# Patient Record
Sex: MALE | Race: BLACK OR AFRICAN AMERICAN | Employment: UNEMPLOYED | URBAN - METROPOLITAN AREA
[De-identification: names, ages, dates, MRNs, and addresses within clinical notes are randomized per-mention and may not be internally consistent; named-entity substitution may affect disease eponyms.]

---

## 2020-02-21 ENCOUNTER — OFFICE VISIT (OUTPATIENT)
Dept: SURGERY | Age: 65
End: 2020-02-21

## 2020-02-21 VITALS
DIASTOLIC BLOOD PRESSURE: 81 MMHG | SYSTOLIC BLOOD PRESSURE: 166 MMHG | HEIGHT: 71 IN | WEIGHT: 178 LBS | RESPIRATION RATE: 16 BRPM | TEMPERATURE: 98.2 F | BODY MASS INDEX: 24.92 KG/M2 | HEART RATE: 66 BPM | OXYGEN SATURATION: 99 %

## 2020-02-21 DIAGNOSIS — K40.90 RIGHT INGUINAL HERNIA: Primary | ICD-10-CM

## 2020-02-21 DIAGNOSIS — F19.10 DRUG ABUSE, IV (HCC): ICD-10-CM

## 2020-02-21 DIAGNOSIS — F10.10 ALCOHOL ABUSE: ICD-10-CM

## 2020-02-21 DIAGNOSIS — K70.30 ALCOHOLIC CIRRHOSIS OF LIVER WITHOUT ASCITES (HCC): ICD-10-CM

## 2020-02-21 NOTE — H&P
General Surgery Consult      Burgess Monk  Admit date: (Not on file)    MRN: [id number]     : 1955     Age: 75 y.o. Attending Physician: Hoa Keller MD [facility]      History of Present Illness:     Burgess Monk is a 75 y.o. male who has multiple medical problems including history of IV drug use and alcohol abuse as well. The patient was diagnosed with a symptomatic large right inguinal hernia for which she was scheduled to have the surgery at [place]. The patient also has evidence of cirrhosis on a CT scan but with no ascites and also there was a liver mass about 2 cm for which they decided to do an MRI and follow-up with medical oncology. The MRI was not done yet but the patient was scheduled for the surgery and it was canceled last minute because his insurance was not taken at the [facility].  So he was referred to me for the surgical evaluation. In addition to the right groin pain the patient has stated that he has sciatica on the right lower extremity with significant right hip and back pain and with the pain radiating all the way to his right lower extremity. The patient stated that his right groin pain is intermittent and that the bulge is most of the time out but it can be reduced when he laid back.      Patient Active Problem List    Diagnosis Date Noted    Leukopenia 2012    Iron deficiency anemia, unspecified 2012    Epidural abscess, L2-L5 2012    Drug abuse, IV ([address] 2012     Past Medical History:   Diagnosis Date    Abscess of right shoulder     Abscess of shoulder     Epidural abscess     Heart murmur     Hematuria     Heroin abuse ([address]     Infected wound     Infectious disease     Iron deficiency anemia     IV drug user     Tobacco abuse       Past Surgical History:   Procedure Laterality Date    HX COLONOSCOPY  2016    HX CYST INCISION AND DRAINAGE      HX CYST INCISION AND DRAINAGE      right shoulder    HX ENDOSCOPY 07/18/2016    HX FREE SKIN GRAFT      HX OTHER SURGICAL      right forearm infections and graft      Social History     Tobacco Use    Smoking status: Current Every Day Smoker     Packs/day: 0.50     Years: 38.00     Pack years: 19.00     Types: Cigarettes    Smokeless tobacco: Never Used   Substance Use Topics    Alcohol use: Yes      Social History     Tobacco Use   Smoking Status Current Every Day Smoker    Packs/day: 0.50    Years: 38.00    Pack years: 19.00    Types: Cigarettes   Smokeless Tobacco Never Used     No family history on file. Current Outpatient Medications   Medication Sig    amLODIPine (NORVASC) 10 mg tablet Take  by mouth daily.  ergocalciferol (VITAMIN D2) 50,000 unit capsule Take 50,000 Units by mouth.  cyclobenzaprine (FLEXERIL) 10 mg tablet Take  by mouth three (3) times daily as needed for Muscle Spasm(s).  fluconazole (DIFLUCAN) 100 mg tablet Take 200 mg by mouth daily. FDA advises cautious prescribing of oral fluconazole in pregnancy.  gabapentin (NEURONTIN) 300 mg capsule Take 300 mg by mouth three (3) times daily.  lidocaine (LIDODERM) 5 % by TransDERmal route every twenty-four (24) hours. Apply patch to the affected area for 12 hours a day and remove for 12 hours a day.  thiamine HCL (B-1) 100 mg tablet Take  by mouth daily.  HYDROcodone-acetaminophen (NORCO) 5-325 mg per tablet Take 1 Tab by mouth every four (4) hours as needed for Pain. No current facility-administered medications for this visit.        No Known Allergies     Review of Systems:  Constitutional: negative  Eyes: negative  Ears, Nose, Mouth, Throat, and Face: negative  Respiratory: negative  Cardiovascular: negative  Gastrointestinal: positive for abdominal pain and Right groin pain and bulge  Genitourinary:negative  Integument/Breast: negative  Hematologic/Lymphatic: negative  Musculoskeletal:positive for back pain and Right hip pain and sciatica however the pain radiating to his right lower extremity including the thigh. Neurological: negative  Behavioral/Psychiatric: negative  Endocrine: negative  Allergic/Immunologic: negative    Objective:     Visit Vitals  /81   Pulse 66   Temp 98.2 °F (36.8 °C) (Oral)   Resp 16   Ht 5' 11\" (1.803 m)   Wt 80.7 kg (178 lb)   SpO2 99%   BMI 24.83 kg/m²       Physical Exam:      General:  in no apparent distress, alert and oriented times 3   Eyes:  conjunctivae and sclerae normal, pupils equal, round, reactive to light   Throat & Neck: no erythema or exudates noted and neck supple and symmetrical; no palpable masses   Lungs:   clear to auscultation bilaterally   Heart:  Regular rate and rhythm   Abdomen:   rounded, soft, nontender, nondistended, no masses or organomegaly. There is no evidence of ascites or abdominal wall hernias except for a large right inguinal hernia that is tender to palpation but reducible with gentle continuous pressure.     Extremities: extremities normal, atraumatic, no cyanosis or edema   Skin: Normal.       Imaging and Lab Review:     CBC:   Lab Results   Component Value Date/Time    WBC 2.7 (L) 09/05/2012 01:30 AM    RBC 4.19 (L) 09/05/2012 01:30 AM    HGB 10.6 (L) 09/05/2012 01:30 AM    HCT 33.2 (L) 09/05/2012 01:30 AM    PLATELET 142 (L) 11/95/9308 01:30 AM     BMP:   Lab Results   Component Value Date/Time    Glucose 95 08/27/2012 04:00 AM    Sodium 136 08/27/2012 04:00 AM    Potassium 4.2 08/27/2012 04:00 AM    Chloride 102 08/27/2012 04:00 AM    CO2 30 08/27/2012 04:00 AM    BUN 10 08/27/2012 04:00 AM    Creatinine 1.0 09/04/2012 07:00 AM    Calcium 10.0 08/27/2012 04:00 AM     CMP:  Lab Results   Component Value Date/Time    Glucose 95 08/27/2012 04:00 AM    Sodium 136 08/27/2012 04:00 AM    Potassium 4.2 08/27/2012 04:00 AM    Chloride 102 08/27/2012 04:00 AM    CO2 30 08/27/2012 04:00 AM    BUN 10 08/27/2012 04:00 AM    Creatinine 1.0 09/04/2012 07:00 AM    Calcium 10.0 08/27/2012 04:00 AM    Anion gap 4 (L) 08/27/2012 04:00 AM    BUN/Creatinine ratio 10 (L) 08/27/2012 04:00 AM    Alk. phosphatase 86 09/11/2010 02:05 AM    Protein, total 6.9 09/11/2010 02:05 AM    Albumin 2.2 (L) 09/11/2010 02:05 AM    Globulin 4.7 (H) 09/11/2010 02:05 AM    A-G Ratio 0.5 (L) 09/11/2010 02:05 AM       No results found for this or any previous visit (from the past 24 hour(s)). images and reports reviewed    Assessment:   Vera Bloom is a 75 y.o. male is presenting with a picture of a symptomatic right inguinal hernia. I agree with the surgeon at [facility] that the patient needs surgery because it is symptomatic and it is tender to palpation however the patient also has a lot of medical condition I explained to him the risks of the surgery. I also explained to him that his right lower extremity pain has nothing to do with the hernia and this will never go away after the surgery. I also explained to him the importance of stop drinking alcohol and the use of IV drugs. I Discussed the possibility of incarceration, strangulation, enlargement in size over time, and the risk of emergency surgery in the face of strangulation. I also discussed the use of prosthetic materials (mesh), including the risk of infection. Also discussed the risk of surgery including recurrence and the possible need for reoperation and removal of mesh if used, possibility of postoperative small bowel injury, obstruction or ileus, and the risks of general anesthetic. I explained to the the patient about the robotic hernia repair procedure. I also explained to the patient that it is a must that he follow-up with PCP and to follow-up on the MRI that was ordered for his liver because of the mass that was discovered. I explained to him that this could be malignant and he stated that he knows about it. Plan:     Schedule for robotic right inguinal hernia repair with placement of mesh.   Stop drinking alcohol and use of IV drugs  Follow-up with his PCP and medical oncology for his liver mass  Follow-up with the GI for his liver cirrhosis    Please call me if you have any questions (cell phone: [phone])     Signed By: Sylvester Chand MD     February 21, 2020

## 2020-03-02 NOTE — PERIOP NOTES
PAT - SURGICAL PRE-ADMISSION INSTRUCTIONS    NAME:  Gilberto Crowley                                                          TODAY'S DATE:  3/2/2020    SURGERY DATE:  3/6/2020                                  SURGERY ARRIVAL TIME:   TBA    1. Do NOT eat or drink anything, including candy or gum, after MIDNIGHT on 03/05 , unless you have specific instructions from your Surgeon or Anesthesia Provider to do so. 2. No smoking on the day of surgery. 3. No alcohol 24 hours prior to the day of surgery. 4. No recreational drugs for one week prior to the day of surgery. 5. Leave all valuables, including money/purse, at home. 6. Remove all jewelry, nail polish, makeup (including mascara); no lotions, powders, deodorant, or perfume/cologne/after shave. 7. Glasses/Contact lenses and Dentures may be worn to the hospital.  They will be removed prior to surgery. 8. Call your doctor if symptoms of a cold or illness develop within 24 ours prior to surgery. 9. AN ADULT MUST DRIVE YOU HOME AFTER OUTPATIENT SURGERY. 10. If you are having an OUTPATIENT procedure, please make arrangements for a responsible adult to be with you for 24 hours after your surgery. 11. If you are admitted to the hospital, you will be assigned to a bed after surgery is complete. Normally a family member will not be able to see you until you are in your assigned bed. 15. Family is encouraged to accompany you to the hospital.  We ask visitors in the treatment area to be limited to ONE person at a time to ensure patient privacy. EXCEPTIONS WILL BE MADE AS NEEDED. 15. Children under 12 are discouraged from entering the treatment area and need to be supervised by an adult when in the waiting room. Special Instructions:    NONE. Patient Prep:    shower with anti-bacterial soap    These surgical instructions were reviewed with Herman Pereira during the PAT phone call. Directions: On the morning of surgery, please go to the [address] [facility]. Enter the building from the [facility] entrance, 1st floor (next to the Emergency Room entrance). Take the elevator to the 2nd floor. Sign in at the Registration Desk.     If you have any questions and/or concerns, please do not hesitate to call:  (Prior to the day of surgery)  [facility] unit:  [phone]  (Day of surgery)  [facility] unit:  [phone]

## 2020-03-04 ENCOUNTER — ANESTHESIA EVENT (OUTPATIENT)
Dept: SURGERY | Age: 65
End: 2020-03-04
Payer: MEDICARE

## 2020-03-06 ENCOUNTER — ANESTHESIA (OUTPATIENT)
Dept: SURGERY | Age: 65
End: 2020-03-06
Payer: MEDICARE

## 2020-03-06 ENCOUNTER — HOSPITAL ENCOUNTER (OUTPATIENT)
Age: 65
Setting detail: OUTPATIENT SURGERY
Discharge: HOME | End: 2020-03-06
Attending: SURGERY | Admitting: SURGERY
Payer: MEDICARE

## 2020-03-06 VITALS
OXYGEN SATURATION: 98 % | SYSTOLIC BLOOD PRESSURE: 150 MMHG | RESPIRATION RATE: 12 BRPM | WEIGHT: 177.8 LBS | TEMPERATURE: 97.6 F | HEART RATE: 59 BPM | BODY MASS INDEX: 24.89 KG/M2 | HEIGHT: 71 IN | DIASTOLIC BLOOD PRESSURE: 72 MMHG

## 2020-03-06 DIAGNOSIS — Z98.890 S/P HERNIA REPAIR: Primary | ICD-10-CM

## 2020-03-06 DIAGNOSIS — Z87.19 S/P HERNIA REPAIR: Primary | ICD-10-CM

## 2020-03-06 LAB
AMPHET UR QL SCN: NEGATIVE
BARBITURATES UR QL SCN: NEGATIVE
BENZODIAZ UR QL: NEGATIVE
BUN BLD-MCNC: 11 MG/DL (ref 7–18)
CANNABINOIDS UR QL SCN: NEGATIVE
CHLORIDE BLD-SCNC: 112 MMOL/L (ref 100–108)
COCAINE UR QL SCN: NEGATIVE
GLUCOSE BLD STRIP.AUTO-MCNC: 91 MG/DL (ref 74–106)
HCT VFR BLD CALC: 46 % (ref 36–49)
HDSCOM,HDSCOM: ABNORMAL
HGB BLD-MCNC: 15.6 G/DL (ref 12–16)
METHADONE UR QL: NEGATIVE
OPIATES UR QL: POSITIVE
PCP UR QL: NEGATIVE
POTASSIUM BLD-SCNC: 4.3 MMOL/L (ref 3.5–5.5)
SODIUM BLD-SCNC: 138 MMOL/L (ref 136–145)

## 2020-03-06 PROCEDURE — 74011250636 HC RX REV CODE- 250/636: Performed by: SURGERY

## 2020-03-06 PROCEDURE — 76210000021 HC REC RM PH II 0.5 TO 1 HR: Performed by: SURGERY

## 2020-03-06 PROCEDURE — 74011250637 HC RX REV CODE- 250/637: Performed by: NURSE ANESTHETIST, CERTIFIED REGISTERED

## 2020-03-06 PROCEDURE — 80307 DRUG TEST PRSMV CHEM ANLYZR: CPT

## 2020-03-06 PROCEDURE — 76060000033 HC ANESTHESIA 1 TO 1.5 HR: Performed by: SURGERY

## 2020-03-06 PROCEDURE — 77030020703 HC SEAL CANN DISP INTU -B: Performed by: SURGERY

## 2020-03-06 PROCEDURE — 77030002933 HC SUT MCRYL J&J -A: Performed by: SURGERY

## 2020-03-06 PROCEDURE — 74011250636 HC RX REV CODE- 250/636: Performed by: NURSE ANESTHETIST, CERTIFIED REGISTERED

## 2020-03-06 PROCEDURE — 93005 ELECTROCARDIOGRAM TRACING: CPT

## 2020-03-06 PROCEDURE — C1781 MESH (IMPLANTABLE): HCPCS | Performed by: SURGERY

## 2020-03-06 PROCEDURE — 82947 ASSAY GLUCOSE BLOOD QUANT: CPT

## 2020-03-06 PROCEDURE — 77030035277 HC OBTRTR BLDELSS DISP INTU -B: Performed by: SURGERY

## 2020-03-06 PROCEDURE — 74011000250 HC RX REV CODE- 250: Performed by: NURSE ANESTHETIST, CERTIFIED REGISTERED

## 2020-03-06 PROCEDURE — 77030032490 HC SLV COMPR SCD KNE COVD -B: Performed by: SURGERY

## 2020-03-06 PROCEDURE — 74011000250 HC RX REV CODE- 250: Performed by: SURGERY

## 2020-03-06 PROCEDURE — 77030031139 HC SUT VCRL2 J&J -A: Performed by: SURGERY

## 2020-03-06 PROCEDURE — 74011250637 HC RX REV CODE- 250/637: Performed by: ANESTHESIOLOGY

## 2020-03-06 PROCEDURE — 76010000934 HC OR TIME 1 TO 1.5HR INTENSV - TIER 2: Performed by: SURGERY

## 2020-03-06 PROCEDURE — 77030009848 HC PASSR SUT SET COOP -C: Performed by: SURGERY

## 2020-03-06 PROCEDURE — 77030018842 HC SOL IRR SOD CL 9% BAXT -A: Performed by: SURGERY

## 2020-03-06 PROCEDURE — 76210000016 HC OR PH I REC 1 TO 1.5 HR: Performed by: SURGERY

## 2020-03-06 PROCEDURE — 77030022704 HC SUT VLOC COVD -B: Performed by: SURGERY

## 2020-03-06 PROCEDURE — 77030010507 HC ADH SKN DERMBND J&J -B: Performed by: SURGERY

## 2020-03-06 DEVICE — LAPAROSCOPIC SELF-FIXATING MESH POLYESTER WITH POLYLACTIC ACID GRIPS AND COLLAGEN FILM
Type: IMPLANTABLE DEVICE | Site: INGUINAL | Status: FUNCTIONAL
Brand: PROGRIP

## 2020-03-06 RX ORDER — FENTANYL CITRATE 50 UG/ML
INJECTION, SOLUTION INTRAMUSCULAR; INTRAVENOUS AS NEEDED
Status: DISCONTINUED | OUTPATIENT
Start: 2020-03-06 | End: 2020-03-06 | Stop reason: HOSPADM

## 2020-03-06 RX ORDER — PROPOFOL 10 MG/ML
INJECTION, EMULSION INTRAVENOUS AS NEEDED
Status: DISCONTINUED | OUTPATIENT
Start: 2020-03-06 | End: 2020-03-06 | Stop reason: HOSPADM

## 2020-03-06 RX ORDER — MAGNESIUM SULFATE 100 %
16 CRYSTALS MISCELLANEOUS AS NEEDED
Status: DISCONTINUED | OUTPATIENT
Start: 2020-03-06 | End: 2020-03-06 | Stop reason: HOSPADM

## 2020-03-06 RX ORDER — VECURONIUM BROMIDE FOR INJECTION 1 MG/ML
INJECTION, POWDER, LYOPHILIZED, FOR SOLUTION INTRAVENOUS AS NEEDED
Status: DISCONTINUED | OUTPATIENT
Start: 2020-03-06 | End: 2020-03-06 | Stop reason: HOSPADM

## 2020-03-06 RX ORDER — FAMOTIDINE 20 MG/1
20 TABLET, FILM COATED ORAL ONCE
Status: COMPLETED | OUTPATIENT
Start: 2020-03-06 | End: 2020-03-06

## 2020-03-06 RX ORDER — HYDROMORPHONE HYDROCHLORIDE 2 MG/ML
0.5 INJECTION, SOLUTION INTRAMUSCULAR; INTRAVENOUS; SUBCUTANEOUS AS NEEDED
Status: DISCONTINUED | OUTPATIENT
Start: 2020-03-06 | End: 2020-03-06 | Stop reason: HOSPADM

## 2020-03-06 RX ORDER — OXYCODONE AND ACETAMINOPHEN 5; 325 MG/1; MG/1
2 TABLET ORAL
Status: COMPLETED | OUTPATIENT
Start: 2020-03-06 | End: 2020-03-06

## 2020-03-06 RX ORDER — KETOROLAC TROMETHAMINE 10 MG/1
10 TABLET, FILM COATED ORAL
Status: COMPLETED | OUTPATIENT
Start: 2020-03-06 | End: 2020-03-06

## 2020-03-06 RX ORDER — SODIUM CHLORIDE, SODIUM LACTATE, POTASSIUM CHLORIDE, CALCIUM CHLORIDE 600; 310; 30; 20 MG/100ML; MG/100ML; MG/100ML; MG/100ML
50 INJECTION, SOLUTION INTRAVENOUS CONTINUOUS
Status: DISCONTINUED | OUTPATIENT
Start: 2020-03-06 | End: 2020-03-06 | Stop reason: HOSPADM

## 2020-03-06 RX ORDER — GLYCOPYRROLATE 0.2 MG/ML
INJECTION INTRAMUSCULAR; INTRAVENOUS AS NEEDED
Status: DISCONTINUED | OUTPATIENT
Start: 2020-03-06 | End: 2020-03-06 | Stop reason: HOSPADM

## 2020-03-06 RX ORDER — INSULIN LISPRO 100 [IU]/ML
INJECTION, SOLUTION INTRAVENOUS; SUBCUTANEOUS ONCE
Status: DISCONTINUED | OUTPATIENT
Start: 2020-03-06 | End: 2020-03-06 | Stop reason: HOSPADM

## 2020-03-06 RX ORDER — NEOSTIGMINE METHYLSULFATE 1 MG/ML
INJECTION, SOLUTION INTRAVENOUS AS NEEDED
Status: DISCONTINUED | OUTPATIENT
Start: 2020-03-06 | End: 2020-03-06 | Stop reason: HOSPADM

## 2020-03-06 RX ORDER — HYDROMORPHONE HYDROCHLORIDE 1 MG/ML
INJECTION, SOLUTION INTRAMUSCULAR; INTRAVENOUS; SUBCUTANEOUS
Status: DISCONTINUED
Start: 2020-03-06 | End: 2020-03-06 | Stop reason: HOSPADM

## 2020-03-06 RX ORDER — ONDANSETRON 2 MG/ML
INJECTION INTRAMUSCULAR; INTRAVENOUS AS NEEDED
Status: DISCONTINUED | OUTPATIENT
Start: 2020-03-06 | End: 2020-03-06 | Stop reason: HOSPADM

## 2020-03-06 RX ORDER — CEFAZOLIN SODIUM 2 G/50ML
2 SOLUTION INTRAVENOUS
Status: COMPLETED | OUTPATIENT
Start: 2020-03-06 | End: 2020-03-06

## 2020-03-06 RX ORDER — OXYCODONE AND ACETAMINOPHEN 5; 325 MG/1; MG/1
1 TABLET ORAL
Qty: 12 TAB | Refills: 0 | Status: SHIPPED | OUTPATIENT
Start: 2020-03-06 | End: 2020-03-09

## 2020-03-06 RX ORDER — LIDOCAINE HYDROCHLORIDE 20 MG/ML
INJECTION, SOLUTION EPIDURAL; INFILTRATION; INTRACAUDAL; PERINEURAL AS NEEDED
Status: DISCONTINUED | OUTPATIENT
Start: 2020-03-06 | End: 2020-03-06 | Stop reason: HOSPADM

## 2020-03-06 RX ORDER — SODIUM CHLORIDE 0.9 % (FLUSH) 0.9 %
5-40 SYRINGE (ML) INJECTION AS NEEDED
Status: DISCONTINUED | OUTPATIENT
Start: 2020-03-06 | End: 2020-03-06 | Stop reason: HOSPADM

## 2020-03-06 RX ORDER — SUCCINYLCHOLINE CHLORIDE 100 MG/5ML
SYRINGE (ML) INTRAVENOUS AS NEEDED
Status: DISCONTINUED | OUTPATIENT
Start: 2020-03-06 | End: 2020-03-06 | Stop reason: HOSPADM

## 2020-03-06 RX ORDER — DEXAMETHASONE SODIUM PHOSPHATE 4 MG/ML
INJECTION, SOLUTION INTRA-ARTICULAR; INTRALESIONAL; INTRAMUSCULAR; INTRAVENOUS; SOFT TISSUE AS NEEDED
Status: DISCONTINUED | OUTPATIENT
Start: 2020-03-06 | End: 2020-03-06 | Stop reason: HOSPADM

## 2020-03-06 RX ORDER — SODIUM CHLORIDE 0.9 % (FLUSH) 0.9 %
5-40 SYRINGE (ML) INJECTION EVERY 8 HOURS
Status: DISCONTINUED | OUTPATIENT
Start: 2020-03-06 | End: 2020-03-06 | Stop reason: HOSPADM

## 2020-03-06 RX ORDER — METOPROLOL TARTRATE 5 MG/5ML
INJECTION INTRAVENOUS AS NEEDED
Status: DISCONTINUED | OUTPATIENT
Start: 2020-03-06 | End: 2020-03-06 | Stop reason: HOSPADM

## 2020-03-06 RX ORDER — MIDAZOLAM HYDROCHLORIDE 1 MG/ML
INJECTION, SOLUTION INTRAMUSCULAR; INTRAVENOUS AS NEEDED
Status: DISCONTINUED | OUTPATIENT
Start: 2020-03-06 | End: 2020-03-06 | Stop reason: HOSPADM

## 2020-03-06 RX ORDER — ONDANSETRON 2 MG/ML
4 INJECTION INTRAMUSCULAR; INTRAVENOUS ONCE
Status: DISCONTINUED | OUTPATIENT
Start: 2020-03-06 | End: 2020-03-06 | Stop reason: HOSPADM

## 2020-03-06 RX ADMIN — GLYCOPYRROLATE 0.2 MG: 0.2 INJECTION INTRAMUSCULAR; INTRAVENOUS at 09:47

## 2020-03-06 RX ADMIN — OXYCODONE HYDROCHLORIDE AND ACETAMINOPHEN 2 TABLET: 5; 325 TABLET ORAL at 11:46

## 2020-03-06 RX ADMIN — CEFAZOLIN SODIUM 2 G: 2 SOLUTION INTRAVENOUS at 08:53

## 2020-03-06 RX ADMIN — VECURONIUM BROMIDE FOR INJECTION 2 MG: 1 INJECTION, POWDER, LYOPHILIZED, FOR SOLUTION INTRAVENOUS at 09:05

## 2020-03-06 RX ADMIN — Medication 100 MG: at 08:57

## 2020-03-06 RX ADMIN — ONDANSETRON 4 MG: 2 SOLUTION INTRAMUSCULAR; INTRAVENOUS at 09:50

## 2020-03-06 RX ADMIN — FAMOTIDINE 20 MG: 20 TABLET ORAL at 07:58

## 2020-03-06 RX ADMIN — DEXAMETHASONE SODIUM PHOSPHATE 4 MG: 4 INJECTION, SOLUTION INTRA-ARTICULAR; INTRALESIONAL; INTRAMUSCULAR; INTRAVENOUS; SOFT TISSUE at 09:44

## 2020-03-06 RX ADMIN — VECURONIUM BROMIDE FOR INJECTION 1 MG: 1 INJECTION, POWDER, LYOPHILIZED, FOR SOLUTION INTRAVENOUS at 08:57

## 2020-03-06 RX ADMIN — LIDOCAINE HYDROCHLORIDE 60 MG: 20 INJECTION, SOLUTION INTRAVENOUS at 08:57

## 2020-03-06 RX ADMIN — KETOROLAC TROMETHAMINE 10 MG: 10 TABLET, FILM COATED ORAL at 12:02

## 2020-03-06 RX ADMIN — DEXMEDETOMIDINE: 100 INJECTION, SOLUTION, CONCENTRATE INTRAVENOUS at 09:08

## 2020-03-06 RX ADMIN — DEXMEDETOMIDINE: 100 INJECTION, SOLUTION, CONCENTRATE INTRAVENOUS at 08:07

## 2020-03-06 RX ADMIN — MIDAZOLAM 2 MG: 1 INJECTION INTRAMUSCULAR; INTRAVENOUS at 08:52

## 2020-03-06 RX ADMIN — SODIUM CHLORIDE, SODIUM LACTATE, POTASSIUM CHLORIDE, AND CALCIUM CHLORIDE 50 ML/HR: 600; 310; 30; 20 INJECTION, SOLUTION INTRAVENOUS at 08:00

## 2020-03-06 RX ADMIN — Medication 2 MG: at 09:47

## 2020-03-06 RX ADMIN — HYDROMORPHONE HYDROCHLORIDE 0.5 MG: 2 INJECTION, SOLUTION INTRAMUSCULAR; INTRAVENOUS; SUBCUTANEOUS at 10:30

## 2020-03-06 RX ADMIN — HYDROMORPHONE HYDROCHLORIDE 0.5 MG: 2 INJECTION, SOLUTION INTRAMUSCULAR; INTRAVENOUS; SUBCUTANEOUS at 10:50

## 2020-03-06 RX ADMIN — PROPOFOL 200 MG: 10 INJECTION, EMULSION INTRAVENOUS at 08:57

## 2020-03-06 RX ADMIN — FENTANYL CITRATE 100 MCG: 50 INJECTION, SOLUTION INTRAMUSCULAR; INTRAVENOUS at 08:57

## 2020-03-06 RX ADMIN — HYDROMORPHONE HYDROCHLORIDE 0.5 MG: 2 INJECTION, SOLUTION INTRAMUSCULAR; INTRAVENOUS; SUBCUTANEOUS at 10:20

## 2020-03-06 RX ADMIN — METOPROLOL TARTRATE 1 MG: 5 INJECTION INTRAVENOUS at 09:15

## 2020-03-06 NOTE — ANESTHESIA PREPROCEDURE EVALUATION
Relevant Problems   No relevant active problems       Anesthetic History   No history of anesthetic complications            Review of Systems / Medical History  Patient summary reviewed, nursing notes reviewed and pertinent labs reviewed    Pulmonary          Smoker         Neuro/Psych             Comments: + Hx/o Polysubstance Abuse/IVDA, denies recent heroin or cocaine. .. Cardiovascular    Hypertension                Comments:   + PULMONARY HTN    1/27/2019 2D ECHO:  Result Impression  :   NORMAL LEFT VENTRICULAR CAVITY SIZE WITH MARKED CONCENTRIC LEFT VENTRICULAR HYPERTROPHY. NORMAL LEFT VENTRICULAR EJECTION FRACTION ESTIMATED AT 65% WITH NO WALL MOTION   ABNORMALITIES. MILD DIASTOLIC DYSFUNCTION. NORMAL RIGHT VENTRICULAR SIZE WITH NORMAL SYSTOLIC FUNCTION. NO HEMODYNAMICALLY SIGNIFICANT VALVULAR PATHOLOGY. ESTIMATED PULMONARY ARTERY PRESSURE IS 40 MMHG. COMPARED TO PRIOR ECHO FROM 7/16 THE EJECTION FRACTION HAS DECREASED BY 10% AND THE   PULMONARY ARTERY PRESSURE HAS INCREASED BY 17 MMHG.        Clinical Indications: Chest pain   ICD Codes: Technical Quality: Adequate     MEASUREMENTS:   2D ECHO    LV Diastolic Diameter Base LX     4.1 cm                8.9-2.1   LV Systolic Diameter Base LX      2.2 cm                2.5-4.1   IVS Diastolic Thickness           1.8 cm                0.6-1.1 cm   LVPW Diastolic Thickness          1.3 cm                6.0-5.8 cm   LA Systolic Diameter LX           3.1 cm                2.1-3.7 cm   Aortic Root Diameter              2.9 cm                6.0-9.8 cm   LA Systolic Pressure              22.2 mmHg   LA Area 4C View                   13.5 cm²   LA Area 2C View                   14.3 cm²   LA Length 4C                      5.3 cm   LA Length 2C                      5 cm   LA Volume                         27.2 cm³     DOPPLER    Mitral E Point Velocity           95.2 cm/s   Mitral  A Point Velocity          103 cm/s   Mitral E to A Ratio               0.92                  1.0 to 1.5   Mitral E to LV E' Septal Ratio    12.9   Mitral E to LV E' Lateral Ratio   16.2   MV Deceleration Time              285 ms                160-240 ms   MV Area PHT                       2.6 cm²   Isovolumic Relaxation Time        111 ms                70-90 ms   Pulm Vein Atrial Reversal Veloci  31.6 cm/s             <35 cm/s   Pulm Vein Atrial Duration         124 ms   E Prime Velocity                  5.9 cm/s   RA Pressure (Entered Value)       3 mmHg   TR Peak Velocity                  3 m/s   TR Peak Gradient                  31 mmHg   RV Systolic Pressure              40 mmHg       FINDINGS:     Left Ventricle: Normal left ventricular cavity size with marked concentric hypertrophy. Left Ventricle Normal left ventricular ejection fraction. Mild diastolic dysfunction. Function:   LVEF: 65 %       Left Atrium: Normal left atrial size. Right Ventricle: Normal right ventricular size with normal systolic function. Tricuspid Annular Plane Systolic   Excursion (TAPSE) is 2.6 cm. Tricuspid Annular Plane Systolic Velocity (TAPSV) is 16 cm/s. Right Atrium: Normal right atrial size. Normal inferior vena cava (IVC)/hepatic vein. IVC collapses >50% with   inspiration consistent with normal venous pressure. Mitral Valve: Thickening/fibrosis of the mitral valve leaflets with no evidence of stenosis. Mild mitral   regurgitation. Aortic Valve: Structurally normal aortic valve with no evidence of stenosis or regurgitation. Tricuspid Valve: Normally structured tricuspid valve with no evidence of stenosis. Mild tricuspid regurgitation. Estimated pulmonary artery pressure is 40 mmHg. Pulmonic Valve: Structurally normal pulmonic valve. No evidence of valvular pulmonic stenosis. Mild pulmonic   regurgitation. Aorta: Normal aortic root diameter. Pericardium: No pericardial effusion. Masses / Shunts: No masses, shunts or thrombi seen.        Anay Lynn MD, [facility] GI/Hepatic/Renal       Hepatitis: type C    Liver disease    Comments: Recent CT scan (1/21/20) shows CIRRHOSIS and Splenomegaly. .. Hx/o Hyponatremia Endo/Other        Arthritis and anemia     Other Findings   Comments: + Hx/o Polysubstance Abuse/IVDA, denies recent heroin or cocaine. .. UDS only positive for opiates today, thus will proceed. .. Physical Exam    Airway  Mallampati: II  TM Distance: 4 - 6 cm  Neck ROM: normal range of motion   Mouth opening: Normal     Cardiovascular    Rhythm: regular  Rate: normal         Dental    Dentition: Poor dentition  Comments: Patient has only two remaining teeth, both cracked/chipped, incisors on lower arcade. ..    Pulmonary  Breath sounds clear to auscultation               Abdominal  GI exam deferred       Other Findings            Anesthetic Plan    ASA: 3  Anesthesia type: general          Induction: Intravenous  Anesthetic plan and risks discussed with: Patient

## 2020-03-06 NOTE — OP NOTES
[facility]  OPERATIVE REPORT    Name:  Otilio Kline  MR#:   [id number]  :  1955  ACCOUNT #:  [de-identified]  DATE OF SERVICE:  2020    PREOPERATIVE DIAGNOSIS:  Right inguinal hernia. POSTOPERATIVE DIAGNOSIS:  Right indirect inguinal hernia. PROCEDURE PERFORMED:  Robotic repair of right inguinal hernia with placement of mesh. SURGEON:  Patricia Rueda MD    ASSISTANT:  Carolyne Gomes. ANESTHESIA:  General.    COMPLICATIONS:  None. SPECIMENS REMOVED:  None. IMPLANTS:  ProGrip Covidien mesh 4 x 6 inches. ESTIMATED BLOOD LOSS:  Minimal.    DETAILS OF PROCEDURE:  The patient was brought to operating room. Anesthesia was induced. Scrubbing and draping of the abdomen was done in usual manner. A time-out was performed. A skin incision in the supraumbilical area was performed. Veress needle was inserted. Saline drop test was performed. Abdomen was insufflated. An 8.5 port was inserted. Abdomen was explored. There was what seems to be a macronodular liver cirrhosis. There was no ascites in the abdomen. There was also a very large right indirect inguinal hernia with part of the small bowel inside of it. Two other 8 mm ports were placed on the right and left side of the abdominal wall under direct visualization after TAP block was performed bilaterally. The patient was placed in Trendelenburg position. The robot was docked. It was easy to reduce the bowel from the hernia sac itself. Then, the peritoneum was opened in the right groin. The preperitoneal space was dissected. The inferior epigastric vessels were identified and protected. The hernia sac was completely reduced. At this point, the Ruel ligament was identified and the vas and cord structure were protected as well. A 4 x 6 inch ProGrip Covidien mesh was placed in the preperitoneal space.   This was fixed with interrupted 2-0 Vicryl sutures and then the peritoneum was closed with a 2-0 V-Loc suture in a running fashion on top of the mesh to cover it. Hemostasis was secured. Instruments were removed. Robot was undocked and the skin incisions were closed with 4-0 Monocryl and glue.       Scar Schultz MD      [id number]  D:  03/06/2020 10:03  T:  03/06/2020 18:29  JOB #:  [id number]

## 2020-03-06 NOTE — PROGRESS NOTES
Date of Surgery Update:  Tammie Ortega was seen and examined. History and physical has been reviewed. The patient has been examined. There have been no significant clinical changes since the completion of the originally dated History and Physical. Will proceed with robotic right inguinal hernia repair with mesh if urology screen is negative.     Signed By: Kelsi Cameron MD     March 6, 2020 8:24 AM

## 2020-03-06 NOTE — DISCHARGE INSTRUCTIONS
Discharge Instructions Following Surgery    Patient: Carisa Rossi MRN: [id number]  SSN: [id number]    YOB: 1955  Age: 75 y.o. Sex: male      Activity  · As tolerated, walking encourage, stairs are okay. · Avoid strenuous activities - no lifting anything heavier than 15 pounds till seen in the clinic. · You may shower at home after 24 hours. Diet  · Regular diet after nausea from the anesthetic has passed. Pain  · Take pain medication as directed by your doctor. · Call your doctor if pain is NOT relieved by medication. Wound and Dressing Care  · There is glue on the wounds. No need for any dressing care. · Apply ice packs to the area of the surgery for the first 1 to 2 days  · Apply warm compresses after 2 days for pain relieve if needed    After Anesthesia  · For the first 24 hours: DO NOT Drive, Drink alcoholic beverages, or Make important decisions. · Be aware of dizziness following anesthesia and while taking pain medication. Call your doctor if  · Excessive bleeding that does not stop after holding mild pressure over the area. · Temperature of 101 degrees F or above. · Redness,excessive swelling or bruising, and/or green or yellow, smelly discharge from incision. · If nausea and vomiting continues. Appointment date/time Follow-Up Phone Calls    · Call the office at [phone] to make your follow-up appointment in 2 weeks after the surgery (if not already set up) . Dr. Cris Lutz cell phone number is [phone]. Please call me if you have any concerns or questions.      DISCHARGE SUMMARY from Nurse    PATIENT INSTRUCTIONS:    After general anesthesia or intravenous sedation, for 24 hours or while taking prescription Narcotics:  · Limit your activities  · Do not drive and operate hazardous machinery  · Do not make important personal or business decisions  · Do  not drink alcoholic beverages  · If you have not urinated within 8 hours after discharge, please contact your surgeon on call. Report the following to your surgeon:  · Excessive pain, swelling, redness or odor of or around the surgical area  · Temperature over 100.5  · Nausea and vomiting lasting longer than 4 hours or if unable to take medications  · Any signs of decreased circulation or nerve impairment to extremity: change in color, persistent  numbness, tingling, coldness or increase pain  · Any questions    These are general instructions for a healthy lifestyle:    No smoking/ No tobacco products/ Avoid exposure to second hand smoke  Surgeon General's Warning:  Quitting smoking now greatly reduces serious risk to your health. Obesity, smoking, and sedentary lifestyle greatly increases your risk for illness    A healthy diet, regular physical exercise & weight monitoring are important for maintaining a healthy lifestyle    You may be retaining fluid if you have a history of heart failure or if you experience any of the following symptoms:  Weight gain of 3 pounds or more overnight or 5 pounds in a week, increased swelling in our hands or feet or shortness of breath while lying flat in bed. Please call your doctor as soon as you notice any of these symptoms; do not wait until your next office visit. The discharge information has been reviewed with the patient. The patient verbalized understanding. Discharge medications reviewed with the patient and appropriate educational materials and side effects teaching were provided. _______________________Patient armband removed and given to patient to take home.   Patient was informed of the privacy risks if armband lost or stolen  ____________________________________________________________________________________________________________

## 2020-03-06 NOTE — ANESTHESIA POSTPROCEDURE EVALUATION
Procedure(s):  Davinci Right inguinal hernia repair with mesh  ROBOTIC ASSISTED. general    Anesthesia Post Evaluation      Multimodal analgesia: multimodal analgesia used between 6 hours prior to anesthesia start to PACU discharge  Patient location during evaluation: PACU  Patient participation: complete - patient participated  Level of consciousness: sleepy but conscious  Pain score: 4  Pain management: adequate  Airway patency: patent  Anesthetic complications: no  Cardiovascular status: acceptable  Respiratory status: acceptable  Hydration status: acceptable  Post anesthesia nausea and vomiting:  none      Vitals Value Taken Time   /68 3/6/2020 10:25 AM   Temp 36.8 °C (98.2 °F) 3/6/2020 10:08 AM   Pulse 60 3/6/2020 10:36 AM   Resp 12 3/6/2020 10:36 AM   SpO2 96 % 3/6/2020 10:36 AM   Vitals shown include unvalidated device data.

## 2020-03-06 NOTE — PERIOP NOTES
0701 Pt states he is unable to urinate at this moment. Pt's friend is confirmed to be in the [address] [facility] opened . Pt urinated at 0830. Istat obtained. Dr. Echo Blackwood at bedside. Pt had not urinated. Pt walked to the  and urine specimen obtained. Clothing in closet.   Anesth given the [facility]

## 2020-03-06 NOTE — PERIOP NOTES
Phase 2 Recovery Summary    Report received from renita martell    Vitals:    03/06/20 1056 03/06/20 1110 03/06/20 1121 03/06/20 1131   BP: 144/72 146/69  150/72   Pulse: (!) 59 (!) 59 (!) 59 (!) 59   Resp: 12 12 12 12   Temp: 97.6 °F (36.4 °C)      SpO2: 96% 97% 97% 98%   Weight:       Height:           oriented to time, place, person and situation          Lines and Drains  Peripheral Intravenous Line:      Wound  Wound Abdomen Anterior TROCAR SITES X 3 (Active)   Dressing Status Clean, dry, and intact 3/6/2020 11:00 AM   Dressing Type 4 x 4;Topical skin adhesive/glue 3/6/2020 11:00 AM   Incision Site Well Approximated Yes 3/6/2020 11:00 AM   Drainage Amount None 3/6/2020 11:00 AM   Number of days: 0       Wound Abdomen (Active)   Number of days: 0        Patient assisted to chair with minimal assist. Pateint tolerating liquids well. Patient's family at bedside. Discharge discussed with patient and family. No questions or concerns at this time. Patient had time to ask any questions. Discharge medications reviewed with patient and appropriate educational materials and side effects teaching were provided.     Patient discharged to home with Nava Gamino RN

## 2020-03-06 NOTE — BRIEF OP NOTE
BRIEF OPERATIVE NOTE    Date of Procedure: 3/6/2020   Preoperative Diagnosis: right inguinal hernia  k40.90  Postoperative Diagnosis: right inguinal hernia  k40.90    Procedure(s):  Johanna Button Right inguinal hernia repair with mesh  ROBOTIC ASSISTED  Surgeon(s) and Role:     * Katlin Lara MD - Primary  Surgical Staff:  Circ-1: Royal Misha RN  Scrub Tech-1: Renetta Sierra  Surg Asst-1: Emogene Eastern C  Event Time In Time Out   Incision Start 0412    Incision Close 0951      Anesthesia: General   Estimated Blood Loss: Minimal.  Specimens: * No specimens in log *   Findings: Right large indirect inguinal hernia   Complications: None. Implants:   Implant Name Type Inv.  Item Serial No.  Lot No. LRB No. Used Action   MESH ABSORB SURG PROGRIP 10X15 -- PROGRIP - [id number]  MESH ABSORB SURG PROGRIP 10X15 -- PROGRIP  [employer]OVID[employer]  SURGICAL [id number] 1 Implanted

## 2020-03-07 LAB
ATRIAL RATE: 64 BPM
CALCULATED P AXIS, ECG09: 49 DEGREES
CALCULATED R AXIS, ECG10: -37 DEGREES
CALCULATED T AXIS, ECG11: 75 DEGREES
DIAGNOSIS, 93000: NORMAL
P-R INTERVAL, ECG05: 154 MS
Q-T INTERVAL, ECG07: 412 MS
QRS DURATION, ECG06: 84 MS
QTC CALCULATION (BEZET), ECG08: 425 MS
VENTRICULAR RATE, ECG03: 64 BPM

## 2022-04-08 ENCOUNTER — APPOINTMENT (OUTPATIENT)
Dept: CT IMAGING | Age: 67
End: 2022-04-08
Attending: STUDENT IN AN ORGANIZED HEALTH CARE EDUCATION/TRAINING PROGRAM
Payer: MEDICARE

## 2022-04-08 ENCOUNTER — HOSPITAL ENCOUNTER (EMERGENCY)
Age: 67
Discharge: HOME | End: 2022-04-08
Attending: STUDENT IN AN ORGANIZED HEALTH CARE EDUCATION/TRAINING PROGRAM
Payer: MEDICARE

## 2022-04-08 DIAGNOSIS — K74.60 CIRRHOSIS OF LIVER WITHOUT ASCITES, UNSPECIFIED HEPATIC CIRRHOSIS TYPE (HCC): ICD-10-CM

## 2022-04-08 DIAGNOSIS — N30.01 ACUTE CYSTITIS WITH HEMATURIA: Primary | ICD-10-CM

## 2022-04-08 DIAGNOSIS — N28.1 RENAL CYST: ICD-10-CM

## 2022-04-08 DIAGNOSIS — D64.9 ANEMIA, UNSPECIFIED TYPE: ICD-10-CM

## 2022-04-08 LAB
ALBUMIN SERPL-MCNC: 2.4 G/DL (ref 3.4–5)
ALBUMIN/GLOB SERPL: 0.5 {RATIO} (ref 0.8–1.7)
ALP SERPL-CCNC: 121 U/L (ref 45–117)
ALT SERPL-CCNC: 60 U/L (ref 16–61)
ANION GAP SERPL CALC-SCNC: 4 MMOL/L (ref 3–18)
APPEARANCE UR: ABNORMAL
APTT PPP: 38.2 SEC (ref 23–36.4)
AST SERPL-CCNC: 79 U/L (ref 10–38)
BACTERIA URNS QL MICRO: ABNORMAL /HPF
BASOPHILS # BLD: 0 K/UL (ref 0–0.1)
BASOPHILS NFR BLD: 1 % (ref 0–2)
BILIRUB DIRECT SERPL-MCNC: 0.3 MG/DL (ref 0–0.2)
BILIRUB SERPL-MCNC: 0.7 MG/DL (ref 0.2–1)
BILIRUB UR QL: ABNORMAL
BUN SERPL-MCNC: 9 MG/DL (ref 7–18)
BUN/CREAT SERPL: 9 (ref 12–20)
CALCIUM SERPL-MCNC: 9.3 MG/DL (ref 8.5–10.1)
CHLORIDE SERPL-SCNC: 106 MMOL/L (ref 100–111)
CO2 SERPL-SCNC: 27 MMOL/L (ref 21–32)
COLOR UR: ABNORMAL
CREAT SERPL-MCNC: 1.03 MG/DL (ref 0.6–1.3)
DIFFERENTIAL METHOD BLD: ABNORMAL
EOSINOPHIL # BLD: 0.3 K/UL (ref 0–0.4)
EOSINOPHIL NFR BLD: 6 % (ref 0–5)
EPITH CASTS URNS QL MICRO: ABNORMAL /LPF (ref 0–5)
ERYTHROCYTE [DISTWIDTH] IN BLOOD BY AUTOMATED COUNT: 16.6 % (ref 11.6–14.5)
GLOBULIN SER CALC-MCNC: 5.2 G/DL (ref 2–4)
GLUCOSE SERPL-MCNC: 143 MG/DL (ref 74–99)
GLUCOSE UR STRIP.AUTO-MCNC: 100 MG/DL
HCT VFR BLD AUTO: 31.2 % (ref 36–48)
HGB BLD-MCNC: 10 G/DL (ref 13–16)
HGB UR QL STRIP: ABNORMAL
IMM GRANULOCYTES # BLD AUTO: 0 K/UL (ref 0–0.04)
IMM GRANULOCYTES NFR BLD AUTO: 0 % (ref 0–0.5)
INR PPP: 1.5 (ref 0.8–1.2)
KETONES UR QL STRIP.AUTO: 40 MG/DL
LEUKOCYTE ESTERASE UR QL STRIP.AUTO: ABNORMAL
LYMPHOCYTES # BLD: 2.3 K/UL (ref 0.9–3.6)
LYMPHOCYTES NFR BLD: 43 % (ref 21–52)
MCH RBC QN AUTO: 28.2 PG (ref 24–34)
MCHC RBC AUTO-ENTMCNC: 32.1 G/DL (ref 31–37)
MCV RBC AUTO: 88.1 FL (ref 78–100)
MONOCYTES # BLD: 0.5 K/UL (ref 0.05–1.2)
MONOCYTES NFR BLD: 9 % (ref 3–10)
NEUTS SEG # BLD: 2.2 K/UL (ref 1.8–8)
NEUTS SEG NFR BLD: 42 % (ref 40–73)
NITRITE UR QL STRIP.AUTO: POSITIVE
NRBC # BLD: 0 K/UL (ref 0–0.01)
NRBC BLD-RTO: 0 PER 100 WBC
OTHER,OTHU: ABNORMAL
PH UR STRIP: 8.5 [PH] (ref 5–8)
PLATELET # BLD AUTO: 100 K/UL (ref 135–420)
PMV BLD AUTO: 11.7 FL (ref 9.2–11.8)
POTASSIUM SERPL-SCNC: 4.2 MMOL/L (ref 3.5–5.5)
PROT SERPL-MCNC: 7.6 G/DL (ref 6.4–8.2)
PROT UR STRIP-MCNC: >300 MG/DL
PROTHROMBIN TIME: 18.1 SEC (ref 11.5–15.2)
RBC # BLD AUTO: 3.54 M/UL (ref 4.35–5.65)
RBC #/AREA URNS HPF: ABNORMAL /HPF (ref 0–5)
SODIUM SERPL-SCNC: 137 MMOL/L (ref 136–145)
SP GR UR REFRACTOMETRY: 1.01 (ref 1–1.03)
UROBILINOGEN UR QL STRIP.AUTO: >8 EU/DL (ref 0.2–1)
WBC # BLD AUTO: 5.3 K/UL (ref 4.6–13.2)
WBC URNS QL MICRO: ABNORMAL /HPF (ref 0–4)

## 2022-04-08 PROCEDURE — 74177 CT ABD & PELVIS W/CONTRAST: CPT

## 2022-04-08 PROCEDURE — 74011250636 HC RX REV CODE- 250/636: Performed by: STUDENT IN AN ORGANIZED HEALTH CARE EDUCATION/TRAINING PROGRAM

## 2022-04-08 PROCEDURE — 74011000636 HC RX REV CODE- 636: Performed by: STUDENT IN AN ORGANIZED HEALTH CARE EDUCATION/TRAINING PROGRAM

## 2022-04-08 PROCEDURE — 85610 PROTHROMBIN TIME: CPT

## 2022-04-08 PROCEDURE — 85025 COMPLETE CBC W/AUTO DIFF WBC: CPT

## 2022-04-08 PROCEDURE — 99285 EMERGENCY DEPT VISIT HI MDM: CPT

## 2022-04-08 PROCEDURE — 80076 HEPATIC FUNCTION PANEL: CPT

## 2022-04-08 PROCEDURE — 80048 BASIC METABOLIC PNL TOTAL CA: CPT

## 2022-04-08 PROCEDURE — 85730 THROMBOPLASTIN TIME PARTIAL: CPT

## 2022-04-08 PROCEDURE — 81001 URINALYSIS AUTO W/SCOPE: CPT

## 2022-04-08 PROCEDURE — 74011000250 HC RX REV CODE- 250: Performed by: STUDENT IN AN ORGANIZED HEALTH CARE EDUCATION/TRAINING PROGRAM

## 2022-04-08 PROCEDURE — 96374 THER/PROPH/DIAG INJ IV PUSH: CPT

## 2022-04-08 RX ORDER — METHADONE HYDROCHLORIDE 10 MG/1
45 TABLET ORAL DAILY
COMMUNITY

## 2022-04-08 RX ORDER — CEPHALEXIN 500 MG/1
500 CAPSULE ORAL 4 TIMES DAILY
Qty: 28 CAPSULE | Refills: 0 | Status: SHIPPED | OUTPATIENT
Start: 2022-04-08 | End: 2022-04-15

## 2022-04-08 RX ADMIN — IOPAMIDOL 100 ML: 612 INJECTION, SOLUTION INTRAVENOUS at 15:02

## 2022-04-08 RX ADMIN — WATER 1 G: 1 INJECTION INTRAMUSCULAR; INTRAVENOUS; SUBCUTANEOUS at 16:14

## 2022-04-08 NOTE — ED PROVIDER NOTES
EMERGENCY DEPARTMENT HISTORY AND PHYSICAL EXAM      Date: 4/8/2022  Patient Name: Leyda Daniels    History of Presenting Illness     Chief Complaint   Patient presents with    Melena       History Provided By: Patient     HPI: Leyda Daniels, 74 y.o. male with extensive smoking history, hypertension, IV drug abuse presents to the ED with cc of hematuria. Patient says that for the past 1 to 2 months he has noticed intermittent small amounts of bright red blood in his urine. Says that over the past week, especially the past 2-3 days, he has noticed large amount of dark red clots in his urine. Denies any hematuria, urethral discharge, difficulty starting or stopping urinary stream, flank pain, fever, chills, history of kidney stones, trauma to the bladder, or concern for STDs. He denies any diarrhea, constipation, abdominal pain, bloody stools. PCP: Larisa, MD Jesus Alberto    No current facility-administered medications on file prior to encounter. Current Outpatient Medications on File Prior to Encounter   Medication Sig Dispense Refill    methadone (DOLOPHINE) 10 mg tablet Take 45 mg by mouth daily. Past History     Past Medical History:  Past Medical History:   Diagnosis Date    Abscess of right shoulder     Abscess of shoulder     Arthritis     Epidural abscess     Heart murmur     Hematuria     Heroin abuse ([address]     Hypertension     Infected wound     Infectious disease     Iron deficiency anemia     IV drug user     Liver disease     Tobacco abuse        Past Surgical History:  Past Surgical History:   Procedure Laterality Date    HX COLONOSCOPY  07/18/2016    HX CYST REMOVAL      rt.forearm and rt.foot    HX ENDOSCOPY  07/18/2016    HX FREE SKIN GRAFT      HX OTHER SURGICAL      right forearm infections and graft       Family History:  No family history on file.     Social History:  Social History     Tobacco Use    Smoking status: Current Every Day Smoker     Packs/day: 0.50 Years: 38.00     Pack years: 19.00     Types: Cigarettes    Smokeless tobacco: Never Used   Substance Use Topics    Alcohol use: Yes     Alcohol/week: 4.0 standard drinks     Types: 4 Cans of beer per week    Drug use: Yes     Types: Heroin     Comment: last dose 2/15       Allergies:  No Known Allergies      Review of Systems   Review of Systems   Constitutional: Negative for chills and fever. HENT: Negative for mouth sores and sore throat. Eyes: Negative for visual disturbance. Negative recent vision problems   Respiratory: Negative for shortness of breath. Cardiovascular: Negative for chest pain. Gastrointestinal: Negative for abdominal pain, blood in stool, diarrhea, nausea and vomiting. Genitourinary: Positive for hematuria. Negative for difficulty urinating, dysuria, flank pain, frequency, penile discharge, penile pain, penile swelling, scrotal swelling, testicular pain and urgency. Musculoskeletal: Negative for joint swelling and myalgias. Skin: Negative for rash. Neurological: Negative for weakness, light-headedness and headaches. Negative altered level of consciousness   All other systems reviewed and are negative. Physical Exam   Physical Exam  Vitals and nursing note reviewed. Constitutional:       General: He is not in acute distress. Appearance: Normal appearance. HENT:      Head: Normocephalic and atraumatic. Mouth/Throat:      Mouth: Mucous membranes are moist.   Eyes:      General: No scleral icterus. Conjunctiva/sclera: Conjunctivae normal.   Cardiovascular:      Rate and Rhythm: Normal rate and regular rhythm. Heart sounds: No murmur heard. No friction rub. No gallop. Pulmonary:      Effort: Pulmonary effort is normal.      Breath sounds: Normal breath sounds. Abdominal:      General: Abdomen is flat. There is no distension. Palpations: Abdomen is soft. Tenderness: There is no abdominal tenderness.    Genitourinary: Penis: Normal.       Testes: Normal.      Prostate: Normal.   Musculoskeletal:         General: No deformity. Normal range of motion. Cervical back: Normal range of motion and neck supple. Skin:     General: Skin is warm and dry. Findings: No rash. Neurological:      General: No focal deficit present. Mental Status: He is alert and oriented to person, place, and time. Mental status is at baseline. Psychiatric:         Mood and Affect: Mood normal.         Behavior: Behavior normal.         Diagnostic Study Results     Labs -     Recent Results (from the past 12 hour(s))   METABOLIC PANEL, BASIC    Collection Time: 04/08/22 11:30 AM   Result Value Ref Range    Sodium 137 136 - 145 mmol/L    Potassium 4.2 3.5 - 5.5 mmol/L    Chloride 106 100 - 111 mmol/L    CO2 27 21 - 32 mmol/L    Anion gap 4 3.0 - 18 mmol/L    Glucose 143 (H) 74 - 99 mg/dL    BUN 9 7.0 - 18 MG/DL    Creatinine 1.03 0.6 - 1.3 MG/DL    BUN/Creatinine ratio 9 (L) 12 - 20      GFR est AA >60 >60 ml/min/1.73m2    GFR est non-AA >60 >60 ml/min/1.73m2    Calcium 9.3 8.5 - 10.1 MG/DL   CBC WITH AUTOMATED DIFF    Collection Time: 04/08/22 11:30 AM   Result Value Ref Range    WBC 5.3 4.6 - 13.2 K/uL    RBC 3.54 (L) 4.35 - 5.65 M/uL    HGB 10.0 (L) 13.0 - 16.0 g/dL    HCT 31.2 (L) 36.0 - 48.0 %    MCV 88.1 78.0 - 100.0 FL    MCH 28.2 24.0 - 34.0 PG    MCHC 32.1 31.0 - 37.0 g/dL    RDW 16.6 (H) 11.6 - 14.5 %    PLATELET 426 (L) 992 - 420 K/uL    MPV 11.7 9.2 - 11.8 FL    NRBC 0.0 0  WBC    ABSOLUTE NRBC 0.00 0.00 - 0.01 K/uL    NEUTROPHILS 42 40 - 73 %    LYMPHOCYTES 43 21 - 52 %    MONOCYTES 9 3 - 10 %    EOSINOPHILS 6 (H) 0 - 5 %    BASOPHILS 1 0 - 2 %    IMMATURE GRANULOCYTES 0 0.0 - 0.5 %    ABS. NEUTROPHILS 2.2 1.8 - 8.0 K/UL    ABS. LYMPHOCYTES 2.3 0.9 - 3.6 K/UL    ABS. MONOCYTES 0.5 0.05 - 1.2 K/UL    ABS. EOSINOPHILS 0.3 0.0 - 0.4 K/UL    ABS. BASOPHILS 0.0 0.0 - 0.1 K/UL    ABS. IMM.  GRANS. 0.0 0.00 - 0.04 K/UL    DF AUTOMATED     HEPATIC FUNCTION PANEL    Collection Time: 04/08/22 11:30 AM   Result Value Ref Range    Protein, total 7.6 6.4 - 8.2 g/dL    Albumin 2.4 (L) 3.4 - 5.0 g/dL    Globulin 5.2 (H) 2.0 - 4.0 g/dL    A-G Ratio 0.5 (L) 0.8 - 1.7      Bilirubin, total 0.7 0.2 - 1.0 MG/DL    Bilirubin, direct 0.3 (H) 0.0 - 0.2 MG/DL    Alk. phosphatase 121 (H) 45 - 117 U/L    AST (SGOT) 79 (H) 10 - 38 U/L    ALT (SGPT) 60 16 - 61 U/L   PROTHROMBIN TIME + INR    Collection Time: 04/08/22  2:12 PM   Result Value Ref Range    Prothrombin time 18.1 (H) 11.5 - 15.2 sec    INR 1.5 (H) 0.8 - 1.2     PTT    Collection Time: 04/08/22  2:12 PM   Result Value Ref Range    aPTT 38.2 (H) 23.0 - 36.4 SEC       Radiologic Studies -   CT ABD PELV W CONT    (Results Pending)     CT Results  (Last 48 hours)    None        CXR Results  (Last 48 hours)    None          Medical Decision Making   I am the first provider for this patient. I reviewed the vital signs, available nursing notes, past medical history, past surgical history, family history and social history. Vital Signs-Reviewed the patient's vital signs. Patient Vitals for the past 12 hrs:   Temp Pulse Resp SpO2   04/08/22 1133 (P) 97 °F (36.1 °C) (P) 77 (P) 20 (P) 99 %     Records Reviewed: Nursing Notes    Provider Notes (Medical Decision Making):   DDX: Bladder cancer, renal cancer, UTI    38-year-old male with extensive smoking history presenting with 1 to 2 months of hematuria, worse within the past 2 to 3 days. Denies any fevers, chills, dysuria, flank pain, concern for STDs. Vitals within normal limits in ED. genital examination was unremarkable, there was no blood at the urethral meatus. Prostate was not enlarged. Given the patient's smoking history, concerned that there is an underlying urological malignancy. Will obtain CBC and coags to determine if patient is anemic/has an underlying bleeding disorder.   CMP to evaluate for hepatic function as well as kidney function. We will also obtain CT abdomen pelvis with IV contrast to assess for any masses/cysts. Patient will need to most likely follow-up with urology for further work-up for malignancy. Patient hemoglobin is 10, seems to be consistent with his baseline comparing previous levels. BMP largely unremarkable, creatinine is 1.03. INR elevated at 1.5. AST 79, ALT and 60. Albumin is 2.4. Patient's urine sample had gross amounts of blood visualization. Patient may have reduced hepatic synthetic function given his low albumin, elevated INR, elevated direct bili. Patient was informed of lab findings, imaging findings, as well as recommendation to follow-up with urology. Patient understands and is agreeable to plan. ED Course:   Initial assessment performed. The patients presenting problems have been discussed, and they are in agreement with the care plan formulated and outlined with them. I have encouraged them to ask questions as they arise throughout their visit.    4:04 PM  Patient's UA positive for leukocytes and nitrites however unable to have further patient CT scan shows probably distended bladder with marked bladder wall thickening, benign left renal cortical cysts, evidence of cirrhosis, and cholelithiasis without evidence of cholecystitis. Microscopic evaluation and therefore will provide patient with IV ceftriaxone and Keflex to go home with for further treatment for possible UTI. Will discharge patient home with return precautions and follow-up recommendations with urology. Patient verbalized understanding and is without any further questions. Disposition:  Home    Follow-up Information     Follow up With Specialties Details Why Contact Info    Catina Folres MD Urology   [address] [facility] [address]  [phone]      [facility] [address]  [phone]        Diagnosis     Clinical Impression:   1.  Hematuria, unspecified type        Attestations:    Екатерина Almeida MD    Please note that this dictation was completed with [employer], the computer voice recognition software. Quite often unanticipated grammatical, syntax, homophones, and other interpretive errors are inadvertently transcribed by the computer software. Please disregard these errors. Please excuse any errors that have escaped final proofreading. Thank you.      ==================================================================================================================================================    I personally saw and examined the patient. I have reviewed and agree with the residents findings, including all diagnostic interpretations, and plans as written. I have edited the note as needed. I was present during the key portions of separately billed procedures.   Naila Lane, DO

## 2022-04-08 NOTE — DISCHARGE INSTRUCTIONS
You have been evaluated today for hematuria. Based on our examination here in the ER, there is no acute emergency that needs to be acted on at this moment. However we highly encourage you to follow-up with the referred urologist in order to be worked up for the blood in your urine. We have listed multiple providers, please reach out to see who you are able to get an appointment with the soonest.  Still, please be sure to follow-up with your primary care doctor as soon as possible for reevaluation. Please return to the ER if you experience any worsening bleeding from the penis, lightheadedness, weakness, chest pain, shortness above, or for any other concerning symptoms.

## 2022-04-08 NOTE — PROGRESS NOTES
CT Delay     [id number] - pt arrived to CT with no IV access - IV attempted by CT tech but was unsuccessful - ordering provider Dr Javier Eisenberg in ED notified and CT was instructed to send pt back to ED for IV placement - ED to call CT once IV access has been established - jillian     7587 - labs in process - iv access not documented - jillian

## 2022-04-22 ENCOUNTER — HOSPITAL ENCOUNTER (INPATIENT)
Age: 67
LOS: 6 days | Discharge: NURSING, REHAB OR LONG-TERM CARE | DRG: 493 | End: 2022-04-28
Attending: EMERGENCY MEDICINE | Admitting: HOSPITALIST
Payer: MEDICARE

## 2022-04-22 ENCOUNTER — APPOINTMENT (OUTPATIENT)
Dept: GENERAL RADIOLOGY | Age: 67
DRG: 493 | End: 2022-04-22
Attending: PHYSICIAN ASSISTANT
Payer: MEDICARE

## 2022-04-22 ENCOUNTER — APPOINTMENT (OUTPATIENT)
Dept: CT IMAGING | Age: 67
DRG: 493 | End: 2022-04-22
Attending: PHYSICIAN ASSISTANT
Payer: MEDICARE

## 2022-04-22 DIAGNOSIS — S82.201A CLOSED FRACTURE OF RIGHT TIBIA AND FIBULA, INITIAL ENCOUNTER: Primary | ICD-10-CM

## 2022-04-22 DIAGNOSIS — D64.9 ANEMIA, UNSPECIFIED TYPE: ICD-10-CM

## 2022-04-22 DIAGNOSIS — F11.90 METHADONE USE: ICD-10-CM

## 2022-04-22 DIAGNOSIS — S82.401A CLOSED FRACTURE OF RIGHT TIBIA AND FIBULA, INITIAL ENCOUNTER: Primary | ICD-10-CM

## 2022-04-22 DIAGNOSIS — D69.6 THROMBOCYTOPENIA (HCC): ICD-10-CM

## 2022-04-22 DIAGNOSIS — S92.314A CLOSED NONDISPLACED FRACTURE OF FIRST METATARSAL BONE OF RIGHT FOOT, INITIAL ENCOUNTER: ICD-10-CM

## 2022-04-22 PROBLEM — S82.209A TIBIA/FIBULA FRACTURE: Status: ACTIVE | Noted: 2022-04-22

## 2022-04-22 PROBLEM — S92.309A METATARSAL FRACTURE: Status: ACTIVE | Noted: 2022-04-22

## 2022-04-22 PROBLEM — F10.10 ALCOHOL ABUSE: Status: ACTIVE | Noted: 2022-04-22

## 2022-04-22 PROBLEM — S82.409A TIBIA/FIBULA FRACTURE: Status: ACTIVE | Noted: 2022-04-22

## 2022-04-22 PROBLEM — I10 HTN (HYPERTENSION): Status: ACTIVE | Noted: 2022-04-22

## 2022-04-22 LAB
AMPHET UR QL SCN: NEGATIVE
ANION GAP SERPL CALC-SCNC: 7 MMOL/L (ref 3–18)
ATRIAL RATE: 69 BPM
BARBITURATES UR QL SCN: NEGATIVE
BASOPHILS # BLD: 0 K/UL (ref 0–0.1)
BASOPHILS NFR BLD: 1 % (ref 0–2)
BENZODIAZ UR QL: NEGATIVE
BUN SERPL-MCNC: 7 MG/DL (ref 7–18)
BUN/CREAT SERPL: 7 (ref 12–20)
CALCIUM SERPL-MCNC: 8.7 MG/DL (ref 8.5–10.1)
CALCULATED P AXIS, ECG09: 55 DEGREES
CALCULATED R AXIS, ECG10: -18 DEGREES
CALCULATED T AXIS, ECG11: 110 DEGREES
CANNABINOIDS UR QL SCN: NEGATIVE
CHLORIDE SERPL-SCNC: 107 MMOL/L (ref 100–111)
CO2 SERPL-SCNC: 27 MMOL/L (ref 21–32)
COCAINE UR QL SCN: POSITIVE
COVID-19 RAPID TEST, COVR: NOT DETECTED
CREAT SERPL-MCNC: 1.07 MG/DL (ref 0.6–1.3)
DIAGNOSIS, 93000: NORMAL
DIFFERENTIAL METHOD BLD: ABNORMAL
EOSINOPHIL # BLD: 0.4 K/UL (ref 0–0.4)
EOSINOPHIL NFR BLD: 13 % (ref 0–5)
ERYTHROCYTE [DISTWIDTH] IN BLOOD BY AUTOMATED COUNT: 17.4 % (ref 11.6–14.5)
GLUCOSE SERPL-MCNC: 98 MG/DL (ref 74–99)
HCT VFR BLD AUTO: 26.6 % (ref 36–48)
HDSCOM,HDSCOM: ABNORMAL
HGB BLD-MCNC: 8.2 G/DL (ref 13–16)
IMM GRANULOCYTES # BLD AUTO: 0 K/UL (ref 0–0.04)
IMM GRANULOCYTES NFR BLD AUTO: 0 % (ref 0–0.5)
INR PPP: 1.3 (ref 0.8–1.2)
LYMPHOCYTES # BLD: 1 K/UL (ref 0.9–3.6)
LYMPHOCYTES NFR BLD: 35 % (ref 21–52)
MCH RBC QN AUTO: 27.1 PG (ref 24–34)
MCHC RBC AUTO-ENTMCNC: 30.8 G/DL (ref 31–37)
MCV RBC AUTO: 87.8 FL (ref 78–100)
METHADONE UR QL: POSITIVE
MONOCYTES # BLD: 0.3 K/UL (ref 0.05–1.2)
MONOCYTES NFR BLD: 11 % (ref 3–10)
NEUTS SEG # BLD: 1.1 K/UL (ref 1.8–8)
NEUTS SEG NFR BLD: 40 % (ref 40–73)
NRBC # BLD: 0 K/UL (ref 0–0.01)
NRBC BLD-RTO: 0 PER 100 WBC
OPIATES UR QL: POSITIVE
P-R INTERVAL, ECG05: 176 MS
PCP UR QL: NEGATIVE
PLATELET # BLD AUTO: 65 K/UL (ref 135–420)
PLATELET COMMENTS,PCOM: ABNORMAL
PMV BLD AUTO: 11 FL (ref 9.2–11.8)
POTASSIUM SERPL-SCNC: 3.8 MMOL/L (ref 3.5–5.5)
PROTHROMBIN TIME: 16.2 SEC (ref 11.5–15.2)
Q-T INTERVAL, ECG07: 424 MS
QRS DURATION, ECG06: 90 MS
QTC CALCULATION (BEZET), ECG08: 454 MS
RBC # BLD AUTO: 3.03 M/UL (ref 4.35–5.65)
RBC MORPH BLD: ABNORMAL
SODIUM SERPL-SCNC: 141 MMOL/L (ref 136–145)
SOURCE, COVRS: NORMAL
VENTRICULAR RATE, ECG03: 69 BPM
WBC # BLD AUTO: 2.8 K/UL (ref 4.6–13.2)

## 2022-04-22 PROCEDURE — 74011250636 HC RX REV CODE- 250/636: Performed by: PHYSICIAN ASSISTANT

## 2022-04-22 PROCEDURE — 99285 EMERGENCY DEPT VISIT HI MDM: CPT

## 2022-04-22 PROCEDURE — 96375 TX/PRO/DX INJ NEW DRUG ADDON: CPT

## 2022-04-22 PROCEDURE — 65270000046 HC RM TELEMETRY

## 2022-04-22 PROCEDURE — 71250 CT THORAX DX C-: CPT

## 2022-04-22 PROCEDURE — 74011250637 HC RX REV CODE- 250/637: Performed by: HOSPITALIST

## 2022-04-22 PROCEDURE — 80048 BASIC METABOLIC PNL TOTAL CA: CPT

## 2022-04-22 PROCEDURE — 85025 COMPLETE CBC W/AUTO DIFF WBC: CPT

## 2022-04-22 PROCEDURE — 73551 X-RAY EXAM OF FEMUR 1: CPT

## 2022-04-22 PROCEDURE — 93005 ELECTROCARDIOGRAM TRACING: CPT

## 2022-04-22 PROCEDURE — 96374 THER/PROPH/DIAG INJ IV PUSH: CPT

## 2022-04-22 PROCEDURE — 86923 COMPATIBILITY TEST ELECTRIC: CPT

## 2022-04-22 PROCEDURE — 73700 CT LOWER EXTREMITY W/O DYE: CPT

## 2022-04-22 PROCEDURE — 2709999900 HC NON-CHARGEABLE SUPPLY

## 2022-04-22 PROCEDURE — 73590 X-RAY EXAM OF LOWER LEG: CPT

## 2022-04-22 PROCEDURE — 87635 SARS-COV-2 COVID-19 AMP PRB: CPT

## 2022-04-22 PROCEDURE — 80307 DRUG TEST PRSMV CHEM ANLYZR: CPT

## 2022-04-22 PROCEDURE — 36415 COLL VENOUS BLD VENIPUNCTURE: CPT

## 2022-04-22 PROCEDURE — APPSS60 APP SPLIT SHARED TIME 46-60 MINUTES: Performed by: PHYSICIAN ASSISTANT

## 2022-04-22 PROCEDURE — 74011250637 HC RX REV CODE- 250/637: Performed by: PHYSICIAN ASSISTANT

## 2022-04-22 PROCEDURE — 99222 1ST HOSP IP/OBS MODERATE 55: CPT | Performed by: HOSPITALIST

## 2022-04-22 PROCEDURE — 85610 PROTHROMBIN TIME: CPT

## 2022-04-22 PROCEDURE — 71045 X-RAY EXAM CHEST 1 VIEW: CPT

## 2022-04-22 PROCEDURE — 96376 TX/PRO/DX INJ SAME DRUG ADON: CPT

## 2022-04-22 PROCEDURE — 86900 BLOOD TYPING SEROLOGIC ABO: CPT

## 2022-04-22 PROCEDURE — 75810000053 HC SPLINT APPLICATION

## 2022-04-22 RX ORDER — LORAZEPAM 1 MG/1
2 TABLET ORAL
Status: DISCONTINUED | OUTPATIENT
Start: 2022-04-22 | End: 2022-04-28 | Stop reason: HOSPADM

## 2022-04-22 RX ORDER — HYDROMORPHONE HYDROCHLORIDE 1 MG/ML
1 INJECTION, SOLUTION INTRAMUSCULAR; INTRAVENOUS; SUBCUTANEOUS
Status: DISCONTINUED | OUTPATIENT
Start: 2022-04-22 | End: 2022-04-23 | Stop reason: SDUPTHER

## 2022-04-22 RX ORDER — CLONIDINE HYDROCHLORIDE 0.1 MG/1
0.1 TABLET ORAL 2 TIMES DAILY
Status: DISCONTINUED | OUTPATIENT
Start: 2022-04-22 | End: 2022-04-24

## 2022-04-22 RX ORDER — ACETAMINOPHEN 325 MG/1
650 TABLET ORAL
Status: DISCONTINUED | OUTPATIENT
Start: 2022-04-22 | End: 2022-04-28 | Stop reason: HOSPADM

## 2022-04-22 RX ORDER — LORAZEPAM 2 MG/ML
2 INJECTION, SOLUTION INTRAMUSCULAR; INTRAVENOUS
Status: DISCONTINUED | OUTPATIENT
Start: 2022-04-22 | End: 2022-04-28 | Stop reason: HOSPADM

## 2022-04-22 RX ORDER — ONDANSETRON 4 MG/1
4 TABLET, ORALLY DISINTEGRATING ORAL
Status: DISCONTINUED | OUTPATIENT
Start: 2022-04-22 | End: 2022-04-23 | Stop reason: SDUPTHER

## 2022-04-22 RX ORDER — LORAZEPAM 1 MG/1
1 TABLET ORAL
Status: DISCONTINUED | OUTPATIENT
Start: 2022-04-22 | End: 2022-04-28 | Stop reason: HOSPADM

## 2022-04-22 RX ORDER — ONDANSETRON 2 MG/ML
4 INJECTION INTRAMUSCULAR; INTRAVENOUS
Status: DISCONTINUED | OUTPATIENT
Start: 2022-04-22 | End: 2022-04-23 | Stop reason: SDUPTHER

## 2022-04-22 RX ORDER — SODIUM CHLORIDE 0.9 % (FLUSH) 0.9 %
5-40 SYRINGE (ML) INJECTION AS NEEDED
Status: DISCONTINUED | OUTPATIENT
Start: 2022-04-22 | End: 2022-04-28 | Stop reason: HOSPADM

## 2022-04-22 RX ORDER — LANOLIN ALCOHOL/MO/W.PET/CERES
100 CREAM (GRAM) TOPICAL DAILY
Status: DISCONTINUED | OUTPATIENT
Start: 2022-04-22 | End: 2022-04-28 | Stop reason: HOSPADM

## 2022-04-22 RX ORDER — ACETAMINOPHEN 650 MG/1
650 SUPPOSITORY RECTAL
Status: DISCONTINUED | OUTPATIENT
Start: 2022-04-22 | End: 2022-04-28 | Stop reason: HOSPADM

## 2022-04-22 RX ORDER — THERA TABS 400 MCG
1 TAB ORAL DAILY
Status: DISCONTINUED | OUTPATIENT
Start: 2022-04-22 | End: 2022-04-28 | Stop reason: HOSPADM

## 2022-04-22 RX ORDER — SODIUM CHLORIDE 0.9 % (FLUSH) 0.9 %
5-40 SYRINGE (ML) INJECTION EVERY 8 HOURS
Status: DISCONTINUED | OUTPATIENT
Start: 2022-04-22 | End: 2022-04-27 | Stop reason: SDUPTHER

## 2022-04-22 RX ORDER — FOLIC ACID 1 MG/1
1 TABLET ORAL DAILY
Status: DISCONTINUED | OUTPATIENT
Start: 2022-04-22 | End: 2022-04-28 | Stop reason: HOSPADM

## 2022-04-22 RX ORDER — FACIAL-BODY WIPES
10 EACH TOPICAL DAILY PRN
Status: DISCONTINUED | OUTPATIENT
Start: 2022-04-22 | End: 2022-04-28 | Stop reason: HOSPADM

## 2022-04-22 RX ORDER — MORPHINE SULFATE 4 MG/ML
4 INJECTION INTRAVENOUS
Status: COMPLETED | OUTPATIENT
Start: 2022-04-22 | End: 2022-04-22

## 2022-04-22 RX ORDER — POLYETHYLENE GLYCOL 3350 17 G/17G
17 POWDER, FOR SOLUTION ORAL DAILY PRN
Status: DISCONTINUED | OUTPATIENT
Start: 2022-04-22 | End: 2022-04-28 | Stop reason: HOSPADM

## 2022-04-22 RX ORDER — LORAZEPAM 2 MG/ML
3 INJECTION, SOLUTION INTRAMUSCULAR; INTRAVENOUS
Status: DISCONTINUED | OUTPATIENT
Start: 2022-04-22 | End: 2022-04-28 | Stop reason: HOSPADM

## 2022-04-22 RX ORDER — ONDANSETRON 2 MG/ML
4 INJECTION INTRAMUSCULAR; INTRAVENOUS
Status: COMPLETED | OUTPATIENT
Start: 2022-04-22 | End: 2022-04-22

## 2022-04-22 RX ORDER — OXYCODONE HYDROCHLORIDE 5 MG/1
5 TABLET ORAL
Status: DISCONTINUED | OUTPATIENT
Start: 2022-04-22 | End: 2022-04-23

## 2022-04-22 RX ORDER — LORAZEPAM 2 MG/ML
1 INJECTION, SOLUTION INTRAMUSCULAR; INTRAVENOUS
Status: DISCONTINUED | OUTPATIENT
Start: 2022-04-22 | End: 2022-04-28 | Stop reason: HOSPADM

## 2022-04-22 RX ORDER — SODIUM CHLORIDE 0.9 % (FLUSH) 0.9 %
5-40 SYRINGE (ML) INJECTION AS NEEDED
Status: DISCONTINUED | OUTPATIENT
Start: 2022-04-22 | End: 2022-04-27 | Stop reason: SDUPTHER

## 2022-04-22 RX ORDER — SODIUM CHLORIDE 0.9 % (FLUSH) 0.9 %
5-40 SYRINGE (ML) INJECTION EVERY 8 HOURS
Status: DISCONTINUED | OUTPATIENT
Start: 2022-04-22 | End: 2022-04-28 | Stop reason: HOSPADM

## 2022-04-22 RX ORDER — AMLODIPINE BESYLATE 5 MG/1
5 TABLET ORAL DAILY
Status: DISCONTINUED | OUTPATIENT
Start: 2022-04-22 | End: 2022-04-22

## 2022-04-22 RX ADMIN — MORPHINE SULFATE 4 MG: 4 INJECTION, SOLUTION INTRAMUSCULAR; INTRAVENOUS at 15:20

## 2022-04-22 RX ADMIN — FOLIC ACID 1 MG: 1 TABLET ORAL at 22:22

## 2022-04-22 RX ADMIN — OXYCODONE HYDROCHLORIDE 5 MG: 5 TABLET ORAL at 20:47

## 2022-04-22 RX ADMIN — MORPHINE SULFATE 4 MG: 4 INJECTION, SOLUTION INTRAMUSCULAR; INTRAVENOUS at 14:24

## 2022-04-22 RX ADMIN — Medication 100 MG: at 22:22

## 2022-04-22 RX ADMIN — ONDANSETRON 4 MG: 2 INJECTION INTRAMUSCULAR; INTRAVENOUS at 14:24

## 2022-04-22 RX ADMIN — SODIUM CHLORIDE 1000 ML: 900 INJECTION, SOLUTION INTRAVENOUS at 14:25

## 2022-04-22 RX ADMIN — CLONIDINE HYDROCHLORIDE 0.1 MG: 0.1 TABLET ORAL at 22:22

## 2022-04-22 RX ADMIN — MORPHINE SULFATE 4 MG: 4 INJECTION, SOLUTION INTRAMUSCULAR; INTRAVENOUS at 17:56

## 2022-04-22 RX ADMIN — THERA TABS 1 TABLET: TAB at 22:22

## 2022-04-22 NOTE — ED NOTES
TRANSFER - OUT REPORT:    Verbal report given to [facility]  on Ramos Hurley  being transferred to [facility]S Room 408 for routine progression of care       Report consisted of patients Situation, Background, Assessment and   Recommendations(SBAR). Information from the following report(s) SBAR and ED Summary was reviewed with the receiving nurse. Lines:   Peripheral IV 04/22/22 Left; Inner Arm (Active)   Site Assessment Clean, dry, & intact 04/22/22 1422   Phlebitis Assessment 0 04/22/22 1422   Infiltration Assessment 0 04/22/22 1422   Dressing Type Transparent 04/22/22 1422   Hub Color/Line Status Pink;Patent; Flushed 04/22/22 1422        Opportunity for questions and clarification was provided.

## 2022-04-22 NOTE — PROGRESS NOTES
Ortho    For IM nail tibia tomorrow am    UDS pos    Please hold blood thinners  NPO after midnight  Elevate lower extremity and Ice    Thanks to IM for helping with medical management

## 2022-04-22 NOTE — ED NOTES
Short splint to right lower leg completed. Patient tolerated procedure well. Patient has strong pedal pulses in right lower leg and strong pedal pulses.

## 2022-04-22 NOTE — ED PROVIDER NOTES
EMERGENCY DEPARTMENT HISTORY AND PHYSICAL EXAM    2:35 PM      Date: 4/22/2022  Patient Name: Arturo Mix    History of Presenting Illness     Chief Complaint   Patient presents with    Leg Injury     right       History Provided By: Patient, EMS     Additional History (Context): Arturo Mix is a 74 y.o. male with hx of heroin abuse, anemia, tobacco abuse, and other noted PMH on Methadone who presents with complaint of right lower extremity pain and swelling after injury that occurred just prior to arrival.  Patient notes he was stepping off the bus this morning when he tripped and twisted the leg, landed forward. Patient denies head injury, loss of consciousness. Notes right lower extremity pain since incident. Did not take any medication for the symptoms prior to arrival.  Patient notes last p.o. intake was this morning around 8 AM.  Denies blood thinner use. PCP: Jesus Alberto Peers MD    Current Outpatient Medications   Medication Sig Dispense Refill    methadone (DOLOPHINE) 10 mg tablet Take 45 mg by mouth daily. Past History     Past Medical History:  Past Medical History:   Diagnosis Date    Abscess of right shoulder     Abscess of shoulder     Arthritis     Epidural abscess     Heart murmur     Hematuria     Heroin abuse ([facility] [address]     Hypertension     Infected wound     Infectious disease     Iron deficiency anemia     IV drug user     Liver disease     Tobacco abuse        Past Surgical History:  Past Surgical History:   Procedure Laterality Date    HX COLONOSCOPY  07/18/2016    HX CYST REMOVAL      rt.forearm and rt.foot    HX ENDOSCOPY  07/18/2016    HX FREE SKIN GRAFT      HX OTHER SURGICAL      right forearm infections and graft       Family History:  History reviewed. No pertinent family history.     Social History:  Social History     Tobacco Use    Smoking status: Current Every Day Smoker     Packs/day: 0.50     Years: 38.00     Pack years: 19.00     Types: Cigarettes    Smokeless tobacco: Never Used   Vaping Use    Vaping Use: Never used   Substance Use Topics    Alcohol use: Yes     Alcohol/week: 4.0 standard drinks     Types: 4 Cans of beer per week    Drug use: Yes     Types: Heroin     Comment: last dose 2/15       Allergies:  No Known Allergies      Review of Systems       Review of Systems   Constitutional: Negative for chills and fever. Respiratory: Negative for shortness of breath. Cardiovascular: Negative for chest pain. Gastrointestinal: Negative for abdominal pain, nausea and vomiting. Musculoskeletal: Positive for arthralgias, joint swelling and myalgias. Skin: Negative for rash. Neurological: Negative for weakness. All other systems reviewed and are negative. Physical Exam     Visit Vitals  BP (!) 181/84 (BP 1 Location: Right upper arm, BP Patient Position: Supine)   Pulse 76   Temp 98.3 °F (36.8 °C)   Resp 10   Ht 6' (1.829 m)   Wt 81.6 kg (180 lb)   SpO2 100%   BMI 24.41 kg/m²         Physical Exam  Vitals and nursing note reviewed. Constitutional:       General: He is not in acute distress. Appearance: Normal appearance. He is well-developed. He is not ill-appearing, toxic-appearing or diaphoretic. HENT:      Head: Normocephalic and atraumatic. Cardiovascular:      Rate and Rhythm: Normal rate and regular rhythm. Heart sounds: Normal heart sounds. No murmur heard. No friction rub. No gallop. Pulmonary:      Effort: Pulmonary effort is normal. No respiratory distress. Breath sounds: Normal breath sounds. No wheezing or rales. Musculoskeletal:         General: Normal range of motion. Cervical back: Normal range of motion and neck supple. Right hip: Normal.      Right upper leg: Normal.      Right knee: Normal.      Right lower leg: Bony tenderness (diffuse at lower leg, moderate edema, no open lesions or skin breakdown) present. Edema present.       Comments: Sensation intact throughout digits, dorsalis pedis 2+    Skin:     General: Skin is warm. Findings: No rash. Neurological:      Mental Status: He is alert. Diagnostic Study Results     Labs -  Recent Results (from the past 12 hour(s))   CBC WITH AUTOMATED DIFF    Collection Time: 04/22/22  2:00 PM   Result Value Ref Range    WBC 2.8 (L) 4.6 - 13.2 K/uL    RBC 3.03 (L) 4.35 - 5.65 M/uL    HGB 8.2 (L) 13.0 - 16.0 g/dL    HCT 26.6 (L) 36.0 - 48.0 %    MCV 87.8 78.0 - 100.0 FL    MCH 27.1 24.0 - 34.0 PG    MCHC 30.8 (L) 31.0 - 37.0 g/dL    RDW 17.4 (H) 11.6 - 14.5 %    PLATELET 65 (L) 078 - 420 K/uL    MPV 11.0 9.2 - 11.8 FL    NRBC 0.0 0  WBC    ABSOLUTE NRBC 0.00 0.00 - 0.01 K/uL    NEUTROPHILS 40 40 - 73 %    LYMPHOCYTES 35 21 - 52 %    MONOCYTES 11 (H) 3 - 10 %    EOSINOPHILS 13 (H) 0 - 5 %    BASOPHILS 1 0 - 2 %    IMMATURE GRANULOCYTES 0 0.0 - 0.5 %    ABS. NEUTROPHILS 1.1 (L) 1.8 - 8.0 K/UL    ABS. LYMPHOCYTES 1.0 0.9 - 3.6 K/UL    ABS. MONOCYTES 0.3 0.05 - 1.2 K/UL    ABS. EOSINOPHILS 0.4 0.0 - 0.4 K/UL    ABS. BASOPHILS 0.0 0.0 - 0.1 K/UL    ABS. IMM.  GRANS. 0.0 0.00 - 0.04 K/UL    DF AUTOMATED      PLATELET COMMENTS DECREASED PLATELETS      RBC COMMENTS NORMOCYTIC, NORMOCHROMIC     METABOLIC PANEL, BASIC    Collection Time: 04/22/22  2:00 PM   Result Value Ref Range    Sodium 141 136 - 145 mmol/L    Potassium 3.8 3.5 - 5.5 mmol/L    Chloride 107 100 - 111 mmol/L    CO2 27 21 - 32 mmol/L    Anion gap 7 3.0 - 18 mmol/L    Glucose 98 74 - 99 mg/dL    BUN 7 7.0 - 18 MG/DL    Creatinine 1.07 0.6 - 1.3 MG/DL    BUN/Creatinine ratio 7 (L) 12 - 20      GFR est AA >60 >60 ml/min/1.73m2    GFR est non-AA >60 >60 ml/min/1.73m2    Calcium 8.7 8.5 - 10.1 MG/DL   TYPE & SCREEN    Collection Time: 04/22/22  2:00 PM   Result Value Ref Range    Crossmatch Expiration 04/25/2022,2359     ABO/Rh(D) Heena Risk POSITIVE     Antibody screen NEG    COVID-19 RAPID TEST    Collection Time: 04/22/22  2:30 PM   Result Value Ref Range    Specimen source Nasopharyngeal      COVID-19 rapid test Not detected NOTD     EKG, 12 LEAD, INITIAL    Collection Time: 04/22/22  2:39 PM   Result Value Ref Range    Ventricular Rate 69 BPM    Atrial Rate 69 BPM    P-R Interval 176 ms    QRS Duration 90 ms    Q-T Interval 424 ms    QTC Calculation (Bezet) 454 ms    Calculated P Axis 55 degrees    Calculated R Axis -18 degrees    Calculated T Axis 110 degrees    Diagnosis       Normal sinus rhythm  Possible Left atrial enlargement  Left ventricular hypertrophy with repolarization abnormality  Abnormal ECG  When compared with ECG of 06-MAR-2020 07:26,  T wave inversion now evident in Lateral leads  Confirmed by Pepito Nunn MD, Nito Boss ([id number]) on 4/22/2022 4:41:16 PM     DRUG SCREEN, URINE    Collection Time: 04/22/22  2:45 PM   Result Value Ref Range    BENZODIAZEPINES Negative NEG      BARBITURATES Negative NEG      THC (TH-CANNABINOL) Negative NEG      OPIATES Positive (A) NEG      PCP(PHENCYCLIDINE) Negative NEG      COCAINE Positive (A) NEG      AMPHETAMINES Negative NEG      METHADONE Positive (A) NEG      HDSCOM (NOTE)        Radiologic Studies -   CT LOW EXT RT WO CONT   Final Result   1. Fractures tibia and fibula shaft, as above. 2.  Mildly comminuted fracture at the base of the first metatarsal without   displacement. There is intra-articular extension to the tarsometatarsal joint. 3.  Nondisplaced fracture at the base of the fourth metatarsal extends near the   articular surface. 4.  Small joint effusion needs, possibly hemarthrosis. 5.  Edema mid distal lower extremity to the foot. 6.  Atherosclerosis. XR FEMUR RT 1 V   Final Result      No acute findings. XR CHEST PORT   Final Result   Question patchy perihilar opacities. CT could be obtained if further evaluation   is required. XR TIB/FIB RT   Final Result      Tibial and fibular fractures as discussed.       CT CHEST WO CONT    (Results Pending)         Medical Decision Making   I am the first provider for this patient. I reviewed the vital signs, available nursing notes, past medical history, past surgical history, family history and social history. Vital Signs-Reviewed the patient's vital signs. Records Reviewed: Nursing Notes, Old Medical Records and Previous electrocardiograms (Time of Review: 2:35 PM)    ED Course: Progress Notes, Reevaluation, and Consults:  2:59 PM: Discussed care with Dr. Wade Fraser, who reviewed images. Recommends posterior and U splint, CT scan of RLE, message him with image results, pt will likely be d/c'd with non-weight-bearing, pain meds, follow-up in office for surgery. 3:52 PM: Dr. Wade Fraser called back, requesting admission to medicine, no blood thinners, NPO after midnight, OR tomorrow    4:12 PM: Discussed care with Dr. Suzan Jackson, hospitalist, accepts admission to telemetry. Will order CT chest for further deliniation for CXR findings. Updated patient with plan, in agreement with plan. 5:36 PM: Pt back from CT, pending CT chest and CT LLE results. 7:16 PM: Sent CT LE radiology read to Dr. Wade Fraser via [employer]. Pending CT chest results. Transport into the ED to take patient to [address] [place] [address]  Provider Notes (Medical Decision Making): 75-year-old male who presents to the ED due to right lower extremity pain and swelling after injury that occurred just prior to arrival.  No head injury or loss of consciousness. Extremity neurovascularly intact. X-ray demonstrates tibia and fibula fracture. Discussed with orthopedic on-call, patient will be admitted for further evaluation and OR tomorrow. Diagnosis     Clinical Impression:   1. Closed fracture of right tibia and fibula, initial encounter    2. Methadone use    3. Anemia, unspecified type    4.  Thrombocytopenia ([facility] [address]        Disposition: admission     Follow-up Information    None          Patient's Medications   Start Taking    No medications on file   Continue Taking    METHADONE (DOLOPHINE) 10 MG TABLET    Take 45 mg by mouth daily. These Medications have changed    No medications on file   Stop Taking    No medications on file       Dictation disclaimer:  Please note that this dictation was completed with [employer], the computer voice recognition software. Quite often unanticipated grammatical, syntax, homophones, and other interpretive errors are inadvertently transcribed by the computer software. Please disregard these errors. Please excuse any errors that have escaped final proofreading.

## 2022-04-23 ENCOUNTER — ANESTHESIA EVENT (OUTPATIENT)
Dept: SURGERY | Age: 67
DRG: 493 | End: 2022-04-23
Payer: MEDICARE

## 2022-04-23 ENCOUNTER — APPOINTMENT (OUTPATIENT)
Dept: GENERAL RADIOLOGY | Age: 67
DRG: 493 | End: 2022-04-23
Attending: ORTHOPAEDIC SURGERY
Payer: MEDICARE

## 2022-04-23 ENCOUNTER — ANESTHESIA (OUTPATIENT)
Dept: SURGERY | Age: 67
DRG: 493 | End: 2022-04-23
Payer: MEDICARE

## 2022-04-23 LAB
ALBUMIN SERPL-MCNC: 2.2 G/DL (ref 3.4–5)
ALBUMIN/GLOB SERPL: 0.5 {RATIO} (ref 0.8–1.7)
ALP SERPL-CCNC: 98 U/L (ref 45–117)
ALT SERPL-CCNC: 37 U/L (ref 16–61)
AMPHET UR QL SCN: NEGATIVE
ANION GAP SERPL CALC-SCNC: 4 MMOL/L (ref 3–18)
AST SERPL-CCNC: 51 U/L (ref 10–38)
BARBITURATES UR QL SCN: NEGATIVE
BENZODIAZ UR QL: NEGATIVE
BILIRUB DIRECT SERPL-MCNC: 0.3 MG/DL (ref 0–0.2)
BILIRUB SERPL-MCNC: 0.9 MG/DL (ref 0.2–1)
BUN SERPL-MCNC: 8 MG/DL (ref 7–18)
BUN/CREAT SERPL: 8 (ref 12–20)
CALCIUM SERPL-MCNC: 9 MG/DL (ref 8.5–10.1)
CANNABINOIDS UR QL SCN: NEGATIVE
CHLORIDE SERPL-SCNC: 106 MMOL/L (ref 100–111)
CO2 SERPL-SCNC: 28 MMOL/L (ref 21–32)
COCAINE UR QL SCN: POSITIVE
CREAT SERPL-MCNC: 1.01 MG/DL (ref 0.6–1.3)
ERYTHROCYTE [DISTWIDTH] IN BLOOD BY AUTOMATED COUNT: 17.5 % (ref 11.6–14.5)
FERRITIN SERPL-MCNC: 18 NG/ML (ref 8–388)
FOLATE SERPL-MCNC: >20 NG/ML (ref 3.1–17.5)
GLOBULIN SER CALC-MCNC: 4.8 G/DL (ref 2–4)
GLUCOSE BLD STRIP.AUTO-MCNC: 122 MG/DL (ref 70–110)
GLUCOSE SERPL-MCNC: 99 MG/DL (ref 74–99)
HCT VFR BLD AUTO: 24.6 % (ref 36–48)
HDSCOM,HDSCOM: ABNORMAL
HGB BLD-MCNC: 7.6 G/DL (ref 13–16)
INR PPP: 1.4 (ref 0.8–1.2)
IRON SATN MFR SERPL: 6 % (ref 20–50)
IRON SERPL-MCNC: 25 UG/DL (ref 50–175)
MCH RBC QN AUTO: 26.8 PG (ref 24–34)
MCHC RBC AUTO-ENTMCNC: 30.9 G/DL (ref 31–37)
MCV RBC AUTO: 86.6 FL (ref 78–100)
METHADONE UR QL: POSITIVE
NRBC # BLD: 0 K/UL (ref 0–0.01)
NRBC BLD-RTO: 0 PER 100 WBC
OPIATES UR QL: POSITIVE
PCP UR QL: NEGATIVE
PLATELET # BLD AUTO: 53 K/UL (ref 135–420)
PMV BLD AUTO: 11.6 FL (ref 9.2–11.8)
POTASSIUM SERPL-SCNC: 4.1 MMOL/L (ref 3.5–5.5)
PROT SERPL-MCNC: 7 G/DL (ref 6.4–8.2)
PROTHROMBIN TIME: 17.2 SEC (ref 11.5–15.2)
RBC # BLD AUTO: 2.84 M/UL (ref 4.35–5.65)
SODIUM SERPL-SCNC: 138 MMOL/L (ref 136–145)
TIBC SERPL-MCNC: 420 UG/DL (ref 250–450)
VIT B12 SERPL-MCNC: 1445 PG/ML (ref 211–911)
WBC # BLD AUTO: 2.9 K/UL (ref 4.6–13.2)

## 2022-04-23 PROCEDURE — 74011250637 HC RX REV CODE- 250/637: Performed by: STUDENT IN AN ORGANIZED HEALTH CARE EDUCATION/TRAINING PROGRAM

## 2022-04-23 PROCEDURE — 74011250636 HC RX REV CODE- 250/636: Performed by: ORTHOPAEDIC SURGERY

## 2022-04-23 PROCEDURE — 77030026438 HC STYL ET INTUB CARD -A: Performed by: ANESTHESIOLOGY

## 2022-04-23 PROCEDURE — 82962 GLUCOSE BLOOD TEST: CPT

## 2022-04-23 PROCEDURE — C1713 ANCHOR/SCREW BN/BN,TIS/BN: HCPCS | Performed by: ORTHOPAEDIC SURGERY

## 2022-04-23 PROCEDURE — 77030013079 HC BLNKT BAIR HGGR 3M -A: Performed by: ANESTHESIOLOGY

## 2022-04-23 PROCEDURE — 80076 HEPATIC FUNCTION PANEL: CPT

## 2022-04-23 PROCEDURE — 01480 ANES OPEN PX LOWER L/A/F NOS: CPT | Performed by: NURSE ANESTHETIST, CERTIFIED REGISTERED

## 2022-04-23 PROCEDURE — 74011250636 HC RX REV CODE- 250/636: Performed by: NURSE ANESTHETIST, CERTIFIED REGISTERED

## 2022-04-23 PROCEDURE — 36415 COLL VENOUS BLD VENIPUNCTURE: CPT

## 2022-04-23 PROCEDURE — 77030038692 HC WND DEB SYS IRMX -B: Performed by: ORTHOPAEDIC SURGERY

## 2022-04-23 PROCEDURE — 77030012890: Performed by: ORTHOPAEDIC SURGERY

## 2022-04-23 PROCEDURE — 99221 1ST HOSP IP/OBS SF/LOW 40: CPT | Performed by: ORTHOPAEDIC SURGERY

## 2022-04-23 PROCEDURE — 99232 SBSQ HOSP IP/OBS MODERATE 35: CPT | Performed by: INTERNAL MEDICINE

## 2022-04-23 PROCEDURE — 85027 COMPLETE CBC AUTOMATED: CPT

## 2022-04-23 PROCEDURE — 73590 X-RAY EXAM OF LOWER LEG: CPT

## 2022-04-23 PROCEDURE — 74011000250 HC RX REV CODE- 250: Performed by: PHYSICIAN ASSISTANT

## 2022-04-23 PROCEDURE — 01480 ANES OPEN PX LOWER L/A/F NOS: CPT | Performed by: ANESTHESIOLOGY

## 2022-04-23 PROCEDURE — C1769 GUIDE WIRE: HCPCS | Performed by: ORTHOPAEDIC SURGERY

## 2022-04-23 PROCEDURE — 28470 CLTX METATARSAL FX WO MNP EA: CPT | Performed by: ORTHOPAEDIC SURGERY

## 2022-04-23 PROCEDURE — 77030016474 HC BIT DRL QC3 SYNT -C: Performed by: ORTHOPAEDIC SURGERY

## 2022-04-23 PROCEDURE — 65270000046 HC RM TELEMETRY

## 2022-04-23 PROCEDURE — 74011000250 HC RX REV CODE- 250: Performed by: ORTHOPAEDIC SURGERY

## 2022-04-23 PROCEDURE — 2709999900 HC NON-CHARGEABLE SUPPLY: Performed by: ORTHOPAEDIC SURGERY

## 2022-04-23 PROCEDURE — 74011250637 HC RX REV CODE- 250/637: Performed by: ORTHOPAEDIC SURGERY

## 2022-04-23 PROCEDURE — 77030008462 HC STPLR SKN PROX J&J -A: Performed by: ORTHOPAEDIC SURGERY

## 2022-04-23 PROCEDURE — 77030018836 HC SOL IRR NACL ICUM -A: Performed by: ORTHOPAEDIC SURGERY

## 2022-04-23 PROCEDURE — 74011250637 HC RX REV CODE- 250/637: Performed by: PHYSICIAN ASSISTANT

## 2022-04-23 PROCEDURE — 83540 ASSAY OF IRON: CPT

## 2022-04-23 PROCEDURE — 85610 PROTHROMBIN TIME: CPT

## 2022-04-23 PROCEDURE — 27759 TREATMENT OF TIBIA FRACTURE: CPT | Performed by: ORTHOPAEDIC SURGERY

## 2022-04-23 PROCEDURE — 76010000132 HC OR TIME 2.5 TO 3 HR: Performed by: ORTHOPAEDIC SURGERY

## 2022-04-23 PROCEDURE — 82607 VITAMIN B-12: CPT

## 2022-04-23 PROCEDURE — 99140 ANES COMP EMERGENCY COND: CPT | Performed by: NURSE ANESTHETIST, CERTIFIED REGISTERED

## 2022-04-23 PROCEDURE — 80048 BASIC METABOLIC PNL TOTAL CA: CPT

## 2022-04-23 PROCEDURE — APPSS45 APP SPLIT SHARED TIME 31-45 MINUTES: Performed by: PHYSICIAN ASSISTANT

## 2022-04-23 PROCEDURE — 74011000250 HC RX REV CODE- 250: Performed by: NURSE ANESTHETIST, CERTIFIED REGISTERED

## 2022-04-23 PROCEDURE — 74011250637 HC RX REV CODE- 250/637: Performed by: ANESTHESIOLOGY

## 2022-04-23 PROCEDURE — 74011250636 HC RX REV CODE- 250/636

## 2022-04-23 PROCEDURE — 80307 DRUG TEST PRSMV CHEM ANLYZR: CPT

## 2022-04-23 PROCEDURE — 99140 ANES COMP EMERGENCY COND: CPT | Performed by: ANESTHESIOLOGY

## 2022-04-23 PROCEDURE — 2709999900 HC NON-CHARGEABLE SUPPLY

## 2022-04-23 PROCEDURE — 77030017026 HC ENDCAP TIB NL1 SYNT -C: Performed by: ORTHOPAEDIC SURGERY

## 2022-04-23 PROCEDURE — 82728 ASSAY OF FERRITIN: CPT

## 2022-04-23 PROCEDURE — 76210000017 HC OR PH I REC 1.5 TO 2 HR: Performed by: ORTHOPAEDIC SURGERY

## 2022-04-23 PROCEDURE — 74011250636 HC RX REV CODE- 250/636: Performed by: INTERNAL MEDICINE

## 2022-04-23 PROCEDURE — 76060000036 HC ANESTHESIA 2.5 TO 3 HR: Performed by: ORTHOPAEDIC SURGERY

## 2022-04-23 PROCEDURE — 77030014405 HC GD ROD RMR SYNT -C: Performed by: ORTHOPAEDIC SURGERY

## 2022-04-23 PROCEDURE — 77030002933 HC SUT MCRYL J&J -A: Performed by: ORTHOPAEDIC SURGERY

## 2022-04-23 PROCEDURE — 77030040922 HC BLNKT HYPOTHRM STRY -A: Performed by: ORTHOPAEDIC SURGERY

## 2022-04-23 PROCEDURE — 0QSG36Z REPOSITION RIGHT TIBIA WITH INTRAMEDULLARY INTERNAL FIXATION DEVICE, PERCUTANEOUS APPROACH: ICD-10-PCS | Performed by: ORTHOPAEDIC SURGERY

## 2022-04-23 DEVICE — SCREW BNE L44MM DIA5MM NONSTERILE TIB LT GRN TI ST CANN LOK: Type: IMPLANTABLE DEVICE | Site: TIBIA | Status: FUNCTIONAL

## 2022-04-23 DEVICE — IMPLANTABLE DEVICE: Type: IMPLANTABLE DEVICE | Site: TIBIA | Status: FUNCTIONAL

## 2022-04-23 DEVICE — SCREW BNE L36MM DIA5MM NONSTERILE TIB LT GRN TI ST CANN LOK: Type: IMPLANTABLE DEVICE | Site: TIBIA | Status: FUNCTIONAL

## 2022-04-23 DEVICE — SCREW BNE L38MM DIA5MM NONSTERILE TIB LT GRN TI ST CANN LOK: Type: IMPLANTABLE DEVICE | Site: TIBIA | Status: FUNCTIONAL

## 2022-04-23 DEVICE — SCREW BNE L40MM DIA5MM TIB LT GRN TI ST CANN LOK FULL THRD: Type: IMPLANTABLE DEVICE | Site: TIBIA | Status: FUNCTIONAL

## 2022-04-23 RX ORDER — SODIUM CHLORIDE 0.9 % (FLUSH) 0.9 %
5-40 SYRINGE (ML) INJECTION AS NEEDED
Status: DISCONTINUED | OUTPATIENT
Start: 2022-04-23 | End: 2022-04-23 | Stop reason: HOSPADM

## 2022-04-23 RX ORDER — OXYCODONE AND ACETAMINOPHEN 5; 325 MG/1; MG/1
1 TABLET ORAL ONCE
Status: DISCONTINUED | OUTPATIENT
Start: 2022-04-23 | End: 2022-04-23 | Stop reason: HOSPADM

## 2022-04-23 RX ORDER — GLYCOPYRROLATE 0.2 MG/ML
INJECTION INTRAMUSCULAR; INTRAVENOUS AS NEEDED
Status: DISCONTINUED | OUTPATIENT
Start: 2022-04-23 | End: 2022-04-23 | Stop reason: HOSPADM

## 2022-04-23 RX ORDER — SODIUM CHLORIDE 0.9 % (FLUSH) 0.9 %
5-40 SYRINGE (ML) INJECTION AS NEEDED
Status: DISCONTINUED | OUTPATIENT
Start: 2022-04-23 | End: 2022-04-25

## 2022-04-23 RX ORDER — FLUMAZENIL 0.1 MG/ML
0.2 INJECTION INTRAVENOUS AS NEEDED
Status: DISCONTINUED | OUTPATIENT
Start: 2022-04-23 | End: 2022-04-28 | Stop reason: HOSPADM

## 2022-04-23 RX ORDER — FAMOTIDINE 20 MG/1
20 TABLET, FILM COATED ORAL ONCE
Status: COMPLETED | OUTPATIENT
Start: 2022-04-23 | End: 2022-04-23

## 2022-04-23 RX ORDER — LANOLIN ALCOHOL/MO/W.PET/CERES
1 CREAM (GRAM) TOPICAL 2 TIMES DAILY WITH MEALS
Status: DISCONTINUED | OUTPATIENT
Start: 2022-04-23 | End: 2022-04-28 | Stop reason: HOSPADM

## 2022-04-23 RX ORDER — INSULIN LISPRO 100 [IU]/ML
INJECTION, SOLUTION INTRAVENOUS; SUBCUTANEOUS ONCE
Status: DISCONTINUED | OUTPATIENT
Start: 2022-04-23 | End: 2022-04-23 | Stop reason: HOSPADM

## 2022-04-23 RX ORDER — DEXAMETHASONE SODIUM PHOSPHATE 4 MG/ML
INJECTION, SOLUTION INTRA-ARTICULAR; INTRALESIONAL; INTRAMUSCULAR; INTRAVENOUS; SOFT TISSUE AS NEEDED
Status: DISCONTINUED | OUTPATIENT
Start: 2022-04-23 | End: 2022-04-23 | Stop reason: HOSPADM

## 2022-04-23 RX ORDER — NEOSTIGMINE METHYLSULFATE 1 MG/ML
INJECTION, SOLUTION INTRAVENOUS AS NEEDED
Status: DISCONTINUED | OUTPATIENT
Start: 2022-04-23 | End: 2022-04-23 | Stop reason: HOSPADM

## 2022-04-23 RX ORDER — MORPHINE SULFATE 10 MG/ML
INJECTION, SOLUTION INTRAMUSCULAR; INTRAVENOUS AS NEEDED
Status: DISCONTINUED | OUTPATIENT
Start: 2022-04-23 | End: 2022-04-23 | Stop reason: HOSPADM

## 2022-04-23 RX ORDER — ONDANSETRON 2 MG/ML
4 INJECTION INTRAMUSCULAR; INTRAVENOUS
Status: DISCONTINUED | OUTPATIENT
Start: 2022-04-23 | End: 2022-04-28 | Stop reason: HOSPADM

## 2022-04-23 RX ORDER — SUCCINYLCHOLINE CHLORIDE 20 MG/ML
INJECTION INTRAMUSCULAR; INTRAVENOUS AS NEEDED
Status: DISCONTINUED | OUTPATIENT
Start: 2022-04-23 | End: 2022-04-23 | Stop reason: HOSPADM

## 2022-04-23 RX ORDER — HYDROMORPHONE HYDROCHLORIDE 1 MG/ML
0.5 INJECTION, SOLUTION INTRAMUSCULAR; INTRAVENOUS; SUBCUTANEOUS
Status: DISCONTINUED | OUTPATIENT
Start: 2022-04-23 | End: 2022-04-23 | Stop reason: HOSPADM

## 2022-04-23 RX ORDER — ACETAMINOPHEN 500 MG
1000 TABLET ORAL EVERY 6 HOURS
Status: DISCONTINUED | OUTPATIENT
Start: 2022-04-23 | End: 2022-04-28 | Stop reason: HOSPADM

## 2022-04-23 RX ORDER — LIDOCAINE HYDROCHLORIDE 20 MG/ML
INJECTION, SOLUTION EPIDURAL; INFILTRATION; INTRACAUDAL; PERINEURAL AS NEEDED
Status: DISCONTINUED | OUTPATIENT
Start: 2022-04-23 | End: 2022-04-23 | Stop reason: HOSPADM

## 2022-04-23 RX ORDER — ASPIRIN 81 MG/1
81 TABLET ORAL 2 TIMES DAILY
Status: DISCONTINUED | OUTPATIENT
Start: 2022-04-24 | End: 2022-04-28 | Stop reason: HOSPADM

## 2022-04-23 RX ORDER — ROCURONIUM BROMIDE 10 MG/ML
INJECTION, SOLUTION INTRAVENOUS AS NEEDED
Status: DISCONTINUED | OUTPATIENT
Start: 2022-04-23 | End: 2022-04-23 | Stop reason: HOSPADM

## 2022-04-23 RX ORDER — SODIUM CHLORIDE, SODIUM LACTATE, POTASSIUM CHLORIDE, CALCIUM CHLORIDE 600; 310; 30; 20 MG/100ML; MG/100ML; MG/100ML; MG/100ML
25 INJECTION, SOLUTION INTRAVENOUS CONTINUOUS
Status: DISCONTINUED | OUTPATIENT
Start: 2022-04-23 | End: 2022-04-28 | Stop reason: HOSPADM

## 2022-04-23 RX ORDER — FENTANYL CITRATE 50 UG/ML
50 INJECTION, SOLUTION INTRAMUSCULAR; INTRAVENOUS AS NEEDED
Status: DISCONTINUED | OUTPATIENT
Start: 2022-04-23 | End: 2022-04-23 | Stop reason: HOSPADM

## 2022-04-23 RX ORDER — ONDANSETRON 2 MG/ML
4 INJECTION INTRAMUSCULAR; INTRAVENOUS ONCE
Status: COMPLETED | OUTPATIENT
Start: 2022-04-23 | End: 2022-04-23

## 2022-04-23 RX ORDER — METHADONE HYDROCHLORIDE 10 MG/ML
45 CONCENTRATE ORAL DAILY
Status: DISCONTINUED | OUTPATIENT
Start: 2022-04-24 | End: 2022-04-28 | Stop reason: HOSPADM

## 2022-04-23 RX ORDER — OXYCODONE HYDROCHLORIDE 10 MG/1
10 TABLET ORAL
Status: DISCONTINUED | OUTPATIENT
Start: 2022-04-23 | End: 2022-04-23 | Stop reason: SDUPTHER

## 2022-04-23 RX ORDER — HYDROMORPHONE HYDROCHLORIDE 1 MG/ML
INJECTION, SOLUTION INTRAMUSCULAR; INTRAVENOUS; SUBCUTANEOUS
Status: COMPLETED
Start: 2022-04-23 | End: 2022-04-23

## 2022-04-23 RX ORDER — LIDOCAINE HYDROCHLORIDE 10 MG/ML
0.1 INJECTION, SOLUTION EPIDURAL; INFILTRATION; INTRACAUDAL; PERINEURAL AS NEEDED
Status: DISCONTINUED | OUTPATIENT
Start: 2022-04-23 | End: 2022-04-23 | Stop reason: HOSPADM

## 2022-04-23 RX ORDER — NALOXONE HYDROCHLORIDE 0.4 MG/ML
0.4 INJECTION, SOLUTION INTRAMUSCULAR; INTRAVENOUS; SUBCUTANEOUS AS NEEDED
Status: DISCONTINUED | OUTPATIENT
Start: 2022-04-23 | End: 2022-04-28 | Stop reason: HOSPADM

## 2022-04-23 RX ORDER — OXYCODONE HYDROCHLORIDE 10 MG/1
10-15 TABLET ORAL
Status: DISCONTINUED | OUTPATIENT
Start: 2022-04-23 | End: 2022-04-28 | Stop reason: HOSPADM

## 2022-04-23 RX ORDER — OXYCODONE HYDROCHLORIDE 10 MG/1
20 TABLET ORAL
Status: DISCONTINUED | OUTPATIENT
Start: 2022-04-23 | End: 2022-04-28 | Stop reason: HOSPADM

## 2022-04-23 RX ORDER — FENTANYL CITRATE 50 UG/ML
INJECTION, SOLUTION INTRAMUSCULAR; INTRAVENOUS AS NEEDED
Status: DISCONTINUED | OUTPATIENT
Start: 2022-04-23 | End: 2022-04-23 | Stop reason: HOSPADM

## 2022-04-23 RX ORDER — NALOXONE HYDROCHLORIDE 0.4 MG/ML
0.04 INJECTION, SOLUTION INTRAMUSCULAR; INTRAVENOUS; SUBCUTANEOUS AS NEEDED
Status: DISCONTINUED | OUTPATIENT
Start: 2022-04-23 | End: 2022-04-23 | Stop reason: HOSPADM

## 2022-04-23 RX ORDER — PROPOFOL 10 MG/ML
INJECTION, EMULSION INTRAVENOUS AS NEEDED
Status: DISCONTINUED | OUTPATIENT
Start: 2022-04-23 | End: 2022-04-23 | Stop reason: HOSPADM

## 2022-04-23 RX ORDER — SODIUM CHLORIDE 9 MG/ML
100 INJECTION, SOLUTION INTRAVENOUS CONTINUOUS
Status: DISPENSED | OUTPATIENT
Start: 2022-04-23 | End: 2022-04-24

## 2022-04-23 RX ORDER — HYDROMORPHONE HYDROCHLORIDE 1 MG/ML
1 INJECTION, SOLUTION INTRAMUSCULAR; INTRAVENOUS; SUBCUTANEOUS
Status: DISCONTINUED | OUTPATIENT
Start: 2022-04-23 | End: 2022-04-28 | Stop reason: HOSPADM

## 2022-04-23 RX ORDER — SODIUM CHLORIDE, SODIUM LACTATE, POTASSIUM CHLORIDE, CALCIUM CHLORIDE 600; 310; 30; 20 MG/100ML; MG/100ML; MG/100ML; MG/100ML
100 INJECTION, SOLUTION INTRAVENOUS CONTINUOUS
Status: DISCONTINUED | OUTPATIENT
Start: 2022-04-23 | End: 2022-04-23 | Stop reason: HOSPADM

## 2022-04-23 RX ORDER — AMOXICILLIN 250 MG
1 CAPSULE ORAL 2 TIMES DAILY
Status: DISCONTINUED | OUTPATIENT
Start: 2022-04-23 | End: 2022-04-28 | Stop reason: HOSPADM

## 2022-04-23 RX ORDER — ONDANSETRON 2 MG/ML
INJECTION INTRAMUSCULAR; INTRAVENOUS
Status: COMPLETED
Start: 2022-04-23 | End: 2022-04-23

## 2022-04-23 RX ORDER — LANOLIN ALCOHOL/MO/W.PET/CERES
1 CREAM (GRAM) TOPICAL
Status: DISCONTINUED | OUTPATIENT
Start: 2022-04-24 | End: 2022-04-23 | Stop reason: SDUPTHER

## 2022-04-23 RX ORDER — KETOROLAC TROMETHAMINE 15 MG/ML
15 INJECTION, SOLUTION INTRAMUSCULAR; INTRAVENOUS EVERY 6 HOURS
Status: DISPENSED | OUTPATIENT
Start: 2022-04-23 | End: 2022-04-24

## 2022-04-23 RX ORDER — SODIUM CHLORIDE, SODIUM LACTATE, POTASSIUM CHLORIDE, CALCIUM CHLORIDE 600; 310; 30; 20 MG/100ML; MG/100ML; MG/100ML; MG/100ML
125 INJECTION, SOLUTION INTRAVENOUS CONTINUOUS
Status: DISCONTINUED | OUTPATIENT
Start: 2022-04-23 | End: 2022-04-23 | Stop reason: HOSPADM

## 2022-04-23 RX ORDER — HYDRALAZINE HYDROCHLORIDE 20 MG/ML
5 INJECTION INTRAMUSCULAR; INTRAVENOUS ONCE
Status: COMPLETED | OUTPATIENT
Start: 2022-04-23 | End: 2022-04-23

## 2022-04-23 RX ORDER — SODIUM CHLORIDE 0.9 % (FLUSH) 0.9 %
5-40 SYRINGE (ML) INJECTION EVERY 8 HOURS
Status: DISCONTINUED | OUTPATIENT
Start: 2022-04-23 | End: 2022-04-23 | Stop reason: HOSPADM

## 2022-04-23 RX ORDER — ONDANSETRON 2 MG/ML
INJECTION INTRAMUSCULAR; INTRAVENOUS AS NEEDED
Status: DISCONTINUED | OUTPATIENT
Start: 2022-04-23 | End: 2022-04-23 | Stop reason: HOSPADM

## 2022-04-23 RX ORDER — MAGNESIUM SULFATE 100 %
4 CRYSTALS MISCELLANEOUS AS NEEDED
Status: DISCONTINUED | OUTPATIENT
Start: 2022-04-23 | End: 2022-04-23 | Stop reason: HOSPADM

## 2022-04-23 RX ORDER — MIDAZOLAM HYDROCHLORIDE 1 MG/ML
INJECTION, SOLUTION INTRAMUSCULAR; INTRAVENOUS AS NEEDED
Status: DISCONTINUED | OUTPATIENT
Start: 2022-04-23 | End: 2022-04-23 | Stop reason: HOSPADM

## 2022-04-23 RX ORDER — HYDRALAZINE HYDROCHLORIDE 20 MG/ML
INJECTION INTRAMUSCULAR; INTRAVENOUS
Status: COMPLETED
Start: 2022-04-23 | End: 2022-04-23

## 2022-04-23 RX ORDER — SODIUM CHLORIDE 0.9 % (FLUSH) 0.9 %
5-40 SYRINGE (ML) INJECTION EVERY 8 HOURS
Status: DISCONTINUED | OUTPATIENT
Start: 2022-04-23 | End: 2022-04-24

## 2022-04-23 RX ADMIN — SENNOSIDES AND DOCUSATE SODIUM 1 TABLET: 50; 8.6 TABLET ORAL at 18:14

## 2022-04-23 RX ADMIN — LIDOCAINE HYDROCHLORIDE 100 MG: 20 INJECTION, SOLUTION EPIDURAL; INFILTRATION; INTRACAUDAL; PERINEURAL at 12:26

## 2022-04-23 RX ADMIN — ONDANSETRON 4 MG: 2 INJECTION INTRAMUSCULAR; INTRAVENOUS at 12:42

## 2022-04-23 RX ADMIN — ONDANSETRON 4 MG: 2 INJECTION INTRAMUSCULAR; INTRAVENOUS at 16:40

## 2022-04-23 RX ADMIN — HYDROMORPHONE HYDROCHLORIDE 0.5 MG: 1 INJECTION, SOLUTION INTRAMUSCULAR; INTRAVENOUS; SUBCUTANEOUS at 16:28

## 2022-04-23 RX ADMIN — CEFAZOLIN SODIUM 2 G: 1 INJECTION, POWDER, FOR SOLUTION INTRAMUSCULAR; INTRAVENOUS at 12:43

## 2022-04-23 RX ADMIN — PROPOFOL 150 MG: 10 INJECTION, EMULSION INTRAVENOUS at 12:26

## 2022-04-23 RX ADMIN — HYDRALAZINE HYDROCHLORIDE 5 MG: 20 INJECTION, SOLUTION INTRAMUSCULAR; INTRAVENOUS at 16:41

## 2022-04-23 RX ADMIN — DEXAMETHASONE SODIUM PHOSPHATE 4 MG: 4 INJECTION, SOLUTION INTRAMUSCULAR; INTRAVENOUS at 12:42

## 2022-04-23 RX ADMIN — HYDRALAZINE HYDROCHLORIDE 5 MG: 20 INJECTION INTRAMUSCULAR; INTRAVENOUS at 16:41

## 2022-04-23 RX ADMIN — FENTANYL CITRATE 50 MCG: 50 INJECTION, SOLUTION INTRAMUSCULAR; INTRAVENOUS at 12:17

## 2022-04-23 RX ADMIN — WATER 2 G: 1 INJECTION INTRAMUSCULAR; INTRAVENOUS; SUBCUTANEOUS at 21:36

## 2022-04-23 RX ADMIN — OXYCODONE HYDROCHLORIDE 10 MG: 10 TABLET ORAL at 08:12

## 2022-04-23 RX ADMIN — FAMOTIDINE 20 MG: 20 TABLET ORAL at 12:00

## 2022-04-23 RX ADMIN — MORPHINE SULFATE 2 MG: 10 INJECTION, SOLUTION INTRAMUSCULAR; INTRAVENOUS at 14:41

## 2022-04-23 RX ADMIN — HYDROMORPHONE HYDROCHLORIDE 0.5 MG: 1 INJECTION, SOLUTION INTRAMUSCULAR; INTRAVENOUS; SUBCUTANEOUS at 15:39

## 2022-04-23 RX ADMIN — SODIUM CHLORIDE, SODIUM LACTATE, POTASSIUM CHLORIDE, AND CALCIUM CHLORIDE 25 ML/HR: 600; 310; 30; 20 INJECTION, SOLUTION INTRAVENOUS at 11:08

## 2022-04-23 RX ADMIN — OXYCODONE HYDROCHLORIDE 10 MG: 10 TABLET ORAL at 04:11

## 2022-04-23 RX ADMIN — MORPHINE SULFATE 3 MG: 10 INJECTION, SOLUTION INTRAMUSCULAR; INTRAVENOUS at 14:21

## 2022-04-23 RX ADMIN — FENTANYL CITRATE 50 MCG: 50 INJECTION, SOLUTION INTRAMUSCULAR; INTRAVENOUS at 12:26

## 2022-04-23 RX ADMIN — SODIUM CHLORIDE, PRESERVATIVE FREE 10 ML: 5 INJECTION INTRAVENOUS at 17:54

## 2022-04-23 RX ADMIN — MIDAZOLAM HYDROCHLORIDE 2 MG: 2 INJECTION, SOLUTION INTRAMUSCULAR; INTRAVENOUS at 12:17

## 2022-04-23 RX ADMIN — OXYCODONE HYDROCHLORIDE 5 MG: 5 TABLET ORAL at 00:45

## 2022-04-23 RX ADMIN — HYDROMORPHONE HYDROCHLORIDE 0.5 MG: 1 INJECTION, SOLUTION INTRAMUSCULAR; INTRAVENOUS; SUBCUTANEOUS at 15:49

## 2022-04-23 RX ADMIN — SODIUM CHLORIDE, PRESERVATIVE FREE 10 ML: 5 INJECTION INTRAVENOUS at 21:36

## 2022-04-23 RX ADMIN — SUCCINYLCHOLINE CHLORIDE 100 MG: 20 INJECTION, SOLUTION INTRAMUSCULAR; INTRAVENOUS at 12:26

## 2022-04-23 RX ADMIN — GLYCOPYRROLATE 0.4 MG: 0.2 INJECTION INTRAMUSCULAR; INTRAVENOUS at 14:35

## 2022-04-23 RX ADMIN — CLONIDINE HYDROCHLORIDE 0.1 MG: 0.1 TABLET ORAL at 17:37

## 2022-04-23 RX ADMIN — Medication 3 MG: at 14:35

## 2022-04-23 RX ADMIN — ACETAMINOPHEN 1000 MG: 500 TABLET ORAL at 18:14

## 2022-04-23 RX ADMIN — KETOROLAC TROMETHAMINE 15 MG: 15 INJECTION, SOLUTION INTRAMUSCULAR; INTRAVENOUS at 18:14

## 2022-04-23 RX ADMIN — FENTANYL CITRATE 50 MCG: 50 INJECTION, SOLUTION INTRAMUSCULAR; INTRAVENOUS at 16:00

## 2022-04-23 RX ADMIN — FENTANYL CITRATE 50 MCG: 50 INJECTION, SOLUTION INTRAMUSCULAR; INTRAVENOUS at 16:10

## 2022-04-23 RX ADMIN — ROCURONIUM BROMIDE 25 MG: 50 INJECTION INTRAVENOUS at 12:50

## 2022-04-23 RX ADMIN — OXYCODONE HYDROCHLORIDE 10 MG: 10 TABLET ORAL at 17:37

## 2022-04-23 RX ADMIN — Medication 325 MG: at 18:00

## 2022-04-23 RX ADMIN — CLONIDINE HYDROCHLORIDE 0.1 MG: 0.1 TABLET ORAL at 08:12

## 2022-04-23 RX ADMIN — MORPHINE SULFATE 5 MG: 10 INJECTION, SOLUTION INTRAMUSCULAR; INTRAVENOUS at 12:50

## 2022-04-23 NOTE — ANESTHESIA POSTPROCEDURE EVALUATION
Procedure(s):  RIGHT TIBIA INSERTION INTRA MEDULLARY NAIL/SYNTHES/C-ARM. general    Anesthesia Post Evaluation      Multimodal analgesia: multimodal analgesia used between 6 hours prior to anesthesia start to PACU discharge  Patient location during evaluation: bedside  Patient participation: complete - patient participated  Level of consciousness: awake  Pain management: adequate  Airway patency: patent  Anesthetic complications: no  Cardiovascular status: stable  Respiratory status: acceptable  Hydration status: acceptable  Post anesthesia nausea and vomiting:  controlled      INITIAL Post-op Vital signs:   Vitals Value Taken Time   /74 04/23/22 1651   Temp 36.3 °C (97.4 °F) 04/23/22 1521   Pulse 76 04/23/22 1656   Resp 16 04/23/22 1656   SpO2 100 % 04/23/22 1657   Vitals shown include unvalidated device data.

## 2022-04-23 NOTE — INTERVAL H&P NOTE
Update History & Physical    The Patient's History and Physical of April 23, 2022 was reviewed with the patient and I examined the patient. There was no change. The surgical site was confirmed by the patient and me. Plan:  The risk, benefits, expected outcome, and alternative to the recommended procedure have been discussed with the patient. Patient understands and wants to proceed with the procedure.     Electronically signed by Fredis Gloria MD on 4/23/2022 at 11:19 AM

## 2022-04-23 NOTE — CONSULTS
[address] [facility], [place]    Name:  Jw Trujillo  MR#:   [id number]  :  1955  ACCOUNT #:  [de-identified]  DATE OF SERVICE:  2022    REASON FOR CONSULTATION:  Right tib-fib fracture with metatarsal fracture as well. HISTORY OF PRESENT ILLNESS:  The patient apparently was getting off the bus and fell and sustained a fracture. He was seen over at [facility] ED and then transferred here. He denies any shortness of breath or chest pain. Denies any neck or back problems. Otherwise, he has been feeling well. Isolated injury to his right lower extremity. He does drink between four and six beers every day. He uses cocaine. History of opiates as well. History of hypertension. No cardiac history. FAMILY HISTORY:  Positive for arthritis and hypertension. Again, he denies any back pain or other areas of issue. On examination, he is alert. He moves his head and neck adequately. There is no respiratory compromise. The trachea is midline. The abdominal and cardiovascular exams are deferred. His other issues are he does have anemia, thrombocytopenia, and a history of hypertension. He is on withdrawal protocol. I do very much appreciate the Internal Medicine assisting with his medical management. IMAGING DATA:  Review of his x-rays confirmed a somewhat comminuted distal tibial fracture. We did a CT scan to confirm if it is rodable. He also has a couple of base of the metatarsal fractures as well, which are undisplaced. There is a mildly comminuted fracture at the base of the first metatarsal without displacement with some intra-articular extension and also a nondisplaced at the base of the fourth as well. I will have our foot and ankle specialist assist with the management of his metatarsal fractures as well. PHYSICAL EXAMINATION:  Today, he is pleasant enough, moving his upper extremities adequately. Left lower extremity is noncontributory.   He can wiggle his toes.  He is currently in a splint and he is comfortable. The compartments seem to be soft with manual palpation and passive dorsiflexion and plantar flexion. We did discuss risks and benefits associated with this:  Infection, DVT, malunion, nonunion, tight compartments, problems with protected weightbearing as well, possibility of needing transfusion after surgery. All questions were answered and the patient elected to proceed. I again thank Internal Medicine very much for their assistance with regards to this patient's medical management.       Taniya Burrows MD [id number]  D:  04/23/2022 11:25  T:  04/23/2022 14:13  JOB #:  [id number]

## 2022-04-23 NOTE — PROGRESS NOTES
[facility] Hospitalist Group  Progress Note    Patient: Poncho Short Age: 74 y.o. : 1955 MR#: [id number] SSN: [id number]  Date: 2022         Assessment/Plan:     Hospital Problems  Date Reviewed: 3/6/2020          Codes Class Noted POA    Anemia ICD-10-CM: D64.9  ICD-9-CM: 285.9  2022 Unknown        Thrombocytopenia ([facility] [address] ICD-10-CM: D69.6  ICD-9-CM: 287.5  2022 Unknown        * (Principal) Tibia/fibula fracture ICD-10-CM: S82.209A, S82.409A  ICD-9-CM: 823.82  2022 Unknown        Metatarsal fracture ICD-10-CM: Z25.173B  ICD-9-CM: 825.25  2022 Unknown        Alcohol abuse ICD-10-CM: F10.10  ICD-9-CM: 305.00  2022 Unknown        HTN (hypertension) ICD-10-CM: I10  ICD-9-CM: 401.9  2022 Unknown        Polysubstance abuse ([facility] ICD-10-CM: F19.10  ICD-9-CM: 305.90  2012 Unknown            Tib/fib fx  - appreciate ortho assistance  - to OR today  - PT/OT per ortho  - PRN pain control  - incentive spirometry     Metatarsal fx: mildly comminuted fx of 1st metatarsal with intra-articular extension to tarsometatarsal joint and nondisplaced fx at the base of the 4th metatarsal extending near the articular surface.   - PRN pain control  - further recs per ortho     Thrombocytopenia: ?cirrhosis with low platelets, elevated INR, hypoalbuminemia  - monitor CBC  - Patient is agreeable to blood/blood product transfusion if needed  - check RUQ US    Anemia, acute on chronic: acute blood loss, iron deficiency  - monitor H&H and transfuse as needed  - start iron replacement     HTN  - BID clonidine     Polysubstance abuse, alcohol abuse  - CIWA protocol, folate, thiamine  - resume methadone      DVT Prophylaxis:  []Lovenox  []Hep SQ  [x]SCDs  []Coumadin   []On Heparin gtt []PO anticoagulant    Anticipated discharge: 2 days    Subjective:     Pt s/e @ bedside. No major events overnight. Pt complains of continued pain to RLE, no numbness, tingling.  Denies sob, cp, abd pain, nvd.     Objective:   VS:   Visit Vitals  BP (!) 147/72 (BP 1 Location: Right arm, BP Patient Position: At rest)   Pulse 60   Temp 98.7 °F (37.1 °C)   Resp 18   Ht 6' (1.829 m)   Wt 81.6 kg (180 lb)   SpO2 100%   BMI 24.41 kg/m²      Tmax/24hrs: Temp (24hrs), Av.9 °F (37.2 °C), Min:98.3 °F (36.8 °C), Max:99.6 °F (37.6 °C)      Intake/Output Summary (Last 24 hours) at 2022 1314  Last data filed at 2022 1243  Gross per 24 hour   Intake 20 ml   Output 400 ml   Net -380 ml       General Appearance: NAD, conversant  HENT: normocephalic/atraumatic, moist mucus membranes  Lungs: CTA with normal respiratory effort  CV: RRR, no m/r/g  Abdomen: soft, non-tender, normal bowel sounds  Extremities: no cyanosis, no peripheral edema, RLE splinted and wrapped in ACE bandage  Neuro: No focal deficits, motor/sensory intact  Skin: Normal color, intact  Psych: appropriate affect    Current Facility-Administered Medications   Medication Dose Route Frequency    oxyCODONE IR (ROXICODONE) tablet 10 mg  10 mg Oral Q4H PRN    lactated Ringers infusion  25 mL/hr IntraVENous CONTINUOUS    lidocaine (PF) (XYLOCAINE) 10 mg/mL (1 %) injection 0.1 mL  0.1 mL SubCUTAneous PRN    lactated Ringers infusion  100 mL/hr IntraVENous CONTINUOUS    sodium chloride (NS) flush 5-40 mL  5-40 mL IntraVENous Q8H    sodium chloride (NS) flush 5-40 mL  5-40 mL IntraVENous PRN    insulin lispro (HUMALOG) injection   SubCUTAneous ONCE    glucose chewable tablet 16 g  4 Tablet Oral PRN    glucagon (GLUCAGEN) injection 1 mg  1 mg IntraMUSCular PRN    [START ON 2022] ferrous sulfate tablet 325 mg  1 Tablet Oral DAILY WITH BREAKFAST    [START ON 2022] methadone (DOLOPHINE) 10 mg/mL concentrated solution 45 mg  45 mg Oral DAILY    sodium chloride (NS) flush 5-40 mL  5-40 mL IntraVENous Q8H    sodium chloride (NS) flush 5-40 mL  5-40 mL IntraVENous PRN    acetaminophen (TYLENOL) tablet 650 mg  650 mg Oral Q6H PRN    Or    acetaminophen (TYLENOL) suppository 650 mg  650 mg Rectal Q6H PRN    polyethylene glycol (MIRALAX) packet 17 g  17 g Oral DAILY PRN    bisacodyL (DULCOLAX) suppository 10 mg  10 mg Rectal DAILY PRN    ondansetron (ZOFRAN ODT) tablet 4 mg  4 mg Oral Q8H PRN    Or    ondansetron (ZOFRAN) injection 4 mg  4 mg IntraVENous Q6H PRN    HYDROmorphone (DILAUDID) injection 1 mg  1 mg IntraVENous Q4H PRN    sodium chloride (NS) flush 5-40 mL  5-40 mL IntraVENous Q8H    sodium chloride (NS) flush 5-40 mL  5-40 mL IntraVENous PRN    LORazepam (ATIVAN) tablet 1 mg  1 mg Oral Q1H PRN    Or    LORazepam (ATIVAN) 2 mg/mL injection 1 mg  1 mg IntraVENous Q1H PRN    LORazepam (ATIVAN) tablet 2 mg  2 mg Oral Q1H PRN    Or    LORazepam (ATIVAN) 2 mg/mL injection 2 mg  2 mg IntraVENous Q1H PRN    LORazepam (ATIVAN) 2 mg/mL injection 3 mg  3 mg IntraVENous Q15MIN PRN    cloNIDine HCL (CATAPRES) tablet 0.1 mg  0.1 mg Oral BID    thiamine HCL (B-1) tablet 100 mg  100 mg Oral DAILY    folic acid (FOLVITE) tablet 1 mg  1 mg Oral DAILY    therapeutic multivitamin (THERAGRAN) tablet 1 Tablet  1 Tablet Oral DAILY     Facility-Administered Medications Ordered in Other Encounters   Medication Dose Route Frequency    midazolam (VERSED) injection   IntraVENous PRN    fentaNYL citrate (PF) injection   IntraVENous PRN    lidocaine (PF) (XYLOCAINE) 20 mg/mL (2 %) injection   IntraVENous PRN    propofoL (DIPRIVAN) 10 mg/mL injection   IntraVENous PRN    dexamethasone (DECADRON) 4 mg/mL injection   IntraVENous PRN    ondansetron (ZOFRAN) injection   IntraVENous PRN    succinylcholine (ANECTINE) injection   IntraVENous PRN    rocuronium injection   IntraVENous PRN    morphine 10 mg/ml injection   IntraVENous PRN        Labs:    Recent Results (from the past 24 hour(s))   CBC WITH AUTOMATED DIFF    Collection Time: 04/22/22  2:00 PM   Result Value Ref Range    WBC 2.8 (L) 4.6 - 13.2 K/uL    RBC 3.03 (L) 4.35 - 5.65 M/uL    HGB 8.2 (L) 13.0 - 16.0 g/dL    HCT 26.6 (L) 36.0 - 48.0 %    MCV 87.8 78.0 - 100.0 FL    MCH 27.1 24.0 - 34.0 PG    MCHC 30.8 (L) 31.0 - 37.0 g/dL    RDW 17.4 (H) 11.6 - 14.5 %    PLATELET 65 (L) 923 - 420 K/uL    MPV 11.0 9.2 - 11.8 FL    NRBC 0.0 0  WBC    ABSOLUTE NRBC 0.00 0.00 - 0.01 K/uL    NEUTROPHILS 40 40 - 73 %    LYMPHOCYTES 35 21 - 52 %    MONOCYTES 11 (H) 3 - 10 %    EOSINOPHILS 13 (H) 0 - 5 %    BASOPHILS 1 0 - 2 %    IMMATURE GRANULOCYTES 0 0.0 - 0.5 %    ABS. NEUTROPHILS 1.1 (L) 1.8 - 8.0 K/UL    ABS. LYMPHOCYTES 1.0 0.9 - 3.6 K/UL    ABS. MONOCYTES 0.3 0.05 - 1.2 K/UL    ABS. EOSINOPHILS 0.4 0.0 - 0.4 K/UL    ABS. BASOPHILS 0.0 0.0 - 0.1 K/UL    ABS. IMM.  GRANS. 0.0 0.00 - 0.04 K/UL    DF AUTOMATED      PLATELET COMMENTS DECREASED PLATELETS      RBC COMMENTS NORMOCYTIC, NORMOCHROMIC     METABOLIC PANEL, BASIC    Collection Time: 04/22/22  2:00 PM   Result Value Ref Range    Sodium 141 136 - 145 mmol/L    Potassium 3.8 3.5 - 5.5 mmol/L    Chloride 107 100 - 111 mmol/L    CO2 27 21 - 32 mmol/L    Anion gap 7 3.0 - 18 mmol/L    Glucose 98 74 - 99 mg/dL    BUN 7 7.0 - 18 MG/DL    Creatinine 1.07 0.6 - 1.3 MG/DL    BUN/Creatinine ratio 7 (L) 12 - 20      GFR est AA >60 >60 ml/min/1.73m2    GFR est non-AA >60 >60 ml/min/1.73m2    Calcium 8.7 8.5 - 10.1 MG/DL   TYPE & SCREEN    Collection Time: 04/22/22  2:00 PM   Result Value Ref Range    Crossmatch Expiration 04/25/2022,2359     ABO/Rh(D) Viviane Krause POSITIVE     Antibody screen NEG    COVID-19 RAPID TEST    Collection Time: 04/22/22  2:30 PM   Result Value Ref Range    Specimen source Nasopharyngeal      COVID-19 rapid test Not detected NOTD     EKG, 12 LEAD, INITIAL    Collection Time: 04/22/22  2:39 PM   Result Value Ref Range    Ventricular Rate 69 BPM    Atrial Rate 69 BPM    P-R Interval 176 ms    QRS Duration 90 ms    Q-T Interval 424 ms    QTC Calculation (Bezet) 454 ms    Calculated P Axis 55 degrees    Calculated R Axis -18 degrees    Calculated T Axis 110 degrees    Diagnosis       Normal sinus rhythm  Possible Left atrial enlargement  Left ventricular hypertrophy with repolarization abnormality  Abnormal ECG  When compared with ECG of 06-MAR-2020 07:26,  T wave inversion now evident in Lateral leads  Confirmed by Landry Barros MD, [facility] ([id number]) on 4/22/2022 4:41:16 PM     DRUG SCREEN, URINE    Collection Time: 04/22/22  2:45 PM   Result Value Ref Range    BENZODIAZEPINES Negative NEG      BARBITURATES Negative NEG      THC (TH-CANNABINOL) Negative NEG      OPIATES Positive (A) NEG      PCP(PHENCYCLIDINE) Negative NEG      COCAINE Positive (A) NEG      AMPHETAMINES Negative NEG      METHADONE Positive (A) NEG      HDSCOM (NOTE)    PROTHROMBIN TIME + INR    Collection Time: 04/22/22  9:31 PM   Result Value Ref Range    Prothrombin time 16.2 (H) 11.5 - 15.2 sec    INR 1.3 (H) 0.8 - 1.2     CBC W/O DIFF    Collection Time: 04/23/22  1:26 AM   Result Value Ref Range    WBC 2.9 (L) 4.6 - 13.2 K/uL    RBC 2.84 (L) 4.35 - 5.65 M/uL    HGB 7.6 (L) 13.0 - 16.0 g/dL    HCT 24.6 (L) 36.0 - 48.0 %    MCV 86.6 78.0 - 100.0 FL    MCH 26.8 24.0 - 34.0 PG    MCHC 30.9 (L) 31.0 - 37.0 g/dL    RDW 17.5 (H) 11.6 - 14.5 %    PLATELET 53 (L) 745 - 420 K/uL    MPV 11.6 9.2 - 11.8 FL    NRBC 0.0 0  WBC    ABSOLUTE NRBC 0.00 0.00 - 0.01 K/uL   PROTHROMBIN TIME + INR    Collection Time: 04/23/22  1:26 AM   Result Value Ref Range    Prothrombin time 17.2 (H) 11.5 - 15.2 sec    INR 1.4 (H) 0.8 - 1.2     VITAMIN B12 & FOLATE    Collection Time: 04/23/22  1:26 AM   Result Value Ref Range    Vitamin B12 1,445 (H) 211 - 911 pg/mL    Folate >20.0 (H) 3.10 - 17.50 ng/mL   DRUG SCREEN, URINE    Collection Time: 04/23/22  2:05 AM   Result Value Ref Range    BENZODIAZEPINES Negative NEG      BARBITURATES Negative NEG      THC (TH-CANNABINOL) Negative NEG      OPIATES Positive (A) NEG      PCP(PHENCYCLIDINE) Negative NEG      COCAINE Positive (A) NEG      AMPHETAMINES Negative NEG      METHADONE Positive (A) NEG      HDSCOM (NOTE)    METABOLIC PANEL, BASIC    Collection Time: 04/23/22  8:25 AM   Result Value Ref Range    Sodium 138 136 - 145 mmol/L    Potassium 4.1 3.5 - 5.5 mmol/L    Chloride 106 100 - 111 mmol/L    CO2 28 21 - 32 mmol/L    Anion gap 4 3.0 - 18 mmol/L    Glucose 99 74 - 99 mg/dL    BUN 8 7.0 - 18 MG/DL    Creatinine 1.01 0.6 - 1.3 MG/DL    BUN/Creatinine ratio 8 (L) 12 - 20      GFR est AA >60 >60 ml/min/1.73m2    GFR est non-AA >60 >60 ml/min/1.73m2    Calcium 9.0 8.5 - 10.1 MG/DL   FERRITIN    Collection Time: 04/23/22  8:25 AM   Result Value Ref Range    Ferritin 18 8 - 388 NG/ML   IRON PROFILE    Collection Time: 04/23/22  8:25 AM   Result Value Ref Range    Iron 25 (L) 50 - 175 ug/dL    TIBC 420 250 - 450 ug/dL    Iron % saturation 6 (L) 20 - 50 %   HEPATIC FUNCTION PANEL    Collection Time: 04/23/22  8:25 AM   Result Value Ref Range    Protein, total 7.0 6.4 - 8.2 g/dL    Albumin 2.2 (L) 3.4 - 5.0 g/dL    Globulin 4.8 (H) 2.0 - 4.0 g/dL    A-G Ratio 0.5 (L) 0.8 - 1.7      Bilirubin, total 0.9 0.2 - 1.0 MG/DL    Bilirubin, direct 0.3 (H) 0.0 - 0.2 MG/DL    Alk. phosphatase 98 45 - 117 U/L    AST (SGOT) 51 (H) 10 - 38 U/L    ALT (SGPT) 37 16 - 61 U/L   GLUCOSE, POC    Collection Time: 04/23/22  8:54 AM   Result Value Ref Range    Glucose (POC) 122 (H) 70 - 110 mg/dL       Imaging:  XR TIB/FIB RT    Result Date: 4/22/2022  History: Pain status post fall. COMPARISON: None. TECHNIQUE: 3 views right tibia and fibula. FINDINGS: Comminuted fracture of the proximal fibula at the neck noted with minimal displacement. Probable nondisplaced oblique fracture through the distal fibula at the syndesmosis partially evaluated. Comminuted fracture of the distal tibial shaft centered approximately 9 cm from the tibial plafond. Main distal fracture fragment is distracted inferiorly approximately 7 mm and laterally approximately 5 mm. Tibial and fibular fractures as discussed.     CT CHEST WO CONT    Result Date: 4/22/2022  EXAMINATION: CT chest without contrast INDICATION: Abnormal chest x-ray, pulmonary opacities COMPARISON: CT 10/27/2007 TECHNIQUE: CT chest without contrast with multiplanar reformations. All CT scans at this facility are performed using dose optimization technique as appropriate to a performed exam, to include automated exposure control, adjustment of the mA and/or kV according to patient size (including appropriate matching first site specific examinations), or use of iterative reconstruction technique. FINDINGS: Evaluation of soft tissues and vessels limited without vascular enhancement. Cardiovascular: Minimal burden of atherosclerotic calcifications. Pulmonary artery trunk 3.5 cm caliber. Thoracic aorta normal in caliber. Heart size normal. Mediastinum: Imaged thyroid unremarkable. Prominent precarinal, right hilar, subcarinal nodes suspected, not well delineated. Esophagus nondistended. Pleura: No pleural effusion. No pneumothorax. Lungs/airways: No suspicious focal bronchial lesions. No confluent consolidation. A few scattered foci of atelectasis/scar. Upper abdomen: No acute findings. Miscellaneous: Superficial soft tissues unremarkable. Bones: No acute osseous findings. No clearly acute findings. Prominent pulmonary artery trunk could reflect pulmonary artery hypertension. Prominent mediastinal lymph nodes as above, not well delineated, nonspecific. CT LOW EXT RT WO CONT    Result Date: 4/22/2022  EXAM: CT LOW EXT RT WO CONT INDICATION: Right lower extremity pain and swelling following injury, fractures on same day radiograph undergoing better evaluation. COMPARISON: Same day tibia/fibular radiographs TECHNIQUE: Helical CT of the right tibia/fibula with coronal and sagittal reformats. Images reviewed in soft tissue and bone windows.   CT dose reduction was achieved through the use of a standardized protocol tailored for this examination and automatic exposure control for dose modulation. CONTRAST: None. FINDINGS: The joint space is maintained. Fractures of the tibia and fibula. Tibial fracture is comminuted in the distal shaft. There is a longitudinal component laterally and an oblique component at the distal aspect. There are 3 dominant fracture fragments. The distal lateral aspect of the fracture is 4.6 cm from the mortise. The medial aspect of the distal fracture is 8.5 cm from the mortise. There is mild dorsal displacement of the distal fracture fragment by 9 mm. There is some impaction of the fracture fragments with approximately 1.1 cm of bony overlap at the anterior fracture margins and 8 mm of overlap at the medial fracture margins. No intra-articular fracture. Mortise is intact and the talar dome is smooth. Fractures at the fibula are at the proximal and distal shaft. The proximal fracture is mildly comminuted and obliquely oriented. There is mild impaction. The distal fracture is mildly obliquely oriented. No intra-articular extension. This fracture is also comminuted best seen on axial views. No fracture at the medial, lateral or posterior malleolus. There is a mildly comminuted fracture at the base of the first metatarsal without displacement or angulation (series 2, image 200. Fracture extends to the tarsometatarsal joint space. There is a fracture at the base of the fourth metatarsal without displacement (202) which extends near the articular surface. Incompletely visualized small joint effusion knee, possibly hemarthrosis. Edema at the mid distal lower extremity to the foot. Atherosclerosis. 1.  Fractures tibia and fibula shaft, as above. 2.  Mildly comminuted fracture at the base of the first metatarsal without displacement. There is intra-articular extension to the tarsometatarsal joint. 3.  Nondisplaced fracture at the base of the fourth metatarsal extends near the articular surface. 4.  Small joint effusion needs, possibly hemarthrosis.  5.  Edema mid distal lower extremity to the foot. 6.  Atherosclerosis. XR CHEST PORT    Result Date: 4/22/2022  EXAM: XR CHEST PORT INDICATION: chest pain, sob and/or arrhythmia COMPARISON: July 24, 2012 FINDINGS: A portable AP radiograph of the chest was obtained at 1351 hours. The patient is on a cardiac monitor. Question patchy perihilar opacities. . Stable mildly prominent cardiac silhouette. Right PICC line has been removed. .  No acute bone findings. .     Question patchy perihilar opacities. CT could be obtained if further evaluation is required. XR FEMUR RT 1 V    Result Date: 4/22/2022  History: Right leg pain status post fall. COMPARISON: None. TECHNIQUE: 3 views right femur. FINDINGS: Mild-to-moderate atherosclerosis. No visualized fracture. Right hip joint preserved. Potential cam-type morphology to the femoral head neck junction. No acute findings.         Signed By: SAMIA Sauceda DR.[facility]  Hospitalist Division  Office:  [phone]  Pager: [phone]         April 23, 2022 1:14 PM

## 2022-04-23 NOTE — ANESTHESIA PREPROCEDURE EVALUATION
Relevant Problems   CARDIOVASCULAR   (+) HTN (hypertension)      HEMATOLOGY   (+) Anemia   (+) Iron deficiency anemia, unspecified       Anesthetic History   No history of anesthetic complications            Review of Systems / Medical History  Patient summary reviewed and pertinent labs reviewed    Pulmonary          Smoker         Neuro/Psych             Comments: + Hx/o Polysubstance Abuse/IVDA, denies recent heroin or cocaine. .. Cardiovascular    Hypertension: poorly controlled                Comments:   + PULMONARY HTN    1/27/2019 2D ECHO:  Result Impression  :   NORMAL LEFT VENTRICULAR CAVITY SIZE WITH MARKED CONCENTRIC LEFT VENTRICULAR HYPERTROPHY. NORMAL LEFT VENTRICULAR EJECTION FRACTION ESTIMATED AT 65% WITH NO WALL MOTION   ABNORMALITIES. MILD DIASTOLIC DYSFUNCTION. NORMAL RIGHT VENTRICULAR SIZE WITH NORMAL SYSTOLIC FUNCTION. NO HEMODYNAMICALLY SIGNIFICANT VALVULAR PATHOLOGY. ESTIMATED PULMONARY ARTERY PRESSURE IS 40 MMHG. COMPARED TO PRIOR ECHO FROM 7/16 THE EJECTION FRACTION HAS DECREASED BY 10% AND THE   PULMONARY ARTERY PRESSURE HAS INCREASED BY 17 MMHG.        Clinical Indications: Chest pain   ICD Codes: Technical Quality: Adequate     MEASUREMENTS:   2D ECHO    LV Diastolic Diameter Base LX     4.1 cm                9.4-7.0   LV Systolic Diameter Base LX      2.2 cm                5.3-3.5   IVS Diastolic Thickness           1.8 cm                0.6-1.1 cm   LVPW Diastolic Thickness          1.3 cm                0.5-3.3 cm   LA Systolic Diameter LX           3.1 cm                2.1-3.7 cm   Aortic Root Diameter              2.9 cm                4.4-4.7 cm   LA Systolic Pressure              22.2 mmHg   LA Area 4C View                   13.5 cm²   LA Area 2C View                   14.3 cm²   LA Length 4C                      5.3 cm   LA Length 2C                      5 cm   LA Volume                         27.2 cm³     DOPPLER    Mitral E Point Velocity           95.2 cm/s Mitral  A Point Velocity          103 cm/s   Mitral E to A Ratio               0.92                  1.0 to 1.5   Mitral E to LV E' Septal Ratio    12.9   Mitral E to LV E' Lateral Ratio   16.2   MV Deceleration Time              285 ms                160-240 ms   MV Area PHT                       2.6 cm²   Isovolumic Relaxation Time        111 ms                70-90 ms   Pulm Vein Atrial Reversal Veloci  31.6 cm/s             <35 cm/s   Pulm Vein Atrial Duration         124 ms   E Prime Velocity                  5.9 cm/s   RA Pressure (Entered Value)       3 mmHg   TR Peak Velocity                  3 m/s   TR Peak Gradient                  19 mmHg   RV Systolic Pressure              40 mmHg       FINDINGS:     Left Ventricle: Normal left ventricular cavity size with marked concentric hypertrophy. Left Ventricle Normal left ventricular ejection fraction. Mild diastolic dysfunction. Function:   LVEF: 65 %       Left Atrium: Normal left atrial size. Right Ventricle: Normal right ventricular size with normal systolic function. Tricuspid Annular Plane Systolic   Excursion (TAPSE) is 2.6 cm. Tricuspid Annular Plane Systolic Velocity (TAPSV) is 16 cm/s. Right Atrium: Normal right atrial size. Normal inferior vena cava (IVC)/hepatic vein. IVC collapses >50% with   inspiration consistent with normal venous pressure. Mitral Valve: Thickening/fibrosis of the mitral valve leaflets with no evidence of stenosis. Mild mitral   regurgitation. Aortic Valve: Structurally normal aortic valve with no evidence of stenosis or regurgitation. Tricuspid Valve: Normally structured tricuspid valve with no evidence of stenosis. Mild tricuspid regurgitation. Estimated pulmonary artery pressure is 40 mmHg. Pulmonic Valve: Structurally normal pulmonic valve. No evidence of valvular pulmonic stenosis. Mild pulmonic   regurgitation. Aorta: Normal aortic root diameter. Pericardium: No pericardial effusion.    Masses / Shunts: No masses, shunts or thrombi seen. Lizet Evans MD, [facility]       GI/Hepatic/Renal       Hepatitis: type C    Liver disease    Comments: Recent CT scan (1/21/20) shows CIRRHOSIS and Splenomegaly. .. Hx/o Hyponatremia Endo/Other        Arthritis and anemia     Other Findings   Comments: + Hx/o Polysubstance Abuse/IVDA, . Pos for cocaine, heroin, methadone. Physical Exam    Airway  Mallampati: II  TM Distance: 4 - 6 cm  Neck ROM: normal range of motion   Mouth opening: Normal     Cardiovascular    Rhythm: regular  Rate: normal         Dental    Dentition: Poor dentition  Comments: Patient has only two remaining teeth, both cracked/chipped, incisors on lower arcade. ..    Pulmonary  Breath sounds clear to auscultation               Abdominal  GI exam deferred       Other Findings            Anesthetic Plan    ASA: 3, emergent  Anesthesia type: general          Induction: Intravenous  Anesthetic plan and risks discussed with: Patient

## 2022-04-23 NOTE — PERIOP NOTES
TRANSFER - IN REPORT:    Verbal report received from [facility] rn(name) on Marisela Cooley  being received from room 408 for ordered procedure      Report consisted of patients Situation, Background, Assessment and   Recommendations(SBAR). Information from the following report(s) SBAR, Kardex and MAR was reviewed with the receiving nurse. Opportunity for questions and clarification was provided. Assessment completed upon patients arrival to unit and care assumed.

## 2022-04-23 NOTE — ROUTINE PROCESS
Assumed care of patient -- arrived medical transport-- splint and ace wrap to right lower leg-- 20g left lower infiltrated --removed -- attempted iv site by 3 RN's not one found-- gave patient P.O. medication for pain--     Patient resting -- easily aroused but complains of pain when he is awake--     0600-- patient wiped with pre-op wipes, gown changed and top side linens -- he would not roll for under sheet to be changes-- undergarment were cut off patient with his permission because he didn't want to move the leg--      Security came to collect the wallet and keys         Bedside and Verbal shift change report given to [address] (oncoming nurse) by Saravanan Elias RN (offgoing nurse). Report given with SBAR, Kardex, Intake/Output, MAR, Accordion and Recent Results.

## 2022-04-23 NOTE — PERIOP NOTES
TRANSFER - OUT REPORT:    Telephonel report given to Mireya(name) on Tommie Estrada  being transferred to [facility](unit) for routine post - op       Report consisted of patients Situation, Background, Assessment and   Recommendations(SBAR). Information from the following report(s) SBAR, OR Summary and MAR was reviewed with the receiving nurse. Lines:   Peripheral IV 04/23/22 Left;Posterior Hand (Active)        Opportunity for questions and clarification was provided.       Patient transported with:   O2 @ 3 liters  Tech

## 2022-04-23 NOTE — BRIEF OP NOTE
Brief Postoperative Note    Patient: Abran Valadez  YOB: 1955  MRN: [id number]    Date of Procedure: 4/23/2022     Pre-Op Diagnosis: RIGHT TIBULA AND FIBULA FRACTURE    Post-Op Diagnosis: Same as preoperative diagnosis. Procedure(s):  RIGHT TIBIA INSERTION INTRA MEDULLARY NAIL/SYNTHES/C-ARM    Surgeon(s):  Eliseo Torres MD    Surgical Assistant: Surg Asst-1: Sade Galo    Anesthesia: General     Estimated Blood Loss (mL): less than 50     Complications: None    Specimens: * No specimens in log *     Implants:   Implant Name Type Inv.  Item Serial No.  Lot No. LRB No. Used Action   NAIL IM [id number] [id number] UNIV TIB LT GRN TI MARILEE PADMINI RND - [id number]  NAIL IM L375MM [id number] UNIV TIB LT GRN TI MARILEE PADMINI RND  DEPUY [employer] [id number] Right 1 Implanted   SCREW BNE L36MM DIA5MM NONSTERILE TIB LT GRN TI ST MARILEE PADMINI - [id number]  SCREW BNE L36MM DIA5MM NONSTERILE TIB LT GRN TI ST MARILEE PADMINI  DEPUY [employer] AA Right 1 Implanted   SCREW BNE L38MM DIA5MM NONSTERILE TIB LT GRN TI ST MARILEE PADMINI - [id number]  SCREW BNE L38MM DIA5MM NONSTERILE TIB LT GRN TI ST MARILEE PADMINI  DEPUY [employer] AA Right 1 Implanted   SCREW BNE L40MM DIA5MM TIB LT GRN TI ST MARILEE PADMINI FULL THRD - [id number]  SCREW BNE L40MM DIA5MM TIB LT GRN TI ST MARILEE PADMINI FULL THRD  DEPUY [employer] AA Right 1 Implanted   SCREW BNE L44MM DIA5MM NONSTERILE TIB LT GRN TI ST MARILEE PADMINI - [id number]  SCREW BNE L44MM DIA5MM NONSTERILE TIB LT GRN TI ST MARILEE PADMINI  DEPUY [employer] AA Right 2 Implanted   ENDCAP ORTH EXTN 0MM TIB GRY TI MARILEE T40 STARDRV RECESS EXT - [id number]  ENDCAP ORTH EXTN 0MM TIB GRY TI MARILEE T40 STARDRV RECESS EXT  DEPUY [employer] AA Right 1 Implanted       Drains: * No LDAs found *    Findings: same    Electronically Signed by López Cruz MD on 4/23/2022 at 2:41 PM

## 2022-04-23 NOTE — PERIOP NOTES
Per Dr. Mehdi Pandey ok for pt to go to OR with 24g PIV, patient  Per CLEVELAND Vázquez RN, pt. Was stuck mutiple times for PIV access. Per Dr. Mehdi Pandey ok to maintain LR from floor nurse.

## 2022-04-23 NOTE — H&P
History and Physical          Subjective     HPI: Poncho Short is a 74 y.o. male with a PMHx of polysubstance abuse, tobacco abuse, HTN, on methadone who presented to the ED with c/i RLE pain s/p fall. Patient states he was walking downhill when he tripped in a hole. He denies any other injuries, no headache, cp, sob, abd pain, nvd, numbness, tingling. Pain is currently 10/10. He reports daily alcohol use, but gives inconsistent answers as to exactly how much - somewhere between 2-12 beers per day. He has smoked since he was 14, and he admits to doing cocaine and heroin. In the ED, labs significant for thrombocytopenia, anemia. UDS +cocaine, methadone, opiates. CT RLE shows tib/fib fx of shaft, mildly comminuted fx of 1st metatarsal with intra-articular extension to tarsometatarsal joint and nondisplaced fx at the base of the 4th metatarsal extending near the articular surface. Ortho surgery was consulted in ED. Patient will be admitted for further evaluation and treatment.      PMHx:  Past Medical History:   Diagnosis Date    Abscess of right shoulder     Abscess of shoulder     Arthritis     Epidural abscess     Heart murmur     Hematuria     Heroin abuse ([address]     Hypertension     Infected wound     Infectious disease     Iron deficiency anemia     IV drug user     Liver disease     Tobacco abuse        PSurgHx:  Past Surgical History:   Procedure Laterality Date    HX COLONOSCOPY  07/18/2016    HX CYST REMOVAL      rt.forearm and rt.foot    HX ENDOSCOPY  07/18/2016    HX FREE SKIN GRAFT      HX OTHER SURGICAL      right forearm infections and graft       SocialHx:  Social History     Socioeconomic History    Marital status:      Spouse name: Not on file    Number of children: Not on file    Years of education: Not on file    Highest education level: Not on file   Occupational History    Not on file   Tobacco Use    Smoking status: Current Every Day Smoker     Packs/day: 0.50     Years: 38.00     Pack years: 19.00     Types: Cigarettes    Smokeless tobacco: Never Used   Vaping Use    Vaping Use: Never used   Substance and Sexual Activity    Alcohol use: Yes     Alcohol/week: 4.0 standard drinks     Types: 4 Cans of beer per week    Drug use: Yes     Types: Heroin     Comment: last dose 2/15    Sexual activity: Yes   Other Topics Concern    Not on file   Social History Narrative    ** Merged History Encounter **          Social Determinants of Health     Financial Resource Strain:     Difficulty of Paying Living Expenses: Not on file   Food Insecurity:     Worried About Running Out of Food in the Last Year: Not on file    Adelia of Food in the Last Year: Not on file   Transportation Needs:     Lack of Transportation (Medical): Not on file    Lack of Transportation (Non-Medical): Not on file   Physical Activity:     Days of Exercise per Week: Not on file    Minutes of Exercise per Session: Not on file   Stress:     Feeling of Stress : Not on file   Social Connections:     Frequency of Communication with Friends and Family: Not on file    Frequency of Social Gatherings with Friends and Family: Not on file    Attends Baptism Services: Not on file    Active Member of [address] [place] [address] [employer] or Organizations: Not on file    Attends Club or Organization Meetings: Not on file    Marital Status: Not on file   Intimate Partner Violence:     Fear of Current or Ex-Partner: Not on file    Emotionally Abused: Not on file    Physically Abused: Not on file    Sexually Abused: Not on file   Housing Stability:     Unable to Pay for Housing in the Last Year: Not on file    Number of [place] in the Last Year: Not on file    Unstable Housing in the Last Year: Not on file       FamilyHx:  History reviewed. No pertinent family history. Home Medications:  Prior to Admission Medications   Prescriptions Last Dose Informant Patient Reported? Taking?    methadone (DOLOPHINE) 10 mg tablet Yes No   Sig: Take 45 mg by mouth daily. Facility-Administered Medications: None       Allergies:  No Known Allergies     Review of Systems:  CONST: no weight loss, no falls, no fever or chills  HEENT: No change in vision, no sore throat or sinus congestion. NECK: No pain or stiffness. PULM: No shortness of breath, no cough or wheeze. CV: no pnd or orthopnea, no CP, no palpitations, no edema  GI: No abdominal pain, no nausea, no vomiting or diarrhea  : No urinary frequency, no urgency, no hesitancy or dysuria. MSK: +joint pain, no back pain, +recent trauma. INTEG: No rash, no itching, no lesions. NEURO No headache, no dizziness, no seizures, no numbness, no tingling or weakness.          Objective     Physical Exam:  Visit Vitals  BP (!) 181/84 (BP 1 Location: Right upper arm, BP Patient Position: Supine)   Pulse 76   Temp 98.3 °F (36.8 °C)   Resp 10   Ht 6' (1.829 m)   Wt 81.6 kg (180 lb)   SpO2 100%   BMI 24.41 kg/m²       General: NAD, appears stated age, alert, appears uncomfortable  Skin: warm, dry, no rashes  Eyes: sclera is non-icteric  HENT: normocephalic/atraumatic, moist mucus membranes  Respiratory: CTA with no signs of respiratory distress  Cardiovascular: RRR, no m/r/g  GI: soft, non-tender, normal bowel sounds  Extremities: no cyanosis, no peripheral edema, RLE is splinted and wrapped in ACE bandage  Neuro: moves all extremities, no focal deficits, normal speech  Psych: appropriate mood and affect    Laboratory Studies:  Recent Results (from the past 24 hour(s))   CBC WITH AUTOMATED DIFF    Collection Time: 04/22/22  2:00 PM   Result Value Ref Range    WBC 2.8 (L) 4.6 - 13.2 K/uL    RBC 3.03 (L) 4.35 - 5.65 M/uL    HGB 8.2 (L) 13.0 - 16.0 g/dL    HCT 26.6 (L) 36.0 - 48.0 %    MCV 87.8 78.0 - 100.0 FL    MCH 27.1 24.0 - 34.0 PG    MCHC 30.8 (L) 31.0 - 37.0 g/dL    RDW 17.4 (H) 11.6 - 14.5 %    PLATELET 65 (L) 598 - 420 K/uL    MPV 11.0 9.2 - 11.8 FL    NRBC 0.0 0  WBC ABSOLUTE NRBC 0.00 0.00 - 0.01 K/uL    NEUTROPHILS 40 40 - 73 %    LYMPHOCYTES 35 21 - 52 %    MONOCYTES 11 (H) 3 - 10 %    EOSINOPHILS 13 (H) 0 - 5 %    BASOPHILS 1 0 - 2 %    IMMATURE GRANULOCYTES 0 0.0 - 0.5 %    ABS. NEUTROPHILS 1.1 (L) 1.8 - 8.0 K/UL    ABS. LYMPHOCYTES 1.0 0.9 - 3.6 K/UL    ABS. MONOCYTES 0.3 0.05 - 1.2 K/UL    ABS. EOSINOPHILS 0.4 0.0 - 0.4 K/UL    ABS. BASOPHILS 0.0 0.0 - 0.1 K/UL    ABS. IMM.  GRANS. 0.0 0.00 - 0.04 K/UL    DF AUTOMATED      PLATELET COMMENTS DECREASED PLATELETS      RBC COMMENTS NORMOCYTIC, NORMOCHROMIC     METABOLIC PANEL, BASIC    Collection Time: 04/22/22  2:00 PM   Result Value Ref Range    Sodium 141 136 - 145 mmol/L    Potassium 3.8 3.5 - 5.5 mmol/L    Chloride 107 100 - 111 mmol/L    CO2 27 21 - 32 mmol/L    Anion gap 7 3.0 - 18 mmol/L    Glucose 98 74 - 99 mg/dL    BUN 7 7.0 - 18 MG/DL    Creatinine 1.07 0.6 - 1.3 MG/DL    BUN/Creatinine ratio 7 (L) 12 - 20      GFR est AA >60 >60 ml/min/1.73m2    GFR est non-AA >60 >60 ml/min/1.73m2    Calcium 8.7 8.5 - 10.1 MG/DL   TYPE & SCREEN    Collection Time: 04/22/22  2:00 PM   Result Value Ref Range    Crossmatch Expiration 04/25/2022,2359     ABO/Rh(D) Roberto Beverly POSITIVE     Antibody screen NEG    COVID-19 RAPID TEST    Collection Time: 04/22/22  2:30 PM   Result Value Ref Range    Specimen source Nasopharyngeal      COVID-19 rapid test Not detected NOTD     EKG, 12 LEAD, INITIAL    Collection Time: 04/22/22  2:39 PM   Result Value Ref Range    Ventricular Rate 69 BPM    Atrial Rate 69 BPM    P-R Interval 176 ms    QRS Duration 90 ms    Q-T Interval 424 ms    QTC Calculation (Bezet) 454 ms    Calculated P Axis 55 degrees    Calculated R Axis -18 degrees    Calculated T Axis 110 degrees    Diagnosis       Normal sinus rhythm  Possible Left atrial enlargement  Left ventricular hypertrophy with repolarization abnormality  Abnormal ECG  When compared with ECG of 06-MAR-2020 07:26,  T wave inversion now evident in Lateral leads  Confirmed by Ernestine Goff MD, Mohan Soriano ([id number]) on 4/22/2022 4:41:16 PM     DRUG SCREEN, URINE    Collection Time: 04/22/22  2:45 PM   Result Value Ref Range    BENZODIAZEPINES Negative NEG      BARBITURATES Negative NEG      THC (TH-CANNABINOL) Negative NEG      OPIATES Positive (A) NEG      PCP(PHENCYCLIDINE) Negative NEG      COCAINE Positive (A) NEG      AMPHETAMINES Negative NEG      METHADONE Positive (A) NEG      HDSCOM (NOTE)        Imaging Reviewed:  XR TIB/FIB RT    Result Date: 4/22/2022  History: Pain status post fall. COMPARISON: None. TECHNIQUE: 3 views right tibia and fibula. FINDINGS: Comminuted fracture of the proximal fibula at the neck noted with minimal displacement. Probable nondisplaced oblique fracture through the distal fibula at the syndesmosis partially evaluated. Comminuted fracture of the distal tibial shaft centered approximately 9 cm from the tibial plafond. Main distal fracture fragment is distracted inferiorly approximately 7 mm and laterally approximately 5 mm. Tibial and fibular fractures as discussed. CT LOW EXT RT WO CONT    Result Date: 4/22/2022  EXAM: CT LOW EXT RT WO CONT INDICATION: Right lower extremity pain and swelling following injury, fractures on same day radiograph undergoing better evaluation. COMPARISON: Same day tibia/fibular radiographs TECHNIQUE: Helical CT of the right tibia/fibula with coronal and sagittal reformats. Images reviewed in soft tissue and bone windows. CT dose reduction was achieved through the use of a standardized protocol tailored for this examination and automatic exposure control for dose modulation. CONTRAST: None. FINDINGS: The joint space is maintained. Fractures of the tibia and fibula. Tibial fracture is comminuted in the distal shaft. There is a longitudinal component laterally and an oblique component at the distal aspect. There are 3 dominant fracture fragments.  The distal lateral aspect of the fracture is 4.6 cm from the mortise. The medial aspect of the distal fracture is 8.5 cm from the mortise. There is mild dorsal displacement of the distal fracture fragment by 9 mm. There is some impaction of the fracture fragments with approximately 1.1 cm of bony overlap at the anterior fracture margins and 8 mm of overlap at the medial fracture margins. No intra-articular fracture. Mortise is intact and the talar dome is smooth. Fractures at the fibula are at the proximal and distal shaft. The proximal fracture is mildly comminuted and obliquely oriented. There is mild impaction. The distal fracture is mildly obliquely oriented. No intra-articular extension. This fracture is also comminuted best seen on axial views. No fracture at the medial, lateral or posterior malleolus. There is a mildly comminuted fracture at the base of the first metatarsal without displacement or angulation (series 2, image 200. Fracture extends to the tarsometatarsal joint space. There is a fracture at the base of the fourth metatarsal without displacement (202) which extends near the articular surface. Incompletely visualized small joint effusion knee, possibly hemarthrosis. Edema at the mid distal lower extremity to the foot. Atherosclerosis. 1.  Fractures tibia and fibula shaft, as above. 2.  Mildly comminuted fracture at the base of the first metatarsal without displacement. There is intra-articular extension to the tarsometatarsal joint. 3.  Nondisplaced fracture at the base of the fourth metatarsal extends near the articular surface. 4.  Small joint effusion needs, possibly hemarthrosis. 5.  Edema mid distal lower extremity to the foot. 6.  Atherosclerosis. XR CHEST PORT    Result Date: 4/22/2022  EXAM: XR CHEST PORT INDICATION: chest pain, sob and/or arrhythmia COMPARISON: July 24, 2012 FINDINGS: A portable AP radiograph of the chest was obtained at 1351 hours. The patient is on a cardiac monitor. Question patchy perihilar opacities. . Stable mildly prominent cardiac silhouette. Right PICC line has been removed. .  No acute bone findings. .     Question patchy perihilar opacities. CT could be obtained if further evaluation is required. XR FEMUR RT 1 V    Result Date: 4/22/2022  History: Right leg pain status post fall. COMPARISON: None. TECHNIQUE: 3 views right femur. FINDINGS: Mild-to-moderate atherosclerosis. No visualized fracture. Right hip joint preserved. Potential cam-type morphology to the femoral head neck junction. No acute findings. Assessment/Plan     Hospital Problems  Date Reviewed: 3/6/2020          Codes Class Noted POA    Anemia ICD-10-CM: D64.9  ICD-9-CM: 285.9  4/22/2022 Unknown        Thrombocytopenia ([facility] [address] ICD-10-CM: D69.6  ICD-9-CM: 287.5  4/22/2022 Unknown        Tibia/fibula fracture ICD-10-CM: S82.209A, S82.409A  ICD-9-CM: 823.82  4/22/2022 Unknown        Metatarsal fracture ICD-10-CM: S92.309A  ICD-9-CM: 825.25  4/22/2022 Unknown            Tib/fib fx  - appreciate ortho assistance  - to OR tomorrow, NPO p MN  - check INR, monitor plts closely. Patient is agreeable to blood/blood product transfusion if needed  - PT/OT per ortho  - PRN pain control  - incentive spirometry    Metatarsal fx: mildly comminuted fx of 1st metatarsal with intra-articular extension to tarsometatarsal joint and nondisplaced fx at the base of the 4th metatarsal extending near the articular surface.   - PRN pain control  - further recs per ortho    Thrombocytopenia: ?cirrhosis with low platelets, recently elevated INR, hypoalbuminemia  - check LFTs, INR  - monitor CBC    Anemia  - check iron profile, ferritin, b12, folate  - monitor CBC    Polysubstance abuse, alcohol abuse  - [facility] protocol      Anticipated Discharge: >2 days    DVT Prophylaxis:  []Lovenox  []Hep SQ  [x]SCDs  []Coumadin   []On Heparin gtt []PO anticoagulant    I have personally reviewed all pertinent labs, films and EKGs that have officially resulted.  I reviewed available electronic documentation outlining the initial presentation as well as the emergency room physician's encounter.     Ren Copeland PA-C DR. [facility]  Hospitalist Division  Office:  [phone]  Pager: [phone]

## 2022-04-23 NOTE — PROGRESS NOTES
I have discussed with the patient the rationale for blood component transfusion; its benefits in treating or preventing fatigue, organ damage, or death; and its risk which includes mild transfusion reactions, rare risk of blood borne infection, or more serious but rare reactions. I have discussed the alternatives to transfusion, including the risk and consequences of not receiving transfusion. The patient had an opportunity to ask questions and had agreed to proceed with transfusion of blood components.       Navya Johnson PA-C [facility]. [facility]  Hospitalist Division  Office:  [phone]  Pager: [phone]

## 2022-04-24 ENCOUNTER — APPOINTMENT (OUTPATIENT)
Dept: ULTRASOUND IMAGING | Age: 67
DRG: 493 | End: 2022-04-24
Attending: PHYSICIAN ASSISTANT
Payer: MEDICARE

## 2022-04-24 PROBLEM — R16.0 LIVER MASS: Status: ACTIVE | Noted: 2022-04-24

## 2022-04-24 PROBLEM — R31.9 HEMATURIA: Status: ACTIVE | Noted: 2022-04-24

## 2022-04-24 PROBLEM — N32.89 BLADDER MASS: Status: ACTIVE | Noted: 2022-04-24

## 2022-04-24 LAB
ANION GAP SERPL CALC-SCNC: 3 MMOL/L (ref 3–18)
BUN SERPL-MCNC: 11 MG/DL (ref 7–18)
BUN/CREAT SERPL: 10 (ref 12–20)
CALCIUM SERPL-MCNC: 8.3 MG/DL (ref 8.5–10.1)
CHLORIDE SERPL-SCNC: 106 MMOL/L (ref 100–111)
CO2 SERPL-SCNC: 27 MMOL/L (ref 21–32)
CREAT SERPL-MCNC: 1.06 MG/DL (ref 0.6–1.3)
ERYTHROCYTE [DISTWIDTH] IN BLOOD BY AUTOMATED COUNT: 17.6 % (ref 11.6–14.5)
GLUCOSE SERPL-MCNC: 144 MG/DL (ref 74–99)
HCT VFR BLD AUTO: 24.3 % (ref 36–48)
HGB BLD-MCNC: 7.5 G/DL (ref 13–16)
MCH RBC QN AUTO: 26.3 PG (ref 24–34)
MCHC RBC AUTO-ENTMCNC: 30.9 G/DL (ref 31–37)
MCV RBC AUTO: 85.3 FL (ref 78–100)
NRBC # BLD: 0 K/UL (ref 0–0.01)
NRBC BLD-RTO: 0 PER 100 WBC
PLATELET # BLD AUTO: 79 K/UL (ref 135–420)
PMV BLD AUTO: 11 FL (ref 9.2–11.8)
POTASSIUM SERPL-SCNC: 4.2 MMOL/L (ref 3.5–5.5)
RBC # BLD AUTO: 2.85 M/UL (ref 4.35–5.65)
SODIUM SERPL-SCNC: 136 MMOL/L (ref 136–145)
WBC # BLD AUTO: 4.4 K/UL (ref 4.6–13.2)

## 2022-04-24 PROCEDURE — 74011250637 HC RX REV CODE- 250/637: Performed by: ORTHOPAEDIC SURGERY

## 2022-04-24 PROCEDURE — 74011250637 HC RX REV CODE- 250/637: Performed by: HOSPITALIST

## 2022-04-24 PROCEDURE — 86708 HEPATITIS A ANTIBODY: CPT

## 2022-04-24 PROCEDURE — 86704 HEP B CORE ANTIBODY TOTAL: CPT

## 2022-04-24 PROCEDURE — 85027 COMPLETE CBC AUTOMATED: CPT

## 2022-04-24 PROCEDURE — 74011250637 HC RX REV CODE- 250/637: Performed by: PHYSICIAN ASSISTANT

## 2022-04-24 PROCEDURE — 97530 THERAPEUTIC ACTIVITIES: CPT

## 2022-04-24 PROCEDURE — 76705 ECHO EXAM OF ABDOMEN: CPT

## 2022-04-24 PROCEDURE — 97165 OT EVAL LOW COMPLEX 30 MIN: CPT

## 2022-04-24 PROCEDURE — 86015 ACTIN ANTIBODY EACH: CPT

## 2022-04-24 PROCEDURE — 97161 PT EVAL LOW COMPLEX 20 MIN: CPT

## 2022-04-24 PROCEDURE — 74011000250 HC RX REV CODE- 250: Performed by: ORTHOPAEDIC SURGERY

## 2022-04-24 PROCEDURE — 65270000046 HC RM TELEMETRY

## 2022-04-24 PROCEDURE — 36415 COLL VENOUS BLD VENIPUNCTURE: CPT

## 2022-04-24 PROCEDURE — 74011250636 HC RX REV CODE- 250/636: Performed by: ORTHOPAEDIC SURGERY

## 2022-04-24 PROCEDURE — 2709999900 HC NON-CHARGEABLE SUPPLY

## 2022-04-24 PROCEDURE — APPSS45 APP SPLIT SHARED TIME 31-45 MINUTES: Performed by: PHYSICIAN ASSISTANT

## 2022-04-24 PROCEDURE — 77010033678 HC OXYGEN DAILY

## 2022-04-24 PROCEDURE — 80048 BASIC METABOLIC PNL TOTAL CA: CPT

## 2022-04-24 PROCEDURE — 82103 ALPHA-1-ANTITRYPSIN TOTAL: CPT

## 2022-04-24 RX ORDER — AMLODIPINE BESYLATE 10 MG/1
10 TABLET ORAL DAILY
Status: DISCONTINUED | OUTPATIENT
Start: 2022-04-25 | End: 2022-04-28 | Stop reason: HOSPADM

## 2022-04-24 RX ADMIN — Medication 100 MG: at 11:21

## 2022-04-24 RX ADMIN — ACETAMINOPHEN 1000 MG: 500 TABLET ORAL at 15:25

## 2022-04-24 RX ADMIN — OXYCODONE HYDROCHLORIDE 10 MG: 10 TABLET ORAL at 09:34

## 2022-04-24 RX ADMIN — SENNOSIDES AND DOCUSATE SODIUM 1 TABLET: 50; 8.6 TABLET ORAL at 11:21

## 2022-04-24 RX ADMIN — Medication 325 MG: at 17:00

## 2022-04-24 RX ADMIN — OXYCODONE HYDROCHLORIDE 10 MG: 10 TABLET ORAL at 22:05

## 2022-04-24 RX ADMIN — ASPIRIN 81 MG: 81 TABLET, COATED ORAL at 11:21

## 2022-04-24 RX ADMIN — OXYCODONE HYDROCHLORIDE 10 MG: 10 TABLET ORAL at 15:25

## 2022-04-24 RX ADMIN — ASPIRIN 81 MG: 81 TABLET, COATED ORAL at 17:43

## 2022-04-24 RX ADMIN — CLONIDINE HYDROCHLORIDE 0.1 MG: 0.1 TABLET ORAL at 11:21

## 2022-04-24 RX ADMIN — FOLIC ACID 1 MG: 1 TABLET ORAL at 11:21

## 2022-04-24 RX ADMIN — Medication 325 MG: at 08:00

## 2022-04-24 RX ADMIN — SODIUM CHLORIDE 100 ML/HR: 9 INJECTION, SOLUTION INTRAVENOUS at 05:15

## 2022-04-24 RX ADMIN — OXYCODONE HYDROCHLORIDE 10 MG: 10 TABLET ORAL at 06:39

## 2022-04-24 RX ADMIN — METHADONE HYDROCHLORIDE 45 MG: 10 CONCENTRATE ORAL at 09:34

## 2022-04-24 RX ADMIN — THERA TABS 1 TABLET: TAB at 11:21

## 2022-04-24 RX ADMIN — SENNOSIDES AND DOCUSATE SODIUM 1 TABLET: 50; 8.6 TABLET ORAL at 17:43

## 2022-04-24 RX ADMIN — WATER 2 G: 1 INJECTION INTRAMUSCULAR; INTRAVENOUS; SUBCUTANEOUS at 05:03

## 2022-04-24 NOTE — OP NOTES
[address] [place] [address]   OPERATIVE REPORT    Name:  George Desai  MR#:   [id number]  :  1955  ACCOUNT #:  [de-identified]  DATE OF SERVICE:  2022    PREOPERATIVE DIAGNOSES:  Displaced somewhat comminuted distal third right tibia fracture with associated fibular fracture distally and also proximal fibular fracture. POSTOPERATIVE DIAGNOSES:  Displaced somewhat comminuted distal third right tibia fracture with associated fibular fracture distally and also proximal fibular fracture. PROCEDURE PERFORMED:  Closed IM nailing of the right tibia using the [employer] IM nail system with a double lock proximally and triple lock distally. SURGEON:  Lalit Saucedo MD    FIRST ASSISTANT:  Angel Gibbons    SECOND ASSISTANT:   Clara Wesley    ANESTHESIOLOGIST:  Nii Naylor MD    ANESTHESIA:  General.    COMPLICATIONS:  No complications. Compartments soft at the end of the case. SPECIMENS REMOVED:  No specimen. IMPLANTS:  As above mentioned. ESTIMATED BLOOD LOSS:  Less than 100. PROCEDURE:  After the anesthetic was successfully induced, it was confirmed he received his antibiotics. A standard prep and drape and a time-out performed. Initially we made a midline incision and approached the patellar ligament medially and retracted it laterally. I extended the incision a little bit proximally and distally. As he had a previous Osgood-Schlatter disease, he gave us good safe exposure. We placed a guidewire appropriately with C-arm control on AP and lateral images and then opened up the canal and then put a little bend on the guide tip on the wire and had a nice reduction and placed the guidewire across the fracture site. AP and lateral confirmed. We then measured for the nail.   We then reamed up the nail and placed the nail and then locked it distally while protecting neurovascular structures and then locked it proximally keeping it only on the fracture site and we were very nicely reduced on AP and lateral images. Compartments very soft, washed out, and routine closure. No complications and the patient tolerated the procedure well. A well padded posterior splint was applied and the patient brought to the recovery room with vitals stable and compartments soft.       Jose Sandra MD [id number]  D:  04/23/2022 15:00  T:  04/23/2022 23:01  JOB #:  [id number]

## 2022-04-24 NOTE — PROGRESS NOTES
[facility] Hospitalist Group  Progress Note    Patient: Alycia Alford Age: 74 y.o. : 1955 MR#: [id number] SSN: [id number]  Date: 2022         Assessment/Plan:     Hospital Problems  Date Reviewed: 3/6/2020          Codes Class Noted POA    Liver mass ICD-10-CM: R16.0  ICD-9-CM: 573.8  2022 Unknown        Bladder mass ICD-10-CM: N32.89  ICD-9-CM: 596.89  2022 Yes        Hematuria ICD-10-CM: R31.9  ICD-9-CM: 599.70  2022 Yes        Anemia ICD-10-CM: D64.9  ICD-9-CM: 285.9  2022 Unknown        Thrombocytopenia ([facility] ICD-10-CM: D69.6  ICD-9-CM: 287.5  2022 Unknown        * (Principal) Tibia/fibula fracture ICD-10-CM: S82.209A, S82.409A  ICD-9-CM: 823.82  2022 Unknown        Metatarsal fracture ICD-10-CM: W59.913O  ICD-9-CM: 825.25  2022 Unknown        Alcohol abuse ICD-10-CM: F10.10  ICD-9-CM: 305.00  2022 Unknown        HTN (hypertension) ICD-10-CM: I10  ICD-9-CM: 401.9  2022 Unknown        Polysubstance abuse ([facility] ICD-10-CM: F19.10  ICD-9-CM: 305.90  2012 Unknown            Tib/fib fx  - appreciate ortho assistance  - to OR  for closed IM nailing of the right tibia  - PT/OT per ortho  - PRN pain control  - incentive spirometry     Metatarsal fx: mildly comminuted fx of 1st metatarsal with intra-articular extension to tarsometatarsal joint and nondisplaced fx at the base of the 4th metatarsal extending near the articular surface.   - PRN pain control  - further recs per ortho     Thrombocytopenia: likely 2/2 cirrhosis  - platelets have improved today, continue to monitor    Anemia, acute on chronic: acute blood loss, iron deficiency  - monitor H&H and transfuse as needed  - continue iron replacement   - hgb stable today, continue to monitor    Cirrhosis with hepatic masses: seen on CT from ER visit on  with recs for MRI. Per chart review - patient has had evidence of cirrhosis since 2018.  A mass in the right hepatic lobe was seen on a CT in . An MRI was ordered by his surgeon at the time, but the patient states he didn't get it due to transportation issues. - will order MRI   - will consult GI    Hematuria with possible bladder mass: recently seen in ER for hematuria , discharged with abx. Past history of bladder tumor. Patient states he followed up with urology, and it was determined to not be cancer, but I do not see evidence that a cystoscopy was performed, only that it was planned. - will consult urology    HTN  - d/c clonidine, start norvasc     Polysubstance abuse, alcohol abuse  - CIWA protocol, folate, thiamine  - resume methadone      DVT Prophylaxis:  []Lovenox  []Hep SQ  [x]SCDs  []Coumadin   []On Heparin gtt []PO anticoagulant    Anticipated discharge: 2 days    Subjective:     Pt s/e @ bedside. No major events overnight. Pt has no complaints today. Denies sob, cp, abd pain, nvd.     Objective:   VS:   Visit Vitals  BP (!) 158/68   Pulse 70   Temp 97.7 °F (36.5 °C)   Resp 17   Ht 6' (1.829 m)   Wt 81.6 kg (180 lb)   SpO2 98%   BMI 24.41 kg/m²      Tmax/24hrs: Temp (24hrs), Av.8 °F (36.6 °C), Min:97.4 °F (36.3 °C), Max:98.4 °F (36.9 °C)      Intake/Output Summary (Last 24 hours) at 2022 1259  Last data filed at 2022 0405  Gross per 24 hour   Intake 1577 ml   Output 1400 ml   Net 177 ml       General Appearance: NAD, conversant  HENT: normocephalic/atraumatic, moist mucus membranes  Lungs: CTA with normal respiratory effort  CV: RRR, no m/r/g  Abdomen: soft, non-tender, +bowel sounds  Extremities: no cyanosis, no peripheral edema, RLE in post op dressing  Neuro: No focal deficits, motor/sensory intact  Skin: Normal color, intact  Psych: appropriate affect    Current Facility-Administered Medications   Medication Dose Route Frequency    lactated Ringers infusion  25 mL/hr IntraVENous CONTINUOUS    methadone (DOLOPHINE) 10 mg/mL concentrated solution 45 mg  45 mg Oral DAILY    0.9% sodium chloride infusion  100 mL/hr IntraVENous CONTINUOUS    sodium chloride (NS) flush 5-40 mL  5-40 mL IntraVENous Q8H    sodium chloride (NS) flush 5-40 mL  5-40 mL IntraVENous PRN    acetaminophen (TYLENOL) tablet 1,000 mg  1,000 mg Oral Q6H    oxyCODONE IR (ROXICODONE) tablet 10-15 mg  10-15 mg Oral Q3H PRN    oxyCODONE IR (ROXICODONE) tablet 20 mg  20 mg Oral Q3H PRN    naloxone (NARCAN) injection 0.4 mg  0.4 mg IntraVENous PRN    flumazeniL (ROMAZICON) 0.1 mg/mL injection 0.2 mg  0.2 mg IntraVENous PRN    ceFAZolin (ANCEF) 2 g in sterile water (preservative free) 20 mL IV syringe  2 g IntraVENous Q8H    ferrous sulfate tablet 325 mg  1 Tablet Oral BID WITH MEALS    ondansetron (ZOFRAN) injection 4 mg  4 mg IntraVENous Q4H PRN    senna-docusate (PERICOLACE) 8.6-50 mg per tablet 1 Tablet  1 Tablet Oral BID    ketorolac (TORADOL) injection 15 mg  15 mg IntraVENous Q6H    HYDROmorphone (DILAUDID) injection 1 mg  1 mg IntraVENous Q4H PRN    aspirin delayed-release tablet 81 mg  81 mg Oral BID    sodium chloride (NS) flush 5-40 mL  5-40 mL IntraVENous Q8H    sodium chloride (NS) flush 5-40 mL  5-40 mL IntraVENous PRN    acetaminophen (TYLENOL) tablet 650 mg  650 mg Oral Q6H PRN    Or    acetaminophen (TYLENOL) suppository 650 mg  650 mg Rectal Q6H PRN    polyethylene glycol (MIRALAX) packet 17 g  17 g Oral DAILY PRN    bisacodyL (DULCOLAX) suppository 10 mg  10 mg Rectal DAILY PRN    sodium chloride (NS) flush 5-40 mL  5-40 mL IntraVENous Q8H    sodium chloride (NS) flush 5-40 mL  5-40 mL IntraVENous PRN    LORazepam (ATIVAN) tablet 1 mg  1 mg Oral Q1H PRN    Or    LORazepam (ATIVAN) 2 mg/mL injection 1 mg  1 mg IntraVENous Q1H PRN    LORazepam (ATIVAN) tablet 2 mg  2 mg Oral Q1H PRN    Or    LORazepam (ATIVAN) 2 mg/mL injection 2 mg  2 mg IntraVENous Q1H PRN    LORazepam (ATIVAN) 2 mg/mL injection 3 mg  3 mg IntraVENous Q15MIN PRN    cloNIDine HCL (CATAPRES) tablet 0.1 mg  0.1 mg Oral BID    thiamine HCL (B-1) tablet 100 mg  100 mg Oral DAILY    folic acid (FOLVITE) tablet 1 mg  1 mg Oral DAILY    therapeutic multivitamin (THERAGRAN) tablet 1 Tablet  1 Tablet Oral DAILY        Labs:    Recent Results (from the past 24 hour(s))   METABOLIC PANEL, BASIC    Collection Time: 04/24/22 12:43 AM   Result Value Ref Range    Sodium 136 136 - 145 mmol/L    Potassium 4.2 3.5 - 5.5 mmol/L    Chloride 106 100 - 111 mmol/L    CO2 27 21 - 32 mmol/L    Anion gap 3 3.0 - 18 mmol/L    Glucose 144 (H) 74 - 99 mg/dL    BUN 11 7.0 - 18 MG/DL    Creatinine 1.06 0.6 - 1.3 MG/DL    BUN/Creatinine ratio 10 (L) 12 - 20      GFR est AA >60 >60 ml/min/1.73m2    GFR est non-AA >60 >60 ml/min/1.73m2    Calcium 8.3 (L) 8.5 - 10.1 MG/DL   CBC W/O DIFF    Collection Time: 04/24/22 12:43 AM   Result Value Ref Range    WBC 4.4 (L) 4.6 - 13.2 K/uL    RBC 2.85 (L) 4.35 - 5.65 M/uL    HGB 7.5 (L) 13.0 - 16.0 g/dL    HCT 24.3 (L) 36.0 - 48.0 %    MCV 85.3 78.0 - 100.0 FL    MCH 26.3 24.0 - 34.0 PG    MCHC 30.9 (L) 31.0 - 37.0 g/dL    RDW 17.6 (H) 11.6 - 14.5 %    PLATELET 79 (L) 319 - 420 K/uL    MPV 11.0 9.2 - 11.8 FL    NRBC 0.0 0  WBC    ABSOLUTE NRBC 0.00 0.00 - 0.01 K/uL       Imaging:  XR TIB/FIB RT    Result Date: 4/23/2022  EXAM: XR TIB/FIB RT CLINICAL INDICATION/HISTORY : orif rt tib fib. COMPARISON: April 22, 2022 TECHNIQUE: 5 fluoroscopic spot VIEWS FINDINGS: Intramedullary floyd and screw fixation of the comminuted distal fibula fracture. 1.  Postoperative findings as above    US ABD LTD    Result Date: 4/24/2022  Ultrasound right upper quadrant: HISTORY: Elevated liver function tests. Reference: CT April 8, 2022 Pancreatic duct is visualized at the pancreatic head. No discrete mass. Liver cirrhosis with increased echogenicity and lobulated contour. Irregular hypoechoic masses in the left and right hepatic lobes, corresponding to mass lesions in segment 3 and segment 6 as seen on CT.  Gallbladder is distended, containing stones and biliary sludge. Thickened gallbladder wall at 3.6 mm. No sonographic Garica signs. Common bile duct is top normal at 6 mm. Portal vein is slightly distended 1.3 cm. Normal portal venous flow by Color and spectral Doppler study. Right kidney measures 9.5 cm in length. Normal echogenicity. No hydronephrosis, renal stones or renal mass. 1. Cholelithiasis and gallbladder wall thickening with no sonographic Garcia signs. Suspect chronic cholecystitis. No biliary dilatation. 2. Liver cirrhosis with known masses from CT. Further evaluation with dedicated liver MRI as previously recommended.         Signed By: Myrick Bloch, PA-C DR. [facility]  Hospitalist Division  Office:  [phone]  Pager: [phone]         April 24, 2022 1:14 PM

## 2022-04-24 NOTE — PROGRESS NOTES
Problem: Self Care Deficits Care Plan (Adult)  Goal: *Acute Goals and Plan of Care (Insert Text)  Note: Occupational Therapy Goals  Initiated 4/24/2022 within 7 day(s). 1.  Patient will perform LB bathing with modified independence (once wrapping is discontinued)  2. Patient will perform LB dressing with modified independence  3. Patient will maneuver on and off BSC with modified independence (PWB status)     Prior Level of Function: he reports that he lives alone and was independent with his ADL's and IADL's    OCCUPATIONAL THERAPY EVALUATION    Patient: Dot Gonzalez (83 y.o. male)  Date: 4/24/2022  Primary Diagnosis: Tibia/fibula fracture [S82.209A, S82.409A]  Anemia [D64.9]  Thrombocytopenia ([address] [D69.6]  Procedure(s) (LRB):  RIGHT TIBIA INSERTION INTRA MEDULLARY NAIL/SYNTHES/C-ARM (Right) 1 Day Post-Op   Precautions:  Fall,PWB  PLOF: he reports that he lives alone and was independent with his ADL's and IADL's    ASSESSMENT :  Based on the objective data described below, the patient presents with decreased functional mobility and decreased ability to bend and reach his lower body. This limits his independence and safety with his self care routine. Patient will benefit from skilled intervention to address the above impairments.   Patient's rehabilitation potential is considered to be Good  Factors which may influence rehabilitation potential include:   [x]             None noted  []             Mental ability/status  []             Medical condition  []             Home/family situation and support systems  []             Safety awareness  []             Pain tolerance/management  []             Other:      PLAN :  Recommendations and Planned Interventions:   [x]               Self Care Training                  [x]      Therapeutic Activities  [x]               Functional Mobility Training   []      Cognitive Retraining  []               Therapeutic Exercises           []      Endurance Activities  [] Balance Training                    []      Neuromuscular Re-Education  []               Visual/Perceptual Training     [x]      Home Safety Training  []               Patient Education                   []      Family Training/Education  []               Other (comment):    Frequency/Duration: Patient will be followed by occupational therapy 1-2 times per day/4-7 days per week to address goals. Discharge Recommendations: Home Health  Further Equipment Recommendations for Discharge: N/A     SUBJECTIVE:   Patient stated I can just sponge bathe if I need to when I go home.     OBJECTIVE DATA SUMMARY:     Past Medical History:   Diagnosis Date    Abscess of right shoulder     Abscess of shoulder     Arthritis     Epidural abscess     Heart murmur     Hematuria     Heroin abuse ([address]     Hypertension     Infected wound     Infectious disease     Iron deficiency anemia     IV drug user     Liver disease     Tobacco abuse      Past Surgical History:   Procedure Laterality Date    HX COLONOSCOPY  07/18/2016    HX CYST REMOVAL      rt.forearm and rt.foot    HX ENDOSCOPY  07/18/2016    HX FREE SKIN GRAFT      HX OTHER SURGICAL      right forearm infections and graft     Barriers to Learning/Limitations: none  Home Situation:   Home Situation  Home Environment: Private residence  # Steps to Enter: 1  One/Two Story Residence: One story  Living Alone: Yes  Support Systems: Other Family Member(s)  Patient Expects to be Discharged to[de-identified] Home with home health  Current DME Used/Available at Home: None  [x]  Right hand dominant   []  Left hand dominant    Cognitive/Behavioral Status:  Neurologic State: Alert  Orientation Level: Oriented X4    Skin: no skin integrity issues noted during OT evaluation  Edema: no UE edema noted    Vision/Perceptual:     Tracking is WFL        Coordination: BUE  WFL    Balance:  Sitting: Intact; With support  Standing: Impaired; With support  Standing - Static: Fair  Standing - Dynamic : Fair    Strength: BUE  4+/5  Tone & Sensation: BUE  Tone: Normal  Sensation: Intact       Range of Motion: BUE  AROM WFL    Functional Mobility and Transfers for ADLs:  Bed Mobility:     Supine to Sit: Minimum assistance; Additional time     Scooting: Stand-by assistance  Transfers:  Sit to Stand: Contact guard assistance  Stand to Sit: Contact guard assistance      ADL Assessment:    Self feeding: independent  Grooming: modified independent (simulated at bed level)  UB bathing/dressing: independent  LB bathing/dressing: dependent    Pain:  Pain level pre-treatment: 0/10   Pain level post-treatment: 0/10     Activity Tolerance:   No SOB; has generalized c/o fatigue  Please refer to the flowsheet for vital signs taken during this treatment. After treatment:   [] Patient left in no apparent distress sitting up in chair  [x] Patient left in no apparent distress in bed  [x] Call bell left within reach  [] Nursing notified  [] Caregiver present  [] Bed alarm activated    COMMUNICATION/EDUCATION:   [] Role of Occupational Therapy in the acute care setting  [x] Home safety education was provided and the patient/caregiver indicated understanding. [] Patient/family have participated as able in goal setting and plan of care. [x] Patient/family agree to work toward stated goals and plan of care. [] Patient understands intent and goals of therapy, but is neutral about his/her participation. [] Patient is unable to participate in goal setting and plan of care. Thank you for this referral.  Kiara Brunson OTR/L  Time Calculation: 8 mins    Eval Complexity: History: LOW Complexity : Brief history review ; Examination: LOW Complexity : 1-3 performance deficits relating to physical, cognitive , or psychosocial skils that result in activity limitations and / or participation restrictions ;    Decision Making:LOW Complexity : No comorbidities that affect functional and no verbal or physical assistance needed to complete eval tasks

## 2022-04-24 NOTE — ROUTINE PROCESS
Bedside shift change report given to [facility], RN (oncoming nurse) by Teresa Condon RN (offgoing nurse). Report included the following information SBAR, Kardex, MAR and Cardiac Rhythm NSR.

## 2022-04-24 NOTE — PROGRESS NOTES
Ortho    Comfortable    No sob or cp    Moving toes well  Compartments soft    p- mainly in bed today, continue elevation  Ambulate tomorrow

## 2022-04-24 NOTE — PROGRESS NOTES
Problem: Mobility Impaired (Adult and Pediatric)  Goal: *Acute Goals and Plan of Care (Insert Text)  Description: Physical Therapy Goals  Initiated 4/24/2022 and to be accomplished within 7 day(s)  1. Patient will move from supine to sit and sit to supine  in bed with modified independence. 2.  Patient will transfer from bed to chair and chair to bed with modified independence using the least restrictive device. 3.  Patient will perform sit to stand with modified independence. 4.  Patient will ambulate with modified independence for 50 feet with the least restrictive device. 5.  Patient will ascend/descend 1 stairs with B handrail(s) with modified independence. PLOF: Pt lives alone in a [facility] [place] with 1 ALEJANDRO. Indep PTA. Outcome: Progressing Towards Goal     PHYSICAL THERAPY EVALUATION    Patient: Debbi Escobar (73 y.o. male)  Date: 4/24/2022  Primary Diagnosis: Tibia/fibula fracture [S82.209A, S82.409A]  Anemia [D64.9]  Thrombocytopenia ([facility]) [D69.6]  Procedure(s) (LRB):  RIGHT TIBIA INSERTION INTRA MEDULLARY NAIL/SYNTHES/C-ARM (Right) 1 Day Post-Op   Precautions:  Fall,PWB    ASSESSMENT :  Based on the objective data described below, the patient presents with generalized weakness and pain. RN cleared for mobility. Pt found semi-reclined in bed, in NAD, willing to work with PT. Increased pain to R LE. Pt edu on PWB precautions. Sup-sit with Bakari for R LE guidance. Once sitting, grossly 3+/5 RLE, WFL L LE strength. Recliner brought next to bed. Pt stood with Bakari and RW and amb 3 short steps to sit in recliner. Good compliance with PWB precautions. Pt left with B LE's elevated, call bell nearby, all needs met, eud on not getting up without assist.     Patient will benefit from skilled intervention to address the above impairments.   Patient's rehabilitation potential is considered to be Good  Factors which may influence rehabilitation potential include:   []         None noted  []         Mental ability/status  []         Medical condition  [x]         Home/family situation and support systems  []         Safety awareness  [x]         Pain tolerance/management  []         Other:      PLAN :  Recommendations and Planned Interventions:   [x]           Bed Mobility Training             [x]    Neuromuscular Re-Education  [x]           Transfer Training                   []    Orthotic/Prosthetic Training  [x]           Gait Training                          []    Modalities  [x]           Therapeutic Exercises           []    Edema Management/Control  [x]           Therapeutic Activities            [x]    Family Training/Education  [x]           Patient Education  []           Other (comment):    Frequency/Duration: Patient will be followed by physical therapy 1-2 times per day/4-7 days per week to address goals. Discharge Recommendations: Rehab vs Home Health with 24/7 assist  Further Equipment Recommendations for Discharge: rolling walker     SUBJECTIVE:   Patient stated i'm not walking with this pain.     OBJECTIVE DATA SUMMARY:     Past Medical History:   Diagnosis Date    Abscess of right shoulder     Abscess of shoulder     Arthritis     Epidural abscess     Heart murmur     Hematuria     Heroin abuse ([facility]     Hypertension     Infected wound     Infectious disease     Iron deficiency anemia     IV drug user     Liver disease     Tobacco abuse      Past Surgical History:   Procedure Laterality Date    HX COLONOSCOPY  07/18/2016    HX CYST REMOVAL      rt.forearm and rt.foot    HX ENDOSCOPY  07/18/2016    HX FREE SKIN GRAFT      HX OTHER SURGICAL      right forearm infections and graft     Barriers to Learning/Limitations: None  Compensate with: N/A  Home Situation:  Home Situation  Home Environment: Private residence  # Steps to Enter: 1  One/Two Story Residence: One story  Living Alone: Yes  Support Systems: Other Family Member(s)  Patient Expects to be Discharged to[de-identified] Home with home health  Current DME Used/Available at Home: None  Critical Behavior:  Neurologic State: Alert  Orientation Level: Oriented X4  Cognition: Follows commands  Safety/Judgement: Fall prevention  Psychosocial  Patient Behaviors: Calm; Cooperative    Strength:    Strength: Generally decreased, functional       Tone & Sensation:   Tone: Normal              Sensation: Intact    Range Of Motion:  AROM: Generally decreased, functional           Functional Mobility:  Bed Mobility:     Supine to Sit: Minimum assistance; Additional time     Scooting: Stand-by assistance  Transfers:  Sit to Stand: Contact guard assistance  Stand to Sit: Contact guard assistance             Balance:   Sitting: Intact; With support  Standing: Impaired; With support  Standing - Static: Fair  Standing - Dynamic : Fair    Ambulation/Gait Training:  Distance (ft): 3 Feet (ft)  Assistive Device: Walker, rolling  Ambulation - Level of Assistance: Minimal assistance        Gait Abnormalities: Antalgic;Decreased step clearance        Base of Support: Shift to left     Speed/Jovanna: Slow  Step Length: Right shortened;Left shortened       Pain:  Pain level pre-treatment: 8/10   Pain level post-treatment: 8/10   Pain Intervention(s) : Medication (see MAR); Rest, Ice, Repositioning  Response to intervention: Nurse notified, See doc flow    Activity Tolerance:   Pt tolerated mobility wlel  Please refer to the flowsheet for vital signs taken during this treatment. After treatment:   [x]         Patient left in no apparent distress sitting up in chair  []         Patient left in no apparent distress in bed  [x]         Call bell left within reach  [x]         Nursing notified  []         Caregiver present  []         Bed alarm activated  []         SCDs applied    COMMUNICATION/EDUCATION:   [x]         Role of Physical Therapy in the acute care setting. [x]         Fall prevention education was provided and the patient/caregiver indicated understanding.   [x] Patient/family have participated as able in goal setting and plan of care. []         Patient/family agree to work toward stated goals and plan of care. []         Patient understands intent and goals of therapy, but is neutral about his/her participation. []         Patient is unable to participate in goal setting/plan of care: ongoing with therapy staff.  []         Other:     Thank you for this referral.  Nereida Byrd   Time Calculation: 18 mins      Eval Complexity: History: LOW Complexity : Zero comorbidities / personal factors that will impact the outcome / POCExam:MEDIUM Complexity : 3 Standardized tests and measures addressing body structure, function, activity limitation and / or participation in recreation  Presentation: LOW Complexity : Stable, uncomplicated  Clinical Decision Making:Low Complexity    Overall Complexity:LOW

## 2022-04-24 NOTE — CONSULTS
[email or web address]  [phone]    GASTROENTEROLOGY CONSULT      Impression:   1. Liver mass-concerning for hepatoma. Segment 6-2.3 x 2.9 x 2.9 cm; segment 6-1.5 x 1.5 x 1.5. Questionable mass in segment 3.  2.  Liver cirrhosis-nodular liver noted. Suspect this is related to hepatitis C/alcohol use. Appears to have well compensated cirrhosis-no ascites noted on CT. Meld score = 11.  3.  Hepatitis C-untreated. 4.  Polysubstance abuse-alcohol, cocaine. Remote history of IV drug use. 5.  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 Splenomegaly noted on imaging. 6.  Noncompliance. Plan:     1. MRI liver. 2.  Alpha-fetoprotein. 3. Will order Hep C labs to confirm viremia, genotype, Will order additional labs to screen for concomitant liver diseases  4. Counseled about drug use. 5.  May need EGD to screen for varices electively. 6. If MRI shows masses within [place] criteria, consider urgent evaluation for liver transplantation at [facility]-his ongoing drug/alcohol use, noncompliance will be factors that against him. Having said that, would not be unreasonable to at least consider evaluation if appropriate. 7. Low threshold for treatment for opioid/alcohol withdrawal    Parts of this note were generated using speech recognition software, and may contain transcription/phonetic errors-please contact me if any additional clarification is needed. Corrections may be made at a later date. Chief Complaint: Liver mass-concerning for hematoma      HPI:  Debbi Escobar is a 74 y.o. male who I am being asked to see in consultation for an opinion regarding the above symptom. Patient was admitted for fracture of his tibia and fibula. He was noted to have liver masses along with liver cirrhosis on a imaging study done on 4/8/2022. Patient states that he was told he had cirrhosis many years ago but has not had follow-up for this.   Since he was relatively asymptomatic, patient felt that there was no need for follow-up. He has a longstanding history of drug use. Continues to snort cocaine. Denies any recent IV drug use. Continues to drink 2 beers per day. He was told he has hepatitis C many years ago-treatment naïve; review of office chart shows that he was seen as an in patient at [facility] by one of our colleagues in 2016-consult note references active drug use/hepatitis C even at that time. EGD and colonoscopy was done at that time for iron deficiency anemia-EGD was done on 7/18/2016-this showed mild gastritis. Gastric biopsies were negative for Helicobacter pylori. Colonoscopy was done on 7/18/2016-showed diverticulosis. Currently denies any abdominal pain. Denies any jaundice/confusion/hematemesis. No liver/GI related symptoms at this time. PMH:   Past Medical History:   Diagnosis Date    Abscess of right shoulder     Abscess of shoulder     Arthritis     Epidural abscess     Heart murmur     Hematuria     Heroin abuse (HCC)     Hypertension     Infected wound     Infectious disease     Iron deficiency anemia     IV drug user     Liver disease     Tobacco abuse        PSH:   Past Surgical History:   Procedure Laterality Date    HX COLONOSCOPY  07/18/2016    HX CYST REMOVAL      rt.forearm and rt.foot    HX ENDOSCOPY  07/18/2016    HX FREE SKIN GRAFT      HX OTHER SURGICAL      right forearm infections and graft       Social HX:   Social History     Socioeconomic History    Marital status:      Spouse name: Not on file    Number of children: Not on file    Years of education: Not on file    Highest education level: Not on file   Occupational History    Not on file   Tobacco Use    Smoking status: Current Every Day Smoker     Packs/day: 0.50     Years: 38.00     Pack years: 19.00     Types: Cigarettes    Smokeless tobacco: Never Used   Vaping Use    Vaping Use: Never used   Substance and Sexual Activity    Alcohol use:  Yes Alcohol/week: 4.0 standard drinks     Types: 4 Cans of beer per week    Drug use: Yes     Types: Heroin     Comment: last dose 2/15    Sexual activity: Yes   Other Topics Concern    Not on file   Social History Narrative    ** Merged History Encounter **          Social Determinants of Health     Financial Resource Strain:     Difficulty of Paying Living Expenses: Not on file   Food Insecurity:     Worried About Running Out of Food in the Last Year: Not on file    Adelia of Food in the Last Year: Not on file   Transportation Needs:     Lack of Transportation (Medical): Not on file    Lack of Transportation (Non-Medical): Not on file   Physical Activity:     Days of Exercise per Week: Not on file    Minutes of Exercise per Session: Not on file   Stress:     Feeling of Stress : Not on file   Social Connections:     Frequency of Communication with Friends and Family: Not on file    Frequency of Social Gatherings with Friends and Family: Not on file    Attends Jainism Services: Not on file    Active Member of [address] [place] [address] or Organizations: Not on file    Attends Club or Organization Meetings: Not on file    Marital Status: Not on file   Intimate Partner Violence:     Fear of Current or Ex-Partner: Not on file    Emotionally Abused: Not on file    Physically Abused: Not on file    Sexually Abused: Not on file   Housing Stability:     Unable to Pay for Housing in the Last Year: Not on file    Number of [place] in the Last Year: Not on file    Unstable Housing in the Last Year: Not on file       FHX:   History reviewed. No pertinent family history.     Allergy:   No Known Allergies    Patient Active Problem List   Diagnosis Code    Epidural abscess, L2-L5 G06.1    Polysubstance abuse ([facility]) F19.10    Leukopenia D72.819    Iron deficiency anemia, unspecified D50.9    Anemia D64.9    Thrombocytopenia ([facility]) D69.6    Tibia/fibula fracture S82.209A, S82.409A    Metatarsal fracture S92.309A    Alcohol abuse F10.10    HTN (hypertension) I10    Liver mass R16.0    Bladder mass N32.89    Hematuria R31.9       Home Medications:     Medications Prior to Admission   Medication Sig    methadone (DOLOPHINE) 10 mg tablet Take 45 mg by mouth daily. Review of Systems:     Constitutional: No fevers, chills, weight loss, fatigue. Skin: No rashes, pruritis, jaundice, ulcerations, erythema. HENT: No headaches, nosebleeds, sinus pressure, rhinorrhea, sore throat. Eyes: No visual changes, blurred vision, eye pain, photophobia, jaundice. Cardiovascular: No chest pain, heart palpitations. Respiratory: No cough, SOB, wheezing, chest discomfort, orthopnea. Gastrointestinal: Neg unless noted otherwise in H&P   Genitourinary: No dysuria, bleeding, discharge, pyuria. Musculoskeletal: RLE pain   Endo: No sweats. Heme: No bruising, easy bleeding. Allergies: As noted. Neurological: Cranial nerves intact. Alert and oriented. Gait not assessed. Psychiatric:  No anxiety, depression, hallucinations. Visit Vitals  BP (!) 158/68   Pulse 70   Temp 97.7 °F (36.5 °C)   Resp 17   Ht 6' (1.829 m)   Wt 81.6 kg (180 lb)   SpO2 98%   BMI 24.41 kg/m²       Physical Assessment:     constitutional: appearance: well developed, well nourished, normal habitus, no deformities, in no acute distress. skin: inspection: no rashes, ulcers, icterus or other lesions; no clubbing or telangiectasias. palpation: no induration or subcutaneos nodules. eyes: inspection: normal conjunctivae and lids; no jaundice pupils: normal  ENMT: mouth: normal oral mucosa,lips and gums; oropharynx: normal tongue, hard and soft palate; posterior pharynx without erithema, exudate or lesions. neck: thyroid: normal size, consistency and position; no masses or tenderness. respiratory: effort: normal chest excursion; no intercostal retraction or accessory muscle use.    cardiovascular: abdominal aorta: normal size and position; no bruits. palpation: PMI of normal size and position; normal rhythm; no thrill or murmurs. abdominal: abdomen: normal consistency; no tenderness or masses. hernias: no hernias appreciated. liver: normal size and consistency. spleen: not palpable. rectal: hemoccult/guaiac: not performed. musculoskeletal: RLE in bandage. neurologic: cranial nerves: II-XII normal.   psychiatric: judgement/insight: within normal limits. memory: within normal limits for recent and remote events. mood and affect: no evidence of depression, anxiety or agitation. orientation: oriented to time, space and person. Basic Metabolic Profile   Recent Labs     04/24/22  0043      K 4.2      CO2 27   BUN 11   *   CA 8.3*         CBC w/Diff    Recent Labs     04/24/22  0043   WBC 4.4*   RBC 2.85*   HGB 7.5*   HCT 24.3*   MCV 85.3   MCH 26.3   MCHC 30.9*   RDW 17.6*   PLT 79*    Recent Labs     04/22/22  1400   GRANS 40   LYMPH 35   EOS 13*        Hepatic Function   Recent Labs     04/23/22  0825   ALB 2.2*   TP 7.0   TBILI 0.9   AP 98        Coags   Recent Labs     04/23/22  0126 04/22/22  2131   PTP 17.2* 16.2*   INR 1.4* 1.3*           Alize May MD.   [facility] [address]  Cell: [phone]  [email or web address]

## 2022-04-24 NOTE — PROGRESS NOTES
PT order received and chart reviewed. Patient currently has an active bedrest order. Please discontinue bedrest order for full participation in skilled PT evaluation/treatment. Will follow up as patient schedule allows.      Thank you for the referral.    Elena Nielson, PT, DPT \

## 2022-04-24 NOTE — ROUTINE PROCESS
Patient resting quietly easily aroused no complaints of pain -- ginger ale and jello given --   Patient voiding with no issue urine no longer bloody looking -- ara in color   Patient's linens changed   Pain medications given p.o. patient Iv infiltrated during 2nd run of ancef -- no able to find a new one           Bedside and Verbal shift change report given to [employer] (oncoming nurse) by Karen Duffy RN (offgoing nurse). Report given with SBAR, Kardex, Intake/Output, MAR, Accordion and Recent Results.

## 2022-04-24 NOTE — ROUTINE PROCESS
Bedside shift change report given to Fabiola Naylor RN (oncoming nurse) by ST LYNNETTE RN (offgoing nurse).  Report included the following information SBAR, Kardex, MAR and Cardiac Rhythm SB, NSR.

## 2022-04-25 ENCOUNTER — HOSPITAL ENCOUNTER (INPATIENT)
Dept: INTERVENTIONAL RADIOLOGY/VASCULAR | Age: 67
Discharge: HOME | DRG: 493 | End: 2022-04-25
Attending: INTERNAL MEDICINE
Payer: MEDICARE

## 2022-04-25 ENCOUNTER — APPOINTMENT (OUTPATIENT)
Dept: MRI IMAGING | Age: 67
DRG: 493 | End: 2022-04-25
Attending: PHYSICIAN ASSISTANT
Payer: MEDICARE

## 2022-04-25 LAB
ANION GAP SERPL CALC-SCNC: 3 MMOL/L (ref 3–18)
BUN SERPL-MCNC: 13 MG/DL (ref 7–18)
BUN/CREAT SERPL: 12 (ref 12–20)
CALCIUM SERPL-MCNC: 8.3 MG/DL (ref 8.5–10.1)
CHLORIDE SERPL-SCNC: 105 MMOL/L (ref 100–111)
CO2 SERPL-SCNC: 28 MMOL/L (ref 21–32)
CREAT SERPL-MCNC: 1.05 MG/DL (ref 0.6–1.3)
ERYTHROCYTE [DISTWIDTH] IN BLOOD BY AUTOMATED COUNT: 17.6 % (ref 11.6–14.5)
GLUCOSE SERPL-MCNC: 130 MG/DL (ref 74–99)
HBV SURFACE AG SER QL: <0.1 INDEX
HBV SURFACE AG SER QL: NEGATIVE
HCT VFR BLD AUTO: 20.7 % (ref 36–48)
HGB BLD-MCNC: 6.5 G/DL (ref 13–16)
HISTORY CHECKED?,CKHIST: NORMAL
MCH RBC QN AUTO: 27 PG (ref 24–34)
MCHC RBC AUTO-ENTMCNC: 31.4 G/DL (ref 31–37)
MCV RBC AUTO: 85.9 FL (ref 78–100)
NRBC # BLD: 0 K/UL (ref 0–0.01)
NRBC BLD-RTO: 0 PER 100 WBC
PLATELET # BLD AUTO: 64 K/UL (ref 135–420)
PMV BLD AUTO: 11 FL (ref 9.2–11.8)
POTASSIUM SERPL-SCNC: 4.2 MMOL/L (ref 3.5–5.5)
RBC # BLD AUTO: 2.41 M/UL (ref 4.35–5.65)
SODIUM SERPL-SCNC: 136 MMOL/L (ref 136–145)
WBC # BLD AUTO: 4.2 K/UL (ref 4.6–13.2)

## 2022-04-25 PROCEDURE — 82107 ALPHA-FETOPROTEIN L3: CPT

## 2022-04-25 PROCEDURE — 65270000046 HC RM TELEMETRY

## 2022-04-25 PROCEDURE — 87522 HEPATITIS C REVRS TRNSCRPJ: CPT

## 2022-04-25 PROCEDURE — P9016 RBC LEUKOCYTES REDUCED: HCPCS

## 2022-04-25 PROCEDURE — 74011250637 HC RX REV CODE- 250/637: Performed by: PHYSICIAN ASSISTANT

## 2022-04-25 PROCEDURE — A9577 INJ MULTIHANCE: HCPCS | Performed by: INTERNAL MEDICINE

## 2022-04-25 PROCEDURE — 30233N1 TRANSFUSION OF NONAUTOLOGOUS RED BLOOD CELLS INTO PERIPHERAL VEIN, PERCUTANEOUS APPROACH: ICD-10-PCS | Performed by: INTERNAL MEDICINE

## 2022-04-25 PROCEDURE — 2709999900 HC NON-CHARGEABLE SUPPLY

## 2022-04-25 PROCEDURE — 80048 BASIC METABOLIC PNL TOTAL CA: CPT

## 2022-04-25 PROCEDURE — 86038 ANTINUCLEAR ANTIBODIES: CPT

## 2022-04-25 PROCEDURE — 36430 TRANSFUSION BLD/BLD COMPNT: CPT

## 2022-04-25 PROCEDURE — 87902 NFCT AGT GNTYP ALYS HEP C: CPT

## 2022-04-25 PROCEDURE — 74011250637 HC RX REV CODE- 250/637: Performed by: ORTHOPAEDIC SURGERY

## 2022-04-25 PROCEDURE — 85027 COMPLETE CBC AUTOMATED: CPT

## 2022-04-25 PROCEDURE — 76937 US GUIDE VASCULAR ACCESS: CPT

## 2022-04-25 PROCEDURE — 74011250636 HC RX REV CODE- 250/636: Performed by: INTERNAL MEDICINE

## 2022-04-25 PROCEDURE — 74011000250 HC RX REV CODE- 250: Performed by: PHYSICIAN ASSISTANT

## 2022-04-25 PROCEDURE — 36415 COLL VENOUS BLD VENIPUNCTURE: CPT

## 2022-04-25 PROCEDURE — 87340 HEPATITIS B SURFACE AG IA: CPT

## 2022-04-25 PROCEDURE — 74183 MRI ABD W/O CNTR FLWD CNTR: CPT

## 2022-04-25 PROCEDURE — 99232 SBSQ HOSP IP/OBS MODERATE 35: CPT | Performed by: INTERNAL MEDICINE

## 2022-04-25 RX ORDER — SODIUM CHLORIDE 9 MG/ML
250 INJECTION, SOLUTION INTRAVENOUS AS NEEDED
Status: DISCONTINUED | OUTPATIENT
Start: 2022-04-25 | End: 2022-04-26

## 2022-04-25 RX ADMIN — OXYCODONE HYDROCHLORIDE 15 MG: 10 TABLET ORAL at 23:50

## 2022-04-25 RX ADMIN — ACETAMINOPHEN 1000 MG: 500 TABLET ORAL at 19:29

## 2022-04-25 RX ADMIN — SODIUM CHLORIDE, PRESERVATIVE FREE 10 ML: 5 INJECTION INTRAVENOUS at 15:13

## 2022-04-25 RX ADMIN — AMLODIPINE BESYLATE 10 MG: 10 TABLET ORAL at 10:54

## 2022-04-25 RX ADMIN — Medication 325 MG: at 08:00

## 2022-04-25 RX ADMIN — ASPIRIN 81 MG: 81 TABLET, COATED ORAL at 19:29

## 2022-04-25 RX ADMIN — OXYCODONE HYDROCHLORIDE 15 MG: 10 TABLET ORAL at 05:19

## 2022-04-25 RX ADMIN — SENNOSIDES AND DOCUSATE SODIUM 1 TABLET: 50; 8.6 TABLET ORAL at 19:29

## 2022-04-25 RX ADMIN — ACETAMINOPHEN 1000 MG: 500 TABLET ORAL at 05:19

## 2022-04-25 RX ADMIN — SODIUM CHLORIDE, PRESERVATIVE FREE 10 ML: 5 INJECTION INTRAVENOUS at 16:57

## 2022-04-25 RX ADMIN — OXYCODONE HYDROCHLORIDE 15 MG: 10 TABLET ORAL at 14:10

## 2022-04-25 RX ADMIN — GADOBENATE DIMEGLUMINE 15 ML: 529 INJECTION, SOLUTION INTRAVENOUS at 16:25

## 2022-04-25 RX ADMIN — GADOBENATE DIMEGLUMINE 15 ML: 529 INJECTION, SOLUTION INTRAVENOUS at 09:40

## 2022-04-25 RX ADMIN — METHADONE HYDROCHLORIDE 45 MG: 10 CONCENTRATE ORAL at 10:53

## 2022-04-25 RX ADMIN — OXYCODONE HYDROCHLORIDE 15 MG: 10 TABLET ORAL at 19:29

## 2022-04-25 RX ADMIN — ACETAMINOPHEN 1000 MG: 500 TABLET ORAL at 00:26

## 2022-04-25 RX ADMIN — ASPIRIN 81 MG: 81 TABLET, COATED ORAL at 10:53

## 2022-04-25 RX ADMIN — Medication 325 MG: at 17:00

## 2022-04-25 RX ADMIN — SODIUM CHLORIDE, PRESERVATIVE FREE 10 ML: 5 INJECTION INTRAVENOUS at 23:52

## 2022-04-25 RX ADMIN — ACETAMINOPHEN 1000 MG: 500 TABLET ORAL at 23:50

## 2022-04-25 NOTE — PROGRESS NOTES
conducted an initial consultation and Spiritual Assessment for Sheron Ram, who is a 74 y. o.,male. Patient's Primary Language is: [place]. According to the patient's EMR Tenriism Affiliation is: No Gnosticist. The reason the Patient came to the hospital is:   Patient Active Problem List    Diagnosis Date Noted    Liver mass 04/24/2022    Bladder mass 04/24/2022    Hematuria 04/24/2022    Anemia 04/22/2022    Thrombocytopenia ([facility] [address] 04/22/2022    Tibia/fibula fracture 04/22/2022    Metatarsal fracture 04/22/2022    Alcohol abuse 04/22/2022    HTN (hypertension) 04/22/2022    Leukopenia 08/05/2012    Iron deficiency anemia, unspecified 08/05/2012    Epidural abscess, L2-L5 08/04/2012    Polysubstance abuse ([facility] [address] 08/04/2012        The  provided the following Interventions:  Initiated a relationship of care and support through empathic listening as he shared about his hospital experience thus far. Explored for spiritual needs while hospitalized. Provided information about Spiritual Care Services. Chart reviewed. The following outcomes where achieved:  Patient processed feelings about current hospitalization. Patient expressed gratitude for 's visit. Assessment:  Patient does not have any Sikh/cultural needs that will affect patient's preferences in health care. There are no spiritual or Sikh issues which require intervention at this time. Plan:  Chaplains will continue to follow and will provide pastoral care on an as needed/requested basis.  recommends bedside caregivers page  on duty if patient shows signs of acute spiritual or emotional distress.     [address] [place]   [phone]

## 2022-04-25 NOTE — PROGRESS NOTES
OT attempted 2x. Pt BABAR for testing and now politely refusing stating that he will be going for more testing shortly. Will continue to follow.

## 2022-04-25 NOTE — ROUTINE PROCESS
Bedside and Verbal shift change report given to [address] (oncoming nurse) by Keyshawn Tyler RN (offgoing nurse). Report given with SBAR, Kardex, Intake/Output, MAR, Accordion and Recent Results.      Labs resulted talked to Rui Flores about H&H

## 2022-04-25 NOTE — PROGRESS NOTES
Ortho    Pt off floor for testing    Visit Vitals  BP (!) 158/70 (BP 1 Location: Right upper arm, BP Patient Position: At rest)   Pulse 65   Temp 98.2 °F (36.8 °C)   Resp 16   Ht 6' (1.829 m)   Wt 180 lb (81.6 kg)   SpO2 98%   BMI 24.41 kg/m²     A: sp IM floyd right tibial fx    P:  PT- pwb right LE, aspirin

## 2022-04-25 NOTE — PROGRESS NOTES
[email or web address]  [phone]    Gastroenterology follow up-Progress note    Impression:  1. Liver mass-concerning for hepatoma. Segment 6-2.3 x 2.9 x 2.9 cm; segment 6-1.5 x 1.5 x 1.5. Questionable mass in segment 3. MRI pending- went down this AM but had no IV access  2. Liver cirrhosis-nodular liver noted. Suspect this is related to hepatitis C/alcohol use. Appears to have well compensated cirrhosis-no ascites noted on CT. Meld score = 11.  3.  Hepatitis C-untreated. 4.  Polysubstance abuse-alcohol, cocaine. Remote history of IV drug use. 5.  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 Splenomegaly noted on imaging. 6.  Noncompliance.       Plan:     1. MRI liver pending. 2.  Alpha-fetoprotein pending  3. Will order Hep C labs to confirm viremia, genotype, Will order additional labs to screen for concomitant liver diseases- all pending  4. Counseled about drug use. 5.  May need EGD to screen for varices electively. 6. If MRI shows masses within [place] criteria, consider urgent [facility]-his ongoing drug/alcohol use, noncompliance will be factors that against him. Having said that, would not be unreasonable to at least consider evaluation if appropriate. 7. Low threshold for treatment for opioid/alcohol withdrawal    Chief Complaint: liver lesion      Subjective:  Doing well; no abdominal pain, frustrated about lack of IV so delay in MRI. ROS: Denies any fevers, chills, rash.      Eyes: conjunctiva normal, EOM normal   Neck: ROM normal, supple and trachea normal   Cardiovascular: heart normal,  normal rate and regular rhythm   Pulmonary/Chest Wall: Respiratory  effort normal   Abdominal: appearance normal,  soft, non-acute, non-tender     Patient Active Problem List   Diagnosis Code    Epidural abscess, L2-L5 G06.1    Polysubstance abuse (HCC) F19.10    Leukopenia D72.819    Iron deficiency anemia, unspecified D50.9    Anemia D64.9    Thrombocytopenia (HCC) D69.6    Tibia/fibula fracture S82.209A, S82.409A    Metatarsal fracture S92.309A    Alcohol abuse F10.10    HTN (hypertension) I10    Liver mass R16.0    Bladder mass N32.89    Hematuria R31.9         Visit Vitals  BP (!) 158/70 (BP 1 Location: Right upper arm, BP Patient Position: At rest)   Pulse 65   Temp 98.2 °F (36.8 °C)   Resp 16   Ht 6' (1.829 m)   Wt 81.6 kg (180 lb)   SpO2 98%   BMI 24.41 kg/m²         No intake or output data in the 24 hours ending 04/25/22 1241    CBC w/Diff    Lab Results   Component Value Date/Time    WBC 4.2 (L) 04/25/2022 05:04 AM    RBC 2.41 (L) 04/25/2022 05:04 AM    HGB 6.5 (L) 04/25/2022 05:04 AM    HCT 20.7 (L) 04/25/2022 05:04 AM    MCV 85.9 04/25/2022 05:04 AM    MCH 27.0 04/25/2022 05:04 AM    MCHC 31.4 04/25/2022 05:04 AM    RDW 17.6 (H) 04/25/2022 05:04 AM    PLT 64 (L) 04/25/2022 05:04 AM    Lab Results   Component Value Date/Time    GRANS 40 04/22/2022 02:00 PM    LYMPH 35 04/22/2022 02:00 PM    EOS 13 (H) 04/22/2022 02:00 PM    BANDS 1 08/22/2012 03:00 AM    BASOS 1 04/22/2022 02:00 PM      Basic Metabolic Profile   Recent Labs     04/25/22  0504      K 4.2      CO2 28   BUN 13   CA 8.3*        Hepatic Function    Lab Results   Component Value Date/Time    ALB 2.2 (L) 04/23/2022 08:25 AM    TP 7.0 04/23/2022 08:25 AM    AP 98 04/23/2022 08:25 AM    No results found for: TBIL       Coags   Recent Labs     04/23/22  0126 04/22/22  2131   PTP 17.2* 16.2*   INR 1.4* 1.3*               Asenath Anne, NP    [facility]. [email or web address]  Phone: [phone]  Pager: [phone]

## 2022-04-25 NOTE — PROGRESS NOTES
PT treatment attempted at 1355. Pt currently off floor. Will follow up. 2nd attempt at , pt reporting needing blood and liver biopsy today. Pt requesting to rest. Will continue to follow.        Thank you for this referral.   Brandy Bertrand PT DPT

## 2022-04-25 NOTE — PROGRESS NOTES
CM called patient's sister Nakul Paul [phone], received voicemail, CM left a message with phone number for a return call. CM called patient's wife Juan Avila [phone], said Not in Service.                Ekaterina Scott, RN  Case Management [phone]

## 2022-04-25 NOTE — PROGRESS NOTES
Problem: Falls - Risk of  Goal: *Absence of Falls  Description: Document Gregg Ralphs Fall Risk and appropriate interventions in the flowsheet. Outcome: Progressing Towards Goal  Note: Fall Risk Interventions:  Mobility Interventions: OT consult for ADLs,PT Consult for mobility concerns         Medication Interventions: Bed/chair exit alarm,Patient to call before getting OOB    Elimination Interventions: Call light in reach,Urinal in reach    History of Falls Interventions: Room close to nurse's station         Problem: Patient Education: Go to Patient Education Activity  Goal: Patient/Family Education  Outcome: Progressing Towards Goal     Problem: Pressure Injury - Risk of  Goal: *Prevention of pressure injury  Description: Document Jonathan Scale and appropriate interventions in the flowsheet. Outcome: Progressing Towards Goal  Note: Pressure Injury Interventions:             Activity Interventions: PT/OT evaluation    Mobility Interventions: PT/OT evaluation    Nutrition Interventions: Document food/fluid/supplement intake                     Problem: Patient Education: Go to Patient Education Activity  Goal: Patient/Family Education  Outcome: Progressing Towards Goal     Problem: Patient Education: Go to Patient Education Activity  Goal: Patient/Family Education  Outcome: Progressing Towards Goal     Problem: Lower Extremity Fracture:Day of Admission  Goal: Diagnostic Test/Procedures  Outcome: Resolved/Met  Goal: Nutrition/Diet  Outcome: Resolved/Met  Goal: Medications  Outcome: Resolved/Met  Goal: Respiratory  Outcome: Resolved/Met  Goal: Treatments/Interventions/Procedures  Outcome: Resolved/Met  Goal: *Optimal pain control at patient's stated goal  Outcome: Resolved/Met  Goal: *Hemodynamically stable  Outcome: Resolved/Met  Goal: *Adequate oxygenation  Outcome: Resolved/Met     Problem: Lower Extremity Fracture:Day of Admission  Goal: Diagnostic Test/Procedures  Outcome: Resolved/Met  Goal: Nutrition/Diet  Outcome: Resolved/Met  Goal: Medications  Outcome: Resolved/Met  Goal: Respiratory  Outcome: Resolved/Met  Goal: Treatments/Interventions/Procedures  Outcome: Resolved/Met  Goal: *Optimal pain control at patient's stated goal  Outcome: Resolved/Met  Goal: *Hemodynamically stable  Outcome: Resolved/Met  Goal: *Adequate oxygenation  Outcome: Resolved/Met     Problem: Lower Extremity Fracture:Day of Surgery (Intiate SCIP Measures for Post-op Care)  Goal: Activity/Safety  Outcome: Progressing Towards Goal  Goal: Consults, if ordered  Outcome: Progressing Towards Goal  Goal: Diagnostic Test/Procedures  Outcome: Progressing Towards Goal  Goal: Nutrition/Diet  Outcome: Progressing Towards Goal  Goal: Medications  Outcome: Progressing Towards Goal  Goal: Respiratory  Outcome: Progressing Towards Goal  Goal: Treatments/Interventions/Procedures  Outcome: Progressing Towards Goal  Goal: Psychosocial  Outcome: Progressing Towards Goal  Goal: *Optimal pain control at patient's stated goal  Outcome: Progressing Towards Goal  Goal: *Hemodynamically stable  Outcome: Progressing Towards Goal  Goal: *Adequate oxygenation  Outcome: Progressing Towards Goal     Problem: Injury - Risk of, Adverse Drug Event  Goal: *Absence of adverse drug events  Outcome: Progressing Towards Goal  Goal: *Absence of medication errors  Outcome: Progressing Towards Goal  Goal: *Knowledge of prescribed medications  Outcome: Progressing Towards Goal     Problem: Patient Education: Go to Patient Education Activity  Goal: Patient/Family Education  Outcome: Progressing Towards Goal

## 2022-04-25 NOTE — PROGRESS NOTES
[facility]  [facility]    Patient: Rosa Patient Age: 74 y.o. : 1955 MR#: [id number] SSN: [id number]  Date/Time: 2022     C/C: fall and fracture Rt LL       Subjective:   HPI : Right T?F Fracture s/p open reduction , Liver mass , Bladder mass           Review of Systems:  positive responses in bold type   Constitutional: Negative for fever, chills, diaphoresis and unexpected weight change. HENT: Negative for ear pain, congestion, sore throat, rhinorrhea, drooling, trouble swallowing, neck pain and tinnitus. Eyes: Negative for photophobia, pain, redness and visual disturbance. Respiratory: negative for shortness of breath, cough, choking, chest tightness, wheezing or stridor. Cardiovascular: Negative for chest pain, palpitations and leg swelling. Gastrointestinal: Negative for nausea, vomiting, abdominal pain, diarrhea, constipation, blood in stool, abdominal distention and anal bleeding. Genitourinary: Negative for dysuria, urgency, frequency, hematuria, flank pain and difficulty urinating. Musculoskeletal: Negative for back pain and arthralgias. Skin: Negative for color change, rash and wound. Neurological: Negative for dizziness, seizures, syncope, speech difficulty, light-headedness or headaches. Hematological: Does not bruise/bleed easily. Psychiatric/Behavioral: Negative for suicidal ideas, hallucinations, behavioral problems, self-injury or agitation     Assessment/Plan:     1. Right Tib/fib fx- s/p surgery - follow with ortho - PT /OT     2 Liver mass - s/b GI - Follow MRI     3 Anemia - Post hemorrhagia - BT one unit / monitor CBC     4 Thrombocytopenia - ?  From Cirrhosis of Liver     5 Midline for IV access    Objective:       General:  Alert, cooperative, no acute distress  HEENT: No facial asymmetry, JENNIFER Salvador, External ears - WNL    Cardiovascular: S1S2 - regular , No Murmur   Pulmonary: Equal expansion , No Use of accessory muscles , No Rales No Rhonchi    GI:  +BS in all four quadrants, soft, non-tender  Extremities:  No edema; 2+ dorsalis pedis pulses bilaterally  Neuro: Alert and oriented X 2.        DVT Prophylaxis:  []Lovenox  []Hep SQ  [x]SCDs  []Coumadin   []On Heparin gtt    [] Eliquis [] Xarelto     Vitals:         VS:   Visit Vitals  BP (!) 158/70 (BP 1 Location: Right upper arm, BP Patient Position: At rest)   Pulse 65   Temp 98.2 °F (36.8 °C)   Resp 16   Ht 6' (1.829 m)   Wt 81.6 kg (180 lb)   SpO2 98%   BMI 24.41 kg/m²      Tmax/24hrs: Temp (24hrs), Av.1 °F (36.7 °C), Min:98 °F (36.7 °C), Max:98.2 °F (36.8 °C)  IOBRIEFNo intake or output data in the 24 hours ending 22 [id number]      Medications:   Current Facility-Administered Medications   Medication Dose Route Frequency    0.9% sodium chloride infusion 250 mL  250 mL IntraVENous PRN    amLODIPine (NORVASC) tablet 10 mg  10 mg Oral DAILY    lactated Ringers infusion  25 mL/hr IntraVENous CONTINUOUS    methadone (DOLOPHINE) 10 mg/mL concentrated solution 45 mg  45 mg Oral DAILY    sodium chloride (NS) flush 5-40 mL  5-40 mL IntraVENous PRN    acetaminophen (TYLENOL) tablet 1,000 mg  1,000 mg Oral Q6H    oxyCODONE IR (ROXICODONE) tablet 10-15 mg  10-15 mg Oral Q3H PRN    oxyCODONE IR (ROXICODONE) tablet 20 mg  20 mg Oral Q3H PRN    naloxone (NARCAN) injection 0.4 mg  0.4 mg IntraVENous PRN    flumazeniL (ROMAZICON) 0.1 mg/mL injection 0.2 mg  0.2 mg IntraVENous PRN    ferrous sulfate tablet 325 mg  1 Tablet Oral BID WITH MEALS    ondansetron (ZOFRAN) injection 4 mg  4 mg IntraVENous Q4H PRN    senna-docusate (PERICOLACE) 8.6-50 mg per tablet 1 Tablet  1 Tablet Oral BID    HYDROmorphone (DILAUDID) injection 1 mg  1 mg IntraVENous Q4H PRN    aspirin delayed-release tablet 81 mg  81 mg Oral BID    sodium chloride (NS) flush 5-40 mL  5-40 mL IntraVENous Q8H    sodium chloride (NS) flush 5-40 mL  5-40 mL IntraVENous PRN    acetaminophen (TYLENOL) tablet 650 mg  650 mg Oral Q6H PRN    Or    acetaminophen (TYLENOL) suppository 650 mg  650 mg Rectal Q6H PRN    polyethylene glycol (MIRALAX) packet 17 g  17 g Oral DAILY PRN    bisacodyL (DULCOLAX) suppository 10 mg  10 mg Rectal DAILY PRN    sodium chloride (NS) flush 5-40 mL  5-40 mL IntraVENous Q8H    sodium chloride (NS) flush 5-40 mL  5-40 mL IntraVENous PRN    LORazepam (ATIVAN) tablet 1 mg  1 mg Oral Q1H PRN    Or    LORazepam (ATIVAN) 2 mg/mL injection 1 mg  1 mg IntraVENous Q1H PRN    LORazepam (ATIVAN) tablet 2 mg  2 mg Oral Q1H PRN    Or    LORazepam (ATIVAN) 2 mg/mL injection 2 mg  2 mg IntraVENous Q1H PRN    LORazepam (ATIVAN) 2 mg/mL injection 3 mg  3 mg IntraVENous Q15MIN PRN    thiamine HCL (B-1) tablet 100 mg  100 mg Oral DAILY    folic acid (FOLVITE) tablet 1 mg  1 mg Oral DAILY    therapeutic multivitamin (THERAGRAN) tablet 1 Tablet  1 Tablet Oral DAILY       Labs:    Recent Labs     04/25/22  0504 04/24/22  0043 04/23/22  0126   WBC 4.2* 4.4* 2.9*   HGB 6.5* 7.5* 7.6*   HCT 20.7* 24.3* 24.6*   PLT 64* 79* 53*     Recent Labs     04/25/22  0504 04/24/22  0043 04/23/22  0825 04/23/22  0126 04/22/22  2131    136 138  --   --    K 4.2 4.2 4.1  --   --     106 106  --   --    CO2 28 27 28  --   --    * 144* 99  --   --    BUN 13 11 8  --   --    CREA 1.05 1.06 1.01  --   --    CA 8.3* 8.3* 9.0  --   --    ALB  --   --  2.2*  --   --    ALT  --   --  37  --   --    INR  --   --   --  1.4* 1.3*         Time spent on direct patient care >30 mints     Complexity : High complex - due to multiple medical issues outlined above. CODE Status : Full CODE     Case discussed with:  [x]Patient  [] Family  []Nursing  []Case Management         Disclaimer: Sections of this note are dictated utilizing voice recognition software, which may have resulted in some phonetic based errors in grammar and contents.  Even though attempts were made to correct all the mistakes, some may have been missed, and remained in the body of the document. If questions arise, please contact our department.     Signed By: Cassia Rodriguez MD     April 25, 2022

## 2022-04-25 NOTE — PROGRESS NOTES
4/25/2022 09:59 am Patient arrived in department for the STAT MRI abdomen w and w/o contrast without IV access. Unable to get IV access in MRI department and notified the RN that the test will be delayed until he has and IV.

## 2022-04-25 NOTE — CONSULTS
1. Closed fracture of right tibia and fibula, initial encounter    2. Methadone use    3. Anemia, unspecified type    4. Thrombocytopenia (HCC)    5. Closed nondisplaced fracture of first metatarsal bone of right foot, initial encounter        ASSESSMENT:   Bladder Tumor- 2 cm nodular projection at right lateral aspect   WBC 4.2   Hgb 6.5   Creat: 10.5   VSS  Thrombocytopenia- PLT 64      Admitted for Tib/fib fracture, metatarsal fx    Cirrhosis with hepatic masses  HTN  Polysubstance abuse, ETOH abuse        PLAN:    Appreciate overall management per medicine. CT reviewed, bladder tumor present on imaging. No hematuria noted at this time. Dark yellow in urinal. Recommend cystoscopy outpatient to evaluate bladder. Recommend obtain PVR. No further  intervention at this time. Will sign off. Follow up arranged? YES  Note sent to nancy [facility]. Abril Ramila, [address]    [phone]    I have seen and examined Mr. Estela Chamorro. He reports no dysuria and no hematuria. I reviewed concern for bladder mass. I stressed need for outpatient follow up for office cystoscopy. I agree with MIKAYLA Dunn's assessment and plan. Everett Deluna M.D., [facility]   Urological Oncologist   [facility] [address], [address] [place] [address]   [phone]       Chief Complaint   Patient presents with    Leg Injury     right       HISTORY OF PRESENT ILLNESS:  Corey Garcia is a 74 y.o. male who is seen in consultation as referred by Nelson Sabillon  for bladder tumor. Patient seen in 2018 by Dr. Radha Pineda for possible bladder mass with gross hematuria. Patient was referred for cysto but never followed up. Patient with PMHX significant for  Polysubstance abuse, tobacco abuse, HTN, presented to ED for RLE pain after falling. Patient tripped on pot hole while walking down hill. Patient admitted for for tibial fracture and metatarsal fracture. Patient says intermittent gross hematuria for 1 year.  Complains of some frequency. Denies dysuria, fever, or chills. Patient is a 1/2 PPD of tobacco for past 50 years. No  previous history of cancer. AUA Symptom Score 9/24/2018   Over the past month how often have you had the sensation that your bladder was not completely empty after you finished urinating? 0   Over the past month, how often have had to urinate again less than 2 hours after you last finished urinating? 0   Over the past month, how often have you found you stopped and started again several times when you urinated? 0   Over the past month, how often have you found it difficult to postpone urination? 5   Over the past month, how often have you had a weak urinary stream? 0   Over the past month, how often have you had to push or strain to begin urinating? 0   Over the past month, how many times did you most typically get up to urinate from the time you went to bed at night until the time you got up in the morning? 3   AUA Score 8   If you were to spend the rest of your life with your urinary condition the way it is now, how would you feel about that?  Mostly satisfied         Past Medical History:   Diagnosis Date    Abscess of right shoulder     Abscess of shoulder     Arthritis     Epidural abscess     Heart murmur     Hematuria     Heroin abuse (HCC)     Hypertension     Infected wound     Infectious disease     Iron deficiency anemia     IV drug user     Liver disease     Tobacco abuse        Past Surgical History:   Procedure Laterality Date    HX COLONOSCOPY  07/18/2016    HX CYST REMOVAL      rt.forearm and rt.foot    HX ENDOSCOPY  07/18/2016    HX FREE SKIN GRAFT      HX OTHER SURGICAL      right forearm infections and graft       Social History     Tobacco Use    Smoking status: Current Every Day Smoker     Packs/day: 0.50     Years: 38.00     Pack years: 19.00     Types: Cigarettes    Smokeless tobacco: Never Used   Vaping Use    Vaping Use: Never used   Substance Use Topics    Alcohol use: Yes     Alcohol/week: 4.0 standard drinks     Types: 4 Cans of beer per week    Drug use: Yes     Types: Heroin     Comment: last dose 2/15       No Known Allergies    History reviewed. No pertinent family history.     Current Facility-Administered Medications   Medication Dose Route Frequency Provider Last Rate Last Admin    0.9% sodium chloride infusion 250 mL  250 mL IntraVENous PRN Vesna Guadarrama MD        amLODIPine (NORVASC) tablet 10 mg  10 mg Oral DAILY Reprogle, Georgianne Ormond, PA   10 mg at 04/25/22 1054    lactated Ringers infusion  25 mL/hr IntraVENous CONTINUOUS Vesna Guadarrama MD 25 mL/hr at 04/23/22 1219 Restarted at 04/23/22 1432    methadone (DOLOPHINE) 10 mg/mL concentrated solution 45 mg  45 mg Oral DAILY Emanuelgle, Georgianne Ormond, PA   45 mg at 04/25/22 1053    acetaminophen (TYLENOL) tablet 1,000 mg  1,000 mg Oral Q6H Jamel Akbar MD   1,000 mg at 04/25/22 0519    oxyCODONE IR (ROXICODONE) tablet 10-15 mg  10-15 mg Oral Q3H PRN Jamel Akbar MD   15 mg at 04/25/22 1410    oxyCODONE IR (ROXICODONE) tablet 20 mg  20 mg Oral Q3H PRN Jamel Akbar MD        naloxone [facility]) injection 0.4 mg  0.4 mg IntraVENous PRN Jamel Akbar MD        flumazeniL (ROMAZICON) 0.1 mg/mL injection 0.2 mg  0.2 mg IntraVENous PRN Jamel Akbar MD        ferrous sulfate tablet 325 mg  1 Tablet Oral BID WITH MEALS Jamel Akbar MD   325 mg at 04/25/22 0800    ondansetron (ZOFRAN) injection 4 mg  4 mg IntraVENous Q4H PRN Jamel Akbar MD        senna-docusate (PERICOLACE) 8.6-50 mg per tablet 1 Tablet  1 Tablet Oral BID Jamel Akbar MD   1 Tablet at 04/24/22 1743    HYDROmorphone (DILAUDID) injection 1 mg  1 mg IntraVENous Q4H PRN Jamel Akbar MD        aspirin delayed-release tablet 81 mg  81 mg Oral BID Jamel Akbar MD   81 mg at 04/25/22 1053    sodium chloride (NS) flush 5-40 mL  5-40 mL IntraVENous Q8H Vice, MIKAYLA Byrne   10 mL at 04/23/22 1754    sodium chloride (NS) flush 5-40 mL  5-40 mL IntraVENous PRN Reprogle, Severiano Alley, PA        acetaminophen (TYLENOL) tablet 650 mg  650 mg Oral Q6H PRN Reprogle, Severiano Alley, PA        Or    acetaminophen (TYLENOL) suppository 650 mg  650 mg Rectal Q6H PRN Reprogle, Severiano Alley, PA        polyethylene glycol (MIRALAX) packet 17 g  17 g Oral DAILY PRN Reprogle, Severiano Alley, PA        bisacodyL (DULCOLAX) suppository 10 mg  10 mg Rectal DAILY PRN Reprogle, Severiano Alley, PA        sodium chloride (NS) flush 5-40 mL  5-40 mL IntraVENous Q8H Reprogle, MIKAYLA Byrne   10 mL at 04/25/22 1513    sodium chloride (NS) flush 5-40 mL  5-40 mL IntraVENous PRN Reprogle, Severiano Alley, PA        LORazepam (ATIVAN) tablet 1 mg  1 mg Oral Q1H PRN Reprogle, Severiano Alley, PA        Or    LORazepam (ATIVAN) 2 mg/mL injection 1 mg  1 mg IntraVENous Q1H PRN Reprogle, Severiano Alley, PA        LORazepam (ATIVAN) tablet 2 mg  2 mg Oral Q1H PRN Reprogle, Severiano Alley, PA        Or    LORazepam (ATIVAN) 2 mg/mL injection 2 mg  2 mg IntraVENous Q1H PRN Reprogle, Severiano Alley, PA        LORazepam (ATIVAN) 2 mg/mL injection 3 mg  3 mg IntraVENous Q15MIN PRN Reprogle, Severiano Alley, PA        thiamine HCL (B-1) tablet 100 mg  100 mg Oral DAILY Cindy Epps MD   100 mg at 94/65/28 1912    folic acid (FOLVITE) tablet 1 mg  1 mg Oral DAILY Cindy Epps MD   1 mg at 04/24/22 1121    therapeutic multivitamin (THERAGRAN) tablet 1 Tablet  1 Tablet Oral DAILY Dajuan Wolf MD   1 Tablet at 04/24/22 1121       Review of Systems  ROS is:        Negative for: Ophthalmologic issues, ENT issues, Cardiovascular issues, respiratory issues, GI issues, neurologic issues, hematoogic issues, skin lesions, musculoskeletal issues, psychiatric issues  Exceptions: yes    Positive for:    Intermittent hematuria, frequency              PHYSICAL EXAMINATION:   Visit Vitals  BP (!) 158/70 (BP 1 Location: Right upper arm, BP Patient Position: At rest) Pulse 65   Temp 98.2 °F (36.8 °C)   Resp 16   Ht 6' (1.829 m)   Wt 81.6 kg (180 lb)   SpO2 98%   BMI 24.41 kg/m²     Constitutional: Well developed, well nourished male. No acute distress. HEENT: Normocephalic, Atraumatic, EOM's intact   CV:  no edema  Respiratory: No respiratory distress or difficulties breathing   Abdomen:  soft and non tender    Male:  No CVA tenderness. Urinal with dark yellow urine. Skin: No evidence of jaundice. Normal color  Neuro/Psych:  Alert and oriented. Affect appropriate. REVIEW OF LABS AND IMAGING:      CT 4/8/22:     IMPRESSION  1. Poorly distended bladder with marked bladder wall thickening, especially  more lobulated with 2 cm nodular projection at right lateral aspect. Poor  distention and lack of intraluminal contrast limits the evaluation. Recommend  urology consult with cystoscopy. 2.  Benign left renal cortical cysts. No renal calculi, hydronephrosis or  enhancing mass seen. 3.  Evidence of cirrhosis. Several subtle enhancing masses concerned in the  segment 3 and segment 6 as outlined above, highly concerning for hepatomas. Recommend dedicated liver MR for better evaluation. 4.  Borderline splenomegaly and mild perigastric varices. No ascites. This is  suggestive of early portal vein hypertension. The portal vein is mildly dilated. 5.  Gallbladder hydrops with multiple small gallstones.   6.  Moderate stool burden in the colon.     Thank you for this referral.    Labs: Results:   Chemistry    Recent Labs     04/25/22  0504 04/24/22  0043 04/23/22  0825   * 144* 99    136 138   K 4.2 4.2 4.1    106 106   CO2 28 27 28   BUN 13 11 8   CREA 1.05 1.06 1.01   CA 8.3* 8.3* 9.0   AGAP 3 3 4   BUCR 12 10* 8*   AP  --   --  98   TP  --   --  7.0   ALB  --   --  2.2*   GLOB  --   --  4.8*   AGRAT  --   --  0.5*      CBC w/Diff Recent Labs     04/25/22  0504 04/24/22  0043 04/23/22  0126   WBC 4.2* 4.4* 2.9*   RBC 2.41* 2.85* 2.84*   HGB 6.5* 7.5* 7.6*   HCT 20.7* 24.3* 24.6*   PLT 64* 79* 53*      Cultures No results for input(s): CULT in the last 72 hours. All Micro Results     Procedure Component Value Units Date/Time    COVID-19 RAPID TEST [[id number]] Collected: 04/22/22 1430    Order Status: Completed Specimen: Nasopharyngeal Updated: 04/22/22 1642     Specimen source Nasopharyngeal        COVID-19 rapid test Not detected        Comment: Rapid Abbott ID Now       Rapid NAAT:  The specimen is NEGATIVE for SARS-CoV-2, the novel coronavirus associated with COVID-19. Negative results should be treated as presumptive and, if inconsistent with clinical signs and symptoms or necessary for patient management, should be tested with an alternative molecular assay. Negative results do not preclude SARS-CoV-2 infection and should not be used as the sole basis for patient management decisions. This test has been authorized by the FDA under an Emergency Use Authorization (EUA) for use by authorized laboratories.    Fact sheet for Healthcare Providers: ConventionUpdate.co.nz  Fact sheet for Patients: ConventionUpdate.co.nz       Methodology: Isothermal Nucleic Acid Amplification                 Urinalysis Color   Date Value Ref Range Status   04/08/2022 RED   Final     Appearance   Date Value Ref Range Status   04/08/2022 BLOODY   Final     Specific gravity   Date Value Ref Range Status   04/08/2022 1.010 1.003 - 1.030   Final     pH (UA)   Date Value Ref Range Status   04/08/2022 8.5 (H) 5.0 - 8.0   Final     Protein   Date Value Ref Range Status   04/08/2022 >300 (A) NEG mg/dL Final     Ketone   Date Value Ref Range Status   04/08/2022 40 (A) NEG mg/dL Final     Bilirubin   Date Value Ref Range Status   04/08/2022 LARGE (A) NEG   Final     Blood   Date Value Ref Range Status   04/08/2022 LARGE (A) NEG   Final     Urobilinogen   Date Value Ref Range Status   04/08/2022 >8.0 (H) 0.2 - 1.0 EU/dL Final     Nitrites Date Value Ref Range Status   04/08/2022 Positive (A) NEG   Final     Leukocyte Esterase   Date Value Ref Range Status   04/08/2022 LARGE (A) NEG   Final     Potassium   Date Value Ref Range Status   04/25/2022 4.2 3.5 - 5.5 mmol/L Final     Creatinine   Date Value Ref Range Status   04/25/2022 1.05 0.6 - 1.3 MG/DL Final     BUN   Date Value Ref Range Status   04/25/2022 13 7.0 - 18 MG/DL Final      PSA No results for input(s): PSA in the last 72 hours.    Coagulation Lab Results   Component Value Date/Time    Prothrombin time 17.2 (H) 04/23/2022 01:26 AM    Prothrombin time 16.2 (H) 04/22/2022 09:31 PM    INR 1.4 (H) 04/23/2022 01:26 AM    INR 1.3 (H) 04/22/2022 09:31 PM    aPTT 38.2 (H) 04/08/2022 02:12 PM    aPTT 39.6 (H) 07/23/2012 08:00 AM

## 2022-04-25 NOTE — PROGRESS NOTES
CM went to see patient, to complete an initial assessment, patient is off the unit.            Tiana Reynoso, RN  Case Management [phone]

## 2022-04-26 LAB
A1AT SERPL-MCNC: 156 MG/DL (ref 101–187)
ABO + RH BLD: NORMAL
ANA TITR SER IF: NEGATIVE {TITER}
ANION GAP SERPL CALC-SCNC: 4 MMOL/L (ref 3–18)
BLD PROD TYP BPU: NORMAL
BLOOD GROUP ANTIBODIES SERPL: NORMAL
BPU ID: NORMAL
BUN SERPL-MCNC: 12 MG/DL (ref 7–18)
BUN/CREAT SERPL: 14 (ref 12–20)
CALCIUM SERPL-MCNC: 8.4 MG/DL (ref 8.5–10.1)
CALLED TO:,BCALL1: NORMAL
CHLORIDE SERPL-SCNC: 107 MMOL/L (ref 100–111)
CO2 SERPL-SCNC: 28 MMOL/L (ref 21–32)
CREAT SERPL-MCNC: 0.88 MG/DL (ref 0.6–1.3)
CROSSMATCH RESULT,%XM: NORMAL
ERYTHROCYTE [DISTWIDTH] IN BLOOD BY AUTOMATED COUNT: 16.7 % (ref 11.6–14.5)
GLUCOSE SERPL-MCNC: 102 MG/DL (ref 74–99)
HAV AB SER QL IA: POSITIVE
HBV CORE AB SERPL QL IA: POSITIVE
HCT VFR BLD AUTO: 24.8 % (ref 36–48)
HGB BLD-MCNC: 7.9 G/DL (ref 13–16)
MCH RBC QN AUTO: 27.8 PG (ref 24–34)
MCHC RBC AUTO-ENTMCNC: 31.9 G/DL (ref 31–37)
MCV RBC AUTO: 87.3 FL (ref 78–100)
NRBC # BLD: 0.04 K/UL (ref 0–0.01)
NRBC BLD-RTO: 1.1 PER 100 WBC
PLATELET # BLD AUTO: 63 K/UL (ref 135–420)
PLEASE NOTE, 734348: NORMAL
PMV BLD AUTO: 11.6 FL (ref 9.2–11.8)
POTASSIUM SERPL-SCNC: 4.1 MMOL/L (ref 3.5–5.5)
RBC # BLD AUTO: 2.84 M/UL (ref 4.35–5.65)
SODIUM SERPL-SCNC: 139 MMOL/L (ref 136–145)
SPECIMEN EXP DATE BLD: NORMAL
STATUS OF UNIT,%ST: NORMAL
UNIT DIVISION, %UDIV: 0
WBC # BLD AUTO: 3.5 K/UL (ref 4.6–13.2)

## 2022-04-26 PROCEDURE — 74011250637 HC RX REV CODE- 250/637: Performed by: ORTHOPAEDIC SURGERY

## 2022-04-26 PROCEDURE — 65270000046 HC RM TELEMETRY

## 2022-04-26 PROCEDURE — 99232 SBSQ HOSP IP/OBS MODERATE 35: CPT | Performed by: INTERNAL MEDICINE

## 2022-04-26 PROCEDURE — 74011250637 HC RX REV CODE- 250/637: Performed by: PHYSICIAN ASSISTANT

## 2022-04-26 PROCEDURE — 36415 COLL VENOUS BLD VENIPUNCTURE: CPT

## 2022-04-26 PROCEDURE — 2709999900 HC NON-CHARGEABLE SUPPLY

## 2022-04-26 PROCEDURE — 97116 GAIT TRAINING THERAPY: CPT

## 2022-04-26 PROCEDURE — 74011000250 HC RX REV CODE- 250: Performed by: PHYSICIAN ASSISTANT

## 2022-04-26 PROCEDURE — 80048 BASIC METABOLIC PNL TOTAL CA: CPT

## 2022-04-26 PROCEDURE — 74011250637 HC RX REV CODE- 250/637: Performed by: HOSPITALIST

## 2022-04-26 PROCEDURE — 97110 THERAPEUTIC EXERCISES: CPT

## 2022-04-26 PROCEDURE — 97530 THERAPEUTIC ACTIVITIES: CPT

## 2022-04-26 PROCEDURE — 97535 SELF CARE MNGMENT TRAINING: CPT

## 2022-04-26 PROCEDURE — 85027 COMPLETE CBC AUTOMATED: CPT

## 2022-04-26 RX ADMIN — ASPIRIN 81 MG: 81 TABLET, COATED ORAL at 09:09

## 2022-04-26 RX ADMIN — ACETAMINOPHEN 1000 MG: 500 TABLET ORAL at 23:25

## 2022-04-26 RX ADMIN — ACETAMINOPHEN 1000 MG: 500 TABLET ORAL at 13:51

## 2022-04-26 RX ADMIN — FOLIC ACID 1 MG: 1 TABLET ORAL at 09:09

## 2022-04-26 RX ADMIN — SENNOSIDES AND DOCUSATE SODIUM 1 TABLET: 50; 8.6 TABLET ORAL at 09:09

## 2022-04-26 RX ADMIN — Medication 100 MG: at 09:09

## 2022-04-26 RX ADMIN — SODIUM CHLORIDE, PRESERVATIVE FREE 10 ML: 5 INJECTION INTRAVENOUS at 23:31

## 2022-04-26 RX ADMIN — OXYCODONE HYDROCHLORIDE 20 MG: 10 TABLET ORAL at 18:34

## 2022-04-26 RX ADMIN — OXYCODONE HYDROCHLORIDE 20 MG: 10 TABLET ORAL at 09:10

## 2022-04-26 RX ADMIN — AMLODIPINE BESYLATE 10 MG: 10 TABLET ORAL at 09:09

## 2022-04-26 RX ADMIN — THERA TABS 1 TABLET: TAB at 09:09

## 2022-04-26 RX ADMIN — METHADONE HYDROCHLORIDE 45 MG: 10 CONCENTRATE ORAL at 09:32

## 2022-04-26 RX ADMIN — POLYETHYLENE GLYCOL 3350 17 G: 17 POWDER, FOR SOLUTION ORAL at 23:25

## 2022-04-26 RX ADMIN — OXYCODONE HYDROCHLORIDE 15 MG: 10 TABLET ORAL at 05:05

## 2022-04-26 RX ADMIN — OXYCODONE HYDROCHLORIDE 20 MG: 10 TABLET ORAL at 13:51

## 2022-04-26 RX ADMIN — Medication 325 MG: at 18:32

## 2022-04-26 RX ADMIN — ACETAMINOPHEN 1000 MG: 500 TABLET ORAL at 18:27

## 2022-04-26 RX ADMIN — ACETAMINOPHEN 1000 MG: 500 TABLET ORAL at 05:04

## 2022-04-26 RX ADMIN — Medication 325 MG: at 09:09

## 2022-04-26 RX ADMIN — OXYCODONE HYDROCHLORIDE 15 MG: 10 TABLET ORAL at 23:25

## 2022-04-26 RX ADMIN — SENNOSIDES AND DOCUSATE SODIUM 1 TABLET: 50; 8.6 TABLET ORAL at 18:26

## 2022-04-26 RX ADMIN — ASPIRIN 81 MG: 81 TABLET, COATED ORAL at 18:26

## 2022-04-26 NOTE — ROUTINE PROCESS
Bedside and Verbal shift change report given to [address] [place] [address] (oncoming nurse) by Rand Jaimes RN (offgoing nurse). Report given with SBAR, Kardex, Intake/Output, MAR, Accordion and Recent Results.

## 2022-04-26 NOTE — PROGRESS NOTES
[email or web address] Number  [phone]    Gastroenterology follow up-Progress note    Impression:  1. Liver mass-concerning for hepatoma. Segment 6-2.3 x 2.9 x 2.9 cm; segment 6-1.5 x 1.5 x 1.5. Questionable mass in segment 3. MRI pending-  report thus far:   Preliminary: Segment 3 right paramedian anterior aspect faintly T2 hyperintense 1.3 x 1.3 cm arterial phase enhancing mass with washout and restricted diffusion. Suspicious for hepatic neoplasm i.e. HCC (LiRADS 4-5). 2.  Liver cirrhosis-nodular liver noted. Suspect this is related to hepatitis C/alcohol use. Appears to have well compensated cirrhosis-no ascites noted on CT. Meld score = 11.  3.  Hepatitis C-untreated. Hep B Core Ab positive; Hep B Surface Ag neg. 4.  Polysubstance abuse-alcohol, cocaine. Remote history of IV drug use. 5.  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 Splenomegaly noted on imaging. 6.  Noncompliance. 7. Bladder lesion- f/u with urology as outpatient     Plan:     1. MRI liver pending final result but prelim reports show likely malignancy . 2. Alpha-fetoprotein pending  3. Will order Hep C labs to confirm viremia, genotype, Will order additional labs to screen for concomitant liver diseases- Hep C labs still pending as are other serologies and his AFP  4. Counseled about drug use. 5.  May need EGD to screen for varices electively. 6. If MRI shows masses within [place] criteria, consider urgent evaluation for liver transplantation at [facility]-his ongoing drug/alcohol use, noncompliance will be factors that against him. Having said that, would not be unreasonable to at least consider evaluation if appropriate. 7. Low threshold for treatment for opioid/alcohol withdrawal    Chief Complaint: liver lesion      Subjective:  Doing well; no abdominal pain, states feels better today than yesterday. ROS: Denies any fevers, chills, rash.      Eyes: conjunctiva normal, EOM normal   Neck: ROM normal, supple and trachea normal   Cardiovascular: heart normal,  normal rate and regular rhythm   Pulmonary/Chest Wall: Respiratory  effort normal   Abdominal: appearance normal,  soft, non-acute, non-tender     Patient Active Problem List   Diagnosis Code    Epidural abscess, L2-L5 G06.1    Polysubstance abuse (HCC) F19.10    Leukopenia D72.819    Iron deficiency anemia, unspecified D50.9    Anemia D64.9    Thrombocytopenia (HCC) D69.6    Tibia/fibula fracture S82.209A, S82.409A    Metatarsal fracture S92.309A    Alcohol abuse F10.10    HTN (hypertension) I10    Liver mass R16.0    Bladder mass N32.89    Hematuria R31.9         Visit Vitals  BP (!) 172/74   Pulse 74   Temp 97.2 °F (36.2 °C)   Resp 17   Ht 6' (1.829 m)   Wt 81.6 kg (180 lb)   SpO2 100%   BMI 24.41 kg/m²           Intake/Output Summary (Last 24 hours) at 4/26/2022 1038  Last data filed at 4/26/2022 0421  Gross per 24 hour   Intake 330 ml   Output 500 ml   Net -170 ml       CBC w/Diff    Lab Results   Component Value Date/Time    WBC 3.5 (L) 04/26/2022 05:29 AM    RBC 2.84 (L) 04/26/2022 05:29 AM    HGB 7.9 (L) 04/26/2022 05:29 AM    HCT 24.8 (L) 04/26/2022 05:29 AM    MCV 87.3 04/26/2022 05:29 AM    MCH 27.8 04/26/2022 05:29 AM    MCHC 31.9 04/26/2022 05:29 AM    RDW 16.7 (H) 04/26/2022 05:29 AM    PLT 63 (L) 04/26/2022 05:29 AM    Lab Results   Component Value Date/Time    GRANS 40 04/22/2022 02:00 PM    LYMPH 35 04/22/2022 02:00 PM    EOS 13 (H) 04/22/2022 02:00 PM    BANDS 1 08/22/2012 03:00 AM    BASOS 1 04/22/2022 02:00 PM      Basic Metabolic Profile   Recent Labs     04/26/22  0529      K 4.1      CO2 28   BUN 12   CA 8.4*        Hepatic Function    Lab Results   Component Value Date/Time    ALB 2.2 (L) 04/23/2022 08:25 AM    TP 7.0 04/23/2022 08:25 AM    AP 98 04/23/2022 08:25 AM    No results found for: TBIL       Coags   No results for input(s): PTP, INR, APTT, INREXT, INREXT in the last 72 hours.             [facility] Justin Boateng, EDER    [facility]. [email or web address]  Phone: [phone]  Pager: [phone]

## 2022-04-26 NOTE — PROGRESS NOTES
[facility]  Progress Note    Patient: Maximilian Toth Age: 74 y.o. : 1955 MR#: [id number] SSN: [id number]  Date/Time: 2022     C/C: fall and fracture Rt LL       Subjective:   HPI : Right T?F Fracture s/p open reduction , Liver mass , Bladder mass           Review of Systems: Patient offers no new complaints he is lying in bed alert awake oriented, pain control adequate    positive responses in bold type   Constitutional: Negative for fever, chills, diaphoresis and unexpected weight change. HENT: Negative for ear pain, congestion, sore throat, rhinorrhea, drooling, trouble swallowing, neck pain and tinnitus. Eyes: Negative for photophobia, pain, redness and visual disturbance. Respiratory: negative for shortness of breath, cough, choking, chest tightness, wheezing or stridor. Cardiovascular: Negative for chest pain, palpitations and leg swelling. Gastrointestinal: Negative for nausea, vomiting, abdominal pain, diarrhea, constipation, blood in stool, abdominal distention and anal bleeding. Genitourinary: Negative for dysuria, urgency, frequency, hematuria, flank pain and difficulty urinating. Musculoskeletal: Negative for back pain and arthralgias. Skin: Negative for color change, rash and wound. Neurological: Negative for dizziness, seizures, syncope, speech difficulty, light-headedness or headaches. Hematological: Does not bruise/bleed easily. Psychiatric/Behavioral: Negative for suicidal ideas, hallucinations, behavioral problems, self-injury or agitation     Assessment/Plan:     1. Right Tib/fib fx- s/p surgery - follow with ortho - PT /OT     2 Liver mass - s/b GI - Follow MRI   MRI preliminary report \" Preliminary: Segment 3 right paramedian anterior aspect faintly T2 hyperintense 1.3 x 1.3 cm arterial phase enhancing mass with washout and restricted diffusion.  Suspicious for hepatic neoplasm i.e. HCC (LiRADS 4-5). \"    3 Anemia - Post hemorrhagia - BT one unit / monitor CBC     4 Thrombocytopenia - ? From Cirrhosis of Liver     5 Midline for IV access        Objective:       General:  Alert, cooperative, no acute distress  HEENT: No facial asymmetry, JENNIFER Salvador, External ears - WNL    Cardiovascular: S1S2 - regular , No Murmur   Pulmonary: Equal expansion , No Use of accessory muscles , No Rales No Rhonchi    GI:  +BS in all four quadrants, soft, non-tender  Extremities:  No edema; 2+ dorsalis pedis pulses bilaterally  Neuro: Alert and oriented X 2.        DVT Prophylaxis:  []Lovenox  []Hep SQ  [x]SCDs  []Coumadin   []On Heparin gtt    [] Eliquis [] Xarelto     Vitals:         VS:   Visit Vitals  /65   Pulse 61   Temp 97.9 °F (36.6 °C)   Resp 17   Ht 6' (1.829 m)   Wt 81.6 kg (180 lb)   SpO2 100%   BMI 24.41 kg/m²      Tmax/24hrs: Temp (24hrs), Av.2 °F (36.8 °C), Min:97.2 °F (36.2 °C), Max:98.8 °F (37.1 °C)  IOBRIEF    Intake/Output Summary (Last 24 hours) at 2022 1356  Last data filed at 2022 0421  Gross per 24 hour   Intake 330 ml   Output 500 ml   Net -170 ml         Medications:   Current Facility-Administered Medications   Medication Dose Route Frequency    0.9% sodium chloride infusion 250 mL  250 mL IntraVENous PRN    amLODIPine (NORVASC) tablet 10 mg  10 mg Oral DAILY    lactated Ringers infusion  25 mL/hr IntraVENous CONTINUOUS    methadone (DOLOPHINE) 10 mg/mL concentrated solution 45 mg  45 mg Oral DAILY    acetaminophen (TYLENOL) tablet 1,000 mg  1,000 mg Oral Q6H    oxyCODONE IR (ROXICODONE) tablet 10-15 mg  10-15 mg Oral Q3H PRN    oxyCODONE IR (ROXICODONE) tablet 20 mg  20 mg Oral Q3H PRN    naloxone (NARCAN) injection 0.4 mg  0.4 mg IntraVENous PRN    flumazeniL (ROMAZICON) 0.1 mg/mL injection 0.2 mg  0.2 mg IntraVENous PRN    ferrous sulfate tablet 325 mg  1 Tablet Oral BID WITH MEALS    ondansetron (ZOFRAN) injection 4 mg  4 mg IntraVENous Q4H PRN    senna-docusate (PERICOLACE) 8.6-50 mg per tablet 1 Tablet  1 Tablet Oral BID    HYDROmorphone (DILAUDID) injection 1 mg  1 mg IntraVENous Q4H PRN    aspirin delayed-release tablet 81 mg  81 mg Oral BID    sodium chloride (NS) flush 5-40 mL  5-40 mL IntraVENous Q8H    sodium chloride (NS) flush 5-40 mL  5-40 mL IntraVENous PRN    acetaminophen (TYLENOL) tablet 650 mg  650 mg Oral Q6H PRN    Or    acetaminophen (TYLENOL) suppository 650 mg  650 mg Rectal Q6H PRN    polyethylene glycol (MIRALAX) packet 17 g  17 g Oral DAILY PRN    bisacodyL (DULCOLAX) suppository 10 mg  10 mg Rectal DAILY PRN    sodium chloride (NS) flush 5-40 mL  5-40 mL IntraVENous Q8H    sodium chloride (NS) flush 5-40 mL  5-40 mL IntraVENous PRN    LORazepam (ATIVAN) tablet 1 mg  1 mg Oral Q1H PRN    Or    LORazepam (ATIVAN) 2 mg/mL injection 1 mg  1 mg IntraVENous Q1H PRN    LORazepam (ATIVAN) tablet 2 mg  2 mg Oral Q1H PRN    Or    LORazepam (ATIVAN) 2 mg/mL injection 2 mg  2 mg IntraVENous Q1H PRN    LORazepam (ATIVAN) 2 mg/mL injection 3 mg  3 mg IntraVENous Q15MIN PRN    thiamine HCL (B-1) tablet 100 mg  100 mg Oral DAILY    folic acid (FOLVITE) tablet 1 mg  1 mg Oral DAILY    therapeutic multivitamin (THERAGRAN) tablet 1 Tablet  1 Tablet Oral DAILY       Labs:    Recent Labs     04/26/22  0529 04/25/22  0504 04/24/22  0043   WBC 3.5* 4.2* 4.4*   HGB 7.9* 6.5* 7.5*   HCT 24.8* 20.7* 24.3*   PLT 63* 64* 79*     Recent Labs     04/26/22  0529 04/25/22  0504 04/24/22  0043    136 136   K 4.1 4.2 4.2    105 106   CO2 28 28 27   * 130* 144*   BUN 12 13 11   CREA 0.88 1.05 1.06   CA 8.4* 8.3* 8.3*         Time spent on direct patient care >30 mints     Complexity : High complex - due to multiple medical issues outlined above.      CODE Status : full code     Case discussed with:  [x]Patient  [] Family  []Nursing  []Case Management         Disclaimer: Sections of this note are dictated utilizing voice recognition software, which may have resulted in some phonetic based errors in grammar and contents. Even though attempts were made to correct all the mistakes, some may have been missed, and remained in the body of the document. If questions arise, please contact our department.     Signed By: Ellie Zapata MD     April 26, 2022

## 2022-04-26 NOTE — PROGRESS NOTES
Reason for Admission:  Tibia/fibula fracture [S82.209A, S82.409A]  Anemia [D64.9]  Thrombocytopenia ([address] [D69.6]                 RUR Score:    13%            Plan for utilizing home health:    Not at this time. Discharge plan is Acute Rehab Unit vs SNF. Likelihood of Readmission:   LOW                         Transition of Care Plan:              Initial assessment completed with patient. Cognitive status of patient: oriented to time, place, person and situation. Face sheet information confirmed:  yes. The patient designates his sister Susan Zhang [phone] to participate in his discharge plan and to receive any needed information. This patient lives alone, in a single family home, with 2-3 steps to enter . Patient was able to navigate steps as needed. Prior to hospitalization, patient was considered to be independent with ADLs/IADLS : yes . Patient has a current ACP document on file: no.      Healthcare Decision Maker:   Primary Decision Maker: Rosa M Zavala - [phone]    Click here to [address] including selection of the Healthcare Decision Maker Relationship (ie \"Primary\")      Medical Transport will need to be available to transport patient to SNF or ARU upon discharge. The patient already has none reported,  medical equipment available in the home. Patient is not currently active with home health. Patient has not stayed in a skilled nursing facility or rehab. This patient is on dialysis :no.    List of available SNF agencies were provided and reviewed with the patient prior to discharge. [place] of choice verbal consent given: yes, for [facility]. Currently, the discharge plan is Acute Rehab Unit vs SNF. The patient states that he can obtain his medications from the pharmacy, and take his medications as directed.     Patient's current insurance is [employer]. Care Management Interventions  PCP Verified by CM:  Yes (CM updated PCP in patient's chart.)  Mode of Transport at Discharge: BLS  Transition of Care Consult (CM Consult): Acute Rehab,SNF  Partner SNF: Yes  Discharge Durable Medical Equipment: No  Physical Therapy Consult: Yes  Occupational Therapy Consult: Yes  Speech Therapy Consult: No  Support Systems: Other (Comment) (Patient lives alone.)  Confirm Follow Up Transport: Family  The Plan for Transition of Care is Related to the Following Treatment Goals : Rehab Unit Subacute  The Patient and/or Patient Representative was Provided with a Choice of Provider and Agrees with the Discharge Plan?: Yes  Freedom of Choice List was Provided with Basic Dialogue that Supports the Patient's Individualized Plan of Care/Goals, Treatment Preferences and Shares the Quality Data Associated with the Providers?: Yes  Discharge Location  Patient Expects to be Discharged to[de-identified] Rehab Unit Subacute (vs SNF)        Shira Garza RN  Case Management [phone]

## 2022-04-26 NOTE — PROGRESS NOTES
Problem: Mobility Impaired (Adult and Pediatric)  Goal: *Acute Goals and Plan of Care (Insert Text)  Description: Physical Therapy Goals  Initiated 4/24/2022 and to be accomplished within 7 day(s)  1. Patient will move from supine to sit and sit to supine  in bed with modified independence. 2.  Patient will transfer from bed to chair and chair to bed with modified independence using the least restrictive device. 3.  Patient will perform sit to stand with modified independence. 4.  Patient will ambulate with modified independence for 50 feet with the least restrictive device. 5.  Patient will ascend/descend 1 stairs with B handrail(s) with modified independence. PLOF: Pt lives alone in a [facility] [place] with 1 ALEJANDRO. Indep PTA. Outcome: Progressing Towards Goal   PHYSICAL THERAPY TREATMENT    Patient: Arturo Mix (53 y.o. male)  Date: 4/26/2022  Diagnosis: Tibia/fibula fracture [S82.209A, S82.409A]  Anemia [D64.9]  Thrombocytopenia (HCC) [D69.6] Tibia/fibula fracture  Procedure(s) (LRB):  RIGHT TIBIA INSERTION INTRA MEDULLARY NAIL/SYNTHES/C-ARM (Right) 3 Days Post-Op  Precautions: Fall,PWB  PLOF: see above    ASSESSMENT:  Pt cleared by nursing to participate in PT. Pt sitting up in recliner chair upon entry of PT. Pt reported he got up into the chair by himself without nursing staff present in room. Pt educated again on need for supervision still at this time for safety and need to call for assistance. Obtained alarm box and attached it to pad positioned in pt's chair and notified nursing. Pt participated in gait training today and able to ambulate 30 ft into hallway and turn around and go 30 ft back to chair. However, gait was very slow, painful, and took a lot of energy. Pt voiced agreement that it was more difficult walking and maintaining PWB R LE than he thought and that he was \"wore out\" after this short walk.   Pt also participated in B LE therex seated in recliner chair and needed assistance with R LE due to pain and weakness. Pt lives alone and would benefit from short term rehab for more strengthening and mobility training to maximize function and safe transition back home. Pt educated on PT's recommendation and verbalized that he would think about rehab as a possible option. Progression toward goals:   [x]      Improving appropriately and progressing toward goals  []      Improving slowly and progressing toward goals  []      Not making progress toward goals and plan of care will be adjusted     PLAN:  Patient continues to benefit from skilled intervention to address the above impairments. Continue treatment per established plan of care. Discharge Recommendations:  Rehab  Further Equipment Recommendations for Discharge:  rolling walker and wheelchair      SUBJECTIVE:   Patient stated I've been sitting up for about an hour. I got in the chair by myself. No, I didn't have nursing in here with me.   Pt educated again on need for assistance with mobility and pt verbalized understanding. Pt voiced agreement with PT that it \"wore me out\" taking short walk and that rehab \"may be a good idea for a couple weeks. \"    OBJECTIVE DATA SUMMARY:   Critical Behavior:  Neurologic State: Alert  Orientation Level: Oriented X4  Cognition: Follows commands  Safety/Judgement: Fall prevention  Functional Mobility Training:  Bed Mobility:   NT- pt up in recliner chair upon entry of PT and returned to chair at end of session        Transfers:  Sit to Stand: Contact guard assistance (vc's for hand placement for safety)  Stand to Sit: Contact guard assistance (vc's for hand placement for safety & to control descent)        Balance:  Standing: Impaired; With support (RW)  Standing - Static: Fair  Standing - Dynamic : Fair     Ambulation/Gait Training:  Distance (ft): 60 Feet (ft) (30 ft, turn, 30 ft back to room)  Assistive Device: Walker, rolling  Ambulation - Level of Assistance: Minimal assistance  Gait Description (WDL): Exceptions to WDL  Gait Abnormalities: Antalgic;Decreased step clearance; Step to gait (PWB R LE)  Right Side Weight Bearing: Partial (%)  Base of Support: Shift to left  Stance: Right decreased  Speed/Jovanna: Slow  Step Length: Right shortened;Left shortened      Slow pace, painful, and fatigued after gait this distance    Therapeutic Exercises:   AROM L LE; AAROM R LE due to pain and weakness  Performed seated in recliner chair with both feet down and feet elevated in long-sit position      EXERCISE   Sets   Reps   Active Active Assist   Passive Self ROM   Comments   Ankle Pumps    [] [] [] []    Quad Sets/Glut Sets    [] [] [] []    Hamstring Sets   [] [] [] []    Short Arc Quads   [] [] [] []    Heel Slides   [] [] [] []    Straight Leg Raises 1 10 [x]L [x]R [] [] Long-sit position   Hip Add/Abd  1 10 [x]L [x]R [] [] Long-sit position   Long Arc Quads 1 10 [x]L [x]R [] []    Seated Marching 1 10 [x]L [x]R [] []    Standing Marching   [] [] [] []       [] [] [] []        Pain:  Pain level pre-treatment: 7/10, R LE  Pain level post-treatment: 8.5/10  R LE  Pain Intervention(s): Medication (see MAR); Rest, Repositioning   Response to intervention: Nurse notified, See doc flow    Activity Tolerance:   Fair +; pt without c/o dizziness or SOB or nausea. Pt limited by pain levels in R LE; needed slow pace and frequent rest breaks  Please refer to the flowsheet for vital signs taken during this treatment. After treatment:   [x] Patient left in no apparent distress sitting up in chair  [] Patient left in no apparent distress in bed  [x] Call bell left within reach  [x] Nursing notified  [] Caregiver present  [x] Chair alarm activated  [] SCDs applied      COMMUNICATION/EDUCATION:   [x]         Role of Physical Therapy in the acute care setting. [x]         Fall prevention education was provided and the patient/caregiver indicated understanding.   [x]         Patient/family have participated as able in working toward goals and plan of care. []         Patient/family agree to work toward stated goals and plan of care. []         Patient understands intent and goals of therapy, but is neutral about his/her participation.   []         Patient is unable to participate in stated goals/plan of care: ongoing with therapy staff.  []         Other:        Alivia Boyd, PT   Time Calculation: 33 mins

## 2022-04-26 NOTE — PROGRESS NOTES
CM spoke with patient, he is agreeable to Rehab, would like ARU, but is agreeable to SNF if not accepted to ARU. Patient is agreeable to [facility]. Patient said he could have someone stay with him after discharge if accepted to ARU. Patient will need Auth for ARU and SNF. Patient has been Covid Vaccinated with a Booster shot. CM called and spoke with Eusebia at ARU, and asked if she could review patient for possible admission. CM uploaded patient with clinicals to [place] and [address] [place] [address], and [place] [facility] [place]              Devorah Garza, RN  Case Management [phone]

## 2022-04-26 NOTE — PROGRESS NOTES
Ortho    Pt seen and evaluated  Doing well  Pain well controlled  No cp, sob, abd pain    Visit Vitals  /65   Pulse 61   Temp 97.9 °F (36.6 °C)   Resp 17   Ht 6' (1.829 m)   Wt 180 lb (81.6 kg)   SpO2 100%   BMI 24.41 kg/m²     Right LE  Splint/ACE cdi  Comparments soft  nv intact  Neg calf tenderness    Labs  Lab Results   Component Value Date/Time    WBC 3.5 (L) 04/26/2022 05:29 AM    Hemoglobin, POC 15.6 03/06/2020 08:39 AM    HGB 7.9 (L) 04/26/2022 05:29 AM    Hematocrit, POC 46 03/06/2020 08:39 AM    HCT 24.8 (L) 04/26/2022 05:29 AM    PLATELET 63 (L) 36/54/7818 05:29 AM    MCV 87.3 04/26/2022 05:29 AM     A: sp right tibial IM nailing    P:  PT- pwb to right LE. Aspirin x 2 weeks. Continue with current medical management. F/u in office in 2 weeks.

## 2022-04-26 NOTE — PROGRESS NOTES
Problem: Self Care Deficits Care Plan (Adult)  Goal: *Acute Goals and Plan of Care (Insert Text)  Outcome: Progressing Towards Goal   Note: Occupational Therapy Goals  Initiated 4/24/2022 within 7 day(s). 1.  Patient will perform LB bathing with modified independence (once wrapping is discontinued)  2. Patient will perform LB dressing with modified independence  3. Patient will maneuver on and off BSC with modified independence (PWB status)      Prior Level of Function: he reports that he lives alone and was independent with his ADL's and IADL's    OCCUPATIONAL THERAPY TREATMENT    Patient: Mariposa Srinivasan (42 y.o. male)  Date: 4/26/2022  Diagnosis: Tibia/fibula fracture [S82.209A, S82.409A]  Anemia [D64.9]  Thrombocytopenia ([address] [D69.6] Tibia/fibula fracture  Procedure(s) (LRB):  RIGHT TIBIA INSERTION INTRA MEDULLARY NAIL/SYNTHES/C-ARM (Right) 3 Days Post-Op  Precautions: Fall,PWB    Chart, occupational therapy assessment, plan of care, and goals were reviewed. ASSESSMENT:  Pt OOB seated in chair upon entry. Pt limited by c/o RLE pain. NSG aware. Pt seen for adaptive equipment training for increase independence w/LB dressing ADLs. Pt requires increase time and vc's for technique. Pt requires assist w/functional transfer to standing for clothing mgt w/LB dressing. Provided elevation for pain relief. Progression toward goals:  []          Improving appropriately and progressing toward goals  [x]          Improving slowly and progressing toward goals  []          Not making progress toward goals and plan of care will be adjusted     PLAN:  Patient continues to benefit from skilled intervention to address the above impairments. Continue treatment per established plan of care. Discharge Recommendations:  Rehab  Further Equipment Recommendations for Discharge:  TBD by next level of care     SUBJECTIVE:   Patient stated I'm going to make my decision in a couple of days.  reference rehab vs home    OBJECTIVE DATA SUMMARY:   Cognitive/Behavioral Status:  Neurologic State: Alert  Orientation Level: Oriented X4  Cognition: Follows commands  Safety/Judgement: Fall prevention    Functional Mobility and Transfers for ADLs:   Transfers:  Sit to Stand: Contact guard assistance (w/RW)  Bed to Chair: Contact guard assistance (w/RW)    Balance:  Sitting: Intact  Standing: Impaired; With support  Standing - Static: Fair  Standing - Dynamic : Fair    ADL Intervention:  Lower Body Dressing Assistance  Underpants: Minimum assistance (simulated w/pillowcase)  Leg Crossed Method Used: No  Position Performed: Seated in chair  Cues: Don;Doff  Adaptive Equipment Used: Reacher    Cognitive Retraining  Safety/Judgement: Fall prevention    UE Therapeutic Exercises/Activity:   Arm chair push-ups 10x    Pt performs floor level item retrieval w/AE for carryover w/ADLs. Pain:  Pain level pre-treatment: 7/10   Pain level post-treatment: 7/10    Activity Tolerance:    Good    Please refer to the flowsheet for vital signs taken during this treatment. After treatment:   [x]  Patient left in no apparent distress sitting up in chair  []  Patient left in no apparent distress in bed  [x]  Call bell left within reach  []  Nursing notified  []  Caregiver present  [x]  chair alarm activated    COMMUNICATION/EDUCATION:   [] Role of Occupational Therapy in the acute care setting  [] Home safety education was provided and the patient/caregiver indicated understanding. [] Patient/family have participated as able in working towards goals and plan of care. [x] Patient/family agree to work toward stated goals and plan of care. [] Patient understands intent and goals of therapy, but is neutral about his/her participation. [] Patient is unable to participate in goal setting and plan of care.       Thank you for this referral.  MANUEL Hawkins  Time Calculation: 23 mins

## 2022-04-26 NOTE — PROGRESS NOTES
ARU/IPR REFERRAL CONTACT NOTE  [address] [facility]    RE: Consuelo Gutierrez Referral received to review this patient's case for admission to [address] [facility]. Current status reviewed. Pt positive for displaced somewhat comminuted distal third right tibia fracture with associated fibular fracture distally and also proximal fibular fracture s/p closed IM nailing of the right tibia using the [employer] IM nail system with a double lock proximally and triple lock distally. PT eval completed; OT eval pending. Will follow up for medical/therapy status, review case with team and advise. Please be aware that if patient meets criteria for [facility] for [facility] admission will be contingent on insurance authorization. Thank you for this referral.  Should you have any questions please do not hesitate to call.      Sincerely,  [facility]  [phone]

## 2022-04-27 LAB
AFP L3 MFR SERPL: 20.9 % (ref 0–9.9)
AFP SERPL-MCNC: 14.7 NG/ML (ref 0–8.4)
ANION GAP SERPL CALC-SCNC: 3 MMOL/L (ref 3–18)
BUN SERPL-MCNC: 13 MG/DL (ref 7–18)
BUN/CREAT SERPL: 14 (ref 12–20)
CALCIUM SERPL-MCNC: 8.3 MG/DL (ref 8.5–10.1)
CHLORIDE SERPL-SCNC: 105 MMOL/L (ref 100–111)
CO2 SERPL-SCNC: 29 MMOL/L (ref 21–32)
CREAT SERPL-MCNC: 0.95 MG/DL (ref 0.6–1.3)
ERYTHROCYTE [DISTWIDTH] IN BLOOD BY AUTOMATED COUNT: 18 % (ref 11.6–14.5)
GLUCOSE SERPL-MCNC: 114 MG/DL (ref 74–99)
HCT VFR BLD AUTO: 24.9 % (ref 36–48)
HCV GENTYP SERPL NAA+PROBE: NORMAL
HCV GENTYP SERPL NAA+PROBE: NORMAL
HCV RNA SERPL NAA+PROBE-ACNC: NORMAL IU/ML
HCV RNA SERPL NAA+PROBE-LOG IU: 6.31 LOG10 IU/ML
HGB BLD-MCNC: 7.6 G/DL (ref 13–16)
MCH RBC QN AUTO: 27.5 PG (ref 24–34)
MCHC RBC AUTO-ENTMCNC: 30.5 G/DL (ref 31–37)
MCV RBC AUTO: 90.2 FL (ref 78–100)
NRBC # BLD: 0.04 K/UL (ref 0–0.01)
NRBC BLD-RTO: 1.1 PER 100 WBC
PLATELET # BLD AUTO: 70 K/UL (ref 135–420)
PLEASE NOTE, 550474: NORMAL
PMV BLD AUTO: 12.1 FL (ref 9.2–11.8)
POTASSIUM SERPL-SCNC: 3.7 MMOL/L (ref 3.5–5.5)
RBC # BLD AUTO: 2.76 M/UL (ref 4.35–5.65)
SODIUM SERPL-SCNC: 137 MMOL/L (ref 136–145)
TEST INFORMATION, 550045: NORMAL
WBC # BLD AUTO: 3.7 K/UL (ref 4.6–13.2)

## 2022-04-27 PROCEDURE — 80048 BASIC METABOLIC PNL TOTAL CA: CPT

## 2022-04-27 PROCEDURE — 74011250637 HC RX REV CODE- 250/637: Performed by: HOSPITALIST

## 2022-04-27 PROCEDURE — 74011250637 HC RX REV CODE- 250/637: Performed by: ORTHOPAEDIC SURGERY

## 2022-04-27 PROCEDURE — 74011250637 HC RX REV CODE- 250/637: Performed by: PHYSICIAN ASSISTANT

## 2022-04-27 PROCEDURE — 36415 COLL VENOUS BLD VENIPUNCTURE: CPT

## 2022-04-27 PROCEDURE — 65270000046 HC RM TELEMETRY

## 2022-04-27 PROCEDURE — 85027 COMPLETE CBC AUTOMATED: CPT

## 2022-04-27 PROCEDURE — 97535 SELF CARE MNGMENT TRAINING: CPT

## 2022-04-27 PROCEDURE — 97116 GAIT TRAINING THERAPY: CPT

## 2022-04-27 PROCEDURE — 2709999900 HC NON-CHARGEABLE SUPPLY

## 2022-04-27 PROCEDURE — 99232 SBSQ HOSP IP/OBS MODERATE 35: CPT | Performed by: INTERNAL MEDICINE

## 2022-04-27 PROCEDURE — 94760 N-INVAS EAR/PLS OXIMETRY 1: CPT

## 2022-04-27 RX ADMIN — METHADONE HYDROCHLORIDE 45 MG: 10 CONCENTRATE ORAL at 09:46

## 2022-04-27 RX ADMIN — ASPIRIN 81 MG: 81 TABLET, COATED ORAL at 08:49

## 2022-04-27 RX ADMIN — Medication 325 MG: at 08:49

## 2022-04-27 RX ADMIN — SENNOSIDES AND DOCUSATE SODIUM 1 TABLET: 50; 8.6 TABLET ORAL at 08:48

## 2022-04-27 RX ADMIN — OXYCODONE HYDROCHLORIDE 20 MG: 10 TABLET ORAL at 08:48

## 2022-04-27 RX ADMIN — FOLIC ACID 1 MG: 1 TABLET ORAL at 08:49

## 2022-04-27 RX ADMIN — OXYCODONE HYDROCHLORIDE 20 MG: 10 TABLET ORAL at 20:51

## 2022-04-27 RX ADMIN — OXYCODONE HYDROCHLORIDE 20 MG: 10 TABLET ORAL at 17:27

## 2022-04-27 RX ADMIN — Medication 100 MG: at 08:49

## 2022-04-27 RX ADMIN — SENNOSIDES AND DOCUSATE SODIUM 1 TABLET: 50; 8.6 TABLET ORAL at 17:27

## 2022-04-27 RX ADMIN — THERA TABS 1 TABLET: TAB at 08:49

## 2022-04-27 RX ADMIN — ACETAMINOPHEN 1000 MG: 500 TABLET ORAL at 13:23

## 2022-04-27 RX ADMIN — Medication 325 MG: at 17:27

## 2022-04-27 RX ADMIN — POLYETHYLENE GLYCOL 3350 17 G: 17 POWDER, FOR SOLUTION ORAL at 08:47

## 2022-04-27 RX ADMIN — OXYCODONE HYDROCHLORIDE 15 MG: 10 TABLET ORAL at 05:36

## 2022-04-27 RX ADMIN — AMLODIPINE BESYLATE 10 MG: 10 TABLET ORAL at 08:49

## 2022-04-27 RX ADMIN — ACETAMINOPHEN 1000 MG: 500 TABLET ORAL at 05:36

## 2022-04-27 NOTE — PROGRESS NOTES
ARU/IPR REFERRAL CONTACT NOTE  [id number] [place] [address] for Physical Rehabilitation    RE:  Edna Lana Thank you for the opportunity to review this patient's case for admission to [id number] [place] [address] for Physical Rehabilitation. Based on our pre-admission screening:     Johana ] Our Team/Medical Director is following this case. Comments:  Have sent request to Dr Hunt Free to review for medical necessity. Will meet with patient at bedside to discuss IPR and support available at [facility]. Please be advised admission to ARU will be based on meeting admission requirements, authorization from [facility]        Thank you for this referral.   Please do not hesitate to call if you need further information or have additional questions.      Regards,    Jimbo Chiang PTA   Admissions [facility] for [facility]  [phone]

## 2022-04-27 NOTE — PROGRESS NOTES
All SNF declined patient in [place] for drug screen positive on admission for Cocaine and Opiates, and patient on Methadone. ARU reviewing, if they decline patient, patient will need to go home with home health at time of discharge.              Marisela Kingston RN  Case Management [phone]

## 2022-04-27 NOTE — PROGRESS NOTES
[facility] Hospitalist Group  Progress Note    Patient: Dot Gonzalez Age: 74 y.o. : 1955 MR#: [id number] SSN: [id number]  Date/Time: 2022     C/C: fall and fracture Rt LL       Subjective:   HPI : Right T?F Fracture s/p open reduction , Liver mass , Bladder mass           Review of Systems: Patient is sitting in bed. Appears comfortable. Pain control adequate  I tried to discuss case with him but he is not interested at this point he is not even interested in going to out of this area for management of his liver issue  positive responses in bold type   Constitutional: Negative for fever, chills, diaphoresis and unexpected weight change. HENT: Negative for ear pain, congestion, sore throat, rhinorrhea, drooling, trouble swallowing, neck pain and tinnitus. Eyes: Negative for photophobia, pain, redness and visual disturbance. Respiratory: negative for shortness of breath, cough, choking, chest tightness, wheezing or stridor. Cardiovascular: Negative for chest pain, palpitations and leg swelling. Gastrointestinal: Negative for nausea, vomiting, abdominal pain, diarrhea, constipation, blood in stool, abdominal distention and anal bleeding. Genitourinary: Negative for dysuria, urgency, frequency, hematuria, flank pain and difficulty urinating. Musculoskeletal: Negative for back pain and arthralgias. Skin: Negative for color change, rash and wound. Neurological: Negative for dizziness, seizures, syncope, speech difficulty, light-headedness or headaches. Hematological: Does not bruise/bleed easily. Psychiatric/Behavioral: Negative for suicidal ideas, hallucinations, behavioral problems, self-injury or agitation     Assessment/Plan:     1. Right Tib/fib fx- s/p surgery - follow with ortho - PT /OT     2 Liver mass -MRI report is back-likely hematoma-discussed with GI, they are not going to follow patient as patient needs oncology management.   Consult placed to Dr. Brenda Mix for further management. Message left to his office awaiting phone call back. Approach was made through perfection    3 Anemia - Post hemorrhagia - BT one unit / monitor CBC     4 Thrombocytopenia - ? From Cirrhosis of Liver     5 Midline for IV access    Objective:       General:  Alert, cooperative, no acute distress  HEENT: No facial asymmetry, JENNIFER Salvador, External ears - WNL    Cardiovascular: S1S2 - regular , No Murmur   Pulmonary: Equal expansion , No Use of accessory muscles , No Rales No Rhonchi    GI:  +BS in all four quadrants, soft, non-tender  Extremities:  No edema; 2+ dorsalis pedis pulses bilaterally  Neuro: Alert and oriented X 2.        DVT Prophylaxis:  []Lovenox  []Hep SQ  [x]SCDs  []Coumadin   []On Heparin gtt    [] Eliquis [] Xarelto       Vitals:         VS:   Visit Vitals  BP (!) 116/59 (BP 1 Location: Left upper arm, BP Patient Position: Sitting)   Pulse 66   Temp 98.2 °F (36.8 °C)   Resp 17   Ht 6' (1.829 m)   Wt 81.6 kg (180 lb)   SpO2 98%   BMI 24.41 kg/m²      Tmax/24hrs: Temp (24hrs), Av.4 °F (36.9 °C), Min:97.6 °F (36.4 °C), Max:99.5 °F (37.5 °C)  IOBRIEF    Intake/Output Summary (Last 24 hours) at 2022 1147  Last data filed at 2022 0848  Gross per 24 hour   Intake 240 ml   Output 1550 ml   Net -1310 ml         Medications:   Current Facility-Administered Medications   Medication Dose Route Frequency    amLODIPine (NORVASC) tablet 10 mg  10 mg Oral DAILY    lactated Ringers infusion  25 mL/hr IntraVENous CONTINUOUS    methadone (DOLOPHINE) 10 mg/mL concentrated solution 45 mg  45 mg Oral DAILY    acetaminophen (TYLENOL) tablet 1,000 mg  1,000 mg Oral Q6H    oxyCODONE IR (ROXICODONE) tablet 10-15 mg  10-15 mg Oral Q3H PRN    oxyCODONE IR (ROXICODONE) tablet 20 mg  20 mg Oral Q3H PRN    naloxone (NARCAN) injection 0.4 mg  0.4 mg IntraVENous PRN    flumazeniL (ROMAZICON) 0.1 mg/mL injection 0.2 mg  0.2 mg IntraVENous PRN    ferrous sulfate tablet 325 mg  1 Tablet Oral BID WITH MEALS    ondansetron (ZOFRAN) injection 4 mg  4 mg IntraVENous Q4H PRN    senna-docusate (PERICOLACE) 8.6-50 mg per tablet 1 Tablet  1 Tablet Oral BID    HYDROmorphone (DILAUDID) injection 1 mg  1 mg IntraVENous Q4H PRN    aspirin delayed-release tablet 81 mg  81 mg Oral BID    sodium chloride (NS) flush 5-40 mL  5-40 mL IntraVENous Q8H    sodium chloride (NS) flush 5-40 mL  5-40 mL IntraVENous PRN    acetaminophen (TYLENOL) tablet 650 mg  650 mg Oral Q6H PRN    Or    acetaminophen (TYLENOL) suppository 650 mg  650 mg Rectal Q6H PRN    polyethylene glycol (MIRALAX) packet 17 g  17 g Oral DAILY PRN    bisacodyL (DULCOLAX) suppository 10 mg  10 mg Rectal DAILY PRN    sodium chloride (NS) flush 5-40 mL  5-40 mL IntraVENous Q8H    sodium chloride (NS) flush 5-40 mL  5-40 mL IntraVENous PRN    LORazepam (ATIVAN) tablet 1 mg  1 mg Oral Q1H PRN    Or    LORazepam (ATIVAN) 2 mg/mL injection 1 mg  1 mg IntraVENous Q1H PRN    LORazepam (ATIVAN) tablet 2 mg  2 mg Oral Q1H PRN    Or    LORazepam (ATIVAN) 2 mg/mL injection 2 mg  2 mg IntraVENous Q1H PRN    LORazepam (ATIVAN) 2 mg/mL injection 3 mg  3 mg IntraVENous Q15MIN PRN    thiamine HCL (B-1) tablet 100 mg  100 mg Oral DAILY    folic acid (FOLVITE) tablet 1 mg  1 mg Oral DAILY    therapeutic multivitamin (THERAGRAN) tablet 1 Tablet  1 Tablet Oral DAILY       Labs:    Recent Labs     04/27/22  0042 04/26/22  0529 04/25/22  0504   WBC 3.7* 3.5* 4.2*   HGB 7.6* 7.9* 6.5*   HCT 24.9* 24.8* 20.7*   PLT 70* 63* 64*     Recent Labs     04/27/22  0042 04/26/22  0529 04/25/22  0504    139 136   K 3.7 4.1 4.2    107 105   CO2 29 28 28   * 102* 130*   BUN 13 12 13   CREA 0.95 0.88 1.05   CA 8.3* 8.4* 8.3*         Time spent on direct patient care >30 mints     Complexity : High complex - due to multiple medical issues outlined above.      CODE Status : Full CODE     Case discussed with:  [x]Patient  [] Family  []Nursing []Case Management      Disclaimer: Sections of this note are dictated utilizing voice recognition software, which may have resulted in some phonetic based errors in grammar and contents. Even though attempts were made to correct all the mistakes, some may have been missed, and remained in the body of the document. If questions arise, please contact our department.     Signed By: Rancho Davison MD     April 27, 2022

## 2022-04-27 NOTE — PROGRESS NOTES
[email or web address]  [phone]    Gastroenterology Progress Note    Impression:  1.  Liver mass-concerning for hepatoma.  Segment 6-2.3 x 2.9 x 2.9 cm; segment 6-1.5 x 1.5 x 1.5.  Questionable mass in segment 3. MRI performed 4/25 pending 4/27-  report thus far:   Preliminary: Segment 3 right paramedian anterior aspect faintly T2 hyperintense 1.3 x 1.3 cm arterial phase enhancing mass with washout and restricted diffusion.  Suspicious for hepatic neoplasm i.e. HCC (LiRADS 4-5). 2. Chris Fanbrittany cirrhosis-nodular liver noted.  Suspect this is related to hepatitis C/alcohol use.  Appears to have well compensated cirrhosis-no ascites noted on CT.  Meld score = 11.  3.  Chronic Hepatitis C - untreated, PCR elevated. Hep B Core Ab positive; Hep B Surface Ag neg. 4.  Polysubstance abuse-alcohol, cocaine.  Remote history of IV drug use. 5.  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  Splenomegaly noted on imaging. 6.  Noncompliance. 7. Bladder lesion- f/u with urology as outpatient     Plan:  1.  MRI liver pending final result but prelim reports show likely malignancy. AFP pending. If MRI shows masses within Titus criteria, consider urgent evaluation for liver transplantation at [facility]-his ongoing drug/alcohol use, noncompliance will be factors that against him. Boone Alfonso said that, would not be unreasonable to at least consider evaluation if appropriate. 2. Oncology input is appreciated. 3.  Hep C labs to confirm genotype are pending. Will schedule OP f/u for HCV treatment as well as EGD for varices screening. 4. GI to sign off. Thank you for this consultation and the opportunity to participate in the care of this patient. Please do not hesitate to call with any questions or concerns, or should event occur that may necessitate additional GI evaluation. Chief Complaint: liver lesion, HCV      Subjective:  Continues to feel well, eating well today.       ROS: Denies any fevers, chills, nausea, vomiting.        General: well developed, well nourished, no acute distress  Eyes: conjunctiva normal, EOM normal  Cardiovascular: heart normal, intact distal pulses, normal rate and regular rhythm  Pulmonary: breath sounds normal and effort normal  Abdominal: non-distended, active bowel sounds, soft, non-tender, non-acute  Patient Active Problem List   Diagnosis Code    Epidural abscess, L2-L5 G06.1    Polysubstance abuse (HCC) F19.10    Leukopenia D72.819    Iron deficiency anemia, unspecified D50.9    Anemia D64.9    Thrombocytopenia (HCC) D69.6    Tibia/fibula fracture S82.209A, S82.409A    Metatarsal fracture S92.309A    Alcohol abuse F10.10    HTN (hypertension) I10    Liver mass R16.0    Bladder mass N32.89    Hematuria R31.9         Visit Vitals  /60 (BP 1 Location: Right upper arm, BP Patient Position: At rest)   Pulse 71   Temp 99.5 °F (37.5 °C)   Resp 18   Ht 6' (1.829 m)   Wt 81.6 kg (180 lb)   SpO2 98%   BMI 24.41 kg/m²           Intake/Output Summary (Last 24 hours) at 4/27/2022 0841  Last data filed at 4/27/2022 0801  Gross per 24 hour   Intake 120 ml   Output 1550 ml   Net -1430 ml       CBC w/Diff    Lab Results   Component Value Date/Time    WBC 3.7 (L) 04/27/2022 12:42 AM    RBC 2.76 (L) 04/27/2022 12:42 AM    HGB 7.6 (L) 04/27/2022 12:42 AM    HCT 24.9 (L) 04/27/2022 12:42 AM    MCV 90.2 04/27/2022 12:42 AM    MCH 27.5 04/27/2022 12:42 AM    MCHC 30.5 (L) 04/27/2022 12:42 AM    RDW 18.0 (H) 04/27/2022 12:42 AM    PLT 70 (L) 04/27/2022 12:42 AM    Lab Results   Component Value Date/Time    GRANS 40 04/22/2022 02:00 PM    LYMPH 35 04/22/2022 02:00 PM    EOS 13 (H) 04/22/2022 02:00 PM    BANDS 1 08/22/2012 03:00 AM    BASOS 1 04/22/2022 02:00 PM      Basic Metabolic Profile   Recent Labs     04/27/22  0042      K 3.7      CO2 29   BUN 13   CA 8.3*        Hepatic Function    Lab Results   Component Value Date/Time    ALB 2.2 (L) 04/23/2022 08:25 AM    TP 7.0 04/23/2022 08:25 AM    AP 98 04/23/2022 08:25 AM    No results found for: TBIL       Coags   No results for input(s): PTP, INR, APTT, INREXT in the last 72 hours. Rolando Cee PA-C.   04/27/22, 8:50 AM   [facility]  [email or web address]  Phone: [phone]  Pager: [phone]

## 2022-04-27 NOTE — ROUTINE PROCESS
Bedside and Verbal shift change report given to [address] [place] [address] (oncoming nurse) by Mouna Pinto RN (offgoing nurse). Report given with SBAR, Kardex, Intake/Output, MAR, Accordion and Recent Results.

## 2022-04-27 NOTE — ROUTINE PROCESS
Bedside and Verbal shift change report given to Ramin Muhammad RN (oncoming nurse) by TEZ Travis RN (offgoing nurse). Report included the following information SBAR, Kardex, MAR and Recent Results.

## 2022-04-27 NOTE — PROGRESS NOTES
Problem: Self Care Deficits Care Plan (Adult)  Goal: *Acute Goals and Plan of Care (Insert Text)  Outcome: Progressing Towards Goal    Note: Occupational Therapy Goals  Initiated 4/24/2022 within 7 day(s). 1.  Patient will perform LB bathing with modified independence (once wrapping is discontinued)  2. Patient will perform LB dressing with modified independence  3. Patient will maneuver on and off BSC with modified independence (PWB status)      Prior Level of Function: he reports that he lives alone and was independent with his ADL's and [address] TREATMENT    Patient: Maximilian Toth (27 y.o. male)  Date: 4/27/2022  Diagnosis: Tibia/fibula fracture [S82.209A, S82.409A]  Anemia [D64.9]  Thrombocytopenia (HCC) [D69.6] Tibia/fibula fracture  Procedure(s) (LRB):  RIGHT TIBIA INSERTION INTRA MEDULLARY NAIL/SYNTHES/C-ARM (Right) 4 Days Post-Op  Precautions: Fall,PWB    Chart, occupational therapy assessment, plan of care, and goals were reviewed. ASSESSMENT:  Pt seen for toileting ADL. Pt requires min vc's for pacing and safety 2/2 impulsivity. Pt tolerates standing sinkside for hand hygiene to improve dynamic standing balance. Pt uses bending forward technique w/LB dressing task and CGA w/clothing mgt hiking pants over hips. Reinforced importacne of calling for assistance. Pt will benefit from short ARU stay to address IALDS and increase overall strength/activity tolerance for return to PLOF. Progression toward goals:  [x]          Improving appropriately and progressing toward goals  []          Improving slowly and progressing toward goals  []          Not making progress toward goals and plan of care will be adjusted     PLAN:  Patient continues to benefit from skilled intervention to address the above impairments. Continue treatment per established plan of care.   Discharge Recommendations:  Inpatient Rehab  Further Equipment Recommendations for Discharge:  shower chair and rolling walker     SUBJECTIVE:   Patient stated I just want to stay sitting on the edge of the bed.     OBJECTIVE DATA SUMMARY:   Cognitive/Behavioral Status:  Neurologic State: Alert  Orientation Level: Oriented X4  Cognition: Follows commands  Safety/Judgement: Fall prevention    Functional Mobility and Transfers for ADLs:   Transfers:  Sit to Stand: Contact guard assistance (w/RW)  Bed to Chair: Contact guard assistance (w/RW)   Toilet Transfer : Contact guard assistance (w/grab bars)   Bathroom Mobility: Contact guard assistance (w/RW)  Balance:  Sitting: Intact; With support  Standing: Impaired; With support  Standing - Static: Good  Standing - Dynamic : Fair;Occasional    ADL Intervention:  Grooming  Position Performed: Standing  Washing Hands: Contact guard assistance    Lower Body Dressing Assistance  Pants With Elastic Waist: Minimum assistance  Leg Crossed Method Used: No  Position Performed: Seated edge of bed;Bending forward method  Cues: [address] [facility], [address]: Moderate assistance  Clothing Management: Moderate assistance    Cognitive Retraining  Safety/Judgement: Fall prevention    Pain:  Pain level pre-treatment: 0/10   Pain level post-treatment: 0/10    Activity Tolerance:    Good    Please refer to the flowsheet for vital signs taken during this treatment. After treatment:   []  Patient left in no apparent distress sitting up in chair  [x]  Patient left in no apparent distress sitting EOB  [x]  Call bell left within reach  []  Nursing notified  []  Caregiver present  []  Bed alarm activated    COMMUNICATION/EDUCATION:   [] Role of Occupational Therapy in the acute care setting  [] Home safety education was provided and the patient/caregiver indicated understanding. [] Patient/family have participated as able in working towards goals and plan of care. [x] Patient/family agree to work toward stated goals and plan of care.   [] Patient understands intent and goals of therapy, but is neutral about his/her participation. [] Patient is unable to participate in goal setting and plan of care.       Thank you for this referral.  MANUEL Delgado  Time Calculation: 27 mins

## 2022-04-27 NOTE — PROGRESS NOTES
Problem: Falls - Risk of  Goal: *Absence of Falls  Description: Document Morene Hole Fall Risk and appropriate interventions in the flowsheet. Outcome: Progressing Towards Goal  Note: Fall Risk Interventions:  Mobility Interventions: OT consult for ADLs,PT Consult for mobility concerns,Utilize walker, cane, or other assistive device         Medication Interventions: Patient to call before getting OOB    Elimination Interventions: Call light in reach,Urinal in reach    History of Falls Interventions: Door open when patient unattended         Problem: Patient Education: Go to Patient Education Activity  Goal: Patient/Family Education  Outcome: Progressing Towards Goal     Problem: Pressure Injury - Risk of  Goal: *Prevention of pressure injury  Description: Document Jonathan Scale and appropriate interventions in the flowsheet. Outcome: Progressing Towards Goal  Note: Pressure Injury Interventions:             Activity Interventions: Increase time out of bed,PT/OT evaluation    Mobility Interventions: HOB 30 degrees or less    Nutrition Interventions: Document food/fluid/supplement intake                     Problem: Patient Education: Go to Patient Education Activity  Goal: Patient/Family Education  Outcome: Progressing Towards Goal     Problem: Patient Education: Go to Patient Education Activity  Goal: Patient/Family Education  Outcome: Progressing Towards Goal     Problem: Lower Extremity Fracture:Day of Surgery (Intiate SCIP Measures for Post-op Care)  Goal: Activity/Safety  Outcome: Progressing Towards Goal  Goal: Diagnostic Test/Procedures  Outcome: Progressing Towards Goal  Goal: Nutrition/Diet  Outcome: Progressing Towards Goal  Goal: Medications  Outcome: Progressing Towards Goal  Goal: Respiratory  Outcome: Progressing Towards Goal  Goal: Treatments/Interventions/Procedures  Outcome: Progressing Towards Goal  Goal: Psychosocial  Outcome: Progressing Towards Goal  Goal: *Optimal pain control at patient's stated goal  Outcome: Progressing Towards Goal  Goal: *Hemodynamically stable  Outcome: Progressing Towards Goal  Goal: *Adequate oxygenation  Outcome: Progressing Towards Goal

## 2022-04-27 NOTE — PROGRESS NOTES
ARU/IPR REFERRAL CONTACT NOTE  [id number] [place] [address] for Physical Rehabilitation    RE:  Anjelica Sol Thank you for the opportunity to review this patient's case for admission to [id number] [place] [address] for Physical Rehabilitation. Based on our pre-admission screening:     [email or web address] ] Our Team/Medical Director is following this case. Comments: Met with patient at bedside. He is agreeable to IPR pending insurance authorization and VU that he would return home at DC from [employer]. Identified his cousin Bessy Aiken at [phone] for assist at [facility]; Mr Vy Rae on cell and agreeing to assist patient at MD. Please be advised admission to ARU will be based on meeting admission requirements, authorization from [facility]. Initiated authorization with patient's [facility]  Medicare Advantage plan. Lennox.Seidel ] This patient meets criteria for admission to [facility] for Physical      Rehabilitation. Thank you for this referral.   Please do not hesitate to call if you need further information or have additional questions.      Regards,    Mehdi Comes PTA   Admissions [facility] for Physical Rehabilitation  [phone]

## 2022-04-27 NOTE — PROGRESS NOTES
Problem: Mobility Impaired (Adult and Pediatric)  Goal: *Acute Goals and Plan of Care (Insert Text)  Description: Physical Therapy Goals  Initiated 4/24/2022 and to be accomplished within 7 day(s)  1. Patient will move from supine to sit and sit to supine  in bed with modified independence. 2.  Patient will transfer from bed to chair and chair to bed with modified independence using the least restrictive device. 3.  Patient will perform sit to stand with modified independence. 4.  Patient will ambulate with modified independence for 50 feet with the least restrictive device. 5.  Patient will ascend/descend 1 stairs with B handrail(s) with modified independence. PLOF: Pt lives alone in a [facility] [place] with 1 ALEJANDRO. Indep PTA. Outcome: Progressing Towards Goal     PHYSICAL THERAPY TREATMENT    Patient: Rick Vallecillo (87 y.o. male)  Date: 4/27/2022  Diagnosis: Tibia/fibula fracture [S82.209A, S82.409A]  Anemia [D64.9]  Thrombocytopenia (HCC) [D69.6] Tibia/fibula fracture  Procedure(s) (LRB):  RIGHT TIBIA INSERTION INTRA MEDULLARY NAIL/SYNTHES/C-ARM (Right) 4 Days Post-Op  Precautions: Fall,PWB    ASSESSMENT:  RN cleared for mobility. Pt found sitting up in recliner, in NAD, willing to work with PT. He reports increased pain to leg, but is very motivated to get up and amb. STS with CGA and RW, slight unsteadiness upon rising but pt able to recover well. Good compliance with PWB precautions. He amb 50 ft with a very slow gait speed out door and back. Several rest breaks needed. Mahendra at times for safety. He returned back sitting up in recliner. B LE's elevated, call bell nearby, RLE propped on pillow. Chair alarm on for safety.   Progression toward goals:   []      Improving appropriately and progressing toward goals  [x]      Improving slowly and progressing toward goals  []      Not making progress toward goals and plan of care will be adjusted     PLAN:  Patient continues to benefit from skilled intervention to address the above impairments. Continue treatment per established plan of care. Discharge Recommendations:  Inpatient Rehab  Further Equipment Recommendations for Discharge:  N/A     SUBJECTIVE:   Patient stated I hope I can get down there for rehab.     OBJECTIVE DATA SUMMARY:   Critical Behavior:  Neurologic State: Alert  Orientation Level: Oriented X4  Cognition: Follows commands  Safety/Judgement: Fall prevention  Functional Mobility Training:    Transfers:  Sit to Stand: Contact guard assistance  Stand to Sit: Contact guard assistance             Balance:  Sitting: Intact; With support  Standing: Impaired; With support  Standing - Static: Good  Standing - Dynamic : Fair;Occasional   Ambulation/Gait Training:  Distance (ft): 50 Feet (ft)  Assistive Device: Walker, rolling  Ambulation - Level of Assistance: Minimal assistance; Additional time;Assist x2        Gait Abnormalities: Decreased step clearance;Trunk sway increased        Base of Support: Center of gravity altered;Shift to left  Stance: Left increased  Speed/Jovanna: Slow  Step Length: Left shortened;Right shortened    Pain:  Pain level pre-treatment: 9/10  Pain level post-treatment: 9/10   Pain Intervention(s): Medication (see MAR); Rest, Ice, Repositioning   Response to intervention: Nurse notified, See doc flow    Activity Tolerance:   Pt tolerated mobility well  Please refer to the flowsheet for vital signs taken during this treatment. After treatment:   [x] Patient left in no apparent distress sitting up in chair  [] Patient left in no apparent distress in bed  [x] Call bell left within reach  [x] Nursing notified  [] Caregiver present  [] Bed alarm activated  [] SCDs applied      COMMUNICATION/EDUCATION:   [x]         Role of Physical Therapy in the acute care setting. [x]         Fall prevention education was provided and the patient/caregiver indicated understanding.   [x]         Patient/family have participated as able in working toward goals and plan of care. []         Patient/family agree to work toward stated goals and plan of care. []         Patient understands intent and goals of therapy, but is neutral about his/her participation.   []         Patient is unable to participate in stated goals/plan of care: ongoing with therapy staff.  []         Other:        Librado Alarcon   Time Calculation: 23 mins

## 2022-04-28 ENCOUNTER — HOSPITAL ENCOUNTER (INPATIENT)
Age: 67
LOS: 14 days | Discharge: HOME HEALTH | DRG: 812 | End: 2022-05-12
Attending: EMERGENCY MEDICINE | Admitting: EMERGENCY MEDICINE
Payer: MEDICARE

## 2022-04-28 VITALS
HEIGHT: 72 IN | BODY MASS INDEX: 24.38 KG/M2 | SYSTOLIC BLOOD PRESSURE: 125 MMHG | DIASTOLIC BLOOD PRESSURE: 58 MMHG | TEMPERATURE: 98.1 F | RESPIRATION RATE: 17 BRPM | OXYGEN SATURATION: 99 % | WEIGHT: 180 LBS | HEART RATE: 63 BPM

## 2022-04-28 DIAGNOSIS — S82.251D CLOSED DISPLACED COMMINUTED FRACTURE OF SHAFT OF RIGHT TIBIA WITH ROUTINE HEALING: Primary | ICD-10-CM

## 2022-04-28 DIAGNOSIS — F11.20 METHADONE DEPENDENCE (HCC): Chronic | ICD-10-CM

## 2022-04-28 DIAGNOSIS — S82.91XD: ICD-10-CM

## 2022-04-28 DIAGNOSIS — I10 HYPERTENSION, UNSPECIFIED TYPE: ICD-10-CM

## 2022-04-28 DIAGNOSIS — I10 PRIMARY HYPERTENSION: ICD-10-CM

## 2022-04-28 DIAGNOSIS — D50.9 IRON DEFICIENCY ANEMIA, UNSPECIFIED IRON DEFICIENCY ANEMIA TYPE: ICD-10-CM

## 2022-04-28 DIAGNOSIS — F19.10 POLYSUBSTANCE ABUSE (HCC): ICD-10-CM

## 2022-04-28 DIAGNOSIS — D62 ACUTE BLOOD LOSS AS CAUSE OF POSTOPERATIVE ANEMIA: ICD-10-CM

## 2022-04-28 DIAGNOSIS — S82.831D OTHER CLOSED FRACTURE OF DISTAL END OF RIGHT FIBULA WITH ROUTINE HEALING, SUBSEQUENT ENCOUNTER: ICD-10-CM

## 2022-04-28 DIAGNOSIS — F10.10 ALCOHOL ABUSE: ICD-10-CM

## 2022-04-28 LAB — SMA IGG SER-ACNC: 57 UNITS (ref 0–19)

## 2022-04-28 PROCEDURE — 74011250637 HC RX REV CODE- 250/637: Performed by: PHYSICIAN ASSISTANT

## 2022-04-28 PROCEDURE — 65310000000 HC RM PRIVATE REHAB

## 2022-04-28 PROCEDURE — 74011000250 HC RX REV CODE- 250: Performed by: PHYSICIAN ASSISTANT

## 2022-04-28 PROCEDURE — 74011250637 HC RX REV CODE- 250/637: Performed by: EMERGENCY MEDICINE

## 2022-04-28 PROCEDURE — 74011250637 HC RX REV CODE- 250/637: Performed by: ORTHOPAEDIC SURGERY

## 2022-04-28 PROCEDURE — 97116 GAIT TRAINING THERAPY: CPT

## 2022-04-28 PROCEDURE — 99232 SBSQ HOSP IP/OBS MODERATE 35: CPT | Performed by: INTERNAL MEDICINE

## 2022-04-28 PROCEDURE — 97535 SELF CARE MNGMENT TRAINING: CPT

## 2022-04-28 PROCEDURE — 99239 HOSP IP/OBS DSCHRG MGMT >30: CPT | Performed by: INTERNAL MEDICINE

## 2022-04-28 PROCEDURE — 99222 1ST HOSP IP/OBS MODERATE 55: CPT | Performed by: NURSE PRACTITIONER

## 2022-04-28 PROCEDURE — 74011250637 HC RX REV CODE- 250/637: Performed by: HOSPITALIST

## 2022-04-28 PROCEDURE — 2709999900 HC NON-CHARGEABLE SUPPLY

## 2022-04-28 RX ORDER — LANOLIN ALCOHOL/MO/W.PET/CERES
325 CREAM (GRAM) TOPICAL 2 TIMES DAILY WITH MEALS
Qty: 30 TABLET | Refills: 0 | Status: SHIPPED
Start: 2022-04-28

## 2022-04-28 RX ORDER — ASPIRIN 81 MG/1
81 TABLET ORAL 2 TIMES DAILY
Qty: 30 TABLET | Refills: 0 | Status: SHIPPED
Start: 2022-04-28

## 2022-04-28 RX ORDER — OXYCODONE HYDROCHLORIDE 5 MG/1
10 TABLET ORAL
Status: DISCONTINUED | OUTPATIENT
Start: 2022-04-28 | End: 2022-05-02

## 2022-04-28 RX ORDER — DOCUSATE SODIUM 100 MG/1
100 CAPSULE, LIQUID FILLED ORAL 2 TIMES DAILY
Status: DISCONTINUED | OUTPATIENT
Start: 2022-04-28 | End: 2022-05-12 | Stop reason: HOSPADM

## 2022-04-28 RX ORDER — POLYETHYLENE GLYCOL 3350 17 G/17G
17 POWDER, FOR SOLUTION ORAL DAILY
Status: DISCONTINUED | OUTPATIENT
Start: 2022-04-29 | End: 2022-05-03

## 2022-04-28 RX ORDER — THERA TABS 400 MCG
1 TAB ORAL DAILY
Qty: 30 TABLET | Refills: 0 | Status: SHIPPED
Start: 2022-04-29

## 2022-04-28 RX ORDER — GUAIFENESIN 100 MG/5ML
81 LIQUID (ML) ORAL 2 TIMES DAILY
Status: DISCONTINUED | OUTPATIENT
Start: 2022-04-28 | End: 2022-05-12 | Stop reason: HOSPADM

## 2022-04-28 RX ORDER — FACIAL-BODY WIPES
10 EACH TOPICAL
Status: DISCONTINUED | OUTPATIENT
Start: 2022-04-28 | End: 2022-05-02

## 2022-04-28 RX ORDER — POLYETHYLENE GLYCOL 3350 17 G/17G
17 POWDER, FOR SOLUTION ORAL DAILY
Qty: 30 PACKET | Refills: 0 | Status: SHIPPED
Start: 2022-04-28

## 2022-04-28 RX ORDER — THERA TABS 400 MCG
1 TAB ORAL DAILY
Status: DISCONTINUED | OUTPATIENT
Start: 2022-04-29 | End: 2022-05-12 | Stop reason: HOSPADM

## 2022-04-28 RX ORDER — LANOLIN ALCOHOL/MO/W.PET/CERES
100 CREAM (GRAM) TOPICAL DAILY
Qty: 15 TABLET | Refills: 0 | Status: SHIPPED
Start: 2022-04-29

## 2022-04-28 RX ORDER — AMLODIPINE BESYLATE 10 MG/1
10 TABLET ORAL DAILY
Status: DISCONTINUED | OUTPATIENT
Start: 2022-04-29 | End: 2022-05-12 | Stop reason: HOSPADM

## 2022-04-28 RX ORDER — AMLODIPINE BESYLATE 10 MG/1
10 TABLET ORAL DAILY
Qty: 15 TABLET | Refills: 0 | Status: SHIPPED
Start: 2022-04-29

## 2022-04-28 RX ORDER — LANOLIN ALCOHOL/MO/W.PET/CERES
100 CREAM (GRAM) TOPICAL DAILY
Status: DISCONTINUED | OUTPATIENT
Start: 2022-04-29 | End: 2022-05-12 | Stop reason: HOSPADM

## 2022-04-28 RX ORDER — OXYCODONE HYDROCHLORIDE 10 MG/1
10-15 TABLET ORAL
Qty: 3 TABLET | Refills: 0 | Status: SHIPPED
Start: 2022-04-28 | End: 2022-05-12

## 2022-04-28 RX ORDER — FOLIC ACID 1 MG/1
1 TABLET ORAL DAILY
Qty: 15 TABLET | Refills: 0 | Status: SHIPPED
Start: 2022-04-29

## 2022-04-28 RX ORDER — FOLIC ACID 1 MG/1
1 TABLET ORAL DAILY
Status: DISCONTINUED | OUTPATIENT
Start: 2022-04-29 | End: 2022-05-12 | Stop reason: HOSPADM

## 2022-04-28 RX ORDER — LANOLIN ALCOHOL/MO/W.PET/CERES
325 CREAM (GRAM) TOPICAL 2 TIMES DAILY WITH MEALS
Status: DISCONTINUED | OUTPATIENT
Start: 2022-04-29 | End: 2022-05-12 | Stop reason: HOSPADM

## 2022-04-28 RX ADMIN — DOCUSATE SODIUM 100 MG: 100 CAPSULE, LIQUID FILLED ORAL at 20:44

## 2022-04-28 RX ADMIN — OXYCODONE HYDROCHLORIDE 20 MG: 10 TABLET ORAL at 16:43

## 2022-04-28 RX ADMIN — OXYCODONE HYDROCHLORIDE 20 MG: 10 TABLET ORAL at 03:26

## 2022-04-28 RX ADMIN — METHADONE HYDROCHLORIDE 45 MG: 10 CONCENTRATE ORAL at 08:59

## 2022-04-28 RX ADMIN — OXYCODONE HYDROCHLORIDE 20 MG: 10 TABLET ORAL at 00:13

## 2022-04-28 RX ADMIN — Medication 325 MG: at 08:44

## 2022-04-28 RX ADMIN — SENNOSIDES AND DOCUSATE SODIUM 1 TABLET: 50; 8.6 TABLET ORAL at 08:44

## 2022-04-28 RX ADMIN — Medication 100 MG: at 08:44

## 2022-04-28 RX ADMIN — SODIUM CHLORIDE, PRESERVATIVE FREE 10 ML: 5 INJECTION INTRAVENOUS at 06:47

## 2022-04-28 RX ADMIN — ASPIRIN 81 MG CHEWABLE TABLET 81 MG: 81 TABLET CHEWABLE at 20:44

## 2022-04-28 RX ADMIN — THERA TABS 1 TABLET: TAB at 08:44

## 2022-04-28 RX ADMIN — SODIUM CHLORIDE, PRESERVATIVE FREE 10 ML: 5 INJECTION INTRAVENOUS at 16:44

## 2022-04-28 RX ADMIN — ACETAMINOPHEN 1000 MG: 500 TABLET ORAL at 06:23

## 2022-04-28 RX ADMIN — FOLIC ACID 1 MG: 1 TABLET ORAL at 08:43

## 2022-04-28 RX ADMIN — SODIUM CHLORIDE, PRESERVATIVE FREE 10 ML: 5 INJECTION INTRAVENOUS at 06:48

## 2022-04-28 RX ADMIN — AMLODIPINE BESYLATE 10 MG: 10 TABLET ORAL at 08:43

## 2022-04-28 RX ADMIN — Medication 325 MG: at 16:44

## 2022-04-28 RX ADMIN — OXYCODONE HYDROCHLORIDE 10 MG: 10 TABLET ORAL at 13:42

## 2022-04-28 RX ADMIN — ACETAMINOPHEN 1000 MG: 500 TABLET ORAL at 00:13

## 2022-04-28 RX ADMIN — OXYCODONE HYDROCHLORIDE 10 MG: 5 TABLET ORAL at 20:44

## 2022-04-28 RX ADMIN — ACETAMINOPHEN 1000 MG: 500 TABLET ORAL at 13:38

## 2022-04-28 RX ADMIN — ASPIRIN 81 MG: 81 TABLET, COATED ORAL at 08:43

## 2022-04-28 RX ADMIN — OXYCODONE HYDROCHLORIDE 10 MG: 10 TABLET ORAL at 06:23

## 2022-04-28 NOTE — PROGRESS NOTES
Received call from  at [facility]. Case is with medical director for review. Anticipate decision by noon today per Pedro Griffith at [place].      Nixon Brock PTA   Admissions [facility] for Physical Rehabilitation  [phone]

## 2022-04-28 NOTE — DISCHARGE SUMMARY
Discharge Summary     Patient ID:  Libia Wall  [id number]  53 y.o.  1955  Body mass index is 24.41 kg/m². PCP on record: Racheal Zhong MD    Admit date: 4/22/2022  Discharge date and time: 4/28/2022      Reason for admission: Fall and fracture    Discharge Diagnoses:    1 right tibia-fibula fracture s/p surgery  2 liver mass-MRI suggestive of hepatoma  3 fpfeqz-unweivmhhfpvvqt-zrdril after 1 unit of transfusion  4 hypertension  5 UDS positive for cocaine methadone and opiates  6 urinary mass-seen by urology-outpatient dvctor-tc-ni hematuria  7 Thrombocytopenic   8  Cirrhosis and portal hypertension with trace ascites. - on MRI                                              Diet - Regular with no salt added        Consults: GI, Hematology/Oncology and Orthopedic Surgery          Hospital Course by problems:    -25-year-old male, past history of hypertension, illicit drug use, admitted with history of fall fracture of right tibia-fibula, seen by Ortho s/p surgical repair now Ortho recommending  \"PT- pwb to right LE. Aspirin x 2 weeks. Continue with current medical management. F/u in office in 2 weeks\"    -Patient's right lower leg in cast    -Patient also had a past history of liver mass for which she was supposed to go to see a specialist and he did not go this admission further work-up including MRI suggestive of hematoma. Initial plan was to get biopsy. But after discussion it was decided that no need for biopsy patient will continue follow-up with IR and hematology oncology.    -Strong recommendation to stop use of illicit drugs-patient agreed and verbalized understanding      Patient seen and examined by me on discharge day.   Pertinent Findings:  Stable    Significant Diagnostic Studies:  Results  MRI ABD W WO CONT (Accession [id number]) (Order [id number])    Allergies       No Known Allergies     Exam Information    Status Exam Begun  Exam Ended    Final [99] 4/25/2022 09:15 4/25/2022  4:44 PM [id number]  4:44 PM     Result Information    Status: Edited Result - FINAL (Exam End: 4/25/2022 16:44) Provider Status: Open       MRI ABD W WO CONT: Patient Communication     Released  Not seen     Addendum       Addendum: Hepatic fibrosis   Addended by Slim Greenberg MD on 4/27/2022 10:24 AM     Study Result    Narrative & Impression   MRI abdomen with and without contrast     Indication: Admitted with lower extremity fracture with cirrhosis and liver  masses on imaging, hepatitis C undergoing follow-up     Technique: MRI performed consisting of following pulse sequences: Coronal and  axial SSFSE, axial in and out of phase FSPGR, FSFSE T2 and axial gradient echo  T1 with fat suppression and gadolinium enhanced FAME dynamic gradient  acquisition T1.      Comparison: 4/24/2022; 4/22/2022; 4/8/22     Findings: Images are significantly degraded by motion. Arterial images are  suboptimally timed also limiting assessment.     Liver is diffusely nodular. There is signal dropout on out of phase sequences.     Observations 1:1.4 x 1.3 cm lesion in segment 4 between 4A and 4B (series 1301,  image 78) is relatively T2 isointense, T1 isointense with enhancement and  minimal washout. No clear capsule.     Observations 2: The enhancing lesion in segment 6 on recent CT is mildly  hyperenhancing, better seen as a washout lesion measuring 2.2 x 1.9 cm (series  1304, image 22). There is a capsule. There is generalized restricted diffusion  in this region by not clearly corresponding to the lesion. There is ill-defined  nodular enhancement at the periphery of segment 6 with potential additional  regions of enhancement (series 1301, image 37). There is heterogeneity in this  region on subsequent postcontrast imaging with suggestion of washout measuring  up to 1.2 and 0.6 cm (series 1304, image 43, 44).  There are fine reticulations  throughout the liver seen as delayed enhancement.     Spleen is 12.6 cm longitudinal, upper limits of normal. No focal mass. Gallbladder and biliary ducts are normal. Pancreas demonstrates normal T1 and T2  signal intensity and the duct is normal in caliber. There is homogenous  enhancement. Moderate colonic stool burden. No lymphadenopathy. Trace ascites.     Bilateral adrenal thickening. There are a few left peripelvic cysts. No  hydronephrosis.     Aorta is normal in caliber. Hepatic veins, portal vein and SMV are patent. There  is no recanalized paraumbilical vein. No paraesophageal varices. No significant  collaterals. No splenorenal shunt.     Lung bases are unremarkable.     Soft tissues grossly normal. Bones grossly normal for patient's age.     IMPRESSION  1. Observations 1 in hepatic segment 4 is LIRADS 5.  2.  Observation 2 in segment 6 is also LIRADS 5 given size and washout. There  may be peripheral additional suspicious nodules, potentially infiltrative. 3.  Cirrhosis and portal hypertension with trace ascites.     Preliminary report provided by Dr. Rodriguez on 4/25/2022 at 2024 hours. Pertinent Lab Data:  Recent Labs     04/27/22  0042 04/26/22  0529   WBC 3.7* 3.5*   HGB 7.6* 7.9*   HCT 24.9* 24.8*   PLT 70* 63*     Recent Labs     04/27/22  0042 04/26/22  0529    139   K 3.7 4.1    107   CO2 29 28   * 102*   BUN 13 12   CREA 0.95 0.88   CA 8.3* 8.4*       DISCHARGE MEDICATIONS:   @  Current Discharge Medication List      START taking these medications    Details   amLODIPine (NORVASC) 10 mg tablet Take 1 Tablet by mouth daily. Qty: 15 Tablet, Refills: 0  Start date: 4/29/2022      aspirin delayed-release 81 mg tablet Take 1 Tablet by mouth two (2) times a day. Qty: 30 Tablet, Refills: 0  Start date: 4/28/2022      ferrous sulfate 325 mg (65 mg iron) tablet Take 1 Tablet by mouth two (2) times daily (with meals). Qty: 30 Tablet, Refills: 0  Start date: 4/65/6835      folic acid (FOLVITE) 1 mg tablet Take 1 Tablet by mouth daily.   Qty: 15 Tablet, Refills: 0  Start date: 2022      oxyCODONE IR (ROXICODONE) 10 mg tab immediate release tablet Take 1-1.5 Tablets by mouth every three (3) hours as needed for Pain for up to 3 days. Max Daily Amount: 120 mg.  Qty: 3 Tablet, Refills: 0  Start date: 2022, End date: 2022    Associated Diagnoses: Closed fracture of right tibia and fibula, initial encounter      polyethylene glycol (MIRALAX) 17 gram packet Take 1 Packet by mouth daily. Qty: 30 Packet, Refills: 0  Start date: 2022      therapeutic multivitamin [facility]) tablet Take 1 Tablet by mouth daily. Qty: 30 Tablet, Refills: 0  Start date: 2022      thiamine HCL (B-1) 100 mg tablet Take 1 Tablet by mouth daily. Qty: 15 Tablet, Refills: 0  Start date: 2022         CONTINUE these medications which have NOT CHANGED    Details   methadone (DOLOPHINE) 10 mg tablet Take 45 mg by mouth daily. My Recommended Diet, Activity, Wound Care, and follow-up labs are listed in the patient's Discharge Insturctions which I have personally completed and reviewed. Disposition:     [x] Home with family     [] [facility] PT/RN   [] SNF/NH   [] Inpatient Rehab/[facility]  Condition at Discharge:  Stable    Follow up with:   PCP : Unruly Kunz MD      Please follow-up tests/labs that are still pendin. None  2.    >30 minutes spent coordinating this discharge (review instructions/follow-up, prescriptions, preparing report for sign off)    Disclaimer: Sections of this note are dictated utilizing voice recognition software, which may have resulted in some phonetic based errors in grammar and contents. Even though attempts were made to correct all the mistakes, some may have been missed, and remained in the body of the document. If questions arise, please contact our department.     Signed:  Gina Johnson MD

## 2022-04-28 NOTE — CONSULTS
Hematology/Oncology Brief Note     Patient has been consulted for liver Mass         ---- MRI result   IMPRESSION  1. Observations 1 in hepatic segment 4 is LIRADS 5.  2.  Observation 2 in segment 6 is also LIRADS 5 given size and washout. There  may be peripheral additional suspicious nodules, potentially infiltrative. 3.  Cirrhosis and portal hypertension with trace ascites.     Labs report AFP was elevated at 14.7      Dr. Brenda Mix has spoken to Dr. Silvestre Half about biopsy   Please consult IR for liver biopsy    Final consult note will follow this afternoon       Ani Larry, DNP

## 2022-04-28 NOTE — PROGRESS NOTES
Problem: Self Care Deficits Care Plan (Adult)  Goal: *Acute Goals and Plan of Care (Insert Text)  Description: Note: Occupational Therapy Goals  Initiated 4/24/2022 within 7 day(s). 1.  Patient will perform LB bathing with modified independence (once wrapping is discontinued)  2. Patient will perform LB dressing with modified independence  3. Patient will maneuver on and off BSC with modified independence (PWB status)      Prior Level of Function: he reports that he lives alone and was independent with his ADL's and IADL's  4/28/2022 1552 by Perry POOL  Outcome: Progressing Towards Goal  OCCUPATIONAL THERAPY TREATMENT    Patient: Maximilian Toth (81 y.o. male)  Date: 4/28/2022  Diagnosis: Tibia/fibula fracture [S82.209A, S82.409A]  Anemia [D64.9]  Thrombocytopenia (HCC) [D69.6] Tibia/fibula fracture  Procedure(s) (LRB):  RIGHT TIBIA INSERTION INTRA MEDULLARY NAIL/SYNTHES/C-ARM (Right) 5 Days Post-Op  Precautions: Fall,PWB (RLE)  PLOF: he reports that he lives alone and was independent with his ADL's and IADL's    Chart, occupational therapy assessment, plan of care, and goals were reviewed. ASSESSMENT:  Nursing/RN cleared for pt to participate in OT tx session. Pt agreeable to wash hands in stance with RW. STS w/ CGA, functional mobility using RW with good adherence with RLE PBW & CGA from recliner chair to sink w/ fair balance. Pt sitting up in recliner chair w/ BLEs elevated. Call bell within reach & pt verbalized understanding and provided return demonstration to utilize for assist e.g. functional transfers in order to prevent falls. Progression toward goals:  [x]          Improving appropriately and progressing toward goals  []          Improving slowly and progressing toward goals  []          Not making progress toward goals and plan of care will be adjusted     PLAN:  Patient continues to benefit from skilled intervention to address the above impairments.   Continue treatment per established plan of care. Discharge Recommendations:  Rehab  Further Equipment Recommendations for Discharge:  transfer bench and rolling walker     SUBJECTIVE:   Patient stated Namrata Johnson don't have anyone to stay with me.     OBJECTIVE DATA SUMMARY:   Cognitive/Behavioral Status:  Neurologic State: Alert  Orientation Level: Oriented X4  Cognition: Follows commands  Safety/Judgement: Fall prevention    Functional Mobility and Transfers for ADLs:   Bed Mobility:   Transfers:  Sit to Stand: Contact guard assistance  Stand to Sit: Contact guard assistance    Balance:  Sitting: Intact  Standing: Impaired; With support  Standing - Static: Good  Standing - Dynamic : Fair    ADL Intervention:  Grooming  Position Performed: Standing (w/ RW)  Washing Hands: Contact guard assistance    Pain:  Pain level pre-treatment: 5/10   Pain level post-treatment: 7/10  Pain Intervention(s): Medication (see MAR); Rest, Repositioning   Response to intervention: Nurse notified, See doc flow    Activity Tolerance:    good  Please refer to the flowsheet for vital signs taken during this treatment. After treatment:   [x]  Patient left in no apparent distress sitting up in chair  []  Patient left in no apparent distress in bed  [x]  Call bell left within reach  [x]  Nursing notified  []  Caregiver present  [x]  chair alarm activated    COMMUNICATION/EDUCATION:   [x] Role of Occupational Therapy in the acute care setting  [x] Home safety education was provided and the patient/caregiver indicated understanding. [x] Patient/family have participated as able in working towards goals and plan of care. [x] Patient/family agree to work toward stated goals and plan of care. [] Patient understands intent and goals of therapy, but is neutral about his/her participation. [] Patient is unable to participate in goal setting and plan of care.       Thank you for this referral.  Rosemary Fall  Time Calculation: 32 mins

## 2022-04-28 NOTE — CONSULTS
Hematology / Oncology Consult Note      Reason for Consultation:  Rick Vallecillo is a 74 y.o. male who I've been asked to consult for liver Mass. Subjective:     HPI:      Mr Ann Marlow is a 74year old male with a past history of hypertension, illicit drug use, currently on methadone per patient. Admitted for a fall fracture of the right tibia,. He is S/P closed IM nailing of the right tibia on 2022. He is being consulted for liver mass. He was noted to have liver mass with liver cirrhosis on imaging done on 2022. Patient report that he was inform about he cirrhosis and hepatitis C  many year ago but he did not follow up. He report drinking about 2-3 beers per day. 1/2 pack cigarette smoker a day. Patient seen and examine today. Sitting up in chair. Denies any abdominal pain,         Patient report sister  in her 42's with some brain cancer   No personal history of cancer   ECOG 1  Review of Systems:   Constitutional: Negative for fever, chills, diaphoresis,   HENT: Negative for nosebleeds, congestion, . Eyes: Negative for photophobia, pain, discharge and itching. Respiratory: Negative for apnea, cough,   Cardiovascular: Negative for chest pain, palpitations and leg swelling. Gastrointestinal: Negative for abdominal pain. Negative for nausea, diarrhea, constipation, blood in stool and rectal pain. Genitourinary: Negative for dysuria, urgency, hematuria, flank pain and difficulty urinating. Musculoskeletal: Negative for back pain, joint swelling, arthralgias and gait problem. Skin: Negative for color change, pallor, rash and wound. Neurological:  Negative for dizziness, facial asymmetry, speech difficulty,   Hematological: Negative for adenopathy. Does not bruise/bleed easily. Psychiatric/Behavioral: Negative for hallucinations, confusion, disturbed wake/sleep cycle and agitation.        Physical Assessment:     Visit Vitals  /72 (BP 1 Location: Left upper arm, BP Patient Position: Sitting)   Pulse 61   Temp 97.9 °F (36.6 °C)   Resp 16   Ht 6' (1.829 m)   Wt 81.6 kg (180 lb)   SpO2 99%   BMI 24.41 kg/m²       Temp (24hrs), Av °F (36.7 °C), Min:97.9 °F (36.6 °C), Max:98.3 °F (36.8 °C)      Physical Exam:    General: NAD  HEENT:  Anicteric sclerae; pink palpebral conjunctivae; mucosa moist  Resp:  Symmetrical chest expansion, no accessory muscle use; good airway entry; no rales/ wheezing/ rhonchi noted  CV:  regular rate   GI:  Abdomen soft, non-tender; (+) active bowel sounds  Extremities:  +2 pulses on all extremities; no edema/ cyanosis/ clubbing noted  Skin:  Warm; no rashes/ lesions noted  Neurologic:  Non-focal          AssessmentPlan:   Liver Mass      IMPRESSION  Preliminary MRI report on 2022  1. Observations 1 in hepatic segment 4 is LIRADS 5.  2.  Observation 2 in segment 6 is also LIRADS 5 given size and washout. There  may be peripheral additional suspicious nodules, potentially infiltrative. 3.  Cirrhosis and portal hypertension with trace ascites. MRI reported LIRADS 5 therefore they is no place for Biopsy at this time         Waiting final MRI report   ---- AFP elevated at 14.7  ----- Please Consult IR for liver directed chemoembolization          Dr. Mookie De La Rosa spoke again with Dr. Tereso Ferguson and he does agrees with IR consult     Bladder Mass        CT on 2022 reported   IMPRESSION  1. Poorly distended bladder with marked bladder wall thickening, especially  more lobulated with 2 cm nodular projection at right lateral aspect. Poor  distention and lack of intraluminal contrast limits the evaluation. Recommend  urology consult with cystoscopy. 2.  Benign left renal cortical cysts. No renal calculi, hydronephrosis or  enhancing mass seen. 3.  Evidence of cirrhosis. Several subtle enhancing masses concerned in the  segment 3 and segment 6 as outlined above, highly concerning for hepatomas. Recommend dedicated liver MR for better evaluation.   4. Borderline splenomegaly and mild perigastric varices. No ascites. This is  suggestive of early portal vein hypertension. The portal vein is mildly dilated. 5.  Gallbladder hydrops with multiple small gallstones. 6.  Moderate stool burden in the colon. Patient will need Urology to follow up for the bladder Mass      Thrombocytopenia/Anemia    Thrombocytopenia: could be multifactorial given cirrhosis, alcohol abuse, chronic hepatitis C anemia   --- Platelet Transfusion can be considered in PLT's <20 K, active bleeding and the PLT count is <50k or prior to invasive procedure to keep Platelet count above 50K     Anemia evaluation labs         Recent iron was low at  25, iron sat of 6%, ferritin 18, vitamin B12 was 1445  ---  Venofer 300 mg IV every 24 hours for 4 doses  ---- Please give iron as ordered         We will order: LDH, haptoglobin, peripheral smear today   --- continue medical management  --- follow up outpatient  Hematology/Oncology on Wednesday 5/4/2022 at 2:15 to see Dr. Mookie De La Rosa at the [facility] location     Past Medical History:   Diagnosis Date    Abscess of right shoulder     Abscess of shoulder     Arthritis     Epidural abscess     Heart murmur     Hematuria     Heroin abuse ([address]     Hypertension     Infected wound     Infectious disease     Iron deficiency anemia     IV drug user     Liver disease     Tobacco abuse      Past Surgical History:   Procedure Laterality Date    HX COLONOSCOPY  07/18/2016    HX CYST REMOVAL      rt.forearm and rt.foot    HX ENDOSCOPY  07/18/2016    HX FREE SKIN GRAFT      HX OTHER SURGICAL      right forearm infections and graft       History reviewed. No pertinent family history.   No Known Allergies    Home Medications:   Home Meds reviewed with patient    Hospital Medications:   Current hospital medications reviewed    Labs:  Recent Labs     04/27/22  0042 04/26/22  0529   WBC 3.7* 3.5*   RBC 2.76* 2.84*   HCT 24.9* 24.8*   MCV 90.2 87.3 MCH 27.5 27.8   MCHC 30.5* 31.9   RDW 18.0* 16.7*       Recent Labs     04/27/22  0042 04/26/22  0529   CO2 29 28   BUN 13 12       No results for input(s): ALBUMIN in the last 72 hours. No lab exists for component: [facility]    Thank you for requesting us to consult on your patient. We appreciate the opportunity to participate in your patient's care. Please call if you have questions.       Dee Lowe, DNP

## 2022-04-28 NOTE — PROGRESS NOTES
Problem: Mobility Impaired (Adult and Pediatric)  Goal: *Acute Goals and Plan of Care (Insert Text)  Description: Physical Therapy Goals  Initiated 4/24/2022 and to be accomplished within 7 day(s)  1. Patient will move from supine to sit and sit to supine  in bed with modified independence. 2.  Patient will transfer from bed to chair and chair to bed with modified independence using the least restrictive device. 3.  Patient will perform sit to stand with modified independence. 4.  Patient will ambulate with modified independence for 50 feet with the least restrictive device. 5.  Patient will ascend/descend 1 stairs with B handrail(s) with modified independence. PLOF: Pt lives alone in a [facility] [place] with 1 ALEJANDRO. Indep PTA. Outcome: Progressing Towards Goal     PHYSICAL THERAPY TREATMENT    Patient: Rick Vallecillo (27 y.o. male)  Date: 4/28/2022  Diagnosis: Tibia/fibula fracture [S82.209A, S82.409A]  Anemia [D64.9]  Thrombocytopenia (HCC) [D69.6] Tibia/fibula fracture  Procedure(s) (LRB):  RIGHT TIBIA INSERTION INTRA MEDULLARY NAIL/SYNTHES/C-ARM (Right) 5 Days Post-Op  Precautions: Fall,PWB (RLE)  PLOF: (I)    ASSESSMENT:  Pt is progressing well, as evidenced by ability to ambulate increased distance this session with less assistance. Pt continues to demonstrate gait dysfunction and decreased activity tolerance; however, required only CGA during ambulation, at times progressing to SBA with use of RW. Pt sitting up in chair at end of session, provided with cool washcloth. Progression toward goals:   [x]      Improving appropriately and progressing toward goals  []      Improving slowly and progressing toward goals  []      Not making progress toward goals and plan of care will be adjusted     PLAN:  Patient continues to benefit from skilled intervention to address the above impairments. Continue treatment per established plan of care.   Discharge Recommendations:  Rehab  Further Equipment Recommendations for Discharge:  rolling walker     SUBJECTIVE:   Patient agreeable to therapy. OBJECTIVE DATA SUMMARY:   Critical Behavior:  Neurologic State: Alert  Orientation Level: Oriented X4  Cognition: Follows commands  Safety/Judgement: Fall prevention  Functional Mobility Training:  Bed Mobility:  Supine to Sit: Minimum assistance (for RLE)  Transfers:  Sit to Stand: Contact guard assistance  Stand to Sit: Contact guard assistance  Balance:  Sitting: Intact  Standing: Impaired; With support  Standing - Static: Good  Standing - Dynamic : Fair  Ambulation/Gait Training:  Distance (ft): 60 Feet (ft)  Assistive Device: Walker, rolling  Ambulation - Level of Assistance: Contact guard assistance  Gait Description (WDL): Exceptions to WDL  Speed/Jovanna: Slow;Pace decreased (<100 feet/min)  Step Length: Right shortened;Left shortened  Pain:  Pain level pre-treatment: 5/10  Pain level post-treatment: 5/10   Pain Intervention(s): Medication (see MAR); Rest, Ice, Repositioning   Response to intervention: Nurse notified, See doc flow    Activity Tolerance:   Fair, limited by pain  Please refer to the flowsheet for vital signs taken during this treatment. After treatment:   [x] Patient left in no apparent distress sitting up in chair  [] Patient left in no apparent distress in bed  [x] Call bell left within reach  [x] Nursing notified  [] Caregiver present  [] Bed alarm activated  [] SCDs applied      COMMUNICATION/EDUCATION:   [x]         Role of Physical Therapy in the acute care setting. [x]         Fall prevention education was provided and the patient/caregiver indicated understanding. [x]         Patient/family have participated as able in working toward goals and plan of care. []         Patient/family agree to work toward stated goals and plan of care. []         Patient understands intent and goals of therapy, but is neutral about his/her participation.   []         Patient is unable to participate in stated goals/plan of care: ongoing with therapy staff.  []         Other:        Rubén Nixon, PT   Time Calculation: 14 mins

## 2022-04-28 NOTE — PROGRESS NOTES
Ortho    Pt seen and evaluated  Doing well  Pain well controlled  No cp, sob, abd pain    Visit Vitals  BP (!) 141/73 (BP 1 Location: Left upper arm, BP Patient Position: At rest;Semi fowlers)   Pulse 61   Temp 97.9 °F (36.6 °C)   Resp 16   Ht 6' (1.829 m)   Wt 180 lb (81.6 kg)   SpO2 99%   BMI 24.41 kg/m²     Right LE  Dressing/splint cdi  Compartments soft  nv intact    A: sp right tibial IM nail    P:  Continue with current medical management. PT- pwb right LE with RW. Work on knee ROM. Ice. Pain control. Aspirin x 2 weeks. F/u in office in 2 weeks.

## 2022-04-28 NOTE — PROGRESS NOTES
[facility] Hospitalist Group  Progress Note    Patient: Marycruz Jackson Age: 74 y.o. : 1955 MR#: [id number] SSN: [id number]  Date/Time: 2022     C/C: fall and fracture Rt LL       Subjective:   HPI : Right T?F Fracture s/p open reduction , Liver mass , Bladder mass           Review of Systems: Patient is sitting in bed. Appears comfortable. Pain control adequate  I tried to discuss case with him but he is not interested at this point he is not even interested in going to out of this area for management of his liver issue  positive responses in bold type   Constitutional: Negative for fever, chills, diaphoresis and unexpected weight change. HENT: Negative for ear pain, congestion, sore throat, rhinorrhea, drooling, trouble swallowing, neck pain and tinnitus. Eyes: Negative for photophobia, pain, redness and visual disturbance. Respiratory: negative for shortness of breath, cough, choking, chest tightness, wheezing or stridor. Cardiovascular: Negative for chest pain, palpitations and leg swelling. Gastrointestinal: Negative for nausea, vomiting, abdominal pain, diarrhea, constipation, blood in stool, abdominal distention and anal bleeding. Genitourinary: Negative for dysuria, urgency, frequency, hematuria, flank pain and difficulty urinating. Musculoskeletal: Negative for back pain and arthralgias. Skin: Negative for color change, rash and wound. Neurological: Negative for dizziness, seizures, syncope, speech difficulty, light-headedness or headaches. Hematological: Does not bruise/bleed easily. Psychiatric/Behavioral: Negative for suicidal ideas, hallucinations, behavioral problems, self-injury or agitation     Assessment/Plan:     1. Right Tib/fib fx- s/p surgery - follow with ortho - PT /OT     2 Liver mass - S/B Hem onco- they want liver biopsy , consult to IR placed   3 Anemia - Post hemorrhagia - BT one unit / monitor CBC     4 Thrombocytopenia - ? From Cirrhosis of Liver , low stable ? May need transfusion for Biopsy procedure     5 Midline for IV access    Objective:       General:  Alert, cooperative, no acute distress  HEENT: No facial asymmetry, JENNIFER Salvador, External ears - WNL    Cardiovascular: S1S2 - regular , No Murmur   Pulmonary: Equal expansion , No Use of accessory muscles , No Rales No Rhonchi    GI:  +BS in all four quadrants, soft, non-tender  Extremities:  No edema; 2+ dorsalis pedis pulses bilaterally  Neuro: Alert and oriented X 2.        DVT Prophylaxis:  []Lovenox  []Hep SQ  [x]SCDs  []Coumadin   []On Heparin gtt    [] Eliquis [] Xarelto       Vitals:         VS:   Visit Vitals  /72 (BP 1 Location: Left upper arm, BP Patient Position: Sitting)   Pulse 61   Temp 97.9 °F (36.6 °C)   Resp 16   Ht 6' (1.829 m)   Wt 81.6 kg (180 lb)   SpO2 99%   BMI 24.41 kg/m²      Tmax/24hrs: Temp (24hrs), Av °F (36.7 °C), Min:97.9 °F (36.6 °C), Max:98.3 °F (36.8 °C)  IOBRIEF    Intake/Output Summary (Last 24 hours) at 2022 1345  Last data filed at 2022 1005  Gross per 24 hour   Intake 1200 ml   Output 3800 ml   Net -2600 ml         Medications:   Current Facility-Administered Medications   Medication Dose Route Frequency    amLODIPine (NORVASC) tablet 10 mg  10 mg Oral DAILY    lactated Ringers infusion  25 mL/hr IntraVENous CONTINUOUS    methadone (DOLOPHINE) 10 mg/mL concentrated solution 45 mg  45 mg Oral DAILY    acetaminophen (TYLENOL) tablet 1,000 mg  1,000 mg Oral Q6H    oxyCODONE IR (ROXICODONE) tablet 10-15 mg  10-15 mg Oral Q3H PRN    oxyCODONE IR (ROXICODONE) tablet 20 mg  20 mg Oral Q3H PRN    naloxone (NARCAN) injection 0.4 mg  0.4 mg IntraVENous PRN    flumazeniL (ROMAZICON) 0.1 mg/mL injection 0.2 mg  0.2 mg IntraVENous PRN    ferrous sulfate tablet 325 mg  1 Tablet Oral BID WITH MEALS    ondansetron (ZOFRAN) injection 4 mg  4 mg IntraVENous Q4H PRN    senna-docusate (PERICOLACE) 8.6-50 mg per tablet 1 Tablet  1 Tablet Oral BID    HYDROmorphone (DILAUDID) injection 1 mg  1 mg IntraVENous Q4H PRN    aspirin delayed-release tablet 81 mg  81 mg Oral BID    acetaminophen (TYLENOL) tablet 650 mg  650 mg Oral Q6H PRN    Or    acetaminophen (TYLENOL) suppository 650 mg  650 mg Rectal Q6H PRN    polyethylene glycol (MIRALAX) packet 17 g  17 g Oral DAILY PRN    bisacodyL (DULCOLAX) suppository 10 mg  10 mg Rectal DAILY PRN    sodium chloride (NS) flush 5-40 mL  5-40 mL IntraVENous Q8H    sodium chloride (NS) flush 5-40 mL  5-40 mL IntraVENous PRN    LORazepam (ATIVAN) tablet 1 mg  1 mg Oral Q1H PRN    Or    LORazepam (ATIVAN) 2 mg/mL injection 1 mg  1 mg IntraVENous Q1H PRN    LORazepam (ATIVAN) tablet 2 mg  2 mg Oral Q1H PRN    Or    LORazepam (ATIVAN) 2 mg/mL injection 2 mg  2 mg IntraVENous Q1H PRN    LORazepam (ATIVAN) 2 mg/mL injection 3 mg  3 mg IntraVENous Q15MIN PRN    thiamine HCL (B-1) tablet 100 mg  100 mg Oral DAILY    folic acid (FOLVITE) tablet 1 mg  1 mg Oral DAILY    therapeutic multivitamin (THERAGRAN) tablet 1 Tablet  1 Tablet Oral DAILY       Labs:    Recent Labs     04/27/22  0042 04/26/22  0529   WBC 3.7* 3.5*   HGB 7.6* 7.9*   HCT 24.9* 24.8*   PLT 70* 63*     Recent Labs     04/27/22  0042 04/26/22  0529    139   K 3.7 4.1    107   CO2 29 28   * 102*   BUN 13 12   CREA 0.95 0.88   CA 8.3* 8.4*         Time spent on direct patient care >30 mints     Complexity : High complex - due to multiple medical issues outlined above. CODE Status : stable     Case discussed with:  [x]Patient  [] Family  []Nursing  []Case Management         Disclaimer: Sections of this note are dictated utilizing voice recognition software, which may have resulted in some phonetic based errors in grammar and contents. Even though attempts were made to correct all the mistakes, some may have been missed, and remained in the body of the document.  If questions arise, please contact our department.     Signed By: Kiko Neville MD     April 28, 2022

## 2022-04-28 NOTE — CONSULTS
Interventional Radiology Consult Note  Patient: Deja Wong               Sex: male          DOA: 4/22/2022       YOB: 1955      Age:  74 y.o.        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 Case and images reviewed by Dr. Vitaly Calvin and Dr. Kyleigh March. Plan     1. The patient will be referred to our outpatient clinic for consideration of liver directed therapy  2. No biopsy is needed at this time    Thank you,  Carroll Moreno [facility]  [id number]    HPI:     Deja Wong is a 74 y.o. male who has been seen in evaluation of [place] [address]. at the request of Dr. Rebekah Caballero. Deja Wong presented to [facility] 4/22/22 s/p fall from standing. He was found to have a right tib-fib fracture requiring surgical repair. Imaging (MRI 4/25/22) shows LR 5 multifocal hepatoma, hepatic cirrhosis and portal hypertension. PMHx is significant for untreated hepatitis C, hematuria, HTN non compliant with medication, 2-6 beers per day, and polysubstance abuse. A bladder mass was noted on CT 4/08/22 and the patient is going to follow with Urology outpatient. The patient does not take any blood thinning medication at home. He reports that his last alcoholic drink was approximately 1 week ago. The patient denies abdominal pain, N/V, weakness, fatigue, unintentional weight loss, abdominal distension. We discussed in detail hepatocellular carcinoma, and our treatment options such as radioembolization. We discussed that he will need to remain abstinent from alcohol and drug use during treatment, due to risk of liver injury and potential liver failure.  We discussed that the only definitive cure for Brad [address] 73. is a liver transplant, and that he will need to be referred to a specialist for this while we delay the progression of disease with liver directed therapy if this is determined to be the best option for the patient after his follow up visit with Dr. Esther Gurrola. The patient voices his understanding, and wants more time to think before getting further imaging studies. He agrees to follow with our clinic in the outpatient setting. Past Medical History:   Diagnosis Date    Abscess of right shoulder     Abscess of shoulder     Arthritis     Epidural abscess     Heart murmur     Hematuria     Heroin abuse (HCC)     Hypertension     Infected wound     Infectious disease     Iron deficiency anemia     IV drug user     Liver disease     Tobacco abuse      Past Surgical History:   Procedure Laterality Date    HX COLONOSCOPY  07/18/2016    HX CYST REMOVAL      rt.forearm and rt.foot    HX ENDOSCOPY  07/18/2016    HX FREE SKIN GRAFT      HX OTHER SURGICAL      right forearm infections and graft     History reviewed. No pertinent family history. Social History     Socioeconomic History    Marital status:    Tobacco Use    Smoking status: Current Every Day Smoker     Packs/day: 0.50     Years: 38.00     Pack years: 19.00     Types: Cigarettes    Smokeless tobacco: Never Used   Vaping Use    Vaping Use: Never used   Substance and Sexual Activity    Alcohol use: Yes     Alcohol/week: 4.0 standard drinks     Types: 4 Cans of beer per week    Drug use: Yes     Types: Heroin     Comment: last dose 2/15    Sexual activity: Yes   Social History Narrative    ** Merged History Encounter **          Prior to Admission medications    Medication Sig Start Date End Date Taking? Authorizing Provider   methadone (DOLOPHINE) 10 mg tablet Take 45 mg by mouth daily.    Yes Other, MD Jesus Alberto     No Known Allergies  Review of Systems  As above    Physical Exam:      Visit Vitals  /72 (BP 1 Location: Left upper arm, BP Patient Position: Sitting)   Pulse 61   Temp 97.9 °F (36.6 °C)   Resp 16   Ht 6' (1.829 m)   Wt 81.6 kg (180 lb)   SpO2 99%   BMI 24.41 kg/m²     Physical Exam:  Constitutional: Awake and alert and oriented x 4, NAD  Respiratory: Normal work of breathing, equal chest rise and fall  Cardiovascular: RRR  Gastrointestinal: Soft NT ND  Extremities: Skin warm and dry, moves all four, right leg elevated    Labs Reviewed:  Plt 70  Hgb 7.6  INR 1.4  T Bili 0.9  AST/ALT 51/37  AFP 14.7 with 20.9 L3%    Blood Thinners:  None

## 2022-04-28 NOTE — PROGRESS NOTES
Discharge planning    Discharge order noted for today. Patient has been accepted to ARU. Confirmed with [facility] bed is available today. Patient and  agreeable to the transition plan today. [employer] authorization has been obtained. ** Transport to facility has been arranged with staff. Patient's discharge summary has been forwarded to ARU. Bedside RN, Nikolas, has been updated to the transition plan. Discharge information has been updated on the AVS.  Please call report to ext [phone]/ .     K.  CAROLINA Fernandez, RN  Pager # [phone]  Care Manager

## 2022-04-28 NOTE — PROGRESS NOTES
Problem: Falls - Risk of  Goal: *Absence of Falls  Description: Document Taz Sumner Fall Risk and appropriate interventions in the flowsheet. Outcome: Progressing Towards Goal  Note: Fall Risk Interventions:  Mobility Interventions: Patient to call before getting OOB,Bed/chair exit alarm         Medication Interventions: Evaluate medications/consider consulting pharmacy,Patient to call before getting OOB    Elimination Interventions: Patient to call for help with toileting needs,Call light in reach    History of Falls Interventions: Bed/chair exit alarm,Door open when patient unattended         Problem: Pressure Injury - Risk of  Goal: *Prevention of pressure injury  Description: Document Jonathan Scale and appropriate interventions in the flowsheet. Outcome: Progressing Towards Goal  Note: Pressure Injury Interventions:             Activity Interventions: Pressure redistribution bed/mattress(bed type)    Mobility Interventions: Pressure redistribution bed/mattress (bed type)    Nutrition Interventions: Document food/fluid/supplement intake                     Problem: Injury - Risk of, Adverse Drug Event  Goal: *Absence of adverse drug events  Outcome: Progressing Towards Goal

## 2022-04-28 NOTE — ROUTINE PROCESS
Bedside and Verbal shift change report given to [facility] (oncoming nurse) by Jose Eduardo Mullins (offgoing nurse). Report included the following information SBAR and Kardex.

## 2022-04-28 NOTE — PROGRESS NOTES
Problem: Falls - Risk of  Goal: *Absence of Falls  Description: Document Palak Roberts Fall Risk and appropriate interventions in the flowsheet. Outcome: Progressing Towards Goal  Note: Fall Risk Interventions:  Mobility Interventions: PT Consult for mobility concerns         Medication Interventions: Bed/chair exit alarm    Elimination Interventions: Bed/chair exit alarm    History of Falls Interventions: Bed/chair exit alarm         Problem: Patient Education: Go to Patient Education Activity  Goal: Patient/Family Education  Outcome: Progressing Towards Goal     Problem: Pressure Injury - Risk of  Goal: *Prevention of pressure injury  Description: Document Jonathan Scale and appropriate interventions in the flowsheet. Outcome: Progressing Towards Goal  Note: Pressure Injury Interventions:             Activity Interventions: PT/OT evaluation    Mobility Interventions: HOB 30 degrees or less,PT/OT evaluation    Nutrition Interventions: Document food/fluid/supplement intake                     Problem: Patient Education: Go to Patient Education Activity  Goal: Patient/Family Education  Outcome: Progressing Towards Goal     Problem: Injury - Risk of, Adverse Drug Event  Goal: *Absence of adverse drug events  Outcome: Progressing Towards Goal  Goal: *Absence of medication errors  Outcome: Progressing Towards Goal  Goal: *Knowledge of prescribed medications  Outcome: Progressing Towards Goal

## 2022-04-28 NOTE — ROUTINE PROCESS
3812- Bedside, Verbal and Written shift change report received by Nahomy Lord (oncoming nurse) by Carey(offgoing nurse). Report included the following information SBAR, Kardex, Intake/Output, MAR and Recent Results. 0845-Assessment completed, callbell within reach, no distress noted. AM  medications administered, pt tolerated with ease, will continue to monitor. 1130- Shift reassessment, pt condition unchanged, will continue to monitor. 1600-  Shift reassessment, pt condition unchanged, will continue to monitor. 1620-TRANSFER - OUT REPORT:    Verbal report given to Colton(name) on Sheron Ram  being transferred to ARU(unit) for routine progression of care       Report consisted of patients Situation, Background, Assessment and   Recommendations(SBAR). Information from the following report(s) SBAR, MAR and Recent Results was reviewed with the receiving nurse. Lines:       Opportunity for questions and clarification was provided. Patient transported with:   Patient-specific medications from Pharmacy    1574-transferred to ARU via wheel chair.

## 2022-04-29 ENCOUNTER — APPOINTMENT (OUTPATIENT)
Dept: GENERAL RADIOLOGY | Age: 67
DRG: 812 | End: 2022-04-29
Attending: FAMILY MEDICINE
Payer: MEDICARE

## 2022-04-29 LAB
ANION GAP SERPL CALC-SCNC: 2 MMOL/L (ref 3–18)
APPEARANCE UR: CLEAR
BASOPHILS # BLD: 0 K/UL (ref 0–0.1)
BASOPHILS NFR BLD: 0 % (ref 0–2)
BILIRUB UR QL: NEGATIVE
BUN SERPL-MCNC: 10 MG/DL (ref 7–18)
BUN/CREAT SERPL: 11 (ref 12–20)
CALCIUM SERPL-MCNC: 8 MG/DL (ref 8.5–10.1)
CHLORIDE SERPL-SCNC: 106 MMOL/L (ref 100–111)
CO2 SERPL-SCNC: 28 MMOL/L (ref 21–32)
COLOR UR: YELLOW
CREAT SERPL-MCNC: 0.88 MG/DL (ref 0.6–1.3)
DIFFERENTIAL METHOD BLD: ABNORMAL
EOSINOPHIL # BLD: 0.2 K/UL (ref 0–0.4)
EOSINOPHIL NFR BLD: 5 % (ref 0–5)
ERYTHROCYTE [DISTWIDTH] IN BLOOD BY AUTOMATED COUNT: 22.5 % (ref 11.6–14.5)
GLUCOSE SERPL-MCNC: 118 MG/DL (ref 74–99)
GLUCOSE UR STRIP.AUTO-MCNC: NEGATIVE MG/DL
HCT VFR BLD AUTO: 28.6 % (ref 36–48)
HGB BLD-MCNC: 8.9 G/DL (ref 13–16)
HGB UR QL STRIP: ABNORMAL
IMM GRANULOCYTES # BLD AUTO: 0 K/UL (ref 0–0.04)
IMM GRANULOCYTES NFR BLD AUTO: 0 % (ref 0–0.5)
KETONES UR QL STRIP.AUTO: NEGATIVE MG/DL
LEUKOCYTE ESTERASE UR QL STRIP.AUTO: NEGATIVE
LYMPHOCYTES # BLD: 0.9 K/UL (ref 0.9–3.6)
LYMPHOCYTES NFR BLD: 30 % (ref 21–52)
MAGNESIUM SERPL-MCNC: 1.9 MG/DL (ref 1.6–2.6)
MCH RBC QN AUTO: 29 PG (ref 24–34)
MCHC RBC AUTO-ENTMCNC: 31.1 G/DL (ref 31–37)
MCV RBC AUTO: 93.2 FL (ref 78–100)
MONOCYTES # BLD: 0.4 K/UL (ref 0.05–1.2)
MONOCYTES NFR BLD: 12 % (ref 3–10)
NEUTS SEG # BLD: 1.6 K/UL (ref 1.8–8)
NEUTS SEG NFR BLD: 52 % (ref 40–73)
NITRITE UR QL STRIP.AUTO: NEGATIVE
NRBC # BLD: 0 K/UL (ref 0–0.01)
NRBC BLD-RTO: 0 PER 100 WBC
PH UR STRIP: 6 [PH] (ref 5–8)
PLATELET # BLD AUTO: 63 K/UL (ref 135–420)
PMV BLD AUTO: 11.3 FL (ref 9.2–11.8)
POTASSIUM SERPL-SCNC: 4.5 MMOL/L (ref 3.5–5.5)
PROT UR STRIP-MCNC: NEGATIVE MG/DL
RBC # BLD AUTO: 3.07 M/UL (ref 4.35–5.65)
RBC #/AREA URNS HPF: NORMAL /HPF (ref 0–5)
SODIUM SERPL-SCNC: 136 MMOL/L (ref 136–145)
SP GR UR REFRACTOMETRY: 1.01 (ref 1–1.03)
UROBILINOGEN UR QL STRIP.AUTO: 1 EU/DL (ref 0.2–1)
WBC # BLD AUTO: 3.1 K/UL (ref 4.6–13.2)
WBC URNS QL MICRO: NORMAL /HPF (ref 0–4)

## 2022-04-29 PROCEDURE — 87086 URINE CULTURE/COLONY COUNT: CPT

## 2022-04-29 PROCEDURE — 2709999900 HC NON-CHARGEABLE SUPPLY

## 2022-04-29 PROCEDURE — 97530 THERAPEUTIC ACTIVITIES: CPT

## 2022-04-29 PROCEDURE — 36415 COLL VENOUS BLD VENIPUNCTURE: CPT

## 2022-04-29 PROCEDURE — 81001 URINALYSIS AUTO W/SCOPE: CPT

## 2022-04-29 PROCEDURE — 83735 ASSAY OF MAGNESIUM: CPT

## 2022-04-29 PROCEDURE — 85025 COMPLETE CBC W/AUTO DIFF WBC: CPT

## 2022-04-29 PROCEDURE — 65310000000 HC RM PRIVATE REHAB

## 2022-04-29 PROCEDURE — 87040 BLOOD CULTURE FOR BACTERIA: CPT

## 2022-04-29 PROCEDURE — 87186 SC STD MICRODIL/AGAR DIL: CPT

## 2022-04-29 PROCEDURE — 71045 X-RAY EXAM CHEST 1 VIEW: CPT

## 2022-04-29 PROCEDURE — 74011250637 HC RX REV CODE- 250/637: Performed by: EMERGENCY MEDICINE

## 2022-04-29 PROCEDURE — 97166 OT EVAL MOD COMPLEX 45 MIN: CPT

## 2022-04-29 PROCEDURE — 97162 PT EVAL MOD COMPLEX 30 MIN: CPT

## 2022-04-29 PROCEDURE — 97542 WHEELCHAIR MNGMENT TRAINING: CPT

## 2022-04-29 PROCEDURE — 97535 SELF CARE MNGMENT TRAINING: CPT

## 2022-04-29 PROCEDURE — 80048 BASIC METABOLIC PNL TOTAL CA: CPT

## 2022-04-29 PROCEDURE — 74011250637 HC RX REV CODE- 250/637: Performed by: FAMILY MEDICINE

## 2022-04-29 PROCEDURE — 99223 1ST HOSP IP/OBS HIGH 75: CPT | Performed by: EMERGENCY MEDICINE

## 2022-04-29 PROCEDURE — 87077 CULTURE AEROBIC IDENTIFY: CPT

## 2022-04-29 RX ORDER — ACETAMINOPHEN 325 MG/1
650 TABLET ORAL
Status: DISCONTINUED | OUTPATIENT
Start: 2022-04-29 | End: 2022-05-02

## 2022-04-29 RX ORDER — PANTOPRAZOLE SODIUM 40 MG/1
40 TABLET, DELAYED RELEASE ORAL
Status: DISCONTINUED | OUTPATIENT
Start: 2022-04-29 | End: 2022-05-12 | Stop reason: HOSPADM

## 2022-04-29 RX ADMIN — FERROUS SULFATE TAB 325 MG (65 MG ELEMENTAL FE) 325 MG: 325 (65 FE) TAB at 16:22

## 2022-04-29 RX ADMIN — OXYCODONE HYDROCHLORIDE 10 MG: 5 TABLET ORAL at 00:23

## 2022-04-29 RX ADMIN — OXYCODONE HYDROCHLORIDE 10 MG: 5 TABLET ORAL at 12:20

## 2022-04-29 RX ADMIN — FOLIC ACID 1 MG: 1 TABLET ORAL at 08:38

## 2022-04-29 RX ADMIN — DOCUSATE SODIUM 100 MG: 100 CAPSULE, LIQUID FILLED ORAL at 17:08

## 2022-04-29 RX ADMIN — OXYCODONE HYDROCHLORIDE 10 MG: 5 TABLET ORAL at 08:01

## 2022-04-29 RX ADMIN — ACETAMINOPHEN 650 MG: 325 TABLET ORAL at 21:26

## 2022-04-29 RX ADMIN — AMLODIPINE BESYLATE 10 MG: 10 TABLET ORAL at 08:39

## 2022-04-29 RX ADMIN — FERROUS SULFATE TAB 325 MG (65 MG ELEMENTAL FE) 325 MG: 325 (65 FE) TAB at 08:02

## 2022-04-29 RX ADMIN — METHADONE HYDROCHLORIDE 45 MG: 10 TABLET ORAL at 08:39

## 2022-04-29 RX ADMIN — OXYCODONE HYDROCHLORIDE 10 MG: 5 TABLET ORAL at 20:09

## 2022-04-29 RX ADMIN — THERA TABS 1 TABLET: TAB at 08:39

## 2022-04-29 RX ADMIN — DOCUSATE SODIUM 100 MG: 100 CAPSULE, LIQUID FILLED ORAL at 08:39

## 2022-04-29 RX ADMIN — ASPIRIN 81 MG CHEWABLE TABLET 81 MG: 81 TABLET CHEWABLE at 17:08

## 2022-04-29 RX ADMIN — POLYETHYLENE GLYCOL 3350 17 G: 17 POWDER, FOR SOLUTION ORAL at 08:38

## 2022-04-29 RX ADMIN — OXYCODONE HYDROCHLORIDE 10 MG: 5 TABLET ORAL at 04:05

## 2022-04-29 RX ADMIN — Medication 100 MG: at 08:39

## 2022-04-29 RX ADMIN — ASPIRIN 81 MG CHEWABLE TABLET 81 MG: 81 TABLET CHEWABLE at 08:39

## 2022-04-29 RX ADMIN — OXYCODONE HYDROCHLORIDE 10 MG: 5 TABLET ORAL at 16:22

## 2022-04-29 NOTE — H&P
[facility] PHYSICAL REHABILITATION  [address] [place] [address], [address]     INPATIENT REHABILITATION  HISTORY AND PHYSICAL  (Post Admission Physician Evaluation)    Name: Delta Crow CSN: [id number]   Age: 74 y.o. MRN: [id number]   Sex: male Admit Date: 4/28/2022         Primary Rehabilitation Impairment Category (MICHEL): Miscellaneous    Impairment Group Label: Debility    Etiologic Diagnosis: Anemia      Subjective:     Patient seen and examined. History of the Present Illness: This is a 73-year-old male who was recently admitted to [facility] after he had a fall. Patient was noted to have a right tib-fib fracture. Orthopedics was consulted. Patient underwent intramedullary nailing. Patient was noted to have chronic thrombocytopenia. Patient has a history of alcohol abuse and substance abuse. Patient had a liver mass and MRI was very suspicious for hepatocellular cancer. There was also concern for bladder mass and patient was seen by urology and outpatient follow-up was recommended. Patient was also seen by gastroenterology as well as oncology as well as interventional radiology. Patient was evaluated by PT and OT and subsequently was transferred to the inpatient rehab. For full details regarding patient's hospital course at [facility] please refer to chart. I saw the patient for the first time in the inpatient rehab facility earlier today. Patient is sitting in bed in no apparent distress. Patient is awake and alert. No history of any chest pain or shortness of breath or palpitations. No history of any nausea vomiting or abdominal pain. No history of any diarrhea or rectal bleeding. No history of any headaches numbness or focal weakness. No history of any rash.         Past Medical History:  Past Medical History:   Diagnosis Date    Abscess of right shoulder     Abscess of shoulder     Arthritis     Epidural abscess     Heart murmur     Hematuria     Heroin abuse ([facility] [address]     Hypertension     Infected wound     Infectious disease     Iron deficiency anemia     IV drug user     Liver disease     Tobacco abuse        Past Surgical History:  Past Surgical History:   Procedure Laterality Date    HX COLONOSCOPY  07/18/2016    HX CYST REMOVAL      rt.forearm and rt.foot    HX ENDOSCOPY  07/18/2016    HX FREE SKIN GRAFT      HX ORTHOPAEDIC      HX ORTHOPAEDIC Right 04/23/2022    Closed Fracture of Right Tibia     HX OTHER SURGICAL      right forearm infections and graft       Allergies:  No Known Allergies      Social History: Patient denies any tobacco use. Patient states he drinks alcohol but does not specify how much      Family History:  High blood pressure runs in the family        Transfer Medications (from the Discharge Summary     Prior to Admission Medications   Prescriptions Last Dose Informant Patient Reported? Taking? amLODIPine (NORVASC) 10 mg tablet 4/28/2022 at 0844  No Yes   Sig: Take 1 Tablet by mouth daily. aspirin delayed-release 81 mg tablet 4/28/2022 at 0844  No Yes   Sig: Take 1 Tablet by mouth two (2) times a day. ferrous sulfate 325 mg (65 mg iron) tablet 4/28/2022 at 1640  No Yes   Sig: Take 1 Tablet by mouth two (2) times daily (with meals). folic acid (FOLVITE) 1 mg tablet 4/28/2022 at 0844  No Yes   Sig: Take 1 Tablet by mouth daily. methadone (DOLOPHINE) 10 mg tablet 4/28/2022  Yes Yes   Sig: Take 45 mg by mouth daily. oxyCODONE IR (ROXICODONE) 10 mg tab immediate release tablet 4/28/2022 at Unknown time  No Yes   Sig: Take 1-1.5 Tablets by mouth every three (3) hours as needed for Pain for up to 3 days. Max Daily Amount: 120 mg.   polyethylene glycol (MIRALAX) 17 gram packet 4/27/2022 at Unknown time  No Yes   Sig: Take 1 Packet by mouth daily. therapeutic multivitamin (THERAGRAN) tablet 4/28/2022 at 0844  No Yes   Sig: Take 1 Tablet by mouth daily.    thiamine HCL (B-1) 100 mg tablet 4/28/2022 at 4922  No Yes   Sig: Take 1 Tablet by mouth daily. Facility-Administered Medications: None       Review Of Systems:   Review of Systems  GENERAL: Patient alert, awake and oriented times 3, able to communicate full sentences and not in distress. HEENT: No change in vision, no earache, tinnitus, sore throat or sinus congestion. NECK: No pain or stiffness. PULMONARY: No shortness of breath, cough or wheeze. Cardiovascular: no pnd or orthopnea, no CP  GASTROINTESTINAL: No abdominal pain, nausea, vomiting or diarrhea, melena or bright red blood per rectum. GENITOURINARY: No urinary frequency, urgency, hesitancy or dysuria. MUSCULOSKELETAL: No back pain, no leg pain  DERMATOLOGIC: No rash, no itching, no lesions. ENDOCRINE: No polyuria, polydipsia, no heat or cold intolerance. No recent change in weight. HEMATOLOGICAL: No anemia or easy bruising or bleeding. NEUROLOGIC: No headache, seizures, numbness, tingling or weakness. Objective:     Vital Signs:  Patient Vitals for the past 24 hrs:   BP Temp Pulse Resp SpO2 Height   04/29/22 1641 -- -- -- -- -- 6' (1.829 m)   04/29/22 1541 124/84 98.4 °F (36.9 °C) 75 15 99 % --   04/29/22 0745 (!) 148/67 98.5 °F (36.9 °C) 81 18 98 % --   04/29/22 0404 (!) 152/71 99.1 °F (37.3 °C) 72 18 99 % --   04/29/22 0022 (!) 146/63 98.7 °F (37.1 °C) 73 18 98 % --   04/28/22 2042 139/63 97.3 °F (36.3 °C) 74 18 99 % --        Body mass index is 24.41 kg/m². Physical Examination:  General:    Lying in bed in no acute distress. HEENT:  Pupils equal.  Sclera anicteric. Conjunctiva pink. Mucous membranes                           Moist, no ear or nasal discharge  Neck:  Supple. Trachea midline. No accessory muscle use. No thyromegaly. No jugular venous distention, no carotid bruit  CV:                  Regular rate and rhythm. S1S2+  Lungs:   Clear to auscultation bilaterally. No Wheezing or Rhonchi. No rales.   Abdomen:   Soft, non-tender. Not distended. Bowel sounds normal.   Extremities: No cyanosis. No edema. Right foot in cast  Neurologic: Alert and oriented X 3. Follows commands, responds appropriately. No focal neurological deficit was noted  Skin:                Warm and dry. No rashes.    Right arm has midline    Current Medications:  Current Facility-Administered Medications   Medication Dose Route Frequency    amLODIPine (NORVASC) tablet 10 mg  10 mg Oral DAILY    aspirin chewable tablet 81 mg  81 mg Oral BID    ferrous sulfate tablet 325 mg  325 mg Oral BID WITH MEALS    folic acid (FOLVITE) tablet 1 mg  1 mg Oral DAILY    methadone (DOLOPHINE) tablet 45 mg  45 mg Oral DAILY    oxyCODONE IR (ROXICODONE) tablet 10 mg  10 mg Oral Q4H PRN    polyethylene glycol (MIRALAX) packet 17 g  17 g Oral DAILY    therapeutic multivitamin (THERAGRAN) tablet 1 Tablet  1 Tablet Oral DAILY    thiamine HCL (B-1) tablet 100 mg  100 mg Oral DAILY    docusate sodium (COLACE) capsule 100 mg  100 mg Oral BID    bisacodyL (DULCOLAX) suppository 10 mg  10 mg Rectal Q48H PRN       Functional Assessment:     Occupational Therapy   Prior Level of Function  Pre-Admission Screen  Post-Admission Evaluation   Eating   Independent Eating   Set up Eating  Functional Level: 5   Grooming   Independent Grooming   Contact Guard Assist Grooming      Upper Body Dressing   Independent Upper Body Dressing   Set up Upper Body Dressing      Lower Body Dressing   Independent Lower Body Dressing   Minimal Assist Lower Body Dressing      Bladder Management   Independent Bladder Management   Modified Independent Toileting      Bowel Management   Independent Bowel Management   Set up      Physical Therapy   Prior Level of Function  Pre-Admission Screen  Post-Admission Evaluation   Ambulation   Independent Ambulation   Maximal Assist Gait  Amount of Assistance: 4 (Minimal assistance)  Distance (ft): 6 Feet (ft)  Assistive Device: Walker, rolling   Bed Mobility Independent Bed Mobility   Minimal Assist Bed/Mat Mobility  Rolling Right : 4 (Minimal assistance)  Rolling Left : 4 (Minimal assistance)  Supine to Sit : 5 (Supervision)  Sit to Supine : 4 (Minimal assistance)   Supine to Sit   Independent Supine to Sit   Minimal Assist Bed/Mat Mobility  Rolling Right : 4 (Minimal assistance)  Rolling Left : 4 (Minimal assistance)  Supine to Sit : 5 (Supervision)  Sit to Supine : 4 (Minimal assistance)   Sit to Stand   Independent Sit to Stand   Contact Guard Assist Bed/Mat Mobility  Rolling Right : 4 (Minimal assistance)  Rolling Left : 4 (Minimal assistance)  Supine to Sit : 5 (Supervision)  Sit to Supine : 4 (Minimal assistance)   Bed/Chair Transfers   Independent Bed/Chair Transfers   Minimal Assist Transfers  Transfer Type:  Other  Other: stand step with RW  Transfer Assistance : 4 (Minimal assistance)  Sit to Stand Assistance: Minimal assistance  Car Transfers: Not tested (pt declined 2/2 pain)  Car Type: N/A   Toilet Transfers   Independent Toilet Transfers   Contact Guard Assist Toilet Transfers        Speech and Language Pathology  Post-Admission Evaluation                                     Legend:   7 - Independent   6 - Modified Independent   5 - [address] / Supervision / Set-up   4 - Minimum Assistance / [address]   3 - Moderate Assistance   2 - Maximum Assistance   1 - Total Assistance / Dependent       Labs on Admission:  Recent Results (from the past 24 hour(s))   CBC WITH AUTOMATED DIFF    Collection Time: 04/29/22  5:34 AM   Result Value Ref Range    WBC 3.1 (L) 4.6 - 13.2 K/uL    RBC 3.07 (L) 4.35 - 5.65 M/uL    HGB 8.9 (L) 13.0 - 16.0 g/dL    HCT 28.6 (L) 36.0 - 48.0 %    MCV 93.2 78.0 - 100.0 FL    MCH 29.0 24.0 - 34.0 PG    MCHC 31.1 31.0 - 37.0 g/dL    RDW 22.5 (H) 11.6 - 14.5 %    PLATELET 63 (L) 930 - 420 K/uL    MPV 11.3 9.2 - 11.8 FL    NRBC 0.0 0  WBC    ABSOLUTE NRBC 0.00 0.00 - 0.01 K/uL    NEUTROPHILS 52 40 - 73 % LYMPHOCYTES 30 21 - 52 %    MONOCYTES 12 (H) 3 - 10 %    EOSINOPHILS 5 0 - 5 %    BASOPHILS 0 0 - 2 %    IMMATURE GRANULOCYTES 0 0.0 - 0.5 %    ABS. NEUTROPHILS 1.6 (L) 1.8 - 8.0 K/UL    ABS. LYMPHOCYTES 0.9 0.9 - 3.6 K/UL    ABS. MONOCYTES 0.4 0.05 - 1.2 K/UL    ABS. EOSINOPHILS 0.2 0.0 - 0.4 K/UL    ABS. BASOPHILS 0.0 0.0 - 0.1 K/UL    ABS. IMM. GRANS. 0.0 0.00 - 0.04 K/UL    DF AUTOMATED     METABOLIC PANEL, BASIC    Collection Time: 04/29/22  5:34 AM   Result Value Ref Range    Sodium 136 136 - 145 mmol/L    Potassium 4.5 3.5 - 5.5 mmol/L    Chloride 106 100 - 111 mmol/L    CO2 28 21 - 32 mmol/L    Anion gap 2 (L) 3.0 - 18 mmol/L    Glucose 118 (H) 74 - 99 mg/dL    BUN 10 7.0 - 18 MG/DL    Creatinine 0.88 0.6 - 1.3 MG/DL    BUN/Creatinine ratio 11 (L) 12 - 20      GFR est AA >60 >60 ml/min/1.73m2    GFR est non-AA >60 >60 ml/min/1.73m2    Calcium 8.0 (L) 8.5 - 10.1 MG/DL   MAGNESIUM    Collection Time: 04/29/22  5:34 AM   Result Value Ref Range    Magnesium 1.9 1.6 - 2.6 mg/dL             Assessment:     Primary rehabilitation diagnosis  Impaired mobility and ADLs  Right tib-fib fracture status post intramedullary nailing by orthopedics    Other medical issues  Liver mass likely hepatocellular cancer  Anemia  Hypertension  Substance abuse  On chronic methadone  Thrombocytopenia  Cirrhosis of liver  Bladder mass    Willingness to participate in the program: Good      Rehabilitation Potential: Good      Plan:     1. Medical Issues being followed closely:    [x]  Fall and safety precautions     [x]  Wound Care     [x]  Bowel and Bladder Function     [x]  Fluid Electrolyte and Nutrition Balance     [x]  Pain Control      2.  Issues that 24 hour rehabilitation nursing is following:    [x]  Fall and safety precautions     [x]  Wound Care     [x]  Bowel and Bladder Function     [x]  Fluid Electrolyte and Nutrition Balance     [x]  Pain Control      [x]  Assistance with and education on in-room safety with transfers to and from the bed, wheelchair, toilet and shower. 3. Acute rehabilitation plan of care:    [x]  Patient to be evaluated and treated by:           [x]  Physical Therapy           [x]  Occupational Therapy           []  Speech Therapy     []  Hold Rehab until further notice     5. Medications:    [x]  MAR Reviewed     [x]  Continue Present Medications     6. Chemical DVT Prophylaxis:      []  Enoxaparin     []  Unfractionated Heparin     []  Warfarin     []  NOAC     [x]  Aspirin     []  None     7. Mechanical DVT Prophylaxis:      []  RADHA Stockings     [x]  Sequential Compression Device     []  None     8. GI Prophylaxis:      [x]  PPI     []  H2 Blocker     [x]  None / Not indicated     9. Code status:    [x]  Full code     []  Partial code     []  Do not intubate     []  Do not resuscitate       12. Rehabilitation program and expectations from patient, as well as medical issues discussed with the patient. 1. The patient is being admitted to a comprehensive acute inpatient rehabilitation program consisting of at least 180 minutes a day, 5 out of 7 days a week of  combined physical, occupational therapy   2. The patient's prognosis for significant practical improvement within a reasonable period of time appears to be good. 3. The estimated length of stay is 12 days. 4. The patient/family has a good understanding of our discharge process. The patient has potential to make improvement and is in need of at least two of the following multidisciplinary therapies including but not limited to physical, occupational, speech, nutritional services, wound care, prosthetics and orthotics. The patient is expected to be able to return to home with home health therapy and family support. 5. Given the patient's multiple co-morbidities and risk for further medical complications, rehabilitation services could not be safely provided at a lower level of care such as a skilled nursing facility.     6. Physical therapy for therapeutic exercise, progressive mobility, gait training, transfer training, bed mobility training, patient and family education, and wheelchair mobility training. Physical therapy goals to address - extremity function, range of motion, balance, safety awareness, independence in transfers, activity tolerance, independence in bed mobility, and independence in ambulation. 7. Occupational therapy for self-care home management, transfer training, therapeutic exercise, activity, wheelchair mobility training. Occupational therapy goals to address - extremity function, cognition, balance, activity tolerance, independence in functional transfers, range of motion, safety awareness, independence in ADL  and independence in home management skills. 9. Specialized 24 hour rehabilitation nursing care for bowel and bladder retraining, disease management, pain management, pressure ulcer prevention and management per policy, education on pressure relief techniques, embolism prevention, nutrition management, hydration management, transfer training and   medication distribution. 10. Nutrition and Dietary services will be obtained for assessment of adequate calorie needs, hydration and calorie counts as appropriate. 11. Therapeutic recreation for leisure skills. 15. Rehab psychology for coping skills. 15. Social work services for patient and family counseling and safe discharge planning. MEDICAL PLAN:  We will continue prior to admission medications include amlodipine. Patient's chronic methadone will be continued. I counseled patient regarding cessation of illicit drug use. We will continue patient on aspirin for DVT prophylaxis. Labs will be monitored intermittently. Patient will need close outpatient follow-up after discharge from the rehab with oncology as well as gastroenterology as well as interventional radiology as well as urology. Discussed with patient.   I counseled patient regarding lifelong abstinence from alcohol. Patient verbalized understanding. I also counseled patient regarding cessation of illicit drug use. Patient verbalized understanding. I will start patient on Protonix. I have asked nursing staff to remove midline        PRECAUTIONS:  1. Safety/fall precautions. 2. Deep venous thrombosis precautions. 3. Aspiration precautions. POTENTIAL BARRIERS TO DISCHARGE:  1. Risk for falls. 3. Current home layout. 4. Family limited in ability to provide constant care. 5. Limited community resources. RELEVANT CHANGES SINCE PREADMISSION SCREENING: I have compared the patients medical and functional status at the time of the pre-admission screening and on this post-admission evaluation. The preadmission screen and findings from therapy evaluations both support my post admission physician evaluation, deeming this patient to be an appropriate candidate for the IRF. The patient requires multidisciplinary treatment, physician oversight and intensive therapy not provided at a lower level of care. By signing this document, I acknowledge that I have personally performed a full physical examination on this patient within 24 hours of admission to this inpatient rehabilitation facility and have determined the patient to be able to tolerate the above course of treatment at an intensive level for a reasonable period of time.            Signed:    Leatha Raphael MD      April 29, 2022

## 2022-04-29 NOTE — ROUTINE PROCESS
SHIFT CHANGE NOTE FOR [facility]    Bedside and Verbal shift change report given to Sudha Rebollar RN (oncoming nurse) by Radha De La Cruz RN (offgoing nurse). Report included the following information SBAR, Kardex, MAR and Recent Results.     Situation:   Code Status: Full Code   Hospital Day: 0   Problem List:   Hospital Problems  Date Reviewed: 3/6/2020          Codes Class Noted POA    Tibia/fibula fracture ICD-10-CM: S82.209A, S82.409A  ICD-9-CM: 823.82  4/22/2022 Unknown              Background:   Past Medical History:   Past Medical History:   Diagnosis Date    Abscess of right shoulder     Abscess of shoulder     Arthritis     Epidural abscess     Heart murmur     Hematuria     Heroin abuse ([facility] [address]     Hypertension     Infected wound     Infectious disease     Iron deficiency anemia     IV drug user     Liver disease     Tobacco abuse         Assessment:   Changes in Assessment throughout shift:       Patient has a central line: no Reasons if yes: na  Insertion date:na Last dressing date:na   Patient has Morris Cath: no Reasons if yes: na   Insertion date:na  Shift morris care completed: NO     Last Vitals:     Vitals:    04/28/22 1757   BP: 126/67   Pulse: (!) 59   Resp: 19   Temp: 99.2 °F (37.3 °C)   SpO2: 97%   Height: 6' (1.829 m)        PAIN    Pain Assessment    Pain Intensity 1: 0 (04/28/22 1945) Pain Intensity 1: 2 (12/29/14 1105)      Pain Location 1: Abdomen      Pain Intervention(s) 1: Medication (see MAR)  Patient Stated Pain Goal: 0 Patient Stated Pain Goal: 0  o Intervention effective: yes  o Other actions taken for pain:       Skin Assessment  Skin color    Condition/Temperature    Integrity Skin Integrity: Incision (comment) (s/p ORIF)  Turgor    Weekly Pressure Ulcer Documentation  Pressure  Injury Documentation: No Pressure Injury Noted-Pressure Ulcer Prevention Initiated  Wound Prevention & Protection Methods  Orientation of wound Orientation of Wound Prevention: Posterior  Location of Prevention    Dressing Present    Dressing Status    Wound Offloading       INTAKE/OUPUT  Date 04/27/22 1900 - 04/28/22 0659(Not Admitted) 04/28/22 0700 - 04/29/22 0659   Shift 3530-0798 24 Hour Total 7834-2036 2163-1727 24 Hour Total   INTAKE   Shift Total        OUTPUT   Urine          Urine Occurrence(s)    0 x 0 x   Stool          Stool Occurrence(s)    0 x 0 x   Shift Total        NET        Weight (kg)            Recommendations:  1. Patient needs and requests: na    2. Pending tests/procedures: na     3. Functional Level/Equipment:   / Bed (comment)    Fall Precautions:   Fall risk precautions were reinforced with the patient; he was instructed to call for help prior to getting up. The following fall risk precautions were continued: bed/ chair alarms, door signage, yellow bracelet and socks as well as update of the Weblio Katherine tool in the patient's room. Ezio Score: 4    HEALS Safety Check    A safety check occurred in the patient's room between off going nurse and oncoming nurse listed above. The safety check included the below items  Area Items   H  High Alert Medications - Verify all high alert medication drips (heparin, PCA, etc.)   E  Equipment - Suction is set up for ALL patients (with devin)  - Red plugs utilized for all equipment (IV pumps, etc.)  - WOWs wiped down at end of shift.  - Room stocked with oxygen, suction, and other unit-specific supplies   A  Alarms - Bed alarm is set for fall risk patients  - Ensure chair alarm is in place and activated if patient is up in a chair   L  Lines - Check IV for any infiltration  - Benitez bag is empty if patient has a Benitez   - Tubing and IV bags are labeled   S  Safety   - Room is clean, patient is clean, and equipment is clean. - Hallways are clear from equipment besides carts.    - Fall bracelet on for fall risk patients  - Ensure room is clear and free of clutter  - Suction is set up for ALL patients (with devin)  - Hallways are clear from equipment besides carts.    - Isolation precautions followed, supplies available outside room, sign posted     Juve Garzon RN

## 2022-04-29 NOTE — ROUTINE PROCESS
1730 Pt. Admitted to Acute rehab alert and oriented x 4 cast to right lower ext intact  Good pulses. Pt. Denied any discomfort Dr. Low Williamson was notified of admission and stated he will have admission order. 1800 Admission assessment done no pressure ulcers or skin breakdown noted skin intact   1900 On coming nurse given report.

## 2022-04-29 NOTE — PROGRESS NOTES
Patient was not able to receive the Venofer 300 mg IVP x4 doses that was ordered for him to start tonight prior to coming to ARU this evening. Nurse called Dr. Carlos Eduardo Perdomo and informed him of the above to see if he wanted to start the medication tonight. MD states to not give the medication tonight and to follow up with Dr. Corrinne Basket tomorrow morning about starting the medication.

## 2022-04-29 NOTE — PROGRESS NOTES
Problem: Mobility Impaired (Adult and Pediatric)  Goal: *Acute Goals and Plan of Care (Insert Text)  Description: Physical Therapy Short Term Goals  Initiated 4/29/2022 and to be accomplished within 5-7 day(s) (5/06/2022)  1. Patient will move from supine to sit and sit to supine , scoot up and down, and roll side to side in bed with supervision/set-up. 2.  Patient will transfer from bed to chair and chair to bed with supervision/set-up using the least restrictive device. 3.  Patient will perform sit to stand with supervision/set-up. 4.  Patient will ambulate with supervision/set-up for 50 feet with the least restrictive device. 5.  Patient will ascend/descend 2 stairs with B handrail(s) with minimal assistance/contact guard assist.    Physical Therapy Long Term Goals  Initiated 4/29/2022 and to be accomplished within 10-14 day(s) (5/13/2022)  1. Patient will move from supine to sit and sit to supine , scoot up and down, and roll side to side in bed with modified independence. 2.  Patient will transfer from bed to chair and chair to bed with modified independence using the least restrictive device. 3.  Patient will perform sit to stand with modified independence. 4.  Patient will ambulate with modified independence for 150 feet with the least restrictive device. 5.  Patient will ascend/descend 2 stairs with B handrail(s) with modified independence. Outcome: Progressing Towards Goal    PHYSICAL THERAPY EVALUATION    Patient: Abran Valadez (58 y.o. male)  Date: 4/29/2022  Diagnosis: Tibia/fibula fracture [S82.209A, S82.409A] <principal problem not specified>  Precautions: Fall,Other (comment) (PWB Right LE)  Chart, physical therapy assessment, plan of care and goals were reviewed. Time in:0700  Time out:0830    Patient seen for: Balance activities; Family training;Patient education;Transfer training; Wheelchair mobility;Gait training    Pain:  Pt pain was reported as 6/10 at rest, 10/10 in dependent position right LE pre-treatment. Pt pain was reported as 6-10/10 post-treatment. Intervention: Pt's nursing staff notified and aware. Pt received pain medication during treatment session. Patient identified with name and : yes    SUBJECTIVE:     Patient stated I can't do much with my leg hurting like this.   Pt reports c/o right LE pain but is agreeable to participation in skilled PT evaluation and intervention with minimal encouragement. Pt is pleasant and cooperative throughout treatment session. Patient's Goal for Physical Therapy: \"to get well and get better. \"    OBJECTIVE DATA SUMMARY:     Past Medical History:   Diagnosis Date    Abscess of right shoulder     Abscess of shoulder     Arthritis     Epidural abscess     Heart murmur     Hematuria     Heroin abuse ([facility] [address]     Hypertension     Infected wound     Infectious disease     Iron deficiency anemia     IV drug user     Liver disease     Tobacco abuse      Past Surgical History:   Procedure Laterality Date    HX COLONOSCOPY  2016    HX CYST REMOVAL      rt.forearm and rt.foot    HX ENDOSCOPY  2016    HX FREE SKIN GRAFT      HX OTHER SURGICAL      right forearm infections and graft       Problem List:    Decreased strength B LE  [x]     Decreased strength trunk/core  [x]     Decreased AROM   [x]     Decreased PROM  [x]    Decreased endurance  [x]     Decreased balance sitting  []     Decreased balance standing  [x]     Pain   [x]     Slow ambulation velocity  [x]    Decreased coordination  []    Decreased safety awareness  [x]      Functional Limitations:   Decreased independence with bed mobility  [x]     Decreased independence with functional transfers  [x]     Decreased independence with ambulation  [x]     Decreased independence with stair negotiation  [x]       Previous Functional Level: Per pt he was employed full time and mobilizing independently before he had his fall and recent hospitalization. Home Environment: Home Environment: Private residence  # Steps to Enter: 2  Rails to Enter: Yes  One/Two Story Residence: Two story  Living Alone: No  Support Systems: Other Family Member(s)  Patient Expects to be Discharged to[de-identified] Home  Current DME Used/Available at Home: None    Barriers to Learning/Limitations: yes;  physical  Compensate with: visual, verbal, tactile, kinesthetic cues/model         Outcome Measures: mobility assessment     MMT Initial Assessment: Not formally assessed on evaluation. Right LE limited AROM at hip and knee against gravity with functional mobility.    Right Lower Extremity Left Lower Extremity   Hip Flexion       Knee Extension       Knee Flexion       Ankle Dorsiflexion       0/5 No palpable muscle contraction  1/5 Palpable muscle contraction, no joint movement  2-/5 Less than full range of motion in gravity eliminated position  2/5 Able to complete full range of motion in gravity eliminated position  2+/5 Able to initiate movement against gravity  3-/5 More than half but not full range of motion against gravity  3/5 Able to complete full range of motion against gravity  3+/5 Completes full range of motion against gravity with minimal resistance  4-/5 Completes full range of motion against gravity with minimal-moderate resistance  4/5 Completes full range of motion against gravity with moderate resistance  4+/5 Completes full range of motion against gravity with moderate-maximum resistance  5/5 Completes full range of motion against gravity with maximum resistance        GROSS ASSESSMENT Initial Assessment 4/29/2022   AROM Generally decreased, functional   Strength Generally decreased, functional   Coordination Within functional limits   Tone Normal   Sensation Intact   PROM Generally decreased, functional       POSTURE Initial Assessment 4/29/2022   Posture (WDL)  Exceptions to WDL   Posture Assessment Forward Flexed, Rounded Shoulder Posture       BALANCE Initial Assessment 4/29/2022    Sitting - Static: Good (unsupported)  Sitting - Dynamic: Good (unsupported)  Standing - Static: Poor  Standing - Dynamic : Impaired       BED/CHAIR/WHEELCHAIR TRANSFERS Initial Assessment 4/29/2022   Rolling Right 4 (Minimal assistance) for safety and right LE management   Rolling Left 4 (Minimal assistance)  for safety and right LE management   Supine to Sit 5 (Supervision) for safety with increased time and pt utilizing B UEs to assist right LE. Sit to Stand Minimal assistance for safety with verbal reminders for safe hand placement with transition sit <> stand to/from RW. Sit to Supine 4 (Minimal assistance) for right LE management against gravity   Transfer Assist Score 4 (Minimal assistance)   Transfer Type Other   Comments Pt requires CGA for safety and balance in static standing with UE support on RW but demonstrates LOB to left with stand step transfer bed to w/c to his left with c/o right LE pain requiring minimal steadying assistance. Pt consistently maintains right LE PWB through forefoot with right knee flexed. Car Transfer Not tested (pt declined 2/2 pain)   Car Type N/A       WHEELCHAIR MOBILITY/MANAGEMENT Initial Assessment 4/29/2022   Able to Propel 270 feet   Assist Level 4   Curbs/ramps assistance required 0 (Not tested)   Wheelchair set up assistance required 3 (Moderate assistance) to manage B leg rests   Wheelchair management Manages left brake,Manages right brake   Comments Pt requires minimal assistance for straight path negotiation and increased time to propel w/c.        GAIT Initial Assessment 4/29/2022   Gait Description (WDL) Exceptions to WDL   Gait Abnormalities Antalgic (Forward Flexed Posture)       WALKING INDEPENDENCE Initial Assessment 4/29/2022   Assistive device Walker, rolling   Ambulation assistance - level surface 4 (Minimal assistance)   Distance 6 Feet (ft)   Comments  Pt requires minimal assistance for balance and safety ambulating with RW with verbal cues for decreased forward flexed posture. Ambulation assistance - unlevel surface  NT       STEPS/STAIRS Initial Assessment 4/29/2022   Steps/Stairs ambulated 0   Rail Use None   Assistance Level 0 (Not tested) (2/2 pt ambulating only short distances 2/2 pain)   Comments  NT   Curbs/Ramps  NT         ASSESSMENT :  Based on the objective data described above, the patient presents with right LE pain s/p tibia/fibula fracture with PWB following surgical intervention and decreased functional strength and balance limiting safety and independence with mobility. Patient will benefit from skilled intervention to address the above impairments. Patient's rehabilitation potential is considered to be Good  Factors which may influence rehabilitation potential include:   []         None noted  []         Mental ability/status  [x]         Medical condition  []         Home/family situation and support systems  [x]         Safety awareness  [x]         Pain tolerance/management  []         Other:      PLAN :  Please refer to POC for details re: LTGs. Order received from MD for physical therapy services and chart reviewed. Pt to be seen 5 times per week for 3 hours of total therapy per day for 1-2 weeks. Thank you for the referral.    Pt would benefit from skilled physical therapy in order to improve independent functional mobility within the home. Interventions may include range of motion (AROM, PROM B LE/trunk), motor function (B LE/trunk strengthening/coordination), activity tolerance (vitals, oxygen saturation levels), bed mobility training, balance activities, gait training (progressive ambulation program), wheelchair management and functional transfer training. Patient would also benefit from concurrent and group therapy sessions to promote increased participation in skilled therapy interventions and to provide opportunities for increased social interaction.      Discharge Recommendations: Home Physical Therapy  Further Equipment Recommendations for Discharge: rolling walker and N/A       Activity Tolerance:   Fair  Please refer to the flowsheet for vital signs taken during this treatment. After treatment:   [] Patient left in no apparent distress in bed  [x] Patient left in no apparent distress sitting up in chair  [] Patient left in no apparent distress in w/c mobilizing under own power  [] Patient left in no apparent distress dining area  [] Patient left in no apparent distress mobilizing under own power  [x] Call bell left within reach  [] Nursing notified  [] Caregiver present  [] Bed alarm activated   [x] Chair alarm activated. COMMUNICATION/EDUCATION:   [x]         Fall prevention education was provided and the patient/caregiver indicated understanding. [x]         Patient/family have participated as able in goal setting and plan of care. [x]         Patient/family agree to work toward stated goals and plan of care. []         Patient understands intent and goals of therapy, but is neutral about his/her participation. []         Patient is unable to participate in goal setting and plan of care.     Thank you for this referral.  Ellen Caba, PT, DPT  4/29/2022

## 2022-04-29 NOTE — ROUTINE PROCESS
SHIFT CHANGE NOTE FOR [facility]    Bedside and Verbal shift change report given to SHAWN Markham (oncoming nurse) by Juan Starks RN (offgoing nurse). Report included the following information SBAR, Kardex, MAR and Recent Results.     Situation:   Code Status: Full Code   Hospital Day: 1   Problem List:   Hospital Problems  Date Reviewed: 3/6/2020          Codes Class Noted POA    Tibia/fibula fracture ICD-10-CM: S82.209A, S82.409A  ICD-9-CM: 823.82  4/22/2022 Unknown    Overview Signed 4/29/2022  3:06 PM by Van PERALTA NP     > On 4/23/2022:    > Surgery: Closed fracture of right tibia insertion intra medullary nail. ( Dr. Reddy Lin)                   Background:   Past Medical History:   Past Medical History:   Diagnosis Date    Abscess of right shoulder     Abscess of shoulder     Arthritis     Epidural abscess     Heart murmur     Hematuria     Heroin abuse ([address]     Hypertension     Infected wound     Infectious disease     Iron deficiency anemia     IV drug user     Liver disease     Tobacco abuse         Assessment:   Changes in Assessment throughout shift: No change to previous assessment     Patient has a central line: no Reasons if yes: na  Insertion date:na Last dressing date:na   Patient has Morris Cath: no Reasons if yes: na   Insertion date:na  Shift morris care completed: NO     Last Vitals:     Vitals:    04/29/22 0404 04/29/22 0745 04/29/22 1541 04/29/22 1641   BP: (!) 152/71 (!) 148/67 124/84    Pulse: 72 81 75    Resp: 18 18 15    Temp: 99.1 °F (37.3 °C) 98.5 °F (36.9 °C) 98.4 °F (36.9 °C)    SpO2: 99% 98% 99%    Height:    6' (1.829 m)        PAIN    Pain Assessment    Pain Intensity 1: 5 (04/29/22 1706) Pain Intensity 1: 2 (12/29/14 1105)    Pain Location 1: Leg Pain Location 1: Abdomen    Pain Intervention(s) 1: Medication (see MAR) Pain Intervention(s) 1: Medication (see MAR)  Patient Stated Pain Goal: 0 Patient Stated Pain Goal: 0  o Intervention effective: yes  o Other actions taken for pain: Medication (see MAR)     Skin Assessment  Skin color    Condition/Temperature    Integrity Skin Integrity: Wound (add Wound LDA)  Turgor    Weekly Pressure Ulcer Documentation  Pressure  Injury Documentation: No Pressure Injury Noted-Pressure Ulcer Prevention Initiated  Wound Prevention & Protection Methods  Orientation of wound Orientation of Wound Prevention: Posterior  Location of Prevention Location of Wound Prevention: Sacrum/Coccyx  Dressing Present Dressing Present : No  Dressing Status    Wound Offloading Wound Offloading (Prevention Methods): Repositioning,Bed, pressure reduction mattress     INTAKE/OUPUT  Date 04/28/22 1900 - 04/29/22 0659 04/29/22 0700 - 04/30/22 0659   Shift 9185-1213 24 Hour Total 0456-2989 6721-7682 24 Hour Total   INTAKE   P.O.   240  240     P. O.   240  240   Shift Total   240  240   OUTPUT   Urine 2050 2050        Urine Voided 2050 2050        Urine Occurrence(s) 4 x 4 x 3 x  3 x   Stool          Stool Occurrence(s) 0 x 0 x 1 x  1 x   Shift Total 2050 2050      NET -2050 -2050 240  240   Weight (kg)            Recommendations:  1. Patient needs and requests: na    2. Pending tests/procedures: na     3. Functional Level/Equipment: Partial (one person) /      Fall Precautions:   Fall risk precautions were reinforced with the patient; he was instructed to call for help prior to getting up. The following fall risk precautions were continued: bed/ chair alarms, door signage, yellow bracelet and socks as well as update of the Gregg [employer]s tool in the patient's room. Ezio Score: 4    HEALS Safety Check    A safety check occurred in the patient's room between off going nurse and oncoming nurse listed above.     The safety check included the below items  Area Items   H  High Alert Medications - Verify all high alert medication drips (heparin, PCA, etc.)   E  Equipment - Suction is set up for ALL patients (with eviker)  - Red plugs utilized for all equipment (IV pumps, etc.)  - WOWs wiped down at end of shift.  - Room stocked with oxygen, suction, and other unit-specific supplies   A  Alarms - Bed alarm is set for fall risk patients  - Ensure chair alarm is in place and activated if patient is up in a chair   L  Lines - Check IV for any infiltration  - Benitez bag is empty if patient has a Benitez   - Tubing and IV bags are labeled   S  Safety   - Room is clean, patient is clean, and equipment is clean. - Hallways are clear from equipment besides carts. - Fall bracelet on for fall risk patients  - Ensure room is clear and free of clutter  - Suction is set up for ALL patients (with yanker)  - Hallways are clear from equipment besides carts.    - Isolation precautions followed, supplies available outside room, sign posted     María Rainey RN

## 2022-04-29 NOTE — WOUND CARE
Physical Exam   Patient: Cherelle Merritt  Room #: 176  Date:  04/28/22   AURELIO: [id number]    Situation: Wound Care Consult    Background:    PMH:    Abscess of right shoulder      Abscess of shoulder      Arthritis      Epidural abscess      Heart murmur      Hematuria      Heroin abuse (HCC)      Hypertension      Infected wound      Infectious disease      Iron deficiency anemia      IV drug user      Liver disease      Tobacco abuse           PSurgHx:        Past Surgical History:   Procedure Laterality Date    HX COLONOSCOPY   07/18/2016    HX CYST REMOVAL         rt.forearm and rt.foot    HX ENDOSCOPY   07/18/2016    HX FREE SKIN GRAFT        HX OTHER SURGICAL         right forearm infections and graft      Current Dx:    Anemia ICD-10-CM: D64.9  ICD-9-CM: 285. 9   4/22/2022 Unknown           Thrombocytopenia (HCC) ICD-10-CM: D69.6  ICD-9-CM: 287. 5   4/22/2022 Unknown           Tibia/fibula fracture ICD-10-CM: S82.209A, S82.409A  ICD-9-CM: 823.82   4/22/2022 Unknown           Metatarsal fracture ICD-10-CM: S92.309A  ICD-9-CM: 825.25   4/22/2022 Unknown      Jonathan Score: 19/23   BMI: 24.41   Preventive measures in place: Other bed/chair alarms    Assessment:   Patient found sitting. Patient is Oriented x person, place, time and situation. Wound(s) Description:         Wound #1    Location: Other right knee  Stage/Etiology: Surgical    Characteristics: appears to be healing well with good reapproximation. Staples in place. Tiana-wound skin is clear/intact  Drainage: Sanguinous and Scant   Measurements: 15x0.1cm. Medial incision 3x0.1cm. Photo:         Wound #2  Location: Other right ankle  Stage/Etiology: Surgical    Characteristics: appears to be healing well with good reapproximation, staples in place. Tiana-wound skin is clear/intact  Drainage: Serosanguinous and Scant   Measurements: 2x0.1cm x 3 incisions.    Photo:         Recommendations:    Wound care orders as follows: Every other day clean incisions to right knee and ankle with wound spray then apply Xeroform and dry dressing. Wrap with kerlex then reapply posterior splint with ace wraps. Supplies Used: Xeroform gauze, kerlex, ace wrap         Care discussed with primary nurse, Emelia Bazan RN. Care turned over to nursing staff at this time. Soniya VARGASN, RN, [address] [place] [address],[address], [place] [address] Dept  [phone]

## 2022-04-29 NOTE — PROGRESS NOTES
Problem: Self Care Deficits Care Plan (Adult)  Goal: *Acute Goals and Plan of Care (Insert Text)  Description: Occupational Therapy Goals   Long Term Goals  Initiated 4/29/2022 and to be accomplished within 2 week(s), 5/13/2022  1. Pt will perform self-feeding with Mod I.  2. Pt will perform grooming with Mod I.  3. Pt will perform UB bathing with Mod I.  4. Pt will perform LB bathing with Mod I using AE and/ or compensatory strategies as needed. 5. Pt will perform tub/shower transfer with supv using least restrictive assistive device while maintaining weight bearing restrictions. 6. Pt will perform UB dressing with Mod I.  7. Pt will perform LB dressing with supv using AE and/ or compensatory strategies as needed. 8. Pt will perform toileting task with Mod I.  9. Pt will perform toilet transfer with supv using least restrictive assistive device while maintaining weight bearing restrictions. Short Term Goals   Initiated 4/29/2022 and to be accomplished within 7 day(s), by 5/6/2022  1. Pt will perform self-feeding with set-up. 2. Pt will perform grooming with SBA. 3. Pt will perform UB bathing with set-up. 4. Pt will perform LB bathing with SBA using AE and/ or compensatory strategies as needed. 5. Pt will perform tub/shower transfer with Min A using least restrictive assistive device while maintaining weight bearing restrictions. 6. Pt will perform UB dressing with set-up. 7. Pt will perform LB dressing with CGA/ SBA using AE and/ or compensatory strategies as needed. 8. Pt will perform toileting task with Min A/ CGA. 9. Pt will perform toilet transfer with SBA using least restrictive assistive device while maintaining weight bearing restrictions. Outcome: Progressing Towards Goal  Goal: Interventions  Outcome: Progressing Towards Goal   OCCUPATIONAL THERAPY EVALUATION    Patient: Giuseppe Brown 74 y.o.   Date: 4/29/2022  Primary Diagnosis: Tibia/fibula fracture [X83.450X, S82.409A]    Patient identified with name and : yes    Past Medical History:   Past Medical History:   Diagnosis Date    Abscess of right shoulder     Abscess of shoulder     Arthritis     Epidural abscess     Heart murmur     Hematuria     Heroin abuse ([facility] [address]     Hypertension     Infected wound     Infectious disease     Iron deficiency anemia     IV drug user     Liver disease     Tobacco abuse      Precautions: Fall precautions; Safety precautions; PWB R LE    First Tx session:   Time In: 0900  Time Out: 1000    Second Tx session:  Time In: 1330  Time Out: 1359    Pain:  Pt reports 5/10 pain or discomfort prior to treatment; R LE (aching; throbbing)  Pt reports 5/10 pain or discomfort post treatment; R LE (aching; throbbing)  Interventions: repositioning; intermittent rest breaks. Pt denies need for pain medication at this time. SUBJECTIVE:   Patient stated my leg feels better propped up.     OBJECTIVE DATA SUMMARY:   ADL scoring; RUE/ LUE MMT    [x]  Right hand dominant   []  Left hand dominant    Therapy Diagnosis:   Difficulty with ADLs  [x]     Difficulty with functional transfers  [x]     Difficulty with ambulation  [x]     Difficulty with IADLs  [x]       Problem List:    Decreased strength B UE  [x]     Decreased strength trunk/core  [x]     Decreased AROM   [x]     Decreased endurance  [x]     Decreased balance sitting  []     Decreased balance standing  [x]     Pain   [x]     Decreased PROM  []       Functional Limitations:   Decreased independence with ADL  [x]     Decreased independence with functional transfers  [x]     Decreased independence with ambulation  [x]     Decreased independence with IADL  [x]       Previous Functional Level: Pre-Morbid Level of Function: Per patient he was employed full time and (I) with ADL's/ IADL's. He does not drive. Pt reports being (I) with mobility without AD prior to recent fall and current hospitalization.      Home Environment: Home Situation  Home Environment: Private residence  # Steps to Enter: 2  Rails to Enter: Yes  Hand Rails : Bilateral  Wheelchair Ramp: No  One/Two Story Residence: One story  Living Alone: No  Support Systems: Other Family Member(s);children; friends  Patient Expects to be Discharged to[de-identified] Home  Current DME Used/Available at Home: None  Tub or Shower Type: Tub/Shower combination    Barriers to Learning/Limitations: yes;  physical  Compensate with: visual, verbal, tactile, kinesthetic cues/model    Outcome Measures:      MMT Initial Assessment   Right Upper Extremity  Left Upper Extremity    UE AROM RUE AROM WFL LUE AROM WFL   Shoulder flexion 4-/5 4-/5   Shoulder extension 4-/5 4-/5   Shoulder ABDuction     Shoulder ADDUction     Elbow Flexion 4-/5 4-/5   Elbow Extension 4-/5 4-/5   Wrist Extension/Flexion 4-/5 4-/5    4-/5 4-/5   0/5 No palpable muscle contraction  1/5 Palpable muscle contraction, no joint movement  2-/5 Less than full range of motion in gravity eliminated position  2/5 Able to complete full range of motion in gravity eliminated position  2+/5 Able to initiate movement against gravity  3-/5 More than half but not full range of motion against gravity  3/5 Able to complete full range of motion against gravity  3+/5 Completes full range of motion against gravity with minimal resistance  4-/5 Completes full range of motion against gravity with minimal resistance  4/5 Completes full range of motion against gravity with moderate resistance  5/5 Completes full range of motion against gravity with maximum resistance    Sensation: intact to light touch  Coordination: RUE/ LUE FMC appears intact    FIM SCORES Initial Assessment   Bladder - level of assist     Bladder - accident frequency score     Bowel - level of assist     Bowel - accident frequency score     Please see [facility] Interdisciplinary Eval: Coordination/Balance Section for details regarding FIM score description.     COGNITION/PERCEPTION Initial Assessment   Reading Status     Writing Status     Arousal/Alertness     Orientation Level Oriented X4   Visual Fields     Praxis     Body Scheme       COMPREHENSION MODE Initial Assessment   Primary Mode of Comprehension Auditory   Hearing Aide None   Corrective Lenses     Score 5     EXPRESSION Initial Assessment   Primary Mode of Expression Verbal   Score 5   Comments       SOCIAL INTERACTION/PRAGMATICS Initial Assessment   Score 5   Comments       PROBLEM SOLVING Initial Assessment   Score 5   Comments       MEMORY Initial Assessment   Score 5   Comments       EATING Initial Assessment   Functional Level 5   Comments  (Self-feeding (set-up) of meal tray; pt opens lids/ packets)     GROOMING Initial Assessment   Functional Level 4 (CGA in stance; set-up/ supv wheelchair at sink)    Oral Hygiene FIM: 4 (CGA in stance)   Tasks completed by patient Washed face,Washed hands,Brushed teeth (Oral hygiene)   Comments CGA in stance; set-up/ supv wheelchair at sink     BATHING Initial Assessment   Functional Level 4 (UB bathing: set-up; LB bathing: Min A)   Body parts patient bathed Abdomen,Arm, left,Arm, right,Chest,Lower leg and foot, left,Tiana area,Thigh, left,Thigh, right   Comments UB bathing: set-up/ supv with basin on tray table; LB bathing: simulated bathing bed level with assistance to wash distal left lower extremity and sacrum. TUB/SHOWER TRANSFER INDEPENDENCE Initial Assessment   Score 0 (Not assessed at this time 2/2 pain R LE and safety/ balance)   Comments Not assessed at this time 2/2 pain R LE and safety/ balance     UPPER BODY DRESSING/UNDRESSING Initial Assessment   Functional Level 5 (set-up/ supv )   Items applied/Steps completed Pullover (4 steps)   Comments UB dressing performed set-up/ supv seated edge of bed for doffing/ donning scrub top.         LOWER BODY DRESSING/UNDRESSING Initial Assessment   Functional Level 4 (Min A overall)    Sock and/or Shoe Management FIM: 5   Items applied/Steps completed Sock, left (1 step) (Elastic waist shorts (3 steps))   Comments Patient requiring assistance to thread shorts over RLE (s/p surgical intervention tibia/ fibula fx); pt able to thread shorts over left lower extremity. Pt able to pull shorts over hips to waist (bed level). Pt doffed/ donned left slipper sock with SBA seated in wheelchair. TOILETING Initial Assessment   Functional Level 3 (Mod A)   Comments  (CM performed Mod A; hygiene set-up/ supv)     TOILET TRANSFER INDEPENDENCE Initial Assessment   Transfer score 4 (Stand step xfer (Min A) using RW; PWB R LE; gait belt)   Comments  (Elevated seat over commode; Stand step xfer (Min A) using RW)     INSTRUMENTAL ADL Initial Assessment (PLOF)   Meal preparation Independent (PLOF)   Homemaking Independent (PLOF)   Medicine Management Independent (PLOF)   Financial Management Independent (PLOF)        ASSESSMENT :  Based on the objective data described above, the patient presents with decreased (I) with ADL's, decreased strength/ endurance, decreased activity tolerance, impaired functional transfers/ mobility, impaired balance/ coordination, pain (R LE), and weight bearing restrictions impacting patient's functional independence with ADL's and mobility. Patient presented seated wheelchair level at bedside. Alert and oriented x4. Therapist introduced self and role of occupational therapy on inpatient rehab unit. Patient agreeable to participate in occupational therapy evaluation and ADL treatment session at this time. UB ADL's performed supv/ set-up level; LB ADL's performed with Min A. Stand step xfer performed (Min A) using RW and gait belt; pt demonstrating good adherence to PWB status R LE. Patient limited at times 2/2 pain R LE; pain decreasing with R LE positioned on elevated leg rest. Patient currently requiring Mod A during toileting self-care for clothing management due to standing balance, pain, and weight bearing restrictions.  Pt would benefit from continued skilled occupational therapy interventions to address decreased (I) with ADL's, decreased strength/ endurance, decreased activity tolerance, impaired functional transfers/ mobility, impaired balance/ coordination, pain (R LE), and weight bearing restrictions impacting patient's functional independence with ADL's and mobility. Second tx session: focused on ADL training during toileting self-care and functional mobility/ transfer training using least restrictive AD (RW) while maintaining PWB R LE. Sit to supine (Min A) for managing R LE. Therapist introduced AE for improved functional ability with LB dressing tasks e.g. reacher. Pt would benefit from skilled occupational therapy in order to improve independent functional mobility/ADLs,/IADLs within the home. Interventions may include range of motion (AROM, PROM B UE), motor function (B UE/ strengthening/coordination), activity tolerance (vitals, oxygen saturation levels), balance training, ADL/IADL training and functional transfer training. Please see IRC; Interdisciplinary Eval, Care Plan, and Patient Education for further information regarding occupational therapy examination and plan of care. PLAN :  Recommendations and Planned Interventions:  [x]               Self Care Training                   [x]        Therapeutic Activities  [x]               Functional Mobility Training    []        Cognitive Retraining  [x]               Therapeutic Exercises            [x]        Endurance Activities  [x]               Balance Training                     [x]        Neuromuscular Re-Education  []               Visual/Perceptual Training      [x]   Home Safety Training  [x]               Patient Education                    [x]        Family Training/Education  []               Other (comment):    Frequency/Duration: Patient will be followed by occupational therapy 1-2 times per day/4-7 days per week to address goals.   Discharge Recommendations: Home Health  Further Equipment Recommendations for Discharge: bedside commode, gait belt and transfer bench; TBD pending progress     Please refer to the flow sheet for vital signs taken during this treatment. After treatment:   [x] Patient left in no apparent distress sitting up in wheelchair with needs met at conclusion of first tx session  [x] Patient left in no apparent distress in bed with needs met at conclusion of second tx session  [x] Call bell left within reach  [x] Nursing notified  [] Caregiver present  [x] Wheelchair alarm activated (at conclusion of first tx session; Bed alarm activated (at conclusion of second tx session)    COMMUNICATION/EDUCATION:   [] Home safety education was provided and the patient/caregiver indicated understanding. [x] Patient/family have participated as able in goal setting and plan of care. [x] Patient/family agree to work toward stated goals and plan of care. [] Patient understands intent and goals of therapy, but is neutral about his/her participation. [] Patient is unable to participate in goal setting and plan of care. Order received from MD for occupational therapy services and chart reviewed. Pt to be seen 5 times per week for 3 hours of total therapy per day for 2 weeks.   Thank you for the referral.    Thank you for this referral.  Citlaly Munson, OT

## 2022-04-29 NOTE — CONSULTS
Comprehensive Nutrition Assessment    Type and Reason for Visit: Initial,Consult    Nutrition Recommendations/Plan:   1. No nutrition intervention indicated at this time. Will re-screen as appropriate. 2. Check weight as able - nursing to obtain     Malnutrition Assessment:  Malnutrition Status:  No malnutrition (04/29/22 1648)      Nutrition History and Allergies: Past medical hx:  Heroin abuse, HTN, infectious disease, liver disease, IV drug user, iron deficiency anemia. Good appetite/ meal intake PTA. Denied experiencing any recent changes in weight PTA; UBW is 180 lb. Noted weight gain over the last ~10 years per chart hx. No known food allergies     Nutrition Assessment:    Pt reported good appetite/ meal intake PTA and since admission. Tolerating diet. Reported stable weight PTA. Last wt taken was on 4/22/2022, stated wt of 180 lb. Attempted with RN to obtain current wt today via bed scale; not functioning properly at this time; RN to obtain at later time as able. Nutrition Related Findings:    BM 4/27. No edema. Wound Type: Surgical incision    Current Nutrition Intake & Therapies:  Average Meal Intake: %  Average Supplement Intake: None ordered  ADULT DIET Regular; Low Fat/Low Chol/High Fiber/2 gm Na    Anthropometric Measures:  Height: 6' (182.9 cm)  Ideal Body Weight (IBW): 178 lbs (81 kg)  Admission Body Weight:  (not taken)  Current Body Wt:  81.6 kg (179 lb 14.3 oz), 101.1 % IBW.  Stated  Current BMI (kg/m2): 24.4  Usual Body Weight: 81.6 kg (180 lb)  % Weight Change (Calculated): -0.1  Weight Adjustment: No adjustment  BMI Category: Normal weight (BMI 22.0-24.9) age over 72    Estimated Daily Nutrient Needs:  Energy Requirements Based On: Formula  Weight Used for Energy Requirements: Current  Energy (kcal/day): 3324-6505  Weight Used for Protein Requirements: Current  Protein (g/day): 65-82  Method Used for Fluid Requirements: 1 ml/kcal  Fluid (ml/day): 8590-5073    Nutrition Diagnosis:   · No nutrition diagnosis at this time          Nutrition Interventions:   Food and/or Nutrient Delivery: Continue current diet,Mineral supplement,Vitamin supplement  Nutrition Education/Counseling: No recommendations at this time,Education not indicated  Coordination of Nutrition Care: Continue to monitor while inpatient  Plan of Care discussed with: pt and RN    Goals:     Goals: Meet at least 75% of estimated needs,PO intake 75% or greater,by next RD assessment       Nutrition Monitoring and Evaluation:   Behavioral-Environmental Outcomes: None identified  Food/Nutrient Intake Outcomes: Food and nutrient intake,Vitamin/mineral intake  Physical Signs/Symptoms Outcomes: Biochemical data,Meal time behavior    Discharge Planning:    No discharge needs at this time    Keenan Guidry, [address]: [phone]

## 2022-04-29 NOTE — ROUTINE PROCESS
SHIFT CHANGE NOTE FOR [facility]    Bedside and Verbal shift change report given to Cristina Sanders RN (oncoming nurse) by Victorino Faye RN (offgoing nurse). Report included the following information SBAR, Kardex, MAR and Recent Results.     Situation:   Code Status: Full Code   Hospital Day: 1   Problem List:   Hospital Problems  Date Reviewed: 3/6/2020          Codes Class Noted POA    Tibia/fibula fracture ICD-10-CM: S82.209A, S82.409A  ICD-9-CM: 823.82  4/22/2022 Unknown              Background:   Past Medical History:   Past Medical History:   Diagnosis Date    Abscess of right shoulder     Abscess of shoulder     Arthritis     Epidural abscess     Heart murmur     Hematuria     Heroin abuse ([facility] [address]     Hypertension     Infected wound     Infectious disease     Iron deficiency anemia     IV drug user     Liver disease     Tobacco abuse         Assessment:   Changes in Assessment throughout shift: No change to previous assessment     Patient has a central line: no Reasons if yes: na  Insertion date:na Last dressing date:na   Patient has Morris Cath: no Reasons if yes: na   Insertion date:na  Shift morris care completed: NO     Last Vitals:     Vitals:    04/28/22 1757 04/28/22 2042 04/29/22 0022 04/29/22 0404   BP: 126/67 139/63 (!) 146/63 (!) 152/71   Pulse: (!) 59 74 73 72   Resp: 19 18 18 18   Temp: 99.2 °F (37.3 °C) 97.3 °F (36.3 °C) 98.7 °F (37.1 °C) 99.1 °F (37.3 °C)   SpO2: 97% 99% 98% 99%   Height: 6' (1.829 m)           PAIN    Pain Assessment    Pain Intensity 1: 0 (04/29/22 0505) Pain Intensity 1: 2 (12/29/14 1105)    Pain Location 1: Leg Pain Location 1: Abdomen    Pain Intervention(s) 1: Medication (see MAR) Pain Intervention(s) 1: Medication (see MAR)  Patient Stated Pain Goal: 0 Patient Stated Pain Goal: 0  o Intervention effective: yes  o Other actions taken for pain: Medication (see MAR)     Skin Assessment  Skin color    Condition/Temperature    Integrity Skin Integrity: Incision (comment) (ORIF to RLE)  [facility]    Weekly Pressure Ulcer Documentation  Pressure  Injury Documentation: No Pressure Injury Noted-Pressure Ulcer Prevention Initiated  Wound Prevention & Protection Methods  Orientation of wound Orientation of Wound Prevention: Posterior  Location of Prevention Location of Wound Prevention: Sacrum/Coccyx  Dressing Present    Dressing Status    Wound Offloading Wound Offloading (Prevention Methods): Bed, pressure redistribution/air,Bed, pressure reduction mattress,Pillows,Repositioning,Wheelchair     INTAKE/OUPUT  Date 04/28/22 0700 - 04/29/22 0659 04/29/22 0700 - 04/30/22 0659   Shift 6862-0013 3690-3105 24 Hour Total 3085-5206 6557-3392 24 Hour Total   INTAKE   Shift Total         OUTPUT   Urine  2050 2050        Urine Voided  2050 2050        Urine Occurrence(s)  4 x 4 x      Stool           Stool Occurrence(s)  0 x 0 x      Shift Total  2050 2050      NET  -2050 -2050      Weight (kg)             Recommendations:  1. Patient needs and requests: na    2. Pending tests/procedures: na     3. Functional Level/Equipment: Partial (one person) / Bed (comment); Stabilization belt; Wheelchair    Fall Precautions:   Fall risk precautions were reinforced with the patient; he was instructed to call for help prior to getting up. The following fall risk precautions were continued: bed/ chair alarms, door signage, yellow bracelet and socks as well as update of the David Gault tool in the patient's room. Ezio Score: 4    HEALS Safety Check    A safety check occurred in the patient's room between off going nurse and oncoming nurse listed above.     The safety check included the below items  Area Items   H  High Alert Medications - Verify all high alert medication drips (heparin, PCA, etc.)   E  Equipment - Suction is set up for ALL patients (with yanker)  - Red plugs utilized for all equipment (IV pumps, etc.)  - WOWs wiped down at end of shift.  - Room stocked with oxygen, suction, and other unit-specific supplies   A  Alarms - Bed alarm is set for fall risk patients  - Ensure chair alarm is in place and activated if patient is up in a chair   L  Lines - Check IV for any infiltration  - Benitez bag is empty if patient has a Benitez   - Tubing and IV bags are labeled   S  Safety   - Room is clean, patient is clean, and equipment is clean. - Hallways are clear from equipment besides carts. - Fall bracelet on for fall risk patients  - Ensure room is clear and free of clutter  - Suction is set up for ALL patients (with yanker)  - Hallways are clear from equipment besides carts.    - Isolation precautions followed, supplies available outside room, sign posted     Anitra Velez RN

## 2022-04-30 PROCEDURE — 74011250637 HC RX REV CODE- 250/637: Performed by: EMERGENCY MEDICINE

## 2022-04-30 PROCEDURE — 97530 THERAPEUTIC ACTIVITIES: CPT

## 2022-04-30 PROCEDURE — 65310000000 HC RM PRIVATE REHAB

## 2022-04-30 PROCEDURE — 97542 WHEELCHAIR MNGMENT TRAINING: CPT

## 2022-04-30 PROCEDURE — 97110 THERAPEUTIC EXERCISES: CPT

## 2022-04-30 RX ADMIN — POLYETHYLENE GLYCOL 3350 17 G: 17 POWDER, FOR SOLUTION ORAL at 08:07

## 2022-04-30 RX ADMIN — OXYCODONE HYDROCHLORIDE 10 MG: 5 TABLET ORAL at 08:03

## 2022-04-30 RX ADMIN — FERROUS SULFATE TAB 325 MG (65 MG ELEMENTAL FE) 325 MG: 325 (65 FE) TAB at 08:08

## 2022-04-30 RX ADMIN — THERA TABS 1 TABLET: TAB at 08:08

## 2022-04-30 RX ADMIN — DOCUSATE SODIUM 100 MG: 100 CAPSULE, LIQUID FILLED ORAL at 08:08

## 2022-04-30 RX ADMIN — OXYCODONE HYDROCHLORIDE 10 MG: 5 TABLET ORAL at 03:56

## 2022-04-30 RX ADMIN — Medication 100 MG: at 08:08

## 2022-04-30 RX ADMIN — AMLODIPINE BESYLATE 10 MG: 10 TABLET ORAL at 08:08

## 2022-04-30 RX ADMIN — OXYCODONE HYDROCHLORIDE 10 MG: 5 TABLET ORAL at 16:12

## 2022-04-30 RX ADMIN — METHADONE HYDROCHLORIDE 45 MG: 10 TABLET ORAL at 09:12

## 2022-04-30 RX ADMIN — PANTOPRAZOLE 40 MG: 40 TABLET, DELAYED RELEASE ORAL at 06:42

## 2022-04-30 RX ADMIN — OXYCODONE HYDROCHLORIDE 10 MG: 5 TABLET ORAL at 20:18

## 2022-04-30 RX ADMIN — FERROUS SULFATE TAB 325 MG (65 MG ELEMENTAL FE) 325 MG: 325 (65 FE) TAB at 16:17

## 2022-04-30 RX ADMIN — ASPIRIN 81 MG CHEWABLE TABLET 81 MG: 81 TABLET CHEWABLE at 08:08

## 2022-04-30 RX ADMIN — DOCUSATE SODIUM 100 MG: 100 CAPSULE, LIQUID FILLED ORAL at 17:05

## 2022-04-30 RX ADMIN — ASPIRIN 81 MG CHEWABLE TABLET 81 MG: 81 TABLET CHEWABLE at 17:15

## 2022-04-30 RX ADMIN — FOLIC ACID 1 MG: 1 TABLET ORAL at 08:08

## 2022-04-30 NOTE — PROGRESS NOTES
Problem: Falls - Risk of  Goal: *Absence of Falls  Description: Document Taz Sumner Fall Risk and appropriate interventions in the flowsheet.   Outcome: Progressing Towards Goal  Note: Fall Risk Interventions:  Mobility Interventions: Bed/chair exit alarm         Medication Interventions: Evaluate medications/consider consulting pharmacy,Bed/chair exit alarm,Patient to call before getting OOB    Elimination Interventions: Call light in reach,Bed/chair exit alarm,Patient to call for help with toileting needs    History of Falls Interventions: Bed/chair exit alarm,Evaluate medications/consider consulting pharmacy         Problem: Patient Education: Go to Patient Education Activity  Goal: Patient/Family Education  Outcome: Progressing Towards Goal     Problem: Pain  Goal: *Control of Pain  Outcome: Progressing Towards Goal  Goal: *PALLIATIVE CARE:  Alleviation of Pain  Outcome: Progressing Towards Goal     Problem: Patient Education: Go to Patient Education Activity  Goal: Patient/Family Education  Outcome: Progressing Towards Goal     Problem: Patient Education: Go to Patient Education Activity  Goal: Patient/Family Education  Outcome: Progressing Towards Goal     Problem: Patient Education: Go to Patient Education Activity  Goal: Patient/Family Education  Outcome: Progressing Towards Goal

## 2022-04-30 NOTE — PROGRESS NOTES
Progress Note    Patient: Arturo Mix MRN: [id number]  CSN: [id number]    YOB: 1955  Age: 74 y.o. Sex: male    DOA: 4/28/2022 LOS:  LOS: 2 days                    Subjective:     Primary Rehabilitation Impairment Category (MICHEL): Miscellaneous     Impairment Group Label: Debility     Etiologic Diagnosis: Anemia     Pt seen and examined review chart dioscussed with patient and nursing staff     Review of systems  General: had low grade fever last night no chills. Cardiovascular: No chest pain or pressure. No palpitations. Pulmonary: No shortness of breath, cough or wheeze. Gastrointestinal: No abdominal pain, nausea, vomiting or diarrhea. Genitourinary: No urinary frequency, urgency, hesitancy or dysuria. Musculoskeletal: Rt leg pain s/p surgery dressing clean  Neurologic: No headache, generalized weakness    Objective:     Physical Exam:  Visit Vitals  /66 (BP 1 Location: Left upper arm, BP Patient Position: At rest)   Pulse 63   Temp 97.5 °F (36.4 °C)   Resp 17   Ht 6' (1.829 m)   SpO2 98%   BMI 24.41 kg/m²        General:         Alert, cooperative, no acute distress    HEENT: NC, Atraumatic. PERRLA, anicteric sclerae. Lungs: CTA Bilaterally. No Wheezing/Rhonchi/Rales. Heart:  Regular  rhythm,  No murmur, No Rubs, No Gallops  Abdomen: Soft, Non distended, Non tender.  +Bowel sounds, no HSM  Extremities:  dressing and ace bandage Rt leg clean  Psych:   Not anxious or agitated. Neurologic:  CN 2-12 grossly intact, Alert and oriented X 3.   No acute neurological                                 Deficits,     Intake and Output:  Current Shift:  04/30 0701 - 04/30 1900  In: 720 [P.O.:720]  Out: -   Last three shifts:  04/28 1901 - 04/30 0700  In: 240 [P.O.:240]  Out: 5150 [Urine:5150]    Labs: Results:       Chemistry Recent Labs     04/29/22  0534   *      K 4.5      CO2 28   BUN 10   CREA 0.88   CA 8.0*   AGAP 2*   BUCR 11*      CBC w/Diff Recent Labs 04/29/22  0534   WBC 3.1*   RBC 3.07*   HGB 8.9*   HCT 28.6*   PLT 63*   GRANS 52   LYMPH 30   EOS 5      Cardiac Enzymes No results for input(s): CPK, CKND1, RADHA in the last 72 hours. No lab exists for component: CKRMB, TROIP   Coagulation No results for input(s): PTP, INR, APTT, INREXT, INREXT in the last 72 hours. Lipid Panel No results found for: CHOL, CHOLPOCT, CHOLX, CHLST, CHOLV, 731226, HDL, HDLP, LDL, LDLC, DLDLP, 513799, VLDLC, VLDL, TGLX, TRIGL, TRIGP, TGLPOCT, CHHD, CHHDX   BNP No results for input(s): BNPP in the last 72 hours. Liver Enzymes No results for input(s): TP, ALB, TBIL, AP in the last 72 hours.     No lab exists for component: SGOT, GPT, DBIL   Thyroid Studies No results found for: T4, T3U, TSH, TSHEXT, TSHEXT       Procedures/imaging: see electronic medical records for all procedures/Xrays and details which were not copied into this note but were reviewed prior to creation of Plan    Medications:   Current Facility-Administered Medications   Medication Dose Route Frequency    pantoprazole (PROTONIX) tablet 40 mg  40 mg Oral ACB    acetaminophen (TYLENOL) tablet 650 mg  650 mg Oral Q6H PRN    amLODIPine (NORVASC) tablet 10 mg  10 mg Oral DAILY    aspirin chewable tablet 81 mg  81 mg Oral BID    ferrous sulfate tablet 325 mg  325 mg Oral BID WITH MEALS    folic acid (FOLVITE) tablet 1 mg  1 mg Oral DAILY    methadone (DOLOPHINE) tablet 45 mg  45 mg Oral DAILY    oxyCODONE IR (ROXICODONE) tablet 10 mg  10 mg Oral Q4H PRN    polyethylene glycol (MIRALAX) packet 17 g  17 g Oral DAILY    therapeutic multivitamin (THERAGRAN) tablet 1 Tablet  1 Tablet Oral DAILY    thiamine HCL (B-1) tablet 100 mg  100 mg Oral DAILY    docusate sodium (COLACE) capsule 100 mg  100 mg Oral BID    bisacodyL (DULCOLAX) suppository 10 mg  10 mg Rectal Q48H PRN       Assessment/Plan     Active Problems:    Tibia/fibula fracture (4/22/2022)      Overview: > On 4/23/2022:        > Surgery: Closed fracture of right tibia insertion intra medullary nail.       ( Dr. Bermudez Common)      Plan    Right tibia/fibula fracture/low-grade fever patient patient rehab  Status post surgery  Use incentive spirometry  PT and OT evaluation and treatment  Out of bed to chair with assistance  Fall precautions  Chest x-ray  Urine analysis/urine culture if urine analysis positive  Blood culture x2  Tylenol 650 every 6 hours as needed  Monitor for new symptoms      Iron deficiency anemia/status post 1 unit transfusion  Ferrous sulfate 325 mg twice daily  Colace 100 mg twice daily  Protonix 40 mg once daily  MiraLAX 17 g once daily    Hypertension/polysubstance abuse  Norvasc 10 mg once daily  Aspirin 81 mg once daily  Thiamine 100 mg once daily  Folic acid 1 mg once daily  Methadone 45 mg daily      Portal hypertension/cirrhosis of the liver thrombocytopenia/questionable liver mass  Patient seen by hematology oncology  During his hospital stay  Need follow-up with oncology      Nicolas Johnson MD  4/30/2022 3:31 PM

## 2022-04-30 NOTE — PROGRESS NOTES
Problem: Mobility Impaired (Adult and Pediatric)  Goal: *Acute Goals and Plan of Care (Insert Text)  Description: Physical Therapy Short Term Goals  Initiated 4/29/2022 and to be accomplished within 5-7 day(s) (5/06/2022)  1. Patient will move from supine to sit and sit to supine , scoot up and down, and roll side to side in bed with supervision/set-up. 2.  Patient will transfer from bed to chair and chair to bed with supervision/set-up using the least restrictive device. 3.  Patient will perform sit to stand with supervision/set-up. 4.  Patient will ambulate with supervision/set-up for 50 feet with the least restrictive device. 5.  Patient will ascend/descend 2 stairs with B handrail(s) with minimal assistance/contact guard assist.    Physical Therapy Long Term Goals  Initiated 4/29/2022 and to be accomplished within 10-14 day(s) (5/13/2022)  1. Patient will move from supine to sit and sit to supine , scoot up and down, and roll side to side in bed with modified independence. 2.  Patient will transfer from bed to chair and chair to bed with modified independence using the least restrictive device. 3.  Patient will perform sit to stand with modified independence. 4.  Patient will ambulate with modified independence for 150 feet with the least restrictive device. 5.  Patient will ascend/descend 2 stairs with B handrail(s) with modified independence. Outcome: Progressing Towards Goal  PHYSICAL THERAPY TREATMENT    Patient: Delta Crow (89 y.o. male)  Date: 4/30/2022  Diagnosis: Tibia/fibula fracture [S82.209A, S82.409A] <principal problem not specified>  Precautions: Fall,Other (comment) (PWB Right LE)  Chart, physical therapy assessment, plan of care and goals were reviewed. Time In:0900  Time Out:1005    Patient seen for:  gait, transfers, LE strengthening, There Act    Pain:  Pt pain was reported as 6 pre-treatment. Pt pain was reported as 5 post-treatment.   Intervention: rest and elevating right LE as needed    Patient identified with name and :yes    SUBJECTIVE:      \"I'm ready to work. \"    OBJECTIVE DATA SUMMARY:    Objective:     GROSS ASSESSMENT Daily Assessment            BED/MAT MOBILITY Daily Assessment     Supine to Sit : 5 (Supervision) (with increased time and use of rail)      TRANSFERS Daily Assessment     Other: stand step with RW  Transfer Assistance : 4 (Minimal assistance)  Sit to Stand Assistance: Minimal assistance (progressed to light CGA )  Verbal and tactile cues for anterior weight shift during sit to stand transfer, with repetition patient progressed from min A to light CGA        GAIT Daily Assessment    Gait Description (WDL)      Gait Abnormalities Antalgic    Assistive device Walker, rolling;Gait belt    Ambulation assistance - level surface 4 (Minimal assistance)    Distance 8 Feet (ft)    Ambulation assistance- uneven surface  NT    Comments Cues to stay close to walker and increase use of UEs to allow for increased step length and foot clearance on the left        STEPS/STAIRS Daily Assessment     Steps/Stairs ambulated      Assistance Required 0 (Not tested) (2/2 pt ambulating only short distances 2/2 pain)    Rail Use      Comments  NT    Curbs/Ramps          BALANCE Daily Assessment     Sitting - Static: Good (unsupported)  Sitting - Dynamic: Good (unsupported)  Standing - Static: Fair (with RW for UE support)  Pt relying heavily on UEs to maintain balance in standing        WHEELCHAIR MOBILITY Daily Assessment     Able to Propel (ft): 250 feet  Functional Level: 5  Curbs/Ramps Assist Required (FIM Score): 0 (Not tested)  Wheelchair Management: Manages right brake;Manages left brake  Pt was able to maintain straight course and maneuver around obstacles with only supervision today        THERAPEUTIC EXERCISES Daily Assessment       Seated B LE exercises 2x10 with 2 pound weight on left LE: LAQ, marching, knee flexion and glute sets          ASSESSMENT:  Pt is progressing with transfers however is limited by pain with right LE in dependent position limiting his ambulation distance and activity tolerance to seated and standing activities. Pt remains motivated and is participating well despite pain. Progression toward goals:  []      Improving appropriately and progressing toward goals  [x]      Improving slowly and progressing toward goals  []      Not making progress toward goals and plan of care will be adjusted      PLAN:  Patient continues to benefit from skilled intervention to address the above impairments. Continue treatment per established plan of care. Discharge Recommendations:  Home Physical Therapy  Further Equipment Recommendations for Discharge:  TBD as patient progresses      Estimated Discharge Date: TBD    Activity Tolerance:   Fair-  Please refer to the flowsheet for vital signs taken during this treatment.     After treatment:   [] Patient left in no apparent distress in bed  [x] Patient left in no apparent distress sitting up in chair  [] Patient left in no apparent distress in w/c mobilizing under own power  [] Patient left in no apparent distress dining area  [] Patient left in no apparent distress mobilizing under own power  [x] Call bell left within reach  [x] Nursing notified  [] Caregiver present  [] Bed alarm activated   [x] Chair alarm activated      Dianne Andrea, PT  4/30/2022

## 2022-04-30 NOTE — PROGRESS NOTES
conducted an initial consultation and Spiritual Assessment for Melchor Marie, who is a 74 y. o.,male. Patient's Primary Language is: [place]. According to the patient's EMR Yazidi Affiliation is: No Jew. The reason the Patient came to the hospital is:   Patient Active Problem List    Diagnosis Date Noted    Liver mass 04/24/2022    Bladder mass 04/24/2022    Hematuria 04/24/2022    Anemia 04/22/2022    Thrombocytopenia ([facility] [address] 04/22/2022    Tibia/fibula fracture 04/22/2022    Metatarsal fracture 04/22/2022    Alcohol abuse 04/22/2022    HTN (hypertension) 04/22/2022    Leukopenia 08/05/2012    Iron deficiency anemia, unspecified 08/05/2012    Epidural abscess, L2-L5 08/04/2012    Polysubstance abuse ([facility] [address] 08/04/2012        The  provided the following Interventions:  Continued a relationship of care and support as I had also visited him on a regular unit pre-surgery. Explored issues of estefanía, belief, spirituality and Sikh/ritual needs. Listened empathically as he requested for I keep him prayer during these \"trying times. \"  Offered assurance of prayers on patient's behalf. Chart reviewed. The following outcomes where achieved:  Patient shared limited information about both their medical narrative and spiritual journey/beliefs. Patient expressed gratitude for 's visit. Assessment:  Patient does not have any Sikh/cultural needs that will affect patient's preferences in health care. There are no additional spiritual or Sikh issues which require intervention at this time. Plan:  Chaplains will continue to follow and will provide pastoral care on an as needed/requested basis.  recommends bedside caregivers page  on duty if patient shows signs of acute spiritual or emotional distress.     [address] [place]   [phone]

## 2022-04-30 NOTE — ROUTINE PROCESS
SHIFT CHANGE NOTE FOR [facility]    Bedside and Verbal shift change report given to [facility], RN (oncoming nurse) by Daquan Arriaza RN (offgoing nurse). Report included the following information SBAR, Kardex, MAR and Recent Results.     Situation:   Code Status: Full Code   Hospital Day: 2   Problem List:   Hospital Problems  Date Reviewed: 3/6/2020          Codes Class Noted POA    Tibia/fibula fracture ICD-10-CM: S82.209A, S82.409A  ICD-9-CM: 823.82  4/22/2022 Unknown    Overview Signed 4/29/2022  3:06 PM by Xena Gipson I NP     > On 4/23/2022:    > Surgery: Closed fracture of right tibia insertion intra medullary nail. ( Dr. Benitez Gave)                   Background:   Past Medical History:   Past Medical History:   Diagnosis Date    Abscess of right shoulder     Abscess of shoulder     Arthritis     Epidural abscess     Heart murmur     Hematuria     Heroin abuse ([address]     Hypertension     Infected wound     Infectious disease     Iron deficiency anemia     IV drug user     Liver disease     Tobacco abuse         Assessment:   Changes in Assessment throughout shift: No change to previous assessment     Patient has a central line: no Reasons if yes: na  Insertion date:na Last dressing date:na   Patient has Morris Cath: no Reasons if yes: na   Insertion date:na  Shift morris care completed: NO     Last Vitals:     Vitals:    04/29/22 0404 04/29/22 0745 04/29/22 1541 04/29/22 1641   BP: (!) 152/71 (!) 148/67 124/84    Pulse: 72 81 75    Resp: 18 18 15    Temp: 99.1 °F (37.3 °C) 98.5 °F (36.9 °C) 98.4 °F (36.9 °C)    SpO2: 99% 98% 99%    Height:    6' (1.829 m)        PAIN    Pain Assessment    Pain Intensity 1: 8 (04/30/22 0356) Pain Intensity 1: 2 (12/29/14 1105)    Pain Location 1: Leg Pain Location 1: Abdomen    Pain Intervention(s) 1: Elevation,Medication (see MAR) Pain Intervention(s) 1: Medication (see MAR)  Patient Stated Pain Goal: 0 Patient Stated Pain Goal: 0  o Intervention effective: yes  o Other actions taken for pain: Elevation;Medication (see MAR)     Skin Assessment  Skin color    Condition/Temperature    Integrity Skin Integrity: Wound (add Wound LDA)  Turgor    Weekly Pressure Ulcer Documentation  Pressure  Injury Documentation: No Pressure Injury Noted-Pressure Ulcer Prevention Initiated  Wound Prevention & Protection Methods  Orientation of wound Orientation of Wound Prevention: Posterior  Location of Prevention Location of Wound Prevention: Sacrum/Coccyx  Dressing Present Dressing Present : No  Dressing Status    Wound Offloading Wound Offloading (Prevention Methods): Bed, pressure reduction mattress,Elevate heels,Pillows,Repositioning,Wheelchair,Walker     INTAKE/OUPUT  Date 04/29/22 0700 - 04/30/22 0659 04/30/22 0700 - 05/01/22 0659   Shift 1364-6221 8401-6242 24 Hour Total 3320-5938 8877-4752 24 Hour Total   INTAKE   P.O. 240  240        P. O. 240  240      Shift Total 240  240      OUTPUT   Urine  3100 3100        Urine Voided  3100 3100        Urine Occurrence(s) 3 x 6 x 9 x      Emesis/NG output           Emesis Occurrence(s)  0 x 0 x      Stool           Stool Occurrence(s) 1 x 0 x 1 x      Shift Total  3100 3100       -3100 -2860      Weight (kg)             Recommendations:  1. Patient needs and requests: na    2. Pending tests/procedures: na     3. Functional Level/Equipment: Partial (one person) / Bed (comment); [place]; Wheelchair; Anti-embolic stockings    Fall Precautions:   Fall risk precautions were reinforced with the patient; he was instructed to call for help prior to getting up. The following fall risk precautions were continued: bed/ chair alarms, door signage, yellow bracelet and socks as well as update of the Gaylan Mandes tool in the patient's room. Ezio Score: 4    HEALS Safety Check    A safety check occurred in the patient's room between off going nurse and oncoming nurse listed above.     The safety check included the below items  Area Items H  High Alert Medications - Verify all high alert medication drips (heparin, PCA, etc.)   E  Equipment - Suction is set up for ALL patients (with devin)  - Red plugs utilized for all equipment (IV pumps, etc.)  - WOWs wiped down at end of shift.  - Room stocked with oxygen, suction, and other unit-specific supplies   A  Alarms - Bed alarm is set for fall risk patients  - Ensure chair alarm is in place and activated if patient is up in a chair   L  Lines - Check IV for any infiltration  - Benitez bag is empty if patient has a Benitez   - Tubing and IV bags are labeled   S  Safety   - Room is clean, patient is clean, and equipment is clean. - Hallways are clear from equipment besides carts. - Fall bracelet on for fall risk patients  - Ensure room is clear and free of clutter  - Suction is set up for ALL patients (with devin)  - Hallways are clear from equipment besides carts.    - Isolation precautions followed, supplies available outside room, sign posted     Margarito Tilley RN

## 2022-04-30 NOTE — PROGRESS NOTES
Patient's rivo=471.1 . Dr Yuri Solomon notified with new orders and were carried out. CXR done,UA/C& S sent. BCx2 done . Tylenol 650 mg po given as ordered. LETI midline removed as order.

## 2022-05-01 PROCEDURE — 65310000000 HC RM PRIVATE REHAB

## 2022-05-01 PROCEDURE — 97530 THERAPEUTIC ACTIVITIES: CPT

## 2022-05-01 PROCEDURE — 2709999900 HC NON-CHARGEABLE SUPPLY

## 2022-05-01 PROCEDURE — 74011250637 HC RX REV CODE- 250/637: Performed by: EMERGENCY MEDICINE

## 2022-05-01 PROCEDURE — 97110 THERAPEUTIC EXERCISES: CPT

## 2022-05-01 PROCEDURE — 74011250637 HC RX REV CODE- 250/637: Performed by: FAMILY MEDICINE

## 2022-05-01 RX ADMIN — Medication 100 MG: at 08:02

## 2022-05-01 RX ADMIN — OXYCODONE HYDROCHLORIDE 10 MG: 5 TABLET ORAL at 15:02

## 2022-05-01 RX ADMIN — FERROUS SULFATE TAB 325 MG (65 MG ELEMENTAL FE) 325 MG: 325 (65 FE) TAB at 08:01

## 2022-05-01 RX ADMIN — METHADONE HYDROCHLORIDE 45 MG: 10 TABLET ORAL at 08:02

## 2022-05-01 RX ADMIN — OXYCODONE HYDROCHLORIDE 10 MG: 5 TABLET ORAL at 23:06

## 2022-05-01 RX ADMIN — FERROUS SULFATE TAB 325 MG (65 MG ELEMENTAL FE) 325 MG: 325 (65 FE) TAB at 17:16

## 2022-05-01 RX ADMIN — OXYCODONE HYDROCHLORIDE 10 MG: 5 TABLET ORAL at 09:45

## 2022-05-01 RX ADMIN — POLYETHYLENE GLYCOL 3350 17 G: 17 POWDER, FOR SOLUTION ORAL at 08:01

## 2022-05-01 RX ADMIN — DOCUSATE SODIUM 100 MG: 100 CAPSULE, LIQUID FILLED ORAL at 08:02

## 2022-05-01 RX ADMIN — ACETAMINOPHEN 650 MG: 325 TABLET ORAL at 17:55

## 2022-05-01 RX ADMIN — AMLODIPINE BESYLATE 10 MG: 10 TABLET ORAL at 08:02

## 2022-05-01 RX ADMIN — PANTOPRAZOLE 40 MG: 40 TABLET, DELAYED RELEASE ORAL at 07:03

## 2022-05-01 RX ADMIN — THERA TABS 1 TABLET: TAB at 08:02

## 2022-05-01 RX ADMIN — OXYCODONE HYDROCHLORIDE 10 MG: 5 TABLET ORAL at 19:01

## 2022-05-01 RX ADMIN — ASPIRIN 81 MG CHEWABLE TABLET 81 MG: 81 TABLET CHEWABLE at 08:01

## 2022-05-01 RX ADMIN — OXYCODONE HYDROCHLORIDE 10 MG: 5 TABLET ORAL at 05:40

## 2022-05-01 RX ADMIN — DOCUSATE SODIUM 100 MG: 100 CAPSULE, LIQUID FILLED ORAL at 17:16

## 2022-05-01 RX ADMIN — OXYCODONE HYDROCHLORIDE 10 MG: 5 TABLET ORAL at 00:18

## 2022-05-01 RX ADMIN — FOLIC ACID 1 MG: 1 TABLET ORAL at 08:01

## 2022-05-01 RX ADMIN — ASPIRIN 81 MG CHEWABLE TABLET 81 MG: 81 TABLET CHEWABLE at 17:16

## 2022-05-01 NOTE — ROUTINE PROCESS
SHIFT CHANGE NOTE FOR [facility]    Bedside and Verbal shift change report given to [facility], RN (oncoming nurse) by Tone Villalobos RN (offgoing nurse). Report included the following information SBAR, Kardex, MAR and Recent Results.     Situation:   Code Status: Full Code   Hospital Day: 3   Problem List:   Hospital Problems  Date Reviewed: 3/6/2020          Codes Class Noted POA    Tibia/fibula fracture ICD-10-CM: S82.209A, S82.409A  ICD-9-CM: 823.82  4/22/2022 Unknown    Overview Signed 4/29/2022  3:06 PM by Ralph PERALTA NP     > On 4/23/2022:    > Surgery: Closed fracture of right tibia insertion intra medullary nail. ( Dr. David Weathers)                   Background:   Past Medical History:   Past Medical History:   Diagnosis Date    Abscess of right shoulder     Abscess of shoulder     Arthritis     Epidural abscess     Heart murmur     Hematuria     Heroin abuse ([address]     Hypertension     Infected wound     Infectious disease     Iron deficiency anemia     IV drug user     Liver disease     Tobacco abuse         Assessment:   Changes in Assessment throughout shift:       Patient has a central line: no Reasons if yes: na  Insertion date:na Last dressing date:na   Patient has Morris Cath: no Reasons if yes: na   Insertion date:na  Shift morris care completed: NO     Last Vitals:     Vitals:    04/29/22 1641 04/30/22 0743 04/30/22 1553 04/30/22 2018   BP:  135/66 (!) 142/70 139/70   Pulse:  63 68 71   Resp:  17 19 17   Temp:  97.5 °F (36.4 °C) 98 °F (36.7 °C) 97.1 °F (36.2 °C)   SpO2:  98% 99% 99%   Height: 6' (1.829 m)           PAIN    Pain Assessment    Pain Intensity 1: 4 (05/01/22 0640) Pain Intensity 1: 2 (12/29/14 1105)    Pain Location 1: Ankle Pain Location 1: Abdomen    Pain Intervention(s) 1: Medication (see MAR) Pain Intervention(s) 1: Medication (see MAR)  Patient Stated Pain Goal: 0 Patient Stated Pain Goal: 0  o Intervention effective: yes  o Other actions taken for pain: Medication (see MAR)     Skin Assessment  Skin color    Condition/Temperature    Integrity Skin Integrity: Incision (comment)  Turgor    Weekly Pressure Ulcer Documentation  Pressure  Injury Documentation: No Pressure Injury Noted-Pressure Ulcer Prevention Initiated  Wound Prevention & Protection Methods  Orientation of wound Orientation of Wound Prevention: Posterior  Location of Prevention Location of Wound Prevention: Buttocks,Sacrum/Coccyx  Dressing Present Dressing Present : No  Dressing Status    Wound Offloading Wound Offloading (Prevention Methods): Bed, pressure redistribution/air     INTAKE/OUPUT  Date 04/30/22 0700 - 05/01/22 0659 05/01/22 0700 - 05/02/22 0659   Shift 1789-0430 8300-4252 24 Hour Total 2129-5084 9441-2668 24 Hour Total   INTAKE   P.O. 960  960        P. O. 960  960      Shift Total 960  960      OUTPUT   Urine 600 2750 3350        Urine Voided 600 2750 3350        Urine Occurrence(s) 3 x 6 x 9 x      Stool           Stool Occurrence(s) 0 x 0 x 0 x      Shift Total 600 2750 3350       -2750 -2390      Weight (kg)             Recommendations:  1. Patient needs and requests: pain management,dressing change,emptying urinal    2. Pending tests/procedures: na     3. Functional Level/Equipment: Partial (one person) /      Fall Precautions:   Fall risk precautions were reinforced with the patient; he was instructed to call for help prior to getting up. The following fall risk precautions were continued: bed/ chair alarms, door signage, yellow bracelet and socks as well as update of the Lavaughn Harp tool in the patient's room. Ezio Score:      HEALS Safety Check    A safety check occurred in the patient's room between off going nurse and oncoming nurse listed above.     The safety check included the below items  Area Items   H  High Alert Medications - Verify all high alert medication drips (heparin, PCA, etc.)   E  Equipment - Suction is set up for ALL patients (with devin)  - Red plugs utilized for all equipment (IV pumps, etc.)  - WOWs wiped down at end of shift.  - Room stocked with oxygen, suction, and other unit-specific supplies   A  Alarms - Bed alarm is set for fall risk patients  - Ensure chair alarm is in place and activated if patient is up in a chair   L  Lines - Check IV for any infiltration  - Benitez bag is empty if patient has a Benitez   - Tubing and IV bags are labeled   S  Safety   - Room is clean, patient is clean, and equipment is clean. - Hallways are clear from equipment besides carts. - Fall bracelet on for fall risk patients  - Ensure room is clear and free of clutter  - Suction is set up for ALL patients (with yanker)  - Hallways are clear from equipment besides carts.    - Isolation precautions followed, supplies available outside room, sign posted     Scooter Nunez RN

## 2022-05-01 NOTE — ROUTINE PROCESS
SHIFT CHANGE NOTE FOR [facility]    Bedside and Verbal shift change report given to SHAWN Mistry (oncoming nurse) by Jaime Erikcson RN (offgoing nurse). Report included the following information SBAR, Kardex, MAR and Recent Results.     Situation:   Code Status: Full Code   Hospital Day: 3   Problem List:   Hospital Problems  Date Reviewed: 3/6/2020          Codes Class Noted POA    Tibia/fibula fracture ICD-10-CM: S82.209A, S82.409A  ICD-9-CM: 823.82  4/22/2022 Unknown    Overview Signed 4/29/2022  3:06 PM by César PERALTA NP     > On 4/23/2022:    > Surgery: Closed fracture of right tibia insertion intra medullary nail. ( Dr. Power Briscoe)                   Background:   Past Medical History:   Past Medical History:   Diagnosis Date    Abscess of right shoulder     Abscess of shoulder     Arthritis     Epidural abscess     Heart murmur     Hematuria     Heroin abuse ([address]     Hypertension     Infected wound     Infectious disease     Iron deficiency anemia     IV drug user     Liver disease     Tobacco abuse         Assessment:   Changes in Assessment throughout shift: No change to previous assessment     Patient has a central line: no Reasons if yes: na  Insertion date:na Last dressing date:na   Patient has Morris Cath: no Reasons if yes: na   Insertion date:na  Shift morris care completed: NO     Last Vitals:     Vitals:    04/30/22 1553 04/30/22 2018 05/01/22 0750 05/01/22 1643   BP: (!) 142/70 139/70 (!) 158/77 127/68   Pulse: 68 71 65 71   Resp: 19 17 18 18   Temp: 98 °F (36.7 °C) 97.1 °F (36.2 °C) 97.3 °F (36.3 °C) 97.4 °F (36.3 °C)   SpO2: 99% 99% 99% 97%   Height:            PAIN    Pain Assessment    Pain Intensity 1: 8 (05/01/22 1903) Pain Intensity 1: 2 (12/29/14 1105)    Pain Location 1: Knee,Ankle Pain Location 1: Abdomen    Pain Intervention(s) 1: Medication (see MAR) Pain Intervention(s) 1: Medication (see MAR)  Patient Stated Pain Goal: 0 Patient Stated Pain Goal: 0  o Intervention effective: yes  o Other actions taken for pain: Medication (see MAR)     Skin Assessment  Skin color    Condition/Temperature    Integrity Skin Integrity: Incision (comment)  Turgor    Weekly Pressure Ulcer Documentation  Pressure  Injury Documentation: No Pressure Injury Noted-Pressure Ulcer Prevention Initiated  Wound Prevention & Protection Methods  Orientation of wound Orientation of Wound Prevention: Posterior  Location of Prevention Location of Wound Prevention: Sacrum/Coccyx  Dressing Present Dressing Present : No  Dressing Status    Wound Offloading Wound Offloading (Prevention Methods): Bed, pressure reduction mattress     INTAKE/OUPUT  Date 04/30/22 1900 - 05/01/22 0659 05/01/22 0700 - 05/02/22 0659   Shift 4007-1132 24 Hour Total 1976-4474 6964-1896 24 Hour Total   INTAKE   P.O.  960 720  720     P. O.  960 720  720   Shift Total  960 720  720   OUTPUT   Urine 2750 3350        Urine Voided 2750 3350        Urine Occurrence(s) 6 x 9 x 4 x  4 x   Stool          Stool Occurrence(s) 0 x 0 x 0 x  0 x   Shift Total 2750 3350      NET -2750 -2390 720  720   Weight (kg)            Recommendations:  1. Patient needs and requests: pain management,dressing change,emptying urinal    2. Pending tests/procedures: na     3. Functional Level/Equipment: Partial (one person) / Wheelchair    Fall Precautions:   Fall risk precautions were reinforced with the patient; he was instructed to call for help prior to getting up. The following fall risk precautions were continued: bed/ chair alarms, door signage, yellow bracelet and socks as well as update of the Jessica Jalloh tool in the patient's room. Ezio Score: 4    HEALS Safety Check    A safety check occurred in the patient's room between off going nurse and oncoming nurse listed above.     The safety check included the below items  Area Items   H  High Alert Medications - Verify all high alert medication drips (heparin, PCA, etc.)   E  Equipment - Suction is set up for ALL patients (with devin)  - Red plugs utilized for all equipment (IV pumps, etc.)  - WOWs wiped down at end of shift.  - Room stocked with oxygen, suction, and other unit-specific supplies   A  Alarms - Bed alarm is set for fall risk patients  - Ensure chair alarm is in place and activated if patient is up in a chair   L  Lines - Check IV for any infiltration  - Benitez bag is empty if patient has a Benitez   - Tubing and IV bags are labeled   S  Safety   - Room is clean, patient is clean, and equipment is clean. - Hallways are clear from equipment besides carts. - Fall bracelet on for fall risk patients  - Ensure room is clear and free of clutter  - Suction is set up for ALL patients (with devin)  - Hallways are clear from equipment besides carts.    - Isolation precautions followed, supplies available outside room, sign posted     Jessica Luevano RN

## 2022-05-01 NOTE — PROGRESS NOTES
Problem: Self Care Deficits Care Plan (Adult)  Goal: *Acute Goals and Plan of Care (Insert Text)  Description: Occupational Therapy Goals   Long Term Goals  Initiated 2022 and to be accomplished within 2 week(s), 2022  1. Pt will perform self-feeding with Mod I.  2. Pt will perform grooming with Mod I.  3. Pt will perform UB bathing with Mod I.  4. Pt will perform LB bathing with Mod I using AE and/ or compensatory strategies as needed. 5. Pt will perform tub/shower transfer with supv using least restrictive assistive device while maintaining weight bearing restrictions. 6. Pt will perform UB dressing with Mod I.  7. Pt will perform LB dressing with supv using AE and/ or compensatory strategies as needed. 8. Pt will perform toileting task with Mod I.  9. Pt will perform toilet transfer with supv using least restrictive assistive device while maintaining weight bearing restrictions. Short Term Goals   Initiated 2022 and to be accomplished within 7 day(s), by 2022  1. Pt will perform self-feeding with set-up. 2. Pt will perform grooming with SBA. 3. Pt will perform UB bathing with set-up. 4. Pt will perform LB bathing with SBA using AE and/ or compensatory strategies as needed. 5. Pt will perform tub/shower transfer with Min A using least restrictive assistive device while maintaining weight bearing restrictions. 6. Pt will perform UB dressing with set-up. 7. Pt will perform LB dressing with CGA/ SBA using AE and/ or compensatory strategies as needed. 8. Pt will perform toileting task with Min A/ CGA. 9. Pt will perform toilet transfer with SBA using least restrictive assistive device while maintaining weight bearing restrictions. Outcome: Progressing Towards Goal  Goal: Interventions  Outcome: Progressing Towards Goal   Occupational Therapy TREATMENT    Patient: Rick Vallecillo   74 y.o.     Patient identified with name and : yes    Date: 2022    First Tx Session  Time In: 1101  Time Out[de-identified] 1200        Diagnosis: Tibia/fibula fracture [S82.209A, S82.409A]   Precautions: Fall,Other (comment) (PWB Right LE)  Chart, occupational therapy assessment, plan of care, and goals were reviewed. Pain:  Pt reports 0/10 pain or discomfort prior to treatment. Pt reports -/10 pain or discomfort post treatment. Intervention Provided:       SUBJECTIVE:   Patient stated Napolean Pucker we go outside? Thalia Brand    OBJECTIVE DATA SUMMARY:     THERAPEUTIC EXERCISE Daily Assessment    Pt performed BUE strengthening with green theraband in all planes (shoulder flexion/extension, bicep curls, tricep extension, chest press) 8x15 reps, with RB's in between reps. MOBILITY/TRANSFERS Daily Assessment     Pt performed bed mobility rolling to L with supervision and transferring supine to sit with supervision. Pt transferred EOB to w/c with CGA for increased safety using FWW for stability. Pt performed functional mobility from pt room to therapy gym with supervision and on uneven terrain outdoors ascending and descending small hills from w/c self propelling with BUE and BLE with supervision. ASSESSMENT:  Pt performed w/c transfer with CGA and VC's for safety with RLE PWB status. Pt requires RB's with BUE strengthening secondary to decreased activity tolerance. Continued OT necessary to increase independence and safety with self cares and increasing BUE strength and activity tolerance. Progression toward goals:  [x]          Improving appropriately and progressing toward goals  []          Improving slowly and progressing toward goals  []          Not making progress toward goals and plan of care will be adjusted     PLAN:  Patient continues to benefit from skilled intervention to address the above impairments. Continue treatment per established plan of care.   Discharge Recommendations:  Home Health  Further Equipment Recommendations for Discharge:  bedside commode and transfer bench Activity Tolerance:  fair      Estimated LOS:TBD    Please refer to the flowsheet for vital signs taken during this treatment. After treatment:   [x]  Patient left in no apparent distress sitting up in chair   []  Patient left in no apparent distress in bed  [x]  Call bell left within reach  []  Nursing notified  []  Caregiver present  []  Bed alarm activated    COMMUNICATION/EDUCATION:   [] Home safety education was provided and the patient/caregiver indicated understanding. [] Patient/family have participated as able in goal setting and plan of care. [x] Patient/family agree to work toward stated goals and plan of care. [] Patient understands intent and goals of therapy, but is neutral about his/her participation. [] Patient is unable to participate in goal setting and plan of care.       Med George, OT

## 2022-05-01 NOTE — PROGRESS NOTES
Problem: Falls - Risk of  Goal: *Absence of Falls  Description: Document Te Smith Fall Risk and appropriate interventions in the flowsheet. Outcome: Progressing Towards Goal  Note: Fall Risk Interventions:  Mobility Interventions: Bed/chair exit alarm,Patient to call before getting OOB    Mentation Interventions: Bed/chair exit alarm,Evaluate medications/consider consulting pharmacy    Medication Interventions: Bed/chair exit alarm,Evaluate medications/consider consulting pharmacy,Patient to call before getting OOB    Elimination Interventions: Call light in reach,Bed/chair exit alarm,Patient to call for help with toileting needs    History of Falls Interventions: Bed/chair exit alarm,Evaluate medications/consider consulting pharmacy         Problem: Patient Education: Go to Patient Education Activity  Goal: Patient/Family Education  Outcome: Progressing Towards Goal     Problem: Pain  Goal: *Control of Pain  Outcome: Progressing Towards Goal  Goal: *PALLIATIVE CARE:  Alleviation of Pain  Outcome: Progressing Towards Goal     Problem: Patient Education: Go to Patient Education Activity  Goal: Patient/Family Education  Outcome: Progressing Towards Goal     Problem: Patient Education: Go to Patient Education Activity  Goal: Patient/Family Education  Outcome: Progressing Towards Goal     Problem: Patient Education: Go to Patient Education Activity  Goal: Patient/Family Education  Outcome: Progressing Towards Goal     Problem: Pressure Injury - Risk of  Goal: *Prevention of pressure injury  Description: Document Jonathan Scale and appropriate interventions in the flowsheet.   Outcome: Progressing Towards Goal  Note: Pressure Injury Interventions:  Sensory Interventions: Assess changes in LOC,Chair cushion         Activity Interventions: Chair cushion,Increase time out of bed,Pressure redistribution bed/mattress(bed type)    Mobility Interventions: Chair cushion,HOB 30 degrees or less,Pressure redistribution bed/mattress (bed type)    Nutrition Interventions: Document food/fluid/supplement intake                     Problem: Patient Education: Go to Patient Education Activity  Goal: Patient/Family Education  Outcome: Progressing Towards Goal

## 2022-05-01 NOTE — PROGRESS NOTES
Progress Note    Patient: Abran Valadez MRN: [id number]  CSN: [id number]    YOB: 1955  Age: 74 y.o. Sex: male    DOA: 4/28/2022 LOS:  LOS: 3 days                    Subjective:     Primary Rehabilitation Impairment Category (MICHEL): Miscellaneous     Impairment Group Label: Debility     Etiologic Diagnosis: Anemia     Review of systems  General: had low grade fever last night no chills. Cardiovascular: No chest pain or pressure. Pulmonary: No shortness of breath, cough or wheeze. Gastrointestinal: No abdominal pain, nausea, vomiting or diarrhea. Genitourinary: No urinary frequency, urgency, hesitancy or dysuria. Musculoskeletal: Rt leg pain s/p surgery dressing clean pain improved   Neurologic: No headache, generalized weakness    Objective:     Physical Exam:  Visit Vitals  BP (!) 158/77 (BP 1 Location: Left upper arm, BP Patient Position: At rest)   Pulse 65   Temp 97.3 °F (36.3 °C)   Resp 18   Ht 6' (1.829 m)   SpO2 99%   BMI 24.41 kg/m²        General:         Alert, cooperative, no acute distress    HEENT: NC,  anicteric sclerae. Lungs: CTA Bilaterally. No Wheezing/Rhonchi/Rales. Heart:  Regular  rhythm,  No murmur, No Rubs, No Gallops  Abdomen: Soft, Non distended, Non tender. Extremities:  dressing and ace bandage Rt leg clean  Psych:   Not anxious or agitated. Neurologic:  CN 2-12 grossly intact, Alert and oriented X 3.   No acute neurological                                 Deficits,     Intake and Output:  Current Shift:  05/01 0701 - 05/01 1900  In: 240 [P.O.:240]  Out: -   Last three shifts:  04/29 1901 - 05/01 0700  In: 960 [P.O.:960]  Out: 6450 [Urine:6450]    Labs: Results:       Chemistry Recent Labs     04/29/22  0534   *      K 4.5      CO2 28   BUN 10   CREA 0.88   CA 8.0*   AGAP 2*   BUCR 11*      CBC w/Diff Recent Labs     04/29/22  0534   WBC 3.1*   RBC 3.07*   HGB 8.9*   HCT 28.6*   PLT 63*   GRANS 52   LYMPH 30   EOS 5      Cardiac Enzymes No results for input(s): CPK, CKND1, RADHA in the last 72 hours. No lab exists for component: CKRMB, TROIP   Coagulation No results for input(s): PTP, INR, APTT, INREXT, INREXT in the last 72 hours. Lipid Panel No results found for: CHOL, CHOLPOCT, CHOLX, CHLST, CHOLV, 614256, HDL, HDLP, LDL, LDLC, DLDLP, 309508, VLDLC, VLDL, TGLX, TRIGL, TRIGP, TGLPOCT, CHHD, CHHDX   BNP No results for input(s): BNPP in the last 72 hours. Liver Enzymes No results for input(s): TP, ALB, TBIL, AP in the last 72 hours.     No lab exists for component: SGOT, GPT, DBIL   Thyroid Studies No results found for: T4, T3U, TSH, TSHEXT, TSHEXT       Procedures/imaging: see electronic medical records for all procedures/Xrays and details which were not copied into this note but were reviewed prior to creation of Plan    Medications:   Current Facility-Administered Medications   Medication Dose Route Frequency    pantoprazole (PROTONIX) tablet 40 mg  40 mg Oral ACB    acetaminophen (TYLENOL) tablet 650 mg  650 mg Oral Q6H PRN    amLODIPine (NORVASC) tablet 10 mg  10 mg Oral DAILY    aspirin chewable tablet 81 mg  81 mg Oral BID    ferrous sulfate tablet 325 mg  325 mg Oral BID WITH MEALS    folic acid (FOLVITE) tablet 1 mg  1 mg Oral DAILY    methadone (DOLOPHINE) tablet 45 mg  45 mg Oral DAILY    oxyCODONE IR (ROXICODONE) tablet 10 mg  10 mg Oral Q4H PRN    polyethylene glycol (MIRALAX) packet 17 g  17 g Oral DAILY    therapeutic multivitamin (THERAGRAN) tablet 1 Tablet  1 Tablet Oral DAILY    thiamine HCL (B-1) tablet 100 mg  100 mg Oral DAILY    docusate sodium (COLACE) capsule 100 mg  100 mg Oral BID    bisacodyL (DULCOLAX) suppository 10 mg  10 mg Rectal Q48H PRN       Assessment/Plan     Active Problems:    Tibia/fibula fracture (4/22/2022)      Overview: > On 4/23/2022:        > Surgery: Closed fracture of right tibia insertion intra medullary nail.       ( Dr. Magalie Porter)      Plan    Right tibia/fibula fracture/low-grade fever patient patient rehab  Status post surgery  Use incentive spirometry  PT and OT evaluation and treatment  Out of bed to chair with assistance  Fall precautions    Fever on admission   Chest x-ray negative  Urine analysis negative /urine culture positive 20,000 rods  Pt has no sx f/u final result possible contamination or colonization   Blood culture x2 negative  Tylenol 650 every 6 hours as needed  Monitor for new symptoms      Iron deficiency anemia/status post 1 unit transfusion  Ferrous sulfate 325 mg twice daily  Colace 100 mg twice daily  Protonix 40 mg once daily  MiraLAX 17 g once daily    Hypertension/polysubstance abuse  Norvasc 10 mg once daily  Aspirin 81 mg once daily  Thiamine 100 mg once daily  Folic acid 1 mg once daily  Methadone 45 mg daily      Portal hypertension/cirrhosis of the liver thrombocytopenia/questionable liver mass  Patient seen by hematology oncology  During his hospital stay  Need follow-up with oncology      Derrick Krueger MD  5/1/2022 2:46  PM

## 2022-05-02 PROBLEM — Z74.09 IMPAIRED MOBILITY AND ADLS: Status: ACTIVE | Noted: 2022-04-22

## 2022-05-02 PROBLEM — Z78.9 IMPAIRED MOBILITY AND ADLS: Status: ACTIVE | Noted: 2022-04-22

## 2022-05-02 PROBLEM — S82.831D CLOSED FRACTURE OF DISTAL END OF RIGHT FIBULA WITH ROUTINE HEALING: Status: ACTIVE | Noted: 2022-04-22

## 2022-05-02 PROBLEM — S82.251D CLOSED DISPLACED COMMINUTED FRACTURE OF SHAFT OF RIGHT TIBIA WITH ROUTINE HEALING: Status: ACTIVE | Noted: 2022-05-02

## 2022-05-02 PROBLEM — S82.254D: Status: ACTIVE | Noted: 2022-05-02

## 2022-05-02 PROBLEM — S82.301D CLOSED FRACTURE OF DISTAL END OF RIGHT TIBIA WITH ROUTINE HEALING: Status: ACTIVE | Noted: 2022-04-22

## 2022-05-02 PROBLEM — D62 ACUTE BLOOD LOSS AS CAUSE OF POSTOPERATIVE ANEMIA: Status: ACTIVE | Noted: 2022-04-22

## 2022-05-02 PROBLEM — S82.251D CLOSED DISPLACED COMMINUTED FRACTURE OF SHAFT OF RIGHT TIBIA WITH ROUTINE HEALING: Status: ACTIVE | Noted: 2022-04-22

## 2022-05-02 PROBLEM — S82.91XD: Status: ACTIVE | Noted: 2022-04-22

## 2022-05-02 LAB
BACTERIA SPEC CULT: ABNORMAL
CC UR VC: ABNORMAL
SERVICE CMNT-IMP: ABNORMAL

## 2022-05-02 PROCEDURE — 65310000000 HC RM PRIVATE REHAB

## 2022-05-02 PROCEDURE — 99232 SBSQ HOSP IP/OBS MODERATE 35: CPT | Performed by: INTERNAL MEDICINE

## 2022-05-02 PROCEDURE — 74011250637 HC RX REV CODE- 250/637: Performed by: EMERGENCY MEDICINE

## 2022-05-02 PROCEDURE — 97535 SELF CARE MNGMENT TRAINING: CPT

## 2022-05-02 PROCEDURE — 97150 GROUP THERAPEUTIC PROCEDURES: CPT

## 2022-05-02 PROCEDURE — 97542 WHEELCHAIR MNGMENT TRAINING: CPT

## 2022-05-02 PROCEDURE — 97116 GAIT TRAINING THERAPY: CPT

## 2022-05-02 PROCEDURE — 97530 THERAPEUTIC ACTIVITIES: CPT

## 2022-05-02 PROCEDURE — 97110 THERAPEUTIC EXERCISES: CPT

## 2022-05-02 PROCEDURE — 74011250637 HC RX REV CODE- 250/637: Performed by: INTERNAL MEDICINE

## 2022-05-02 PROCEDURE — 2709999900 HC NON-CHARGEABLE SUPPLY

## 2022-05-02 RX ORDER — ACETAMINOPHEN 325 MG/1
650 TABLET ORAL
Status: DISCONTINUED | OUTPATIENT
Start: 2022-05-02 | End: 2022-05-12 | Stop reason: HOSPADM

## 2022-05-02 RX ORDER — OXYCODONE HYDROCHLORIDE 5 MG/1
10 TABLET ORAL
Status: DISCONTINUED | OUTPATIENT
Start: 2022-05-02 | End: 2022-05-12 | Stop reason: HOSPADM

## 2022-05-02 RX ORDER — BISACODYL 5 MG
10 TABLET, DELAYED RELEASE (ENTERIC COATED) ORAL DAILY PRN
Status: DISCONTINUED | OUTPATIENT
Start: 2022-05-02 | End: 2022-05-12 | Stop reason: HOSPADM

## 2022-05-02 RX ORDER — ACETAMINOPHEN 325 MG/1
650 TABLET ORAL 3 TIMES DAILY
Status: DISCONTINUED | OUTPATIENT
Start: 2022-05-03 | End: 2022-05-12 | Stop reason: HOSPADM

## 2022-05-02 RX ADMIN — DOCUSATE SODIUM 100 MG: 100 CAPSULE, LIQUID FILLED ORAL at 17:05

## 2022-05-02 RX ADMIN — OXYCODONE HYDROCHLORIDE 10 MG: 5 TABLET ORAL at 18:22

## 2022-05-02 RX ADMIN — ASPIRIN 81 MG CHEWABLE TABLET 81 MG: 81 TABLET CHEWABLE at 17:05

## 2022-05-02 RX ADMIN — Medication 100 MG: at 08:35

## 2022-05-02 RX ADMIN — FERROUS SULFATE TAB 325 MG (65 MG ELEMENTAL FE) 325 MG: 325 (65 FE) TAB at 17:05

## 2022-05-02 RX ADMIN — OXYCODONE HYDROCHLORIDE 10 MG: 5 TABLET ORAL at 14:03

## 2022-05-02 RX ADMIN — OXYCODONE HYDROCHLORIDE 10 MG: 5 TABLET ORAL at 05:00

## 2022-05-02 RX ADMIN — AMLODIPINE BESYLATE 10 MG: 10 TABLET ORAL at 08:36

## 2022-05-02 RX ADMIN — METHADONE HYDROCHLORIDE 45 MG: 10 TABLET ORAL at 08:35

## 2022-05-02 RX ADMIN — FERROUS SULFATE TAB 325 MG (65 MG ELEMENTAL FE) 325 MG: 325 (65 FE) TAB at 08:36

## 2022-05-02 RX ADMIN — PANTOPRAZOLE 40 MG: 40 TABLET, DELAYED RELEASE ORAL at 06:33

## 2022-05-02 RX ADMIN — ASPIRIN 81 MG CHEWABLE TABLET 81 MG: 81 TABLET CHEWABLE at 08:36

## 2022-05-02 RX ADMIN — THERA TABS 1 TABLET: TAB at 08:36

## 2022-05-02 RX ADMIN — OXYCODONE HYDROCHLORIDE 10 MG: 5 TABLET ORAL at 09:24

## 2022-05-02 RX ADMIN — FOLIC ACID 1 MG: 1 TABLET ORAL at 08:35

## 2022-05-02 RX ADMIN — DOCUSATE SODIUM 100 MG: 100 CAPSULE, LIQUID FILLED ORAL at 08:35

## 2022-05-02 RX ADMIN — OXYCODONE HYDROCHLORIDE 10 MG: 5 TABLET ORAL at 23:14

## 2022-05-02 NOTE — PROGRESS NOTES
Problem: Self Care Deficits Care Plan (Adult)  Goal: *Acute Goals and Plan of Care (Insert Text)  Description: Occupational Therapy Goals   Long Term Goals  Initiated 4/29/2022 and to be accomplished within 2 week(s), 5/13/2022  1. Pt will perform self-feeding with Mod I.  2. Pt will perform grooming with Mod I.  3. Pt will perform UB bathing with Mod I.  4. Pt will perform LB bathing with Mod I using AE and/ or compensatory strategies as needed. 5. Pt will perform tub/shower transfer with supv using least restrictive assistive device while maintaining weight bearing restrictions. 6. Pt will perform UB dressing with Mod I.  7. Pt will perform LB dressing with supv using AE and/ or compensatory strategies as needed. 8. Pt will perform toileting task with Mod I.  9. Pt will perform toilet transfer with supv using least restrictive assistive device while maintaining weight bearing restrictions. Short Term Goals   Initiated 4/29/2022 and to be accomplished within 7 day(s), by 5/6/2022  1. Pt will perform self-feeding with set-up. 2. Pt will perform grooming with SBA. 3. Pt will perform UB bathing with set-up. 4. Pt will perform LB bathing with SBA using AE and/ or compensatory strategies as needed. 5. Pt will perform tub/shower transfer with Min A using least restrictive assistive device while maintaining weight bearing restrictions. 6. Pt will perform UB dressing with set-up. 7. Pt will perform LB dressing with CGA/ SBA using AE and/ or compensatory strategies as needed. 8. Pt will perform toileting task with Min A/ CGA. 9. Pt will perform toilet transfer with SBA using least restrictive assistive device while maintaining weight bearing restrictions.        5/2/2022 1648 by Jamee Gray OT  Outcome: Progressing Towards Goal  Goal: Interventions  5/2/2022 1648 by Jamee Gray OT  Outcome: Progressing Towards Goal  Occupational Therapy TREATMENT    Patient: Janine Hodgkins 74 y.o.    Patient identified with name and : yes    Date: 2022    First Tx Session  Time In: 1330  Time Out: 1432    Diagnosis: Tibia/fibula fracture [S82.209A, S82.409A]   Precautions: Fall,Other (comment) (PWB Right LE)  Chart, occupational therapy assessment, plan of care, and goals were reviewed. Pain:  Pt reports 6/10 pain or discomfort prior to treatment; right ankle (aching). Pt reports 4/10 pain or discomfort post treatment; right ankle (aching). Intervention Provided: repositioning RLE on elevated leg rest; intermittent rest breaks. Care coordinated with Matilda Marrero LPN for pain medication. SUBJECTIVE:   Patient stated it's a constant dull ache\" and \"it really increases in pain when I stand up.  Patient's description of his RLE discomfort/ pain. OBJECTIVE DATA SUMMARY:     THERAPEUTIC ACTIVITY Daily Assessment    Patient performed functional wheelchair mobility using his BUE's and left lower extremity to propel w/c from pt's room to therapy gym at (SBA) level; ~258 ft. Patient requiring Min A for managing RLE leg rest.    RUE/ LUE [address] there act performed for reaching out-front and overhead to insert large colored pegs onto vertical foam pegboard surface (40 pegs); followed by removal. Therapist increased task challenge with use of 2 lb wrist weights. Verbal cues for alternating right/ left upper extremities and for following verbal instructions regarding peg color selection and placement onto pegboard. Performed for functional reach, range of motion, and strengthening for increased (I) with ADL's and functional transfers using LRAD (RW) while adhering to weight bearing restrictions. THERAPEUTIC EXERCISE Daily Assessment    RUE/ LUE there ex performed using tabletop shoulder ladder for range of motion and strengthening for increased (I) with ADL's and functional mobility using LRAD (RW) while maintaining R LE PWB status.  Therapist increased task challenge with use of 4 lb weight on wooden bar and 2 lb wrist weights. Pt performed 4 trials (with 4 lb weight on bar); 1 trial= up/ down six slots on shoulder ladder. Therapist modified task to include use of 2.5 lb weight on wooden bar versus 4 lb weight secondary to level of difficulty; pt performed 5 trials up/down shoulder ladder (2 lb weight on bar). Intermittent rest breaks due to arm fatigue. RUE/ LUE there ex performed using red flexbar (10 reps x2) for shoulder internal/ external rotation, forearm pronation/ supination, and wrist flexion/ extension. Performed for range of motion and strengthening for increased (I) with ADL's. Therapist providing verbal cues with demonstration for technique. GROOMING Daily Assessment    Functional Level: 5 (set-up/ supv)  Tasks Completed by Patient: Washed hands  Comments: Hand hygiene performed w/c level at sink following toileting self-care. TOILETING Daily Assessment    Functional Level: 4 (CGA)  Comments: Toileting CM performed CGA; hygiene supv; elevated seat over commode     MOBILITY/TRANSFERS Daily Assessment    Toilet Transfer Score: 4 (CGA/ Min A)  Comments: Functional xfer w/c <-> elevated seat over commode; use of grab bar; gait belt in use         ASSESSMENT:  Today's skilled occupational therapy session focused on ADL training (toileting; toilet transfer), RUE/ LUE [address], functional wheelchair mobility for accessing/ navigating within facility environment, and RUE/ LUE there ex (ROM; strengthening) for increased functional (I) with self-cares. Patient will benefit from continued skilled occupational therapy interventions to address decreased (I) with ADL's, decreased strength/ endurance, decreased activity tolerance, impaired functional transfers/ mobility, impaired balance/ coordination, pain (R LE), and weight bearing restrictions impacting patient's functional independence with ADL's and mobility. Patient is limited at times secondary to pain R LE.      Progression toward goals:  [] Improving appropriately and progressing toward goals  [x]          Improving slowly and progressing toward goals  []          Not making progress toward goals and plan of care will be adjusted     PLAN:  Patient continues to benefit from skilled intervention to address the above impairments. Continue treatment per established plan of care. Discharge Recommendations:  Home Health  Further Equipment Recommendations for Discharge:  bedside commode, gait belt, transfer bench; TBD pending progress. Activity Tolerance:  Fair+; intermittent rest breaks as needed due to fatigue and pain R LE. Estimated LOS: TBD    Please refer to the flowsheet for vital signs taken during this treatment. After treatment:   [x]  Patient left in no apparent distress sitting up in wheelchair with needs met  []  Patient left in no apparent distress in bed  [x]  Call bell left within reach  [x]  Nursing notified  []  Caregiver present  [x]  Wheelchair alarm activated    COMMUNICATION/EDUCATION:   [] Home safety education was provided and the patient/caregiver indicated understanding. [x] Patient/family have participated as able in goal setting and plan of care. [x] Patient/family agree to work toward stated goals and plan of care. [] Patient understands intent and goals of therapy, but is neutral about his/her participation. [] Patient is unable to participate in goal setting and plan of care.     [id number], OT

## 2022-05-02 NOTE — INTERDISCIPLINARY ROUNDS
[facility] PHYSICAL REHABILITATION  [address] [place] [address], [address]    INPATIENT REHABILITATION  PRE-TEAM CONFERENCE SUMMARY     Date of Conference: 5/3/2022    Patient Information:        Name: Mariposa Srinivasan Age / Sex: 74 y.o. / male   CSN: [id number] MRN: [id number]   Admit Date: 4/28/2022 Length of Stay: 4 days     Primary Rehabilitation Diagnosis  1. Impaired Mobility and ADLs  2. Closed displaced comminuted fracture of distal third of shaft of right tibia with routine healing  3. Closed fracture of distal end of right fibula with routine healing  4. S/P Closed IM nailing of the right tibia using the Synthes IM nail system with a double lock proximally and triple lock distally (4/22/2022 - Dr. Enrique Enriquez)    Comorbidities  Patient Active Problem List   Diagnosis Code    Epidural abscess, L2-L5 G06.1    Polysubstance abuse ([address] F19.10    Leukopenia D72.819    Iron deficiency anemia, unspecified D50.9    Anemia D64.9    Thrombocytopenia ([address] D69.6    Orthopedic aftercare for healing traumatic lower leg fracture, right, closed S82. 91XD    Metatarsal fracture S92.309A    Alcohol abuse F10.10    HTN (hypertension) I10    Liver mass R16.0    Bladder mass N32.89    Hematuria R31.9    Closed displaced comminuted fracture of shaft of right tibia with routine healing S82.251D    Closed fracture of distal end of right fibula with routine healing S82.831D    Impaired mobility and ADLs Z74.09, Z78.9    Acute blood loss as cause of postoperative anemia D62    Methadone dependence (HCC) F11.20          Therapy:     FIM SCORES Initial Assessment Weekly Progress Assessment 5/2/2022   Eating Functional Level: 5  Comments:  (Self-feeding (set-up) of meal tray; pt opens lids/ packets)  Functional Level: 5 set-up/ Mod I for self-feeding   Swallowing     Grooming 4 (CGA in stance; set-up/ supv wheelchair at sink)  4 (set-up seated edge of bed; CGA for grooming tasks performed in stance at sink)   Bathing 4 (UB bathing: set-up; LB bathing: Min A)  5 (UB bathing: set-up seated EOB; LB bathing: CGA/ SBA) use of LH sponge for washing distal left lower extremity. Upper Body Dressing Functional Level: 5 (set-up/ supv )  Items Applied/Steps Completed: Pullover (4 steps)  Comments: UB dressing performed set-up/ supv seated edge of bed for doffing/ donning scrub top. Functional level: 5 (set-up) seated edge of bed   Lower Body Dressing Functional Level: 4 (Min A overall)  Items Applied/Steps Completed: Sock, left (1 step) (Elastic waist shorts (3 steps))  Comments: Patient requiring assistance to thread shorts over RLE (s/p surgical intervention tibia/ fibula fx); pt able to thread shorts over left lower extremity. Pt able to pull shorts over hips to waist (bed level). Pt doffed/ donned left slipper sock with SBA seated in wheelchair. Functional Level: 4 (CGA/ Min A); minimal assist for doffing shorts over R LE splint while seated edge of bed. Toileting Functional Level: 3 (Mod A)  Comments:  (CM performed Mod A; hygiene set-up/ supv)  Functional Level: 4 (CGA/ SBA); elevated seat over commode   Bladder 0 0   Bowel  0 0   Toilet Transfer [place] Toilet Transfer Score: 4 (Stand step xfer (Min A) using RW; PWB R LE; gait belt)  Comments:  (Elevated seat over commode; Stand step xfer (Min A) using RW)  Toilet transfer score: 4 (CGA/ Min A using RW; R LE PWB) gait belt in use for safety; elevated seat over commode   Tub/Shower Transfer [place] Tub or Shower Type: Tub/Shower combination  Tub/Shower Transfer Score: 0 (Not assessed at this time 2/2 pain R LE and safety/ balance)  Comments: Not assessed at this time 2/2 pain R LE and safety/ balance  Tub/ Shower transfer score: 4 (CGA/ Min A using RW; R LE PWB) gait belt for safety. Functional transfer training using RW.      Comprehension Primary Mode of Comprehension: Auditory  Score: 5  Score: 5     Expression Primary Mode of Expression: Verbal  Score: 5  Score: 5   Social Interaction Score: 5  Score: 5   Problem Solving Score: 5  Score: 5   Memory Score: 5  Score: 5     FIM SCORES Initial Assessment Weekly Progress Assessment 5/2/2022   Bed/Chair/Wheelchair Transfers Transfer Type: Other  Other: stand step with RW  Transfer Assistance : 4 (Minimal assistance)  Sit to Stand Assistance: Minimal assistance  Car Transfers: Not tested (pt declined 2/2 pain)  Car Type: N/A Transfer Type:  Other  Other: stand step w/RW  Transfer Assistance : 4 (Minimal assistance) (CGA/min A, x 2 reps to L and to R)  Sit to Stand Assistance: Contact guard assistance (frequent vc's for hand placement )   Bed Mobility Rolling Right 4 (Minimal assistance)   Rolling Left 4 (Minimal assistance)   Supine to Sit 5 (Supervision)   Sit to Stand Minimal assistance   Sit to Supine 4 (Minimal assistance)    Rolling Right    4 (CGA/minimal assistance)   Rolling Left    4 (CGA/minimal assistance)   Supine to Sit   5 (Supervision) (additional time, flat mat table w/out side rails)   Sit to Stand   Contact guard assistance (frequent vc's for hand placement )   Sit to Supine    (CGA for R LE on mat table w/out siderail)      Locomotion (W/C) Able to Propel (ft): 270 feet  Functional Level: 4  Curbs/Ramps Assist Required (FIM Score): 0 (Not tested)  Wheelchair Setup Assist Required : 3 (Moderate assistance)  Wheelchair Management: Manages left brake,Manages right brake Function 5  Setup Assistance  4 (Minimal assistance)   W/c management: manages left brake, manages right brake   Locomotion (W/C distance)   250 feet (x2 reps between room and PT gym)   Locomotion (Walk) 4 (Minimal assistance) 4 (Minimal assistance)  Walker, rolling;Gait belt   Locomotion (Walk dist.) 6 Feet (ft) 24 Feet (ft) (x2 trials; seated rest break between)   Steps/Stairs Steps/Stairs Ambulated (#): 0  Level of Assist : 0 (Not tested) (2/2 pt ambulating only short distances 2/2 pain)  Rail Use: None  Steps/Stairs Ambulated (#): 0  Level of Assist: 0 (NTdue to pain levels in R LE)         Nursing:     Neuro:   AAA&O x4            Respiratory:   [x] WNL   [] O2 LPM:   Other:  Peripheral Vascular:   [] TEDS present   [x] Edema present ____ Grade   Cardiac:   [x] WNL   [] Other  Genitourinary:   [x] continent   [] incontinent   [] morris  Abdominal ____5/2/2022___ LBM  GI: ____Regular___ Diet __thn____ Liquids _____ tube feeds  Musculoskeletal: ____ ROM Transfers ___w/c__ Assistive Device Used  ___mod_ Level of Assistance  Skin Integumentary:   [] Intact   [x] Not Intact   __________Preventative Measures  Details_____Right tibia/fibia fracture_________________________________________________________  Pain: [x] Controlled   [] Not Controlled   Pain Meds:   [] Scheduled   [x] PRN        Interdisciplinary Team Goals:     1. Discipline  Physical Therapy    Goal  pt will demonstrated bed <> chair transfers with RW and SBA/CGA, stand step with RW and PWB R LE. Barrier  increased pain, decreased strength B LE's, impaired standing balance, decreased activity tolerance    Intervention  therex, therapeutic activities, balance/NMR, pt education    Goal written by:   Nancy Cespedes PT     2. Discipline  Occupational Therapy    Goal  Patient will perform LB dressing SBA using AE and/ or compensatory strategies as needed. Barrier Decreased (I) with ADL's, decreased strength/ endurance, impaired functional transfers/ mobility, impaired standing balance/ coordination, decreased activity tolerance, pain R LE    Intervention  ADL training, there ex, there act, functional transfer training, patient education    Goal written by:  James Stauffer OTR/L      3. Discipline  Speech Therapy    Goal      Barrier      Intervention      Goal written by:       4.  Discipline  Nursing    Goal  Patient will request pain medication at onset of pain before intensity increases    Barrier  Understanding pain medication,fears of side effects,cognition Intervention  Acute pain management,Regular pain assessments, health history    Goal written by:  Santa Ruiz LPN     5. Discipline  Clinical Psychology    Goal      Barrier      Intervention      Goal written by:           Disposition / Discharge Planning: Follow-up services:  [x] Physical Therapy             [x] Occupational Therapy       [] Speech Therapy           [] Skilled Nursing      [] Medical Social Worker   [] Aide        [] Outpatient      [] vs   [x] Home Health  [] vs       [] to progress to outpatient       [] with 24-hour supervision       [] with 24-hour assistance   [] [place]   [facility] recommendations:  rolling walker pending progress   Estimated discharge date:     Discharge Location:  [] Home  [] versus    [] [place]    [] [address]   [] Other:           Electronic Signatures:      Signature Date Signed   Physical Therapist    Nicole Damian, PT 5/2/2022   Occupational Therapist    Filomena Kline, OTR/L  5/3/2022   Speech Therapist         Recreational Therapist   Filiberto Heath, CTRS 5/2/2022   Nursing    Santa Ruiz LPN  8/2/2849   Clinical Psychologist         Physician    Claudette Brewer MD   5/2/2022               Opportunity to share with family/caregiver[] YES [] NO    Relationship to patient____________________________________________________      The above information has been reviewed with the patient in a language that they can understand. Opportunity for comments and questions has been provided and a signed attestation has been scanned into the \"media tab\" of the EMR.       Patient Signature: ______________________________________________________    Date Signed: __________________________________________________________

## 2022-05-02 NOTE — PROGRESS NOTES
Problem: Self Care Deficits Care Plan (Adult)  Goal: *Acute Goals and Plan of Care (Insert Text)  Description: Occupational Therapy Goals   Long Term Goals  Initiated 4/29/2022 and to be accomplished within 2 week(s), 5/13/2022  1. Pt will perform self-feeding with Mod I.  2. Pt will perform grooming with Mod I.  3. Pt will perform UB bathing with Mod I.  4. Pt will perform LB bathing with Mod I using AE and/ or compensatory strategies as needed. 5. Pt will perform tub/shower transfer with supv using least restrictive assistive device while maintaining weight bearing restrictions. 6. Pt will perform UB dressing with Mod I.  7. Pt will perform LB dressing with supv using AE and/ or compensatory strategies as needed. 8. Pt will perform toileting task with Mod I.  9. Pt will perform toilet transfer with supv using least restrictive assistive device while maintaining weight bearing restrictions. Short Term Goals   Initiated 4/29/2022 and to be accomplished within 7 day(s), by 5/6/2022  1. Pt will perform self-feeding with set-up. 2. Pt will perform grooming with SBA. 3. Pt will perform UB bathing with set-up. 4. Pt will perform LB bathing with SBA using AE and/ or compensatory strategies as needed. 5. Pt will perform tub/shower transfer with Min A using least restrictive assistive device while maintaining weight bearing restrictions. 6. Pt will perform UB dressing with set-up. 7. Pt will perform LB dressing with CGA/ SBA using AE and/ or compensatory strategies as needed. 8. Pt will perform toileting task with Min A/ CGA. 9. Pt will perform toilet transfer with SBA using least restrictive assistive device while maintaining weight bearing restrictions.        Outcome: Progressing Towards Goal  Goal: Interventions  Outcome: Progressing Towards Goal     OCCUPATIONAL THERAPY GROUP THERAPY TREATMENT     Patient: Mariposa Srinivasan (40 y.o. male)  Date: 5/2/2022  Diagnosis: Tibia/fibula fracture [S82.209A, S82.409A] Closed displaced comminuted fracture of shaft of right tibia with routine healing  Precautions: Fall,Other (comment) (PWB Right LE)        Time In: 1136  Time Out: 1206     Patient seen for: Group therapy session     Pain:  Pt pain was reported as 5/10 pre-treatment; right ankle (aching). Pt pain was reported as 5/10 post-treatment; right ankle (aching). Intervention: repositioning RLE using elevated leg rest; intermittent rest breaks as needed. Care coordinated with staff nurse; Bharti Rhodes LPN to follow-up. Patient identified with name and : yes     SUBJECTIVE:        Patient agreeable to participate in occupational therapy group there ex session. OBJECTIVE DATA SUMMARY:     Objective:       Therex   Sets Reps Comments   Shoulder shrugs 2 10    Shoulder forward/ backward rolls        2       10    Bicep curls        2       15 2 lb weighted bar   Chest Press        2       15 2 lb weighted bar   Stirring motion holding bar vertically (clockwise; counter clockwise)        2       15 2 lb weighted bar   Wrist flexion/extension        1        10 2 lb weighted bar   Rowing motion        2        15 2 lb weighted bar       Therac   Sets   Reps   Comments   Beach ball toss/catch there act performed along with question/ answer activity to facilitate active engagement among group members  Multiple reps during toss/ catch activity Beach ball toss/ catch activity performed for range of motion, eye hand coordination, and activity tolerance for increased (I) with ADL's; question/ answer section performed to facilitate active engagement and participation among group members   Forward/multi-directional AROM performed during warm-up and cool down       2      10 RUE/ LUE AROM performed during warm-up and cool down period           ASSESSMENT:  Progression toward goals:  [x]          Patient participated fully in group therapy session and able to tolerate all activities  []          Patient able to participate in group therapy session with only minor modifications and/or extended rest breaks needed. []          Patient not able to tolerate group therapy session. PLAN:  Patient continues to benefit from skilled intervention to address functional impairments. Patient is appropriate to continue group therapy sessions to promote increased participation in skilled therapy interventions and to provide opportunities for increased social interaction.            After treatment:   [x]  Patient left in no apparent distress sitting up in wheelchair   []  Patient left in no apparent distress in bed  [x]  Call bell left within reach  [x]  Nursing notified  []  Caregiver present  [x]  Wheelchair alarm activated      Shanna Rapp OT  5/2/2022

## 2022-05-02 NOTE — REHAB NOTE
[facility] PHYSICAL REHABILITATION  [address] [place] [address]    Name: Shruti Carney CSN: [id number]   Age: 74 y.o. MRN: [id number]   Sex: male Admit Date: 4/28/2022     Primary Rehabilitation Diagnosis  1. Impaired Mobility and ADLs  2. Closed displaced comminuted fracture of distal third of shaft of right tibia with routine healing  3. Closed fracture of distal end of right fibula with routine healing  4. S/P Closed IM nailing of the right tibia using the Synthes IM nail system with a double lock proximally and triple lock distally (4/22/2022 - Dr. Andrei Estevez)    Comorbidities  Patient Active Problem List   Diagnosis Code    Epidural abscess, L2-L5 G06.1    Polysubstance abuse ([address] F19.10    Leukopenia D72.819    Iron deficiency anemia, unspecified D50.9    Anemia D64.9    Thrombocytopenia ([address] D69.6    Orthopedic aftercare for healing traumatic lower leg fracture, right, closed S82. 91XD    Metatarsal fracture S92.309A    Alcohol abuse F10.10    HTN (hypertension) I10    Liver mass R16.0    Bladder mass N32.89    Hematuria R31.9    Closed displaced comminuted fracture of shaft of right tibia with routine healing S82.251D    Closed fracture of distal end of right fibula with routine healing S82.831D    Impaired mobility and ADLs Z74.09, Z78.9    Acute blood loss as cause of postoperative anemia D62    Methadone dependence (HCC) F11.20       ANTICIPATED INTERVENTIONS THAT SUPPORT THE MEDICAL NECESSITY OF THIS ADMISSION:    I. Physical Therapy              A. Intensity: 1.5 hour per day              B. Frequency: 5 times per week              C. Duration: 1-2 weeks              D. Short Term Goals:    1. Patient will move from supine to sit and sit to supine , scoot up and down, and roll side to side in bed with supervision/set-up.       2.  Patient will transfer from bed to chair and chair to bed with supervision/set-up using the least restrictive device. 3.  Patient will perform sit to stand with supervision/set-up. 4.  Patient will ambulate with supervision/set-up for 50 feet with the least restrictive device. 5.  Patient will ascend/descend 2 stairs with B handrail(s) with minimal assistance/contact guard assist.              E. Long Term Goals:    1. Patient will move from supine to sit and sit to supine , scoot up and down, and roll side to side in bed with modified independence. 2.  Patient will transfer from bed to chair and chair to bed with modified independence using the least restrictive device. 3.  Patient will perform sit to stand with modified independence. 4.  Patient will ambulate with modified independence for 150 feet with the least restrictive device. 5.  Patient will ascend/descend 2 stairs with B handrail(s) with modified independence. F. Interventions: Interventions may include range of motion (AROM, PROM B LE/trunk), motor function (B LE/trunk strengthening/coordination), activity tolerance (vitals, oxygen saturation levels), bed mobility training, balance activities, gait training (progressive ambulation program), wheelchair management and functional transfer training. Patient would also benefit from concurrent and group therapy sessions to promote increased participation in skilled therapy interventions and to provide opportunities for increased social interaction. II. Occupational Therapy              A. Intensity: 1.5 hour per day              B. Frequency: 5 times per week              C. Duration: 2 weeks              D. Short Term Goals:    1. Pt will perform self-feeding with set-up. 2. Pt will perform grooming with SBA. 3. Pt will perform UB bathing with set-up. 4. Pt will perform LB bathing with SBA using AE and/ or compensatory strategies as needed.     5. Pt will perform tub/shower transfer with Min A using least restrictive assistive device while maintaining weight bearing restrictions. 6. Pt will perform UB dressing with set-up. 7. Pt will perform LB dressing with CGA/ SBA using AE and/ or compensatory strategies as needed. 8. Pt will perform toileting task with Min A/ CGA. 9. Pt will perform toilet transfer with SBA using least restrictive assistive device while maintaining weight bearing restrictions. E. Long Term Goals:    1. Pt will perform self-feeding with Mod I.    2. Pt will perform grooming with Mod I.    3. Pt will perform UB bathing with Mod I.    4. Pt will perform LB bathing with Mod I using AE and/ or compensatory strategies as needed. 5. Pt will perform tub/shower transfer with supv using least restrictive assistive device while maintaining weight bearing restrictions. 6. Pt will perform UB dressing with Mod I.    7. Pt will perform LB dressing with supv using AE and/ or compensatory strategies as needed. 8. Pt will perform toileting task with Mod I.    9. Pt will perform toilet transfer with supv using least restrictive assistive device while maintaining weight bearing restrictions. F. Interventions: Interventions may include range of motion (AROM, PROM B UE), motor function (B UE/ strengthening/coordination), activity tolerance (vitals, oxygen saturation levels), balance training, ADL/IADL training and functional transfer training. PHYSICIAN'S ASSESSMENT OF FINDINGS:    Are the established goals sufficient for achieving the optimal level of function? [x]  Yes      []  No    What changes would you recommend to the goals as written? None      Are the interventions noted sufficient for achieving the optimal level of function? [x]  Yes      []  No    What changes would you recommend to the interventions noted?  If therapy staff is unable to provide 3 hr of total therapy per day in 5 days due to medical issues or decreased patient tolerance, may modify treatment schedule to 15 hr/week.       Estimated length of stay: 1-2 weeks      Medical rehabilitation prognosis:    []  Excellent     [x]  Good     []  Fair     []  Guarded       Discharge Destination:     [x]  Home     []  [address]     []  [facility]     []  [facility] [place]     []  [place]     []  Other:       Signed:    Micheal Hunter MD    April 30, 2022

## 2022-05-02 NOTE — PROGRESS NOTES
Problem: Falls - Risk of  Goal: *Absence of Falls  Description: Document Cindia Neigh Fall Risk and appropriate interventions in the flowsheet. Outcome: Progressing Towards Goal  Note: Fall Risk Interventions:  Mobility Interventions: Assess mobility with egress test,Bed/chair exit alarm,PT Consult for mobility concerns,Strengthening exercises (ROM-active/passive),Utilize walker, cane, or other assistive device,Utilize gait belt for transfers/ambulation    Mentation Interventions: Bed/chair exit alarm,Adequate sleep, hydration, pain control,Increase mobility,Gait belt with transfers/ambulation,Room close to nurse's station,Toileting rounds    Medication Interventions: Bed/chair exit alarm,Teach patient to arise slowly,Utilize gait belt for transfers/ambulation    Elimination Interventions: Bed/chair exit alarm,Call light in reach,Patient to call for help with toileting needs,Urinal in reach    History of Falls Interventions: Consult care management for discharge planning,Bed/chair exit alarm,Room close to nurse's station,Utilize gait belt for transfer/ambulation         Problem: Pain  Goal: *Control of Pain  Outcome: Progressing Towards Goal     Problem: Pressure Injury - Risk of  Goal: *Prevention of pressure injury  Description: Document Jonathan Scale and appropriate interventions in the flowsheet. Outcome: Progressing Towards Goal  Note: Pressure Injury Interventions:  Sensory Interventions: Assess need for specialty bed,Assess changes in LOC,Chair cushion,Minimize linen layers,Pad between skin to skin,Pressure redistribution bed/mattress (bed type),Turn and reposition approx.  every two hours (pillows and wedges if needed)    Moisture Interventions: Absorbent underpads,Apply protective barrier, creams and emollients,Limit adult briefs,Maintain skin hydration (lotion/cream),Minimize layers,Moisture barrier    Activity Interventions: Assess need for specialty bed,Chair cushion,Increase time out of bed,Pressure redistribution bed/mattress(bed type),PT/OT evaluation    Mobility Interventions: Assess need for specialty bed,Chair cushion,Float heels,HOB 30 degrees or less,Pressure redistribution bed/mattress (bed type),PT/OT evaluation    Nutrition Interventions: Discuss nutritional consult with provider,Document food/fluid/supplement intake

## 2022-05-02 NOTE — ROUTINE PROCESS
SHIFT CHANGE NOTE FOR [facility]    Bedside and Verbal shift change report given to SCOTTY Aguayo (oncoming nurse) by Nohemi Hart RN (offgoing nurse). Report included the following information SBAR, Kardex, MAR and Recent Results.     Situation:   Code Status: Full Code   Hospital Day: 4   Problem List:   Hospital Problems  Date Reviewed: 3/6/2020          Codes Class Noted POA    Tibia/fibula fracture ICD-10-CM: S82.209A, S82.409A  ICD-9-CM: 823.82  4/22/2022 Unknown    Overview Signed 4/29/2022  3:06 PM by Rebbeca Nageotte I, NP     > On 4/23/2022:    > Surgery: Closed fracture of right tibia insertion intra medullary nail. ( Dr. Enrique Enriquez)                   Background:   Past Medical History:   Past Medical History:   Diagnosis Date    Abscess of right shoulder     Abscess of shoulder     Arthritis     Epidural abscess     Heart murmur     Hematuria     Heroin abuse ([address]     Hypertension     Infected wound     Infectious disease     Iron deficiency anemia     IV drug user     Liver disease     Tobacco abuse         Assessment:   Changes in Assessment throughout shift: No change to previous assessment     Patient has a central line: no Reasons if yes: na  Insertion date:na Last dressing date:na   Patient has Morris Cath: no Reasons if yes: na   Insertion date:na  Shift morris care completed: NO     Last Vitals:     Vitals:    04/30/22 2018 05/01/22 0750 05/01/22 1643 05/01/22 2045   BP: 139/70 (!) 158/77 127/68 130/71   Pulse: 71 65 71 74   Resp: 17 18 18 18   Temp: 97.1 °F (36.2 °C) 97.3 °F (36.3 °C) 97.4 °F (36.3 °C) 98.9 °F (37.2 °C)   SpO2: 99% 99% 97% 99%   Height:            PAIN    Pain Assessment    Pain Intensity 1: 4 (05/02/22 0600) Pain Intensity 1: 2 (12/29/14 1105)    Pain Location 1: Ankle Pain Location 1: Abdomen    Pain Intervention(s) 1: Medication (see MAR) Pain Intervention(s) 1: Medication (see MAR)  Patient Stated Pain Goal: 0 Patient Stated Pain Goal: 0  o Intervention effective: yes  o Other actions taken for pain: Medication (see MAR)     Skin Assessment  Skin color    Condition/Temperature    Integrity Skin Integrity: Incision (comment)  Turgor    Weekly Pressure Ulcer Documentation  Pressure  Injury Documentation: No Pressure Injury Noted-Pressure Ulcer Prevention Initiated  Wound Prevention & Protection Methods  Orientation of wound Orientation of Wound Prevention: Posterior  Location of Prevention Location of Wound Prevention: Buttocks,Sacrum/Coccyx  Dressing Present Dressing Present : No  Dressing Status    Wound Offloading Wound Offloading (Prevention Methods): Bed, pressure redistribution/air     INTAKE/OUPUT  Date 05/01/22 0700 - 05/02/22 0659 05/02/22 0700 - 05/03/22 0659   Shift 5165-8838 1247-4188 24 Hour Total 3078-5812 6470-9008 24 Hour Total   INTAKE   P.O. 720  720        P. O. 720  720      Shift Total 720  720      OUTPUT   Urine  3675 3675        Urine Voided  3675 3675        Urine Occurrence(s) 4 x 8 x 12 x      Stool           Stool Occurrence(s) 0 x 0 x 0 x      Shift Total  3675 3675       -2620 -2955      Weight (kg)             Recommendations:  1. Patient needs and requests: pain management,emptying urinal    2. Pending tests/procedures: labs    3. Functional Level/Equipment: Partial (one person) /      Fall Precautions:   Fall risk precautions were reinforced with the patient; he was instructed to call for help prior to getting up. The following fall risk precautions were continued: bed/ chair alarms, door signage, yellow bracelet and socks as well as update of the [employer] tool in the patient's room. Ezio Score:      HEALS Safety Check    A safety check occurred in the patient's room between off going nurse and oncoming nurse listed above.     The safety check included the below items  Area Items   H  High Alert Medications - Verify all high alert medication drips (heparin, PCA, etc.)   E  Equipment - Suction is set up for ALL patients (with devin)  - Red plugs utilized for all equipment (IV pumps, etc.)  - WOWs wiped down at end of shift.  - Room stocked with oxygen, suction, and other unit-specific supplies   A  Alarms - Bed alarm is set for fall risk patients  - Ensure chair alarm is in place and activated if patient is up in a chair   L  Lines - Check IV for any infiltration  - Benitez bag is empty if patient has a Benitez   - Tubing and IV bags are labeled   S  Safety   - Room is clean, patient is clean, and equipment is clean. - Hallways are clear from equipment besides carts. - Fall bracelet on for fall risk patients  - Ensure room is clear and free of clutter  - Suction is set up for ALL patients (with devin)  - Hallways are clear from equipment besides carts.    - Isolation precautions followed, supplies available outside room, sign posted     Marv Barkley RN

## 2022-05-02 NOTE — PROGRESS NOTES
[facility] PHYSICAL REHABILITATION  [address] [place] [address], [address]     INPATIENT REHABILITATION  DAILY PROGRESS NOTE     Date: 5/2/2022    Name: Alycia Alford Age / Sex: 74 y.o. / male   CSN: [id number] MRN: [id number]   [facility] Date: 4/28/2022 Length of Stay: 4 days     Primary Rehabilitation Diagnosis: Impaired Mobility and ADLs secondary to:  1. Closed displaced comminuted fracture of distal third of shaft of right tibia with routine healing  2. Closed fracture of distal end of right fibula with routine healing  3. S/P Closed IM nailing of the right tibia using the Synthes IM nail system with a double lock proximally and triple lock distally (4/22/2022 - Dr. Venu Dhaliwal)      Subjective:     Patient seen and examined. Blood pressure controlled. Patient's Complaint:   No significant medical complaints    Pain Control: ongoing significant pain in which is stable and controlled by current meds      Objective:     Vital Signs:  Patient Vitals for the past 24 hrs:   BP Temp Pulse Resp SpO2   05/02/22 0736 135/70 98.8 °F (37.1 °C) 62 16 97 %   05/01/22 2045 130/71 98.9 °F (37.2 °C) 74 18 99 %   05/01/22 1643 127/68 97.4 °F (36.3 °C) 71 18 97 %        Physical Examination:  GENERAL SURVEY: Patient is awake, alert, oriented x 3, sitting comfortably on the wheelchair, not in acute respiratory distress.   HEENT: pale palpebral conjunctivae, anicteric sclerae, no nasoaural discharge, moist oral mucosa  NECK: supple, no jugular venous distention, no palpable lymph nodes  CHEST/LUNGS: symmetrical chest expansion, good air entry, clear breath sounds  HEART: adynamic precordium, good S1 S2, no S3, regular rhythm, no murmurs  ABDOMEN: flat, bowel sounds appreciated, soft, non-tender  EXTREMITIES: pale nailbeds, no edema, full and equal pulses, no calf tenderness   NEUROLOGICAL EXAM: The patient is awake, alert and oriented x3, able to answer questions fairly appropriately, able to follow 1 and 2 step commands. Able to tell time from the wall clock. Cranial nerves II-XII are grossly intact. No gross sensory deficit. Motor strength is 4+/5 on BUE and LLE, 4- to 4/5 on the RLE. Current Medications:  Current Facility-Administered Medications   Medication Dose Route Frequency    pantoprazole (PROTONIX) tablet 40 mg  40 mg Oral ACB    acetaminophen (TYLENOL) tablet 650 mg  650 mg Oral Q6H PRN    amLODIPine (NORVASC) tablet 10 mg  10 mg Oral DAILY    aspirin chewable tablet 81 mg  81 mg Oral BID    ferrous sulfate tablet 325 mg  325 mg Oral BID WITH MEALS    folic acid (FOLVITE) tablet 1 mg  1 mg Oral DAILY    methadone (DOLOPHINE) tablet 45 mg  45 mg Oral DAILY    oxyCODONE IR (ROXICODONE) tablet 10 mg  10 mg Oral Q4H PRN    polyethylene glycol (MIRALAX) packet 17 g  17 g Oral DAILY    therapeutic multivitamin (THERAGRAN) tablet 1 Tablet  1 Tablet Oral DAILY    thiamine HCL (B-1) tablet 100 mg  100 mg Oral DAILY    docusate sodium (COLACE) capsule 100 mg  100 mg Oral BID    bisacodyL (DULCOLAX) suppository 10 mg  10 mg Rectal Q48H PRN       Allergies:  No Known Allergies      Lab/Data Review:  No results found for this or any previous visit (from the past 24 hour(s)). Assessment:     Primary Rehabilitation Diagnosis  1. Impaired Mobility and ADLs  2. Closed displaced comminuted fracture of distal third of shaft of right tibia with routine healing  3. Closed fracture of distal end of right fibula with routine healing  4.  S/P Closed IM nailing of the right tibia using the Synthes IM nail system with a double lock proximally and triple lock distally (4/22/2022 - Dr. David Weathers)    Comorbidities  Patient Active Problem List   Diagnosis Code    Epidural abscess, L2-L5 G06.1    Polysubstance abuse ([facility] [address] F19.10    Leukopenia D72.819    Iron deficiency anemia, unspecified D50.9    Anemia D64.9    Thrombocytopenia ([facility] [address] D69.6    Orthopedic aftercare for healing traumatic lower leg fracture, right, closed S82. 91XD    Metatarsal fracture S92.309A    Alcohol abuse F10.10    HTN (hypertension) I10    Liver mass R16.0    Bladder mass N32.89    Hematuria R31.9    Closed displaced comminuted fracture of shaft of right tibia with routine healing S82.251D    Closed fracture of distal end of right fibula with routine healing S82.831D    Impaired mobility and ADLs Z74.09, Z78.9    Acute blood loss as cause of postoperative anemia D62    Methadone dependence (HCC) F11.20       Plan:     1. Justification for continued stay: Good progression towards established rehabilitation goals. 2. Medical Issues being followed closely:    [x]  Fall and safety precautions     [x]  Wound Care     [x]  Bowel and Bladder Function     [x]  Fluid Electrolyte and Nutrition Balance     [x]  Pain Control      3. Issues that 24 hour rehabilitation nursing is following:    [x]  Fall and safety precautions     [x]  Wound Care     [x]  Bowel and Bladder Function     [x]  Fluid Electrolyte and Nutrition Balance     [x]  Pain Control      [x]  Assistance with and education on in-room safety with transfers to and from the bed, wheelchair, toilet and shower. 4. Acute rehabilitation plan of care:    [x]  Continue current care and rehab. [x]  Physical Therapy           [x]  Occupational Therapy           []  Speech Therapy     []  Hold Rehab until further notice     5. Medications:    [x]  MAR Reviewed     [x]  Continue Present Medications     6. Chemical DVT Prophylaxis:      []  Enoxaparin     []  Unfractionated Heparin     []  Warfarin     []  NOAC     [x]  Aspirin 81 mg PO BID (as per Orthopedics)     []  None     7. Mechanical DVT Prophylaxis:      []  RADHA Stockings     [x]  Sequential Compression Device on LLE     []  None     8. GI Prophylaxis:      [x]  PPI     []  H2 Blocker     []  None / Not indicated     9.  Code status:    [x]  Full code     []  Partial code     []  Do not intubate     []  Do not resuscitate     10. Diet:  Specifications  Low fat/Low cholesterol/High fiber/2 gm Na   Solids (consistency)  Regular   Liquids (consistency)  Thin   Fluid Restriction  None     11. Orders:   > Closed displaced comminuted fracture of distal third of shaft of right tibia with routine healing; Closed fracture of distal end of right fibula with routine healing; S/P Closed IM nailing of the right tibia using the Synthes IM nail system with a double lock proximally and triple lock distally (4/22/2022 - Dr. Cori Lazar)   > Partial weightbearing on the right lower extremity using a rolling walker    > Acute postoperative blood loss anemia; Iron deficiency anemia   > Hgb/Hct (4/29/2022, on admission to the ARU) = 8.9/28.6   > Continue Ferrous sulfate 325 mg PO BID with meals    > Alcohol abuse   > Continue:    > Folic acid 1 mg PO once daily    > Multivitamin 1 tab PO once daily    > Thiamine 100 mg PO once daily    > Constipation   > Continue:    > Docusate sodium 100 mg PO BID    > Miralax 17 grams in 8 oz water PO once daily    > Methadone dependence   > Continue Methadone 45 mg PO once daily    > Analgesia   > In AM, start Acetaminophen 650 mg PO TID (8AM, 12PM, 4PM)   > Continue:    > Acetaminophen 650 mg PO q 4 hr PRN for pain level 4/10 or lesser    > Oxycodone 10 mg PO q 4 hr PRN for pain level 5/10 or greater       12. Personal Protective Equipment (N95 face mask) was used while interacting with the patient. Patient was using a surgical mask. 15. Patient's progress in rehabilitation and medical issues discussed with the patient. All questions answered to the best of my ability. Care plan discussed with patient and nurse. 14. Total clinical care time is 30 minutes, including review of chart including all labs, radiology, past medical history, and discussion with patient. Greater than 50% of my time was spent in coordination of care and counseling.       Signed:    Donis Snyder MD    May 2, 2022

## 2022-05-02 NOTE — PROGRESS NOTES
Problem: Mobility Impaired (Adult and Pediatric)  Goal: *Acute Goals and Plan of Care (Insert Text)  Description: Physical Therapy Short Term Goals  Initiated 4/29/2022 and to be accomplished within 5-7 day(s) (5/06/2022)  1. Patient will move from supine to sit and sit to supine , scoot up and down, and roll side to side in bed with supervision/set-up. 2.  Patient will transfer from bed to chair and chair to bed with supervision/set-up using the least restrictive device. 3.  Patient will perform sit to stand with supervision/set-up. 4.  Patient will ambulate with supervision/set-up for 50 feet with the least restrictive device. 5.  Patient will ascend/descend 2 stairs with B handrail(s) with minimal assistance/contact guard assist.    Physical Therapy Long Term Goals  Initiated 4/29/2022 and to be accomplished within 10-14 day(s) (5/13/2022)  1. Patient will move from supine to sit and sit to supine , scoot up and down, and roll side to side in bed with modified independence. 2.  Patient will transfer from bed to chair and chair to bed with modified independence using the least restrictive device. 3.  Patient will perform sit to stand with modified independence. 4.  Patient will ambulate with modified independence for 150 feet with the least restrictive device. 5.  Patient will ascend/descend 2 stairs with B handrail(s) with modified independence. Outcome: Progressing Towards Goal   PHYSICAL THERAPY TREATMENT    Patient: Poncho Short (72 y.o. male)  Date: 5/2/2022  Diagnosis: Tibia/fibula fracture [S82.209A, S82.409A] Closed displaced comminuted fracture of shaft of right tibia with routine healing  Precautions: Fall,Other (comment) (PWB Right LE)  Chart, physical therapy assessment, plan of care and goals were reviewed. Time In:0930  Time Out:1100    Patient seen for: Gait training; Therapeutic exercise;Transfer training;Patient education; Wheelchair mobility    Pain:  Pt pain was reported as 8/10, R LE, pre-treatment. Pt pain was reported as 4-5/10, R LE, post-treatment. Intervention: pt had received pain meds shortly before therapy session; mobility, repositioning, elevation of LE     Patient identified with name and : yes    SUBJECTIVE:      Pt reported pain in R LE, but willing to work with PT. \"I have to work through some of this pain\"  Pt states he wants to be able to [facility] better, so I can get around my home. \"     OBJECTIVE DATA SUMMARY:    Objective:     BED/MAT MOBILITY Daily Assessment     Supine to Sit : 5 (Supervision) (additional time, flat mat table w/out side rails)  Sit to Supine :  (CGA for R LE on mat table w/out siderail)   Slow pace and needing to self assist R LE at times with UE's due to pain      TRANSFERS Daily Assessment     Transfer Type: Other  Other: stand step w/RW  Transfer Assistance : 4 (Minimal assistance) (CGA/min A, x 2 reps to L and to R)  Sit to Stand Assistance: Contact guard assistance (frequent vc's for hand placement )x 6 reps; vc's for anterior wt shift; uses B UE's on arms rests/bed to push up         GAIT Daily Assessment    Gait Description (WDL) Exceptions to WDL    Gait Abnormalities Antalgic;Decreased step clearance; Step to gait    Assistive device Walker, rolling;Gait belt    Ambulation assistance - level surface 4 (Minimal assistance)    Distance 24 Feet (ft) (x2 trials; seated rest break between)    Ambulation assistance- uneven surface  NT    Comments Slow pace, beginning to put a little weight on R LE vs trying to maintain NWB on R LE (pt has PWB R LE orders). BALANCE Daily Assessment     Sitting - Static: Good (unsupported)  Sitting - Dynamic: Good (unsupported)  Standing - Static: Fair (with RW support)  Standing - Dynamic : Impaired     Pt stood for 1-2 minutes with 1 UE support and no UE support at brief moments, with SBA/CGA, while using urinal in room.         WHEELCHAIR MOBILITY Daily Assessment     Able to Propel (ft): 250 feet (x2 reps between room and PT gym)  Functional Level: 5  Curbs/Ramps Assist Required (FIM Score): 0 (Not tested)  Wheelchair Setup Assist Required : 4 (Minimal assistance)  Wheelchair Management: Manages left brake;Manages right brake        THERAPEUTIC EXERCISES Daily Assessment     Extremity: Both  Exercise Type #1: Supine lower extremity strengthening (AROM L/ AAROM R LE: SLR, hip abd/add, quad sets, eccentric HS sets (pushing heel into mat table and holding for a few seconds))  Sets Performed: 1  Reps Performed: 10  Level of Assist: Moderate assistance (for R LE due to pain)  Exercise Type #2: Seated lower extremity strengthening (AROM L LE/  AAROM R LE: marches and LAQ's)  Sets Performed: 1  Reps Performed: 10  Level of Assist: Moderate assistance (for R LE)    Pt needing slow pace and frequent rest breaks during exercises due to pain in R LE. Pt has difficulty fully extending R knee due to pain; pt educated on trying to lie in bed with knee in extension to maintain ROM. ASSESSMENT:  Pt tolerated tx session well. Continues to have significant pain, but tries to participate in PT as able with frequent rest breaks. Pt motivated to walk more and try to walk better. Pt educated on importance of trying to lie in bed with knee extended to maintain ROM. Pt able to progress with gait training today, although still painful and slow pace. Pt doing a better job of trying to put R foot on floor and put a little weight on it to help balance himself. Progression toward goals:  [x]      Improving appropriately and progressing toward goals  []      Improving slowly and progressing toward goals  []      Not making progress toward goals and plan of care will be adjusted      PLAN:  Patient continues to benefit from skilled intervention to address the above impairments. Continue treatment per established plan of care.   Discharge Recommendations:  Home Health PT  Further Equipment Recommendations for Discharge: TBD pending progress with PT, currently using a RW and wheelchair      Estimated Discharge Date: 10-14 days (5/13/22)    Activity Tolerance:   Fair +; needs slow pace and frequent rest breaks due to pain  Please refer to the flowsheet for vital signs taken during this treatment.     After treatment:   [] Patient left in no apparent distress in bed  [x] Patient left in no apparent distress sitting up in chair  [] Patient left in no apparent distress in w/c mobilizing under own power  [] Patient left in no apparent distress dining area  [] Patient left in no apparent distress mobilizing under own power  [x] Call bell left within reach  [] Nursing notified  [] Caregiver present  [] Bed alarm activated   [x] Chair alarm activated      Susan Tello, PT  5/2/2022

## 2022-05-02 NOTE — PROGRESS NOTES
SHIFT CHANGE NOTE FOR [facility]    Bedside and Verbal shift change report given to MADHAVI  (oncoming nurse) by Chikis Ortiz LPN (offgoing nurse). Report included the following information SBAR, Kardex, MAR and Recent Results.     Situation:   Code Status: Full Code   Hospital Day: 4   Problem List:   Hospital Problems  Date Reviewed: 5/2/2022          Codes Class Noted POA    Methadone dependence ([address] (Chronic) ICD-10-CM: F11.20  ICD-9-CM: 304.00  Unknown Yes        Orthopedic aftercare for healing traumatic lower leg fracture, right, closed ICD-10-CM: S82.91XD  ICD-9-CM: V54.16  4/22/2022 Yes    Overview Addendum 5/2/2022 10:49 AM by Dada Wilson MD     S/P Closed IM nailing of the right tibia using the Synthes IM nail system with a double lock proximally and triple lock distally (4/22/2022 - Dr. Mike Gil)             Alcohol abuse ICD-10-CM: F10.10  ICD-9-CM: 305.00  4/22/2022 Yes        * (Principal) Closed displaced comminuted fracture of shaft of right tibia with routine healing ICD-10-CM: S82.251D  ICD-9-CM: V54.16  4/22/2022 Yes        Closed fracture of distal end of right fibula with routine healing ICD-10-CM: S82.831D  ICD-9-CM: V54.16  4/22/2022 Yes        Impaired mobility and ADLs ICD-10-CM: Z74.09, Z78.9  ICD-9-CM: V49.89  4/22/2022 Yes        Acute blood loss as cause of postoperative anemia ICD-10-CM: D62  ICD-9-CM: 285.1  4/22/2022 Yes              Background:   Past Medical History:   Past Medical History:   Diagnosis Date    Abscess of right shoulder     Abscess of shoulder     Acute blood loss as cause of postoperative anemia 4/22/2022    Arthritis     Closed displaced comminuted fracture of shaft of right tibia with routine healing 04/22/2022    Closed fracture of distal end of right fibula with routine healing 4/22/2022    Epidural abscess     Heart murmur     Hematuria     [address]in [address] [address]     Hypertension     Infected wound     Infectious disease     Iron deficiency anemia     IV drug user     Liver disease     [facility]adone dependence [address] [address]     Tobacco abuse         Assessment:   Changes in Assessment throughout shift: No change to previous assessment     Patient has a central line: no Reasons if yes: Insertion date: Last dressing date:   Patient has Benitez Cath: no Reasons if yes: Insertion date:      Last Vitals:     Vitals:    05/01/22 1643 05/01/22 2045 05/02/22 0736 05/02/22 1510   BP: 127/68 130/71 135/70 (!) 143/71   Pulse: 71 74 62 73   Resp: 18 18 16 18   Temp: 97.4 °F (36.3 °C) 98.9 °F (37.2 °C) 98.8 °F (37.1 °C) 97 °F (36.1 °C)   SpO2: 97% 99% 97% 94%   Height:            PAIN    Pain Assessment    Pain Intensity 1: 4 (05/02/22 1600) Pain Intensity 1: 2 (12/29/14 1105)    Pain Location 1: Ankle Pain Location 1: Abdomen    Pain Intervention(s) 1: Medication (see MAR) Pain Intervention(s) 1: Medication (see MAR)  Patient Stated Pain Goal: 0 Patient Stated Pain Goal: 0  o Intervention effective: yes  o Other actions taken for pain: Medication (see MAR)     Skin Assessment  Skin color    Condition/Temperature    Integrity Skin Integrity: Incision (comment)  Turgor    Weekly Pressure Ulcer Documentation  Pressure  Injury Documentation: No Pressure Injury Noted-Pressure Ulcer Prevention Initiated  Wound Prevention & Protection Methods  Orientation of wound Orientation of Wound Prevention: Posterior  Location of Prevention Location of Wound Prevention: Buttocks,Sacrum/Coccyx  Dressing Present Dressing Present : No  Dressing Status    Wound Offloading Wound Offloading (Prevention Methods): Bed, pressure redistribution/air,Chair cushion,Repositioning,Pillows,Wheelchair     INTAKE/OUPUT  Date 05/01/22 0700 - 05/02/22 0659 05/02/22 0700 - 05/03/22 0659   Shift 0700-1859 1900-0659 24 Hour Total 0700-1859 1900-0659 24 Hour Total   INTAKE   P.O. 720  720 720  720     P. O. 720  720 720  720   Shift Total 720  720 720  720   OUTPUT   Urine  3675 3675        Urine Voided 7397 1304        Urine Occurrence(s) 4 x 8 x 12 x 1 x  1 x   Stool           Stool Occurrence(s) 0 x 0 x 0 x 1 x  1 x   Shift Total  2037 2538       -4380 -0014 480 366   Weight (kg)             Recommendations:  1. Patient needs and requests: TOILETING    2. Pending tests/procedures: LABS PENDING     3. Functional Level/Equipment: Partial (one person) /      Fall Precautions:   Fall risk precautions were reinforced with the patient; he was instructed to call for help prior to getting up. The following fall risk precautions were continued: bed/ chair alarms, door signage, yellow bracelet and socks as well as update of the Jessica Jalloh tool in the patient's room. Ezio Score: 4    HEALS Safety Check    A safety check occurred in the patient's room between off going nurse and oncoming nurse listed above. The safety check included the below items  Area Items   H  High Alert Medications - Verify all high alert medication drips (heparin, PCA, etc.)   E  Equipment - Suction is set up for ALL patients (with devin)  - Red plugs utilized for all equipment (IV pumps, etc.)  - WOWs wiped down at end of shift.  - Room stocked with oxygen, suction, and other unit-specific supplies   A  Alarms - Bed alarm is set for fall risk patients  - Ensure chair alarm is in place and activated if patient is up in a chair   L  Lines - Check IV for any infiltration  - Benitez bag is empty if patient has a Benitez   - Tubing and IV bags are labeled   S  Safety   - Room is clean, patient is clean, and equipment is clean. - Hallways are clear from equipment besides carts. - Fall bracelet on for fall risk patients  - Ensure room is clear and free of clutter  - Suction is set up for ALL patients (with devin)  - Hallways are clear from equipment besides carts.    - Isolation precautions followed, supplies available outside room, sign posted     Claudia Pineda LPN

## 2022-05-03 LAB
ANION GAP SERPL CALC-SCNC: 4 MMOL/L (ref 3–18)
BASOPHILS # BLD: 0 K/UL (ref 0–0.1)
BASOPHILS NFR BLD: 1 % (ref 0–2)
BUN SERPL-MCNC: 12 MG/DL (ref 7–18)
BUN/CREAT SERPL: 12 (ref 12–20)
CALCIUM SERPL-MCNC: 9.1 MG/DL (ref 8.5–10.1)
CHLORIDE SERPL-SCNC: 104 MMOL/L (ref 100–111)
CO2 SERPL-SCNC: 27 MMOL/L (ref 21–32)
CREAT SERPL-MCNC: 0.99 MG/DL (ref 0.6–1.3)
DIFFERENTIAL METHOD BLD: ABNORMAL
EOSINOPHIL # BLD: 0.2 K/UL (ref 0–0.4)
EOSINOPHIL NFR BLD: 7 % (ref 0–5)
ERYTHROCYTE [DISTWIDTH] IN BLOOD BY AUTOMATED COUNT: 23.8 % (ref 11.6–14.5)
GLUCOSE SERPL-MCNC: 151 MG/DL (ref 74–99)
HCT VFR BLD AUTO: 33.7 % (ref 36–48)
HGB BLD-MCNC: 10.3 G/DL (ref 13–16)
IMM GRANULOCYTES # BLD AUTO: 0 K/UL (ref 0–0.04)
IMM GRANULOCYTES NFR BLD AUTO: 0 % (ref 0–0.5)
LYMPHOCYTES # BLD: 0.9 K/UL (ref 0.9–3.6)
LYMPHOCYTES NFR BLD: 30 % (ref 21–52)
MCH RBC QN AUTO: 29.5 PG (ref 24–34)
MCHC RBC AUTO-ENTMCNC: 30.6 G/DL (ref 31–37)
MCV RBC AUTO: 96.6 FL (ref 78–100)
MONOCYTES # BLD: 0.3 K/UL (ref 0.05–1.2)
MONOCYTES NFR BLD: 12 % (ref 3–10)
NEUTS SEG # BLD: 1.5 K/UL (ref 1.8–8)
NEUTS SEG NFR BLD: 50 % (ref 40–73)
NRBC # BLD: 0 K/UL (ref 0–0.01)
NRBC BLD-RTO: 0 PER 100 WBC
PLATELET # BLD AUTO: 92 K/UL (ref 135–420)
PLATELET COMMENTS,PCOM: ABNORMAL
PMV BLD AUTO: 12.1 FL (ref 9.2–11.8)
POTASSIUM SERPL-SCNC: 4 MMOL/L (ref 3.5–5.5)
RBC # BLD AUTO: 3.49 M/UL (ref 4.35–5.65)
RBC MORPH BLD: ABNORMAL
SODIUM SERPL-SCNC: 135 MMOL/L (ref 136–145)
WBC # BLD AUTO: 2.9 K/UL (ref 4.6–13.2)

## 2022-05-03 PROCEDURE — 97542 WHEELCHAIR MNGMENT TRAINING: CPT

## 2022-05-03 PROCEDURE — 97110 THERAPEUTIC EXERCISES: CPT

## 2022-05-03 PROCEDURE — 97530 THERAPEUTIC ACTIVITIES: CPT

## 2022-05-03 PROCEDURE — 74011250637 HC RX REV CODE- 250/637: Performed by: INTERNAL MEDICINE

## 2022-05-03 PROCEDURE — 74011250637 HC RX REV CODE- 250/637: Performed by: EMERGENCY MEDICINE

## 2022-05-03 PROCEDURE — 85025 COMPLETE CBC W/AUTO DIFF WBC: CPT

## 2022-05-03 PROCEDURE — 99232 SBSQ HOSP IP/OBS MODERATE 35: CPT | Performed by: INTERNAL MEDICINE

## 2022-05-03 PROCEDURE — 97535 SELF CARE MNGMENT TRAINING: CPT

## 2022-05-03 PROCEDURE — 2709999900 HC NON-CHARGEABLE SUPPLY

## 2022-05-03 PROCEDURE — 36415 COLL VENOUS BLD VENIPUNCTURE: CPT

## 2022-05-03 PROCEDURE — 97116 GAIT TRAINING THERAPY: CPT

## 2022-05-03 PROCEDURE — 80048 BASIC METABOLIC PNL TOTAL CA: CPT

## 2022-05-03 PROCEDURE — 65310000000 HC RM PRIVATE REHAB

## 2022-05-03 RX ORDER — POLYETHYLENE GLYCOL 3350 17 G/17G
17 POWDER, FOR SOLUTION ORAL DAILY
Status: DISCONTINUED | OUTPATIENT
Start: 2022-05-04 | End: 2022-05-12 | Stop reason: HOSPADM

## 2022-05-03 RX ORDER — PREGABALIN 50 MG/1
50 CAPSULE ORAL 3 TIMES DAILY
Status: DISCONTINUED | OUTPATIENT
Start: 2022-05-03 | End: 2022-05-06

## 2022-05-03 RX ORDER — LANOLIN ALCOHOL/MO/W.PET/CERES
3 CREAM (GRAM) TOPICAL
Status: DISCONTINUED | OUTPATIENT
Start: 2022-05-03 | End: 2022-05-12 | Stop reason: HOSPADM

## 2022-05-03 RX ADMIN — OXYCODONE HYDROCHLORIDE 10 MG: 5 TABLET ORAL at 03:58

## 2022-05-03 RX ADMIN — OXYCODONE HYDROCHLORIDE 10 MG: 5 TABLET ORAL at 16:20

## 2022-05-03 RX ADMIN — ACETAMINOPHEN 650 MG: 325 TABLET ORAL at 12:30

## 2022-05-03 RX ADMIN — THERA TABS 1 TABLET: TAB at 08:56

## 2022-05-03 RX ADMIN — ACETAMINOPHEN 650 MG: 325 TABLET ORAL at 16:20

## 2022-05-03 RX ADMIN — DOCUSATE SODIUM 100 MG: 100 CAPSULE, LIQUID FILLED ORAL at 08:56

## 2022-05-03 RX ADMIN — METHADONE HYDROCHLORIDE 45 MG: 10 TABLET ORAL at 08:56

## 2022-05-03 RX ADMIN — AMLODIPINE BESYLATE 10 MG: 10 TABLET ORAL at 08:56

## 2022-05-03 RX ADMIN — ASPIRIN 81 MG CHEWABLE TABLET 81 MG: 81 TABLET CHEWABLE at 08:57

## 2022-05-03 RX ADMIN — FERROUS SULFATE TAB 325 MG (65 MG ELEMENTAL FE) 325 MG: 325 (65 FE) TAB at 08:56

## 2022-05-03 RX ADMIN — OXYCODONE HYDROCHLORIDE 10 MG: 5 TABLET ORAL at 08:57

## 2022-05-03 RX ADMIN — PANTOPRAZOLE 40 MG: 40 TABLET, DELAYED RELEASE ORAL at 06:34

## 2022-05-03 RX ADMIN — DOCUSATE SODIUM 100 MG: 100 CAPSULE, LIQUID FILLED ORAL at 17:11

## 2022-05-03 RX ADMIN — FOLIC ACID 1 MG: 1 TABLET ORAL at 08:57

## 2022-05-03 RX ADMIN — FERROUS SULFATE TAB 325 MG (65 MG ELEMENTAL FE) 325 MG: 325 (65 FE) TAB at 16:21

## 2022-05-03 RX ADMIN — OXYCODONE HYDROCHLORIDE 10 MG: 5 TABLET ORAL at 20:47

## 2022-05-03 RX ADMIN — PREGABALIN 50 MG: 50 CAPSULE ORAL at 20:47

## 2022-05-03 RX ADMIN — ACETAMINOPHEN 650 MG: 325 TABLET ORAL at 08:57

## 2022-05-03 RX ADMIN — ASPIRIN 81 MG CHEWABLE TABLET 81 MG: 81 TABLET CHEWABLE at 17:11

## 2022-05-03 RX ADMIN — Medication 100 MG: at 08:56

## 2022-05-03 RX ADMIN — Medication 3 MG: at 17:11

## 2022-05-03 NOTE — ROUTINE PROCESS
SHIFT CHANGE NOTE FOR [facility]    Bedside and Verbal shift change report given to SCOTTY Aguayo (oncoming nurse) by Gregory Kumar RN (offgoing nurse). Report included the following information SBAR, Kardex, MAR and Recent Results.     Situation:   Code Status: Full Code   Hospital Day: 5   Problem List:   Hospital Problems  Date Reviewed: 5/2/2022          Codes Class Noted POA    Methadone dependence ([address] (Chronic) ICD-10-CM: F11.20  ICD-9-CM: 304.00  Unknown Yes        Orthopedic aftercare for healing traumatic lower leg fracture, right, closed ICD-10-CM: S82.91XD  ICD-9-CM: V54.16  4/22/2022 Yes    Overview Addendum 5/2/2022 10:49 AM by Shauna Ramos MD     S/P Closed IM nailing of the right tibia using the Synthes IM nail system with a double lock proximally and triple lock distally (4/22/2022 - Dr. Brooklyn Bolden)             Alcohol abuse ICD-10-CM: F10.10  ICD-9-CM: 305.00  4/22/2022 Yes        HTN (hypertension) ICD-10-CM: I10  ICD-9-CM: 401.9  4/22/2022 Yes        * (Principal) Closed displaced comminuted fracture of shaft of right tibia with routine healing ICD-10-CM: S82.251D  ICD-9-CM: V54.16  4/22/2022 Yes        Closed fracture of distal end of right fibula with routine healing ICD-10-CM: S82.831D  ICD-9-CM: V54.16  4/22/2022 Yes        Impaired mobility and ADLs ICD-10-CM: Z74.09, Z78.9  ICD-9-CM: V49.89  4/22/2022 Yes        Acute blood loss as cause of postoperative anemia ICD-10-CM: D62  ICD-9-CM: 285.1  4/22/2022 Yes        Iron deficiency anemia, unspecified ICD-10-CM: D50.9  ICD-9-CM: 280.9  8/5/2012 Yes              Background:   Past Medical History:   Past Medical History:   Diagnosis Date    Abscess of right shoulder     Abscess of shoulder     Acute blood loss as cause of postoperative anemia 4/22/2022    Arthritis     Closed displaced comminuted fracture of shaft of right tibia with routine healing 04/22/2022    Closed fracture of distal end of right fibula with routine healing 4/22/2022    Epidural abscess     Heart murmur     Hematuria     [address]in abuse ([facility] [address]     Hypertension     Infected wound     Infectious disease     Iron deficiency anemia     IV drug user     Liver disease     Methadone dependence ([facility] [address]     Tobacco abuse         Assessment:   Changes in Assessment throughout shift: No change to previous assessment     Patient has a central line: no Reasons if yes: na  Insertion date:na Last dressing date:na   Patient has Morris Cath: no Reasons if yes: na   Insertion date:na  Shift morris care completed: NO     Last Vitals:     Vitals:    05/01/22 2045 05/02/22 0736 05/02/22 1510 05/02/22 2009   BP: 130/71 135/70 (!) 143/71 (!) 141/69   Pulse: 74 62 73 71   Resp: 18 16 18 18   Temp: 98.9 °F (37.2 °C) 98.8 °F (37.1 °C) 97 °F (36.1 °C) 99.2 °F (37.3 °C)   SpO2: 99% 97% 94% 98%   Height:            PAIN    Pain Assessment    Pain Intensity 1: 0 (05/03/22 0450) Pain Intensity 1: 2 (12/29/14 1105)    Pain Location 1: Leg,Ankle Pain Location 1: Abdomen    Pain Intervention(s) 1: Elevation,Medication (see MAR) Pain Intervention(s) 1: Medication (see MAR)  Patient Stated Pain Goal: 0 Patient Stated Pain Goal: 0  o Intervention effective: yes  o Other actions taken for pain: Elevation;Medication (see MAR)     Skin Assessment  Skin color    Condition/Temperature    Integrity Skin Integrity: Incision (comment)  Turgor    Weekly Pressure Ulcer Documentation  Pressure  Injury Documentation: No Pressure Injury Noted-Pressure Ulcer Prevention Initiated  Wound Prevention & Protection Methods  Orientation of wound Orientation of Wound Prevention: Posterior  Location of Prevention Location of Wound Prevention: Buttocks,Sacrum/Coccyx  Dressing Present Dressing Present : No  Dressing Status    Wound Offloading Wound Offloading (Prevention Methods): Bed, pressure reduction mattress,Elevate heels,Pillows,Wheelchair     INTAKE/OUPUT  Date 05/02/22 0700 - 05/03/22 0659 05/03/22 0700 - 05/04/22 0659   Shift 5148-0731 9887-7024 24 Hour Total 3434-7110 2287-8905 24 Hour Total   INTAKE   P.O. 720  720        P. O. 720  720      Shift Total 720  720      OUTPUT   Urine  4300 4300        Urine Voided  4300 4300        Urine Occurrence(s) 1 x 8 x 9 x      Emesis/NG output           Emesis Occurrence(s)  0 x 0 x      Stool           Stool Occurrence(s) 1 x 0 x 1 x      Shift Total  4300 4300       -4300 -3580      Weight (kg)             Recommendations:  1. Patient needs and requests: pain management,emptying urinal    2. Pending tests/procedures: labs    3. Functional Level/Equipment: Partial (one person) / Bed (comment); P[place]am ; Wheelchair    Fall Precautions:   Fall risk precautions were reinforced with the patient; he was instructed to call for help prior to getting up. The following fall risk precautions were continued: bed/ chair alarms, door signage, yellow bracelet and socks as well as update of the Hesham Sites tool in the patient's room. Ezio Score: 4    HEALS Safety Check    A safety check occurred in the patient's room between off going nurse and oncoming nurse listed above. The safety check included the below items  Area Items   H  High Alert Medications - Verify all high alert medication drips (heparin, PCA, etc.)   E  Equipment - Suction is set up for ALL patients (with yanker)  - Red plugs utilized for all equipment (IV pumps, etc.)  - WOWs wiped down at end of shift.  - Room stocked with oxygen, suction, and other unit-specific supplies   A  Alarms - Bed alarm is set for fall risk patients  - Ensure chair alarm is in place and activated if patient is up in a chair   L  Lines - Check IV for any infiltration  - Benitez bag is empty if patient has a Benitez   - Tubing and IV bags are labeled   S  Safety   - Room is clean, patient is clean, and equipment is clean. - Hallways are clear from equipment besides carts.    - Fall bracelet on for fall risk patients  - Ensure room is clear and free of clutter  - Suction is set up for ALL patients (with devin)  - Hallways are clear from equipment besides carts.    - Isolation precautions followed, supplies available outside room, sign posted     Colt Aiken RN

## 2022-05-03 NOTE — PROGRESS NOTES
Problem: Mobility Impaired (Adult and Pediatric)  Goal: *Acute Goals and Plan of Care (Insert Text)  Description: Physical Therapy Short Term Goals  Initiated 4/29/2022 and to be accomplished within 5-7 day(s) (5/06/2022)  1. Patient will move from supine to sit and sit to supine , scoot up and down, and roll side to side in bed with supervision/set-up. 2.  Patient will transfer from bed to chair and chair to bed with supervision/set-up using the least restrictive device. 3.  Patient will perform sit to stand with supervision/set-up. 4.  Patient will ambulate with supervision/set-up for 50 feet with the least restrictive device. 5.  Patient will ascend/descend 2 stairs with B handrail(s) with minimal assistance/contact guard assist.    Physical Therapy Long Term Goals  Initiated 4/29/2022 and to be accomplished within 10-14 day(s) (5/13/2022)  1. Patient will move from supine to sit and sit to supine , scoot up and down, and roll side to side in bed with modified independence. 2.  Patient will transfer from bed to chair and chair to bed with modified independence using the least restrictive device. 3.  Patient will perform sit to stand with modified independence. 4.  Patient will ambulate with modified independence for 150 feet with the least restrictive device. 5.  Patient will ascend/descend 2 stairs with B handrail(s) with modified independence. Outcome: Progressing Towards Goal  PHYSICAL THERAPY TREATMENT    Patient: Alyssa Gonzalez (18 y.o. male)  Date: 5/3/2022  Diagnosis: Tibia/fibula fracture [S82.209A, S82.409A] Closed displaced comminuted fracture of shaft of right tibia with routine healing  Precautions: Fall,Other (comment) (PWB Right LE)  Chart, physical therapy assessment, plan of care and goals were reviewed. Time In: 11:30  Time Out: 12:30  Time In:1335  Time Out:1405    Patient seen for: Gait training; Therapeutic exercise;Patient education    Pain:  Pt pain was reported as 6 pre-treatment. Pt pain was reported as 7 post-treatment. Intervention: rest, elevating right foot    Patient identified with name and :yes    SUBJECTIVE:      \"I feel like I'm starting to do better. \"    OBJECTIVE DATA SUMMARY:    Objective:     GROSS ASSESSMENT Daily Assessment            BED/MAT MOBILITY Daily Assessment     Supine to Sit : 5 (Supervision)  Sit to Supine : 5 (Supervision)  On mat without rails      TRANSFERS Daily Assessment     Other: stand step with RW  Transfer Assistance : 4 (Contact guard assistance)  Sit to Stand Assistance: Contact guard assistance (progressing towards SBA)  Occasional cues required for hand placement during stand to sit transfer        GAIT Daily Assessment    Gait Description (WDL)      Gait Abnormalities Antalgic;Decreased step clearance    Assistive device Walker, rolling;Gait belt    Ambulation assistance - level surface 4 (Contact guard assistance)    Distance 47 Feet (ft) (62)    Ambulation assistance- uneven surface NT     Comments Pt with good UE support when ambulating. Starting to put a small amount of weight through his right foot during ambulation. Utilizing a step to gait pattern.         STEPS/STAIRS Daily Assessment     Steps/Stairs ambulated      Assistance Required     Rail Use      Comments  NT     Curbs/Ramps          BALANCE Daily Assessment     Sitting - Static: Good (unsupported)  Sitting - Dynamic: Good (unsupported)  Standing - Static: Fair (with RW for UE support)  Standing - Dynamic : Impaired        WHEELCHAIR MOBILITY Daily Assessment     Able to Propel (ft): 250 feet  Functional Level: 6 (on even surfaces; SBA on uneven surfaces)  Wheelchair Setup Assist Required : 4 (Minimal assistance) (for leg rest management)  Wheelchair Management: Manages right brake;Manages left brake    W/c training on uneven surfaces patient required SBA with significant verbal cues to slow down on declines to prevent losing control        THERAPEUTIC EXERCISES Daily Assessment       Seated LE exercises 2x10 with 2 pound weight on left LE: LAQ, marching, glute sets, knee flexion, hip abd/adduction with knees extended     Supine 1x10: SLR with assistance on right, hip abd/adduction, quad sets          ASSESSMENT:  Pt is progressing well towards PT goals. Pt is slowly tolerating increase weight through R LE during ambulation and transfers, still maintaining PWB status. Plan to initiate stair training on 2\" step on next treatment session. Pt continues to have weakness and increased pain resulting in decreased independence with functional mobility. Progression toward goals:  [x]      Improving appropriately and progressing toward goals  []      Improving slowly and progressing toward goals  []      Not making progress toward goals and plan of care will be adjusted      PLAN:  Patient continues to benefit from skilled intervention to address the above impairments. Continue treatment per established plan of care. Discharge Recommendations:  Home Physical Therapy  Further Equipment Recommendations for Discharge:  rolling walker and wheelchair       Estimated Discharge Date:5/12/2022    Activity Tolerance:   fair  Please refer to the flowsheet for vital signs taken during this treatment.     After treatment:   [] Patient left in no apparent distress in bed  [] Patient left in no apparent distress sitting up in chair  [x] Patient left in no apparent distress in w/c mobilizing under own power  [] Patient left in no apparent distress dining area  [] Patient left in no apparent distress mobilizing under own power  [] Call bell left within reach  [] Nursing notified  [] Caregiver present  [] Bed alarm activated   [x] Chair alarm activated      Eric Becerril, PT  5/3/2022

## 2022-05-03 NOTE — PROGRESS NOTES
Pt is a 74 y.o. male admitted to ARU for Tibia/fibula fracture [S82.209A, S82.409A]. Pt is alert and oriented, alone in the room. Previous Functional Level: Per patient he was employed full time and (I) with ADL's/ IADL's. He does not drive. Pt reports being (I) with mobility without AD prior to recent fall and current hospitalization. Home Environment: Private residence  Steps to enter: 3 with bilateral hand rails  Support System: Other Family Member(s)    Patient has received services from: Toni Polanco 84 patient has currenlty: none  Pt states that at dc they will be returning to his home with intermittent support from his cousin Citlaly Rabago [] YES   [x] NO  NOK: Sister, Ced [place]    CGE/caregiver education  session: DATE: TBD  Designated caregiver will attend: NAME /CONTACT INFO:  Marguerite Norton - patient asked for opportunity to speak with Mr. Rosemary Lim prior to this writer calling to schedule CGE     PHARMACY: [address] on [address]  Do you want to use the MEDS to BEDS Program? NO  PCP:  Ashli BAKER VACCINE: yes +booster (moderna)    Reviewed DC planning, IDR's. Patient verbalized understanding. Team conference and  caregiver  education were all shared with patient. Permission to reach out to Marguerite Norton after patient speaks with him to schedule and share information about discharge. Anticipate patient will be discharged from the  ARU to his home with [place].  to follow.       Abelardo Chiang

## 2022-05-03 NOTE — PROGRESS NOTES
Problem: Falls - Risk of  Goal: *Absence of Falls  Description: Document Hagaman Ringwood Fall Risk and appropriate interventions in the flowsheet.   Outcome: Progressing Towards Goal  Note: Fall Risk Interventions:  Mobility Interventions: Assess mobility with egress test,Bed/chair exit alarm,Patient to call before getting OOB,Strengthening exercises (ROM-active/passive),Utilize walker, cane, or other assistive device,Utilize gait belt for transfers/ambulation    Mentation Interventions: Bed/chair exit alarm,Gait belt with transfers/ambulation,Increase mobility,More frequent rounding,Room close to nurse's station,Toileting rounds    Medication Interventions: Bed/chair exit alarm,Evaluate medications/consider consulting pharmacy,Patient to call before getting OOB,Teach patient to arise slowly,Utilize gait belt for transfers/ambulation    Elimination Interventions: Bed/chair exit alarm,Call light in reach,Stay With Me (per policy),Urinal in reach    History of Falls Interventions: Bed/chair exit alarm,Door open when patient unattended,Room close to nurse's station,Utilize gait belt for transfer/ambulation,Assess for delayed presentation/identification of injury for 48 hrs (comment for end date)         Problem: Pain  Goal: *Control of Pain  Outcome: Progressing Towards Goal

## 2022-05-03 NOTE — PROGRESS NOTES
[facility] PHYSICAL REHABILITATION  [address] [place] [address], [address]     INPATIENT REHABILITATION  DAILY PROGRESS NOTE     Date: 5/3/2022    Name: Rick Vallecillo Age / Sex: 74 y.o. / male   CSN: [id number] MRN: [id number]   [facility] Date: 4/28/2022 Length of Stay: 5 days     Primary Rehabilitation Diagnosis: Impaired Mobility and ADLs secondary to:  1. Closed displaced comminuted fracture of distal third of shaft of right tibia with routine healing  2. Closed fracture of distal end of right fibula with routine healing  3. S/P Closed IM nailing of the right tibia using the Synthes IM nail system with a double lock proximally and triple lock distally (4/22/2022 - Dr. Magalie Porter)      Subjective:     Patient seen and examined. Blood pressure controlled. Team conference was held at bedside this PM.     Patient's Complaint:   Uncontrolled pain  Has problem sleeping    Pain Control: increasing significant pain       Objective:     Vital Signs:  Patient Vitals for the past 24 hrs:   BP Temp Pulse Resp SpO2   05/03/22 0734 139/76 98.6 °F (37 °C) 61 18 98 %   05/02/22 2009 (!) 141/69 99.2 °F (37.3 °C) 71 18 98 %   05/02/22 1510 (!) 143/71 97 °F (36.1 °C) 73 18 94 %        Physical Examination:  GENERAL SURVEY: Patient is awake, alert, oriented x 3, sitting comfortably on the wheelchair, not in acute respiratory distress.   HEENT: pale palpebral conjunctivae, anicteric sclerae, no nasoaural discharge, moist oral mucosa  NECK: supple, no jugular venous distention, no palpable lymph nodes  CHEST/LUNGS: symmetrical chest expansion, good air entry, clear breath sounds  HEART: adynamic precordium, good S1 S2, no S3, regular rhythm, no murmurs  ABDOMEN: flat, bowel sounds appreciated, soft, non-tender  EXTREMITIES: pale nailbeds, no edema, full and equal pulses, no calf tenderness   NEUROLOGICAL EXAM: The patient is awake, alert and oriented x3, able to answer questions fairly appropriately, able to follow 1 and 2 step commands. Able to tell time from the wall clock. Cranial nerves II-XII are grossly intact. No gross sensory deficit. Motor strength is 4+/5 on BUE and LLE, 4- to 4/5 on the RLE.        Current Medications:  Current Facility-Administered Medications   Medication Dose Route Frequency    acetaminophen (TYLENOL) tablet 650 mg  650 mg Oral Q4H PRN    acetaminophen (TYLENOL) tablet 650 mg  650 mg Oral TID    oxyCODONE IR (ROXICODONE) tablet 10 mg  10 mg Oral Q4H PRN    bisacodyL (DULCOLAX) tablet 10 mg  10 mg Oral DAILY PRN    pantoprazole (PROTONIX) tablet 40 mg  40 mg Oral ACB    amLODIPine (NORVASC) tablet 10 mg  10 mg Oral DAILY    aspirin chewable tablet 81 mg  81 mg Oral BID    ferrous sulfate tablet 325 mg  325 mg Oral BID WITH MEALS    folic acid (FOLVITE) tablet 1 mg  1 mg Oral DAILY    methadone (DOLOPHINE) tablet 45 mg  45 mg Oral DAILY    polyethylene glycol (MIRALAX) packet 17 g  17 g Oral DAILY    therapeutic multivitamin (THERAGRAN) tablet 1 Tablet  1 Tablet Oral DAILY    thiamine HCL (B-1) tablet 100 mg  100 mg Oral DAILY    docusate sodium (COLACE) capsule 100 mg  100 mg Oral BID       Allergies:  No Known Allergies      Functional Progress:    PHYSICAL THERAPY    ON ADMISSION MOST RECENT   Wheelchair Mobility/Management  Able to Propel (ft): 270 feet  Functional Level: 4  Curbs/Ramps Assist Required (FIM Score): 0 (Not tested)  Wheelchair Setup Assist Required : 3 (Moderate assistance)  Wheelchair Management: Manages left brake,Manages right brake Wheelchair Mobility/Management  Able to Propel (ft): 250 feet (x2 reps between room and PT gym)  Functional Level: 5  Curbs/Ramps Assist Required (FIM Score): 0 (Not tested)  Wheelchair Setup Assist Required : 4 (Minimal assistance)  Wheelchair Management: Manages left brake,Manages right brake     Gait  Amount of Assistance: 4 (Minimal assistance)  Distance (ft): 6 Feet (ft)  Assistive Device: Walker, rolling Gait  Amount of Assistance: 4 (Minimal assistance)  Distance (ft): 24 Feet (ft) (x2 trials; seated rest break between)  Assistive Device: Walker, rolling,Gait belt     Balance-Sitting/Standing  Sitting - Static: Good (unsupported)  Sitting - Dynamic: Good (unsupported)  Standing - Static: Poor  Standing - Dynamic : Impaired Balance-Sitting/Standing  Sitting - Static: Good (unsupported)  Sitting - Dynamic: Good (unsupported)  Standing - Static: Fair (with RW support)  Standing - Dynamic : Impaired     Bed/Mat Mobility  Rolling Right : 4 (Minimal assistance)  Rolling Left : 4 (Minimal assistance)  Supine to Sit : 5 (Supervision)  Sit to Supine : 4 (Minimal assistance) Bed/Mat Mobility  Rolling Right : 4 (Minimal assistance)  Rolling Left : 4 (Minimal assistance)  Supine to Sit : 5 (Supervision) (additional time, flat mat table w/out side rails)  Sit to Supine :  (CGA for R LE on mat table w/out siderail)     Transfers  Transfer Type: Other  Other: stand step with RW  Transfer Assistance : 4 (Minimal assistance)  Sit to Stand Assistance: Minimal assistance  Car Transfers: Not tested (pt declined 2/2 pain)  Car Type: N/A Transfers  Transfer Type:  Other  Other: stand step w/RW  Transfer Assistance : 4 (Minimal assistance) (CGA/min A, x 2 reps to L and to R)  Sit to Stand Assistance: Contact guard assistance (frequent vc's for hand placement )  Car Transfers: Not tested (pt declined 2/2 pain)  Car Type: N/A     Steps or Stairs  Steps/Stairs Ambulated (#): 0  Level of Assist : 0 (Not tested) (2/2 pt ambulating only short distances 2/2 pain)  Rail Use: None Steps or Stairs  Steps/Stairs Ambulated (#): 0  Level of Assist : 0 (Not tested) (2/2 pt ambulating only short distances 2/2 pain)  Rail Use: None         Lab/Data Review:  Recent Results (from the past 24 hour(s))   CBC WITH AUTOMATED DIFF    Collection Time: 05/03/22  9:14 AM   Result Value Ref Range    WBC 2.9 (L) 4.6 - 13.2 K/uL    RBC 3.49 (L) 4.35 - 5.65 M/uL    HGB 10.3 (L) 13.0 - 16.0 g/dL HCT 33.7 (L) 36.0 - 48.0 %    MCV 96.6 78.0 - 100.0 FL    MCH 29.5 24.0 - 34.0 PG    MCHC 30.6 (L) 31.0 - 37.0 g/dL    RDW 23.8 (H) 11.6 - 14.5 %    PLATELET 92 (L) 858 - 420 K/uL    MPV 12.1 (H) 9.2 - 11.8 FL    NRBC 0.0 0  WBC    ABSOLUTE NRBC 0.00 0.00 - 0.01 K/uL    NEUTROPHILS 50 40 - 73 %    LYMPHOCYTES 30 21 - 52 %    MONOCYTES 12 (H) 3 - 10 %    EOSINOPHILS 7 (H) 0 - 5 %    BASOPHILS 1 0 - 2 %    IMMATURE GRANULOCYTES 0 0.0 - 0.5 %    ABS. NEUTROPHILS 1.5 (L) 1.8 - 8.0 K/UL    ABS. LYMPHOCYTES 0.9 0.9 - 3.6 K/UL    ABS. MONOCYTES 0.3 0.05 - 1.2 K/UL    ABS. EOSINOPHILS 0.2 0.0 - 0.4 K/UL    ABS. BASOPHILS 0.0 0.0 - 0.1 K/UL    ABS. IMM. GRANS. 0.0 0.00 - 0.04 K/UL    DF AUTOMATED      PLATELET COMMENTS DECREASED PLATELETS      RBC COMMENTS MACROCYTOSIS  1+        RBC COMMENTS HYPOCHROMIA  1+        RBC COMMENTS POLYCHROMASIA  1+        RBC COMMENTS ANISOCYTOSIS  2+       METABOLIC PANEL, BASIC    Collection Time: 05/03/22  9:14 AM   Result Value Ref Range    Sodium 135 (L) 136 - 145 mmol/L    Potassium 4.0 3.5 - 5.5 mmol/L    Chloride 104 100 - 111 mmol/L    CO2 27 21 - 32 mmol/L    Anion gap 4 3.0 - 18 mmol/L    Glucose 151 (H) 74 - 99 mg/dL    BUN 12 7.0 - 18 MG/DL    Creatinine 0.99 0.6 - 1.3 MG/DL    BUN/Creatinine ratio 12 12 - 20      GFR est AA >60 >60 ml/min/1.73m2    GFR est non-AA >60 >60 ml/min/1.73m2    Calcium 9.1 8.5 - 10.1 MG/DL       Assessment:     Primary Rehabilitation Diagnosis  1. Impaired Mobility and ADLs  2. Closed displaced comminuted fracture of distal third of shaft of right tibia with routine healing  3. Closed fracture of distal end of right fibula with routine healing  4.  S/P Closed IM nailing of the right tibia using the Synthes IM nail system with a double lock proximally and triple lock distally (4/22/2022 - Dr. Venu Dhaliwal)    Comorbidities  Patient Active Problem List   Diagnosis Code    Epidural abscess, L2-L5 G06.1    Polysubstance abuse ([facility] [address] F19.10    Leukopenia D72.819    Iron deficiency anemia, unspecified D50.9    Anemia D64.9    Thrombocytopenia (HCC) D69.6    Orthopedic aftercare for healing traumatic lower leg fracture, right, closed S82. 91XD    Metatarsal fracture S92.309A    Alcohol abuse F10.10    HTN (hypertension) I10    Liver mass R16.0    Bladder mass N32.89    Hematuria R31.9    Closed displaced comminuted fracture of shaft of right tibia with routine healing S82.251D    Closed fracture of distal end of right fibula with routine healing S82.831D    Impaired mobility and ADLs Z74.09, Z78.9    Acute blood loss as cause of postoperative anemia D62    Methadone dependence (HCC) F11.20       Plan:     1. Justification for continued stay: Good progression towards established rehabilitation goals. 2. Medical Issues being followed closely:    [x]  Fall and safety precautions     [x]  Wound Care     [x]  Bowel and Bladder Function     [x]  Fluid Electrolyte and Nutrition Balance     [x]  Pain Control      3. Issues that 24 hour rehabilitation nursing is following:    [x]  Fall and safety precautions     [x]  Wound Care     [x]  Bowel and Bladder Function     [x]  Fluid Electrolyte and Nutrition Balance     [x]  Pain Control      [x]  Assistance with and education on in-room safety with transfers to and from the bed, wheelchair, toilet and shower. 4. Acute rehabilitation plan of care:    [x]  Continue current care and rehab. [x]  Physical Therapy           [x]  Occupational Therapy           []  Speech Therapy     []  Hold Rehab until further notice     5. Medications:    [x]  MAR Reviewed     [x]  Continue Present Medications     6. Chemical DVT Prophylaxis:      []  Enoxaparin     []  Unfractionated Heparin     []  Warfarin     []  NOAC     [x]  Aspirin 81 mg PO BID (as per Orthopedics)     []  None     7. Mechanical DVT Prophylaxis:      []  RADHA Stockings     [x]  Sequential Compression Device on LLE     []  None     8.  GI Prophylaxis:      [x]  PPI     []  H2 Blocker     []  None / Not indicated     9. Code status:    [x]  Full code     []  Partial code     []  Do not intubate     []  Do not resuscitate     10. Diet:  Specifications  Low fat/Low cholesterol/High fiber/2 gm Na   Solids (consistency)  Regular   Liquids (consistency)  Thin   Fluid Restriction  None     11. Orders:   > Closed displaced comminuted fracture of distal third of shaft of right tibia with routine healing; Closed fracture of distal end of right fibula with routine healing; S/P Closed IM nailing of the right tibia using the Synthes IM nail system with a double lock proximally and triple lock distally (4/22/2022 - Dr. Boogie Wasserman)   > Partial weightbearing on the right lower extremity using a rolling walker    > Acute postoperative blood loss anemia;  Iron deficiency anemia   > Hgb/Hct (4/29/2022, on admission to the ARU) = 8.9/28.6   > Continue Ferrous sulfate 325 mg PO BID with meals    > Alcohol abuse   > Continue:    > Folic acid 1 mg PO once daily    > Multivitamin 1 tab PO once daily    > Thiamine 100 mg PO once daily    > Constipation   > Continue:    > Docusate sodium 100 mg PO BID    > Miralax 17 grams in 8 oz water PO once daily    > Hypertension   > Continue Amlodipine 10 mg PO once daily (9AM)    > Methadone dependence   > Continue Methadone 45 mg PO once daily    > Fever, etiology to be determined   > As per note of Dr. Genora Schilder dated 4/30/2022, patient had a fever and a work up was initiated   > Work-up:    > WBC count (4/29/2022, on admission to the ARU) = 3.1    > Blood culture x 2 sets (collected 4/29/2022, preliminary result as of 5/3/2022) yielded no growth after 3 days    > Chest x-ray (4/29/2022) showed perhaps mild bronchial wall thickening    > Urinalysis (4/29/2022): WNL    > Urine culture (collected 4/29/2022, resulted 5/2/2022) yielded growth of 20,000 colonies/ml of Escherichia coli   > WBC count (5/3/2022) = 2.9   > No fever over the past 24 hours   > No antibiotics for now    > Difficulty sleeping   > Start Melatonin 3 mg PO daily after dinner    > Analgesia   > Start:    > Acetaminophen 650 mg PO TID (8AM, 12PM, 4PM)    > Pregabalin 50 mg PO TID (8AM, 2PM, 8PM)   > Continue:    > Acetaminophen 650 mg PO q 4 hr PRN for pain level 4/10 or lesser    > Oxycodone 10 mg PO q 4 hr PRN for pain level 5/10 or greater       12. Personal Protective Equipment (N95 face mask) was used while interacting with the patient. Patient was using a surgical mask. 15. Patient's progress in rehabilitation and medical issues discussed with the patient. All questions answered to the best of my ability. Care plan discussed with patient and nurse. 14. Total clinical care time is 30 minutes, including review of chart including all labs, radiology, past medical history, and discussion with patient. Greater than 50% of my time was spent in coordination of care and counseling.       Signed:    Irma Wells MD    May 3, 2022

## 2022-05-03 NOTE — PROGRESS NOTES
Problem: Self Care Deficits Care Plan (Adult)  Goal: *Acute Goals and Plan of Care (Insert Text)  Description: Occupational Therapy Goals   Long Term Goals  Initiated 2022 and to be accomplished within 2 week(s), 2022  1. Pt will perform self-feeding with Mod I.  2. Pt will perform grooming with Mod I.  3. Pt will perform UB bathing with Mod I.  4. Pt will perform LB bathing with Mod I using AE and/ or compensatory strategies as needed. 5. Pt will perform tub/shower transfer with supv using least restrictive assistive device while maintaining weight bearing restrictions. 6. Pt will perform UB dressing with Mod I.  7. Pt will perform LB dressing with supv using AE and/ or compensatory strategies as needed. 8. Pt will perform toileting task with Mod I.  9. Pt will perform toilet transfer with supv using least restrictive assistive device while maintaining weight bearing restrictions. Short Term Goals   Initiated 2022 and to be accomplished within 7 day(s), by 2022  1. Pt will perform self-feeding with set-up. 2. Pt will perform grooming with SBA. 3. Pt will perform UB bathing with set-up. 4. Pt will perform LB bathing with SBA using AE and/ or compensatory strategies as needed. 5. Pt will perform tub/shower transfer with Min A using least restrictive assistive device while maintaining weight bearing restrictions. 6. Pt will perform UB dressing with set-up. 7. Pt will perform LB dressing with CGA/ SBA using AE and/ or compensatory strategies as needed. 8. Pt will perform toileting task with Min A/ CGA. 9. Pt will perform toilet transfer with SBA using least restrictive assistive device while maintaining weight bearing restrictions. Outcome: Progressing Towards Goal  Goal: Interventions  Outcome: Progressing Towards Goal   Occupational Therapy TREATMENT    Patient: Debbi Escobar   74 y.o.     Patient identified with name and : yes    Date: 5/3/2022    First Tx Session  Time In: 1001  Time Out: 1129    Diagnosis: Tibia/fibula fracture [S82.209A, S82.409A]   Precautions: Fall,Other (comment) (PWB Right LE)  Chart, occupational therapy assessment, plan of care, and goals were reviewed. Pain:  Pt reports 5/10 pain or discomfort prior to treatment; R LE (surgical site) described as aching  Pt reports 4/10 pain or discomfort post treatment; R LE (surgical site) described as aching  Intervention Provided: repositioning; use of elevated leg rest. Patient denies need for pain medication at this time. SUBJECTIVE:   Patient stated I'm not sleeping well and am waking up all night long.  Care coordinated with staff nurse for follow-up. OBJECTIVE DATA SUMMARY:     THERAPEUTIC ACTIVITY Daily Assessment                              Therapeutic Exercise Functional stand step transfer performed (CGA/ Min A) using RW; gait belt for safety to address shower transfer goal. Wheelchair mobility performed (Mod I) from pt's room to therapy gym (~250 ft) using BUE's and L LE to propel w/c forward; pt requiring assistance with managing right leg rest.     RUE/ LUE there ex performed using red theraband (15 reps x2) for shoulder flexion (within pain free range), shoulder abduction/ adduction, elbow flexion/ extension, chest pull, and shoulder internal/ external rotation. Verbal cues with demonstration for technique and positioning. Performed for strengthening, range of motion, and endurance for increased (I) with ADL's and functional mobility using LRAD (RW). BUE there ex performed using 2 lb weighted bar during beach ball bump activity with therapist; 15 reps x2 (therapist tossed beach ball; patient pushing out-front using weighted bar in horizontal position to hit ball forward). Performed for strengthening, range of motion, and endurance for carryover to self-cares. Good participation.       GROOMING Daily Assessment    Functional Level: 5 (set-up)  Tasks Completed by Patient: Washed face;Brushed teeth (Oral hygiene)  Comments: Self-grooming tasks performed (set-up) level seated on edge of bed. Oral hygiene: 5 set-up     BATHING Daily Assessment    Functional Level: 4 (UB bathing: set-up; LB bathing: SBA/ CGA (edge of bed))  Body Parts Patient Bathed: Abdomen;Arm, left;Arm, right;Buttocks; Chest;Lower leg and foot, left;Tiana area; Thigh, left; Thigh, right  Comments: UB bathing performed set-up level; basin set-up on tray table. LB bathing: CGA/ SBA; CGA for aspects performed in stance due to balance and weight bearing restrictions (R LE PWB). UPPER BODY DRESSING Daily Assessment    Functional Level: 5 (set-up)  Items Applied/Steps Completed: Pullover (4 steps)  Comments: UB dressing performed (set-up) level seated edge of bed. LOWER BODY DRESSING Daily Assessment    Functional Level: 4 (CGA/ Min A )  Items Applied/Steps Completed: Sock, left (1 step) (Scrub pants (3 steps) )  Comments: Pt doffed shorts CGA/ Min A seated edge of bed; pt donned scrub pants CGA at edge of bed for aspects performed in stance due balance/ coordination and R LE PWB (weight bearing restrictions). Sock and/or Shoe management: 5 set-up/ supv for donning left sock seated edge of bed      MOBILITY/TRANSFERS Daily Assessment     Tub/Shower Transfer Score: 4 (CGA/ Min A using RW (gait belt); R LE PWB status)  Comments: CGA/ Min A using RW (gait belt); R LE PWB status; functional transfer training; pt performed bathing EOB due to R LE splint/ dressing        ASSESSMENT:  Today's skilled occupational therapy session focused on ADL training (self-care performance), functional transfer training using LRAD (RW) while maintaining R LE PWB, and RUE/ LUE there ex (strengthening; endurance) for increased functional (I) with self-cares.  Patient will benefit from continued skilled occupational therapy interventions to address decreased (I) with ADL's, decreased strength/ endurance, decreased activity tolerance, impaired functional transfers/ mobility, impaired balance/ coordination, pain (R LE), and weight bearing restrictions impacting patient's functional independence with ADL's and mobility. Patient is limited at times secondary to pain R LE. Progression toward goals:  []          Improving appropriately and progressing toward goals  [x]          Improving slowly and progressing toward goals  []          Not making progress toward goals and plan of care will be adjusted     PLAN:  Patient continues to benefit from skilled intervention to address the above impairments. Continue treatment per established plan of care. Discharge Recommendations:  Home Health occupational therapy with assist  Further Equipment Recommendations for Discharge:  bedside commode, gait belt, transfer bench; TBD pending progress. Activity Tolerance:  Fair+; intermittent rest breaks due to fatigue and R LE discomfort. Estimated LOS: 5/12/2022    Please refer to the flowsheet for vital signs taken during this treatment. After treatment:   [x]  Patient left in no apparent distress sitting up in wheelchair with needs met  []  Patient left in no apparent distress in bed  [x]  Call bell left within reach  [x]  Nursing notified  []  Caregiver present  [x]  Wheelchair alarm activated    COMMUNICATION/EDUCATION:   [] Home safety education was provided and the patient/caregiver indicated understanding. [x] Patient/family have participated as able in goal setting and plan of care. [x] Patient/family agree to work toward stated goals and plan of care. [] Patient understands intent and goals of therapy, but is neutral about his/her participation. [] Patient is unable to participate in goal setting and plan of care.     Paula Pearson OT

## 2022-05-03 NOTE — INTERDISCIPLINARY ROUNDS
[facility] PHYSICAL REHABILITATION  [address] [place] [address], [address]    INPATIENT REHABILITATION  TEAM CONFERENCE SUMMARY     Date of Conference: 5/3/2022    Patient Information:        Name: Delta Crow Age / Sex: 74 y.o. / male   CSN: [id number] MRN: [id number]   Admit Date: 4/28/2022 Length of Stay: 5 days     Primary Rehabilitation Diagnosis  1. Impaired Mobility and ADLs  2. Closed displaced comminuted fracture of distal third of shaft of right tibia with routine healing  3. Closed fracture of distal end of right fibula with routine healing  4. S/P Closed IM nailing of the right tibia using the Synthes IM nail system with a double lock proximally and triple lock distally (4/22/2022 - Dr. Sherri Khan)    Comorbidities  Patient Active Problem List   Diagnosis Code    Epidural abscess, L2-L5 G06.1    Polysubstance abuse ([address] F19.10    Leukopenia D72.819    Iron deficiency anemia, unspecified D50.9    Anemia D64.9    Thrombocytopenia ([address] D69.6    Orthopedic aftercare for healing traumatic lower leg fracture, right, closed S82. 91XD    Metatarsal fracture S92.309A    Alcohol abuse F10.10    HTN (hypertension) I10    Liver mass R16.0    Bladder mass N32.89    Hematuria R31.9    Closed displaced comminuted fracture of shaft of right tibia with routine healing S82.251D    Closed fracture of distal end of right fibula with routine healing S82.831D    Impaired mobility and ADLs Z74.09, Z78.9    Acute blood loss as cause of postoperative anemia D62    Methadone dependence (HCC) F11.20          Therapy:     FIM SCORES Initial Assessment Weekly Progress Assessment 5/3/2022   Eating Functional Level: 5  Comments:  (Self-feeding (set-up) of meal tray; pt opens lids/ packets)  Functional Level: 5 set-up/ Mod I for self-feeding   Swallowing     Grooming 4 (CGA in stance; set-up/ supv wheelchair at sink)  4 (set-up seated edge of bed; CGA for grooming tasks performed in stance at sink)   Bathing 4 (UB bathing: set-up; LB bathing: Min A)  5 (UB bathing: set-up seated EOB; LB bathing: CGA/ SBA) use of LH sponge for washing distal left lower extremity. Upper Body Dressing Functional Level: 5 (set-up/ supv )  Items Applied/Steps Completed: Pullover (4 steps)  Comments: UB dressing performed set-up/ supv seated edge of bed for doffing/ donning scrub top. Functional level: 5 (set-up) seated edge of bed   Lower Body Dressing Functional Level: 4 (Min A overall)  Items Applied/Steps Completed: Sock, left (1 step) (Elastic waist shorts (3 steps))  Comments: Patient requiring assistance to thread shorts over RLE (s/p surgical intervention tibia/ fibula fx); pt able to thread shorts over left lower extremity. Pt able to pull shorts over hips to waist (bed level). Pt doffed/ donned left slipper sock with SBA seated in wheelchair. Functional Level: 4 (CGA/ Min A); minimal assist for doffing shorts over R LE splint while seated edge of bed. Toileting Functional Level: 3 (Mod A)  Comments:  (CM performed Mod A; hygiene set-up/ supv)  Functional Level: 4 (CGA/ SBA); elevated seat over commode   Bladder 0 0   Bowel  0 0   Toilet Transfer [place] Toilet Transfer Score: 4 (Stand step xfer (Min A) using RW; PWB R LE; gait belt)  Comments:  (Elevated seat over commode; Stand step xfer (Min A) using RW)  Toilet transfer score: 4 (CGA/ Min A using RW; R LE PWB) gait belt in use for safety; elevated seat over commode   Tub/Shower Transfer [place] Tub or Shower Type: Tub/Shower combination  Tub/Shower Transfer Score: 0 (Not assessed at this time 2/2 pain R LE and safety/ balance)  Comments: Not assessed at this time 2/2 pain R LE and safety/ balance  Tub/ Shower transfer score: 4 (CGA/ Min A using RW; R LE PWB) gait belt for safety. Functional transfer training using RW.      Comprehension Primary Mode of Comprehension: Auditory  Score: 5  Score: 5     Expression Primary Mode of Expression: Verbal  Score: 5  Score: 5   Social Interaction Score: 5  Score: 5   Problem Solving Score: 5  Score: 5   Memory Score: 5  Score: 5     FIM SCORES Initial Assessment Weekly Progress Assessment 5/3/2022   Bed/Chair/Wheelchair Transfers Transfer Type:  Other  Other: stand step with RW  Transfer Assistance : 4 (Minimal assistance)  Sit to Stand Assistance: Minimal assistance  Car Transfers: Not tested (pt declined 2/2 pain)  Car Type: N/A Other: stand step with RW  Transfer Assistance : 4 (Contact guard assistance)  Sit to Stand Assistance: Contact guard assistance (progressing towards SBA)   Bed Mobility Rolling Right 4 (Minimal assistance)   Rolling Left 4 (Minimal assistance)   Supine to Sit 5 (Supervision)   Sit to Stand Minimal assistance   Sit to Supine 4 (Minimal assistance)    Rolling Right    4 (CGA/minimal assistance)   Rolling Left    4 (CGA/minimal assistance)   Supine to Sit   5 (Supervision)   Sit to Stand   Contact guard assistance (progressing towards SBA)   Sit to Supine   5 (Supervision)      Locomotion (W/C) Able to Propel (ft): 270 feet  Functional Level: 4  Curbs/Ramps Assist Required (FIM Score): 0 (Not tested)  Wheelchair Setup Assist Required : 3 (Moderate assistance)  Wheelchair Management: Manages left brake,Manages right brake Function 6 (on even surfaces; SBA on uneven surfaces)  Setup Assistance  4 (Minimal assistance) (for leg rest management)   W/c management: manages left brake, manages right brake   Locomotion (W/C distance)   250 feet   Locomotion (Walk) 4 (Minimal assistance) 4 (Contact guard assistance)  Walker, rolling;Gait belt   Locomotion (Walk dist.) 6 Feet (ft) 47 Feet (ft) (62)   Steps/Stairs Steps/Stairs Ambulated (#): 0  Level of Assist : 0 (Not tested) (2/2 pt ambulating only short distances 2/2 pain)  Rail Use: None  Steps/Stairs Ambulated (#): 0  Level of Assist: 0 (NTdue to pain levels in R LE)         Nursing:     Neuro:   AAA&O x4            Respiratory:   [x] WNL   [] O2 LPM:   Other:  Peripheral Vascular:   [] TEDS present   [x] Edema present ____ Grade   Cardiac:   [x] WNL   [] Other  Genitourinary:   [x] continent   [] incontinent   [] morris  Abdominal ____5/2/2022___ LBM  GI: ____Regular___ Diet __thn____ Liquids _____ tube feeds  Musculoskeletal: ____ ROM Transfers ___w/c__ Assistive Device Used  ___mod_ Level of Assistance  Skin Integumentary:   [] Intact   [x] Not Intact   __________Preventative Measures  Details_____Right tibia/fibia fracture_________________________________________________________  Pain: [x] Controlled   [] Not Controlled   Pain Meds:   [] Scheduled   [x] PRN        Interdisciplinary Team Goals:     1. Discipline  Physical Therapy    Goal  pt will demonstrated bed <> chair transfers with RW and SBA/CGA, stand step with RW and PWB R LE. Barrier  increased pain, decreased strength B LE's, impaired standing balance, decreased activity tolerance    Intervention  therex, therapeutic activities, balance/NMR, pt education    Goal written by:   Doyle Wilson, PT     2. Discipline  Occupational Therapy    Goal  Patient will perform LB dressing SBA using AE and/ or compensatory strategies as needed. Barrier Decreased (I) with ADL's, decreased strength/ endurance, impaired functional transfers/ mobility, impaired standing balance/ coordination, decreased activity tolerance, pain R LE    Intervention  ADL training, there ex, there act, functional transfer training, patient education    Goal written by:  Mati Larsen, OTR/L      3. Discipline  Speech Therapy    Goal      Barrier      Intervention      Goal written by:       4. Discipline  Nursing    Goal  Patient will request pain medication at onset of pain before intensity increases    Barrier  Understanding pain medication,fears of side effects,cognition    Intervention  Acute pain management,Regular pain assessments, health history    Goal written by:  Sara Tan LPN     5.   Discipline  Clinical Psychology    Goal      Barrier      Intervention      Goal written by:           Disposition / Discharge Planning:      Follow-up services:  [x] Physical Therapy             [x] Occupational Therapy       [] Speech Therapy           [] Skilled Nursing      [] Medical Social Worker   [] Aide        [] Outpatient      [] vs   [x] Home Health  [] vs       [] to progress to outpatient       [] with 24-hour supervision       [] with 24-hour assistance   [] [facility] recommendations:  rolling walker pending progress   Estimated discharge date:  tbd   Discharge Location:  [x] Home  [] versus    [] [facility]    [] [address]   [] Other:           Interdisciplinary team rounds were held this PM with the following team members:       Name   Physical Therapist    Lyudmila Perez PT, DPT     Occupational Therapist    Kiko Alegria OTR/L   Recreational Therapist    Yelena Quinonez, [address] [place] [address], RN   Physician    Adria Orellana MD        Signed:  Adria Orellana MD    May 3, 2022

## 2022-05-03 NOTE — PROGRESS NOTES
SHIFT CHANGE NOTE FOR [facility]    Bedside and Verbal shift change report given to [address] (oncoming nurse) by Claudia Pineda LPN (offgoing nurse). Report included the following information SBAR, Kardex, MAR and Recent Results.     Situation:   Code Status: Full Code   Hospital Day: 5   Problem List:   Hospital Problems  Date Reviewed: 5/3/2022          Codes Class Noted POA    Methadone dependence ([address] (Chronic) ICD-10-CM: F11.20  ICD-9-CM: 304.00  Unknown Yes        Orthopedic aftercare for healing traumatic lower leg fracture, right, closed ICD-10-CM: S82.91XD  ICD-9-CM: V54.16  4/22/2022 Yes    Overview Addendum 5/2/2022 10:49 AM by Alondra Cruz MD     S/P Closed IM nailing of the right tibia using the Synthes IM nail system with a double lock proximally and triple lock distally (4/22/2022 - Dr. Luigi Matamoros)             Alcohol abuse ICD-10-CM: F10.10  ICD-9-CM: 305.00  4/22/2022 Yes        HTN (hypertension) ICD-10-CM: I10  ICD-9-CM: 401.9  4/22/2022 Yes        * (Principal) Closed displaced comminuted fracture of shaft of right tibia with routine healing ICD-10-CM: S82.251D  ICD-9-CM: V54.16  4/22/2022 Yes        Closed fracture of distal end of right fibula with routine healing ICD-10-CM: S82.831D  ICD-9-CM: V54.16  4/22/2022 Yes        Impaired mobility and ADLs ICD-10-CM: Z74.09, Z78.9  ICD-9-CM: V49.89  4/22/2022 Yes        Acute blood loss as cause of postoperative anemia ICD-10-CM: D62  ICD-9-CM: 285.1  4/22/2022 Yes        Iron deficiency anemia, unspecified ICD-10-CM: D50.9  ICD-9-CM: 280.9  8/5/2012 Yes              Background:   Past Medical History:   Past Medical History:   Diagnosis Date    Abscess of right shoulder     Abscess of shoulder     Acute blood loss as cause of postoperative anemia 4/22/2022    Arthritis     Closed displaced comminuted fracture of shaft of right tibia with routine healing 04/22/2022    Closed fracture of distal end of right fibula with routine healing 4/22/2022    Epidural abscess     Heart murmur     Hematuria     Heroin abuse (HCC)     Hypertension     Infected wound     Infectious disease     Iron deficiency anemia     IV drug user     Liver disease     Methadone dependence ([facility]     Tobacco abuse         Assessment:   Changes in Assessment throughout shift: No change to previous assessment     Patient has a central line: no Reasons if yes:    Insertion date: Last dressing date:   Patient has Benitez Cath: no Reasons if yes:     Insertion date: Last Vitals:     Vitals:    05/02/22 0736 05/02/22 1510 05/02/22 2009 05/03/22 0734   BP: 135/70 (!) 143/71 (!) 141/69 139/76   Pulse: 62 73 71 61   Resp: 16 18 18 18   Temp: 98.8 °F (37.1 °C) 97 °F (36.1 °C) 99.2 °F (37.3 °C) 98.6 °F (37 °C)   SpO2: 97% 94% 98% 98%   Height:            PAIN    Pain Assessment    Pain Intensity 1: 7 (05/03/22 1231) Pain Intensity 1: 2 (12/29/14 1105)    Pain Location 1: Leg,Ankle Pain Location 1: Abdomen    Pain Intervention(s) 1: Elevation,Medication (see MAR) Pain Intervention(s) 1: Medication (see MAR)  Patient Stated Pain Goal: 0 Patient Stated Pain Goal: 0  o Intervention effective: yes  o Other actions taken for pain: Elevation;Medication (see MAR)     Skin Assessment  Skin color    Condition/Temperature    Integrity Skin Integrity: Incision (comment)  Turgor    Weekly Pressure Ulcer Documentation  Pressure  Injury Documentation: No Pressure Injury Noted-Pressure Ulcer Prevention Initiated  Wound Prevention & Protection Methods  Orientation of wound Orientation of Wound Prevention: Posterior  Location of Prevention Location of Wound Prevention: Buttocks,Sacrum/Coccyx  Dressing Present Dressing Present : No  Dressing Status    Wound Offloading Wound Offloading (Prevention Methods): Bed, pressure reduction mattress,Chair cushion,Pillows,Repositioning     INTAKE/OUPUT  Date 05/02/22 0700 - 05/03/22 0659 05/03/22 0700 - 05/04/22 0659   Shift 1693-8686 7743-5906 24 Hour Total [id number] 6379-8235 24 Hour Total   INTAKE   P.O. 720  720 480  480     P. O. 720  720 480  480   Shift Total 720  720 480  480   OUTPUT   Urine  4300 4300 400  400     Urine Voided  4300 4300 400  400     Urine Occurrence(s) 1 x 8 x 9 x 3 x  3 x   Emesis/NG output           Emesis Occurrence(s)  0 x 0 x      Stool           Stool Occurrence(s) 1 x 0 x 1 x 0 x  0 x   Shift Total  4300 4300 400  400    -4300 -2961 80  80   Weight (kg)             Recommendations:  1. Patient needs and requests: TOILETING, URINAL    2. Pending tests/procedures: LABS PENDING     3. Functional Level/Equipment: Partial (one person) / Bed (comment)    Fall Precautions:   Fall risk precautions were reinforced with the patient; he was instructed to call for help prior to getting up. The following fall risk precautions were continued: bed/ chair alarms, door signage, yellow bracelet and socks as well as update of the [employer] tool in the patient's room. Ezio Score: 4    HEALS Safety Check    A safety check occurred in the patient's room between off going nurse and oncoming nurse listed above. The safety check included the below items  Area Items   H  High Alert Medications - Verify all high alert medication drips (heparin, PCA, etc.)   E  Equipment - Suction is set up for ALL patients (with yanker)  - Red plugs utilized for all equipment (IV pumps, etc.)  - WOWs wiped down at end of shift.  - Room stocked with oxygen, suction, and other unit-specific supplies   A  Alarms - Bed alarm is set for fall risk patients  - Ensure chair alarm is in place and activated if patient is up in a chair   L  Lines - Check IV for any infiltration  - Benitez bag is empty if patient has a Benitez   - Tubing and IV bags are labeled   S  Safety   - Room is clean, patient is clean, and equipment is clean. - Hallways are clear from equipment besides carts.    - Fall bracelet on for fall risk patients  - Ensure room is clear and free of clutter  - Suction is set up for ALL patients (with devin)  - Hallways are clear from equipment besides carts.    - Isolation precautions followed, supplies available outside room, sign posted     Vandana Blair LPN

## 2022-05-04 PROCEDURE — 74011250637 HC RX REV CODE- 250/637: Performed by: INTERNAL MEDICINE

## 2022-05-04 PROCEDURE — 99232 SBSQ HOSP IP/OBS MODERATE 35: CPT | Performed by: INTERNAL MEDICINE

## 2022-05-04 PROCEDURE — 65310000000 HC RM PRIVATE REHAB

## 2022-05-04 PROCEDURE — 97110 THERAPEUTIC EXERCISES: CPT

## 2022-05-04 PROCEDURE — 97530 THERAPEUTIC ACTIVITIES: CPT

## 2022-05-04 PROCEDURE — 97150 GROUP THERAPEUTIC PROCEDURES: CPT

## 2022-05-04 PROCEDURE — 97535 SELF CARE MNGMENT TRAINING: CPT

## 2022-05-04 PROCEDURE — 97116 GAIT TRAINING THERAPY: CPT

## 2022-05-04 PROCEDURE — 74011250637 HC RX REV CODE- 250/637: Performed by: EMERGENCY MEDICINE

## 2022-05-04 RX ADMIN — PANTOPRAZOLE 40 MG: 40 TABLET, DELAYED RELEASE ORAL at 06:35

## 2022-05-04 RX ADMIN — ACETAMINOPHEN 650 MG: 325 TABLET ORAL at 17:26

## 2022-05-04 RX ADMIN — OXYCODONE HYDROCHLORIDE 10 MG: 5 TABLET ORAL at 03:05

## 2022-05-04 RX ADMIN — AMLODIPINE BESYLATE 10 MG: 10 TABLET ORAL at 08:00

## 2022-05-04 RX ADMIN — OXYCODONE HYDROCHLORIDE 10 MG: 5 TABLET ORAL at 14:23

## 2022-05-04 RX ADMIN — PREGABALIN 50 MG: 50 CAPSULE ORAL at 08:00

## 2022-05-04 RX ADMIN — ACETAMINOPHEN 650 MG: 325 TABLET ORAL at 12:41

## 2022-05-04 RX ADMIN — Medication 3 MG: at 17:38

## 2022-05-04 RX ADMIN — DOCUSATE SODIUM 100 MG: 100 CAPSULE, LIQUID FILLED ORAL at 17:39

## 2022-05-04 RX ADMIN — PREGABALIN 50 MG: 50 CAPSULE ORAL at 14:23

## 2022-05-04 RX ADMIN — FERROUS SULFATE TAB 325 MG (65 MG ELEMENTAL FE) 325 MG: 325 (65 FE) TAB at 08:00

## 2022-05-04 RX ADMIN — PREGABALIN 50 MG: 50 CAPSULE ORAL at 19:50

## 2022-05-04 RX ADMIN — THERA TABS 1 TABLET: TAB at 08:01

## 2022-05-04 RX ADMIN — Medication 100 MG: at 08:00

## 2022-05-04 RX ADMIN — ACETAMINOPHEN 650 MG: 325 TABLET ORAL at 07:59

## 2022-05-04 RX ADMIN — ASPIRIN 81 MG CHEWABLE TABLET 81 MG: 81 TABLET CHEWABLE at 17:38

## 2022-05-04 RX ADMIN — DOCUSATE SODIUM 100 MG: 100 CAPSULE, LIQUID FILLED ORAL at 08:00

## 2022-05-04 RX ADMIN — FERROUS SULFATE TAB 325 MG (65 MG ELEMENTAL FE) 325 MG: 325 (65 FE) TAB at 17:39

## 2022-05-04 RX ADMIN — FOLIC ACID 1 MG: 1 TABLET ORAL at 08:00

## 2022-05-04 RX ADMIN — METHADONE HYDROCHLORIDE 45 MG: 10 TABLET ORAL at 07:58

## 2022-05-04 RX ADMIN — ASPIRIN 81 MG CHEWABLE TABLET 81 MG: 81 TABLET CHEWABLE at 08:01

## 2022-05-04 RX ADMIN — POLYETHYLENE GLYCOL 3350 17 G: 17 POWDER, FOR SOLUTION ORAL at 08:07

## 2022-05-04 RX ADMIN — OXYCODONE HYDROCHLORIDE 10 MG: 5 TABLET ORAL at 19:50

## 2022-05-04 NOTE — PROGRESS NOTES
Problem: Falls - Risk of  Goal: *Absence of Falls  Description: Document Kenzie Murphy Fall Risk and appropriate interventions in the flowsheet. Outcome: Progressing Towards Goal  Note: Fall Risk Interventions:  Mobility Interventions: (P) Bed/chair exit alarm,Patient to call before getting OOB    Mentation Interventions: (P) Bed/chair exit alarm,Evaluate medications/consider consulting pharmacy    Medication Interventions: (P) Bed/chair exit alarm,Evaluate medications/consider consulting pharmacy,Patient to call before getting OOB    Elimination Interventions: (P) Call light in reach,Bed/chair exit alarm,Patient to call for help with toileting needs    History of Falls Interventions: (P) Bed/chair exit alarm,Evaluate medications/consider consulting pharmacy         Problem: Patient Education: Go to Patient Education Activity  Goal: Patient/Family Education  Outcome: Progressing Towards Goal     Problem: Pain  Goal: *Control of Pain  Outcome: Progressing Towards Goal  Goal: *PALLIATIVE CARE:  Alleviation of Pain  Outcome: Progressing Towards Goal     Problem: Patient Education: Go to Patient Education Activity  Goal: Patient/Family Education  Outcome: Progressing Towards Goal     Problem: Patient Education: Go to Patient Education Activity  Goal: Patient/Family Education  Outcome: Progressing Towards Goal     Problem: Patient Education: Go to Patient Education Activity  Goal: Patient/Family Education  Outcome: Progressing Towards Goal     Problem: Pressure Injury - Risk of  Goal: *Prevention of pressure injury  Description: Document Jonathan Scale and appropriate interventions in the flowsheet.   Outcome: Progressing Towards Goal  Note: Pressure Injury Interventions:  Sensory Interventions: (P) Assess changes in LOC,Chair cushion    Moisture Interventions: (P) Absorbent underpads    Activity Interventions: (P) Chair cushion,Pressure redistribution bed/mattress(bed type),Increase time out of bed    Mobility Interventions: (P) Chair cushion,HOB 30 degrees or less,Pressure redistribution bed/mattress (bed type)    Nutrition Interventions: (P) Document food/fluid/supplement intake    Friction and Shear Interventions: (P) Feet elevated on foot rest,HOB 30 degrees or less                Problem: Patient Education: Go to Patient Education Activity  Goal: Patient/Family Education  Outcome: Progressing Towards Goal

## 2022-05-04 NOTE — INTERDISCIPLINARY ROUNDS
[address]  [address] [place] [address], [address]     INPATIENT REHABILITATION  DISCHARGE RECOMMENDATION SHEET    Date: 5/4/2022     Name: Chente Jenkins Age / Sex: 74 y.o. / male   CSN: [id number] MRN: [id number]   [facility] Date: 4/28/2022 Discharge Date: 5/12/2022        [facility] WALKING AIDS FOLLOW-UP SERVICES      Height  []  Straight cane  [] DMV Handicap Placard    []  #14 ½ torres height  []  Forearm crutches OUTPATIENT    []  Torres height  []  Axillary crutches  []  PT    []  Standard height  []  LBQC  []  OT    []  Reclining high back  []  SBQC  []  ST       Weight  HOME HEALTH    []  Standard weight WALKERS  [x]  PT    []  Lightweight  []  Standard height  [x]  OT    []  High-strength lightweight  []  Small adult  []  ST    []  Heavy Duty   [x]  Tall walker  [x]  Nursing    []  Patient is unable to self-propel a standard weight wheelchair  [x]  Rolling walker with 5 single fixed wheels  [x]  Wound care  Location of wound:  Specify needs:      []  Anticoagulation management       Width  []  Bariatric walker  []  Diabetes management    []  Narrow (16)   []  Insulin management    []  Standard (18) ACCESSORIES  []  No insulin management    []  Wide (20)  []  Rear sure glide brakes  []  BP management    []  Other  []  10 rear wheel brake  []  CHF Telehealth       Arms  []  Tall leg extensions  []  Post-CABG care/monitoring    []  Standard  []  Other:  []  Colostomy care    []  Desk/Tray   []  Tube feeding    []  Removable BATHROOM EQUIPMENT  []  PICC line care      Foot/Leg Rests  [x]  Bedside commode  []  Benitez catheter care    []  Removable  []  Extra wide bedside commode  []  Other:     []  Elevating  []  3:1 commode WITH drop arms  []  Medical Social Worker      Other  [x]  Tub transfer bench  []  Aide    []  Brake extensions  []  Shower chair     []  Padded gloves       []  Antitippers           CUSHIONS OTHER     []  Foam cushion  []  Seat belt     []  Gel cushion [x]  Gait belt     []  Yamile Countess II  []  Transfer board - Size:     []  Roho  []  Oxygen     []  Other  []  CPM      []  [address]  []  Ramp     []  Hospital bed  []  Hip kit     []  Special mattress  []  Reacher     []  Trapeze bar       Preferred Local Pharmacy (not mail-order):  [facility] on [address] [facility] 5/5/2022   Occupational Therapy Ana Gunter MSOTR/L   Speech-Language Therapy    Social Work    Nursing Randy Simon RN

## 2022-05-04 NOTE — PROGRESS NOTES
Problem: Self Care Deficits Care Plan (Adult)  Goal: *Acute Goals and Plan of Care (Insert Text)  Description: Occupational Therapy Goals   Long Term Goals  Initiated 2022 and to be accomplished within 2 week(s), 2022  1. Pt will perform self-feeding with Mod I.  2. Pt will perform grooming with Mod I.  3. Pt will perform UB bathing with Mod I.  4. Pt will perform LB bathing with Mod I using AE and/ or compensatory strategies as needed. 5. Pt will perform tub/shower transfer with supv using least restrictive assistive device while maintaining weight bearing restrictions. 6. Pt will perform UB dressing with Mod I.  7. Pt will perform LB dressing with supv using AE and/ or compensatory strategies as needed. 8. Pt will perform toileting task with Mod I.  9. Pt will perform toilet transfer with supv using least restrictive assistive device while maintaining weight bearing restrictions. Short Term Goals   Initiated 2022 and to be accomplished within 7 day(s), by 2022  1. Pt will perform self-feeding with set-up. 2. Pt will perform grooming with SBA. 3. Pt will perform UB bathing with set-up. 4. Pt will perform LB bathing with SBA using AE and/ or compensatory strategies as needed. 5. Pt will perform tub/shower transfer with Min A using least restrictive assistive device while maintaining weight bearing restrictions. 6. Pt will perform UB dressing with set-up. 7. Pt will perform LB dressing with CGA/ SBA using AE and/ or compensatory strategies as needed. 8. Pt will perform toileting task with Min A/ CGA. 9. Pt will perform toilet transfer with SBA using least restrictive assistive device while maintaining weight bearing restrictions. Occupational Therapy TREATMENT    Patient: Gagan Carbajal   74 y.o.     Patient identified with name and :     Date: 2022    First Tx Session  Time In:   Time Out[de-identified] 1058    Diagnosis: Tibia/fibula fracture [S82.209A, S82.409A] Precautions: Fall,Other (comment) (PWB Right LE)  Chart, occupational therapy assessment, plan of care, and goals were reviewed. Pain:  Pt reports 6/10 pain or discomfort prior tx at right LE   Pt reports 6/10 pain or discomfort post tx at right LE. Intervention Provided: Nursing provided medications. SUBJECTIVE:   Patient stated Can I get a heavier weight, I just want to see if I could do it? .  Pt given a 5 lb weight after educated on exercises becoming harder. Pt later decided to go back to 3lb weight. OBJECTIVE DATA SUMMARY:     THERAPEUTIC EXERCISE Daily Assessment    Pt propelled w/c from room to gym with I.  Group:  UB HEP (bicep curls, chest pulls,  front raise,upright rows 3x10 with 3lb weight in hand. ASSESSMENT:  Pt working to increase strength for ADLs. Progression toward goals:  [x]          Improving appropriately and progressing toward goals  []          Improving slowly and progressing toward goals  []          Not making progress toward goals and plan of care will be adjusted     Activity Tolerance:  Fair       Please refer to the flowsheet for vital signs taken during this treatment. After treatment:   [x]  Patient left in no apparent distress sitting up in chair   []  Patient left in no apparent distress in bed  []  Call bell left within reach  []  Nursing notified  []  Caregiver present  []  Bed alarm activated    COMMUNICATION/EDUCATION:   [] Home safety education was provided and the patient/caregiver indicated understanding. [x] Patient/family have participated as able in goal setting and plan of care. [] Patient/family agree to work toward stated goals and plan of care. [] Patient understands intent and goals of therapy, but is neutral about his/her participation. [] Patient is unable to participate in goal setting and plan of care.       Keith Adam OT     Outcome: Progressing Towards Goal  Goal: Interventions  Outcome: Progressing Towards Goal

## 2022-05-04 NOTE — PROGRESS NOTES
Problem: Mobility Impaired (Adult and Pediatric)  Goal: *Acute Goals and Plan of Care (Insert Text)  Description: Physical Therapy Short Term Goals  Initiated 4/29/2022 and to be accomplished within 5-7 day(s) (5/06/2022)  1. Patient will move from supine to sit and sit to supine , scoot up and down, and roll side to side in bed with supervision/set-up. 2.  Patient will transfer from bed to chair and chair to bed with supervision/set-up using the least restrictive device. 3.  Patient will perform sit to stand with supervision/set-up. 4.  Patient will ambulate with supervision/set-up for 50 feet with the least restrictive device. 5.  Patient will ascend/descend 2 stairs with B handrail(s) with minimal assistance/contact guard assist.    Physical Therapy Long Term Goals  Initiated 4/29/2022 and to be accomplished within 10-14 day(s) (5/13/2022)  1. Patient will move from supine to sit and sit to supine , scoot up and down, and roll side to side in bed with modified independence. 2.  Patient will transfer from bed to chair and chair to bed with modified independence using the least restrictive device. 3.  Patient will perform sit to stand with modified independence. 4.  Patient will ambulate with modified independence for 150 feet with the least restrictive device. 5.  Patient will ascend/descend 2 stairs with B handrail(s) with modified independence. Outcome: Progressing Towards Goal  PHYSICAL THERAPY TREATMENT    Patient: Cosme Sultana (53 y.o. male)  Date: 5/4/2022  Diagnosis: Tibia/fibula fracture [S82.209A, S82.409A] Closed displaced comminuted fracture of shaft of right tibia with routine healing  Precautions: Fall,Other (comment) (PWB Right LE)  Chart, physical therapy assessment, plan of care and goals were reviewed. Time in: 0925  Time Out: 1005    Time In:1135  Time Out:1230    Patient seen for: Transfer training;Balance activities; Wheelchair mobility, gait training, patient education, strengthening     Pain:  Pt pain was reported as 5 pre-treatment. Pt pain was reported as 6 post-treatment. Intervention: rest breaks as needed    Patient identified with name and :yes    SUBJECTIVE:      \"I can tell that I'm getting stronger. \"    OBJECTIVE DATA SUMMARY:    Objective:     GROSS ASSESSMENT Daily Assessment            BED/MAT MOBILITY Daily Assessment      NT      TRANSFERS Daily Assessment     Other: stand step with RW  Transfer Assistance : 4 (Contact guard assistance)  Sit to Stand Assistance:  (SBA/CGA) from bed and w/c        GAIT Daily Assessment    Gait Description (WDL)      Gait Abnormalities Antalgic; Step to gait    Assistive device Walker, rolling    Ambulation assistance - level surface 4 (Contact guard assistance)    Distance 93 Feet (ft)    Ambulation assistance- uneven surface NT     Comments Pt is progressing with PWB status on right LE and was issued a post-surgical shoe for the right to protect his splint and prevent his foot from sliding on the floor.           STEPS/STAIRS Daily Assessment     Steps/Stairs ambulated 2 (4\" box step with RW for support)    Assistance Required 3 (Moderate assistance) (first step and progressed to min A with 2nd step)    Rail Use  (RW)    Comments  Verbal cues for foot placement and sequencing    Curbs/Ramps          BALANCE Daily Assessment     Sitting - Static: Good (unsupported)  Sitting - Dynamic: Good (unsupported)  Standing - Static:  (fair+)  Standing- Dynamic: fair/fair-        WHEELCHAIR MOBILITY Daily Assessment     Able to Propel (ft): 300 feet  Functional Level: 6  Curbs/Ramps Assist Required (FIM Score): 5 (Supervision)  Wheelchair Setup Assist Required : 4 (Minimal assistance) (for right leg rest)  Wheelchair Management: Manages right brake;Manages left brake  Patient propelled w/c for 200 feet outside on multiple types of surfaces and up and down small inclines with supervision; pt demonstrated better safety awareness with control speed today compared to other days        THERAPEUTIC EXERCISES Daily Assessment       Seated LE exercises 2x10      Group:  Patient participated in a group activity that required reaching outside of his base of support and hitting a balloon in both sitting and standing. Pt demonstrated good dynamic sitting balance however did use left UE to maintain balance when reaching further distances. Pt performed 2 trials in standing for 1.5 minutes each time with CGA/min A for balance and left UE support on the RW,  and verbal cues to maintain PWB on the right. ASSESSMENT:  Pt was able to initiate stair training today with the 4\" box step and RW with mod/min A. Pt has 3 steps to ascend in order to get into his house. Pt is increasing his single gait trail ambulation distance however is fatigued afterwards and requires a significant rest break and is unable ambulate again within 15 minutes due to fatigue. Pt is demonstrating increased muscle endurance with LE exercise and is able to perform 2 sets of 10 repetitions without tremors due to weakness. Pt remains very motivated to increase his independence with functional mobility. Progression toward goals:  [x]      Improving appropriately and progressing toward goals  []      Improving slowly and progressing toward goals  []      Not making progress toward goals and plan of care will be adjusted      PLAN:  Patient continues to benefit from skilled intervention to address the above impairments. Continue treatment per established plan of care. Discharge Recommendations:  Home Physical Therapy  Further Equipment Recommendations for Discharge:  rolling walker      Estimated Discharge Date: 5/12/2022    Activity Tolerance:   fair  Please refer to the flowsheet for vital signs taken during this treatment.     After treatment:   [] Patient left in no apparent distress in bed  [x] Patient left in no apparent distress sitting up in chair  [] Patient left in no apparent distress in w/c mobilizing under own power  [] Patient left in no apparent distress dining area  [] Patient left in no apparent distress mobilizing under own power  [x] Call bell left within reach  [x] Nursing notified  [] Caregiver present  [] Bed alarm activated   [x] Chair alarm activated      Anuradha Rodriguez, PT  5/4/2022

## 2022-05-04 NOTE — PROGRESS NOTES
[facility] PHYSICAL REHABILITATION  [address] [place] [address], [address]     INPATIENT REHABILITATION  DAILY PROGRESS NOTE     Date: 5/4/2022    Name: Shruti Carney Age / Sex: 74 y.o. / male   CSN: [id number] MRN: [id number]   [facility] Date: 4/28/2022 Length of Stay: 6 days     Primary Rehabilitation Diagnosis: Impaired Mobility and ADLs secondary to:  1. Closed displaced comminuted fracture of distal third of shaft of right tibia with routine healing  2. Closed fracture of distal end of right fibula with routine healing  3. S/P Closed IM nailing of the right tibia using the Synthes IM nail system with a double lock proximally and triple lock distally (4/22/2022 - Dr. Andrei Estevez)      Subjective:     Patient seen and examined. Blood pressure controlled. Patient's Complaint:   No significant medical complaints    Pain Control: ongoing significant pain in which is stable and controlled by current meds      Objective:     Vital Signs:  Patient Vitals for the past 24 hrs:   BP Temp Pulse Resp SpO2   05/04/22 1551 135/70 97.5 °F (36.4 °C) 66 18 97 %   05/04/22 0728 128/63 98.1 °F (36.7 °C) 63 18 98 %   05/03/22 2047 (!) 148/73 98.7 °F (37.1 °C) 69 18 99 %        Physical Examination:  GENERAL SURVEY: Patient is awake, alert, oriented x 3, sitting comfortably on the wheelchair, not in acute respiratory distress.   HEENT: pale palpebral conjunctivae, anicteric sclerae, no nasoaural discharge, moist oral mucosa  NECK: supple, no jugular venous distention, no palpable lymph nodes  CHEST/LUNGS: symmetrical chest expansion, good air entry, clear breath sounds  HEART: adynamic precordium, good S1 S2, no S3, regular rhythm, no murmurs  ABDOMEN: flat, bowel sounds appreciated, soft, non-tender  EXTREMITIES: pale nailbeds, no edema, full and equal pulses, no calf tenderness   NEUROLOGICAL EXAM: The patient is awake, alert and oriented x3, able to answer questions fairly appropriately, able to follow 1 and 2 step commands. Able to tell time from the wall clock. Cranial nerves II-XII are grossly intact. No gross sensory deficit. Motor strength is 4+/5 on BUE and LLE, 4- to 4/5 on the RLE.        Current Medications:  Current Facility-Administered Medications   Medication Dose Route Frequency    pregabalin (LYRICA) capsule 50 mg  50 mg Oral TID    melatonin tablet 3 mg  3 mg Oral PCD    polyethylene glycol (MIRALAX) packet 17 g  17 g Oral DAILY    acetaminophen (TYLENOL) tablet 650 mg  650 mg Oral Q4H PRN    acetaminophen (TYLENOL) tablet 650 mg  650 mg Oral TID    oxyCODONE IR (ROXICODONE) tablet 10 mg  10 mg Oral Q4H PRN    bisacodyL (DULCOLAX) tablet 10 mg  10 mg Oral DAILY PRN    pantoprazole (PROTONIX) tablet 40 mg  40 mg Oral ACB    amLODIPine (NORVASC) tablet 10 mg  10 mg Oral DAILY    aspirin chewable tablet 81 mg  81 mg Oral BID    ferrous sulfate tablet 325 mg  325 mg Oral BID WITH MEALS    folic acid (FOLVITE) tablet 1 mg  1 mg Oral DAILY    methadone (DOLOPHINE) tablet 45 mg  45 mg Oral DAILY    therapeutic multivitamin (THERAGRAN) tablet 1 Tablet  1 Tablet Oral DAILY    thiamine HCL (B-1) tablet 100 mg  100 mg Oral DAILY    docusate sodium (COLACE) capsule 100 mg  100 mg Oral BID       Allergies:  No Known Allergies      Functional Progress:    OCCUPATIONAL THERAPY    ON ADMISSION MOST RECENT   Eating  Functional Level: 5   Eating  Functional Level: 5     Grooming  Functional Level: 4 (CGA in stance; set-up/ supv wheelchair at sink)   Grooming  Functional Level: 5 (set-up)     Bathing  Functional Level: 4 (UB bathing: set-up; LB bathing: Min A)   Bathing  Functional Level: 4 (UB bathing: set-up; LB bathing: SBA/ CGA (edge of bed))     Upper Body Dressing  Functional Level: 5 (set-up/ supv )   Upper Body Dressing  Functional Level: 5 (set-up)     Lower Body Dressing  Functional Level: 4 (Min A overall)   Lower Body Dressing  Functional Level: 4 (CGA/ Min A )     Toileting  Functional Level: 3 (Mod A)   Toileting  Functional Level: 4 (CGA)     Toilet Transfers  Toilet Transfer Score: 4 (Stand step xfer (Min A) using RW; PWB R LE; gait belt)   Toilet Transfers  Toilet Transfer Score: 4 (CGA/ Min A)     Tub /Shower Transfers  Tub/Shower Transfer Score: 0 (Not assessed at this time 2/2 pain R LE and safety/ balance)   Tub/Shower Transfers  Tub/Shower Transfer Score: 4 (CGA/ Min A using RW (gait belt); R LE PWB status)       Legend:   7 - Independent   6 - Modified Independent   5 - Standby Assistance / Supervision / Set-up   4 - Minimum Assistance / Contact Guard Assistance   3 - Moderate Assistance   2 - Maximum Assistance   1 - Total Assistance / Dependent       Lab/Data Review:  No results found for this or any previous visit (from the past 24 hour(s)). Assessment:     Primary Rehabilitation Diagnosis  1. Impaired Mobility and ADLs  2. Closed displaced comminuted fracture of distal third of shaft of right tibia with routine healing  3. Closed fracture of distal end of right fibula with routine healing  4. S/P Closed IM nailing of the right tibia using the Synthes IM nail system with a double lock proximally and triple lock distally (4/22/2022 - Dr. Venu Dhaliwal)    Comorbidities  Patient Active Problem List   Diagnosis Code    Epidural abscess, L2-L5 G06.1    Polysubstance abuse ([address] F19.10    Leukopenia D72.819    Iron deficiency anemia, unspecified D50.9    Anemia D64.9    Thrombocytopenia ([address] D69.6    Orthopedic aftercare for healing traumatic lower leg fracture, right, closed S82. 91XD    Metatarsal fracture S92.309A    Alcohol abuse F10.10    HTN (hypertension) I10    Liver mass R16.0    Bladder mass N32.89    Hematuria R31.9    Closed displaced comminuted fracture of shaft of right tibia with routine healing S82.251D    Closed fracture of distal end of right fibula with routine healing S82.831D    Impaired mobility and ADLs Z74.09, Z78.9    Acute blood loss as cause of postoperative anemia D62    Methadone dependence ([facility] F11.20       Plan:     1. Justification for continued stay: Good progression towards established rehabilitation goals. 2. Medical Issues being followed closely:    [x]  Fall and safety precautions     [x]  Wound Care     [x]  Bowel and Bladder Function     [x]  Fluid Electrolyte and Nutrition Balance     [x]  Pain Control      3. Issues that 24 hour rehabilitation nursing is following:    [x]  Fall and safety precautions     [x]  Wound Care     [x]  Bowel and Bladder Function     [x]  Fluid Electrolyte and Nutrition Balance     [x]  Pain Control      [x]  Assistance with and education on in-room safety with transfers to and from the bed, wheelchair, toilet and shower. 4. Acute rehabilitation plan of care:    [x]  Continue current care and rehab. [x]  Physical Therapy           [x]  Occupational Therapy           []  Speech Therapy     []  Hold Rehab until further notice     5. Medications:    [x]  MAR Reviewed     [x]  Continue Present Medications     6. Chemical DVT Prophylaxis:      []  Enoxaparin     []  Unfractionated Heparin     []  Warfarin     []  NOAC     [x]  Aspirin 81 mg PO BID (as per Orthopedics)     []  None     7. Mechanical DVT Prophylaxis:      []  RADHA Stockings     [x]  Sequential Compression Device on LLE     []  None     8. GI Prophylaxis:      [x]  PPI     []  H2 Blocker     []  None / Not indicated     9. Code status:    [x]  Full code     []  Partial code     []  Do not intubate     []  Do not resuscitate     10. Diet:  Specifications  Low fat/Low cholesterol/High fiber/2 gm Na   Solids (consistency)  Regular   Liquids (consistency)  Thin   Fluid Restriction  None     11.  Orders:   > Closed displaced comminuted fracture of distal third of shaft of right tibia with routine healing; Closed fracture of distal end of right fibula with routine healing; S/P Closed IM nailing of the right tibia using the Synthes IM nail system with a double lock proximally and triple lock distally (4/22/2022 - Dr. Ulises Patel)   > Partial weightbearing on the right lower extremity using a rolling walker    > Acute postoperative blood loss anemia; Iron deficiency anemia   > Hgb/Hct (4/29/2022, on admission to the ARU) = 8.9/28.6   > Continue Ferrous sulfate 325 mg PO BID with meals    > Alcohol abuse   > Continue:    > Folic acid 1 mg PO once daily    > Multivitamin 1 tab PO once daily    > Thiamine 100 mg PO once daily    > Constipation   > Continue:    > Docusate sodium 100 mg PO BID    > Miralax 17 grams in 8 oz water PO once daily    > Hypertension   > Continue Amlodipine 10 mg PO once daily (9AM)    > Methadone dependence   > Continue Methadone 45 mg PO once daily    > Fever, etiology to be determined   > As per note of Dr. Kendal Young dated 4/30/2022, patient had a fever and a work up was initiated   > Work-up:    > WBC count (4/29/2022, on admission to the ARU) = 3.1    > Blood culture x 2 sets (collected 4/29/2022, preliminary result as of 5/4/2022) yielded no growth after 4 days    > Chest x-ray (4/29/2022) showed perhaps mild bronchial wall thickening    > Urinalysis (4/29/2022): WNL    > Urine culture (collected 4/29/2022, resulted 5/2/2022) yielded growth of 20,000 colonies/ml of Escherichia coli   > WBC count (5/3/2022) = 2.9   > No fever over the past 24 hours   > No antibiotics for now    > Difficulty sleeping   > On 5/3/2022, started Melatonin 3 mg PO daily after dinner   > Continue Melatonin 3 mg PO daily after dinner    > Analgesia   > On 5/3/2022, started:    > Acetaminophen 650 mg PO TID (8AM, 12PM, 4PM)    > Pregabalin 50 mg PO TID (8AM, 2PM, 8PM)   > Continue:    > Acetaminophen 650 mg PO TID (8AM, 12PM, 4PM)    > Acetaminophen 650 mg PO q 4 hr PRN for pain level 4/10 or lesser (from 8PM to 4AM only)    > Pregabalin 50 mg PO TID (8AM, 2PM, 8PM)    > Oxycodone 10 mg PO q 4 hr PRN for pain level 5/10 or greater       12. Personal Protective Equipment (N95 face mask) was used while interacting with the patient. Patient was using a surgical mask. 15. Patient's progress in rehabilitation and medical issues discussed with the patient. All questions answered to the best of my ability. Care plan discussed with patient and nurse. 14. Total clinical care time is 30 minutes, including review of chart including all labs, radiology, past medical history, and discussion with patient. Greater than 50% of my time was spent in coordination of care and counseling.       Signed:    Kay Alvarez MD    May 4, 2022

## 2022-05-04 NOTE — PROGRESS NOTES
Problem: Falls - Risk of  Goal: *Absence of Falls  Description: Document Shannon Mcburney Fall Risk and appropriate interventions in the flowsheet.   Outcome: Progressing Towards Goal  Note: Fall Risk Interventions:  Mobility Interventions: Assess mobility with egress test,Bed/chair exit alarm,Patient to call before getting OOB,Strengthening exercises (ROM-active/passive),Utilize walker, cane, or other assistive device,Utilize gait belt for transfers/ambulation    Mentation Interventions: Bed/chair exit alarm,Gait belt with transfers/ambulation,Increase mobility,More frequent rounding,Room close to nurse's station,Toileting rounds    Medication Interventions: Bed/chair exit alarm,Evaluate medications/consider consulting pharmacy,Patient to call before getting OOB,Teach patient to arise slowly,Utilize gait belt for transfers/ambulation    Elimination Interventions: Bed/chair exit alarm,Call light in reach,Stay With Me (per policy),Urinal in reach    History of Falls Interventions: Bed/chair exit alarm,Door open when patient unattended,Room close to nurse's station,Utilize gait belt for transfer/ambulation,Assess for delayed presentation/identification of injury for 48 hrs (comment for end date)         Problem: Patient Education: Go to Patient Education Activity  Goal: Patient/Family Education  Outcome: Progressing Towards Goal     Problem: Pain  Goal: *Control of Pain  Outcome: Progressing Towards Goal     Problem: Patient Education: Go to Patient Education Activity  Goal: Patient/Family Education  Outcome: Progressing Towards Goal     Problem: Patient Education: Go to Patient Education Activity  Goal: Patient/Family Education  Outcome: Progressing Towards Goal     Problem: Patient Education: Go to Patient Education Activity  Goal: Patient/Family Education  Outcome: Progressing Towards Goal     Problem: Pressure Injury - Risk of  Goal: *Prevention of pressure injury  Description: Document Jonathan Scale and appropriate interventions in the flowsheet. Outcome: Progressing Towards Goal  Note: Pressure Injury Interventions:  Sensory Interventions: Assess changes in LOC,Assess need for specialty bed,Chair cushion,Minimize linen layers,Pad between skin to skin,Pressure redistribution bed/mattress (bed type),Turn and reposition approx.  every two hours (pillows and wedges if needed)    Moisture Interventions: Absorbent underpads,Apply protective barrier, creams and emollients,Limit adult briefs,Maintain skin hydration (lotion/cream),Minimize layers,Moisture barrier    Activity Interventions: Assess need for specialty bed,Chair cushion,Increase time out of bed,Pressure redistribution bed/mattress(bed type),PT/OT evaluation    Mobility Interventions: Assess need for specialty bed,Chair cushion,Float heels,HOB 30 degrees or less,Pressure redistribution bed/mattress (bed type),PT/OT evaluation    Nutrition Interventions: Document food/fluid/supplement intake,Discuss nutritional consult with provider    Friction and Shear Interventions: HOB 30 degrees or less,Lift sheet,Apply protective barrier, creams and emollients,Minimize layers                Problem: Patient Education: Go to Patient Education Activity  Goal: Patient/Family Education  Outcome: Progressing Towards Goal

## 2022-05-04 NOTE — PROGRESS NOTES
Problem: Self Care Deficits Care Plan (Adult)  Goal: *Acute Goals and Plan of Care (Insert Text)  Description: Occupational Therapy Goals   Long Term Goals  Initiated 2022 and to be accomplished within 2 week(s), 2022  1. Pt will perform self-feeding with Mod I.  2. Pt will perform grooming with Mod I.  3. Pt will perform UB bathing with Mod I.  4. Pt will perform LB bathing with Mod I using AE and/ or compensatory strategies as needed. 5. Pt will perform tub/shower transfer with supv using least restrictive assistive device while maintaining weight bearing restrictions. 6. Pt will perform UB dressing with Mod I.  7. Pt will perform LB dressing with supv using AE and/ or compensatory strategies as needed. 8. Pt will perform toileting task with Mod I.  9. Pt will perform toilet transfer with supv using least restrictive assistive device while maintaining weight bearing restrictions. Short Term Goals   Initiated 2022 and to be accomplished within 7 day(s), by 2022  1. Pt will perform self-feeding with set-up. 2. Pt will perform grooming with SBA. 3. Pt will perform UB bathing with set-up. 4. Pt will perform LB bathing with SBA using AE and/ or compensatory strategies as needed. 5. Pt will perform tub/shower transfer with Min A using least restrictive assistive device while maintaining weight bearing restrictions. 6. Pt will perform UB dressing with set-up. 7. Pt will perform LB dressing with CGA/ SBA using AE and/ or compensatory strategies as needed. 8. Pt will perform toileting task with Min A/ CGA. 9. Pt will perform toilet transfer with SBA using least restrictive assistive device while maintaining weight bearing restrictions. Outcome: Progressing Towards Goal  Goal: Interventions  Outcome: Progressing Towards Goal   Occupational Therapy TREATMENT    Patient: Moe Solorio   74 y.o.     Patient identified with name and : yes    Date: 2022    First Tx Session  Time In: 1103  Time Out[de-identified] 1200    Second Tx Session  Time In: 3637  Time Out[de-identified] 2398    Diagnosis: Tibia/fibula fracture [S82.209A, S82.409A]   Precautions: Fall,Other (comment) (PWB Right LE)  Chart, occupational therapy assessment, plan of care, and goals were reviewed. Pain:  Pt reports 4/10 pain or discomfort prior to treatment.  (first session)  Pt reports 6/10 pain or discomfort post treatment. (second session)  Intervention Provided: Pt reported tolerable and agreeable to engage in OT session(s)      SUBJECTIVE:   Patient stated i've been down here all day. I like to stay busy.     OBJECTIVE DATA SUMMARY:     THERAPEUTIC ACTIVITY Daily Assessment    Pt engaged in BUE functional reach and FM activity in seated position at tabletop which involved grasping cards and securing to board to match cards. Pt demonstrated ability to reach across table top and across midline to secure cards to match on board while wearing 1 1/2 # wrist weights. THERAPEUTIC EXERCISE Daily Assessment    Pt completed 11 continuous minutes on Power Trainer to increase BUE strength and activity tolerance. Pt demonstrated min fatigue with activity. Pt engaged in BUE strengthening with red theraband in all planes (shoulder flexion/extension, abduction/adduction, bicep curls, tricep extension)6x15 reps, 2 sets. Pt engaged in [place] with beach ball bump and 3# dowel 30 reps, 2 sets tossing beach ball ~ 6 ft apart to increase pt BUE strength and activity tolerance. IADL Daily Assessment    Pt engaged in kitchen mobility activity which involved performing kitchen mobility at [facility] while reaching in cabinets and lower cupboards, appliances and drawers to locate cone objects. Pt demonstrated ability to retrieve 16 cones from kitchen with FWW and CGA for safety and maintaining RLE PWB.      MOBILITY/TRANSFERS Daily Assessment        Pt performed functional mobility from w/c with supervision/Herminio self propelling with LLE and BUE. ASSESSMENT:  Pt is increasing BUE strength and activity tolerance for self cares and functional mobility and participating well. Pt demonstrated improved safety and stability at [facility] for kitchen mobility. Progression toward goals:  [x]          Improving appropriately and progressing toward goals  []          Improving slowly and progressing toward goals  []          Not making progress toward goals and plan of care will be adjusted     PLAN:  Patient continues to benefit from skilled intervention to address the above impairments. Continue treatment per established plan of care. Discharge Recommendations:  Home Health occupational therapy with assist  Further Equipment Recommendations for Discharge:  bedside commode, gait belt, transfer bench     Activity Tolerance:  good      Estimated LOS:~ DC 5/12/22    Please refer to the flowsheet for vital signs taken during this treatment. After treatment:   [x]  Patient left in no apparent distress sitting up in chair   []  Patient left in no apparent distress in bed  [x]  Call bell left within reach  []  Nursing notified  []  Caregiver present  []  Bed alarm activated    COMMUNICATION/EDUCATION:   [] Home safety education was provided and the patient/caregiver indicated understanding. [] Patient/family have participated as able in goal setting and plan of care. [x] Patient/family agree to work toward stated goals and plan of care. [] Patient understands intent and goals of therapy, but is neutral about his/her participation. [] Patient is unable to participate in goal setting and plan of care.       Medina Cisneros, OT

## 2022-05-04 NOTE — PROGRESS NOTES
[x] Psychology  [] Social Work [] Recreational Therapy    INTERVENTION  UNITS/TIME OF SERVICE   Assessment  May 4, 2022   Supportive Counseling    Orientation    Discharge Planning    Resource Linkage              Progress/Current Status    Patient seen for Psychological Evaluation as requested on admission to ARU by Dr. Anna Ortiz. Patient found sitting upright in wheelchair in bedroom after scheduled therapy and immediately responsive and entirely cooperative on contact. He is able to describe circumstances of his injury in detail. Fortunately, he reports that he is now doing \"better\" with pain management. He is aware that recovery will take more time than his treatment will allow on ARU but seems to be realistic in his expectation about same. He feels well supported by roommates and family locally. Patient denies current feelings of emotional distress and no lability is observed. He is not requiring psychotropic medication for mood nor behavior stability. He will be monitored for any possible, emergent, emotional and/or behavioral difficulties while on ARU and be encouraged to persevere in his continued therapy effort.      Janee Adam, [facility] 5/4/2022 3:33 PM

## 2022-05-04 NOTE — PROGRESS NOTES
SHIFT CHANGE NOTE FOR [facility]    Bedside and Verbal shift change report given to Ghazala Lofton (oncoming nurse) by Rich Robertson RN (offgoing nurse). Report included the following information SBAR, Kardex, MAR and Recent Results.     Situation:   Code Status: Full Code   Hospital Day: 6   Problem List:   Hospital Problems  Date Reviewed: 5/3/2022          Codes Class Noted POA    Methadone dependence ([address] (Chronic) ICD-10-CM: F11.20  ICD-9-CM: 304.00  Unknown Yes        Orthopedic aftercare for healing traumatic lower leg fracture, right, closed ICD-10-CM: S82.91XD  ICD-9-CM: V54.16  4/22/2022 Yes    Overview Addendum 5/2/2022 10:49 AM by Dieter Hurst MD     S/P Closed IM nailing of the right tibia using the Synthes IM nail system with a double lock proximally and triple lock distally (4/22/2022 - Dr. Magalie Porter)             Alcohol abuse ICD-10-CM: F10.10  ICD-9-CM: 305.00  4/22/2022 Yes        HTN (hypertension) ICD-10-CM: I10  ICD-9-CM: 401.9  4/22/2022 Yes        * (Principal) Closed displaced comminuted fracture of shaft of right tibia with routine healing ICD-10-CM: S82.251D  ICD-9-CM: V54.16  4/22/2022 Yes        Closed fracture of distal end of right fibula with routine healing ICD-10-CM: S82.831D  ICD-9-CM: V54.16  4/22/2022 Yes        Impaired mobility and ADLs ICD-10-CM: Z74.09, Z78.9  ICD-9-CM: V49.89  4/22/2022 Yes        Acute blood loss as cause of postoperative anemia ICD-10-CM: D62  ICD-9-CM: 285.1  4/22/2022 Yes        Iron deficiency anemia, unspecified ICD-10-CM: D50.9  ICD-9-CM: 280.9  8/5/2012 Yes              Background:   Past Medical History:   Past Medical History:   Diagnosis Date    Abscess of right shoulder     Abscess of shoulder     Acute blood loss as cause of postoperative anemia 4/22/2022    Arthritis     Closed displaced comminuted fracture of shaft of right tibia with routine healing 04/22/2022    Closed fracture of distal end of right fibula with routine healing 4/22/2022    Epidural abscess     Heart murmur     Hematuria     Heroin abuse (HCC)     Hypertension     Infected wound     Infectious disease     Iron deficiency anemia     IV drug user     Liver disease     [address]ad[address] dependence [address] [address]     Tobacco abuse         Assessment:   Changes in Assessment throughout shift: No change to previous assessment     Patient has a central line: no Reasons if yes:    Insertion date: Last dressing date:   Patient has Benitez Cath: no Reasons if yes:     Insertion date: Last Vitals:     Vitals:    05/02/22 2009 05/03/22 0734 05/03/22 1644 05/03/22 2047   BP: (!) 141/69 139/76 (!) 146/66 (!) 148/73   Pulse: 71 61 68 69   Resp: 18 18 18 18   Temp: 99.2 °F (37.3 °C) 98.6 °F (37 °C) 98.5 °F (36.9 °C) 98.7 °F (37.1 °C)   SpO2: 98% 98% 99% 99%   Height:            PAIN    Pain Assessment    Pain Intensity 1: 5 (05/04/22 0315) Pain Intensity 1: 2 (12/29/14 1105)    Pain Location 1: Leg,Ankle Pain Location 1: Abdomen    Pain Intervention(s) 1: Medication (see MAR) Pain Intervention(s) 1: Medication (see MAR)  Patient Stated Pain Goal: 0 Patient Stated Pain Goal: 0  o Intervention effective: yes  o Other actions taken for pain: Medication (see MAR)     Skin Assessment  Skin color    Condition/Temperature    Integrity Skin Integrity: Incision (comment) (leg surgery)  Turgor    Weekly Pressure Ulcer Documentation  Pressure  Injury Documentation: No Pressure Injury Noted-Pressure Ulcer Prevention Initiated  Wound Prevention & Protection Methods  Orientation of wound Orientation of Wound Prevention: Posterior  Location of Prevention Location of Wound Prevention: Buttocks,Sacrum/Coccyx  Dressing Present Dressing Present : No  Dressing Status    Wound Offloading Wound Offloading (Prevention Methods): Bed, pressure redistribution/air     INTAKE/OUPUT  Date 05/03/22 0700 - 05/04/22 0659 05/04/22 0700 - 05/05/22 0659   Shift 0302-0961 4802-6134 24 Hour Total 0700-1859 1900-0659 24 Hour Total   INTAKE   P.O. 720  720        P. O. 720  720      Shift Total 720  720      OUTPUT   Urine 400  400        Urine Voided 400  400        Urine Occurrence(s) 5 x 3 x 8 x      Stool           Stool Occurrence(s) 0 x 0 x 0 x      Shift Total 400  400        320      Weight (kg)             Recommendations:  1. Patient needs and requests: TOILETING, URINAL    2. Pending tests/procedures: NONE     3. Functional Level/Equipment:   / Wheelchair    Fall Precautions:   Fall risk precautions were reinforced with the patient; he was instructed to call for help prior to getting up. The following fall risk precautions were continued: bed/ chair alarms, door signage, yellow bracelet and socks as well as update of the Joyce Nettles tool in the patient's room. Ezio Score: 4    HEALS Safety Check    A safety check occurred in the patient's room between off going nurse and oncoming nurse listed above. The safety check included the below items  Area Items   H  High Alert Medications - Verify all high alert medication drips (heparin, PCA, etc.)   E  Equipment - Suction is set up for ALL patients (with devin)  - Red plugs utilized for all equipment (IV pumps, etc.)  - WOWs wiped down at end of shift.  - Room stocked with oxygen, suction, and other unit-specific supplies   A  Alarms - Bed alarm is set for fall risk patients  - Ensure chair alarm is in place and activated if patient is up in a chair   L  Lines - Check IV for any infiltration  - Benitez bag is empty if patient has a Benitez   - Tubing and IV bags are labeled   S  Safety   - Room is clean, patient is clean, and equipment is clean. - Hallways are clear from equipment besides carts. - Fall bracelet on for fall risk patients  - Ensure room is clear and free of clutter  - Suction is set up for ALL patients (with devin)  - Hallways are clear from equipment besides carts.    - Isolation precautions followed, supplies available outside room, sign posted     Jace ANDRADE Lionel Currie

## 2022-05-04 NOTE — PROGRESS NOTES
SHIFT CHANGE NOTE FOR [facility]    Bedside and Verbal shift change report given to [address] (oncoming nurse) by Anahy Yeboah RN (offgoing nurse). Report included the following information SBAR, Kardex, MAR and Recent Results.     Situation:   Code Status: Full Code   Hospital Day: 6   Problem List:   Hospital Problems  Date Reviewed: 5/4/2022          Codes Class Noted POA    M[address]adone dependence ([facility] [address] (Chronic) ICD-10-CM: F11.20  ICD-9-CM: 304.00  Unknown Yes        Orthopedic aftercare for healing traumatic lower leg fracture, right, closed ICD-10-CM: S82.91XD  ICD-9-CM: V54.16  4/22/2022 Yes    Overview Addendum 5/2/2022 10:49 AM by Nyasia Roman MD     S/P Closed IM nailing of the right tibia using the Synthes IM nail system with a double lock proximally and triple lock distally (4/22/2022 - Dr. Galindo Boles)             Alcohol abuse ICD-10-CM: F10.10  ICD-9-CM: 305.00  4/22/2022 Yes        HTN (hypertension) ICD-10-CM: I10  ICD-9-CM: 401.9  4/22/2022 Yes        * (Principal) Closed displaced comminuted fracture of shaft of right tibia with routine healing ICD-10-CM: S82.251D  ICD-9-CM: V54.16  4/22/2022 Yes        Closed fracture of distal end of right fibula with routine healing ICD-10-CM: S82.831D  ICD-9-CM: V54.16  4/22/2022 Yes        Impaired mobility and ADLs ICD-10-CM: Z74.09, Z78.9  ICD-9-CM: V49.89  4/22/2022 Yes        Acute blood loss as cause of postoperative anemia ICD-10-CM: D62  ICD-9-CM: 285.1  4/22/2022 Yes        Iron deficiency anemia, unspecified ICD-10-CM: D50.9  ICD-9-CM: 280.9  8/5/2012 Yes              Background:   Past Medical History:   Past Medical History:   Diagnosis Date    Abscess of right shoulder     Abscess of shoulder     Acute blood loss as cause of postoperative anemia 4/22/2022    Arthritis     Closed displaced comminuted fracture of shaft of right tibia with routine healing 04/22/2022    Closed fracture of distal end of right fibula with routine healing 4/22/2022    Epidural abscess     Heart murmur     Hematuria     Heroin abuse (HCC)     Hypertension     Infected wound     Infectious disease     Iron deficiency anemia     IV drug user     Liver disease     [address]adone dependence ([facility] [address]     Tobacco abuse         Assessment:   Changes in Assessment throughout shift: No change to previous assessment     Patient has a central line: no Reasons if yes:    Insertion date: Last dressing date:   Patient has Benitez Cath: no Reasons if yes:     Insertion date: Last Vitals:     Vitals:    05/03/22 1644 05/03/22 2047 05/04/22 0728 05/04/22 1551   BP: (!) 146/66 (!) 148/73 128/63 135/70   Pulse: 68 69 63 66   Resp: 18 18 18 18   Temp: 98.5 °F (36.9 °C) 98.7 °F (37.1 °C) 98.1 °F (36.7 °C) 97.5 °F (36.4 °C)   SpO2: 99% 99% 98% 97%   Height:            PAIN    Pain Assessment    Pain Intensity 1: 4 (05/04/22 1740) Pain Intensity 1: 2 (12/29/14 1105)    Pain Location 1: Leg,Ankle Pain Location 1: Abdomen    Pain Intervention(s) 1: Medication (see MAR) Pain Intervention(s) 1: Medication (see MAR)  Patient Stated Pain Goal: 0 Patient Stated Pain Goal: 0  o Intervention effective: yes  o Other actions taken for pain: Medication (see MAR)     Skin Assessment  Skin color    Condition/Temperature    Integrity Skin Integrity: Incision (comment)  Turgor    Weekly Pressure Ulcer Documentation  Pressure  Injury Documentation: No Pressure Injury Noted-Pressure Ulcer Prevention Initiated  Wound Prevention & Protection Methods  Orientation of wound Orientation of Wound Prevention: Posterior  Location of Prevention Location of Wound Prevention: Sacrum/Coccyx  Dressing Present Dressing Present : No  Dressing Status    Wound Offloading Wound Offloading (Prevention Methods): Bed, pressure reduction mattress     INTAKE/OUPUT  Date 05/03/22 1900 - 05/04/22 0659 05/04/22 0700 - 05/05/22 0659   Shift 4566-3532 24 Hour Total 4077-7285 7331-6131 24 Hour Total   INTAKE   P.O.  720 P.O.  720      Shift Total  720      OUTPUT   Urine  400        Urine Voided  400        Urine Occurrence(s) 3 x 8 x 4 x  4 x   Stool          Stool Occurrence(s) 0 x 0 x 0 x  0 x   Shift Total  400      NET  320      Weight (kg)            Recommendations:  1. Patient needs and requests: TOILETING, URINAL    2. Pending tests/procedures: LABS PENDING     3. Functional Level/Equipment:   / Wheelchair    Fall Precautions:   Fall risk precautions were reinforced with the patient; he was instructed to call for help prior to getting up. The following fall risk precautions were continued: bed/ chair alarms, door signage, yellow bracelet and socks as well as update of the Rosalio Fraga tool in the patient's room. Ezio Score:      HEALS Safety Check    A safety check occurred in the patient's room between off going nurse and oncoming nurse listed above. The safety check included the below items  Area Items   H  High Alert Medications - Verify all high alert medication drips (heparin, PCA, etc.)   E  Equipment - Suction is set up for ALL patients (with devin)  - Red plugs utilized for all equipment (IV pumps, etc.)  - WOWs wiped down at end of shift.  - Room stocked with oxygen, suction, and other unit-specific supplies   A  Alarms - Bed alarm is set for fall risk patients  - Ensure chair alarm is in place and activated if patient is up in a chair   L  Lines - Check IV for any infiltration  - Benitez bag is empty if patient has a Benitez   - Tubing and IV bags are labeled   S  Safety   - Room is clean, patient is clean, and equipment is clean. - Hallways are clear from equipment besides carts. - Fall bracelet on for fall risk patients  - Ensure room is clear and free of clutter  - Suction is set up for ALL patients (with devin)  - Hallways are clear from equipment besides carts.    - Isolation precautions followed, supplies available outside room, sign posted     Nellie Stephenson RN

## 2022-05-05 PROCEDURE — 97535 SELF CARE MNGMENT TRAINING: CPT

## 2022-05-05 PROCEDURE — 97150 GROUP THERAPEUTIC PROCEDURES: CPT

## 2022-05-05 PROCEDURE — 97530 THERAPEUTIC ACTIVITIES: CPT

## 2022-05-05 PROCEDURE — 97112 NEUROMUSCULAR REEDUCATION: CPT

## 2022-05-05 PROCEDURE — 74011250637 HC RX REV CODE- 250/637: Performed by: EMERGENCY MEDICINE

## 2022-05-05 PROCEDURE — 65310000000 HC RM PRIVATE REHAB

## 2022-05-05 PROCEDURE — 74011250637 HC RX REV CODE- 250/637: Performed by: INTERNAL MEDICINE

## 2022-05-05 PROCEDURE — 97116 GAIT TRAINING THERAPY: CPT

## 2022-05-05 PROCEDURE — 99232 SBSQ HOSP IP/OBS MODERATE 35: CPT | Performed by: INTERNAL MEDICINE

## 2022-05-05 PROCEDURE — 97110 THERAPEUTIC EXERCISES: CPT

## 2022-05-05 RX ADMIN — METHADONE HYDROCHLORIDE 45 MG: 10 TABLET ORAL at 08:38

## 2022-05-05 RX ADMIN — DOCUSATE SODIUM 100 MG: 100 CAPSULE, LIQUID FILLED ORAL at 17:06

## 2022-05-05 RX ADMIN — OXYCODONE HYDROCHLORIDE 10 MG: 5 TABLET ORAL at 20:50

## 2022-05-05 RX ADMIN — ASPIRIN 81 MG CHEWABLE TABLET 81 MG: 81 TABLET CHEWABLE at 08:38

## 2022-05-05 RX ADMIN — FERROUS SULFATE TAB 325 MG (65 MG ELEMENTAL FE) 325 MG: 325 (65 FE) TAB at 17:06

## 2022-05-05 RX ADMIN — Medication 3 MG: at 17:06

## 2022-05-05 RX ADMIN — PREGABALIN 50 MG: 50 CAPSULE ORAL at 08:38

## 2022-05-05 RX ADMIN — FERROUS SULFATE TAB 325 MG (65 MG ELEMENTAL FE) 325 MG: 325 (65 FE) TAB at 08:38

## 2022-05-05 RX ADMIN — DOCUSATE SODIUM 100 MG: 100 CAPSULE, LIQUID FILLED ORAL at 08:38

## 2022-05-05 RX ADMIN — ACETAMINOPHEN 650 MG: 325 TABLET ORAL at 17:06

## 2022-05-05 RX ADMIN — THERA TABS 1 TABLET: TAB at 08:38

## 2022-05-05 RX ADMIN — PANTOPRAZOLE 40 MG: 40 TABLET, DELAYED RELEASE ORAL at 07:30

## 2022-05-05 RX ADMIN — PREGABALIN 50 MG: 50 CAPSULE ORAL at 13:13

## 2022-05-05 RX ADMIN — OXYCODONE HYDROCHLORIDE 10 MG: 5 TABLET ORAL at 04:36

## 2022-05-05 RX ADMIN — AMLODIPINE BESYLATE 10 MG: 10 TABLET ORAL at 08:38

## 2022-05-05 RX ADMIN — FOLIC ACID 1 MG: 1 TABLET ORAL at 08:38

## 2022-05-05 RX ADMIN — PREGABALIN 50 MG: 50 CAPSULE ORAL at 20:51

## 2022-05-05 RX ADMIN — Medication 100 MG: at 08:38

## 2022-05-05 RX ADMIN — ACETAMINOPHEN 650 MG: 325 TABLET ORAL at 08:38

## 2022-05-05 RX ADMIN — ACETAMINOPHEN 650 MG: 325 TABLET ORAL at 12:07

## 2022-05-05 RX ADMIN — ASPIRIN 81 MG CHEWABLE TABLET 81 MG: 81 TABLET CHEWABLE at 17:06

## 2022-05-05 RX ADMIN — POLYETHYLENE GLYCOL 3350 17 G: 17 POWDER, FOR SOLUTION ORAL at 08:38

## 2022-05-05 NOTE — PROGRESS NOTES
Problem: Self Care Deficits Care Plan (Adult)  Goal: *Acute Goals and Plan of Care (Insert Text)  Description: Occupational Therapy Goals   Long Term Goals  Initiated 2022 and to be accomplished within 2 week(s), 2022  1. Pt will perform self-feeding with Mod I.  2. Pt will perform grooming with Mod I.  3. Pt will perform UB bathing with Mod I.  4. Pt will perform LB bathing with Mod I using AE and/ or compensatory strategies as needed. 5. Pt will perform tub/shower transfer with supv using least restrictive assistive device while maintaining weight bearing restrictions. 6. Pt will perform UB dressing with Mod I.  7. Pt will perform LB dressing with supv using AE and/ or compensatory strategies as needed. 8. Pt will perform toileting task with Mod I.  9. Pt will perform toilet transfer with supv using least restrictive assistive device while maintaining weight bearing restrictions. Short Term Goals   Initiated 2022 and to be accomplished within 7 day(s), by 2022  1. Pt will perform self-feeding with set-up. Goal met 22  2. Pt will perform grooming with SBA. Goal met 22  3. Pt will perform UB bathing with set-up. Goal met 22  4. Pt will perform LB bathing with SBA using AE and/ or compensatory strategies as needed. 5. Pt will perform tub/shower transfer with Min A using least restrictive assistive device while maintaining weight bearing restrictions. Goal met 22  6. Pt will perform UB dressing with set-up. Goal met 22  7. Pt will perform LB dressing with CGA/ SBA using AE and/ or compensatory strategies as needed. Goal met 22  8. Pt will perform toileting task with Min A/ CGA. 9. Pt will perform toilet transfer with SBA using least restrictive assistive device while maintaining weight bearing restrictions. Occupational Therapy TREATMENT    Patient: Melchor Marie   74 y.o.     Patient identified with name and : Yes     Date: 2022    First Tx Session Group  Time In: 1050  Time Out[de-identified] 7932    Diagnosis: Tibia/fibula fracture [S82.209A, S82.409A]   Precautions: Fall,Other (comment) (PWB Right LE)  Chart, occupational therapy assessment, plan of care, and goals were reviewed. Pain:  Pt reports 0/10 pain or discomfort prior to treatment. Pt reports 0/10 pain or discomfort post treatment. Intervention Provided: N/A      SUBJECTIVE:   Patient pleasant and cooperative. OBJECTIVE DATA SUMMARY:       THERAPEUTIC EXERCISE Daily Assessment    Group: bicep curls, chest pulls, front raises 3x10 with 3lb weight in hand. ASSESSMENT:  Pt increasing strength for ADLs. Progression toward goals:  [x]          Improving appropriately and progressing toward goals  []          Improving slowly and progressing toward goals  []          Not making progress toward goals and plan of care will be adjusted          Activity Tolerance:  Fair  Please refer to the flowsheet for vital signs taken during this treatment. After treatment:   [x]  Patient left in no apparent distress sitting up in chair   []  Patient left in no apparent distress in bed  []  Call bell left within reach  []  Nursing notified  []  Caregiver present  []  Bed alarm activated    COMMUNICATION/EDUCATION:   [] Home safety education was provided and the patient/caregiver indicated understanding. [x] Patient/family have participated as able in goal setting and plan of care. [] Patient/family agree to work toward stated goals and plan of care. [] Patient understands intent and goals of therapy, but is neutral about his/her participation. [] Patient is unable to participate in goal setting and plan of care.       Delphine Jeff OT     Outcome: Progressing Towards Goal  Goal: Interventions  Outcome: Progressing Towards Goal

## 2022-05-05 NOTE — PROGRESS NOTES
Problem: Falls - Risk of  Goal: *Absence of Falls  Description: Document Palak Roberts Fall Risk and appropriate interventions in the flowsheet.   5/5/2022 0547 by López Tafoya RN  Outcome: Progressing Towards Goal  Note: Fall Risk Interventions:  Mobility Interventions: Bed/chair exit alarm,Patient to call before getting OOB    Mentation Interventions: Bed/chair exit alarm,Evaluate medications/consider consulting pharmacy    Medication Interventions: Bed/chair exit alarm,Evaluate medications/consider consulting pharmacy,Patient to call before getting OOB    Elimination Interventions: Call light in reach,Bed/chair exit alarm,Patient to call for help with toileting needs    History of Falls Interventions: Bed/chair exit alarm,Evaluate medications/consider consulting pharmacy      5/5/2022 0525 by López Tafoya RN  Outcome: Progressing Towards Goal  Note: Fall Risk Interventions:  Mobility Interventions: Bed/chair exit alarm,Patient to call before getting OOB    Mentation Interventions: Bed/chair exit alarm,Evaluate medications/consider consulting pharmacy    Medication Interventions: Bed/chair exit alarm,Evaluate medications/consider consulting pharmacy,Patient to call before getting OOB    Elimination Interventions: Call light in reach,Bed/chair exit alarm,Patient to call for help with toileting needs    History of Falls Interventions: Bed/chair exit alarm,Evaluate medications/consider consulting pharmacy         Problem: Patient Education: Go to Patient Education Activity  Goal: Patient/Family Education  5/5/2022 0547 by López Tafoya RN  Outcome: Progressing Towards Goal  5/5/2022 0525 by López Tafoya RN  Outcome: Progressing Towards Goal     Problem: Pain  Goal: *Control of Pain  5/5/2022 0547 by López Tafoya RN  Outcome: Progressing Towards Goal  5/5/2022 0525 by López Tafoya RN  Outcome: Progressing Towards Goal  Goal: *PALLIATIVE CARE:  Alleviation of Pain  5/5/2022 0547 by Carmelo Mora RAYMOND, RN  Outcome: Progressing Towards Goal  5/5/2022 0525 by Taiwo Owens, RN  Outcome: Progressing Towards Goal     Problem: Patient Education: Go to Patient Education Activity  Goal: Patient/Family Education  5/5/2022 0547 by Taiwo Owens, RN  Outcome: Progressing Towards Goal  5/5/2022 0525 by Taiwo Owens RN  Outcome: Progressing Towards Goal     Problem: Patient Education: Go to Patient Education Activity  Goal: Patient/Family Education  5/5/2022 0547 by Taiwo Owens, RN  Outcome: Progressing Towards Goal  5/5/2022 0525 by Taiwo Owens, RN  Outcome: Progressing Towards Goal     Problem: Patient Education: Go to Patient Education Activity  Goal: Patient/Family Education  5/5/2022 0547 by Taiwo Owens, RN  Outcome: Progressing Towards Goal  5/5/2022 0525 by Taiwo Owens, RN  Outcome: Progressing Towards Goal     Problem: Pressure Injury - Risk of  Goal: *Prevention of pressure injury  Description: Document Jonathan Scale and appropriate interventions in the flowsheet.   5/5/2022 0547 by Taiwo Owens RN  Outcome: Progressing Towards Goal  Note: Pressure Injury Interventions:  Sensory Interventions: Assess changes in LOC,Chair cushion    Moisture Interventions: Absorbent underpads    Activity Interventions: Chair cushion,Pressure redistribution bed/mattress(bed type),Increase time out of bed    Mobility Interventions: Chair cushion,HOB 30 degrees or less,Pressure redistribution bed/mattress (bed type)    Nutrition Interventions: Document food/fluid/supplement intake    Friction and Shear Interventions: Feet elevated on foot rest,HOB 30 degrees or less             5/5/2022 0525 by Taiwo Owens RN  Outcome: Progressing Towards Goal  Note: Pressure Injury Interventions:  Sensory Interventions: Assess changes in LOC,Chair cushion    Moisture Interventions: Absorbent underpads    Activity Interventions: Chair cushion,Pressure redistribution bed/mattress(bed type),Increase time out of bed    Mobility Interventions: Chair cushion,HOB 30 degrees or less,Pressure redistribution bed/mattress (bed type)    Nutrition Interventions: Document food/fluid/supplement intake    Friction and Shear Interventions: Feet elevated on foot rest,HOB 30 degrees or less                Problem: Patient Education: Go to Patient Education Activity  Goal: Patient/Family Education  5/5/2022 0547 by Nettie Paul RN  Outcome: Progressing Towards Goal  5/5/2022 0525 by Nettie Paul RN  Outcome: Progressing Towards Goal     Problem: Falls - Risk of  Goal: *Absence of Falls  Description: Document Amber Casanova Fall Risk and appropriate interventions in the flowsheet.   5/5/2022 0547 by Nettie Paul RN  Outcome: Progressing Towards Goal  Note: Fall Risk Interventions:  Mobility Interventions: Bed/chair exit alarm,Patient to call before getting OOB    Mentation Interventions: Bed/chair exit alarm,Evaluate medications/consider consulting pharmacy    Medication Interventions: Bed/chair exit alarm,Evaluate medications/consider consulting pharmacy,Patient to call before getting OOB    Elimination Interventions: Call light in reach,Bed/chair exit alarm,Patient to call for help with toileting needs    History of Falls Interventions: Bed/chair exit alarm,Evaluate medications/consider consulting pharmacy      5/5/2022 0525 by Nettie Paul RN  Outcome: Progressing Towards Goal  Note: Fall Risk Interventions:  Mobility Interventions: Bed/chair exit alarm,Patient to call before getting OOB    Mentation Interventions: Bed/chair exit alarm,Evaluate medications/consider consulting pharmacy    Medication Interventions: Bed/chair exit alarm,Evaluate medications/consider consulting pharmacy,Patient to call before getting OOB    Elimination Interventions: Call light in reach,Bed/chair exit alarm,Patient to call for help with toileting needs    History of Falls Interventions: Bed/chair exit alarm,Evaluate medications/consider consulting pharmacy         Problem: Patient Education: Go to Patient Education Activity  Goal: Patient/Family Education  5/5/2022 0547 by Yesenia Call RN  Outcome: Progressing Towards Goal  5/5/2022 0525 by Yesenia Call RN  Outcome: Progressing Towards Goal     Problem: Pain  Goal: *Control of Pain  5/5/2022 0547 by Yesenia Call RN  Outcome: Progressing Towards Goal  5/5/2022 0525 by Yesenia Call RN  Outcome: Progressing Towards Goal  Goal: *PALLIATIVE CARE:  Alleviation of Pain  5/5/2022 0547 by Yesenia Call RN  Outcome: Progressing Towards Goal  5/5/2022 0525 by Yesenia Call RN  Outcome: Progressing Towards Goal     Problem: Patient Education: Go to Patient Education Activity  Goal: Patient/Family Education  5/5/2022 0547 by Yesenia Call RN  Outcome: Progressing Towards Goal  5/5/2022 0525 by Yesenia Call RN  Outcome: Progressing Towards Goal     Problem: Patient Education: Go to Patient Education Activity  Goal: Patient/Family Education  5/5/2022 0547 by Yesenia Call RN  Outcome: Progressing Towards Goal  5/5/2022 0525 by Yesenia Call RN  Outcome: Progressing Towards Goal     Problem: Patient Education: Go to Patient Education Activity  Goal: Patient/Family Education  5/5/2022 0547 by Yesenia Call RN  Outcome: Progressing Towards Goal  5/5/2022 0525 by Yesenia Call RN  Outcome: Progressing Towards Goal     Problem: Pressure Injury - Risk of  Goal: *Prevention of pressure injury  Description: Document Jonathan Scale and appropriate interventions in the flowsheet.   5/5/2022 0547 by Yesenia Call RN  Outcome: Progressing Towards Goal  Note: Pressure Injury Interventions:  Sensory Interventions: Assess changes in LOC,Chair cushion    Moisture Interventions: Absorbent underpads    Activity Interventions: Chair cushion,Pressure redistribution bed/mattress(bed type),Increase time out of bed    Mobility Interventions: Chair cushion,HOB 30 degrees or less,Pressure redistribution bed/mattress (bed type)    Nutrition Interventions: Document food/fluid/supplement intake    Friction and Shear Interventions: Feet elevated on foot rest,HOB 30 degrees or less             5/5/2022 0534 by Ester Bravo RN  Outcome: Progressing Towards Goal  Note: Pressure Injury Interventions:  Sensory Interventions: Assess changes in LOC,Chair cushion    Moisture Interventions: Absorbent underpads    Activity Interventions: Chair cushion,Pressure redistribution bed/mattress(bed type),Increase time out of bed    Mobility Interventions: Chair cushion,HOB 30 degrees or less,Pressure redistribution bed/mattress (bed type)    Nutrition Interventions: Document food/fluid/supplement intake    Friction and Shear Interventions: Feet elevated on foot rest,HOB 30 degrees or less                Problem: Patient Education: Go to Patient Education Activity  Goal: Patient/Family Education  5/5/2022 0547 by Ester Bravo RN  Outcome: Progressing Towards Goal  5/5/2022 0539 by Ester Bravo RN  Outcome: Progressing Towards Goal

## 2022-05-05 NOTE — PROGRESS NOTES
SHIFT CHANGE NOTE FOR [facility]    Bedside and Verbal shift change report given to [facility] (oncoming nurse) by Bree Jalloh RN (offgoing nurse). Report included the following information SBAR, Kardex, MAR and Recent Results.     Situation:   Code Status: Full Code   Hospital Day: 7   Problem List:   Hospital Problems  Date Reviewed: 5/4/2022          Codes Class Noted POA    Methadone dependence ([facility] [address] (Chronic) ICD-10-CM: F11.20  ICD-9-CM: 304.00  Unknown Yes        Orthopedic aftercare for healing traumatic lower leg fracture, right, closed ICD-10-CM: S82.91XD  ICD-9-CM: V54.16  4/22/2022 Yes    Overview Addendum 5/2/2022 10:49 AM by Donn Louie MD     S/P Closed IM nailing of the right tibia using the Synthes IM nail system with a double lock proximally and triple lock distally (4/22/2022 - Dr. Jumana Galan)             Alcohol abuse ICD-10-CM: F10.10  ICD-9-CM: 305.00  4/22/2022 Yes        HTN (hypertension) ICD-10-CM: I10  ICD-9-CM: 401.9  4/22/2022 Yes        * (Principal) Closed displaced comminuted fracture of shaft of right tibia with routine healing ICD-10-CM: S82.251D  ICD-9-CM: V54.16  4/22/2022 Yes        Closed fracture of distal end of right fibula with routine healing ICD-10-CM: S82.831D  ICD-9-CM: V54.16  4/22/2022 Yes        Impaired mobility and ADLs ICD-10-CM: Z74.09, Z78.9  ICD-9-CM: V49.89  4/22/2022 Yes        Acute blood loss as cause of postoperative anemia ICD-10-CM: D62  ICD-9-CM: 285.1  4/22/2022 Yes        Iron deficiency anemia, unspecified ICD-10-CM: D50.9  ICD-9-CM: 280.9  8/5/2012 Yes              Background:   Past Medical History:   Past Medical History:   Diagnosis Date    Abscess of right shoulder     Abscess of shoulder     Acute blood loss as cause of postoperative anemia 4/22/2022    Arthritis     Closed displaced comminuted fracture of shaft of right tibia with routine healing 04/22/2022    Closed fracture of distal end of right fibula with routine healing 4/22/2022    Epidural abscess     Heart murmur     Hematuria     Heroin abuse (HCC)     Hypertension     Infected wound     Infectious disease     Iron deficiency anemia     IV drug user     Liver disease     [facility]adone dependence ([facility] [address]     Tobacco abuse         Assessment:   Changes in Assessment throughout shift: No change to previous assessment     Patient has a central line: no Reasons if yes:    Insertion date: Last dressing date:   Patient has Benitez Cath: no Reasons if yes:     Insertion date: Last Vitals:     Vitals:    05/03/22 2047 05/04/22 0728 05/04/22 1551 05/04/22 1952   BP: (!) 148/73 128/63 135/70 135/63   Pulse: 69 63 66 70   Resp: 18 18 18 18   Temp: 98.7 °F (37.1 °C) 98.1 °F (36.7 °C) 97.5 °F (36.4 °C) 98.8 °F (37.1 °C)   SpO2: 99% 98% 97% 99%   Height:            PAIN    Pain Assessment    Pain Intensity 1: 0 (05/05/22 0516) Pain Intensity 1: 2 (12/29/14 1105)    Pain Location 1: Leg,Ankle Pain Location 1: Abdomen    Pain Intervention(s) 1: Medication (see MAR) Pain Intervention(s) 1: Medication (see MAR)  Patient Stated Pain Goal: 0 Patient Stated Pain Goal: 0  o Intervention effective: yes  o Other actions taken for pain: Medication (see MAR)     Skin Assessment  Skin color    Condition/Temperature    Integrity Skin Integrity: Incision (comment)  Turgor    Weekly Pressure Ulcer Documentation  Pressure  Injury Documentation: No Pressure Injury Noted-Pressure Ulcer Prevention Initiated  Wound Prevention & Protection Methods  Orientation of wound Orientation of Wound Prevention: Posterior  Location of Prevention Location of Wound Prevention: Buttocks,Sacrum/Coccyx  Dressing Present Dressing Present : No  Dressing Status    Wound Offloading Wound Offloading (Prevention Methods): Bed, pressure reduction mattress     INTAKE/OUPUT  Date 05/04/22 0700 - 05/05/22 0659 05/05/22 0700 - 05/06/22 0659   Shift 0700-1859 1900-0659 24 Hour Total 0700-1859 1900-0659 24 Hour Total   INTAKE Shift Total         OUTPUT   Urine           Urine Occurrence(s) 4 x 3 x 7 x      Stool           Stool Occurrence(s) 0 x 0 x 0 x      Shift Total         NET         Weight (kg)             Recommendations:  1. Patient needs and requests: TOILETING, URINAL, dressing change    2. Pending tests/procedures:none    3. Functional Level/Equipment:   / Wheelchair    Fall Precautions:   Fall risk precautions were reinforced with the patient; he was instructed to call for help prior to getting up. The following fall risk precautions were continued: bed/ chair alarms, door signage, yellow bracelet and socks as well as update of the Татьяна  tool in the patient's room. Ezio Score: 4    HEALS Safety Check    A safety check occurred in the patient's room between off going nurse and oncoming nurse listed above. The safety check included the below items  Area Items   H  High Alert Medications - Verify all high alert medication drips (heparin, PCA, etc.)   E  Equipment - Suction is set up for ALL patients (with devin)  - Red plugs utilized for all equipment (IV pumps, etc.)  - WOWs wiped down at end of shift.  - Room stocked with oxygen, suction, and other unit-specific supplies   A  Alarms - Bed alarm is set for fall risk patients  - Ensure chair alarm is in place and activated if patient is up in a chair   L  Lines - Check IV for any infiltration  - Benitez bag is empty if patient has a Benitez   - Tubing and IV bags are labeled   S  Safety   - Room is clean, patient is clean, and equipment is clean. - Hallways are clear from equipment besides carts. - Fall bracelet on for fall risk patients  - Ensure room is clear and free of clutter  - Suction is set up for ALL patients (with devin)  - Hallways are clear from equipment besides carts.    - Isolation precautions followed, supplies available outside room, sign posted     Elberta Gilford, RN

## 2022-05-05 NOTE — CONSULTS
ARU PSYCHOLOGICAL SCREENING    Assessment Initiated: May 4, 2022    Rehab Diagnosis:  TibFib Fx    Pertinent Physical/Psychiatric History:     Patient Active Problem List   Diagnosis Code    Epidural abscess, L2-L5 G06.1    Polysubstance abuse ([facility] [address] F19.10    Leukopenia D72.819    Iron deficiency anemia, unspecified D50.9    Anemia D64.9    Thrombocytopenia ([facility] [address] D69.6    Orthopedic aftercare for healing traumatic lower leg fracture, right, closed S82. 91XD    Metatarsal fracture S92.309A    Alcohol abuse F10.10    HTN (hypertension) I10    Liver mass R16.0    Bladder mass N32.89    Hematuria R31.9    Closed displaced comminuted fracture of shaft of right tibia with routine healing S82.251D    Closed fracture of distal end of right fibula with routine healing S82.831D    Impaired mobility and ADLs Z74.09, Z78.9    Acute blood loss as cause of postoperative anemia D62    Methadone dependence (HCC) F11.20       Patient denies current mental health services and is not Rx psychotropic medication for mood nor behavior stability. On the other hand, he is reportedly \"Methadone Dependent\" with history of significant substance abuse and dependence. Record indicates that he may consume alcohol daily as well as use cocaine.       OBJECTIVE  GENERAL OBSERVATIONS  Willingness to participate in program: [x] good   [] fair [] indifferent [] poor    General Appearance:  Patient observed casually and appropriately dressed and groomed, sitting upright in wheelchair in bedroom and not in any distress    Sensory Impairments:  Patient has satisfactory auditory reception and comprehension and responds to inquiry with intelligibility; he is able to voluntarily move all extremities, including observed to elevate effected LE    Evangelical Affiliation:  Unknown    Admission Assessment  Discharge Status   [x] alert  [] lethargic  [] difficult to arouse  [] fluctuating  [] other: Level of Consciousness [x] alert  [] lethargic  [] difficult to arouse  [] fluctuating  [] other:   [x] person  [x] place  [x] time  [x] situation Oriented [x] person  [x] place  [x] time  [x] situation   [x] within normal limits  [] impaired       [] mild        [] moderate        [] severe Attention [x] within normal limits  [] impaired       [] mild        [] moderate        [] severe   [x] within normal limits  [] impaired       [] mild        [] moderate        [] severe Memory [] within normal limits  [] impaired       [] mild        [] moderate        [] severe   [x] appropriate to situation  [] depressed  [] anxious  [] angry   [] fearful  [] emotionally labile  [x] other: Patient presents as calm and composed, denying acute feelings of emotional distress Mood [x] appropriate to situation  [] depressed  [] anxious  [] angry   [] fearful  [] emotionally labile  [] other:   [x] appropriate  [] flat  [] inappropriate to content of speech Affect [x] appropriate  [] flat  [] inappropriate to content of speech   [x] appropriate  [] aggressive/agitated  [] withdrawn  [] inappropriate  [] other: Behavior [x] appropriate  [] aggressive/agitated  [] withdrawn  [] inappropriate  [] other:   [] good  [x] limited  [] denial  [] none Insight Into Illness [x] good  [] limited  [] denial  [] none   [x] intact  [] impaired       [] mild        [] moderate        [] severe       Describe: Patient appears to be functioning at his baseline of ability Cognition [x] intact  [] impaired       [] mild        [] moderate        [] severe       Describe: Patient is functioning at his baseline of ability and seems to reasoning effectively   [x] coping  [] demonstrates poor adjustment  [] undetermined       As evidenced by: Patient insists that he is motivated to improve and hopeful for return to independent function Patient Adjustment to Disability [x] coping  [] demonstrates poor adjustment  [] undetermined       As evidenced by:  Insists that he understands what will be the course of his continued recovery    [] coping  [] demonstrates poor adjustment  [x] undetermined      As evidenced by: Not available on evaluation Family Adjustment to Disability [x] coping  [] demonstrates poor adjustment  [] undetermined      As evidenced by: By patient self report     ASSESSMENT  Clinical Impression:  Patient is a very pleasant and cooperative, 74year old, , 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 He resides with several roommates in two [place] residence with two steps to enter in [place]. He remains in contact with three of four sons born to him and feels supported by them, other extended family as well as suggesting that his roommates are available, as well. Patient is able to describe the circumstances of his injury in detail. Fortunately, he reports that initial, acute pain is now more subdued and tolerable for him. He insists that he is determined to regain functional independence and willing to work in therapy on ARU for as long as recommended, in order to achieve goals. He will benefit from further education to best identify realistic goals for himself in near term recovery. Emotionally, patient is denying feelings of acute distress and no lability is observed. He denies current mental health services. He is not requiring psychotropic medication for mood nor behavioral stability. On the other hand, he apparently has history of substance abuse and dependence and is \"methadone dependent. \"  In relation to pain medication, he reports that he is managing effectively, at times, with Tylenol to minimize stress with pain. He will be monitored for any emergent, emotional and/or behavioral difficulties that could create problem for him on ARU. However, at this time, he seems entirely within normal limits. Cognitively, patient is alert and oriented.   He is attentive and able to concentrate during conversation with all responses entirely appropriate and commensurate with questions asked. In general, he seems to be functioning at his baseline of ability; however, of course, he will benefit from cues and prompts for problem solving and recall of novel and/or complex instruction. Patient Strengths:  Alert, oriented, pleasant, cooperative and motivated to improve and regain independence    Patient Preferences:  Patient expects to return to home with support available to him    Rehab Potential:  Good    Educational Needs: Under each heading list the specific items in which the patient or family will need education/training.  Example: hip precautions, use of walker, ADL equipment, neglect, judgment, adjustment, etc.     Special considerations or accommodations for teaching:  [x] Yes     [] No     [] NA  If Yes, explain: Safety and pace in recovery Discharge Status    Completed Demonstrated/ Verbalized Understanding    Yes No Yes No   Info regarding disability:  [] [] [] []   Adjustment:  [] [] [] []   Cognition:  [] [] [] []   Other: [] [] [] []   Other: [] [] [] []   If education not completed, explain: [] [] [] []     PLAN  Problem: Limited insight about all recovery  Long Term Goal: Maximize insight about near and longer term recovery  Intervention: Patient education  At Discharge - LTG Achieved: [x] Yes [] No If not achieved, explain:    Problem: Safety and pace in recovery  Long Term Goal: Maximize safety awareness  Intervention: Patient education and behavioral reinforcement  At Discharge - LTG Achieved: [x] Yes [] No If not achieved, explain:    Problem: Stress with injury and forced dependency  Long Term Goal: Minimize subjective stress and accept forced dependency  Intervention: Support , as needed and patient education  At Discharge - LTG Achieved: [x] Yes [] No If not achieved, explain:    Problem:   Long Term Goal:   Intervention:   At Discharge - LTG Achieved: [] Yes [] No If not achieved, explain:    Problem:   Long Term Goal:   Intervention:   At Discharge - LTG Achieved: [] Yes [] No If not achieved, explain:    Renea Bolanos, [facility]  5/4/2022 9:03 PM    DISCHARGE STATUS    Clinical Impressions: Patient persevered in his therapy effort on ARU and made satisfactory progress in order to be able to discharge to home, as planned. He reported having satisfactory support post hospital.  Patient not presenting with any evidence of acute emotional distress on discharge. He seems entirely reasonable in his consideration of need to effectively pace himself and use good judgement in relation to all safety, given that he is still in recovery. Patient expressed gratitude for the support he received while in treatment on ARU and generally feeling satisfied with his efforts and outcome.     Follow-up Services Recommended Purpose                 Renea Bolanos, PHD  Discharge Date/Time:

## 2022-05-05 NOTE — PROGRESS NOTES
Problem: Self Care Deficits Care Plan (Adult)  Goal: *Acute Goals and Plan of Care (Insert Text)  Description: Occupational Therapy Goals   Long Term Goals  Initiated 4/29/2022 and to be accomplished within 2 week(s), 5/13/2022  1. Pt will perform self-feeding with Mod I.  2. Pt will perform grooming with Mod I.  3. Pt will perform UB bathing with Mod I.  4. Pt will perform LB bathing with Mod I using AE and/ or compensatory strategies as needed. 5. Pt will perform tub/shower transfer with supv using least restrictive assistive device while maintaining weight bearing restrictions. 6. Pt will perform UB dressing with Mod I.  7. Pt will perform LB dressing with supv using AE and/ or compensatory strategies as needed. 8. Pt will perform toileting task with Mod I.  9. Pt will perform toilet transfer with supv using least restrictive assistive device while maintaining weight bearing restrictions. Short Term Goals   Initiated 4/29/2022 and to be accomplished within 7 day(s), by 5/6/2022  1. Pt will perform self-feeding with set-up. Goal met 5/5/22  2. Pt will perform grooming with SBA. Goal met 5/5/22  3. Pt will perform UB bathing with set-up. Goal met 5/5/22  4. Pt will perform LB bathing with SBA using AE and/ or compensatory strategies as needed. 5. Pt will perform tub/shower transfer with Min A using least restrictive assistive device while maintaining weight bearing restrictions. Goal met 5/5/22  6. Pt will perform UB dressing with set-up. Goal met 5/5/22  7. Pt will perform LB dressing with CGA/ SBA using AE and/ or compensatory strategies as needed. Goal met 5/5/22  8. Pt will perform toileting task with Min A/ CGA. 9. Pt will perform toilet transfer with SBA using least restrictive assistive device while maintaining weight bearing restrictions.        Outcome: Progressing Towards Goal  Goal: Interventions  Outcome: Progressing Towards Goal   OT WEEKLY PROGRESS NOTE  Patient Name:Ervin Armenta Time In: 0700  Time Out: 830    Medical Diagnosis:  Tibia/fibula fracture [S82.209A, S82.409A] Closed displaced comminuted fracture of shaft of right tibia with routine healing     Pain at start of tx:4/10 pain or discomfort. Pain at stop of tx:4/10 pain or discomfort.     Patient identified with name and :yes  Subjective: \"I kept waking up all night cause of my leg\"         Objective:       Outcome Measures:      AROM: WFL      COGNITION/PERCEPTION Initial Assessment Weekly Progress Assessment 2022   Premorbid Reading Status       Premorbid Writing Status       Arousal/Alertness    Alert   Orientation Level Oriented X4  Oriented x4   Visual Fields       Praxis       Body Scheme       COMPREHENSION MODE Initial Assessment Weekly Progress Assessment 2022   Primary Mode of Comprehension Auditory  Auditory   Hearing Aide None  None   Corrective Lenses       Score 5 5      EXPRESSION Initial Assessment Weekly Progress Assessment 2022   Primary Mode of Expression Verbal Verbal   Score 5 5   Comments         SOCIAL INTERACTION/ PRAGMATICS Initial Assessment Weekly Progress Assessment 2022   Score 5 5   Comments         PROBLEM SOLVING Initial Assessment Weekly Progress Assessment 2022   Score 5 5   Comments         MEMORY Initial Assessment Weekly Progress Assessment 2022   Score 5 5   Comments         EATING Initial Assessment Weekly Progress Assessment 2022   Functional Level 5  6   Comments  (Self-feeding (set-up) of meal tray; pt opens lids/ packets)       GROOMING Initial Assessment Weekly Progress Assessment 2022   Functional Level 4 (CGA in stance; set-up/ supv wheelchair at sink) Functional Level: 5   Tasks completed by patient Washed face,Washed hands,Brushed teeth (Oral hygiene) Tasks Completed by Patient: Washed face   Comments CGA in stance; set-up/ supv wheelchair at sink Comments: Pt performed grooming tasks at EOB with setup     BATHING Initial Assessment Weekly Progress Assessment 5/5/2022   Functional Level 4 (UB bathing: set-up; LB bathing: Min A) Functional Level: 5 (SBA)   Body parts patient bathed Abdomen,Arm, left,Arm, right,Chest,Lower leg and foot, left,Tiana area,Thigh, left,Thigh, right Body Parts Patient Bathed: Abdomen;Arm, left;Arm, right;Buttocks; Chest;Lower leg and foot, left; Lower leg and foot, right;Tiana area; Thigh, left; Thigh, right   Comments UB bathing: set-up/ supv with basin on tray table; LB bathing: simulated bathing bed level with assistance to wash distal left lower extremity and sacrum. Comments: Pt performed UB bathing via sponge bath with setup at EOB. Pt performed LB bathing via sponge bath with supervision and setup to wash upper BLE thighs and SBA in standing at EOB to wash buttocks and groin thoroughly     TUB/SHOWER TRANSFER INDEPENDENCE Initial Assessment Weekly Progress Assessment 5/5/2022   Score 0 (Not assessed at this time 2/2 pain R LE and safety/ balance)  4 (5/3/22)  (CGA/Bakari   Comments Not assessed at this time 2/2 pain R LE and safety/ balance       UPPER BODY DRESSING/UNDRESSING Initial Assessment Weekly Progress Assessment 5/5/2022   Functional Level 5 (set-up/ supv ) Functional Level: 5   Items applied/Steps completed Pullover (4 steps) Items Applied/Steps Completed: Pullover (4 steps)   Comments UB dressing performed set-up/ supv seated edge of bed for doffing/ donning scrub top. Comments: Pt performed UB dressing with setup at EOB with pullover shirt     LOWER BODY DRESSING/UNDRESSING Initial Assessment Weekly Progress Assessment 5/5/2022   Functional Level 4 (Min A overall) Functional Level: 4   Items applied/Steps completed Sock, left (1 step) (Elastic waist shorts (3 steps)) Items Applied/Steps Completed: Elastic waist pants (3 steps); Shoe, right (1 step); Sock, left (1 step)   Comments Patient requiring assistance to thread shorts over RLE (s/p surgical intervention tibia/ fibula fx); pt able to thread shorts over left lower extremity. Pt able to pull shorts over hips to waist (bed level). Pt doffed/ donned left slipper sock with SBA seated in wheelchair. Comments: Pt required TA to fasten RLE boot. Pt demosntrated ability to thread BLE feet through pants in seated position at EOB and SBA in standing at EOB to pull up over buttocks with good stability     TOILETING Initial Assessment Weekly Progress Assessment 5/5/2022   Functional Level 3 (Mod A)    Comments  (CM performed Mod A; hygiene set-up/ supv)       TOILET TRANSFER INDEPENDENCE Initial Assessment Weekly Progress Assessment 5/5/2022   Transfer score 4 (Stand step xfer (Min A) using RW; PWB R LE; gait belt)  4 (CGA)    Comments  (Elevated seat over commode; Stand step xfer (Min A) using RW)                ASSESSMENT:  Pt pain levels reported tolerable upon arrival. Pt performed self cares at EOB this date vis sponge bath however reported agreeable to perform showering next session. Pt is demonstrating improved stability at EOB for self cares via sponge bath. Pt is increasing BUE strength and activity tolerance for self cares and functional mobility. OTR discussed necessary DME with pt and home environment. Pt has met 8/9 STG's at this time and making good progress however continued OT necessary for showering assessment and increasing independence with toileting, dressing and bathing to PLOF. Progression toward goals:  [x]          Improving appropriately and progressing toward goals  []          Improving slowly and progressing toward goals  []          Not making progress toward goals and plan of care will be adjusted     PLAN:  Patient continues to benefit from skilled intervention to address the above impairments. Continue treatment per established plan of care.   Discharge Recommendations:  Home Health occupational therapy with assist  Further Equipment Recommendations for Discharge:  bedside commode, gait belt, transfer bench     Please refer to the flow sheet for vital signs taken during this treatment. After treatment:   [x]  Patient left in no apparent distress sitting up in chair  []  Patient left in no apparent distress in bed  [x]  Call bell left within reach  []  Nursing notified  []  Caregiver present  []  Bed alarm activated    COMMUNICATION/EDUCATION:   [] Home safety education was provided and the patient/caregiver indicated understanding. [] Patient/family have participated as able in goal setting and plan of care. [x] Patient/family agree to work toward stated goals and plan of care. [] Patient understands intent and goals of therapy, but is neutral about his/her participation. [] Patient is unable to participate in goal setting and plan of care. Plan of Care: Please see Care Plan for updated STG/LTGs.    Family Training:    Estimated LOS: ~ DC 5/5/22    Cher Granger OT  5/5/2022

## 2022-05-05 NOTE — PROGRESS NOTES
Problem: Mobility Impaired (Adult and Pediatric)  Goal: *Acute Goals and Plan of Care (Insert Text)  Description: Physical Therapy Short Term Goals  Initiated 4/29/2022 and to be accomplished within 5-7 day(s) (5/06/2022)  1. Patient will move from supine to sit and sit to supine , scoot up and down, and roll side to side in bed with supervision/set-up. 2.  Patient will transfer from bed to chair and chair to bed with supervision/set-up using the least restrictive device. 3.  Patient will perform sit to stand with supervision/set-up. 4.  Patient will ambulate with supervision/set-up for 50 feet with the least restrictive device. 5.  Patient will ascend/descend 2 stairs with B handrail(s) with minimal assistance/contact guard assist.    Physical Therapy Long Term Goals  Initiated 4/29/2022 and to be accomplished within 10-14 day(s) (5/13/2022)  1. Patient will move from supine to sit and sit to supine , scoot up and down, and roll side to side in bed with modified independence. 2.  Patient will transfer from bed to chair and chair to bed with modified independence using the least restrictive device. 3.  Patient will perform sit to stand with modified independence. 4.  Patient will ambulate with modified independence for 150 feet with the least restrictive device. 5.  Patient will ascend/descend 2 stairs with B handrail(s) with modified independence. Outcome: Progressing Towards Goal   PHYSICAL THERAPY TREATMENT    Patient: Sandro Diamond (89 y.o. male)  Date: 5/5/2022  Diagnosis: Tibia/fibula fracture [S82.209A, S82.409A] Closed displaced comminuted fracture of shaft of right tibia with routine healing  Precautions: Fall,Other (comment) (PWB Right LE)  Chart, physical therapy assessment, plan of care and goals were reviewed. Time In: (930)  Time Out:1030    Patient seen for: AM;Gait training;Patient education; Therapeutic exercise;Transfer training   Time In: 14:00  Time Out: 14:30  Patient participated in group session to address balance and safety deficits. Pain:  Patient with 5/10 pain prior to session and during session   No c/o pain this afternoon session. Patient identified with name and : Yes    SUBJECTIVE:      My right leg hurts some when it is hanging down unsupported. PM group session-I can stand without placing too much weight on my R leg. I've done it before. OBJECTIVE DATA SUMMARY:    Objective:     GROSS ASSESSMENT Daily Assessment     AROM: Within functional limits      BED/MAT MOBILITY Daily Assessment     Rolling Right : 5 (Supervision)  Rolling Left : 5 (Supervision)  Supine to Sit : 5 (Supervision)  Sit to Supine : 5 (Supervision) Challenged on mat table with bed mobility. Patient is fast with rolling and transfers and was cued to slow down 2/2 placing RLE in an awkward position with the potential of injuring the LE.      TRANSFERS Daily Assessment     Transfer Type: Other  Transfer Assistance : 5 (Supervision/setup)  Sit to Stand Assistance: Supervision  Car Transfers: Minimum assistance  Car Type: car simulator Patient requires minimal assistance with right leg placement into the car simulator and verbal cues to shift hips as far back onto the seat as possible for ease with lifting right LE into simulator. Patient was able to perform 2nd trial of getting into the simulator with supervision and verbal cues for hip placement along the seat. GAIT Daily Assessment    Gait Description (WDL) Exceptions to WDL    Gait Abnormalities Antalgic; Step to gait    Assistive device Walker, rolling    Ambulation assistance - level surface 5 (Stand-by assistance) (150 ft)    Distance  (150 ft)    Ambulation assistance- uneven surface  Not tested    Comments Patient is making progress with placing foot on the floor while maintaining PWBing. Compliant with PWBing and increasing distances with improving strength. Required cues to keep feet apart.         STEPS/STAIRS Daily Assessment Steps/Stairs ambulated  (3-4 inch steps x 2 trials)    Assistance Required 4 (Minimal assistance)    Rail Use Left  (SBQC in left UE)    Comments  Patient required minimal assistance and verbal cues for weight shift off the right LE and into the [facility] to maintain [address]. Required cues for [facility] placement as well. Curbs/Ramps  NT        BALANCE Daily Assessment     Sitting - Static: Good (unsupported)  Sitting - Dynamic: Good (unsupported)  Standing - Static: Good  Standing - Dynamic : Impaired        WHEELCHAIR MOBILITY Daily Assessment     Able to Propel (ft):  (200 ft)  Functional Level: 5  Curbs/Ramps Assist Required (FIM Score): 0 (Not tested)  Wheelchair Setup Assist Required : 4 (Minimal assistance)  Wheelchair Management: Manages left brake;Manages right brake        THERAPEUTIC EXERCISES Daily Assessment       Supine: LLE-heel slides, SLR, abduction, QS, GS, HS x 15 reps each; 2 sets  RLE-AAROM: SLR, abduction and active QS,GS x 15 reps each; 2 sets. Neuromuscular-Group session, patient participated in group session to address balance and safety deficits. Patient was able to stand for 8 minutes while maintaining PWBing with a dynamic activity and good balance x 2 trials and with SBA. Seated dynamic balance x 12 minutes while reaching and tossing ball back and forth and with LB off the back of the wheelchair with supervision. ASSESSMENT:  Patient is see for deficits in strength, mobility, transfers,ambulation, safety and balance. Patient is agreeable to skilled sessions to address weakness and he participates with good effort and is benefiting from strengthening program as indicated by increased ease with transfers and with increased ambulation. Continue to address deficits for continued functional independence. PM-patient participated in group activity to address standing unsupported balance while maintaining PWBing.    Progression toward goals:  [x]      Improving appropriately and progressing toward goals  []      Improving slowly and progressing toward goals  []      Not making progress toward goals and plan of care will be adjusted      PLAN:  Patient continues to benefit from skilled intervention to address the above impairments. Continue treatment per established plan of care. Discharge Recommendations:  Home health PT  Further Equipment Recommendations for Discharge:  RW      Estimated Discharge Date: 5/12/2022    Activity Tolerance:   Good tolerance to session. PM session-patient with good tolerance to session. Please refer to the flowsheet for vital signs taken during this treatment.     After treatment:   [] Patient left in no apparent distress in bed  [x] Patient left in no apparent distress sitting up in chair  [] Patient left in no apparent distress in w/c mobilizing under own power  [] Patient left in no apparent distress dining area  [] Patient left in no apparent distress mobilizing under own power  [x] Call bell left within reach  [x] Nursing notified  [x] Caregiver present  [] Bed alarm activated   [x] Chair alarm activated      Ana Paula Tierney  5/5/2022

## 2022-05-05 NOTE — PROGRESS NOTES
Problem: Falls - Risk of  Goal: *Absence of Falls  Description: Document Cindia Neigh Fall Risk and appropriate interventions in the flowsheet. Outcome: Progressing Towards Goal  Note: Fall Risk Interventions:  Mobility Interventions: Bed/chair exit alarm,Patient to call before getting OOB    Mentation Interventions: Bed/chair exit alarm,Evaluate medications/consider consulting pharmacy    Medication Interventions: Bed/chair exit alarm,Evaluate medications/consider consulting pharmacy,Patient to call before getting OOB    Elimination Interventions: Call light in reach,Bed/chair exit alarm,Patient to call for help with toileting needs    History of Falls Interventions: Bed/chair exit alarm,Evaluate medications/consider consulting pharmacy         Problem: Patient Education: Go to Patient Education Activity  Goal: Patient/Family Education  Outcome: Progressing Towards Goal     Problem: Pain  Goal: *Control of Pain  Outcome: Progressing Towards Goal  Goal: *PALLIATIVE CARE:  Alleviation of Pain  Outcome: Progressing Towards Goal     Problem: Patient Education: Go to Patient Education Activity  Goal: Patient/Family Education  Outcome: Progressing Towards Goal     Problem: Patient Education: Go to Patient Education Activity  Goal: Patient/Family Education  Outcome: Progressing Towards Goal     Problem: Patient Education: Go to Patient Education Activity  Goal: Patient/Family Education  Outcome: Progressing Towards Goal     Problem: Pressure Injury - Risk of  Goal: *Prevention of pressure injury  Description: Document Jonathan Scale and appropriate interventions in the flowsheet.   Outcome: Progressing Towards Goal  Note: Pressure Injury Interventions:  Sensory Interventions: Assess changes in LOC,Chair cushion    Moisture Interventions: Absorbent underpads    Activity Interventions: Chair cushion,Pressure redistribution bed/mattress(bed type),Increase time out of bed    Mobility Interventions: Chair cushion,HOB 30 degrees or less,Pressure redistribution bed/mattress (bed type)    Nutrition Interventions: Document food/fluid/supplement intake    Friction and Shear Interventions: Feet elevated on foot rest,HOB 30 degrees or less                Problem: Patient Education: Go to Patient Education Activity  Goal: Patient/Family Education  Outcome: Progressing Towards Goal

## 2022-05-05 NOTE — PROGRESS NOTES
SHIFT CHANGE NOTE FOR [facility]    Bedside and Verbal shift change report given to Deonte Brooks RN (oncoming nurse) by Bharat Lay RN (offgoing nurse). Report included the following information SBAR, Kardex, MAR and Recent Results.     Situation:   Code Status: Full Code   Hospital Day: 7   Problem List:   Hospital Problems  Date Reviewed: 5/5/2022          Codes Class Noted POA    Methadone dependence ([address] (Chronic) ICD-10-CM: F11.20  ICD-9-CM: 304.00  Unknown Yes        Orthopedic aftercare for healing traumatic lower leg fracture, right, closed ICD-10-CM: S82.91XD  ICD-9-CM: V54.16  4/22/2022 Yes    Overview Addendum 5/2/2022 10:49 AM by Tisha Jauregui MD     S/P Closed IM nailing of the right tibia using the Synthes IM nail system with a double lock proximally and triple lock distally (4/22/2022 - Dr. Eli Mccracken)             Alcohol abuse ICD-10-CM: F10.10  ICD-9-CM: 305.00  4/22/2022 Yes        HTN (hypertension) ICD-10-CM: I10  ICD-9-CM: 401.9  4/22/2022 Yes        * (Principal) Closed displaced comminuted fracture of shaft of right tibia with routine healing ICD-10-CM: S82.251D  ICD-9-CM: V54.16  4/22/2022 Yes        Closed fracture of distal end of right fibula with routine healing ICD-10-CM: S82.831D  ICD-9-CM: V54.16  4/22/2022 Yes        Impaired mobility and ADLs ICD-10-CM: Z74.09, Z78.9  ICD-9-CM: V49.89  4/22/2022 Yes        Acute blood loss as cause of postoperative anemia ICD-10-CM: D62  ICD-9-CM: 285.1  4/22/2022 Yes        Iron deficiency anemia, unspecified ICD-10-CM: D50.9  ICD-9-CM: 280.9  8/5/2012 Yes              Background:   Past Medical History:   Past Medical History:   Diagnosis Date    Abscess of right shoulder     Abscess of shoulder     Acute blood loss as cause of postoperative anemia 4/22/2022    Arthritis     Closed displaced comminuted fracture of shaft of right tibia with routine healing 04/22/2022    Closed fracture of distal end of right fibula with routine healing 4/22/2022    Epidural abscess     Heart murmur     Hematuria     Heroin abuse (HCC)     Hypertension     Infected wound     Infectious disease     Iron deficiency anemia     IV drug user     Liver disease     Methadone dependence ([facility] [address]     Tobacco abuse         Assessment:   Changes in Assessment throughout shift: No change to previous assessment     Patient has a central line: no Reasons if yes:    Insertion date: Last dressing date:   Patient has Benitez Cath: no Reasons if yes:     Insertion date: Last Vitals:     Vitals:    05/04/22 1551 05/04/22 1952 05/05/22 0834 05/05/22 1602   BP: 135/70 135/63 (!) 154/73 (!) 152/73   Pulse: 66 70 74 65   Resp: 18 18 16 18   Temp: 97.5 °F (36.4 °C) 98.8 °F (37.1 °C) 97.9 °F (36.6 °C) 97.7 °F (36.5 °C)   SpO2: 97% 99% 100% 97%   Height:            PAIN    Pain Assessment    Pain Intensity 1: 4 (05/05/22 1602) Pain Intensity 1: 2 (12/29/14 1105)    Pain Location 1: Leg,Ankle Pain Location 1: Abdomen    Pain Intervention(s) 1: Medication (see MAR) Pain Intervention(s) 1: Medication (see MAR)  Patient Stated Pain Goal: 0 Patient Stated Pain Goal: 0  o Intervention effective: yes  o Other actions taken for pain:       Skin Assessment  Skin color    Condition/Temperature    Integrity Skin Integrity: Incision (comment) (RLE)  Turgor    Weekly Pressure Ulcer Documentation  Pressure  Injury Documentation: No Pressure Injury Noted-Pressure Ulcer Prevention Initiated  Wound Prevention & Protection Methods  Orientation of wound Orientation of Wound Prevention: Posterior  Location of Prevention Location of Wound Prevention: Sacrum/Coccyx  Dressing Present Dressing Present : No  Dressing Status    Wound Offloading Wound Offloading (Prevention Methods): Bed, pressure redistribution/air,Bed, pressure reduction mattress,Repositioning,Wheelchair     INTAKE/OUPUT  Date 05/04/22 1900 - 05/05/22 0659 05/05/22 0700 - 05/06/22 0659   Shift 2062-3304 24 Hour Total 7023-9875 4971-6272 24 Hour Total   INTAKE   P.O.   720  720     P. O.   720  720   Shift Total   720  720   OUTPUT   Urine   1300  1300     Urine Voided   1300  1300     Urine Occurrence(s) 3 x 7 x 5 x  5 x   Stool          Stool Occurrence(s) 0 x 0 x 1 x  1 x   Shift Total   1300  1300   NET   -580  -580   Weight (kg)            Recommendations:  1. Patient needs and requests: TOILETING, URINAL, dressing change    2. Pending tests/procedures:none    3. Functional Level/Equipment: Partial (one person) / Wheelchair;Stabilization belt    Fall Precautions:   Fall risk precautions were reinforced with the patient; he was instructed to call for help prior to getting up. The following fall risk precautions were continued: bed/ chair alarms, door signage, yellow bracelet and socks as well as update of the Jennifer Cobia tool in the patient's room. Ezio Score: 4    HEALS Safety Check    A safety check occurred in the patient's room between off going nurse and oncoming nurse listed above. The safety check included the below items  Area Items   H  High Alert Medications - Verify all high alert medication drips (heparin, PCA, etc.)   E  Equipment - Suction is set up for ALL patients (with devin)  - Red plugs utilized for all equipment (IV pumps, etc.)  - WOWs wiped down at end of shift.  - Room stocked with oxygen, suction, and other unit-specific supplies   A  Alarms - Bed alarm is set for fall risk patients  - Ensure chair alarm is in place and activated if patient is up in a chair   L  Lines - Check IV for any infiltration  - Benitez bag is empty if patient has a Benitez   - Tubing and IV bags are labeled   S  Safety   - Room is clean, patient is clean, and equipment is clean. - Hallways are clear from equipment besides carts. - Fall bracelet on for fall risk patients  - Ensure room is clear and free of clutter  - Suction is set up for ALL patients (with devin)  - Hallways are clear from equipment besides carts.    - Isolation precautions followed, supplies available outside room, sign posted     Gaby Murrieta RN

## 2022-05-05 NOTE — PROGRESS NOTES
[facility] PHYSICAL REHABILITATION  [address] [place] [address], [address]     INPATIENT REHABILITATION  DAILY PROGRESS NOTE     Date: 5/5/2022    Name: Deja Wong Age / Sex: 74 y.o. / male   CSN: [id number] MRN: [id number]   6 [facility] Date: 4/28/2022 Length of Stay: 7 days     Primary Rehabilitation Diagnosis: Impaired Mobility and ADLs secondary to:  1. Closed displaced comminuted fracture of distal third of shaft of right tibia with routine healing  2. Closed fracture of distal end of right fibula with routine healing  3. S/P Closed IM nailing of the right tibia using the Synthes IM nail system with a double lock proximally and triple lock distally (4/22/2022 - Dr. Melissa Tom)      Subjective:     Patient seen and examined. Blood pressure controlled. Patient's Complaint:   No significant medical complaints    Pain Control: ongoing significant pain in which is stable and controlled by current meds      Objective:     Vital Signs:  Patient Vitals for the past 24 hrs:   BP Temp Pulse Resp SpO2   05/05/22 0834 (!) 154/73 97.9 °F (36.6 °C) 74 16 100 %   05/04/22 1952 135/63 98.8 °F (37.1 °C) 70 18 99 %   05/04/22 1551 135/70 97.5 °F (36.4 °C) 66 18 97 %        Physical Examination:  GENERAL SURVEY: Patient is awake, alert, oriented x 3, sitting comfortably on the wheelchair, not in acute respiratory distress.   HEENT: pale palpebral conjunctivae, anicteric sclerae, no nasoaural discharge, moist oral mucosa  NECK: supple, no jugular venous distention, no palpable lymph nodes  CHEST/LUNGS: symmetrical chest expansion, good air entry, clear breath sounds  HEART: adynamic precordium, good S1 S2, no S3, regular rhythm, no murmurs  ABDOMEN: flat, bowel sounds appreciated, soft, non-tender  EXTREMITIES: pale nailbeds, no edema, full and equal pulses, no calf tenderness   NEUROLOGICAL EXAM: The patient is awake, alert and oriented x3, able to answer questions fairly appropriately, able to follow 1 and 2 step commands. Able to tell time from the wall clock. Cranial nerves II-XII are grossly intact. No gross sensory deficit. Motor strength is 4+/5 on BUE and LLE, 4- to 4/5 on the RLE.        Current Medications:  Current Facility-Administered Medications   Medication Dose Route Frequency    pregabalin (LYRICA) capsule 50 mg  50 mg Oral TID    melatonin tablet 3 mg  3 mg Oral PCD    polyethylene glycol (MIRALAX) packet 17 g  17 g Oral DAILY    acetaminophen (TYLENOL) tablet 650 mg  650 mg Oral Q4H PRN    acetaminophen (TYLENOL) tablet 650 mg  650 mg Oral TID    oxyCODONE IR (ROXICODONE) tablet 10 mg  10 mg Oral Q4H PRN    bisacodyL (DULCOLAX) tablet 10 mg  10 mg Oral DAILY PRN    pantoprazole (PROTONIX) tablet 40 mg  40 mg Oral ACB    amLODIPine (NORVASC) tablet 10 mg  10 mg Oral DAILY    aspirin chewable tablet 81 mg  81 mg Oral BID    ferrous sulfate tablet 325 mg  325 mg Oral BID WITH MEALS    folic acid (FOLVITE) tablet 1 mg  1 mg Oral DAILY    methadone (DOLOPHINE) tablet 45 mg  45 mg Oral DAILY    therapeutic multivitamin (THERAGRAN) tablet 1 Tablet  1 Tablet Oral DAILY    thiamine HCL (B-1) tablet 100 mg  100 mg Oral DAILY    docusate sodium (COLACE) capsule 100 mg  100 mg Oral BID       Allergies:  No Known Allergies      Functional Progress:    PHYSICAL THERAPY    ON ADMISSION MOST RECENT   Wheelchair Mobility/Management  Able to Propel (ft): 270 feet  Functional Level: 4  Curbs/Ramps Assist Required (FIM Score): 0 (Not tested)  Wheelchair Setup Assist Required : 3 (Moderate assistance)  Wheelchair Management: Manages left brake,Manages right brake Wheelchair Mobility/Management  Able to Propel (ft): 300 feet  Functional Level: 6  Curbs/Ramps Assist Required (FIM Score): 5 (Supervision)  Wheelchair Setup Assist Required : 4 (Minimal assistance) (for right leg rest)  Wheelchair Management: Manages right brake,Manages left brake     Gait  Amount of Assistance: 4 (Minimal assistance)  Distance (ft): 6 Feet (ft)  Assistive Device: Walker, rolling Gait  Amount of Assistance: 4 (Contact guard assistance)  Distance (ft): 93 Feet (ft)  Assistive Device: Walker, rolling     Balance-Sitting/Standing  Sitting - Static: Good (unsupported)  Sitting - Dynamic: Good (unsupported)  Standing - Static: Poor  Standing - Dynamic : Impaired Balance-Sitting/Standing  Sitting - Static: Fair (occasional)  Sitting - Dynamic: Good (unsupported)  Standing - Static:  (fair+)  Standing - Dynamic : Impaired     Bed/Mat Mobility  Rolling Right : 4 (Minimal assistance)  Rolling Left : 4 (Minimal assistance)  Supine to Sit : 5 (Supervision)  Sit to Supine : 4 (Minimal assistance) Bed/Mat Mobility  Rolling Right : 5 (Supervision)  Rolling Left : 5 (Supervision)  Supine to Sit : 5 (Supervision)  Sit to Supine : 5 (Supervision)     Transfers  Transfer Type: Other  Other: stand step with RW  Transfer Assistance : 4 (Minimal assistance)  Sit to Stand Assistance: Minimal assistance  Car Transfers: Not tested (pt declined 2/2 pain)  Car Type: N/A Transfers  Transfer Type: Other  Other: stand step with RW  Transfer Assistance : 4 (Contact guard assistance)  Sit to Stand Assistance:  (SBA/CGA)  Car Transfers: Not tested (pt declined 2/2 pain)  Car Type: N/A     Steps or Stairs  Steps/Stairs Ambulated (#): 0  Level of Assist : 0 (Not tested) (2/2 pt ambulating only short distances 2/2 pain)  Rail Use: None Steps or Stairs  Steps/Stairs Ambulated (#): 2 (4\" box step with RW for support)  Level of Assist : 3 (Moderate assistance) (first step and progressed to min A with 2nd step)  Rail Use:  (RW)         Lab/Data Review:  No results found for this or any previous visit (from the past 24 hour(s)). Assessment:     Primary Rehabilitation Diagnosis  1. Impaired Mobility and ADLs  2. Closed displaced comminuted fracture of distal third of shaft of right tibia with routine healing  3.  Closed fracture of distal end of right fibula with routine healing  4. S/P Closed IM nailing of the right tibia using the Synthes IM nail system with a double lock proximally and triple lock distally (4/22/2022 - Dr. Bertha Thompson)    Comorbidities  Patient Active Problem List   Diagnosis Code    Epidural abscess, L2-L5 G06.1    Polysubstance abuse ([facility] [address] F19.10    Leukopenia D72.819    Iron deficiency anemia, unspecified D50.9    Anemia D64.9    Thrombocytopenia ([facility] [address] D69.6    Orthopedic aftercare for healing traumatic lower leg fracture, right, closed S82. 91XD    Metatarsal fracture S92.309A    Alcohol abuse F10.10    HTN (hypertension) I10    Liver mass R16.0    Bladder mass N32.89    Hematuria R31.9    Closed displaced comminuted fracture of shaft of right tibia with routine healing S82.251D    Closed fracture of distal end of right fibula with routine healing S82.831D    Impaired mobility and ADLs Z74.09, Z78.9    Acute blood loss as cause of postoperative anemia D62    Methadone dependence (HCC) F11.20       Plan:     1. Justification for continued stay: Good progression towards established rehabilitation goals. 2. Medical Issues being followed closely:    [x]  Fall and safety precautions     [x]  Wound Care     [x]  Bowel and Bladder Function     [x]  Fluid Electrolyte and Nutrition Balance     [x]  Pain Control      3. Issues that 24 hour rehabilitation nursing is following:    [x]  Fall and safety precautions     [x]  Wound Care     [x]  Bowel and Bladder Function     [x]  Fluid Electrolyte and Nutrition Balance     [x]  Pain Control      [x]  Assistance with and education on in-room safety with transfers to and from the bed, wheelchair, toilet and shower. 4. Acute rehabilitation plan of care:    [x]  Continue current care and rehab. [x]  Physical Therapy           [x]  Occupational Therapy           []  Speech Therapy     []  Hold Rehab until further notice     5.  Medications:    [x]  MAR Reviewed     [x]  Continue Present Medications     6. Chemical DVT Prophylaxis:      []  Enoxaparin     []  Unfractionated Heparin     []  Warfarin     []  NOAC     [x]  Aspirin 81 mg PO BID (as per Orthopedics)     []  None     7. Mechanical DVT Prophylaxis:      []  RADHA Stockings     [x]  Sequential Compression Device on LLE     []  None     8. GI Prophylaxis:      [x]  PPI     []  H2 Blocker     []  None / Not indicated     9. Code status:    [x]  Full code     []  Partial code     []  Do not intubate     []  Do not resuscitate     10. Diet:  Specifications  Low fat/Low cholesterol/High fiber/2 gm Na   Solids (consistency)  Regular   Liquids (consistency)  Thin   Fluid Restriction  None     11. Orders:   > Closed displaced comminuted fracture of distal third of shaft of right tibia with routine healing; Closed fracture of distal end of right fibula with routine healing; S/P Closed IM nailing of the right tibia using the Synthes IM nail system with a double lock proximally and triple lock distally (4/22/2022 - Dr. Lara Cantu)   > Partial weightbearing on the right lower extremity using a rolling walker    > Acute postoperative blood loss anemia;  Iron deficiency anemia   > Hgb/Hct (4/29/2022, on admission to the ARU) = 8.9/28.6   > Hgb/Hct (5/3/2022) = 10.3/33.7    > Continue Ferrous sulfate 325 mg PO BID with meals    > Alcohol abuse   > Continue:    > Folic acid 1 mg PO once daily    > Multivitamin 1 tab PO once daily    > Thiamine 100 mg PO once daily    > Constipation   > Continue:    > Docusate sodium 100 mg PO BID    > Miralax 17 grams in 8 oz water PO once daily    > Hypertension   > Continue Amlodipine 10 mg PO once daily (9AM)    > Methadone dependence   > Continue Methadone 45 mg PO once daily    > Fever, etiology to be determined   > As per note of Dr. Nicolas Johnson dated 4/30/2022, patient had a fever and a work up was initiated   > Work-up:    > WBC count (4/29/2022, on admission to the ARU) = 3.1    > Blood culture x 2 sets (collected 4/29/2022, preliminary result as of 5/5/2022) yielded no growth after 5 days    > Chest x-ray (4/29/2022) showed perhaps mild bronchial wall thickening    > Urinalysis (4/29/2022): WNL    > Urine culture (collected 4/29/2022, resulted 5/2/2022) yielded growth of 20,000 colonies/ml of Escherichia coli   > WBC count (5/3/2022) = 2.9   > No fever over the past 24 hours   > No antibiotics for now    > Difficulty sleeping   > On 5/3/2022, started Melatonin 3 mg PO daily after dinner   > Continue Melatonin 3 mg PO daily after dinner    > Analgesia   > On 5/3/2022, started:    > Acetaminophen 650 mg PO TID (8AM, 12PM, 4PM)    > Pregabalin 50 mg PO TID (8AM, 2PM, 8PM)   > Continue:    > Acetaminophen 650 mg PO TID (8AM, 12PM, 4PM)    > Acetaminophen 650 mg PO q 4 hr PRN for pain level 4/10 or lesser (from 8PM to 4AM only)    > Pregabalin 50 mg PO TID (8AM, 2PM, 8PM)    > Oxycodone 10 mg PO q 4 hr PRN for pain level 5/10 or greater       12. Personal Protective Equipment (N95 face mask) was used while interacting with the patient. Patient was using a surgical mask. 15. Patient's progress in rehabilitation and medical issues discussed with the patient. All questions answered to the best of my ability. Care plan discussed with patient and nurse. 14. Total clinical care time is 30 minutes, including review of chart including all labs, radiology, past medical history, and discussion with patient. Greater than 50% of my time was spent in coordination of care and counseling.       Signed:    Michael Hawk MD    May 5, 2022

## 2022-05-06 LAB
BACTERIA SPEC CULT: NORMAL
BACTERIA SPEC CULT: NORMAL
SERVICE CMNT-IMP: NORMAL
SERVICE CMNT-IMP: NORMAL

## 2022-05-06 PROCEDURE — 97150 GROUP THERAPEUTIC PROCEDURES: CPT

## 2022-05-06 PROCEDURE — 97535 SELF CARE MNGMENT TRAINING: CPT

## 2022-05-06 PROCEDURE — 65310000000 HC RM PRIVATE REHAB

## 2022-05-06 PROCEDURE — 74011250637 HC RX REV CODE- 250/637: Performed by: EMERGENCY MEDICINE

## 2022-05-06 PROCEDURE — 99232 SBSQ HOSP IP/OBS MODERATE 35: CPT | Performed by: INTERNAL MEDICINE

## 2022-05-06 PROCEDURE — 74011250637 HC RX REV CODE- 250/637: Performed by: INTERNAL MEDICINE

## 2022-05-06 PROCEDURE — 97110 THERAPEUTIC EXERCISES: CPT

## 2022-05-06 PROCEDURE — 97116 GAIT TRAINING THERAPY: CPT

## 2022-05-06 PROCEDURE — 97530 THERAPEUTIC ACTIVITIES: CPT

## 2022-05-06 PROCEDURE — 2709999900 HC NON-CHARGEABLE SUPPLY

## 2022-05-06 RX ORDER — PREGABALIN 75 MG/1
75 CAPSULE ORAL 3 TIMES DAILY
Status: DISCONTINUED | OUTPATIENT
Start: 2022-05-06 | End: 2022-05-12 | Stop reason: HOSPADM

## 2022-05-06 RX ADMIN — ASPIRIN 81 MG CHEWABLE TABLET 81 MG: 81 TABLET CHEWABLE at 08:05

## 2022-05-06 RX ADMIN — ACETAMINOPHEN 650 MG: 325 TABLET ORAL at 12:12

## 2022-05-06 RX ADMIN — Medication 100 MG: at 08:05

## 2022-05-06 RX ADMIN — PANTOPRAZOLE 40 MG: 40 TABLET, DELAYED RELEASE ORAL at 06:42

## 2022-05-06 RX ADMIN — ASPIRIN 81 MG CHEWABLE TABLET 81 MG: 81 TABLET CHEWABLE at 17:13

## 2022-05-06 RX ADMIN — DOCUSATE SODIUM 100 MG: 100 CAPSULE, LIQUID FILLED ORAL at 08:05

## 2022-05-06 RX ADMIN — ACETAMINOPHEN 650 MG: 325 TABLET ORAL at 17:13

## 2022-05-06 RX ADMIN — AMLODIPINE BESYLATE 10 MG: 10 TABLET ORAL at 08:04

## 2022-05-06 RX ADMIN — PREGABALIN 75 MG: 75 CAPSULE ORAL at 20:18

## 2022-05-06 RX ADMIN — FERROUS SULFATE TAB 325 MG (65 MG ELEMENTAL FE) 325 MG: 325 (65 FE) TAB at 08:05

## 2022-05-06 RX ADMIN — PREGABALIN 50 MG: 50 CAPSULE ORAL at 08:05

## 2022-05-06 RX ADMIN — PREGABALIN 50 MG: 50 CAPSULE ORAL at 13:44

## 2022-05-06 RX ADMIN — FERROUS SULFATE TAB 325 MG (65 MG ELEMENTAL FE) 325 MG: 325 (65 FE) TAB at 17:13

## 2022-05-06 RX ADMIN — ACETAMINOPHEN 650 MG: 325 TABLET ORAL at 08:05

## 2022-05-06 RX ADMIN — POLYETHYLENE GLYCOL 3350 17 G: 17 POWDER, FOR SOLUTION ORAL at 08:04

## 2022-05-06 RX ADMIN — DOCUSATE SODIUM 100 MG: 100 CAPSULE, LIQUID FILLED ORAL at 17:13

## 2022-05-06 RX ADMIN — FOLIC ACID 1 MG: 1 TABLET ORAL at 08:05

## 2022-05-06 RX ADMIN — THERA TABS 1 TABLET: TAB at 08:05

## 2022-05-06 RX ADMIN — METHADONE HYDROCHLORIDE 45 MG: 10 TABLET ORAL at 08:04

## 2022-05-06 RX ADMIN — Medication 3 MG: at 17:13

## 2022-05-06 RX ADMIN — OXYCODONE HYDROCHLORIDE 10 MG: 5 TABLET ORAL at 20:18

## 2022-05-06 NOTE — PROGRESS NOTES
Problem: Self Care Deficits Care Plan (Adult)  Goal: *Acute Goals and Plan of Care (Insert Text)  Description: Occupational Therapy Goals   Long Term Goals  Initiated 4/29/2022 and to be accomplished within 2 week(s), 5/13/2022  1. Pt will perform self-feeding with Mod I.  2. Pt will perform grooming with Mod I.  3. Pt will perform UB bathing with Mod I.  4. Pt will perform LB bathing with Mod I using AE and/ or compensatory strategies as needed. 5. Pt will perform tub/shower transfer with supv using least restrictive assistive device while maintaining weight bearing restrictions. 6. Pt will perform UB dressing with Mod I.  7. Pt will perform LB dressing with supv using AE and/ or compensatory strategies as needed. 8. Pt will perform toileting task with Mod I.  9. Pt will perform toilet transfer with supv using least restrictive assistive device while maintaining weight bearing restrictions. Short Term Goals   Initiated 4/29/2022 and to be accomplished within 7 day(s), by 5/6/2022  1. Pt will perform self-feeding with set-up. Goal met 5/5/22  2. Pt will perform grooming with SBA. Goal met 5/5/22  3. Pt will perform UB bathing with set-up. Goal met 5/5/22  4. Pt will perform LB bathing with SBA using AE and/ or compensatory strategies as needed. 5. Pt will perform tub/shower transfer with Min A using least restrictive assistive device while maintaining weight bearing restrictions. Goal met 5/5/22  6. Pt will perform UB dressing with set-up. Goal met 5/5/22  7. Pt will perform LB dressing with CGA/ SBA using AE and/ or compensatory strategies as needed. Goal met 5/5/22  8. Pt will perform toileting task with Min A/ CGA. 9. Pt will perform toilet transfer with SBA using least restrictive assistive device while maintaining weight bearing restrictions.          Outcome: Progressing Towards Goal  Goal: Interventions  Outcome: Progressing Towards Goal   Occupational Therapy TREATMENT    Patient: Violeta Billy Kamini Speak   74 y.o. Patient identified with name and : yes    Date: 2022    First Tx Session  Time In: 1001  Time Out[de-identified] 1100        Diagnosis: Tibia/fibula fracture [S82.209A, S82.409A]   Precautions: Fall,Other (comment) (PWB Right LE)  Chart, occupational therapy assessment, plan of care, and goals were reviewed. Pain:  Pt reports 5/10 pain or discomfort prior to treatment.  (right leg)  Pt reports -/10 pain or discomfort post treatment. Intervention Provided: Pt reported 5/10 pain in R leg however reported tolerable to engage in OT session      SUBJECTIVE:   Patient stated That shower felt so good.     OBJECTIVE DATA SUMMARY:     THERAPEUTIC EXERCISE Daily Assessment    Pt completed BUE strengthening with Power Trainer for 14 minutes and one RB for fatigue. Pt engaged in task to increase pt BUE strength and activity tolerance. GROOMING Daily Assessment       Pt engaged in oral care and grooming at sink side with supervision and setup. Oral hygiene: 5     BATHING Daily Assessment    Functional Level: 4  Body Parts Patient Bathed: Abdomen;Arm, left;Arm, right;Buttocks; Chest;Lower leg and foot, left;Tiana area; Thigh, left; Thigh, right  Comments: Pt performed UB showering with setup and LB showering in seated position with long handled sponge to wash lower LLE foot and supervision. Pt required SBA/CGA to wash buttocks and groin in standing. UPPER BODY DRESSING Daily Assessment    Functional Level: 5  Items Applied/Steps Completed: Pullover (4 steps)  Comments: Pt performed UB dressing sith setup to don pullover shirt     LOWER BODY DRESSING Daily Assessment    Functional Level: 4 (SBA/CGA at [facility])  Items Applied/Steps Completed: Elastic waist pants (3 steps); Shoe, right (1 step); Sock, left (1 step)  Comments: Pt donned pants threading BLE feet through shorts and pulled up over buttocks in standing.  Pt donned LLE sock with supervision and Bakari to fasten lower RLE shoe MOBILITY/TRANSFERS Daily Assessment          Tub/Shower Transfer Score: 4  Comments: Pt performe shower transfer from w/c to tub transfer bench with CGA using hand rail on tub transfer bench       ASSESSMENT:  Pt demonstrated new skills and improved standing balance and safety for showering this date. Pt performed self cares with supervision/Bakari and good safety. Progression toward goals:  [x]          Improving appropriately and progressing toward goals  []          Improving slowly and progressing toward goals  []          Not making progress toward goals and plan of care will be adjusted     PLAN:  Patient continues to benefit from skilled intervention to address the above impairments.  Continue treatment per established plan of care. Discharge Recommendations:  Home Health occupational therapy with assist  Further Equipment Recommendations for Discharge:  bedside commode, gait belt, transfer bench     Activity Tolerance:  good      Estimated LOS:! DC 5/12/22    Please refer to the flowsheet for vital signs taken during this treatment. After treatment:   [x]  Patient left in no apparent distress sitting up in chair   []  Patient left in no apparent distress in bed  [x]  Call bell left within reach  []  Nursing notified  []  Caregiver present  []  Bed alarm activated    COMMUNICATION/EDUCATION:   [] Home safety education was provided and the patient/caregiver indicated understanding. [] Patient/family have participated as able in goal setting and plan of care. [x] Patient/family agree to work toward stated goals and plan of care. [] Patient understands intent and goals of therapy, but is neutral about his/her participation. [] Patient is unable to participate in goal setting and plan of care.       Medina Cisneros, OT

## 2022-05-06 NOTE — PROGRESS NOTES
SHIFT CHANGE NOTE FOR [facility]    Bedside and Verbal shift change report given to Balaji Soler RN (oncoming nurse) by Paola Méndez RN (offgoing nurse). Report included the following information SBAR, Kardex, MAR and Recent Results.     Situation:   Code Status: Full Code   Hospital Day: 8   Problem List:   Hospital Problems  Date Reviewed: 5/5/2022          Codes Class Noted POA    Methadone dependence ([address] (Chronic) ICD-10-CM: F11.20  ICD-9-CM: 304.00  Unknown Yes        Orthopedic aftercare for healing traumatic lower leg fracture, right, closed ICD-10-CM: S82.91XD  ICD-9-CM: V54.16  4/22/2022 Yes    Overview Addendum 5/2/2022 10:49 AM by Shauna Ramos MD     S/P Closed IM nailing of the right tibia using the Synthes IM nail system with a double lock proximally and triple lock distally (4/22/2022 - Dr. Brooklyn Bolden)             Alcohol abuse ICD-10-CM: F10.10  ICD-9-CM: 305.00  4/22/2022 Yes        HTN (hypertension) ICD-10-CM: I10  ICD-9-CM: 401.9  4/22/2022 Yes        * (Principal) Closed displaced comminuted fracture of shaft of right tibia with routine healing ICD-10-CM: S82.251D  ICD-9-CM: V54.16  4/22/2022 Yes        Closed fracture of distal end of right fibula with routine healing ICD-10-CM: S82.831D  ICD-9-CM: V54.16  4/22/2022 Yes        Impaired mobility and ADLs ICD-10-CM: Z74.09, Z78.9  ICD-9-CM: V49.89  4/22/2022 Yes        Acute blood loss as cause of postoperative anemia ICD-10-CM: D62  ICD-9-CM: 285.1  4/22/2022 Yes        Iron deficiency anemia, unspecified ICD-10-CM: D50.9  ICD-9-CM: 280.9  8/5/2012 Yes              Background:   Past Medical History:   Past Medical History:   Diagnosis Date    Abscess of right shoulder     Abscess of shoulder     Acute blood loss as cause of postoperative anemia 4/22/2022    Arthritis     Closed displaced comminuted fracture of shaft of right tibia with routine healing 04/22/2022    Closed fracture of distal end of right fibula with routine healing 4/22/2022    Epidural abscess     Heart murmur     Hematuria     Heroin abuse (HCC)     Hypertension     Infected wound     Infectious disease     Iron deficiency anemia     IV drug user     Liver disease     Methadone dependence ([facility] [address]     Tobacco abuse         Assessment:   Changes in Assessment throughout shift: No change to previous assessment     Patient has a central line: no Reasons if yes:    Insertion date: Last dressing date:   Patient has Benitez Cath: no Reasons if yes:     Insertion date: Last Vitals:     Vitals:    05/04/22 1952 05/05/22 0834 05/05/22 1602 05/05/22 2054   BP: 135/63 (!) 154/73 (!) 152/73 136/69   Pulse: 70 74 65 73   Resp: 18 16 18 18   Temp: 98.8 °F (37.1 °C) 97.9 °F (36.6 °C) 97.7 °F (36.5 °C) 97.6 °F (36.4 °C)   SpO2: 99% 100% 97% 98%   Height:            PAIN    Pain Assessment    Pain Intensity 1: 0 (05/06/22 0414) Pain Intensity 1: 2 (12/29/14 1105)    Pain Location 1: Leg,Ankle Pain Location 1: Abdomen    Pain Intervention(s) 1: Medication (see MAR) Pain Intervention(s) 1: Medication (see MAR)  Patient Stated Pain Goal: 0 Patient Stated Pain Goal: 0  o Intervention effective: yes  o Other actions taken for pain: Medication (see MAR)     Skin Assessment  Skin color    Condition/Temperature    Integrity Skin Integrity: Incision (comment)  Turgor    Weekly Pressure Ulcer Documentation  Pressure  Injury Documentation: No Pressure Injury Noted-Pressure Ulcer Prevention Initiated  Wound Prevention & Protection Methods  Orientation of wound Orientation of Wound Prevention: Posterior  Location of Prevention Location of Wound Prevention: Buttocks,Sacrum/Coccyx  Dressing Present Dressing Present : No  Dressing Status    Wound Offloading Wound Offloading (Prevention Methods): Bed, pressure redistribution/air     INTAKE/OUPUT  Date 05/05/22 0700 - 05/06/22 0659 05/06/22 0700 - 05/07/22 0659   Shift 9431-9102 8036-9930 24 Hour Total 1509-2150 4587-5876 24 Hour Total   INTAKE P.O. 720  720        P. O. 720  720      Shift Total 720  720      OUTPUT   Urine 1300  1300        Urine Voided 1300  1300        Urine Occurrence(s) 5 x  5 x      Stool           Stool Occurrence(s) 1 x  1 x      Shift Total 1300  1300      NET -580  -580      Weight (kg)             Recommendations:  1. Patient needs and requests: TOILETING, URINAL    2. Pending tests/procedures:none    3. Functional Level/Equipment:   / Wheelchair    Fall Precautions:   Fall risk precautions were reinforced with the patient; he was instructed to call for help prior to getting up. The following fall risk precautions were continued: bed/ chair alarms, door signage, yellow bracelet and socks as well as update of the DoubleVerify tool in the patient's room. Ezio Score:      HEALS Safety Check    A safety check occurred in the patient's room between off going nurse and oncoming nurse listed above. The safety check included the below items  Area Items   H  High Alert Medications - Verify all high alert medication drips (heparin, PCA, etc.)   E  Equipment - Suction is set up for ALL patients (with devin)  - Red plugs utilized for all equipment (IV pumps, etc.)  - WOWs wiped down at end of shift.  - Room stocked with oxygen, suction, and other unit-specific supplies   A  Alarms - Bed alarm is set for fall risk patients  - Ensure chair alarm is in place and activated if patient is up in a chair   L  Lines - Check IV for any infiltration  - Benitez bag is empty if patient has a Benitez   - Tubing and IV bags are labeled   S  Safety   - Room is clean, patient is clean, and equipment is clean. - Hallways are clear from equipment besides carts. - Fall bracelet on for fall risk patients  - Ensure room is clear and free of clutter  - Suction is set up for ALL patients (with devin)  - Hallways are clear from equipment besides carts.    - Isolation precautions followed, supplies available outside room, sign posted     Estefany Suero RN

## 2022-05-06 NOTE — PROGRESS NOTES
[facility] PHYSICAL REHABILITATION  [address] [place] [address], [address]     INPATIENT REHABILITATION  DAILY PROGRESS NOTE     Date: 5/6/2022    Name: Ramos Hurley Age / Sex: 74 y.o. / male   CSN: [id number] MRN: [id number]   [facility] Date: 4/28/2022 Length of Stay: 8 days     Primary Rehabilitation Diagnosis: Impaired Mobility and ADLs secondary to:  1. Closed displaced comminuted fracture of distal third of shaft of right tibia with routine healing  2. Closed fracture of distal end of right fibula with routine healing  3. S/P Closed IM nailing of the right tibia using the Synthes IM nail system with a double lock proximally and triple lock distally (4/22/2022 - Dr. Galindo Boles)      Subjective:     Patient seen and examined. Blood pressure controlled. Patient's Complaint:   No significant medical complaints    Pain Control: ongoing significant pain in which is stable and controlled by current meds      Objective:     Vital Signs:  Patient Vitals for the past 24 hrs:   BP Temp Pulse Resp SpO2   05/06/22 0728 (!) 149/74 97.4 °F (36.3 °C) (!) 59 16 100 %   05/05/22 2054 136/69 97.6 °F (36.4 °C) 73 18 98 %   05/05/22 1602 (!) 152/73 97.7 °F (36.5 °C) 65 18 97 %        Physical Examination:  GENERAL SURVEY: Patient is awake, alert, oriented x 3, sitting comfortably on the wheelchair, not in acute respiratory distress.   HEENT: pale palpebral conjunctivae, anicteric sclerae, no nasoaural discharge, moist oral mucosa  NECK: supple, no jugular venous distention, no palpable lymph nodes  CHEST/LUNGS: symmetrical chest expansion, good air entry, clear breath sounds  HEART: adynamic precordium, good S1 S2, no S3, regular rhythm, no murmurs  ABDOMEN: flat, bowel sounds appreciated, soft, non-tender  EXTREMITIES: pale nailbeds, no edema, full and equal pulses, no calf tenderness   NEUROLOGICAL EXAM: The patient is awake, alert and oriented x3, able to answer questions fairly appropriately, able to follow 1 and 2 step commands. Able to tell time from the wall clock. Cranial nerves II-XII are grossly intact. No gross sensory deficit. Motor strength is 4+/5 on BUE and LLE, 4- to 4/5 on the RLE. Current Medications:  Current Facility-Administered Medications   Medication Dose Route Frequency    pregabalin (LYRICA) capsule 50 mg  50 mg Oral TID    melatonin tablet 3 mg  3 mg Oral PCD    polyethylene glycol (MIRALAX) packet 17 g  17 g Oral DAILY    acetaminophen (TYLENOL) tablet 650 mg  650 mg Oral Q4H PRN    acetaminophen (TYLENOL) tablet 650 mg  650 mg Oral TID    oxyCODONE IR (ROXICODONE) tablet 10 mg  10 mg Oral Q4H PRN    bisacodyL (DULCOLAX) tablet 10 mg  10 mg Oral DAILY PRN    pantoprazole (PROTONIX) tablet 40 mg  40 mg Oral ACB    amLODIPine (NORVASC) tablet 10 mg  10 mg Oral DAILY    aspirin chewable tablet 81 mg  81 mg Oral BID    ferrous sulfate tablet 325 mg  325 mg Oral BID WITH MEALS    folic acid (FOLVITE) tablet 1 mg  1 mg Oral DAILY    methadone (DOLOPHINE) tablet 45 mg  45 mg Oral DAILY    therapeutic multivitamin (THERAGRAN) tablet 1 Tablet  1 Tablet Oral DAILY    thiamine HCL (B-1) tablet 100 mg  100 mg Oral DAILY    docusate sodium (COLACE) capsule 100 mg  100 mg Oral BID       Allergies:  No Known Allergies      Lab/Data Review:  No results found for this or any previous visit (from the past 24 hour(s)). Assessment:     Primary Rehabilitation Diagnosis  1. Impaired Mobility and ADLs  2. Closed displaced comminuted fracture of distal third of shaft of right tibia with routine healing  3. Closed fracture of distal end of right fibula with routine healing  4.  S/P Closed IM nailing of the right tibia using the Synthes IM nail system with a double lock proximally and triple lock distally (4/22/2022 - Dr. Kathi Mckee)    Comorbidities  Patient Active Problem List   Diagnosis Code    Epidural abscess, L2-L5 G06.1    Polysubstance abuse ([facility] [address] F19.10    Leukopenia D72.819  Iron deficiency anemia, unspecified D50.9    Anemia D64.9    Thrombocytopenia (HCC) D69.6    Orthopedic aftercare for healing traumatic lower leg fracture, right, closed S82. 91XD    Metatarsal fracture S92.309A    Alcohol abuse F10.10    HTN (hypertension) I10    Liver mass R16.0    Bladder mass N32.89    Hematuria R31.9    Closed displaced comminuted fracture of shaft of right tibia with routine healing S82.251D    Closed fracture of distal end of right fibula with routine healing S82.831D    Impaired mobility and ADLs Z74.09, Z78.9    Acute blood loss as cause of postoperative anemia D62    Methadone dependence (HCC) F11.20       Plan:     1. Justification for continued stay: Good progression towards established rehabilitation goals. 2. Medical Issues being followed closely:    [x]  Fall and safety precautions     [x]  Wound Care     [x]  Bowel and Bladder Function     [x]  Fluid Electrolyte and Nutrition Balance     [x]  Pain Control      3. Issues that 24 hour rehabilitation nursing is following:    [x]  Fall and safety precautions     [x]  Wound Care     [x]  Bowel and Bladder Function     [x]  Fluid Electrolyte and Nutrition Balance     [x]  Pain Control      [x]  Assistance with and education on in-room safety with transfers to and from the bed, wheelchair, toilet and shower. 4. Acute rehabilitation plan of care:    [x]  Continue current care and rehab. [x]  Physical Therapy           [x]  Occupational Therapy           []  Speech Therapy     []  Hold Rehab until further notice     5. Medications:    [x]  MAR Reviewed     [x]  Continue Present Medications     6. Chemical DVT Prophylaxis:      []  Enoxaparin     []  Unfractionated Heparin     []  Warfarin     []  NOAC     [x]  Aspirin 81 mg PO BID (as per Orthopedics)     []  None     7. Mechanical DVT Prophylaxis:      []  RADHA Stockings     [x]  Sequential Compression Device on LLE     []  None     8.  GI Prophylaxis: [x]  PPI     []  H2 Blocker     []  None / Not indicated     9. Code status:    [x]  Full code     []  Partial code     []  Do not intubate     []  Do not resuscitate     10. Diet:  Specifications  Low fat/Low cholesterol/High fiber/2 gm Na   Solids (consistency)  Regular   Liquids (consistency)  Thin   Fluid Restriction  None     11. Orders:   > Closed displaced comminuted fracture of distal third of shaft of right tibia with routine healing; Closed fracture of distal end of right fibula with routine healing; S/P Closed IM nailing of the right tibia using the Synthes IM nail system with a double lock proximally and triple lock distally (4/22/2022 - Dr. Sherri Khan)   > Partial weightbearing on the right lower extremity using a rolling walker    > Acute postoperative blood loss anemia;  Iron deficiency anemia   > Hgb/Hct (4/29/2022, on admission to the ARU) = 8.9/28.6   > Hgb/Hct (5/3/2022) = 10.3/33.7    > Continue Ferrous sulfate 325 mg PO BID with meals    > Alcohol abuse   > Continue:    > Folic acid 1 mg PO once daily    > Multivitamin 1 tab PO once daily    > Thiamine 100 mg PO once daily    > Constipation   > Continue:    > Docusate sodium 100 mg PO BID    > Miralax 17 grams in 8 oz water PO once daily    > Hypertension   > Continue Amlodipine 10 mg PO once daily (9AM)    > Methadone dependence   > Continue Methadone 45 mg PO once daily    > Fever, etiology to be determined   > As per note of Dr. Valdez Chase dated 4/30/2022, patient had a fever and a work up was initiated   > Work-up:    > WBC count (4/29/2022, on admission to the ARU) = 3.1    > Blood culture x 2 sets (collected 4/29/2022, resulted as of 5/6/2022) yielded no growth after 6 days    > Chest x-ray (4/29/2022) showed perhaps mild bronchial wall thickening    > Urinalysis (4/29/2022): WNL    > Urine culture (collected 4/29/2022, resulted 5/2/2022) yielded growth of 20,000 colonies/ml of Escherichia coli   > WBC count (5/3/2022) = 2.9   > No fever over the past 24 hours   > No antibiotics for now    > Difficulty sleeping   > On 5/3/2022, started Melatonin 3 mg PO daily after dinner   > Continue Melatonin 3 mg PO daily after dinner    > Analgesia   > On 5/3/2022, started:    > Acetaminophen 650 mg PO TID (8AM, 12PM, 4PM)    > Pregabalin 50 mg PO TID (8AM, 2PM, 8PM)   > Continue:    > Acetaminophen 650 mg PO TID (8AM, 12PM, 4PM)    > Acetaminophen 650 mg PO q 4 hr PRN for pain level 4/10 or lesser (from 8PM to 4AM only)    > Increase Pregabalin from 50 mg to 75 mg PO TID (8AM, 2PM, 8PM)    > Oxycodone 10 mg PO q 4 hr PRN for pain level 5/10 or greater       12. Personal Protective Equipment (N95 face mask) was used while interacting with the patient. Patient was using a surgical mask. 15. Patient's progress in rehabilitation and medical issues discussed with the patient. All questions answered to the best of my ability. Care plan discussed with patient and nurse. 14. Total clinical care time is 30 minutes, including review of chart including all labs, radiology, past medical history, and discussion with patient. Greater than 50% of my time was spent in coordination of care and counseling.       Signed:    Matt Conteh MD    May 6, 2022

## 2022-05-06 NOTE — PROGRESS NOTES
Problem: Mobility Impaired (Adult and Pediatric)  Goal: *Acute Goals and Plan of Care (Insert Text)  Description: Physical Therapy Short Term Goals  Initiated 4/29/2022 and re-assessed 5/6/2022to be accomplished within 5-7 day(s) (5/13/2022)  1. Patient will move from supine to sit and sit to supine , scoot up and down, and roll side to side in bed with supervision/set-up. Goal met-currently supervision 5/6/2022  2. Patient will transfer from bed to chair and chair to bed with supervision/set-up using the least restrictive device. Goal partially met-currently SBA 5/6/2022  3. Patient will perform sit to stand with supervision/set-up. Goal met 5/6/2022  4. Patient will ambulate with supervision/set-up for 50 feet with the least restrictive device. Partially met 5/6/2022-150 ft with RW and SBA  5. Patient will ascend/descend 2 stairs with B handrail(s) with minimal assistance/contact guard assist. Goal met 5/6/2022    Physical Therapy Long Term Goals  Initiated 4/29/2022 and to be accomplished within 10-14 day(s) (5/13/2022)  1. Patient will move from supine to sit and sit to supine , scoot up and down, and roll side to side in bed with modified independence. 2.  Patient will transfer from bed to chair and chair to bed with modified independence using the least restrictive device. 3.  Patient will perform sit to stand with modified independence. 4.  Patient will ambulate with modified independence for 150 feet with the least restrictive device. 5.  Patient will ascend/descend 2 stairs with B handrail(s) with modified independence.       Outcome: Progressing Towards Goal   PHYSICAL THERAPY WEEKLY PROGRESS NOTE    Patient: Melchor Marie (51 y.o. male)  Date: 5/6/2022  Diagnosis: Tibia/fibula fracture [S82.209A, S82.409A] Closed displaced comminuted fracture of shaft of right tibia with routine healing  Precautions: Fall,Other (comment) (PWB Right LE)  Chart, physical therapy assessment, plan of care and goals were reviewed. Time in:1100  Time out:1130    Patient seen for: AM;Therapeutic exercise  Time In: 14:00  Time Out: 15:00  Patient seen for: PM; therapeutic activities, gait training, transfer training. Pain:  Pt with 5/10 pain scale this PM session       Patient identified with name and : Yes    SUBJECTIVE:     AM-I am getting stronger every day. PM-I'm actually tired this afternoon, I think the shower knocked me out. OBJECTIVE DATA SUMMARY:       GROSS ASSESSMENT Weekly Progress Assessment 2022   AROM Within functional limits   Strength   General weakness   Coordination  General weakness   Tone     Sensation     PROM         POSTURE Weekly Progress Assessment 2022   Posture (WDL)     Posture Assessment         BALANCE Weekly Progress Assessment 2022    Sitting - Static: Good (unsupported)  Sitting - Dynamic: Good (unsupported)  Standing - Static: Good;Fair  Standing - Dynamic : Impaired     BED/CHAIR/WHEELCHAIR TRANSFERS Initial Assessment Weekly Progress Assessment 2022   Rolling Right 4 (Minimal assistance) 5 (Supervision)   Rolling Left 4 (Minimal assistance) 5 (Supervision)   Supine to Sit 5 (Supervision) 5 (Supervision)   Sit to Stand Minimal assistance Supervision   Sit to Supine 4 (Minimal assistance) 5 (Supervision)   Transfer Type Other Other   Transfer Assistance Needed 4 (Minimal assistance) 5 (Supervision/setup)   Comments    Patient with improvements and has achieved STGs for bed to chair transfers.  Continue to address safety awareness and balance deficits   Car Transfer Not tested (pt declined 2/2 pain) Minimum assistance   Car Type N/A car simulator       WHEELCHAIR MOBILITY/MANAGEMENT Initial Assessment Weekly Progress Assessment 2022   Able to Propel (dist) 270 feet     Assistance Required 4 6   Curbs/ramps assistance required 0 (Not tested)     Wheelchair set up assistance required 3 (Moderate assistance)     Wheelchair management Manages left brake,Manages right brake     Comments  Patient propels throughout facility with supervision       GAIT Weekly Progress Assessment 5/6/2022   Gait Description (WDL) Exceptions to WDL   Gait Abnormalities Antalgic; Step to gait       WALKING INDEPENDENCE Initial Assessment Weekly Progress Assessment 5/6/2022   Assistive device Walker, rolling Walker, rolling   Ambulation assistance - level surface 4 (Minimal assistance) 5 (Stand-by assistance)   Distance 6 Feet (ft)  (120 ft) 150 ft x 2   Comments   Patient requires verbal cues for RLE step length as he tends to step too far forward inside the walker with the RLE. Ambulation assistance - unlevel surfaces    Not tested       STEPS/STAIRS Initial Assessment Weekly Progress Assessment 5/6/2022   Steps/Stairs ambulated 0  (6-4inch, 4-6inch)   Assistance Required   4 (Minimal assistance)   Rail Use None Both   Comments    Patient negotiates steps this session and requires verbal cues for sequencing, hand and foot placement safety. Curbs/Ramps    NT       ASSESSMENT:  Patient is seen for deficits in strength, mobility, transfers, ambulation, safety and balance. Patient is re-assessed for weekly progress and he has achieved 3/5 STGs and partially met 2/5 STGs. Patient reports increased fatigue after shower this morning, but that I was very appreciated. Patient is able to tolerate completion of PM session to address weekly goals. Continue to provide skilled sessions for safety awareness, balance and transfer training. Progression toward goals:  []      Improving appropriately and progressing toward goals  [x]      Improving slowly and progressing toward goals  []      Not making progress toward goals and plan of care will be adjusted     PLAN:  Patient continues to benefit from skilled intervention to address the above impairments. Continue treatment per established plan of care.   Discharge Recommendations:  CANDIDOPT   Further Equipment Recommendations for Discharge:  RW, Gait belt     Estimated Discharge Date:5/12/2022    Activity Tolerance:   Patient with good tolerance to session this am and pm.  Please refer to the flowsheet for vital signs taken during this treatment.   After treatment:   [] Patient left in no apparent distress in bed  [] Patient left in no apparent distress sitting up in chair  [x] Patient left in no apparent distress in w/c mobilizing under own power  [] Patient left in no apparent distress dining area  [] Patient left in no apparent distress mobilizing under own power  [] Call bell left within reach  [] Nursing notified  [] Caregiver present  [] Bed alarm activated   [] Chair alarm activated      Martha Yeung  5/6/2022

## 2022-05-06 NOTE — PROGRESS NOTES
SHIFT CHANGE NOTE FOR [facility]    Bedside and Verbal shift change report given to SHAWN Markham (oncoming nurse) by Laure Ivory RN (offgoing nurse). Report included the following information SBAR, Kardex, MAR and Recent Results.     Situation:   Code Status: Full Code   Hospital Day: 8   Problem List:   Hospital Problems  Date Reviewed: 5/6/2022          Codes Class Noted POA    Methadone dependence ([address] (Chronic) ICD-10-CM: F11.20  ICD-9-CM: 304.00  Unknown Yes        Orthopedic aftercare for healing traumatic lower leg fracture, right, closed ICD-10-CM: S82.91XD  ICD-9-CM: V54.16  4/22/2022 Yes    Overview Addendum 5/2/2022 10:49 AM by Calin Blair MD     S/P Closed IM nailing of the right tibia using the Synthes IM nail system with a double lock proximally and triple lock distally (4/22/2022 - Dr. Padmini Qureshi)             Alcohol abuse ICD-10-CM: F10.10  ICD-9-CM: 305.00  4/22/2022 Yes        HTN (hypertension) ICD-10-CM: I10  ICD-9-CM: 401.9  4/22/2022 Yes        * (Principal) Closed displaced comminuted fracture of shaft of right tibia with routine healing ICD-10-CM: S82.251D  ICD-9-CM: V54.16  4/22/2022 Yes        Closed fracture of distal end of right fibula with routine healing ICD-10-CM: S82.831D  ICD-9-CM: V54.16  4/22/2022 Yes        Impaired mobility and ADLs ICD-10-CM: Z74.09, Z78.9  ICD-9-CM: V49.89  4/22/2022 Yes        Acute blood loss as cause of postoperative anemia ICD-10-CM: D62  ICD-9-CM: 285.1  4/22/2022 Yes        Iron deficiency anemia, unspecified ICD-10-CM: D50.9  ICD-9-CM: 280.9  8/5/2012 Yes              Background:   Past Medical History:   Past Medical History:   Diagnosis Date    Abscess of right shoulder     Abscess of shoulder     Acute blood loss as cause of postoperative anemia 4/22/2022    Arthritis     Closed displaced comminuted fracture of shaft of right tibia with routine healing 04/22/2022    Closed fracture of distal end of right fibula with routine healing 4/22/2022    Epidural abscess     Heart murmur     Hematuria     Heroin abuse (HCC)     Hypertension     Infected wound     Infectious disease     Iron deficiency anemia     IV drug user     Liver disease     Methadone dependence ([facility] [address]     Tobacco abuse         Assessment:   Changes in Assessment throughout shift: No change to previous assessment     Patient has a central line: no Reasons if yes:    Insertion date: Last dressing date:   Patient has Benitez Cath: no Reasons if yes:     Insertion date: Last Vitals:     Vitals:    05/05/22 1602 05/05/22 2054 05/06/22 0728 05/06/22 1504   BP: (!) 152/73 136/69 (!) 149/74 133/70   Pulse: 65 73 (!) 59 67   Resp: 18 18 16 18   Temp: 97.7 °F (36.5 °C) 97.6 °F (36.4 °C) 97.4 °F (36.3 °C) 96.8 °F (36 °C)   SpO2: 97% 98% 100% 97%   Height:            PAIN    Pain Assessment    Pain Intensity 1: 6 (05/06/22 1600) Pain Intensity 1: 2 (12/29/14 1105)    Pain Location 1: Leg,Ankle Pain Location 1: Abdomen    Pain Intervention(s) 1: Medication (see MAR) Pain Intervention(s) 1: Medication (see MAR)  Patient Stated Pain Goal: 0 Patient Stated Pain Goal: 0  o Intervention effective: yes  o Other actions taken for pain:       Skin Assessment  Skin color    Condition/Temperature    Integrity Skin Integrity: Incision (comment) (right leg ORIF)  Turgor    Weekly Pressure Ulcer Documentation  Pressure  Injury Documentation: No Pressure Injury Noted-Pressure Ulcer Prevention Initiated  Wound Prevention & Protection Methods  Orientation of wound Orientation of Wound Prevention: Posterior  Location of Prevention Location of Wound Prevention: Sacrum/Coccyx  Dressing Present Dressing Present : No  Dressing Status    Wound Offloading Wound Offloading (Prevention Methods): Bed, pressure redistribution/air,Bed, pressure reduction mattress,Repositioning,Wheelchair     INTAKE/OUPUT  Date 05/05/22 1900 - 05/06/22 0659 05/06/22 0700 - 05/07/22 0659   Shift 3216-2028 24 Hour Total 9186-8834 6151-9192 24 Hour Total   INTAKE   P.O.  720 720  720     P. O.  720 720  720   Shift Total  720 720  720   OUTPUT   Urine  1300  750 750     Urine Voided  1300  750 750     Urine Occurrence(s) 4 x 9 x 1 x 2 x 3 x   Stool          Stool Occurrence(s) 0 x 1 x 1 x 0 x 1 x   Shift Total  1300  750 750   NET  -580 720 -750 -30   Weight (kg)            Recommendations:  1. Patient needs and requests: TOILETING, URINAL, dressing change    2. Pending tests/procedures:none    3. Functional Level/Equipment: Partial (one person) / Wheelchair;Stabilization belt    Fall Precautions:   Fall risk precautions were reinforced with the patient; he was instructed to call for help prior to getting up. The following fall risk precautions were continued: bed/ chair alarms, door signage, yellow bracelet and socks as well as update of the Jessica Jalloh tool in the patient's room. Ezio Score: 4    HEALS Safety Check    A safety check occurred in the patient's room between off going nurse and oncoming nurse listed above. The safety check included the below items  Area Items   H  High Alert Medications - Verify all high alert medication drips (heparin, PCA, etc.)   E  Equipment - Suction is set up for ALL patients (with devin)  - Red plugs utilized for all equipment (IV pumps, etc.)  - WOWs wiped down at end of shift.  - Room stocked with oxygen, suction, and other unit-specific supplies   A  Alarms - Bed alarm is set for fall risk patients  - Ensure chair alarm is in place and activated if patient is up in a chair   L  Lines - Check IV for any infiltration  - Benitez bag is empty if patient has a Benitez   - Tubing and IV bags are labeled   S  Safety   - Room is clean, patient is clean, and equipment is clean. - Hallways are clear from equipment besides carts.    - Fall bracelet on for fall risk patients  - Ensure room is clear and free of clutter  - Suction is set up for ALL patients (with devin)  - Hallways are clear from equipment besides carts.    - Isolation precautions followed, supplies available outside room, sign posted     Marina Gonzalez RN

## 2022-05-06 NOTE — PROGRESS NOTES
Problem: Self Care Deficits Care Plan (Adult)  Goal: *Acute Goals and Plan of Care (Insert Text)  Description: Occupational Therapy Goals   Long Term Goals  Initiated 2022 and to be accomplished within 2 week(s), 2022  1. Pt will perform self-feeding with Mod I.  2. Pt will perform grooming with Mod I.  3. Pt will perform UB bathing with Mod I.  4. Pt will perform LB bathing with Mod I using AE and/ or compensatory strategies as needed. 5. Pt will perform tub/shower transfer with supv using least restrictive assistive device while maintaining weight bearing restrictions. 6. Pt will perform UB dressing with Mod I.  7. Pt will perform LB dressing with supv using AE and/ or compensatory strategies as needed. 8. Pt will perform toileting task with Mod I.  9. Pt will perform toilet transfer with supv using least restrictive assistive device while maintaining weight bearing restrictions. Short Term Goals   Initiated 2022 and to be accomplished within 7 day(s), by 2022  1. Pt will perform self-feeding with set-up. Goal met 22  2. Pt will perform grooming with SBA. Goal met 22  3. Pt will perform UB bathing with set-up. Goal met 22  4. Pt will perform LB bathing with SBA using AE and/ or compensatory strategies as needed. 5. Pt will perform tub/shower transfer with Min A using least restrictive assistive device while maintaining weight bearing restrictions. Goal met 22  6. Pt will perform UB dressing with set-up. Goal met 22  7. Pt will perform LB dressing with CGA/ SBA using AE and/ or compensatory strategies as needed. Goal met 22  8. Pt will perform toileting task with Min A/ CGA. 9. Pt will perform toilet transfer with SBA using least restrictive assistive device while maintaining weight bearing restrictions. Occupational Therapy TREATMENT    Patient: Corey Garcia   74 y.o.     Patient identified with name and : Yes     Date: 2022    First Tx Session Group  Time In: 1130  Time Out[de-identified] 1200    Diagnosis: Tibia/fibula fracture [S82.209A, S82.409A]   Precautions: Fall,Other (comment) (PWB Right LE)  Chart, occupational therapy assessment, plan of care, and goals were reviewed. Pain:  Pt reports 5/10 pain or discomfort prior to treatment. Pt reports 5/10 pain or discomfort post treatment. Intervention Provided: N/A      SUBJECTIVE:   Patient pleasant and cooperative.     OBJECTIVE DATA SUMMARY:     THERAPEUTIC EXERCISE Daily Assessment    UB HEP chess press, butterfly wings, front/side raise, 3x10 with 3lb weight in hand seated in chair               ASSESSMENT:  Pt working to increase strength for ADLs. Progression toward goals:  [x]          Improving appropriately and progressing toward goals  []          Improving slowly and progressing toward goals  []          Not making progress toward goals and plan of care will be adjusted     Activity Tolerance:  Fair     Please refer to the flowsheet for vital signs taken during this treatment. After treatment:   [x]  Patient left in no apparent distress sitting up in chair   []  Patient left in no apparent distress in bed  []  Call bell left within reach  []  Nursing notified  []  Caregiver present  []  Bed alarm activated    COMMUNICATION/EDUCATION:   [] Home safety education was provided and the patient/caregiver indicated understanding. [x] Patient/family have participated as able in goal setting and plan of care. [] Patient/family agree to work toward stated goals and plan of care. [] Patient understands intent and goals of therapy, but is neutral about his/her participation. [] Patient is unable to participate in goal setting and plan of care.       Mariela Rowan OT       Outcome: Progressing Towards Goal  Goal: Interventions  Outcome: Progressing Towards Goal

## 2022-05-06 NOTE — PROGRESS NOTES
Problem: Falls - Risk of  Goal: *Absence of Falls  Description: Document David Dutta Fall Risk and appropriate interventions in the flowsheet. Outcome: Progressing Towards Goal  Note: Fall Risk Interventions:  Mobility Interventions: Bed/chair exit alarm,Assess mobility with egress test    Mentation Interventions: Adequate sleep, hydration, pain control,Bed/chair exit alarm    Medication Interventions: Assess postural VS orthostatic hypotension,Bed/chair exit alarm    Elimination Interventions: Call light in reach,Bed/chair exit alarm    History of Falls Interventions: Bed/chair exit alarm         Problem: Patient Education: Go to Patient Education Activity  Goal: Patient/Family Education  Outcome: Progressing Towards Goal     Problem: Pain  Goal: *Control of Pain  Outcome: Progressing Towards Goal     Problem: Patient Education: Go to Patient Education Activity  Goal: Patient/Family Education  Outcome: Progressing Towards Goal     Problem: Patient Education: Go to Patient Education Activity  Goal: Patient/Family Education  Outcome: Progressing Towards Goal     Problem: Patient Education: Go to Patient Education Activity  Goal: Patient/Family Education  Outcome: Progressing Towards Goal     Problem: Pressure Injury - Risk of  Goal: *Prevention of pressure injury  Description: Document Jonathan Scale and appropriate interventions in the flowsheet.   Outcome: Progressing Towards Goal  Note: Pressure Injury Interventions:  Sensory Interventions: Assess changes in LOC    Moisture Interventions: Absorbent underpads    Activity Interventions: Increase time out of bed    Mobility Interventions: HOB 30 degrees or less    Nutrition Interventions: Document food/fluid/supplement intake    Friction and Shear Interventions: HOB 30 degrees or less                Problem: Patient Education: Go to Patient Education Activity  Goal: Patient/Family Education  Outcome: Progressing Towards Goal

## 2022-05-07 PROCEDURE — 65310000000 HC RM PRIVATE REHAB

## 2022-05-07 PROCEDURE — 74011250637 HC RX REV CODE- 250/637: Performed by: INTERNAL MEDICINE

## 2022-05-07 PROCEDURE — 97116 GAIT TRAINING THERAPY: CPT

## 2022-05-07 PROCEDURE — 97530 THERAPEUTIC ACTIVITIES: CPT

## 2022-05-07 PROCEDURE — 97110 THERAPEUTIC EXERCISES: CPT

## 2022-05-07 PROCEDURE — 74011250637 HC RX REV CODE- 250/637: Performed by: EMERGENCY MEDICINE

## 2022-05-07 RX ADMIN — OXYCODONE HYDROCHLORIDE 10 MG: 5 TABLET ORAL at 08:44

## 2022-05-07 RX ADMIN — PANTOPRAZOLE 40 MG: 40 TABLET, DELAYED RELEASE ORAL at 06:31

## 2022-05-07 RX ADMIN — Medication 3 MG: at 17:13

## 2022-05-07 RX ADMIN — METHADONE HYDROCHLORIDE 45 MG: 10 TABLET ORAL at 08:45

## 2022-05-07 RX ADMIN — ACETAMINOPHEN 650 MG: 325 TABLET ORAL at 08:45

## 2022-05-07 RX ADMIN — FOLIC ACID 1 MG: 1 TABLET ORAL at 08:45

## 2022-05-07 RX ADMIN — POLYETHYLENE GLYCOL 3350 17 G: 17 POWDER, FOR SOLUTION ORAL at 08:43

## 2022-05-07 RX ADMIN — ASPIRIN 81 MG CHEWABLE TABLET 81 MG: 81 TABLET CHEWABLE at 08:45

## 2022-05-07 RX ADMIN — FERROUS SULFATE TAB 325 MG (65 MG ELEMENTAL FE) 325 MG: 325 (65 FE) TAB at 08:44

## 2022-05-07 RX ADMIN — OXYCODONE HYDROCHLORIDE 10 MG: 5 TABLET ORAL at 19:39

## 2022-05-07 RX ADMIN — ASPIRIN 81 MG CHEWABLE TABLET 81 MG: 81 TABLET CHEWABLE at 17:13

## 2022-05-07 RX ADMIN — DOCUSATE SODIUM 100 MG: 100 CAPSULE, LIQUID FILLED ORAL at 17:13

## 2022-05-07 RX ADMIN — PREGABALIN 75 MG: 75 CAPSULE ORAL at 15:03

## 2022-05-07 RX ADMIN — AMLODIPINE BESYLATE 10 MG: 10 TABLET ORAL at 08:44

## 2022-05-07 RX ADMIN — THERA TABS 1 TABLET: TAB at 08:45

## 2022-05-07 RX ADMIN — Medication 100 MG: at 08:45

## 2022-05-07 RX ADMIN — ACETAMINOPHEN 650 MG: 325 TABLET ORAL at 17:13

## 2022-05-07 RX ADMIN — OXYCODONE HYDROCHLORIDE 10 MG: 5 TABLET ORAL at 00:00

## 2022-05-07 RX ADMIN — PREGABALIN 75 MG: 75 CAPSULE ORAL at 19:39

## 2022-05-07 RX ADMIN — DOCUSATE SODIUM 100 MG: 100 CAPSULE, LIQUID FILLED ORAL at 08:55

## 2022-05-07 RX ADMIN — PREGABALIN 75 MG: 75 CAPSULE ORAL at 08:43

## 2022-05-07 RX ADMIN — FERROUS SULFATE TAB 325 MG (65 MG ELEMENTAL FE) 325 MG: 325 (65 FE) TAB at 17:13

## 2022-05-07 RX ADMIN — ACETAMINOPHEN 650 MG: 325 TABLET ORAL at 13:01

## 2022-05-07 NOTE — PROGRESS NOTES
5/7/2022-patient was offered PT session this afternoon and he refused care 2/2 waiting for his sister who is on her way from out of town.

## 2022-05-07 NOTE — PROGRESS NOTES
Problem: Self Care Deficits Care Plan (Adult)  Goal: *Acute Goals and Plan of Care (Insert Text)  Description: Occupational Therapy Goals   Long Term Goals  Initiated 2022 and to be accomplished within 2 week(s), 2022  1. Pt will perform self-feeding with Mod I.  2. Pt will perform grooming with Mod I.  3. Pt will perform UB bathing with Mod I.  4. Pt will perform LB bathing with Mod I using AE and/ or compensatory strategies as needed. 5. Pt will perform tub/shower transfer with supv using least restrictive assistive device while maintaining weight bearing restrictions. 6. Pt will perform UB dressing with Mod I.  7. Pt will perform LB dressing with supv using AE and/ or compensatory strategies as needed. 8. Pt will perform toileting task with Mod I.  9. Pt will perform toilet transfer with supv using least restrictive assistive device while maintaining weight bearing restrictions. Short Term Goals   Initiated 2022 and to be accomplished within 7 day(s), by 2022  1. Pt will perform self-feeding with set-up. Goal met 22  2. Pt will perform grooming with SBA. Goal met 22  3. Pt will perform UB bathing with set-up. Goal met 22  4. Pt will perform LB bathing with SBA using AE and/ or compensatory strategies as needed. 5. Pt will perform tub/shower transfer with Min A using least restrictive assistive device while maintaining weight bearing restrictions. Goal met 22  6. Pt will perform UB dressing with set-up. Goal met 22  7. Pt will perform LB dressing with CGA/ SBA using AE and/ or compensatory strategies as needed. Goal met 22  8. Pt will perform toileting task with Min A/ CGA. 9. Pt will perform toilet transfer with SBA using least restrictive assistive device while maintaining weight bearing restrictions. Outcome: Progressing Towards Goal   Occupational Therapy TREATMENT    Patient: Ramos Hurley   74 y.o.     Patient identified with name and : yes    Date: 5/7/2022    First Tx Session  Time In: 1030  Time Out[de-identified] 1130    Diagnosis: Tibia/fibula fracture [S82.209A, S82.409A]   Precautions: Fall,Other (comment) (PWB Right LE)  Chart, occupational therapy assessment, plan of care, and goals were reviewed. Pain:  Pt reports no pain or discomfort prior to treatment. Pt reports no pain or discomfort post treatment. Intervention Provided: NA      SUBJECTIVE:   Patient stated I'm allowed to put a little bit of weight through my leg.     OBJECTIVE DATA SUMMARY:     THERAPEUTIC ACTIVITY Daily Assessment    Pt participated in FM/dexterity activity for carryover to self-care tasks. Pt used therapy putty to manipulate bimanually to find small beads hidden within. Pt utilized pincer grasp to  the beads and place in small container. THERAPEUTIC EXERCISE Daily Assessment    Pt participated in cardio endurance exercise to facilitate increased activity tolerance. Pt used SciFit at level 2 for 15 minutes with 1 rest break required to complete. Pt performed B hand strengthening exercises using therapy putty. Pt performed squeezes, palm rolls, thumb/digit pinches, lateral pinch and 3-jaw robin pinch. Pt used DigiFlex (green, blue) to perform 2 sets x 20 reps each. MOBILITY/TRANSFERS Daily Assessment    Pt self-propelled w/c room <> gym Mod II.       ASSESSMENT:  Pt making progress with increasing activity tolerance. Progression toward goals:  [x]          Improving appropriately and progressing toward goals  []          Improving slowly and progressing toward goals  []          Not making progress toward goals and plan of care will be adjusted     PLAN:  Patient continues to benefit from skilled intervention to address the above impairments. Continue treatment per established plan of care.   Discharge Recommendations:  Home Health with assist  Further Equipment Recommendations for Discharge:  bedside commode and transfer bench     Activity Tolerance:  Good      Estimated LOS:5/12/2022    Please refer to the flowsheet for vital signs taken during this treatment. After treatment:   [x]  Patient left in no apparent distress sitting up in chair   []  Patient left in no apparent distress in bed  []  Call bell left within reach  []  Nursing notified  []  Caregiver present  []  Bed alarm activated    COMMUNICATION/EDUCATION:   [] Home safety education was provided and the patient/caregiver indicated understanding. [] Patient/family have participated as able in goal setting and plan of care. [x] Patient/family agree to work toward stated goals and plan of care. [] Patient understands intent and goals of therapy, but is neutral about his/her participation. [] Patient is unable to participate in goal setting and plan of care.       MANUEL Lockhart/SD

## 2022-05-07 NOTE — PROGRESS NOTES
SHIFT CHANGE NOTE FOR [facility]    Bedside and Verbal shift change report given to [facility] (oncoming nurse) by Vandana Blair LPN (offgoing nurse). Report included the following information SBAR, Kardex, MAR and Recent Results.     Situation:   Code Status: Full Code   Hospital Day: 9   Problem List:   Hospital Problems  Date Reviewed: 5/7/2022          Codes Class Noted POA    Methadone dependence ([facility] [address] (Chronic) ICD-10-CM: F11.20  ICD-9-CM: 304.00  Unknown Yes        Orthopedic aftercare for healing traumatic lower leg fracture, right, closed ICD-10-CM: S82.91XD  ICD-9-CM: V54.16  4/22/2022 Yes    Overview Addendum 5/2/2022 10:49 AM by Patricia De La Cruz MD     S/P Closed IM nailing of the right tibia using the Synthes IM nail system with a double lock proximally and triple lock distally (4/22/2022 - Dr. Enrique Enriquez)             Alcohol abuse ICD-10-CM: F10.10  ICD-9-CM: 305.00  4/22/2022 Yes        HTN (hypertension) ICD-10-CM: I10  ICD-9-CM: 401.9  4/22/2022 Yes        * (Principal) Closed displaced comminuted fracture of shaft of right tibia with routine healing ICD-10-CM: S82.251D  ICD-9-CM: V54.16  4/22/2022 Yes        Closed fracture of distal end of right fibula with routine healing ICD-10-CM: S82.831D  ICD-9-CM: V54.16  4/22/2022 Yes        Impaired mobility and ADLs ICD-10-CM: Z74.09, Z78.9  ICD-9-CM: V49.89  4/22/2022 Yes        Acute blood loss as cause of postoperative anemia ICD-10-CM: D62  ICD-9-CM: 285.1  4/22/2022 Yes        Iron deficiency anemia, unspecified ICD-10-CM: D50.9  ICD-9-CM: 280.9  8/5/2012 Yes              Background:   Past Medical History:   Past Medical History:   Diagnosis Date    Abscess of right shoulder     Abscess of shoulder     Acute blood loss as cause of postoperative anemia 4/22/2022    Arthritis     Closed displaced comminuted fracture of shaft of right tibia with routine healing 04/22/2022    Closed fracture of distal end of right fibula with routine healing 4/22/2022  Epidural abscess     Heart murmur     Hematuria     Heroin abuse (HCC)     Hypertension     Infected wound     Infectious disease     Iron deficiency anemia     IV drug user     Liver disease     Methadone dependence ([facility] [address]     Tobacco abuse         Assessment:   Changes in Assessment throughout shift: No change to previous assessment     Patient has a central line: no Reasons if yes: Insertion date: Last dressing date:   Patient has Benitez Cath: no Reasons if yes:     Insertion date:     Last Vitals:     Vitals:    05/06/22 1504 05/06/22 2035 05/07/22 0737 05/07/22 1528   BP: 133/70 132/67 (!) 141/70 138/76   Pulse: 67 66 62 (!) 58   Resp: 18 18 17 18   Temp: 96.8 °F (36 °C) 97.8 °F (36.6 °C) 96.8 °F (36 °C) 97.7 °F (36.5 °C)   SpO2: 97% 99% 98% 97%   Height:            PAIN    Pain Assessment    Pain Intensity 1: 3 (05/07/22 1600) Pain Intensity 1: 2 (12/29/14 1105)    Pain Location 1: Ankle,Leg Pain Location 1: Abdomen    Pain Intervention(s) 1: Medication (see MAR) Pain Intervention(s) 1: Medication (see MAR)  Patient Stated Pain Goal: 0 Patient Stated Pain Goal: 0  o Intervention effective: yes  o Other actions taken for pain: Medication (see MAR)     Skin Assessment  Skin color    Condition/Temperature    Integrity Skin Integrity: Incision (comment)  Turgor    Weekly Pressure Ulcer Documentation  Pressure  Injury Documentation: No Pressure Injury Noted-Pressure Ulcer Prevention Initiated  Wound Prevention & Protection Methods  Orientation of wound Orientation of Wound Prevention: Posterior  Location of Prevention Location of Wound Prevention: Buttocks,Sacrum/Coccyx  Dressing Present Dressing Present : No  Dressing Status    Wound Offloading Wound Offloading (Prevention Methods): Bed, pressure redistribution/air,Chair cushion,Wheelchair,Repositioning     INTAKE/OUPUT  Date 05/06/22 0700 - 05/07/22 0659 05/07/22 0700 - 05/08/22 0659   Shift 6449-8241 1325-0017 24 Hour Total 4323-6879 1887-5274 24 Hour Total   INTAKE   P.O. 720  720 480  480     P. O. 720  720 480  480   Shift Total 720  720 480  480   OUTPUT   Urine  3100 3100 600  600     Urine Voided  3100 3100 600  600     Urine Occurrence(s) 1 x 6 x 7 x 4 x  4 x   Emesis/NG output           Emesis Occurrence(s)  0 x 0 x      Stool           Stool Occurrence(s) 1 x 0 x 1 x 0 x  0 x   Shift Total  3100 3100 600  600    -3100 -2380 -120  -120   Weight (kg)             Recommendations:  1. Patient needs and requests: TOILETING, URINAL    2. Pending tests/procedures: LABS PENDING     3. Functional Level/Equipment: Partial (one person) /      Fall Precautions:   Fall risk precautions were reinforced with the patient; he was instructed to call for help prior to getting up. The following fall risk precautions were continued: bed/ chair alarms, door signage, yellow bracelet and socks as well as update of the Jennifer Cecilia tool in the patient's room. Ezio Score: 4    HEALS Safety Check    A safety check occurred in the patient's room between off going nurse and oncoming nurse listed above. The safety check included the below items  Area Items   H  High Alert Medications - Verify all high alert medication drips (heparin, PCA, etc.)   E  Equipment - Suction is set up for ALL patients (with eviker)  - Red plugs utilized for all equipment (IV pumps, etc.)  - WOWs wiped down at end of shift.  - Room stocked with oxygen, suction, and other unit-specific supplies   A  Alarms - Bed alarm is set for fall risk patients  - Ensure chair alarm is in place and activated if patient is up in a chair   L  Lines - Check IV for any infiltration  - Benitez bag is empty if patient has a Benitez   - Tubing and IV bags are labeled   S  Safety   - Room is clean, patient is clean, and equipment is clean. - Hallways are clear from equipment besides carts.    - Fall bracelet on for fall risk patients  - Ensure room is clear and free of clutter  - Suction is set up for ALL patients (with yanker)  - Hallways are clear from equipment besides carts.    - Isolation precautions followed, supplies available outside room, sign posted     Sheryle Slicker, LPN

## 2022-05-07 NOTE — PROGRESS NOTES
Problem: Falls - Risk of  Goal: *Absence of Falls  Description: Document Gregg Lai Fall Risk and appropriate interventions in the flowsheet. Outcome: Progressing Towards Goal  Note: Fall Risk Interventions:  Mobility Interventions: Assess mobility with egress test,Mechanical lift,Patient to call before getting OOB,PT Consult for assist device competence,Strengthening exercises (ROM-active/passive),Utilize walker, cane, or other assistive device,Utilize gait belt for transfers/ambulation    Mentation Interventions: Adequate sleep, hydration, pain control,Bed/chair exit alarm,Gait belt with transfers/ambulation,Increase mobility,More frequent rounding,Room close to nurse's station,Toileting rounds    Medication Interventions: Bed/chair exit alarm,Evaluate medications/consider consulting pharmacy,Patient to call before getting OOB,Teach patient to arise slowly,Utilize gait belt for transfers/ambulation    Elimination Interventions: Bed/chair exit alarm,Call light in reach,Patient to call for help with toileting needs,Stay With Me (per policy),Urinal in reach    History of Falls Interventions: Bed/chair exit alarm,Door open when patient unattended,Room close to nurse's station,Utilize gait belt for transfer/ambulation,Assess for delayed presentation/identification of injury for 48 hrs (comment for end date)         Problem: Pressure Injury - Risk of  Goal: *Prevention of pressure injury  Description: Document Jonathan Scale and appropriate interventions in the flowsheet. Outcome: Progressing Towards Goal  Note: Pressure Injury Interventions:  Sensory Interventions: Assess changes in LOC,Assess need for specialty bed,Chair cushion,Keep linens dry and wrinkle-free,Maintain/enhance activity level,Monitor skin under medical devices,Pad between skin to skin,Pressure redistribution bed/mattress (bed type),Turn and reposition approx.  every two hours (pillows and wedges if needed)    Moisture Interventions: Absorbent underpads,Apply protective barrier, creams and emollients,Limit adult briefs,Minimize layers,Moisture barrier,Offer toileting Q_hr    Activity Interventions: Assess need for specialty bed,Chair cushion,Increase time out of bed,Pressure redistribution bed/mattress(bed type),PT/OT evaluation    Mobility Interventions: Assess need for specialty bed,Chair cushion,Float heels,HOB 30 degrees or less,Pressure redistribution bed/mattress (bed type),PT/OT evaluation    Nutrition Interventions: Document food/fluid/supplement intake,Discuss nutritional consult with provider    Friction and Shear Interventions: Apply protective barrier, creams and emollients,Feet elevated on foot rest,Foam dressings/transparent film/skin sealants,HOB 30 degrees or less,Lift sheet,Lift team/patient mobility team,Minimize layers                Problem: Pain  Goal: *Control of Pain  Outcome: Progressing Towards Goal     Problem: Pressure Injury - Risk of  Goal: *Prevention of pressure injury  Description: Document Jonathan Scale and appropriate interventions in the flowsheet. Outcome: Progressing Towards Goal  Note: Pressure Injury Interventions:  Sensory Interventions: Assess changes in LOC,Assess need for specialty bed,Chair cushion,Keep linens dry and wrinkle-free,Maintain/enhance activity level,Monitor skin under medical devices,Pad between skin to skin,Pressure redistribution bed/mattress (bed type),Turn and reposition approx.  every two hours (pillows and wedges if needed)    Moisture Interventions: Absorbent underpads,Apply protective barrier, creams and emollients,Limit adult briefs,Minimize layers,Moisture barrier,Offer toileting Q_hr    Activity Interventions: Assess need for specialty bed,Chair cushion,Increase time out of bed,Pressure redistribution bed/mattress(bed type),PT/OT evaluation    Mobility Interventions: Assess need for specialty bed,Chair cushion,Float heels,HOB 30 degrees or less,Pressure redistribution bed/mattress (bed type),PT/OT evaluation    Nutrition Interventions: Document food/fluid/supplement intake,Discuss nutritional consult with provider    Friction and Shear Interventions: Apply protective barrier, creams and emollients,Feet elevated on foot rest,Foam dressings/transparent film/skin sealants,HOB 30 degrees or less,Lift sheet,Lift team/patient mobility team,Minimize layers                Problem: Pain  Goal: *Control of Pain  Outcome: Progressing Towards Goal

## 2022-05-07 NOTE — PROGRESS NOTES
[facility] PHYSICAL REHABILITATION  [address] [place] [address], [address]     INPATIENT REHABILITATION  DAILY PROGRESS NOTE     Date: 5/7/2022    Name: Deja Wong Age / Sex: 74 y.o. / male   CSN: [id number] MRN: [id number]   [facility] Date: 4/28/2022 Length of Stay: 9 days     Primary Rehabilitation Diagnosis: Impaired Mobility and ADLs secondary to:  1. Closed displaced comminuted fracture of distal third of shaft of right tibia with routine healing  2. Closed fracture of distal end of right fibula with routine healing  3. S/P Closed IM nailing of the right tibia using the Synthes IM nail system with a double lock proximally and triple lock distally (4/22/2022 - Dr. Melissa Tom)      Subjective:     No new issues or problems reported. Blood pressure controlled.        Objective:     Vital Signs:  Patient Vitals for the past 24 hrs:   BP Temp Pulse Resp SpO2   05/07/22 0737 (!) 141/70 96.8 °F (36 °C) 62 17 98 %   05/06/22 2035 132/67 97.8 °F (36.6 °C) 66 18 99 %   05/06/22 1504 133/70 96.8 °F (36 °C) 67 18 97 %        Current Medications:  Current Facility-Administered Medications   Medication Dose Route Frequency    pregabalin (LYRICA) capsule 75 mg  75 mg Oral TID    melatonin tablet 3 mg  3 mg Oral PCD    polyethylene glycol (MIRALAX) packet 17 g  17 g Oral DAILY    acetaminophen (TYLENOL) tablet 650 mg  650 mg Oral Q4H PRN    acetaminophen (TYLENOL) tablet 650 mg  650 mg Oral TID    oxyCODONE IR (ROXICODONE) tablet 10 mg  10 mg Oral Q4H PRN    bisacodyL (DULCOLAX) tablet 10 mg  10 mg Oral DAILY PRN    pantoprazole (PROTONIX) tablet 40 mg  40 mg Oral ACB    amLODIPine (NORVASC) tablet 10 mg  10 mg Oral DAILY    aspirin chewable tablet 81 mg  81 mg Oral BID    ferrous sulfate tablet 325 mg  325 mg Oral BID WITH MEALS    folic acid (FOLVITE) tablet 1 mg  1 mg Oral DAILY    methadone (DOLOPHINE) tablet 45 mg  45 mg Oral DAILY    therapeutic multivitamin (THERAGRAN) tablet 1 Tablet  1 Tablet Oral DAILY    thiamine HCL (B-1) tablet 100 mg  100 mg Oral DAILY    docusate sodium (COLACE) capsule 100 mg  100 mg Oral BID       Allergies:  No Known Allergies      Lab/Data Review:  No results found for this or any previous visit (from the past 24 hour(s)). Assessment:     Primary Rehabilitation Diagnosis  1. Impaired Mobility and ADLs  2. Closed displaced comminuted fracture of distal third of shaft of right tibia with routine healing  3. Closed fracture of distal end of right fibula with routine healing  4. S/P Closed IM nailing of the right tibia using the Synthes IM nail system with a double lock proximally and triple lock distally (4/22/2022 - Dr. Reddy Lin)    Comorbidities  Patient Active Problem List   Diagnosis Code    Epidural abscess, L2-L5 G06.1    Polysubstance abuse ([address] F19.10    Leukopenia D72.819    Iron deficiency anemia, unspecified D50.9    Anemia D64.9    Thrombocytopenia ([address] D69.6    Orthopedic aftercare for healing traumatic lower leg fracture, right, closed S82. 91XD    Metatarsal fracture S92.309A    Alcohol abuse F10.10    HTN (hypertension) I10    Liver mass R16.0    Bladder mass N32.89    Hematuria R31.9    Closed displaced comminuted fracture of shaft of right tibia with routine healing S82.251D    Closed fracture of distal end of right fibula with routine healing S82.831D    Impaired mobility and ADLs Z74.09, Z78.9    Acute blood loss as cause of postoperative anemia D62    Methadone dependence (HCC) F11.20       Plan:     1. Justification for continued stay: Good progression towards established rehabilitation goals. 2. Medical Issues being followed closely:    [x]  Fall and safety precautions     [x]  Wound Care     [x]  Bowel and Bladder Function     [x]  Fluid Electrolyte and Nutrition Balance     [x]  Pain Control      3.  Issues that 24 hour rehabilitation nursing is following:    [x]  Fall and safety precautions     [x]  Wound Care [x]  Bowel and Bladder Function     [x]  Fluid Electrolyte and Nutrition Balance     [x]  Pain Control      [x]  Assistance with and education on in-room safety with transfers to and from the bed, wheelchair, toilet and shower. 4. Acute rehabilitation plan of care:    [x]  Continue current care and rehab. [x]  Physical Therapy           [x]  Occupational Therapy           []  Speech Therapy     []  Hold Rehab until further notice     5. Medications:    [x]  MAR Reviewed     [x]  Continue Present Medications     6. Chemical DVT Prophylaxis:      []  Enoxaparin     []  Unfractionated Heparin     []  Warfarin     []  NOAC     [x]  Aspirin 81 mg PO BID (as per Orthopedics)     []  None     7. Mechanical DVT Prophylaxis:      []  RADHA Stockings     [x]  Sequential Compression Device on LLE     []  None     8. GI Prophylaxis:      [x]  PPI     []  H2 Blocker     []  None / Not indicated     9. Code status:    [x]  Full code     []  Partial code     []  Do not intubate     []  Do not resuscitate     10. Diet:  Specifications  Low fat/Low cholesterol/High fiber/2 gm Na   Solids (consistency)  Regular   Liquids (consistency)  Thin   Fluid Restriction  None     11. Orders:   > Closed displaced comminuted fracture of distal third of shaft of right tibia with routine healing; Closed fracture of distal end of right fibula with routine healing; S/P Closed IM nailing of the right tibia using the Synthes IM nail system with a double lock proximally and triple lock distally (4/22/2022 - Dr. Domenick Peabody)   > Partial weightbearing on the right lower extremity using a rolling walker    > Acute postoperative blood loss anemia;  Iron deficiency anemia   > Hgb/Hct (4/29/2022, on admission to the ARU) = 8.9/28.6   > Hgb/Hct (5/3/2022) = 10.3/33.7    > Continue Ferrous sulfate 325 mg PO BID with meals    > Alcohol abuse   > Continue:    > Folic acid 1 mg PO once daily    > Multivitamin 1 tab PO once daily    > Thiamine 100 mg PO once daily    > Constipation   > Continue:    > Docusate sodium 100 mg PO BID    > Miralax 17 grams in 8 oz water PO once daily    > Hypertension   > Continue Amlodipine 10 mg PO once daily (9AM)    > Methadone dependence   > Continue Methadone 45 mg PO once daily    > Fever, etiology to be determined   > As per note of Dr. Bri Holland dated 4/30/2022, patient had a fever and a work up was initiated   > Work-up:    > WBC count (4/29/2022, on admission to the ARU) = 3.1    > Blood culture x 2 sets (collected 4/29/2022, resulted as of 5/6/2022) yielded no growth after 6 days    > Chest x-ray (4/29/2022) showed perhaps mild bronchial wall thickening    > Urinalysis (4/29/2022): WNL    > Urine culture (collected 4/29/2022, resulted 5/2/2022) yielded growth of 20,000 colonies/ml of Escherichia coli   > WBC count (5/3/2022) = 2.9   > No fever over the past 24 hours   > No antibiotics for now    > Difficulty sleeping   > On 5/3/2022, started Melatonin 3 mg PO daily after dinner   > Continue Melatonin 3 mg PO daily after dinner    > Analgesia   > On 5/3/2022, started:    > Acetaminophen 650 mg PO TID (8AM, 12PM, 4PM)    > Pregabalin 50 mg PO TID (8AM, 2PM, 8PM)   > On 5/6/2022, increased Pregabalin from 50 mg to 75 mg PO TID (8AM, 2PM, 8PM)   > Continue:    > Acetaminophen 650 mg PO TID (8AM, 12PM, 4PM)    > Acetaminophen 650 mg PO q 4 hr PRN for pain level 4/10 or lesser (from 8PM to 4AM only)    > Pregabalin 75 mg PO TID (8AM, 2PM, 8PM)    > Oxycodone 10 mg PO q 4 hr PRN for pain level 5/10 or greater       Signed:    Alessandra Schmitt MD    May 7, 2022

## 2022-05-07 NOTE — PROGRESS NOTES
Problem: Mobility Impaired (Adult and Pediatric)  Goal: *Acute Goals and Plan of Care (Insert Text)  Description: Physical Therapy Short Term Goals  Initiated 4/29/2022 and re-assessed 5/6/2022to be accomplished within 5-7 day(s) (5/13/2022)  1. Patient will move from supine to sit and sit to supine , scoot up and down, and roll side to side in bed with supervision/set-up. Goal met-currently supervision 5/6/2022  2. Patient will transfer from bed to chair and chair to bed with supervision/set-up using the least restrictive device. Goal partially met-currently SBA 5/6/2022  3. Patient will perform sit to stand with supervision/set-up. Goal met 5/6/2022  4. Patient will ambulate with supervision/set-up for 50 feet with the least restrictive device. Partially met 5/6/2022-150 ft with RW and SBA  5. Patient will ascend/descend 2 stairs with B handrail(s) with minimal assistance/contact guard assist. Goal met 5/6/2022    Physical Therapy Long Term Goals  Initiated 4/29/2022 and to be accomplished within 10-14 day(s) (5/13/2022)  1. Patient will move from supine to sit and sit to supine , scoot up and down, and roll side to side in bed with modified independence. 2.  Patient will transfer from bed to chair and chair to bed with modified independence using the least restrictive device. 3.  Patient will perform sit to stand with modified independence. 4.  Patient will ambulate with modified independence for 150 feet with the least restrictive device. 5.  Patient will ascend/descend 2 stairs with B handrail(s) with modified independence.       Outcome: Progressing Towards Goal   PHYSICAL THERAPY TREATMENT    Patient: Rosa Patient (39 y.o. male)  Date: 5/7/2022  Diagnosis: Tibia/fibula fracture [S82.209A, S82.409A] Closed displaced comminuted fracture of shaft of right tibia with routine healing  Precautions: Fall,Other (comment) (PWB Right LE)  Chart, physical therapy assessment, plan of care and goals were reviewed. Time In:930  Time Out:     Patient seen for: AM;Balance activities;Gait training;Patient education; Therapeutic exercise;Transfer training    Pain:  Patient reports 4-6/10 and controlled with meds    Patient identified with name and : Yes    SUBJECTIVE:      I'm going to be right by the time I get home, I just know it. OBJECTIVE DATA SUMMARY:    Objective:     GROSS ASSESSMENT Daily Assessment     AROM: Within functional limits  Coordination: Within functional limits  Tone: Normal  Sensation: Intact      BED/MAT MOBILITY Daily Assessment     Rolling Right : 6 (Modified independent)  Rolling Left : 6 (Modified independent)  Supine to Sit : 6 (Modified independent)  Sit to Supine : 6 (Modified independent)      TRANSFERS Daily Assessment     Transfer Type: Other  Other:  (stand step with RW)  Transfer Assistance : 5 (Supervision/setup)  Sit to Stand Assistance: Supervision  Car Transfers:  (CGA/SBA)  Car Type:  (car simulator)        GAIT Daily Assessment    Gait Description (WDL) Exceptions to WDL    Gait Abnormalities Antalgic; Step to gait    Assistive device Walker, rolling;Gait belt    Ambulation assistance - level surface 5 (Stand-by assistance)    Distance  (150 ft)    Ambulation assistance- uneven surface  NT    Comments Patient continues to require cues to take shorter step with RLE. He tends to pass through the front frame of the RW and he was advised that it's unsafe.         STEPS/STAIRS Daily Assessment     Steps/Stairs ambulated  (6-6inch)    Assistance Required 4 (Contact guard assistance)    Rail Use Both    Comments      Curbs/Ramps          BALANCE Daily Assessment     Sitting - Static: Good (unsupported)  Sitting - Dynamic: Good (unsupported)  Standing - Static: Good;Fair  Standing - Dynamic : Impaired        WHEELCHAIR MOBILITY Daily Assessment     Functional Level:  (6)        THERAPEUTIC EXERCISES Daily Assessment       LLE with 3#-LAQs, hip flexion  RLE-LAQs, hip flexion ASSESSMENT:  Patient is seen for deficits in strength, mobility, transfers, ambulation, safety and balance. Patient is agreeable to participation to address current weaknesses and this session the focus is on transfers and stair negotiation. Patient continues to require safety cues with transfers as he appears to forget the progression of stand step with RW and to reach back for armrests or surface that he is transferring to. Continue to reinforce safety awareness for safe transition home. Progression toward goals:  [x]      Improving appropriately and progressing toward goals  []      Improving slowly and progressing toward goals  []      Not making progress toward goals and plan of care will be adjusted      PLAN:Therapist had extensive conversation regarding assistance at home and if patient has had the opportunity to have someone come to facility for family education. The patient reported to this therapist that he is somewhat resistant to ask for help, because he knows that people have things going on and he does not want to interfere with their activities. Patient was educated on the need to follow up with caregiver training to assist him appropriately when he transitions home. Patient continues to be resistant to ask for someone to attend training session. He reported that he will be okay once he returns home. Patient was advised that we highly recommend family education sessions. Patient continues to benefit from skilled intervention to address the above impairments. Continue treatment per established plan of care. Discharge Recommendations:  HHPT  Further Equipment Recommendations for Discharge: RW, gait belt      Estimated Discharge Date: 5/12/2022    Activity Tolerance: Tolerates session well  Please refer to the flowsheet for vital signs taken during this treatment.     After treatment:   [] Patient left in no apparent distress in bed  [] Patient left in no apparent distress sitting up in chair  [x] Patient left in no apparent distress in w/c mobilizing under own power  [] Patient left in no apparent distress dining area  [] Patient left in no apparent distress mobilizing under own power  [] Call bell left within reach   Nursing notified  [][] Caregiver present  [] Bed alarm activated   [] Chair alarm activated      Aileen Tran  5/7/2022

## 2022-05-07 NOTE — PROGRESS NOTES
SHIFT CHANGE NOTE FOR [facility]    Bedside and Verbal shift change report given to SCOTTY Aguayo (oncoming nurse) by Daquan Arriaza RN (offgoing nurse). Report included the following information SBAR, Kardex, MAR and Recent Results.     Situation:   Code Status: Full Code   Hospital Day: 9   Problem List:   Hospital Problems  Date Reviewed: 5/6/2022          Codes Class Noted POA    Methadone dependence ([address] (Chronic) ICD-10-CM: F11.20  ICD-9-CM: 304.00  Unknown Yes        Orthopedic aftercare for healing traumatic lower leg fracture, right, closed ICD-10-CM: S82.91XD  ICD-9-CM: V54.16  4/22/2022 Yes    Overview Addendum 5/2/2022 10:49 AM by Tisha Jauregui MD     S/P Closed IM nailing of the right tibia using the Synthes IM nail system with a double lock proximally and triple lock distally (4/22/2022 - Dr. lEi Mccracken)             Alcohol abuse ICD-10-CM: F10.10  ICD-9-CM: 305.00  4/22/2022 Yes        HTN (hypertension) ICD-10-CM: I10  ICD-9-CM: 401.9  4/22/2022 Yes        * (Principal) Closed displaced comminuted fracture of shaft of right tibia with routine healing ICD-10-CM: S82.251D  ICD-9-CM: V54.16  4/22/2022 Yes        Closed fracture of distal end of right fibula with routine healing ICD-10-CM: S82.831D  ICD-9-CM: V54.16  4/22/2022 Yes        Impaired mobility and ADLs ICD-10-CM: Z74.09, Z78.9  ICD-9-CM: V49.89  4/22/2022 Yes        Acute blood loss as cause of postoperative anemia ICD-10-CM: D62  ICD-9-CM: 285.1  4/22/2022 Yes        Iron deficiency anemia, unspecified ICD-10-CM: D50.9  ICD-9-CM: 280.9  8/5/2012 Yes              Background:   Past Medical History:   Past Medical History:   Diagnosis Date    Abscess of right shoulder     Abscess of shoulder     Acute blood loss as cause of postoperative anemia 4/22/2022    Arthritis     Closed displaced comminuted fracture of shaft of right tibia with routine healing 04/22/2022    Closed fracture of distal end of right fibula with routine healing 4/22/2022    Epidural abscess     Heart murmur     Hematuria     Heroin abuse (HCC)     Hypertension     Infected wound     Infectious disease     Iron deficiency anemia     IV drug user     Liver disease     [facility]     Tobacco abuse         Assessment:   Changes in Assessment throughout shift:       Patient has a central line: no Reasons if yes:    Insertion date: Last dressing date:   Patient has Benitez Cath: no Reasons if yes:     Insertion date: Last Vitals:     Vitals:    05/05/22 2054 05/06/22 0728 05/06/22 1504 05/06/22 2035   BP: 136/69 (!) 149/74 133/70 132/67   Pulse: 73 (!) 59 67 66   Resp: 18 16 18 18   Temp: 97.6 °F (36.4 °C) 97.4 °F (36.3 °C) 96.8 °F (36 °C) 97.8 °F (36.6 °C)   SpO2: 98% 100% 97% 99%   Height:            PAIN    Pain Assessment    Pain Intensity 1: 0 (05/07/22 0101) Pain Intensity 1: 2 (12/29/14 1105)    Pain Location 1: Leg,Ankle Pain Location 1: Abdomen    Pain Intervention(s) 1: Medication (see MAR) Pain Intervention(s) 1: Medication (see MAR)  Patient Stated Pain Goal: 0 Patient Stated Pain Goal: 0  o Intervention effective: yes  o Other actions taken for pain: Medication (see MAR)     Skin Assessment  Skin color    Condition/Temperature    Integrity Skin Integrity: Incision (comment)  Turgor    Weekly Pressure Ulcer Documentation  Pressure  Injury Documentation: No Pressure Injury Noted-Pressure Ulcer Prevention Initiated  Wound Prevention & Protection Methods  Orientation of wound Orientation of Wound Prevention: Posterior  Location of Prevention Location of Wound Prevention: Sacrum/Coccyx  Dressing Present Dressing Present : No  Dressing Status    Wound Offloading Wound Offloading (Prevention Methods): Bed, pressure reduction mattress,Chair cushion,Elevate heels,Pillows,Wheelchair     INTAKE/OUPUT  Date 05/06/22 0700 - 05/07/22 0659 05/07/22 0700 - 05/08/22 0659   Shift 7189-3895 7831-0091 24 Hour Total 0700-1859 1258-0746 24 Hour Total INTAKE   P.O. 720  720        P. O. 720  720      Shift Total 720  720      OUTPUT   Urine  3100 3100        Urine Voided  3100 3100        Urine Occurrence(s) 1 x 6 x 7 x      Emesis/NG output           Emesis Occurrence(s)  0 x 0 x      Stool           Stool Occurrence(s) 1 x 0 x 1 x      Shift Total  3100 3100       -3100 -2380      Weight (kg)             Recommendations:  1. Patient needs and requests: TOILETING, URINAL, dressing change    2. Pending tests/procedures:none    3. Functional Level/Equipment: Partial (one person) / Bed (comment); Wheelchair    Fall Precautions:   Fall risk precautions were reinforced with the patient; he was instructed to call for help prior to getting up. The following fall risk precautions were continued: bed/ chair alarms, door signage, yellow bracelet and socks as well as update of the [place] He tool in the patient's room. Ezio Score: 4    HEALS Safety Check    A safety check occurred in the patient's room between off going nurse and oncoming nurse listed above. The safety check included the below items  Area Items   H  High Alert Medications - Verify all high alert medication drips (heparin, PCA, etc.)   E  Equipment - Suction is set up for ALL patients (with devin)  - Red plugs utilized for all equipment (IV pumps, etc.)  - WOWs wiped down at end of shift.  - Room stocked with oxygen, suction, and other unit-specific supplies   A  Alarms - Bed alarm is set for fall risk patients  - Ensure chair alarm is in place and activated if patient is up in a chair   L  Lines - Check IV for any infiltration  - Benitez bag is empty if patient has a Benitez   - Tubing and IV bags are labeled   S  Safety   - Room is clean, patient is clean, and equipment is clean. - Hallways are clear from equipment besides carts.    - Fall bracelet on for fall risk patients  - Ensure room is clear and free of clutter  - Suction is set up for ALL patients (with devin)  - Hallways are clear from equipment besides carts.    - Isolation precautions followed, supplies available outside room, sign posted     Carlitos Gruber RN

## 2022-05-08 PROCEDURE — 65310000000 HC RM PRIVATE REHAB

## 2022-05-08 PROCEDURE — 74011250637 HC RX REV CODE- 250/637: Performed by: EMERGENCY MEDICINE

## 2022-05-08 PROCEDURE — 74011250637 HC RX REV CODE- 250/637: Performed by: INTERNAL MEDICINE

## 2022-05-08 RX ORDER — TELMISARTAN 20 MG/1
10 TABLET ORAL DAILY
Status: DISCONTINUED | OUTPATIENT
Start: 2022-05-08 | End: 2022-05-10

## 2022-05-08 RX ADMIN — FERROUS SULFATE TAB 325 MG (65 MG ELEMENTAL FE) 325 MG: 325 (65 FE) TAB at 17:06

## 2022-05-08 RX ADMIN — ACETAMINOPHEN 650 MG: 325 TABLET ORAL at 08:00

## 2022-05-08 RX ADMIN — OXYCODONE HYDROCHLORIDE 10 MG: 5 TABLET ORAL at 20:33

## 2022-05-08 RX ADMIN — Medication 100 MG: at 08:17

## 2022-05-08 RX ADMIN — THERA TABS 1 TABLET: TAB at 08:17

## 2022-05-08 RX ADMIN — ACETAMINOPHEN 650 MG: 325 TABLET ORAL at 12:33

## 2022-05-08 RX ADMIN — ASPIRIN 81 MG CHEWABLE TABLET 81 MG: 81 TABLET CHEWABLE at 08:17

## 2022-05-08 RX ADMIN — PREGABALIN 75 MG: 75 CAPSULE ORAL at 08:00

## 2022-05-08 RX ADMIN — AMLODIPINE BESYLATE 10 MG: 10 TABLET ORAL at 08:17

## 2022-05-08 RX ADMIN — PREGABALIN 75 MG: 75 CAPSULE ORAL at 20:33

## 2022-05-08 RX ADMIN — METHADONE HYDROCHLORIDE 45 MG: 10 TABLET ORAL at 08:16

## 2022-05-08 RX ADMIN — DOCUSATE SODIUM 100 MG: 100 CAPSULE, LIQUID FILLED ORAL at 17:07

## 2022-05-08 RX ADMIN — PANTOPRAZOLE 40 MG: 40 TABLET, DELAYED RELEASE ORAL at 06:35

## 2022-05-08 RX ADMIN — FERROUS SULFATE TAB 325 MG (65 MG ELEMENTAL FE) 325 MG: 325 (65 FE) TAB at 08:01

## 2022-05-08 RX ADMIN — POLYETHYLENE GLYCOL 3350 17 G: 17 POWDER, FOR SOLUTION ORAL at 08:16

## 2022-05-08 RX ADMIN — FOLIC ACID 1 MG: 1 TABLET ORAL at 08:16

## 2022-05-08 RX ADMIN — ACETAMINOPHEN 650 MG: 325 TABLET ORAL at 17:06

## 2022-05-08 RX ADMIN — OXYCODONE HYDROCHLORIDE 10 MG: 5 TABLET ORAL at 08:00

## 2022-05-08 RX ADMIN — PREGABALIN 75 MG: 75 CAPSULE ORAL at 13:54

## 2022-05-08 RX ADMIN — Medication 3 MG: at 17:06

## 2022-05-08 RX ADMIN — ASPIRIN 81 MG CHEWABLE TABLET 81 MG: 81 TABLET CHEWABLE at 17:07

## 2022-05-08 RX ADMIN — DOCUSATE SODIUM 100 MG: 100 CAPSULE, LIQUID FILLED ORAL at 08:17

## 2022-05-08 RX ADMIN — TELMISARTAN 10 MG: 20 TABLET ORAL at 12:35

## 2022-05-08 NOTE — PROGRESS NOTES
Problem: Falls - Risk of  Goal: *Absence of Falls  Description: Document [place] Fall Risk and appropriate interventions in the flowsheet.   Outcome: Progressing Towards Goal  Note: Fall Risk Interventions:  Mobility Interventions: Assess mobility with egress test,Mechanical lift,Patient to call before getting OOB,PT Consult for assist device competence,Strengthening exercises (ROM-active/passive),Utilize walker, cane, or other assistive device,Utilize gait belt for transfers/ambulation    Mentation Interventions: Adequate sleep, hydration, pain control,Bed/chair exit alarm,Gait belt with transfers/ambulation,Increase mobility,More frequent rounding,Room close to nurse's station,Toileting rounds    Medication Interventions: Bed/chair exit alarm,Evaluate medications/consider consulting pharmacy,Patient to call before getting OOB,Teach patient to arise slowly,Utilize gait belt for transfers/ambulation    Elimination Interventions: Bed/chair exit alarm,Call light in reach,Patient to call for help with toileting needs,Stay With Me (per policy),Urinal in reach    History of Falls Interventions: Bed/chair exit alarm,Door open when patient unattended,Room close to nurse's station,Utilize gait belt for transfer/ambulation,Assess for delayed presentation/identification of injury for 48 hrs (comment for end date)         Problem: Patient Education: Go to Patient Education Activity  Goal: Patient/Family Education  Outcome: Progressing Towards Goal     Problem: Pain  Goal: *Control of Pain  Outcome: Progressing Towards Goal     Problem: Patient Education: Go to Patient Education Activity  Goal: Patient/Family Education  Outcome: Progressing Towards Goal     Problem: Patient Education: Go to Patient Education Activity  Goal: Patient/Family Education  Outcome: Progressing Towards Goal     Problem: Patient Education: Go to Patient Education Activity  Goal: Patient/Family Education  Outcome: Progressing Towards Goal     Problem: Pressure Injury - Risk of  Goal: *Prevention of pressure injury  Description: Document Jonathan Scale and appropriate interventions in the flowsheet. Outcome: Progressing Towards Goal  Note: Pressure Injury Interventions:  Sensory Interventions: Assess changes in LOC,Assess need for specialty bed,Chair cushion,Keep linens dry and wrinkle-free,Maintain/enhance activity level,Monitor skin under medical devices,Pad between skin to skin,Pressure redistribution bed/mattress (bed type),Turn and reposition approx.  every two hours (pillows and wedges if needed)    Moisture Interventions: Absorbent underpads,Apply protective barrier, creams and emollients,Limit adult briefs,Minimize layers,Moisture barrier,Offer toileting Q_hr    Activity Interventions: Assess need for specialty bed,Chair cushion,Increase time out of bed,Pressure redistribution bed/mattress(bed type),PT/OT evaluation    Mobility Interventions: Assess need for specialty bed,Chair cushion,Float heels,HOB 30 degrees or less,Pressure redistribution bed/mattress (bed type),PT/OT evaluation    Nutrition Interventions: Document food/fluid/supplement intake,Discuss nutritional consult with provider    Friction and Shear Interventions: Apply protective barrier, creams and emollients,Feet elevated on foot rest,Foam dressings/transparent film/skin sealants,HOB 30 degrees or less,Lift sheet,Lift team/patient mobility team,Minimize layers                Problem: Patient Education: Go to Patient Education Activity  Goal: Patient/Family Education  Outcome: Progressing Towards Goal

## 2022-05-08 NOTE — PROGRESS NOTES
SHIFT CHANGE NOTE FOR [facility]    Bedside and Verbal shift change report given to SCOTTY Aguayo (oncoming nurse) by Rubio Aponte RN (offgoing nurse). Report included the following information SBAR, Kardex, MAR and Recent Results.     Situation:   Code Status: Full Code   Hospital Day: 10   Problem List:   Hospital Problems  Date Reviewed: 5/7/2022          Codes Class Noted POA    Methadone dependence ([address] (Chronic) ICD-10-CM: F11.20  ICD-9-CM: 304.00  Unknown Yes        Orthopedic aftercare for healing traumatic lower leg fracture, right, closed ICD-10-CM: S82.91XD  ICD-9-CM: V54.16  4/22/2022 Yes    Overview Addendum 5/2/2022 10:49 AM by Nyasia Roman MD     S/P Closed IM nailing of the right tibia using the Synthes IM nail system with a double lock proximally and triple lock distally (4/22/2022 - Dr. Galindo Boles)             Alcohol abuse ICD-10-CM: F10.10  ICD-9-CM: 305.00  4/22/2022 Yes        HTN (hypertension) ICD-10-CM: I10  ICD-9-CM: 401.9  4/22/2022 Yes        * (Principal) Closed displaced comminuted fracture of shaft of right tibia with routine healing ICD-10-CM: S82.251D  ICD-9-CM: V54.16  4/22/2022 Yes        Closed fracture of distal end of right fibula with routine healing ICD-10-CM: S82.831D  ICD-9-CM: V54.16  4/22/2022 Yes        Impaired mobility and ADLs ICD-10-CM: Z74.09, Z78.9  ICD-9-CM: V49.89  4/22/2022 Yes        Acute blood loss as cause of postoperative anemia ICD-10-CM: D62  ICD-9-CM: 285.1  4/22/2022 Yes        Iron deficiency anemia, unspecified ICD-10-CM: D50.9  ICD-9-CM: 280.9  8/5/2012 Yes              Background:   Past Medical History:   Past Medical History:   Diagnosis Date    Abscess of right shoulder     Abscess of shoulder     Acute blood loss as cause of postoperative anemia 4/22/2022    Arthritis     Closed displaced comminuted fracture of shaft of right tibia with routine healing 04/22/2022    Closed fracture of distal end of right fibula with routine healing 4/22/2022    Epidural abscess     Heart murmur     Hematuria     Heroin abuse (HCC)     Hypertension     Infected wound     Infectious disease     Iron deficiency anemia     IV drug user     Liver disease     [address]adone dependence ([facility] [address]     Tobacco abuse         Assessment:   Changes in Assessment throughout shift: No change to previous assessment     Patient has a central line: no Reasons if yes: Insertion date: Last dressing date:   Patient has Benitez Cath: no Reasons if yes:     Insertion date:     Last Vitals:     Vitals:    05/06/22 2035 05/07/22 0737 05/07/22 1528 05/07/22 1942   BP: 132/67 (!) 141/70 138/76 135/77   Pulse: 66 62 (!) 58 66   Resp: 18 17 18 18   Temp: 97.8 °F (36.6 °C) 96.8 °F (36 °C) 97.7 °F (36.5 °C) 98.7 °F (37.1 °C)   SpO2: 99% 98% 97% 97%   Height:            PAIN    Pain Assessment    Pain Intensity 1: 0 (05/08/22 0400) Pain Intensity 1: 2 (12/29/14 1105)    Pain Location 1: Leg,Ankle Pain Location 1: Abdomen    Pain Intervention(s) 1: Medication (see MAR) Pain Intervention(s) 1: Medication (see MAR)  Patient Stated Pain Goal: 0 Patient Stated Pain Goal: 0  o Intervention effective: yes  o Other actions taken for pain: Medication (see MAR)     Skin Assessment  Skin color    Condition/Temperature    Integrity Skin Integrity: Incision (comment)  Turgor    Weekly Pressure Ulcer Documentation  Pressure  Injury Documentation: No Pressure Injury Noted-Pressure Ulcer Prevention Initiated  Wound Prevention & Protection Methods  Orientation of wound Orientation of Wound Prevention: Posterior  Location of Prevention Location of Wound Prevention: Buttocks,Sacrum/Coccyx  Dressing Present Dressing Present : No  Dressing Status    Wound Offloading Wound Offloading (Prevention Methods): Bed, pressure redistribution/air     INTAKE/OUPUT  Date 05/07/22 0700 - 05/08/22 0659 05/08/22 0700 - 05/09/22 0659   Shift 4964-3831 9765-1030 24 Hour Total 5695-5826 9600-0840 24 Hour Total   INTAKE P.O. 720  720        P. O. 720  720      Shift Total 720  720      OUTPUT   Urine 1050  1050        Urine Voided 1050  1050        Urine Occurrence(s) 5 x 3 x 8 x      Stool           Stool Occurrence(s) 0 x 0 x 0 x      Shift Total 1050  1050      NET -330  -330      Weight (kg)             Recommendations:  1. Patient needs and requests: TOILETING, URINAL    2. Pending tests/procedures: None     3. Functional Level/Equipment: Partial (one person) / Bed (comment)    Fall Precautions:   Fall risk precautions were reinforced with the patient; he was instructed to call for help prior to getting up. The following fall risk precautions were continued: bed/ chair alarms, door signage, yellow bracelet and socks as well as update of the Jessica Jalloh tool in the patient's room. Ezio Score: 4    HEALS Safety Check    A safety check occurred in the patient's room between off going nurse and oncoming nurse listed above. The safety check included the below items  Area Items   H  High Alert Medications - Verify all high alert medication drips (heparin, PCA, etc.)   E  Equipment - Suction is set up for ALL patients (with devin)  - Red plugs utilized for all equipment (IV pumps, etc.)  - WOWs wiped down at end of shift.  - Room stocked with oxygen, suction, and other unit-specific supplies   A  Alarms - Bed alarm is set for fall risk patients  - Ensure chair alarm is in place and activated if patient is up in a chair   L  Lines - Check IV for any infiltration  - Benitez bag is empty if patient has a Benitez   - Tubing and IV bags are labeled   S  Safety   - Room is clean, patient is clean, and equipment is clean. - Hallways are clear from equipment besides carts. - Fall bracelet on for fall risk patients  - Ensure room is clear and free of clutter  - Suction is set up for ALL patients (with devin)  - Hallways are clear from equipment besides carts.    - Isolation precautions followed, supplies available outside room, sign posted     Kristian Villatoro RN

## 2022-05-08 NOTE — PROGRESS NOTES
SHIFT CHANGE NOTE FOR [facility]    Bedside and Verbal shift change report given to Jace ESCOBAR (oncoming nurse) by Sheryle Slicker, LPN (offgoing nurse). Report included the following information SBAR, Kardex, MAR and Recent Results.     Situation:   Code Status: Full Code   Hospital Day: 10   Problem List:   Hospital Problems  Date Reviewed: 5/8/2022          Codes Class Noted POA    Methadone dependence ([address] (Chronic) ICD-10-CM: F11.20  ICD-9-CM: 304.00  Unknown Yes        Orthopedic aftercare for healing traumatic lower leg fracture, right, closed ICD-10-CM: S82.91XD  ICD-9-CM: V54.16  4/22/2022 Yes    Overview Addendum 5/2/2022 10:49 AM by Atif Cardenas MD     S/P Closed IM nailing of the right tibia using the Synthes IM nail system with a double lock proximally and triple lock distally (4/22/2022 - Dr. David Weathers)             Alcohol abuse ICD-10-CM: F10.10  ICD-9-CM: 305.00  4/22/2022 Yes        HTN (hypertension) ICD-10-CM: I10  ICD-9-CM: 401.9  4/22/2022 Yes        * (Principal) Closed displaced comminuted fracture of shaft of right tibia with routine healing ICD-10-CM: S82.251D  ICD-9-CM: V54.16  4/22/2022 Yes        Closed fracture of distal end of right fibula with routine healing ICD-10-CM: S82.831D  ICD-9-CM: V54.16  4/22/2022 Yes        Impaired mobility and ADLs ICD-10-CM: Z74.09, Z78.9  ICD-9-CM: V49.89  4/22/2022 Yes        Acute blood loss as cause of postoperative anemia ICD-10-CM: D62  ICD-9-CM: 285.1  4/22/2022 Yes        Iron deficiency anemia, unspecified ICD-10-CM: D50.9  ICD-9-CM: 280.9  8/5/2012 Yes              Background:   Past Medical History:   Past Medical History:   Diagnosis Date    Abscess of right shoulder     Abscess of shoulder     Acute blood loss as cause of postoperative anemia 4/22/2022    Arthritis     Closed displaced comminuted fracture of shaft of right tibia with routine healing 04/22/2022    Closed fracture of distal end of right fibula with routine healing 4/22/2022    Epidural abscess     Heart murmur     Hematuria     Heroin abuse (HCC)     Hypertension     Infected wound     Infectious disease     Iron deficiency anemia     IV drug user     Liver disease     [address]adone dependence ([facility] [address]     Tobacco abuse         Assessment:   Changes in Assessment throughout shift: No change to previous assessment     Patient has a central line: no Reasons if yes: Insertion date: Last dressing date:   Patient has Benitez Cath: no Reasons if yes:     Insertion date:  \"}     Last Vitals:     Vitals:    05/07/22 1528 05/07/22 1942 05/08/22 0753 05/08/22 1235   BP: 138/76 135/77 (!) 140/71 (!) 140/70   Pulse: (!) 58 66 63 68   Resp: 18 18 20    Temp: 97.7 °F (36.5 °C) 98.7 °F (37.1 °C) 96.8 °F (36 °C)    SpO2: 97% 97% 99%    Height:            PAIN    Pain Assessment    Pain Intensity 1: 5 (05/08/22 1200) Pain Intensity 1: 2 (12/29/14 1105)    Pain Location 1: Ankle,Leg Pain Location 1: Abdomen    Pain Intervention(s) 1: Medication (see MAR) Pain Intervention(s) 1: Medication (see MAR)  Patient Stated Pain Goal: 0 Patient Stated Pain Goal: 0  o Intervention effective: yes  o Other actions taken for pain: Medication (see MAR)     Skin Assessment  Skin color    Condition/Temperature    Integrity Skin Integrity: Incision (comment)  Turgor    Weekly Pressure Ulcer Documentation  Pressure  Injury Documentation: No Pressure Injury Noted-Pressure Ulcer Prevention Initiated  Wound Prevention & Protection Methods  Orientation of wound Orientation of Wound Prevention: Posterior  Location of Prevention Location of Wound Prevention: Buttocks,Sacrum/Coccyx  Dressing Present Dressing Present : No  Dressing Status    Wound Offloading Wound Offloading (Prevention Methods): Bed, pressure redistribution/air,Chair cushion,Repositioning,Wheelchair     INTAKE/OUPUT  Date 05/07/22 0700 - 05/08/22 0659 05/08/22 0700 - 05/09/22 0659   Shift 5204-0142 3592-4234 24 Hour Total 0700-1859 8845-4296 24 Hour Total   INTAKE   P.O. 720  720 480  480     P. O. 720  720 480  480   Shift Total 720  720 480  480   OUTPUT   Urine 1050  1050 950  950     Urine Voided 1050  1050 950  950     Urine Occurrence(s) 5 x 3 x 8 x 3 x  3 x   Stool           Stool Occurrence(s) 0 x 0 x 0 x 0 x  0 x   Shift Total 1050  1050 950  950   NET -330  -330 -470  -470   Weight (kg)             Recommendations:  1. Patient needs and requests: TOILETING, URINAL    2. Pending tests/procedures: LABS PENDING     3. Functional Level/Equipment: Partial (one person) /      Fall Precautions:   Fall risk precautions were reinforced with the patient; he was instructed to call for help prior to getting up. The following fall risk precautions were continued: bed/ chair alarms, door signage, yellow bracelet and socks as well as update of the Stephona Gala tool in the patient's room. Ezio Score: 4    HEALS Safety Check    A safety check occurred in the patient's room between off going nurse and oncoming nurse listed above. The safety check included the below items  Area Items   H  High Alert Medications - Verify all high alert medication drips (heparin, PCA, etc.)   E  Equipment - Suction is set up for ALL patients (with devin)  - Red plugs utilized for all equipment (IV pumps, etc.)  - WOWs wiped down at end of shift.  - Room stocked with oxygen, suction, and other unit-specific supplies   A  Alarms - Bed alarm is set for fall risk patients  - Ensure chair alarm is in place and activated if patient is up in a chair   L  Lines - Check IV for any infiltration  - Benitez bag is empty if patient has a Benitez   - Tubing and IV bags are labeled   S  Safety   - Room is clean, patient is clean, and equipment is clean. - Hallways are clear from equipment besides carts. - Fall bracelet on for fall risk patients  - Ensure room is clear and free of clutter  - Suction is set up for ALL patients (with devin)  - Hallways are clear from equipment besides carts. - Isolation precautions followed, supplies available outside room, sign posted     Vandana Blair LPN

## 2022-05-08 NOTE — PROGRESS NOTES
[facility] PHYSICAL REHABILITATION  [address] [place] [address] [address]     INPATIENT REHABILITATION  DAILY PROGRESS NOTE     Date: 5/8/2022    Name: Sheron Ram Age / Sex: 74 y.o. / male   CSN: [id number] MRN: [id number]   [facility] Date: 4/28/2022 Length of Stay: 10 days     Primary Rehabilitation Diagnosis: Impaired Mobility and ADLs secondary to:  1. Closed displaced comminuted fracture of distal third of shaft of right tibia with routine healing  2. Closed fracture of distal end of right fibula with routine healing  3. S/P Closed IM nailing of the right tibia using the Synthes IM nail system with a double lock proximally and triple lock distally (4/22/2022 - Dr. Macario Wilkins)      Subjective:     No new issues or problems reported. Blood pressure fairly controlled.        Objective:     Vital Signs:  Patient Vitals for the past 24 hrs:   BP Temp Pulse Resp SpO2   05/08/22 0753 (!) 140/71 96.8 °F (36 °C) 63 20 99 %   05/07/22 1942 135/77 98.7 °F (37.1 °C) 66 18 97 %   05/07/22 1528 138/76 97.7 °F (36.5 °C) (!) 58 18 97 %        Current Medications:  Current Facility-Administered Medications   Medication Dose Route Frequency    pregabalin (LYRICA) capsule 75 mg  75 mg Oral TID    melatonin tablet 3 mg  3 mg Oral PCD    polyethylene glycol (MIRALAX) packet 17 g  17 g Oral DAILY    acetaminophen (TYLENOL) tablet 650 mg  650 mg Oral Q4H PRN    acetaminophen (TYLENOL) tablet 650 mg  650 mg Oral TID    oxyCODONE IR (ROXICODONE) tablet 10 mg  10 mg Oral Q4H PRN    bisacodyL (DULCOLAX) tablet 10 mg  10 mg Oral DAILY PRN    pantoprazole (PROTONIX) tablet 40 mg  40 mg Oral ACB    amLODIPine (NORVASC) tablet 10 mg  10 mg Oral DAILY    aspirin chewable tablet 81 mg  81 mg Oral BID    ferrous sulfate tablet 325 mg  325 mg Oral BID WITH MEALS    folic acid (FOLVITE) tablet 1 mg  1 mg Oral DAILY    methadone (DOLOPHINE) tablet 45 mg  45 mg Oral DAILY    therapeutic multivitamin (THERAGRAN) tablet 1 Tablet  1 Tablet Oral DAILY    thiamine HCL (B-1) tablet 100 mg  100 mg Oral DAILY    docusate sodium (COLACE) capsule 100 mg  100 mg Oral BID       Allergies:  No Known Allergies      Lab/Data Review:  No results found for this or any previous visit (from the past 24 hour(s)). Assessment:     Primary Rehabilitation Diagnosis  1. Impaired Mobility and ADLs  2. Closed displaced comminuted fracture of distal third of shaft of right tibia with routine healing  3. Closed fracture of distal end of right fibula with routine healing  4. S/P Closed IM nailing of the right tibia using the Synthes IM nail system with a double lock proximally and triple lock distally (4/22/2022 - Dr. Reddy Lin)    Comorbidities  Patient Active Problem List   Diagnosis Code    Epidural abscess, L2-L5 G06.1    Polysubstance abuse ([address] F19.10    Leukopenia D72.819    Iron deficiency anemia, unspecified D50.9    Anemia D64.9    Thrombocytopenia ([address] D69.6    Orthopedic aftercare for healing traumatic lower leg fracture, right, closed S82. 91XD    Metatarsal fracture S92.309A    Alcohol abuse F10.10    HTN (hypertension) I10    Liver mass R16.0    Bladder mass N32.89    Hematuria R31.9    Closed displaced comminuted fracture of shaft of right tibia with routine healing S82.251D    Closed fracture of distal end of right fibula with routine healing S82.831D    Impaired mobility and ADLs Z74.09, Z78.9    Acute blood loss as cause of postoperative anemia D62    Methadone dependence (HCC) F11.20       Plan:     1. Justification for continued stay: Good progression towards established rehabilitation goals. 2. Medical Issues being followed closely:    [x]  Fall and safety precautions     [x]  Wound Care     [x]  Bowel and Bladder Function     [x]  Fluid Electrolyte and Nutrition Balance     [x]  Pain Control      3.  Issues that 24 hour rehabilitation nursing is following:    [x]  Fall and safety precautions [x]  Wound Care     [x]  Bowel and Bladder Function     [x]  Fluid Electrolyte and Nutrition Balance     [x]  Pain Control      [x]  Assistance with and education on in-room safety with transfers to and from the bed, wheelchair, toilet and shower. 4. Acute rehabilitation plan of care:    [x]  Continue current care and rehab. [x]  Physical Therapy           [x]  Occupational Therapy           []  Speech Therapy     []  Hold Rehab until further notice     5. Medications:    [x]  MAR Reviewed     [x]  Continue Present Medications     6. Chemical DVT Prophylaxis:      []  Enoxaparin     []  Unfractionated Heparin     []  Warfarin     []  NOAC     [x]  Aspirin 81 mg PO BID (as per Orthopedics)     []  None     7. Mechanical DVT Prophylaxis:      []  RADHA Stockings     [x]  Sequential Compression Device on LLE     []  None     8. GI Prophylaxis:      [x]  PPI     []  H2 Blocker     []  None / Not indicated     9. Code status:    [x]  Full code     []  Partial code     []  Do not intubate     []  Do not resuscitate     10. Diet:  Specifications  Low fat/Low cholesterol/High fiber/2 gm Na   Solids (consistency)  Regular   Liquids (consistency)  Thin   Fluid Restriction  None     11. Orders:   > Closed displaced comminuted fracture of distal third of shaft of right tibia with routine healing; Closed fracture of distal end of right fibula with routine healing; S/P Closed IM nailing of the right tibia using the Synthes IM nail system with a double lock proximally and triple lock distally (4/22/2022 - Dr. Sherri Khan)   > Partial weightbearing on the right lower extremity using a rolling walker    > Acute postoperative blood loss anemia;  Iron deficiency anemia   > Hgb/Hct (4/29/2022, on admission to the ARU) = 8.9/28.6   > Hgb/Hct (5/3/2022) = 10.3/33.7    > Continue Ferrous sulfate 325 mg PO BID with meals    > Alcohol abuse   > Continue:    > Folic acid 1 mg PO once daily    > Multivitamin 1 tab PO once daily    > Thiamine 100 mg PO once daily    > Constipation   > Continue:    > Docusate sodium 100 mg PO BID    > Miralax 17 grams in 8 oz water PO once daily    > Hypertension   > Start Telmisartan 10 mg PO once daily (9AM)   > Continue Amlodipine 10 mg PO once daily (9AM)    > Methadone dependence   > Continue Methadone 45 mg PO once daily    > Fever, etiology to be determined   > As per note of Dr. Nash Case dated 4/30/2022, patient had a fever and a work up was initiated   > Work-up:    > WBC count (4/29/2022, on admission to the ARU) = 3.1    > Blood culture x 2 sets (collected 4/29/2022, resulted as of 5/6/2022) yielded no growth after 6 days    > Chest x-ray (4/29/2022) showed perhaps mild bronchial wall thickening    > Urinalysis (4/29/2022): WNL    > Urine culture (collected 4/29/2022, resulted 5/2/2022) yielded growth of 20,000 colonies/ml of Escherichia coli   > WBC count (5/3/2022) = 2.9   > No fever over the past 24 hours   > No antibiotics for now    > Difficulty sleeping   > On 5/3/2022, started Melatonin 3 mg PO daily after dinner   > Continue Melatonin 3 mg PO daily after dinner    > Analgesia   > On 5/3/2022, started:    > Acetaminophen 650 mg PO TID (8AM, 12PM, 4PM)    > Pregabalin 50 mg PO TID (8AM, 2PM, 8PM)   > On 5/6/2022, increased Pregabalin from 50 mg to 75 mg PO TID (8AM, 2PM, 8PM)   > Continue:    > Acetaminophen 650 mg PO TID (8AM, 12PM, 4PM)    > Acetaminophen 650 mg PO q 4 hr PRN for pain level 4/10 or lesser (from 8PM to 4AM only)    > Pregabalin 75 mg PO TID (8AM, 2PM, 8PM)    > Oxycodone 10 mg PO q 4 hr PRN for pain level 5/10 or greater       Signed:    Matt Conteh MD    May 8, 2022

## 2022-05-08 NOTE — PROGRESS NOTES
Problem: Falls - Risk of  Goal: *Absence of Falls  Description: Document Sammy Lima Fall Risk and appropriate interventions in the flowsheet.   5/8/2022 0743 by Cruzito Ugarte LPN  Outcome: Progressing Towards Goal  Note: Fall Risk Interventions:  Mobility Interventions: Assess mobility with egress test,Bed/chair exit alarm,Patient to call before getting OOB,PT Consult for assist device competence,Strengthening exercises (ROM-active/passive),Utilize walker, cane, or other assistive device,Utilize gait belt for transfers/ambulation    Mentation Interventions: Bed/chair exit alarm,Gait belt with transfers/ambulation,Increase mobility,More frequent rounding,Room close to nurse's station,Toileting rounds    Medication Interventions: Bed/chair exit alarm,Evaluate medications/consider consulting pharmacy,Patient to call before getting OOB,Teach patient to arise slowly,Utilize gait belt for transfers/ambulation    Elimination Interventions: Bed/chair exit alarm,Call light in reach,Elevated toilet seat,Stay With Me (per policy)    History of Falls Interventions: Bed/chair exit alarm,Door open when patient unattended,Room close to nurse's station,Utilize gait belt for transfer/ambulation,Assess for delayed presentation/identification of injury for 48 hrs (comment for end date)      5/7/2022 2247 by Cruzito Ugarte LPN  Outcome: Progressing Towards Goal  Note: Fall Risk Interventions:  Mobility Interventions: Assess mobility with egress test,Mechanical lift,Patient to call before getting OOB,PT Consult for assist device competence,Strengthening exercises (ROM-active/passive),Utilize walker, cane, or other assistive device,Utilize gait belt for transfers/ambulation    Mentation Interventions: Adequate sleep, hydration, pain control,Bed/chair exit alarm,Gait belt with transfers/ambulation,Increase mobility,More frequent rounding,Room close to nurse's station,Toileting rounds    Medication Interventions: Bed/chair exit alarm,Evaluate medications/consider consulting pharmacy,Patient to call before getting OOB,Teach patient to arise slowly,Utilize gait belt for transfers/ambulation    Elimination Interventions: Bed/chair exit alarm,Call light in reach,Patient to call for help with toileting needs,Stay With Me (per policy),Urinal in reach    History of Falls Interventions: Bed/chair exit alarm,Door open when patient unattended,Room close to nurse's station,Utilize gait belt for transfer/ambulation,Assess for delayed presentation/identification of injury for 48 hrs (comment for end date)         Problem: Pain  Goal: *Control of Pain  5/8/2022 0743 by Quan Jose LPN  Outcome: Progressing Towards Goal  5/7/2022 1757 by Quan Jose LPN  Outcome: Progressing Towards Goal     Problem: Pressure Injury - Risk of  Goal: *Prevention of pressure injury  Description: Document Jonathan Scale and appropriate interventions in the flowsheet. Outcome: Progressing Towards Goal  Note: Pressure Injury Interventions:  Sensory Interventions: Assess changes in LOC,Assess need for specialty bed,Chair cushion,Keep linens dry and wrinkle-free,Maintain/enhance activity level,Monitor skin under medical devices,Pad between skin to skin,Pressure redistribution bed/mattress (bed type),Turn and reposition approx.  every two hours (pillows and wedges if needed)    Moisture Interventions: Absorbent underpads,Apply protective barrier, creams and emollients,Limit adult briefs,Minimize layers,Moisture barrier,Offer toileting Q_hr    Activity Interventions: Assess need for specialty bed,Chair cushion,Increase time out of bed,Pressure redistribution bed/mattress(bed type),PT/OT evaluation    Mobility Interventions: Assess need for specialty bed,Chair cushion,Float heels,HOB 30 degrees or less,Pressure redistribution bed/mattress (bed type),PT/OT evaluation    Nutrition Interventions: Document food/fluid/supplement intake,Discuss nutritional consult with provider    Friction and Shear Interventions: Apply protective barrier, creams and emollients,Feet elevated on foot rest,Foam dressings/transparent film/skin sealants,HOB 30 degrees or less,Lift sheet,Lift team/patient mobility team,Minimize layers

## 2022-05-09 LAB
ANION GAP SERPL CALC-SCNC: 3 MMOL/L (ref 3–18)
BUN SERPL-MCNC: 8 MG/DL (ref 7–18)
BUN/CREAT SERPL: 9 (ref 12–20)
CALCIUM SERPL-MCNC: 9.1 MG/DL (ref 8.5–10.1)
CHLORIDE SERPL-SCNC: 108 MMOL/L (ref 100–111)
CO2 SERPL-SCNC: 28 MMOL/L (ref 21–32)
CREAT SERPL-MCNC: 0.94 MG/DL (ref 0.6–1.3)
GLUCOSE SERPL-MCNC: 138 MG/DL (ref 74–99)
HCT VFR BLD AUTO: 34.8 % (ref 36–48)
HGB BLD-MCNC: 10.5 G/DL (ref 13–16)
POTASSIUM SERPL-SCNC: 4.4 MMOL/L (ref 3.5–5.5)
SODIUM SERPL-SCNC: 139 MMOL/L (ref 136–145)

## 2022-05-09 PROCEDURE — 74011250637 HC RX REV CODE- 250/637: Performed by: EMERGENCY MEDICINE

## 2022-05-09 PROCEDURE — 80048 BASIC METABOLIC PNL TOTAL CA: CPT

## 2022-05-09 PROCEDURE — 85018 HEMOGLOBIN: CPT

## 2022-05-09 PROCEDURE — 74011250637 HC RX REV CODE- 250/637: Performed by: INTERNAL MEDICINE

## 2022-05-09 PROCEDURE — 97110 THERAPEUTIC EXERCISES: CPT

## 2022-05-09 PROCEDURE — 36415 COLL VENOUS BLD VENIPUNCTURE: CPT

## 2022-05-09 PROCEDURE — 97116 GAIT TRAINING THERAPY: CPT

## 2022-05-09 PROCEDURE — 97530 THERAPEUTIC ACTIVITIES: CPT

## 2022-05-09 PROCEDURE — 99232 SBSQ HOSP IP/OBS MODERATE 35: CPT | Performed by: INTERNAL MEDICINE

## 2022-05-09 PROCEDURE — 97150 GROUP THERAPEUTIC PROCEDURES: CPT

## 2022-05-09 PROCEDURE — 65310000000 HC RM PRIVATE REHAB

## 2022-05-09 RX ADMIN — PREGABALIN 75 MG: 75 CAPSULE ORAL at 20:11

## 2022-05-09 RX ADMIN — OXYCODONE HYDROCHLORIDE 10 MG: 5 TABLET ORAL at 23:01

## 2022-05-09 RX ADMIN — ACETAMINOPHEN 650 MG: 325 TABLET ORAL at 12:02

## 2022-05-09 RX ADMIN — FERROUS SULFATE TAB 325 MG (65 MG ELEMENTAL FE) 325 MG: 325 (65 FE) TAB at 16:58

## 2022-05-09 RX ADMIN — ACETAMINOPHEN 650 MG: 325 TABLET ORAL at 16:58

## 2022-05-09 RX ADMIN — FERROUS SULFATE TAB 325 MG (65 MG ELEMENTAL FE) 325 MG: 325 (65 FE) TAB at 08:19

## 2022-05-09 RX ADMIN — Medication 100 MG: at 08:20

## 2022-05-09 RX ADMIN — METHADONE HYDROCHLORIDE 45 MG: 10 TABLET ORAL at 08:19

## 2022-05-09 RX ADMIN — THERA TABS 1 TABLET: TAB at 08:20

## 2022-05-09 RX ADMIN — DOCUSATE SODIUM 100 MG: 100 CAPSULE, LIQUID FILLED ORAL at 08:20

## 2022-05-09 RX ADMIN — Medication 3 MG: at 16:58

## 2022-05-09 RX ADMIN — POLYETHYLENE GLYCOL 3350 17 G: 17 POWDER, FOR SOLUTION ORAL at 08:19

## 2022-05-09 RX ADMIN — ASPIRIN 81 MG CHEWABLE TABLET 81 MG: 81 TABLET CHEWABLE at 08:20

## 2022-05-09 RX ADMIN — TELMISARTAN 10 MG: 20 TABLET ORAL at 08:20

## 2022-05-09 RX ADMIN — PANTOPRAZOLE 40 MG: 40 TABLET, DELAYED RELEASE ORAL at 06:33

## 2022-05-09 RX ADMIN — FOLIC ACID 1 MG: 1 TABLET ORAL at 08:20

## 2022-05-09 RX ADMIN — PREGABALIN 75 MG: 75 CAPSULE ORAL at 13:09

## 2022-05-09 RX ADMIN — OXYCODONE HYDROCHLORIDE 10 MG: 5 TABLET ORAL at 19:03

## 2022-05-09 RX ADMIN — AMLODIPINE BESYLATE 10 MG: 10 TABLET ORAL at 08:20

## 2022-05-09 RX ADMIN — ACETAMINOPHEN 650 MG: 325 TABLET ORAL at 08:20

## 2022-05-09 RX ADMIN — DOCUSATE SODIUM 100 MG: 100 CAPSULE, LIQUID FILLED ORAL at 16:58

## 2022-05-09 RX ADMIN — ASPIRIN 81 MG CHEWABLE TABLET 81 MG: 81 TABLET CHEWABLE at 16:58

## 2022-05-09 RX ADMIN — PREGABALIN 75 MG: 75 CAPSULE ORAL at 08:20

## 2022-05-09 RX ADMIN — OXYCODONE HYDROCHLORIDE 10 MG: 5 TABLET ORAL at 04:48

## 2022-05-09 NOTE — PROGRESS NOTES
Nutrition Assessment     Type and Reason for Visit: Reassess,Consult    Nutrition Recommendations/Plan:   1. Add seasoning preferences into diet order  2. Obtain wt as able - will place order  3. No further nutrition intervention indicated at this time. Will re-screen as appropriate. Nutrition Assessment:  Pt reported good appetite/ meal intake. tolerating diet. eating 100% of most meals. pt c/o not receiving salt with meals; explained to pt that he is on a Na restricted diet due to hx of HTN. Pt verbalized understanding. Other seasoning preferences discussed. Noted wt not taken since admission; unable to obtain at time of visit as pt sitting in wheelchair.      Malnutrition Assessment:  Malnutrition Status: No malnutrition     Estimated Daily Nutrient Needs:  Energy (kcal):  6468-6381  Protein (g):  65-82       Fluid (ml/day):  9399-7160    Nutrition Related Findings:  BM 5/9    Current Nutrition Therapies:  ADULT DIET Regular; Low Fat/Low Chol/High Fiber/2 gm Na    Anthropometric Measures:  Height:  6' (182.9 cm)  Current Body Wt:  81.6 kg (179 lb 14.3 oz)  BMI: 24.4    Nutrition Diagnosis:   · No nutrition diagnosis at this time          Nutrition Interventions:   Food and/or Nutrient Delivery: Continue current diet,Mineral supplement,Vitamin supplement  Nutrition Education/Counseling: No recommendations at this time,Education not indicated  Coordination of Nutrition Care: Continue to monitor while inpatient  Plan of Care discussed with: pt    Goals:  Previous Goal Met: Goal(s) achieved  Goals: Meet at least 75% of estimated needs,PO intake 75% or greater,by next RD assessment       Nutrition Monitoring and Evaluation:   Behavioral-Environmental Outcomes: None identified  Food/Nutrient Intake Outcomes: Food and nutrient intake,Vitamin/mineral intake  Physical Signs/Symptoms Outcomes: Biochemical data,Meal time behavior,Weight    Discharge Planning:    No discharge needs at this time    Shanell Caldera RD  Contact: [phone]

## 2022-05-09 NOTE — PROGRESS NOTES
SHIFT CHANGE NOTE FOR [facility]    Bedside and Verbal shift change report given to Caroline Bustos (oncoming nurse) by Carin Villagran RN (offgoing nurse). Report included the following information SBAR, Kardex, MAR and Recent Results.     Situation:   Code Status: Full Code   Hospital Day: 11   Problem List:   Hospital Problems  Date Reviewed: 5/8/2022          Codes Class Noted POA    Methadone dependence ([address] (Chronic) ICD-10-CM: F11.20  ICD-9-CM: 304.00  Unknown Yes        Orthopedic aftercare for healing traumatic lower leg fracture, right, closed ICD-10-CM: S82.91XD  ICD-9-CM: V54.16  4/22/2022 Yes    Overview Addendum 5/2/2022 10:49 AM by Angel García MD     S/P Closed IM nailing of the right tibia using the Synthes IM nail system with a double lock proximally and triple lock distally (4/22/2022 - Dr. Dreek Babb)             Alcohol abuse ICD-10-CM: F10.10  ICD-9-CM: 305.00  4/22/2022 Yes        HTN (hypertension) ICD-10-CM: I10  ICD-9-CM: 401.9  4/22/2022 Yes        * (Principal) Closed displaced comminuted fracture of shaft of right tibia with routine healing ICD-10-CM: S82.251D  ICD-9-CM: V54.16  4/22/2022 Yes        Closed fracture of distal end of right fibula with routine healing ICD-10-CM: S82.831D  ICD-9-CM: V54.16  4/22/2022 Yes        Impaired mobility and ADLs ICD-10-CM: Z74.09, Z78.9  ICD-9-CM: V49.89  4/22/2022 Yes        Acute blood loss as cause of postoperative anemia ICD-10-CM: D62  ICD-9-CM: 285.1  4/22/2022 Yes        Iron deficiency anemia, unspecified ICD-10-CM: D50.9  ICD-9-CM: 280.9  8/5/2012 Yes              Background:   Past Medical History:   Past Medical History:   Diagnosis Date    Abscess of right shoulder     Abscess of shoulder     Acute blood loss as cause of postoperative anemia 4/22/2022    Arthritis     Closed displaced comminuted fracture of shaft of right tibia with routine healing 04/22/2022    Closed fracture of distal end of right fibula with routine healing 4/22/2022    Epidural abscess     Heart murmur     Hematuria     Heroin abuse (HCC)     Hypertension     Infected wound     Infectious disease     Iron deficiency anemia     IV drug user     Liver disease     [facility]adone dependence ([facility]     Tobacco abuse         Assessment:   Changes in Assessment throughout shift: No change to previous assessment     Patient has a central line: no Reasons if yes: Insertion date: Last dressing date:   Patient has Benitez Cath: no Reasons if yes: Insertion date:  \"}     Last Vitals:     Vitals:    05/08/22 0753 05/08/22 1235 05/08/22 1502 05/08/22 2035   BP: (!) 140/71 (!) 140/70 136/82 138/68   Pulse: 63 68 65 62   Resp: 20  18 19   Temp: 96.8 °F (36 °C)  97.4 °F (36.3 °C) 97.6 °F (36.4 °C)   SpO2: 99%  97% 96%   Height:            PAIN    Pain Assessment    Pain Intensity 1: 0 (05/09/22 0400) Pain Intensity 1: 2 (12/29/14 1105)    Pain Location 1: Ankle,Leg Pain Location 1: Abdomen    Pain Intervention(s) 1: Medication (see MAR) Pain Intervention(s) 1: Medication (see MAR)  Patient Stated Pain Goal: 0 Patient Stated Pain Goal: 0  o Intervention effective: yes  o Other actions taken for pain:       Skin Assessment  Skin color    Condition/Temperature    Integrity Skin Integrity: Incision (comment)  Turgor    Weekly Pressure Ulcer Documentation  Pressure  Injury Documentation: No Pressure Injury Noted-Pressure Ulcer Prevention Initiated  Wound Prevention & Protection Methods  Orientation of wound Orientation of Wound Prevention: Posterior  Location of Prevention Location of Wound Prevention: Buttocks,Sacrum/Coccyx  Dressing Present Dressing Present : No  Dressing Status    Wound Offloading Wound Offloading (Prevention Methods): Bed, pressure redistribution/air     INTAKE/OUPUT  Date 05/08/22 0700 - 05/09/22 0659 05/09/22 0700 - 05/10/22 0659   Shift 4699-9871 7026-0429 24 Hour Total 0700-1859 1900-0659 24 Hour Total   INTAKE   P.O. 720  720        P. O. 720  720 Shift Total 720  720      OUTPUT   Urine 1400  1400        Urine Voided 1400  1400        Urine Occurrence(s) 6 x  6 x      Stool           Stool Occurrence(s) 0 x  0 x      Shift Total 1400  1400      NET -680  -680      Weight (kg)             Recommendations:  1. Patient needs and requests: TOILETING, URINAL    2. Pending tests/procedures: LABS PENDING     3. Functional Level/Equipment:   / Wheelchair    Fall Precautions:   Fall risk precautions were reinforced with the patient; he was instructed to call for help prior to getting up. The following fall risk precautions were continued: bed/ chair alarms, door signage, yellow bracelet and socks as well as update of the Amber Spray tool in the patient's room. Ezio Score: 4    HEALS Safety Check    A safety check occurred in the patient's room between off going nurse and oncoming nurse listed above. The safety check included the below items  Area Items   H  High Alert Medications - Verify all high alert medication drips (heparin, PCA, etc.)   E  Equipment - Suction is set up for ALL patients (with devin)  - Red plugs utilized for all equipment (IV pumps, etc.)  - WOWs wiped down at end of shift.  - Room stocked with oxygen, suction, and other unit-specific supplies   A  Alarms - Bed alarm is set for fall risk patients  - Ensure chair alarm is in place and activated if patient is up in a chair   L  Lines - Check IV for any infiltration  - Benitez bag is empty if patient has a Benitez   - Tubing and IV bags are labeled   S  Safety   - Room is clean, patient is clean, and equipment is clean. - Hallways are clear from equipment besides carts. - Fall bracelet on for fall risk patients  - Ensure room is clear and free of clutter  - Suction is set up for ALL patients (with devin)  - Hallways are clear from equipment besides carts.    - Isolation precautions followed, supplies available outside room, sign posted     Elinor Barraza RN

## 2022-05-09 NOTE — PROGRESS NOTES
Problem: Self Care Deficits Care Plan (Adult)  Goal: *Acute Goals and Plan of Care (Insert Text)  Description: Occupational Therapy Goals   Long Term Goals  Initiated 4/29/2022 and to be accomplished within 2 week(s), 5/13/2022  1. Pt will perform self-feeding with Mod I.  2. Pt will perform grooming with Mod I.  3. Pt will perform UB bathing with Mod I.  4. Pt will perform LB bathing with Mod I using AE and/ or compensatory strategies as needed. 5. Pt will perform tub/shower transfer with supv using least restrictive assistive device while maintaining weight bearing restrictions. 6. Pt will perform UB dressing with Mod I.  7. Pt will perform LB dressing with supv using AE and/ or compensatory strategies as needed. 8. Pt will perform toileting task with Mod I.  9. Pt will perform toilet transfer with supv using least restrictive assistive device while maintaining weight bearing restrictions. Short Term Goals   Initiated 4/29/2022 and to be accomplished within 7 day(s), by 5/6/2022  1. Pt will perform self-feeding with set-up. Goal met 5/5/22  2. Pt will perform grooming with SBA. Goal met 5/5/22  3. Pt will perform UB bathing with set-up. Goal met 5/5/22  4. Pt will perform LB bathing with SBA using AE and/ or compensatory strategies as needed. 5. Pt will perform tub/shower transfer with Min A using least restrictive assistive device while maintaining weight bearing restrictions. Goal met 5/5/22  6. Pt will perform UB dressing with set-up. Goal met 5/5/22  7. Pt will perform LB dressing with CGA/ SBA using AE and/ or compensatory strategies as needed. Goal met 5/5/22  8. Pt will perform toileting task with Min A/ CGA. 9. Pt will perform toilet transfer with SBA using least restrictive assistive device while maintaining weight bearing restrictions.          Outcome: Progressing Towards Goal  Goal: Interventions  Outcome: Progressing Towards Goal   Occupational Therapy TREATMENT    Patient: Luz Live Freya Armenta   74 y.o. Patient identified with name and : yes    Date: 2022    First Tx Session  Time In: 1131  Time Out[de-identified] 1200    Second Tx Session  Time In: 1330  Time Out[de-identified] 1430    Diagnosis: Tibia/fibula fracture [S82.209A, S82.409A]   Precautions: Fall,Other (comment) (PWB Right LE)  Chart, occupational therapy assessment, plan of care, and goals were reviewed. Pain:  Pt reports 5/10 pain or discomfort prior to treatment. Pt reports 5/10 pain or discomfort post treatment. Intervention Provided: Pt reported 5/10 pain in R leg however reported tolerable      SUBJECTIVE:   Patient stated I have someone coming for me in a little bit.     OBJECTIVE DATA SUMMARY:     THERAPEUTIC ACTIVITY Daily Assessment    Pt engaged in group activity which involved conversation beach ball toss and balloon badminton. Pt engaged in verbal prompts to improve socialization. Pt demonstrated good strength and activity tolerance for activity. THERAPEUTIC EXERCISE Daily Assessment    Pt performed BUE strengthening in pt room secondary to waiting for visitor. Pt performed BUE strengthening with red theraband in all planes (shoulder flexion/extension, abduction/adduction, bicep curls) 4x15 reps, 2 sets with supervision/Herminio. Second session pt engaged in BUE strengthening with 3# free weight in all planes (abduction/adduction, butterfly, bicep curls, shoulder flexion/extension) 5x10 reps, 2 sets with RB's. IADL Daily Assessment    Pt requested to do laundry however there was no more laundry detergent, reported to  and agreeable to perform laundry tasks tomorrow. MOBILITY/TRANSFERS Daily Assessment        Pt performed functional mobility self propelling w/c from pt room to therapy gym and back to pt room with supervision/Herminio. ASSESSMENT:  Pt is demonstrating improved safety and activity tolerance with therac. Pt is increasing BUE strength for self cares and functional mobility.    Progression toward goals:  [x]          Improving appropriately and progressing toward goals  []          Improving slowly and progressing toward goals  []          Not making progress toward goals and plan of care will be adjusted     PLAN:  Patient continues to benefit from skilled intervention to address the above impairments. Continue treatment per established plan of care. Discharge Recommendations:  Home Health with assist  Further Equipment Recommendations for Discharge:  bedside commode and transfer bench     Activity Tolerance:  good      Estimated LOS:~ DC 5/12/22  Please refer to the flowsheet for vital signs taken during this treatment. After treatment:   [x]  Patient left in no apparent distress sitting up in chair   []  Patient left in no apparent distress in bed  []  Call bell left within reach  []  Nursing notified  []  Caregiver present  []  Bed alarm activated    COMMUNICATION/EDUCATION:   [] Home safety education was provided and the patient/caregiver indicated understanding. [] Patient/family have participated as able in goal setting and plan of care. [] Patient/family agree to work toward stated goals and plan of care. [] Patient understands intent and goals of therapy, but is neutral about his/her participation. [] Patient is unable to participate in goal setting and plan of care.       Charles Espinosa, OT

## 2022-05-09 NOTE — INTERDISCIPLINARY ROUNDS
[facility] PHYSICAL REHABILITATION  [address] [place] [address], [address]    INPATIENT REHABILITATION  PRE-TEAM CONFERENCE SUMMARY     Date of Conference: 5/10/2022    Patient Information:        Name: Trudy Yeung Age / Sex: 74 y.o. / male   CSN: [id number] MRN: [id number]   Admit Date: 4/28/2022 Length of Stay: 11 days     Primary Rehabilitation Diagnosis  1. Impaired Mobility and ADLs  2. Closed displaced comminuted fracture of distal third of shaft of right tibia with routine healing  3. Closed fracture of distal end of right fibula with routine healing  4. S/P Closed IM nailing of the right tibia using the Synthes IM nail system with a double lock proximally and triple lock distally (4/22/2022 - Dr. David Fernandez)    Comorbidities  Patient Active Problem List   Diagnosis Code    Epidural abscess, L2-L5 G06.1    Polysubstance abuse ([address] F19.10    Leukopenia D72.819    Iron deficiency anemia, unspecified D50.9    Anemia D64.9    Thrombocytopenia ([address] D69.6    Orthopedic aftercare for healing traumatic lower leg fracture, right, closed S82. 91XD    Metatarsal fracture S92.309A    Alcohol abuse F10.10    HTN (hypertension) I10    Liver mass R16.0    Bladder mass N32.89    Hematuria R31.9    Closed displaced comminuted fracture of shaft of right tibia with routine healing S82.251D    Closed fracture of distal end of right fibula with routine healing S82.831D    Impaired mobility and ADLs Z74.09, Z78.9    Acute blood loss as cause of postoperative anemia D62    Methadone dependence (HCC) F11.20          Therapy:     FIM SCORES Initial Assessment Weekly Progress Assessment 5/9/2022   Eating Functional Level: 5  Comments:  (Self-feeding (set-up) of meal tray; pt opens lids/ packets)  6   Swallowing     Grooming 4 (CGA in stance; set-up/ supv wheelchair at sink)  6   Bathing 4 (UB bathing: set-up; LB bathing: Min A)  4   Upper Body Dressing Functional Level: 5 (set-up/ supv )  Items Applied/Steps Completed: Pullover (4 steps)  Comments: UB dressing performed set-up/ supv seated edge of bed for doffing/ donning scrub top. 5   Lower Body Dressing Functional Level: 4 (Min A overall)  Items Applied/Steps Completed: Sock, left (1 step) (Elastic waist shorts (3 steps))  Comments: Patient requiring assistance to thread shorts over RLE (s/p surgical intervention tibia/ fibula fx); pt able to thread shorts over left lower extremity. Pt able to pull shorts over hips to waist (bed level). Pt doffed/ donned left slipper sock with SBA seated in wheelchair. 4   Toileting Functional Level: 3 (Mod A)  Comments:  (CM performed Mod A; hygiene set-up/ supv)  5   Bladder 0 0   Bowel  0 0   Toilet Transfer [place] Toilet Transfer Score: 4 (Stand step xfer (Min A) using RW; PWB R LE; gait belt)  Comments:  (Elevated seat over commode; Stand step xfer (Min A) using RW)     Tub/Shower Transfer [place] Tub or Shower Type: Tub/Shower combination  Tub/Shower Transfer Score: 0 (Not assessed at this time 2/2 pain R LE and safety/ balance)  Comments: Not assessed at this time 2/2 pain R LE and safety/ balance       Comprehension Primary Mode of Comprehension: Auditory  Score: 5 Primary Mode of Comprehension: Auditory  Score: 5     Expression Primary Mode of Expression: Verbal  Score: 5 Primary Mode of Expression: Verbal  Score: 6   Social Interaction Score: 5 Score: 5   Problem Solving Score: 5 Score: 5   Memory Score: 5 Score: 5     FIM SCORES Initial Assessment Weekly Progress Assessment 5/9/2022   Bed/Chair/Wheelchair Transfers Transfer Type:  Other  Other: stand step with RW  Transfer Assistance : 4 (Minimal assistance)  Sit to Stand Assistance: Minimal assistance  Car Transfers: Not tested (pt declined 2/2 pain)  Car Type: N/A Other: stand step with RW  Transfer Assistance : 5 (Supervision/setup)   Bed Mobility Rolling Right 4 (Minimal assistance)   Rolling Left 4 (Minimal assistance)   Supine to Sit 5 (Supervision)   Sit to Stand Minimal assistance   Sit to Supine 4 (Minimal assistance)    Rolling Right   7 (Independent)   Rolling Left   7 (Independent)   Supine to Sit   7 (Independent)   Sit to Stand    supervision   Sit to Supine   7 (Independent)      Locomotion (W/C) Able to Propel (ft): 270 feet  Functional Level: 4  Curbs/Ramps Assist Required (FIM Score): 0 (Not tested)  Wheelchair Setup Assist Required : 3 (Moderate assistance)  Wheelchair Management: Manages left brake,Manages right brake Function 6  Setup Assistance    ad suhail on even and uneven surfaces     Locomotion (W/C distance)       Locomotion (Walk) 4 (Minimal assistance) 5 (Stand-by assistance)  Walker, rolling   Locomotion (Walk dist.) 6 Feet (ft) 60 Feet (ft) (4 times with seated rest breaks between trials)   Steps/Stairs Steps/Stairs Ambulated (#): 0  Level of Assist : 0 (Not tested) (2/2 pt ambulating only short distances 2/2 pain)  Rail Use: None  4 steps with B rails and SBA         Nursing:     Neuro:   AAA&O x  4          Respiratory:   [x] WNL   [] O2 LPM:   Other:  Peripheral Vascular:   [] TEDS present   [] Edema present ____ Grade   Cardiac:   [x] WNL   [] Other  Genitourinary:   [x] continent   [] incontinent   [] morris  Abdominal __5/9/22_____ LBM  GI: ___cardia regular____ Diet ___thin___ Liquids _____ tube feeds  Musculoskeletal: __active__ ROM Transfers _wheelchair____ Assistive Device Used  __x1__ Level of Assistance  Skin Integumentary:   [x] Intact   [] Not Intact   __repositioning________Preventative Measures  Details____right leg incision from ORIF__________________________________________________________  Pain: [x] Controlled   [] Not Controlled   Pain Meds:   [x] Scheduled   [x] PRN        Interdisciplinary Team Goals:     1.  Discipline  Physical Therapy    Goal  Pt will perform transfers and ambulation mod I with RW    Barrier  pain, decreased LE strength, decreased dynamic standing balance    Intervention  gait training, transfer training, LE strengthening, balance activities    Goal written by:   Tiffanie Pineda, PT     2. Discipline  Occupational Therapy    Goal  Pt will perform LB self cares with supervision and AE. Barrier  impaired BUE strength, impaired forward flexion    Intervention  ADL training, BUE strengthening    Goal written by:  Louis CHRISTINE     3. Discipline  Speech Therapy    Goal      Barrier      Intervention      Goal written by:       4. Discipline  Nursing    Goal  Patient's pain level will be less than 8/10 with activity and less than 5/10 at rest.    Barrier Surgical incision from ORIF to right leg    Intervention Repositioning, keeping leg elevated when in bed and at rest, assess pain level every 4 hours and administer scheduled pain medication throughout the day and PRN pain medication for breakthrough pain. Goal written by:  Tatianna Shea RN     5. Discipline  Clinical Psychology    Goal  Minimize stress with forced dependency in recovery    Barrier  Situational stressors with injury and forced dependency    Intervention  Support  and behavioral redirection, as needed    Goal written by:  Guanaco [place], PhD         Disposition / Discharge Planning:      Follow-up services:  [x] Physical Therapy             [x] Occupational Therapy       [] Speech Therapy           [] Skilled Nursing      [] Medical Social Worker   [] Aide        [] Outpatient      [] vs   [x] Home Health  [] vs       [] to progress to outpatient       [] with 24-hour supervision       [] with 24-hour assistance   [] Esa TAMEZ recommendations:  RW   Estimated discharge date:  5/12/22   Discharge Location:  [x] Home  [] versus    [] Esa Armendariz    [] [address] [place] [address]   [] Other:           Electronic Signatures:      Signature Date Signed   Physical Therapist    Tiffanie Pineda, PT  5/10/2022   Occupational Therapist   Louis CHRISTINE  5/9/22   Speech Therapist Recreational Therapist    Tammy Randall 5/9/2022   Nursing    Sunil Macedo, SHAWN 5/9/2022   Clinical Psychologist    Ej Polo, PhD  5/9/2022    Physician    Allie Kevin MD   5/9/2022               Opportunity to share with family/caregiver[] YES [] NO    Relationship to patient____________________________________________________      The above information has been reviewed with the patient in a language that they can understand. Opportunity for comments and questions has been provided and a signed attestation has been scanned into the \"media tab\" of the EMR.       Patient Signature: ______________________________________________________    Date Signed: __________________________________________________________

## 2022-05-09 NOTE — PROGRESS NOTES
Problem: Falls - Risk of  Goal: *Absence of Falls  Description: Document Karma Tao Fall Risk and appropriate interventions in the flowsheet.   Outcome: Progressing Towards Goal  Note: Fall Risk Interventions:  Mobility Interventions: Assess mobility with egress test,Bed/chair exit alarm,Patient to call before getting OOB,PT Consult for assist device competence,Strengthening exercises (ROM-active/passive),Utilize walker, cane, or other assistive device,Utilize gait belt for transfers/ambulation    Mentation Interventions: Bed/chair exit alarm,Gait belt with transfers/ambulation,Increase mobility,More frequent rounding,Room close to nurse's station,Toileting rounds    Medication Interventions: Bed/chair exit alarm,Evaluate medications/consider consulting pharmacy,Patient to call before getting OOB,Teach patient to arise slowly,Utilize gait belt for transfers/ambulation    Elimination Interventions: Bed/chair exit alarm,Call light in reach,Elevated toilet seat,Stay With Me (per policy)    History of Falls Interventions: Bed/chair exit alarm,Door open when patient unattended,Room close to nurse's station,Utilize gait belt for transfer/ambulation,Assess for delayed presentation/identification of injury for 48 hrs (comment for end date)         Problem: Patient Education: Go to Patient Education Activity  Goal: Patient/Family Education  Outcome: Progressing Towards Goal     Problem: Pain  Goal: *Control of Pain  Outcome: Progressing Towards Goal     Problem: Patient Education: Go to Patient Education Activity  Goal: Patient/Family Education  Outcome: Progressing Towards Goal     Problem: Patient Education: Go to Patient Education Activity  Goal: Patient/Family Education  Outcome: Progressing Towards Goal     Problem: Patient Education: Go to Patient Education Activity  Goal: Patient/Family Education  Outcome: Progressing Towards Goal     Problem: Pressure Injury - Risk of  Goal: *Prevention of pressure injury  Description: Document Jonathan Scale and appropriate interventions in the flowsheet. Outcome: Progressing Towards Goal  Note: Pressure Injury Interventions:  Sensory Interventions: Assess changes in LOC,Assess need for specialty bed,Chair cushion,Minimize linen layers,Pad between skin to skin,Pressure redistribution bed/mattress (bed type),Turn and reposition approx.  every two hours (pillows and wedges if needed)    Moisture Interventions: Absorbent underpads,Limit adult briefs,Maintain skin hydration (lotion/cream),Minimize layers,Moisture barrier    Activity Interventions: Assess need for specialty bed,Chair cushion,Increase time out of bed,Pressure redistribution bed/mattress(bed type),PT/OT evaluation    Mobility Interventions: Assess need for specialty bed,Chair cushion,Float heels,HOB 30 degrees or less,Pressure redistribution bed/mattress (bed type),PT/OT evaluation    Nutrition Interventions: Document food/fluid/supplement intake,Discuss nutritional consult with provider    Friction and Shear Interventions: Apply protective barrier, creams and emollients,Lift sheet,HOB 30 degrees or less,Minimize layers                Problem: Patient Education: Go to Patient Education Activity  Goal: Patient/Family Education  Outcome: Progressing Towards Goal

## 2022-05-09 NOTE — PROGRESS NOTES
SHIFT CHANGE NOTE FOR [facility]    Bedside and Verbal shift change report given to Stanley Adams RN (oncoming nurse) by Betito Hayden RN (offgoing nurse). Report included the following information SBAR, Kardex, MAR and Recent Results.     Situation:   Code Status: Full Code   Hospital Day: 11   Problem List:   Hospital Problems  Date Reviewed: 5/9/2022          Codes Class Noted POA    Methadone dependence ([address] (Chronic) ICD-10-CM: F11.20  ICD-9-CM: 304.00  Unknown Yes        Orthopedic aftercare for healing traumatic lower leg fracture, right, closed ICD-10-CM: S82.91XD  ICD-9-CM: V54.16  4/22/2022 Yes    Overview Addendum 5/2/2022 10:49 AM by Venkatesh Duran MD     S/P Closed IM nailing of the right tibia using the Synthes IM nail system with a double lock proximally and triple lock distally (4/22/2022 - Dr. Reddy Lin)             Alcohol abuse ICD-10-CM: F10.10  ICD-9-CM: 305.00  4/22/2022 Yes        HTN (hypertension) ICD-10-CM: I10  ICD-9-CM: 401.9  4/22/2022 Yes        * (Principal) Closed displaced comminuted fracture of shaft of right tibia with routine healing ICD-10-CM: S82.251D  ICD-9-CM: V54.16  4/22/2022 Yes        Closed fracture of distal end of right fibula with routine healing ICD-10-CM: S82.831D  ICD-9-CM: V54.16  4/22/2022 Yes        Impaired mobility and ADLs ICD-10-CM: Z74.09, Z78.9  ICD-9-CM: V49.89  4/22/2022 Yes        Acute blood loss as cause of postoperative anemia ICD-10-CM: D62  ICD-9-CM: 285.1  4/22/2022 Yes        Iron deficiency anemia, unspecified ICD-10-CM: D50.9  ICD-9-CM: 280.9  8/5/2012 Yes              Background:   Past Medical History:   Past Medical History:   Diagnosis Date    Abscess of right shoulder     Abscess of shoulder     Acute blood loss as cause of postoperative anemia 4/22/2022    Arthritis     Closed displaced comminuted fracture of shaft of right tibia with routine healing 04/22/2022    Closed fracture of distal end of right fibula with routine healing 4/22/2022    Epidural abscess     Heart murmur     Hematuria     Heroin abuse (HCC)     Hypertension     Infected wound     Infectious disease     Iron deficiency anemia     IV drug user     Liver disease     [facility]adone dependence ([facility] [address]     Tobacco abuse         Assessment:   Changes in Assessment throughout shift: No change to previous assessment     Patient has a central line: no Reasons if yes: Insertion date: Last dressing date:   Patient has Benitez Cath: no Reasons if yes:     Insertion date:  \"}     Last Vitals:     Vitals:    05/08/22 1502 05/08/22 2035 05/09/22 0817 05/09/22 1620   BP: 136/82 138/68 (!) 146/74 (!) 146/79   Pulse: 65 62 65 65   Resp: 18 19 16 16   Temp: 97.4 °F (36.3 °C) 97.6 °F (36.4 °C) 97.7 °F (36.5 °C) 98 °F (36.7 °C)   SpO2: 97% 96% 100% 98%   Height:            PAIN    Pain Assessment    Pain Intensity 1: 7 (05/09/22 1904) Pain Intensity 1: 2 (12/29/14 1105)    Pain Location 1: Ankle,Leg Pain Location 1: Abdomen    Pain Intervention(s) 1: Medication (see MAR) Pain Intervention(s) 1: Medication (see MAR)  Patient Stated Pain Goal: 0 Patient Stated Pain Goal: 0  o Intervention effective: yes  o Other actions taken for pain: Medication (see MAR)     Skin Assessment  Skin color    Condition/Temperature    Integrity Skin Integrity: Incision (comment) (ORIF of right leg)  Turgor    Weekly Pressure Ulcer Documentation  Pressure  Injury Documentation: No Pressure Injury Noted-Pressure Ulcer Prevention Initiated  Wound Prevention & Protection Methods  Orientation of wound Orientation of Wound Prevention: Posterior  Location of Prevention Location of Wound Prevention: Sacrum/Coccyx  Dressing Present Dressing Present : No  Dressing Status    Wound Offloading Wound Offloading (Prevention Methods): Bed, pressure redistribution/air,Bed, pressure reduction mattress,Repositioning,Wheelchair     INTAKE/OUPUT  Date 05/08/22 1900 - 05/09/22 0659 05/09/22 0700 - 05/10/22 0659   Shift 7656-0814 24 Hour Total 8451-6820 2137-7034 24 Hour Total   INTAKE   P.O.  720 1080  1080     P. O.  720 1080  1080   Shift Total  720 1080  1080   OUTPUT   Urine  1400 1100  1100     Urine Voided  1400 1100  1100     Urine Occurrence(s) 3 x 9 x 4 x  4 x   Stool          Stool Occurrence(s) 0 x 0 x 1 x  1 x   Shift Total  1400 1100  1100   NET  -680 -20  -20   Weight (kg)            Recommendations:  1. Patient needs and requests: TOILETING, URINAL    2. Pending tests/procedures: LABS PENDING     3. Functional Level/Equipment: Partial (one person) / Wheelchair;Stabilization belt;Bed (comment)    Fall Precautions:   Fall risk precautions were reinforced with the patient; he was instructed to call for help prior to getting up. The following fall risk precautions were continued: bed/ chair alarms, door signage, yellow bracelet and socks as well as update of the Татьяна  tool in the patient's room. Ezio Score: 4    HEALS Safety Check    A safety check occurred in the patient's room between off going nurse and oncoming nurse listed above. The safety check included the below items  Area Items   H  High Alert Medications - Verify all high alert medication drips (heparin, PCA, etc.)   E  Equipment - Suction is set up for ALL patients (with devin)  - Red plugs utilized for all equipment (IV pumps, etc.)  - WOWs wiped down at end of shift.  - Room stocked with oxygen, suction, and other unit-specific supplies   A  Alarms - Bed alarm is set for fall risk patients  - Ensure chair alarm is in place and activated if patient is up in a chair   L  Lines - Check IV for any infiltration  - Benitez bag is empty if patient has a Benitez   - Tubing and IV bags are labeled   S  Safety   - Room is clean, patient is clean, and equipment is clean. - Hallways are clear from equipment besides carts.    - Fall bracelet on for fall risk patients  - Ensure room is clear and free of clutter  - Suction is set up for ALL patients (with devin)  - Hallways are clear from equipment besides carts.    - Isolation precautions followed, supplies available outside room, sign posted     Joshua Akers RN

## 2022-05-09 NOTE — PROGRESS NOTES
[facility] PHYSICAL REHABILITATION  [address] [place] [address], [address]     INPATIENT REHABILITATION  DAILY PROGRESS NOTE     Date: 5/9/2022    Name: Melchor Marie Age / Sex: 74 y.o. / male   CSN: [id number] MRN: [id number]   [facility] Date: 4/28/2022 Length of Stay: 11 days     Primary Rehabilitation Diagnosis: Impaired Mobility and ADLs secondary to:  1. Closed displaced comminuted fracture of distal third of shaft of right tibia with routine healing  2. Closed fracture of distal end of right fibula with routine healing  3. S/P Closed IM nailing of the right tibia using the Synthes IM nail system with a double lock proximally and triple lock distally (4/22/2022 - Dr. David Weathers)      Subjective:     Patient seen and examined. Blood pressure controlled. Patient's Complaint:   No significant medical complaints    Pain Control: ongoing significant pain in which is stable and controlled by current meds      Objective:     Vital Signs:  Patient Vitals for the past 24 hrs:   BP Temp Pulse Resp SpO2   05/09/22 0817 (!) 146/74 97.7 °F (36.5 °C) 65 16 100 %   05/08/22 2035 138/68 97.6 °F (36.4 °C) 62 19 96 %   05/08/22 1502 136/82 97.4 °F (36.3 °C) 65 18 97 %   05/08/22 1235 (!) 140/70 -- 68 -- --        Physical Examination:  GENERAL SURVEY: Patient is awake, alert, oriented x 3, sitting comfortably on the wheelchair, not in acute respiratory distress.   HEENT: pale palpebral conjunctivae, anicteric sclerae, no nasoaural discharge, moist oral mucosa  NECK: supple, no jugular venous distention, no palpable lymph nodes  CHEST/LUNGS: symmetrical chest expansion, good air entry, clear breath sounds  HEART: adynamic precordium, good S1 S2, no S3, regular rhythm, no murmurs  ABDOMEN: flat, bowel sounds appreciated, soft, non-tender  EXTREMITIES: pale nailbeds, no edema, full and equal pulses, no calf tenderness   NEUROLOGICAL EXAM: The patient is awake, alert and oriented x3, able to answer questions fairly appropriately, able to follow 1 and 2 step commands. Able to tell time from the wall clock. Cranial nerves II-XII are grossly intact. No gross sensory deficit. Motor strength is 4+/5 on BUE and LLE, 4- to 4/5 on the RLE.        Current Medications:  Current Facility-Administered Medications   Medication Dose Route Frequency    telmisartan (MICARDIS) tablet 10 mg  10 mg Oral DAILY    pregabalin (LYRICA) capsule 75 mg  75 mg Oral TID    melatonin tablet 3 mg  3 mg Oral PCD    polyethylene glycol (MIRALAX) packet 17 g  17 g Oral DAILY    acetaminophen (TYLENOL) tablet 650 mg  650 mg Oral Q4H PRN    acetaminophen (TYLENOL) tablet 650 mg  650 mg Oral TID    oxyCODONE IR (ROXICODONE) tablet 10 mg  10 mg Oral Q4H PRN    bisacodyL (DULCOLAX) tablet 10 mg  10 mg Oral DAILY PRN    pantoprazole (PROTONIX) tablet 40 mg  40 mg Oral ACB    amLODIPine (NORVASC) tablet 10 mg  10 mg Oral DAILY    aspirin chewable tablet 81 mg  81 mg Oral BID    ferrous sulfate tablet 325 mg  325 mg Oral BID WITH MEALS    folic acid (FOLVITE) tablet 1 mg  1 mg Oral DAILY    methadone (DOLOPHINE) tablet 45 mg  45 mg Oral DAILY    therapeutic multivitamin (THERAGRAN) tablet 1 Tablet  1 Tablet Oral DAILY    thiamine HCL (B-1) tablet 100 mg  100 mg Oral DAILY    docusate sodium (COLACE) capsule 100 mg  100 mg Oral BID       Allergies:  No Known Allergies      Lab/Data Review:  Recent Results (from the past 24 hour(s))   HGB & HCT    Collection Time: 05/09/22  6:45 AM   Result Value Ref Range    HGB 10.5 (L) 13.0 - 16.0 g/dL    HCT 34.8 (L) 36.0 - 78.5 %   METABOLIC PANEL, BASIC    Collection Time: 05/09/22  6:45 AM   Result Value Ref Range    Sodium 139 136 - 145 mmol/L    Potassium 4.4 3.5 - 5.5 mmol/L    Chloride 108 100 - 111 mmol/L    CO2 28 21 - 32 mmol/L    Anion gap 3 3.0 - 18 mmol/L    Glucose 138 (H) 74 - 99 mg/dL    BUN 8 7.0 - 18 MG/DL    Creatinine 0.94 0.6 - 1.3 MG/DL    BUN/Creatinine ratio 9 (L) 12 - 20      GFR est AA >60 >60 ml/min/1.73m2    GFR est non-AA >60 >60 ml/min/1.73m2    Calcium 9.1 8.5 - 10.1 MG/DL        Assessment:     Primary Rehabilitation Diagnosis  1. Impaired Mobility and ADLs  2. Closed displaced comminuted fracture of distal third of shaft of right tibia with routine healing  3. Closed fracture of distal end of right fibula with routine healing  4. S/P Closed IM nailing of the right tibia using the Synthes IM nail system with a double lock proximally and triple lock distally (4/22/2022 - Dr. Derek Babb)    Comorbidities  Patient Active Problem List   Diagnosis Code    Epidural abscess, L2-L5 G06.1    Polysubstance abuse ([address] F19.10    Leukopenia D72.819    Iron deficiency anemia, unspecified D50.9    Anemia D64.9    Thrombocytopenia ([address] D69.6    Orthopedic aftercare for healing traumatic lower leg fracture, right, closed S82. 91XD    Metatarsal fracture S92.309A    Alcohol abuse F10.10    HTN (hypertension) I10    Liver mass R16.0    Bladder mass N32.89    Hematuria R31.9    Closed displaced comminuted fracture of shaft of right tibia with routine healing S82.251D    Closed fracture of distal end of right fibula with routine healing S82.831D    Impaired mobility and ADLs Z74.09, Z78.9    Acute blood loss as cause of postoperative anemia D62    Methadone dependence (HCC) F11.20       Plan:     1. Justification for continued stay: Good progression towards established rehabilitation goals. 2. Medical Issues being followed closely:    [x]  Fall and safety precautions     [x]  Wound Care     [x]  Bowel and Bladder Function     [x]  Fluid Electrolyte and Nutrition Balance     [x]  Pain Control      3.  Issues that 24 hour rehabilitation nursing is following:    [x]  Fall and safety precautions     [x]  Wound Care     [x]  Bowel and Bladder Function     [x]  Fluid Electrolyte and Nutrition Balance     [x]  Pain Control      [x]  Assistance with and education on in-room safety with transfers to and from the bed, wheelchair, toilet and shower. 4. Acute rehabilitation plan of care:    [x]  Continue current care and rehab. [x]  Physical Therapy           [x]  Occupational Therapy           []  Speech Therapy     []  Hold Rehab until further notice     5. Medications:    [x]  MAR Reviewed     [x]  Continue Present Medications     6. Chemical DVT Prophylaxis:      []  Enoxaparin     []  Unfractionated Heparin     []  Warfarin     []  NOAC     [x]  Aspirin 81 mg PO BID (as per Orthopedics)     []  None     7. Mechanical DVT Prophylaxis:      []  RADHA Stockings     [x]  Sequential Compression Device on LLE     []  None     8. GI Prophylaxis:      [x]  PPI     []  H2 Blocker     []  None / Not indicated     9. Code status:    [x]  Full code     []  Partial code     []  Do not intubate     []  Do not resuscitate     10. Diet:  Specifications  Low fat/Low cholesterol/High fiber/2 gm Na   Solids (consistency)  Regular   Liquids (consistency)  Thin   Fluid Restriction  None     11. Orders:   > Closed displaced comminuted fracture of distal third of shaft of right tibia with routine healing; Closed fracture of distal end of right fibula with routine healing; S/P Closed IM nailing of the right tibia using the Synthes IM nail system with a double lock proximally and triple lock distally (4/22/2022 - Dr. Shilo Omalley)   > Partial weightbearing on the right lower extremity using a rolling walker    > Acute postoperative blood loss anemia;  Iron deficiency anemia   > Hgb/Hct (4/29/2022, on admission to the ARU) = 8.9/28.6   > Hgb/Hct (5/3/2022) = 10.3/33.7    > Hgb/Hct (5/9/2022) = 10.5/34.8    > Continue Ferrous sulfate 325 mg PO BID with meals    > Alcohol abuse   > Continue:    > Folic acid 1 mg PO once daily    > Multivitamin 1 tab PO once daily    > Thiamine 100 mg PO once daily    > Constipation   > Continue:    > Docusate sodium 100 mg PO BID    > Miralax 17 grams in 8 oz water PO once daily    > Hypertension   > On 5/8/2022, started Telmisartan 10 mg PO once daily (9AM)   > Continue:    > Amlodipine 10 mg PO once daily (9AM)    > Telmisartan 10 mg PO once daily (9AM)    > Methadone dependence   > Continue Methadone 45 mg PO once daily    > Fever, etiology to be determined   > As per note of Dr. Chandra Bailey dated 4/30/2022, patient had a fever and a work up was initiated   > Work-up:    > WBC count (4/29/2022, on admission to the ARU) = 3.1    > Blood culture x 2 sets (collected 4/29/2022, resulted as of 5/6/2022) yielded no growth after 6 days    > Chest x-ray (4/29/2022) showed perhaps mild bronchial wall thickening    > Urinalysis (4/29/2022): WNL    > Urine culture (collected 4/29/2022, resulted 5/2/2022) yielded growth of 20,000 colonies/ml of Escherichia coli   > WBC count (5/3/2022) = 2.9   > No fever over the past 24 hours   > No antibiotics for now    > Difficulty sleeping   > On 5/3/2022, started Melatonin 3 mg PO daily after dinner   > Continue Melatonin 3 mg PO daily after dinner    > Analgesia   > On 5/3/2022, started:    > Acetaminophen 650 mg PO TID (8AM, 12PM, 4PM)    > Pregabalin 50 mg PO TID (8AM, 2PM, 8PM)   > On 5/6/2022, increased Pregabalin from 50 mg to 75 mg PO TID (8AM, 2PM, 8PM)   > Continue:    > Acetaminophen 650 mg PO TID (8AM, 12PM, 4PM)    > Acetaminophen 650 mg PO q 4 hr PRN for pain level 4/10 or lesser (from 8PM to 4AM only)    > Pregabalin 75 mg PO TID (8AM, 2PM, 8PM)    > Oxycodone 10 mg PO q 4 hr PRN for pain level 5/10 or greater       12. Personal Protective Equipment (N95 face mask) was used while interacting with the patient. Patient was using a surgical mask. 15. Patient's progress in rehabilitation and medical issues discussed with the patient. All questions answered to the best of my ability. Care plan discussed with patient and nurse.     14. Total clinical care time is 30 minutes, including review of chart including all labs, radiology, past medical history, and discussion with patient. Greater than 50% of my time was spent in coordination of care and counseling.       Signed:    Jefry Diaz MD    May 9, 2022

## 2022-05-09 NOTE — PROGRESS NOTES
Problem: Mobility Impaired (Adult and Pediatric)  Goal: *Acute Goals and Plan of Care (Insert Text)  Description: Physical Therapy Short Term Goals  Initiated 4/29/2022 and re-assessed 5/6/2022to be accomplished within 5-7 day(s) (5/13/2022)  1. Patient will move from supine to sit and sit to supine , scoot up and down, and roll side to side in bed with supervision/set-up. Goal met-currently supervision 5/6/2022  2. Patient will transfer from bed to chair and chair to bed with supervision/set-up using the least restrictive device. Goal partially met-currently SBA 5/6/2022  3. Patient will perform sit to stand with supervision/set-up. Goal met 5/6/2022  4. Patient will ambulate with supervision/set-up for 50 feet with the least restrictive device. Partially met 5/6/2022-150 ft with RW and SBA  5. Patient will ascend/descend 2 stairs with B handrail(s) with minimal assistance/contact guard assist. Goal met 5/6/2022    Physical Therapy Long Term Goals  Initiated 4/29/2022 and to be accomplished within 10-14 day(s) (5/13/2022)  1. Patient will move from supine to sit and sit to supine , scoot up and down, and roll side to side in bed with modified independence. 2.  Patient will transfer from bed to chair and chair to bed with modified independence using the least restrictive device. 3.  Patient will perform sit to stand with modified independence. 4.  Patient will ambulate with modified independence for 150 feet with the least restrictive device. 5.  Patient will ascend/descend 2 stairs with B handrail(s) with modified independence.       Outcome: Progressing Towards Goal  PHYSICAL THERAPY TREATMENT    Patient: Trudy Yeung (47 y.o. male)  Date: 5/9/2022  Diagnosis: Tibia/fibula fracture [S82.209A, S82.409A] Closed displaced comminuted fracture of shaft of right tibia with routine healing  Precautions: Fall,Other (comment) (PWB Right LE)  Chart, physical therapy assessment, plan of care and goals were reviewed. Time In:0930  Time Out:1030  Time In: 1500  Time Out: 4735    Patient seen for: Gait training;Patient education; Therapeutic exercise;Transfer training    Pain:  Pt pain was reported as 3 pre-treatment. Pt pain was reported as 6 post-treatment. Intervention: rest, elevation    Patient identified with name and :yes    SUBJECTIVE:      \"I feel like things are starting to get better. \"    OBJECTIVE DATA SUMMARY:    Objective:     GROSS ASSESSMENT Daily Assessment            BED/MAT MOBILITY Daily Assessment     Rolling Right : 7 (Independent)  Rolling Left : 7 (Independent)  Supine to Sit : 7 (Independent)  Sit to Supine : 7 (Independent)      TRANSFERS Daily Assessment     Other: stand step with RW  Transfer Assistance : 5 (Supervision/setup)        GAIT Daily Assessment    Gait Description (WDL)      Gait Abnormalities Antalgic; Step to gait    Assistive device Walker, rolling    Ambulation assistance - level surface 5 (Stand-by assistance)    Distance 60 Feet (ft) (4 times with seated rest breaks between trials)    Ambulation assistance- uneven surface  SBA/CGA for 90 feet with RW on uneven surfaces including inclines and declines; frequent cues to stay close to the walker and maintain proper weight bearing    Comments Verbal cues for proper positioning in the RW in order to not be too far forward        STEPS/STAIRS Daily Assessment     Steps/Stairs ambulated 4    Assistance Required 4 (Contact guard assistance)    Rail Use Both    Comments  Marking time pattern, maintained PWB well    Curbs/Ramps          BALANCE Daily Assessment     Sitting - Static: Good (unsupported)  Sitting - Dynamic: Good (unsupported)  Standing - Static: Good (with UE support)        WHEELCHAIR MOBILITY Daily Assessment     Functional Level: 6        THERAPEUTIC EXERCISES Daily Assessment       Seated LE exercises 2x15: LAQ, marching  Supine LE exercises 2x10: hip abd/add and SLR          ASSESSMENT:  Pt is progressing well towards PT goals and remains very motivated. Pt is performing transfers and ambulation at a supervision/SBA level with RW and occasional cues for safety. Pt will benefit from continued PT to address gait, transfers, stair training and car transfers prior to d/c on 5/12/22. Progression toward goals:  [x]      Improving appropriately and progressing toward goals  []      Improving slowly and progressing toward goals  []      Not making progress toward goals and plan of care will be adjusted      PLAN:  Patient continues to benefit from skilled intervention to address the above impairments. Continue treatment per established plan of care. Discharge Recommendations:  Home Physical Therapy  Further Equipment Recommendations for Discharge:  rolling walker       Estimated Discharge Date:5/12/22    Activity Tolerance:   Fair+  Please refer to the flowsheet for vital signs taken during this treatment.     After treatment:   [] Patient left in no apparent distress in bed  [] Patient left in no apparent distress sitting up in chair  [x] Patient left in no apparent distress in w/c mobilizing under own power  [] Patient left in no apparent distress dining area  [] Patient left in no apparent distress mobilizing under own power  [] Call bell left within reach  [] Nursing notified  [] Caregiver present  [] Bed alarm activated   [x] Chair alarm activated      Michaelle Anna, PT  5/9/2022

## 2022-05-09 NOTE — PROGRESS NOTES
Spoke with patient re: scheduling caregiver education. Pt had previously asked for an opportunity to speak with family about it prior to this writer calling. Pt was able to call Mr. Isi Moon while this writer was in the room. Mr. Johanna Yee reports that he would be able to attend a session after he gets off work on Wednesday, 5/11. Agreed to attend caregiver education at 345/4pm Wednesday 5/11. Spoke with treatment team about reported level of assistance that patient would have at home. Team shares that they feel patient would benefit from support during meal prep and leaving that home for appointments secondary to stair management.     Filomena Glilette, MARYLUS

## 2022-05-10 PROCEDURE — 97530 THERAPEUTIC ACTIVITIES: CPT

## 2022-05-10 PROCEDURE — 65310000000 HC RM PRIVATE REHAB

## 2022-05-10 PROCEDURE — 74011250637 HC RX REV CODE- 250/637: Performed by: EMERGENCY MEDICINE

## 2022-05-10 PROCEDURE — 99232 SBSQ HOSP IP/OBS MODERATE 35: CPT | Performed by: INTERNAL MEDICINE

## 2022-05-10 PROCEDURE — 74011250637 HC RX REV CODE- 250/637: Performed by: INTERNAL MEDICINE

## 2022-05-10 PROCEDURE — 97110 THERAPEUTIC EXERCISES: CPT

## 2022-05-10 PROCEDURE — 2709999900 HC NON-CHARGEABLE SUPPLY

## 2022-05-10 PROCEDURE — 97535 SELF CARE MNGMENT TRAINING: CPT

## 2022-05-10 PROCEDURE — 97116 GAIT TRAINING THERAPY: CPT

## 2022-05-10 RX ORDER — TELMISARTAN 20 MG/1
10 TABLET ORAL ONCE
Status: COMPLETED | OUTPATIENT
Start: 2022-05-10 | End: 2022-05-10

## 2022-05-10 RX ORDER — TELMISARTAN 20 MG/1
20 TABLET ORAL DAILY
Status: DISCONTINUED | OUTPATIENT
Start: 2022-05-11 | End: 2022-05-12 | Stop reason: HOSPADM

## 2022-05-10 RX ADMIN — METHADONE HYDROCHLORIDE 45 MG: 10 TABLET ORAL at 08:23

## 2022-05-10 RX ADMIN — FOLIC ACID 1 MG: 1 TABLET ORAL at 08:23

## 2022-05-10 RX ADMIN — Medication 3 MG: at 17:11

## 2022-05-10 RX ADMIN — FERROUS SULFATE TAB 325 MG (65 MG ELEMENTAL FE) 325 MG: 325 (65 FE) TAB at 17:11

## 2022-05-10 RX ADMIN — PREGABALIN 75 MG: 75 CAPSULE ORAL at 08:23

## 2022-05-10 RX ADMIN — FERROUS SULFATE TAB 325 MG (65 MG ELEMENTAL FE) 325 MG: 325 (65 FE) TAB at 08:23

## 2022-05-10 RX ADMIN — ACETAMINOPHEN 650 MG: 325 TABLET ORAL at 12:14

## 2022-05-10 RX ADMIN — TELMISARTAN 10 MG: 20 TABLET ORAL at 08:23

## 2022-05-10 RX ADMIN — THERA TABS 1 TABLET: TAB at 08:23

## 2022-05-10 RX ADMIN — TELMISARTAN 10 MG: 20 TABLET ORAL at 12:15

## 2022-05-10 RX ADMIN — Medication 100 MG: at 08:23

## 2022-05-10 RX ADMIN — ACETAMINOPHEN 650 MG: 325 TABLET ORAL at 08:23

## 2022-05-10 RX ADMIN — OXYCODONE HYDROCHLORIDE 10 MG: 5 TABLET ORAL at 04:22

## 2022-05-10 RX ADMIN — OXYCODONE HYDROCHLORIDE 10 MG: 5 TABLET ORAL at 20:59

## 2022-05-10 RX ADMIN — ACETAMINOPHEN 650 MG: 325 TABLET ORAL at 17:11

## 2022-05-10 RX ADMIN — DOCUSATE SODIUM 100 MG: 100 CAPSULE, LIQUID FILLED ORAL at 08:23

## 2022-05-10 RX ADMIN — ASPIRIN 81 MG CHEWABLE TABLET 81 MG: 81 TABLET CHEWABLE at 17:11

## 2022-05-10 RX ADMIN — PREGABALIN 75 MG: 75 CAPSULE ORAL at 20:59

## 2022-05-10 RX ADMIN — PREGABALIN 75 MG: 75 CAPSULE ORAL at 14:57

## 2022-05-10 RX ADMIN — PANTOPRAZOLE 40 MG: 40 TABLET, DELAYED RELEASE ORAL at 06:45

## 2022-05-10 RX ADMIN — AMLODIPINE BESYLATE 10 MG: 10 TABLET ORAL at 08:23

## 2022-05-10 RX ADMIN — ASPIRIN 81 MG CHEWABLE TABLET 81 MG: 81 TABLET CHEWABLE at 08:23

## 2022-05-10 RX ADMIN — DOCUSATE SODIUM 100 MG: 100 CAPSULE, LIQUID FILLED ORAL at 17:11

## 2022-05-10 NOTE — PROGRESS NOTES
Problem: Falls - Risk of  Goal: *Absence of Falls  Description: Document Joyce Nettles Fall Risk and appropriate interventions in the flowsheet. Outcome: Progressing Towards Goal  Note: Fall Risk Interventions:  Mobility Interventions: Assess mobility with egress test,Bed/chair exit alarm    Mentation Interventions: Adequate sleep, hydration, pain control,Bed/chair exit alarm    Medication Interventions: Assess postural VS orthostatic hypotension,Bed/chair exit alarm    Elimination Interventions: Bed/chair exit alarm,Call light in reach,Patient to call for help with toileting needs,Toilet paper/wipes in reach,Urinal in reach    History of Falls Interventions: Bed/chair exit alarm         Problem: Patient Education: Go to Patient Education Activity  Goal: Patient/Family Education  Outcome: Progressing Towards Goal     Problem: Pain  Goal: *Control of Pain  Outcome: Progressing Towards Goal  Goal: *PALLIATIVE CARE:  Alleviation of Pain  Outcome: Progressing Towards Goal     Problem: Patient Education: Go to Patient Education Activity  Goal: Patient/Family Education  Outcome: Progressing Towards Goal     Problem: Pressure Injury - Risk of  Goal: *Prevention of pressure injury  Description: Document Jonathan Scale and appropriate interventions in the flowsheet.   Outcome: Progressing Towards Goal  Note: Pressure Injury Interventions:  Sensory Interventions: Assess changes in LOC    Moisture Interventions: Absorbent underpads    Activity Interventions: Increase time out of bed,Pressure redistribution bed/mattress(bed type),PT/OT evaluation    Mobility Interventions: Assess need for specialty bed,HOB 30 degrees or less    Nutrition Interventions: Document food/fluid/supplement intake    Friction and Shear Interventions: HOB 30 degrees or less,Minimize layers                Problem: Patient Education: Go to Patient Education Activity  Goal: Patient/Family Education  Outcome: Progressing Towards Goal

## 2022-05-10 NOTE — PROGRESS NOTES
SHIFT CHANGE NOTE FOR [facility]    Bedside and Verbal shift change report given to Bing Cueva, RN (oncoming nurse) by Edda Blood, SHAWN (offgoing nurse). Report included the following information SBAR, Kardex, MAR and Recent Results.     Situation:   Code Status: Full Code   Hospital Day: 12   Problem List:   Hospital Problems  Date Reviewed: 5/10/2022          Codes Class Noted POA    Methadone dependence ([address] (Chronic) ICD-10-CM: F11.20  ICD-9-CM: 304.00  Unknown Yes        Orthopedic aftercare for healing traumatic lower leg fracture, right, closed ICD-10-CM: S82.91XD  ICD-9-CM: V54.16  4/22/2022 Yes    Overview Addendum 5/2/2022 10:49 AM by Alondra Cruz MD     S/P Closed IM nailing of the right tibia using the Synthes IM nail system with a double lock proximally and triple lock distally (4/22/2022 - Dr. Luigi Matamoros)             Alcohol abuse ICD-10-CM: F10.10  ICD-9-CM: 305.00  4/22/2022 Yes        HTN (hypertension) ICD-10-CM: I10  ICD-9-CM: 401.9  4/22/2022 Yes        * (Principal) Closed displaced comminuted fracture of shaft of right tibia with routine healing ICD-10-CM: S82.251D  ICD-9-CM: V54.16  4/22/2022 Yes        Closed fracture of distal end of right fibula with routine healing ICD-10-CM: S82.831D  ICD-9-CM: V54.16  4/22/2022 Yes        Impaired mobility and ADLs ICD-10-CM: Z74.09, Z78.9  ICD-9-CM: V49.89  4/22/2022 Yes        Acute blood loss as cause of postoperative anemia ICD-10-CM: D62  ICD-9-CM: 285.1  4/22/2022 Yes        Iron deficiency anemia, unspecified ICD-10-CM: D50.9  ICD-9-CM: 280.9  8/5/2012 Yes              Background:   Past Medical History:   Past Medical History:   Diagnosis Date    Abscess of right shoulder     Abscess of shoulder     Acute blood loss as cause of postoperative anemia 4/22/2022    Arthritis     Closed displaced comminuted fracture of shaft of right tibia with routine healing 04/22/2022    Closed fracture of distal end of right fibula with routine healing 4/22/2022    Epidural abscess     Heart murmur     Hematuria     Heroin abuse (HCC)     Hypertension     Infected wound     Infectious disease     Iron deficiency anemia     IV drug user     Liver disease     [address]adone dependence ([facility] [address]     Tobacco abuse         Assessment:   Changes in Assessment throughout shift: No change to previous assessment     Patient has a central line: no Reasons if yes: Insertion date: Last dressing date:   Patient has Benitez Cath: no Reasons if yes:     Insertion date:  \"}     Last Vitals:     Vitals:    05/09/22 2208 05/10/22 0749 05/10/22 1214 05/10/22 1556   BP:  (!) 144/65 125/65 115/60   Pulse:  71 62 63   Resp:  18  19   Temp:  97.8 °F (36.6 °C)  98.5 °F (36.9 °C)   SpO2:  100%  99%   Weight: 84.4 kg (186 lb)      Height:            PAIN    Pain Assessment    Pain Intensity 1: 6 (05/10/22 1556) Pain Intensity 1: 2 (12/29/14 1105)    Pain Location 1: Leg Pain Location 1: Abdomen    Pain Intervention(s) 1: Medication (see MAR) Pain Intervention(s) 1: Medication (see MAR)  Patient Stated Pain Goal: 0 Patient Stated Pain Goal: 0  o Intervention effective: yes  o Other actions taken for pain: Medication (see MAR)     Skin Assessment  Skin color    Condition/Temperature    Integrity Skin Integrity: Incision (comment) (right leg ORIF)  Turgor    Weekly Pressure Ulcer Documentation  Pressure  Injury Documentation: No Pressure Injury Noted-Pressure Ulcer Prevention Initiated  Wound Prevention & Protection Methods  Orientation of wound Orientation of Wound Prevention: Posterior  Location of Prevention Location of Wound Prevention: Sacrum/Coccyx  Dressing Present Dressing Present : No  Dressing Status    Wound Offloading Wound Offloading (Prevention Methods): Bed, pressure redistribution/air,Bed, pressure reduction mattress,Repositioning,Wheelchair     INTAKE/OUPUT  Date 05/09/22 1900 - 05/10/22 0659 05/10/22 0700 - 05/11/22 0659   Shift 1765-0479 24 Hour Total 0855-9369 5850-3089 24 Hour Total   INTAKE   P.O.  1080 240  240     P. O.  1080 240  240   Shift Total(mL/kg)  1080(12.8) 240(2.8)  240(2.8)   OUTPUT   Urine(mL/kg/hr) 3300 4400        Urine Voided 3300 4400        Urine Occurrence(s) 0 x 4 x 1 x  1 x   Stool          Stool Occurrence(s) 0 x 1 x 1 x  1 x   Shift Total(mL/kg) 3300(39.1) 4400(52.2)      NET -3300 -3320 240  240   Weight (kg) 84.4 84.4 84.4 84.4 84.4       Recommendations:  1. Patient needs and requests: TOILETING, URINAL    2. Pending tests/procedures: LABS PENDING     3. Functional Level/Equipment:   / Wheelchair    Fall Precautions:   Fall risk precautions were reinforced with the patient; he was instructed to call for help prior to getting up. The following fall risk precautions were continued: bed/ chair alarms, door signage, yellow bracelet and socks as well as update of the Т[employer]  tool in the patient's room. Ezio Score: 4    HEALS Safety Check    A safety check occurred in the patient's room between off going nurse and oncoming nurse listed above. The safety check included the below items  Area Items   H  High Alert Medications - Verify all high alert medication drips (heparin, PCA, etc.)   E  Equipment - Suction is set up for ALL patients (with yanker)  - Red plugs utilized for all equipment (IV pumps, etc.)  - WOWs wiped down at end of shift.  - Room stocked with oxygen, suction, and other unit-specific supplies   A  Alarms - Bed alarm is set for fall risk patients  - Ensure chair alarm is in place and activated if patient is up in a chair   L  Lines - Check IV for any infiltration  - Benitez bag is empty if patient has a Benitez   - Tubing and IV bags are labeled   S  Safety   - Room is clean, patient is clean, and equipment is clean. - Hallways are clear from equipment besides carts.    - Fall bracelet on for fall risk patients  - Ensure room is clear and free of clutter  - Suction is set up for ALL patients (with yanker)  - Hallways are clear from equipment besides carts.    - Isolation precautions followed, supplies available outside room, sign posted     Kristopher Loomis RN

## 2022-05-10 NOTE — ROUTINE PROCESS
[facility]    Bedside and Verbal shift change report given to Landon Ziegler RN (oncoming nurse) by Lilian Douglas RN (offgoing nurse). Report included the following information SBAR, Kardex, MAR and Recent Results.     Situation:   Code Status: Full Code   Hospital Day: 12   Problem List:   Hospital Problems  Date Reviewed: 5/9/2022          Codes Class Noted POA    Methadone dependence ([address] (Chronic) ICD-10-CM: F11.20  ICD-9-CM: 304.00  Unknown Yes        Orthopedic aftercare for healing traumatic lower leg fracture, right, closed ICD-10-CM: S82.91XD  ICD-9-CM: V54.16  4/22/2022 Yes    Overview Addendum 5/2/2022 10:49 AM by Heide Morgan MD     S/P Closed IM nailing of the right tibia using the Synthes IM nail system with a double lock proximally and triple lock distally (4/22/2022 - Dr. Melody Porras)             Alcohol abuse ICD-10-CM: F10.10  ICD-9-CM: 305.00  4/22/2022 Yes        HTN (hypertension) ICD-10-CM: I10  ICD-9-CM: 401.9  4/22/2022 Yes        * (Principal) Closed displaced comminuted fracture of shaft of right tibia with routine healing ICD-10-CM: S82.251D  ICD-9-CM: V54.16  4/22/2022 Yes        Closed fracture of distal end of right fibula with routine healing ICD-10-CM: S82.831D  ICD-9-CM: V54.16  4/22/2022 Yes        Impaired mobility and ADLs ICD-10-CM: Z74.09, Z78.9  ICD-9-CM: V49.89  4/22/2022 Yes        Acute blood loss as cause of postoperative anemia ICD-10-CM: D62  ICD-9-CM: 285.1  4/22/2022 Yes        Iron deficiency anemia, unspecified ICD-10-CM: D50.9  ICD-9-CM: 280.9  8/5/2012 Yes              Background:   Past Medical History:   Past Medical History:   Diagnosis Date    Abscess of right shoulder     Abscess of shoulder     Acute blood loss as cause of postoperative anemia 4/22/2022    Arthritis     Closed displaced comminuted fracture of shaft of right tibia with routine healing 04/22/2022    Closed fracture of distal end of right fibula with routine healing 4/22/2022    Epidural abscess     Heart murmur     Hematuria     Heroin abuse (HCC)     Hypertension     Infected wound     Infectious disease     Iron deficiency anemia     IV drug user     Liver disease     [address]adone dependence ([facility] [address]     Tobacco abuse         Assessment:   Changes in Assessment throughout shift: No change to previous assessment    Patient has a central line: no Patient has Benitez Cath: no    Last Vitals:     Vitals:    05/09/22 0817 05/09/22 1620 05/09/22 2000 05/09/22 2208   BP: (!) 146/74 (!) 146/79 125/65    Pulse: 65 65 63    Resp: 16 16 16    Temp: 97.7 °F (36.5 °C) 98 °F (36.7 °C) 98.4 °F (36.9 °C)    SpO2: 100% 98% 99%    Weight:    84.4 kg (186 lb)   Height:            PAIN    Pain Assessment    Pain Intensity 1: 0 (05/10/22 0530) Pain Intensity 1: 2 (12/29/14 1105)    Pain Location 1: Leg Pain Location 1: Abdomen    Pain Intervention(s) 1: Medication (see MAR) Pain Intervention(s) 1: Medication (see MAR)  Patient Stated Pain Goal: 0 Patient Stated Pain Goal: 0  o Intervention effective: yes  o Other actions taken for pain: Medication (see MAR)     Skin Assessment  Skin color    Condition/Temperature    Integrity Skin Integrity: Incision (comment)  Turgor    Weekly Pressure Ulcer Documentation  Pressure  Injury Documentation: No Pressure Injury Noted-Pressure Ulcer Prevention Initiated  Wound Prevention & Protection Methods  Orientation of wound Orientation of Wound Prevention: Posterior  Location of Prevention Location of Wound Prevention: Sacrum/Coccyx  Dressing Present Dressing Present : No  Dressing Status    Wound Offloading Wound Offloading (Prevention Methods): Bed, pressure redistribution/air,Bed, pressure reduction mattress     INTAKE/OUPUT  Date 05/09/22 0700 - 05/10/22 0659 05/10/22 0700 - 05/11/22 0659   Shift 5515-3232 8944-8504 24 Hour Total 7690-3069 0408-8197 24 Hour Total   INTAKE   P.O. 1080  1080        P. O. 1080  1080      Shift Total(mL/kg) 1080 4195(90.4)      OUTPUT   Urine 1100 3300(3.3) 4400(2.2)        Urine Voided 1100 3300 4400        Urine Occurrence(s) 4 x 0 x 4 x      Stool           Stool Occurrence(s) 1 x 0 x 1 x      Shift Total(mL/kg) 1100 3300(39.1) 4400(52.2)      NET -20 -3300 -3320      Weight (kg)  84.4 84.4 84.4 84.4 84.4       Recommendations:  1. Patient needs and requests: none    2. Pending tests/procedures: none     3. Functional Level/Equipment: Partial (one person) / Wheelchair    Fall Precautions:   Fall risk precautions were reinforced with the patient; he was instructed to call for help prior to getting up. The following fall risk precautions were continued: bed/ chair alarms, door signage, yellow bracelet and socks as well as update of the [employer] tool in the patient's room. Ezio Score: 4    HEALS Safety Check    A safety check occurred in the patient's room between off going nurse and oncoming nurse listed above. The safety check included the below items  Area Items   H  High Alert Medications - Verify all high alert medication drips (heparin, PCA, etc.)   E  Equipment - Suction is set up for ALL patients (with devin)  - Red plugs utilized for all equipment (IV pumps, etc.)  - WOWs wiped down at end of shift.  - Room stocked with oxygen, suction, and other unit-specific supplies   A  Alarms - Bed alarm is set for fall risk patients  - Ensure chair alarm is in place and activated if patient is up in a chair   L  Lines - Check IV for any infiltration  - Benitez bag is empty if patient has a Benitez   - Tubing and IV bags are labeled   S  Safety   - Room is clean, patient is clean, and equipment is clean. - Hallways are clear from equipment besides carts. - Fall bracelet on for fall risk patients  - Ensure room is clear and free of clutter  - Suction is set up for ALL patients (with devin)  - Hallways are clear from equipment besides carts.    - Isolation precautions followed, supplies available outside room, sign posted [place] Deleon RN

## 2022-05-10 NOTE — PROGRESS NOTES
[facility] PHYSICAL REHABILITATION  [address] [place] [address], [address]     INPATIENT REHABILITATION  DAILY PROGRESS NOTE     Date: 5/10/2022    Name: Marycruz Jackson Age / Sex: 74 y.o. / male   CSN: [id number] MRN: [id number]   [facility] Date: 4/28/2022 Length of Stay: 12 days     Primary Rehabilitation Diagnosis: Impaired Mobility and ADLs secondary to:  1. Closed displaced comminuted fracture of distal third of shaft of right tibia with routine healing  2. Closed fracture of distal end of right fibula with routine healing  3. S/P Closed IM nailing of the right tibia using the Synthes IM nail system with a double lock proximally and triple lock distally (4/22/2022 - Dr. Luigi Matamoros)      Subjective:     Patient seen and examined. Blood pressure fairly controlled. Team conference was held at bedside this PM.     Patient's Complaint:   No significant medical complaints    Pain Control: ongoing significant pain in which is stable and controlled by current meds      Objective:     Vital Signs:  Patient Vitals for the past 24 hrs:   BP Temp Pulse Resp SpO2 Weight   05/10/22 0749 (!) 144/65 97.8 °F (36.6 °C) 71 18 100 % --   05/09/22 2208 -- -- -- -- -- 84.4 kg (186 lb)   05/09/22 2000 125/65 98.4 °F (36.9 °C) 63 16 99 % --   05/09/22 1620 (!) 146/79 98 °F (36.7 °C) 65 16 98 % --        Physical Examination:  GENERAL SURVEY: Patient is awake, alert, oriented x 3, sitting comfortably on the wheelchair, not in acute respiratory distress.   HEENT: pale palpebral conjunctivae, anicteric sclerae, no nasoaural discharge, moist oral mucosa  NECK: supple, no jugular venous distention, no palpable lymph nodes  CHEST/LUNGS: symmetrical chest expansion, good air entry, clear breath sounds  HEART: adynamic precordium, good S1 S2, no S3, regular rhythm, no murmurs  ABDOMEN: flat, bowel sounds appreciated, soft, non-tender  EXTREMITIES: pale nailbeds, no edema, full and equal pulses, no calf tenderness   NEUROLOGICAL EXAM: The patient is awake, alert and oriented x3, able to answer questions fairly appropriately, able to follow 1 and 2 step commands. Able to tell time from the wall clock. Cranial nerves II-XII are grossly intact. No gross sensory deficit. Motor strength is 4+/5 on BUE and LLE, 4- to 4/5 on the RLE.        Current Medications:  Current Facility-Administered Medications   Medication Dose Route Frequency    telmisartan (MICARDIS) tablet 10 mg  10 mg Oral DAILY    pregabalin (LYRICA) capsule 75 mg  75 mg Oral TID    melatonin tablet 3 mg  3 mg Oral PCD    polyethylene glycol (MIRALAX) packet 17 g  17 g Oral DAILY    acetaminophen (TYLENOL) tablet 650 mg  650 mg Oral Q4H PRN    acetaminophen (TYLENOL) tablet 650 mg  650 mg Oral TID    oxyCODONE IR (ROXICODONE) tablet 10 mg  10 mg Oral Q4H PRN    bisacodyL (DULCOLAX) tablet 10 mg  10 mg Oral DAILY PRN    pantoprazole (PROTONIX) tablet 40 mg  40 mg Oral ACB    amLODIPine (NORVASC) tablet 10 mg  10 mg Oral DAILY    aspirin chewable tablet 81 mg  81 mg Oral BID    ferrous sulfate tablet 325 mg  325 mg Oral BID WITH MEALS    folic acid (FOLVITE) tablet 1 mg  1 mg Oral DAILY    methadone (DOLOPHINE) tablet 45 mg  45 mg Oral DAILY    therapeutic multivitamin (THERAGRAN) tablet 1 Tablet  1 Tablet Oral DAILY    thiamine HCL (B-1) tablet 100 mg  100 mg Oral DAILY    docusate sodium (COLACE) capsule 100 mg  100 mg Oral BID       Allergies:  No Known Allergies      Functional Progress:    PHYSICAL THERAPY    ON ADMISSION MOST RECENT   Wheelchair Mobility/Management  Able to Propel (ft): 270 feet  Functional Level: 4  Curbs/Ramps Assist Required (FIM Score): 0 (Not tested)  Wheelchair Setup Assist Required : 3 (Moderate assistance)  Wheelchair Management: Manages left brake,Manages right brake Wheelchair Mobility/Management  Able to Propel (ft):  (200 ft)  Functional Level: 6  Curbs/Ramps Assist Required (FIM Score): 0 (Not tested)  Wheelchair Setup Assist Required : 4 (Minimal assistance)  Wheelchair Management: Manages left brake,Manages right brake     Gait  Amount of Assistance: 4 (Minimal assistance)  Distance (ft): 6 Feet (ft)  Assistive Device: Walker, rolling Gait  Amount of Assistance: 5 (Stand-by assistance)  Distance (ft): 60 Feet (ft) (4 times with seated rest breaks between trials)  Assistive Device: Walker, rolling     Balance-Sitting/Standing  Sitting - Static: Good (unsupported)  Sitting - Dynamic: Good (unsupported)  Standing - Static: Poor  Standing - Dynamic : Impaired Balance-Sitting/Standing  Sitting - Static: Good (unsupported)  Sitting - Dynamic: Good (unsupported)  Standing - Static: Good (with UE support)  Standing - Dynamic : Impaired     Bed/Mat Mobility  Rolling Right : 4 (Minimal assistance)  Rolling Left : 4 (Minimal assistance)  Supine to Sit : 5 (Supervision)  Sit to Supine : 4 (Minimal assistance) Bed/Mat Mobility  Rolling Right : 7 (Independent)  Rolling Left : 7 (Independent)  Supine to Sit : 7 (Independent)  Sit to Supine : 7 (Independent)     Transfers  Transfer Type: Other  Other: stand step with RW  Transfer Assistance : 4 (Minimal assistance)  Sit to Stand Assistance: Minimal assistance  Car Transfers: Not tested (pt declined 2/2 pain)  Car Type: N/A Transfers  Transfer Type: Other  Other: stand step with RW  Transfer Assistance : 5 (Supervision/setup)  Sit to Stand Assistance: Supervision  Car Transfers:  (CGA/SBA)  Car Type:  (car simulator)     Steps or Stairs  Steps/Stairs Ambulated (#): 0  Level of Assist : 0 (Not tested) (2/2 pt ambulating only short distances 2/2 pain)  Rail Use: None Steps or Stairs  Steps/Stairs Ambulated (#): 4  Level of Assist : 4 (Contact guard assistance)  Rail Use: Both         Lab/Data Review:  No results found for this or any previous visit (from the past 24 hour(s)). Assessment:     Primary Rehabilitation Diagnosis  1.  Impaired Mobility and ADLs  2. Closed displaced comminuted fracture of distal third of shaft of right tibia with routine healing  3. Closed fracture of distal end of right fibula with routine healing  4. S/P Closed IM nailing of the right tibia using the Synthes IM nail system with a double lock proximally and triple lock distally (4/22/2022 - Dr. Jennifer Hennessy)    Comorbidities  Patient Active Problem List   Diagnosis Code    Epidural abscess, L2-L5 G06.1    Polysubstance abuse ([address] F19.10    Leukopenia D72.819    Iron deficiency anemia, unspecified D50.9    Anemia D64.9    Thrombocytopenia ([address] D69.6    Orthopedic aftercare for healing traumatic lower leg fracture, right, closed S82. 91XD    Metatarsal fracture S92.309A    Alcohol abuse F10.10    HTN (hypertension) I10    Liver mass R16.0    Bladder mass N32.89    Hematuria R31.9    Closed displaced comminuted fracture of shaft of right tibia with routine healing S82.251D    Closed fracture of distal end of right fibula with routine healing S82.831D    Impaired mobility and ADLs Z74.09, Z78.9    Acute blood loss as cause of postoperative anemia D62    Methadone dependence (HCC) F11.20       Plan:     1. Justification for continued stay: Good progression towards established rehabilitation goals. 2. Medical Issues being followed closely:    [x]  Fall and safety precautions     [x]  Wound Care     [x]  Bowel and Bladder Function     [x]  Fluid Electrolyte and Nutrition Balance     [x]  Pain Control      3. Issues that 24 hour rehabilitation nursing is following:    [x]  Fall and safety precautions     [x]  Wound Care     [x]  Bowel and Bladder Function     [x]  Fluid Electrolyte and Nutrition Balance     [x]  Pain Control      [x]  Assistance with and education on in-room safety with transfers to and from the bed, wheelchair, toilet and shower. 4. Acute rehabilitation plan of care:    [x]  Continue current care and rehab.            [x]  Physical Therapy           [x]  Occupational Therapy           []  Speech Therapy     []  Hold Rehab until further notice     5. Medications:    [x]  MAR Reviewed     [x]  Continue Present Medications     6. Chemical DVT Prophylaxis:      []  Enoxaparin     []  Unfractionated Heparin     []  Warfarin     []  NOAC     [x]  Aspirin 81 mg PO BID (as per Orthopedics)     []  None     7. Mechanical DVT Prophylaxis:      []  RADHA Stockings     [x]  Sequential Compression Device on LLE     []  None     8. GI Prophylaxis:      [x]  PPI     []  H2 Blocker     []  None / Not indicated     9. Code status:    [x]  Full code     []  Partial code     []  Do not intubate     []  Do not resuscitate     10. Diet:  Specifications  Low fat/Low cholesterol/High fiber/2 gm Na   Solids (consistency)  Regular   Liquids (consistency)  Thin   Fluid Restriction  None     11. Orders:   > Closed displaced comminuted fracture of distal third of shaft of right tibia with routine healing; Closed fracture of distal end of right fibula with routine healing; S/P Closed IM nailing of the right tibia using the Synthes IM nail system with a double lock proximally and triple lock distally (4/22/2022 - Dr. Mike Gil)   > Partial weightbearing on the right lower extremity using a rolling walker    > Acute postoperative blood loss anemia;  Iron deficiency anemia   > Hgb/Hct (4/29/2022, on admission to the ARU) = 8.9/28.6   > Hgb/Hct (5/3/2022) = 10.3/33.7    > Hgb/Hct (5/9/2022) = 10.5/34.8    > Continue Ferrous sulfate 325 mg PO BID with meals    > Alcohol abuse   > Continue:    > Folic acid 1 mg PO once daily    > Multivitamin 1 tab PO once daily    > Thiamine 100 mg PO once daily    > Constipation   > Continue:    > Docusate sodium 100 mg PO BID    > Miralax 17 grams in 8 oz water PO once daily    > Hypertension   > On 5/8/2022, started Telmisartan 10 mg PO once daily (9AM)   > Continue:    > Amlodipine 10 mg PO once daily (9AM)    > Increase Telmisartan from 10 mg to 20 mg PO once daily (9AM)    > Methadone dependence   > Continue Methadone 45 mg PO once daily    > Fever, etiology to be determined   > As per note of Dr. Derrick Krueger dated 4/30/2022, patient had a fever and a work up was initiated   > Work-up:    > WBC count (4/29/2022, on admission to the ARU) = 3.1    > Blood culture x 2 sets (collected 4/29/2022, resulted as of 5/6/2022) yielded no growth after 6 days    > Chest x-ray (4/29/2022) showed perhaps mild bronchial wall thickening    > Urinalysis (4/29/2022): WNL    > Urine culture (collected 4/29/2022, resulted 5/2/2022) yielded growth of 20,000 colonies/ml of Escherichia coli   > WBC count (5/3/2022) = 2.9   > No fever over the past 24 hours   > No antibiotics for now    > Difficulty sleeping   > On 5/3/2022, started Melatonin 3 mg PO daily after dinner   > Continue Melatonin 3 mg PO daily after dinner    > Analgesia   > On 5/3/2022, started:    > Acetaminophen 650 mg PO TID (8AM, 12PM, 4PM)    > Pregabalin 50 mg PO TID (8AM, 2PM, 8PM)   > On 5/6/2022, increased Pregabalin from 50 mg to 75 mg PO TID (8AM, 2PM, 8PM)   > Continue:    > Acetaminophen 650 mg PO TID (8AM, 12PM, 4PM)    > Acetaminophen 650 mg PO q 4 hr PRN for pain level 4/10 or lesser (from 8PM to 4AM only)    > Pregabalin 75 mg PO TID (8AM, 2PM, 8PM)    > Oxycodone 10 mg PO q 4 hr PRN for pain level 5/10 or greater       12. Personal Protective Equipment (N95 face mask) was used while interacting with the patient. Patient was using a surgical mask. 15. Patient's progress in rehabilitation and medical issues discussed with the patient. All questions answered to the best of my ability. Care plan discussed with patient and nurse. 14. Total clinical care time is 30 minutes, including review of chart including all labs, radiology, past medical history, and discussion with patient. Greater than 50% of my time was spent in coordination of care and counseling.       Signed:    Víctor Calero MD    May 10, 2022

## 2022-05-10 NOTE — PROGRESS NOTES
Problem: Mobility Impaired (Adult and Pediatric)  Goal: *Acute Goals and Plan of Care (Insert Text)  Description: Physical Therapy Short Term Goals  Initiated 4/29/2022 and re-assessed 5/6/2022to be accomplished within 5-7 day(s) (5/13/2022)  1. Patient will move from supine to sit and sit to supine , scoot up and down, and roll side to side in bed with supervision/set-up. Goal met-currently supervision 5/6/2022  2. Patient will transfer from bed to chair and chair to bed with supervision/set-up using the least restrictive device. Goal partially met-currently SBA 5/6/2022  3. Patient will perform sit to stand with supervision/set-up. Goal met 5/6/2022  4. Patient will ambulate with supervision/set-up for 50 feet with the least restrictive device. Partially met 5/6/2022-150 ft with RW and SBA  5. Patient will ascend/descend 2 stairs with B handrail(s) with minimal assistance/contact guard assist. Goal met 5/6/2022    Physical Therapy Long Term Goals  Initiated 4/29/2022 and to be accomplished within 10-14 day(s) (5/13/2022)  1. Patient will move from supine to sit and sit to supine , scoot up and down, and roll side to side in bed with modified independence. 2.  Patient will transfer from bed to chair and chair to bed with modified independence using the least restrictive device. 3.  Patient will perform sit to stand with modified independence. 4.  Patient will ambulate with modified independence for 150 feet with the least restrictive device. 5.  Patient will ascend/descend 2 stairs with B handrail(s) with modified independence.       Outcome: Progressing Towards Goal  PHYSICAL THERAPY TREATMENT    Patient: Corey Garcia (20 y.o. male)  Date: 5/10/2022  Diagnosis: Tibia/fibula fracture [S82.209A, S82.409A] Closed displaced comminuted fracture of shaft of right tibia with routine healing  Precautions: Fall,Other (comment) (PWB Right LE)  Chart, physical therapy assessment, plan of care and goals were reviewed. Time [id number]  Time [id number]    Patient seen for: Gait training; Therapeutic exercise;Patient education;Transfer training    Pain:  Pt pain was reported as 8 pre-treatment. Pt pain was reported as 6 post-treatment. Intervention: rest as needed, elevated right LE when resting    Patient identified with name and :yes    SUBJECTIVE:      \"I feel better now that we worked than when I first came down this morning. \"    OBJECTIVE DATA SUMMARY:    Objective:     GROSS ASSESSMENT Daily Assessment            BED/MAT MOBILITY Daily Assessment     Rolling Right : 7 (Independent)  Rolling Left : 7 (Independent)  Supine to Sit : 7 (Independent)  Sit to Supine : 7 (Independent)      TRANSFERS Daily Assessment     Other: stand step with RW  Transfer Assistance : 5 (Supervision/setup)  Sit to Stand Assistance: Supervision (required verbal cues 3 times during treatment session )        GAIT Daily Assessment    Gait Description (WDL)      Gait Abnormalities Antalgic; Step to gait    Assistive device RW     Ambulation assistance - level surface 5 (Supervision/setup)    Distance 150 Feet (ft) (50 x2, 40)    Ambulation assistance- uneven surface  NT    Comments Verbal cues for positioning in walker and to ensure that he is maintaining PWB on right foot        STEPS/STAIRS Daily Assessment     Steps/Stairs ambulated 4    Assistance Required 5 (Stand-by assistance) (one cue for proper sequencing)    Rail Use Both    Comments  Marking time pattern    Curbs/Ramps NT         BALANCE Daily Assessment     Sitting - Static: Good (unsupported)  Sitting - Dynamic: Good (unsupported)  Standing - Static: Good (with UE support)    Pt performed reaching activity with UEs with one or no hands for support. Pt maintained his WBing status well and required SBA/CGA for balance when bending down while reaching.         WHEELCHAIR MOBILITY Daily Assessment     Functional Level: 6 ad suhail on the unit        THERAPEUTIC EXERCISES Daily Assessment       Supine 2x10: heel slides, hip abd/adduction, glute sets, quad sets, SLR        ASSESSMENT:  Pt with increased pain in right LE overnight and today. Pt was educated on moving his right LE as able in his splint and on elevating his leg while resting to decrease pain. Pt is progressing with mobility and currently at a supervision level as he occasionally requires verbal cues for sequencing and proper hand placement during transfers. Progression toward goals:  [x]      Improving appropriately and progressing toward goals  []      Improving slowly and progressing toward goals  []      Not making progress toward goals and plan of care will be adjusted      PLAN:  Patient continues to benefit from skilled intervention to address the above impairments. Continue treatment per established plan of care. Discharge Recommendations:  Home Physical Therapy  Further Equipment Recommendations for Discharge:  rolling walker      Estimated Discharge Date:5/12/2022    Activity Tolerance:   Fair+  Please refer to the flowsheet for vital signs taken during this treatment.     After treatment:   [] Patient left in no apparent distress in bed  [x] Patient left in no apparent distress sitting up in chair  [] Patient left in no apparent distress in w/c mobilizing under own power  [] Patient left in no apparent distress dining area  [] Patient left in no apparent distress mobilizing under own power  [x] Call bell left within reach  [x] Nursing notified  [] Caregiver present  [] Bed alarm activated   [x] Chair alarm activated      Antonieta Valenzuela PT  5/10/2022

## 2022-05-10 NOTE — PROGRESS NOTES
Problem: Self Care Deficits Care Plan (Adult)  Goal: *Acute Goals and Plan of Care (Insert Text)  Description: Occupational Therapy Goals   Long Term Goals  Initiated 4/29/2022 and to be accomplished within 2 week(s), 5/13/2022  1. Pt will perform self-feeding with Mod I.  2. Pt will perform grooming with Mod I.  3. Pt will perform UB bathing with Mod I.  4. Pt will perform LB bathing with Mod I using AE and/ or compensatory strategies as needed. 5. Pt will perform tub/shower transfer with supv using least restrictive assistive device while maintaining weight bearing restrictions. 6. Pt will perform UB dressing with Mod I.  7. Pt will perform LB dressing with supv using AE and/ or compensatory strategies as needed. 8. Pt will perform toileting task with Mod I.  9. Pt will perform toilet transfer with supv using least restrictive assistive device while maintaining weight bearing restrictions. Short Term Goals   Initiated 4/29/2022 and to be accomplished within 7 day(s), by 5/6/2022  1. Pt will perform self-feeding with set-up. Goal met 5/5/22  2. Pt will perform grooming with SBA. Goal met 5/5/22  3. Pt will perform UB bathing with set-up. Goal met 5/5/22  4. Pt will perform LB bathing with SBA using AE and/ or compensatory strategies as needed. 5. Pt will perform tub/shower transfer with Min A using least restrictive assistive device while maintaining weight bearing restrictions. Goal met 5/5/22  6. Pt will perform UB dressing with set-up. Goal met 5/5/22  7. Pt will perform LB dressing with CGA/ SBA using AE and/ or compensatory strategies as needed. Goal met 5/5/22  8. Pt will perform toileting task with Min A/ CGA. 9. Pt will perform toilet transfer with SBA using least restrictive assistive device while maintaining weight bearing restrictions.          Outcome: Progressing Towards Goal  Goal: Interventions  Outcome: Progressing Towards Goal   Occupational Therapy TREATMENT    Patient: Dada Brar Jarod   74 y.o. Patient identified with name and : yes    Date: 5/10/2022    First Tx Session  Time In: 0700  Time Out[de-identified] 9719        Diagnosis: Tibia/fibula fracture [S82.209A, S82.409A]   Precautions: Fall,Other (comment) (PWB Right LE)  Chart, occupational therapy assessment, plan of care, and goals were reviewed. Pain:  Pt reports 5/10 pain or discomfort prior to treatment.  (RLE)  Pt reports 5/10 pain or discomfort post treatment. Intervention Provided: Pt reported not sleeping well last night due to RLE burning all night. Pt reported 5/10 pain in RLE however tolerable to participate in therapy session. SUBJECTIVE:   Patient stated I don't know what happened.     OBJECTIVE DATA SUMMARY:     THERAPEUTIC EXERCISE Daily Assessment    Pt performed BUE strengthening with 4# dowel in all planes (shoulder flexion/extension, abduction/adduction, bicep curls, chest press, tricep extension, rowing x2) 7x15 reps, 1 set with supervision/Herminio. IADL Daily Assessment    Pt engaged in laundry tasks which involve loading clothing into washer from seated position and operating washing machine to start load of laundry. BATHING Daily Assessment    Functional Level: 5 (setup)  Body Parts Patient Bathed: Abdomen;Arm, left;Arm, right;Buttocks; Chest;Lower leg and foot, left;Tiana area; Thigh, left; Thigh, right   Pt performed UB bathing via sponge bath with setup and LB bathing via sponge bath with setup in seated position and standing to wash groin and buttocks. TOILETING Daily Assessment    Functional Level:  (Pt declined)        UPPER BODY DRESSING Daily Assessment    Functional Level: 5 (setup)   Pt performed UB dressing from seated position EOB with setup        LOWER BODY DRESSING Daily Assessment    Functional Level: 5 (setup)   Pt performed LB dressing with setup at EOB and good safety using table as one hand UE support.        Sock and/or Shoe management: setup       MOBILITY/TRANSFERS Daily Assessment    Toilet Transfer Score:  (Pt declined)     Tub/Shower Transfer Score:  (Pt declined)          ASSESSMENT:  Upon arrival pt declined showering this date due to not sleeping well due to pain in RLE. Pt demonstrated good safety with UB/LB bathing via sponge bath and UB/LB dressing at EOB with setup. Pt is making good progress toward goals however negatively impacted by pain. Progression toward goals:  [x]          Improving appropriately and progressing toward goals  []          Improving slowly and progressing toward goals  []          Not making progress toward goals and plan of care will be adjusted     PLAN:  Patient continues to benefit from skilled intervention to address the above impairments. Continue treatment per established plan of care. Discharge Recommendations:  Home Health with assist  Further Equipment Recommendations for Discharge:  bedside commode and transfer bench     Activity Tolerance:  good      Estimated LOS:~ DC 5/12/22    Please refer to the flowsheet for vital signs taken during this treatment. After treatment:   [x]  Patient left in no apparent distress sitting up in chair   []  Patient left in no apparent distress in bed  [x]  Call bell left within reach  []  Nursing notified  []  Caregiver present  []  Bed alarm activated    COMMUNICATION/EDUCATION:   [] Home safety education was provided and the patient/caregiver indicated understanding. [] Patient/family have participated as able in goal setting and plan of care. [x] Patient/family agree to work toward stated goals and plan of care. [] Patient understands intent and goals of therapy, but is neutral about his/her participation. [] Patient is unable to participate in goal setting and plan of care.       Liu Camarena OT

## 2022-05-10 NOTE — INTERDISCIPLINARY ROUNDS
[facility] PHYSICAL REHABILITATION  [address] [place] [address], [address]    INPATIENT REHABILITATION  TEAM CONFERENCE SUMMARY     Date of Conference: 5/10/2022    Patient Information:        Name: Rick Vallecillo Age / Sex: 74 y.o. / male   CSN: [id number] MRN: [id number]   Admit Date: 4/28/2022 Length of Stay: 12 days     Primary Rehabilitation Diagnosis  1. Impaired Mobility and ADLs  2. Closed displaced comminuted fracture of distal third of shaft of right tibia with routine healing  3. Closed fracture of distal end of right fibula with routine healing  4. S/P Closed IM nailing of the right tibia using the Synthes IM nail system with a double lock proximally and triple lock distally (4/22/2022 - Dr. Magalie Porter)    Comorbidities  Patient Active Problem List   Diagnosis Code    Epidural abscess, L2-L5 G06.1    Polysubstance abuse ([facility] [address] F19.10    Leukopenia D72.819    Iron deficiency anemia, unspecified D50.9    Anemia D64.9    Thrombocytopenia ([facility] [address] D69.6    Orthopedic aftercare for healing traumatic lower leg fracture, right, closed S82. 91XD    Metatarsal fracture S92.309A    Alcohol abuse F10.10    HTN (hypertension) I10    Liver mass R16.0    Bladder mass N32.89    Hematuria R31.9    Closed displaced comminuted fracture of shaft of right tibia with routine healing S82.251D    Closed fracture of distal end of right fibula with routine healing S82.831D    Impaired mobility and ADLs Z74.09, Z78.9    Acute blood loss as cause of postoperative anemia D62    Methadone dependence (HCC) F11.20          Therapy:     FIM SCORES Initial Assessment Weekly Progress Assessment 5/10/2022   Eating Functional Level: 5  Comments:  (Self-feeding (set-up) of meal tray; pt opens lids/ packets) Functional Level: 56   Swallowing     Grooming 4 (CGA in stance; set-up/ supv wheelchair at sink) 56   Bathing 4 (UB bathing: set-up; LB bathing: Min A) 5 (setup)4   Upper Body Dressing Functional Level: 5 (set-up/ supv )  Items Applied/Steps Completed: Pullover (4 steps)  Comments: UB dressing performed set-up/ supv seated edge of bed for doffing/ donning scrub top. Functional Level: 5 (setup)5   Lower Body Dressing Functional Level: 4 (Min A overall)  Items Applied/Steps Completed: Sock, left (1 step) (Elastic waist shorts (3 steps))  Comments: Patient requiring assistance to thread shorts over RLE (s/p surgical intervention tibia/ fibula fx); pt able to thread shorts over left lower extremity. Pt able to pull shorts over hips to waist (bed level). Pt doffed/ donned left slipper sock with SBA seated in wheelchair. Functional Level: 5 (setup)4   Toileting Functional Level: 3 (Mod A)  Comments:  (CM performed Mod A; hygiene set-up/ supv) Functional Level:  (Pt declined)5   Bladder 0 0   Bowel  0 0   Toilet Transfer [place] Toilet Transfer Score: 4 (Stand step xfer (Min A) using RW; PWB R LE; gait belt)  Comments:  (Elevated seat over commode; Stand step xfer (Min A) using RW) Toilet Transfer Score:  (Pt declined)   Tub/Shower Transfer [place] Tub or Shower Type: Tub/Shower combination  Tub/Shower Transfer Score: 0 (Not assessed at this time 2/2 pain R LE and safety/ balance)  Comments: Not assessed at this time 2/2 pain R LE and safety/ balance Tub/Shower Transfer Score:  (Pt declined)     Comprehension Primary Mode of Comprehension: Auditory  Score: 5       Expression Primary Mode of Expression: Verbal  Score: 5     Social Interaction Score: 5     Problem Solving Score: 5     Memory Score: 5       FIM SCORES Initial Assessment Weekly Progress Assessment 5/10/2022   Bed/Chair/Wheelchair Transfers Transfer Type:  Other  Other: stand step with RW  Transfer Assistance : 4 (Minimal assistance)  Sit to Stand Assistance: Minimal assistance  Car Transfers: Not tested (pt declined 2/2 pain)  Car Type: N/A     Bed Mobility Rolling Right 4 (Minimal assistance)   Rolling Left 4 (Minimal assistance)   Supine to Sit 5 (Supervision)   Sit to Stand Minimal assistance   Sit to Supine 4 (Minimal assistance)    Rolling Right       Rolling Left       Supine to Sit       Sit to Stand    supervision   Sit to Supine          Locomotion (W/C) Able to Propel (ft): 270 feet  Functional Level: 4  Curbs/Ramps Assist Required (FIM Score): 0 (Not tested)  Wheelchair Setup Assist Required : 3 (Moderate assistance)  Wheelchair Management: Manages left brake,Manages right brake Function    Setup Assistance    ad suhail on even and uneven surfaces     Locomotion (W/C distance)       Locomotion (Walk) 4 (Minimal assistance)        Locomotion (Walk dist.) 6 Feet (ft)     Steps/Stairs Steps/Stairs Ambulated (#): 0  Level of Assist : 0 (Not tested) (2/2 pt ambulating only short distances 2/2 pain)  Rail Use: None  4 steps with B rails and SBA         Nursing:     Neuro:   AAA&O x  4          Respiratory:   [x] WNL   [] O2 LPM:   Other:  Peripheral Vascular:   [] TEDS present   [] Edema present ____ Grade   Cardiac:   [x] WNL   [] Other  Genitourinary:   [x] continent   [] incontinent   [] morris  Abdominal __5/9/22_____ LBM  GI: ___cardia regular____ Diet ___thin___ Liquids _____ tube feeds  Musculoskeletal: __active__ ROM Transfers _wheelchair____ Assistive Device Used  __x1__ Level of Assistance  Skin Integumentary:   [x] Intact   [] Not Intact   __repositioning________Preventative Measures  Details____right leg incision from ORIF__________________________________________________________  Pain: [x] Controlled   [] Not Controlled   Pain Meds:   [x] Scheduled   [x] PRN        Interdisciplinary Team Goals:     1. Discipline  Physical Therapy    Goal  Pt will perform transfers and ambulation mod I with RW    Barrier  pain, decreased LE strength, decreased dynamic standing balance    Intervention  gait training, transfer training, LE strengthening, balance activities    Goal written by:   Unruly Kaye, PT     2.  Discipline  Occupational Therapy    Goal  Pt will perform LB self cares with supervision and AE. Barrier  impaired BUE strength, impaired forward flexion    Intervention  ADL training, BUE strengthening    Goal written by:  Kelly Hernandez MSOTR/L     3. Discipline  Speech Therapy    Goal      Barrier      Intervention      Goal written by:       4. Discipline  Nursing    Goal  Patient's pain level will be less than 8/10 with activity and less than 5/10 at rest.    Barrier Surgical incision from ORIF to right leg    Intervention Repositioning, keeping leg elevated when in bed and at rest, assess pain level every 4 hours and administer scheduled pain medication throughout the day and PRN pain medication for breakthrough pain. Goal written by:  Saima Gunter RN     5. Discipline  Clinical Psychology    Goal  Minimize stress with forced dependency in recovery    Barrier  Situational stressors with injury and forced dependency    Intervention  Support  and behavioral redirection, as needed    Goal written by:  Helena Portillo, PhD         Disposition / Discharge Planning:      Follow-up services:  [x] Physical Therapy             [x] Occupational Therapy       [] Speech Therapy           [] Skilled Nursing      [] Medical Social Worker   [] Aide        [] Outpatient      [] vs   [x] Home Health  [] vs       [] to progress to outpatient       [] with 24-hour supervision       [] with 24-hour assistance   [] [place]   [facility] recommendations:  RW   Estimated discharge date:  5/12/22   Discharge Location:  [x] Home  [] versus    [] [place]    [] [address]   [] Other:           Interdisciplinary team rounds were held this PM with the following team members:       Name   Physical Therapist    Sweetie Juarez PT, DPT     Occupational Therapist    Chantal Bee, MSOTR/L   Recreational Therapist    Filmoena [place], [address] [facility] MSN RN [facility]   Physician    Dominique Morel MD        Signed:  Elise Rees Rolando Bermudez MD    May 10, 2022

## 2022-05-11 PROCEDURE — 97110 THERAPEUTIC EXERCISES: CPT

## 2022-05-11 PROCEDURE — 74011250637 HC RX REV CODE- 250/637: Performed by: EMERGENCY MEDICINE

## 2022-05-11 PROCEDURE — 2709999900 HC NON-CHARGEABLE SUPPLY

## 2022-05-11 PROCEDURE — 97530 THERAPEUTIC ACTIVITIES: CPT

## 2022-05-11 PROCEDURE — 97535 SELF CARE MNGMENT TRAINING: CPT

## 2022-05-11 PROCEDURE — 97116 GAIT TRAINING THERAPY: CPT

## 2022-05-11 PROCEDURE — 74011250637 HC RX REV CODE- 250/637: Performed by: INTERNAL MEDICINE

## 2022-05-11 PROCEDURE — 99232 SBSQ HOSP IP/OBS MODERATE 35: CPT | Performed by: INTERNAL MEDICINE

## 2022-05-11 PROCEDURE — 65310000000 HC RM PRIVATE REHAB

## 2022-05-11 RX ORDER — PREGABALIN 75 MG/1
75 CAPSULE ORAL 3 TIMES DAILY
Qty: 90 CAPSULE | Refills: 0 | Status: SHIPPED | OUTPATIENT
Start: 2022-05-11

## 2022-05-11 RX ORDER — TELMISARTAN 20 MG/1
20 TABLET ORAL DAILY
Qty: 30 TABLET | Refills: 0 | Status: SHIPPED | OUTPATIENT
Start: 2022-05-12

## 2022-05-11 RX ORDER — OXYCODONE AND ACETAMINOPHEN 5; 325 MG/1; MG/1
1 TABLET ORAL
Qty: 20 TABLET | Refills: 0 | Status: SHIPPED | OUTPATIENT
Start: 2022-05-11 | End: 2022-05-16

## 2022-05-11 RX ORDER — LANOLIN ALCOHOL/MO/W.PET/CERES
3 CREAM (GRAM) TOPICAL
Qty: 30 TABLET | Refills: 0 | Status: SHIPPED | OUTPATIENT
Start: 2022-05-11

## 2022-05-11 RX ORDER — PANTOPRAZOLE SODIUM 40 MG/1
40 TABLET, DELAYED RELEASE ORAL
Qty: 30 TABLET | Refills: 0 | Status: SHIPPED | OUTPATIENT
Start: 2022-05-12

## 2022-05-11 RX ORDER — NALOXONE HYDROCHLORIDE 4 MG/.1ML
1 SPRAY NASAL
Qty: 1 EACH | Refills: 0 | Status: SHIPPED | OUTPATIENT
Start: 2022-05-11 | End: 2022-05-11

## 2022-05-11 RX ORDER — ACETAMINOPHEN 325 MG/1
650 TABLET ORAL
Qty: 30 TABLET | Refills: 0 | Status: SHIPPED | OUTPATIENT
Start: 2022-05-11

## 2022-05-11 RX ADMIN — DOCUSATE SODIUM 100 MG: 100 CAPSULE, LIQUID FILLED ORAL at 07:48

## 2022-05-11 RX ADMIN — Medication 3 MG: at 17:12

## 2022-05-11 RX ADMIN — ACETAMINOPHEN 650 MG: 325 TABLET ORAL at 17:12

## 2022-05-11 RX ADMIN — OXYCODONE HYDROCHLORIDE 10 MG: 5 TABLET ORAL at 13:42

## 2022-05-11 RX ADMIN — ACETAMINOPHEN 650 MG: 325 TABLET ORAL at 07:48

## 2022-05-11 RX ADMIN — OXYCODONE HYDROCHLORIDE 10 MG: 5 TABLET ORAL at 19:49

## 2022-05-11 RX ADMIN — AMLODIPINE BESYLATE 10 MG: 10 TABLET ORAL at 07:48

## 2022-05-11 RX ADMIN — THERA TABS 1 TABLET: TAB at 07:48

## 2022-05-11 RX ADMIN — PREGABALIN 75 MG: 75 CAPSULE ORAL at 07:48

## 2022-05-11 RX ADMIN — METHADONE HYDROCHLORIDE 45 MG: 10 TABLET ORAL at 07:48

## 2022-05-11 RX ADMIN — ASPIRIN 81 MG CHEWABLE TABLET 81 MG: 81 TABLET CHEWABLE at 07:48

## 2022-05-11 RX ADMIN — TELMISARTAN 20 MG: 20 TABLET ORAL at 08:43

## 2022-05-11 RX ADMIN — PREGABALIN 75 MG: 75 CAPSULE ORAL at 19:49

## 2022-05-11 RX ADMIN — ACETAMINOPHEN 650 MG: 325 TABLET ORAL at 12:51

## 2022-05-11 RX ADMIN — FERROUS SULFATE TAB 325 MG (65 MG ELEMENTAL FE) 325 MG: 325 (65 FE) TAB at 17:12

## 2022-05-11 RX ADMIN — PREGABALIN 75 MG: 75 CAPSULE ORAL at 13:43

## 2022-05-11 RX ADMIN — PANTOPRAZOLE 40 MG: 40 TABLET, DELAYED RELEASE ORAL at 06:42

## 2022-05-11 RX ADMIN — OXYCODONE HYDROCHLORIDE 10 MG: 5 TABLET ORAL at 02:45

## 2022-05-11 RX ADMIN — DOCUSATE SODIUM 100 MG: 100 CAPSULE, LIQUID FILLED ORAL at 17:12

## 2022-05-11 RX ADMIN — FERROUS SULFATE TAB 325 MG (65 MG ELEMENTAL FE) 325 MG: 325 (65 FE) TAB at 07:48

## 2022-05-11 RX ADMIN — ASPIRIN 81 MG CHEWABLE TABLET 81 MG: 81 TABLET CHEWABLE at 17:12

## 2022-05-11 RX ADMIN — Medication 100 MG: at 07:48

## 2022-05-11 RX ADMIN — FOLIC ACID 1 MG: 1 TABLET ORAL at 07:48

## 2022-05-11 RX ADMIN — POLYETHYLENE GLYCOL 3350 17 G: 17 POWDER, FOR SOLUTION ORAL at 07:55

## 2022-05-11 NOTE — ROUTINE PROCESS
SHIFT CHANGE NOTE FOR [facility]    Bedside and Verbal shift change report given to Ashleigh RN (oncoming nurse) by Hasmukh Padilla RN (offgoing nurse). Report included the following information SBAR, Kardex, MAR and Recent Results.     Situation:   Code Status: Full Code   Hospital Day: 13   Problem List:   Hospital Problems  Date Reviewed: 5/11/2022          Codes Class Noted POA    Methadone dependence ([address] (Chronic) ICD-10-CM: F11.20  ICD-9-CM: 304.00  Unknown Yes        Orthopedic aftercare for healing traumatic lower leg fracture, right, closed ICD-10-CM: S82.91XD  ICD-9-CM: V54.16  4/22/2022 Yes    Overview Addendum 5/2/2022 10:49 AM by Tisha Jauregui MD     S/P Closed IM nailing of the right tibia using the Synthes IM nail system with a double lock proximally and triple lock distally (4/22/2022 - Dr. Benitez Gave)             Alcohol abuse ICD-10-CM: F10.10  ICD-9-CM: 305.00  4/22/2022 Yes        HTN (hypertension) ICD-10-CM: I10  ICD-9-CM: 401.9  4/22/2022 Yes        * (Principal) Closed displaced comminuted fracture of shaft of right tibia with routine healing ICD-10-CM: S82.251D  ICD-9-CM: V54.16  4/22/2022 Yes        Closed fracture of distal end of right fibula with routine healing ICD-10-CM: S82.831D  ICD-9-CM: V54.16  4/22/2022 Yes        Impaired mobility and ADLs ICD-10-CM: Z74.09, Z78.9  ICD-9-CM: V49.89  4/22/2022 Yes        Acute blood loss as cause of postoperative anemia ICD-10-CM: D62  ICD-9-CM: 285.1  4/22/2022 Yes        Iron deficiency anemia, unspecified ICD-10-CM: D50.9  ICD-9-CM: 280.9  8/5/2012 Yes              Background:   Past Medical History:   Past Medical History:   Diagnosis Date    Abscess of right shoulder     Abscess of shoulder     Acute blood loss as cause of postoperative anemia 4/22/2022    Arthritis     Closed displaced comminuted fracture of shaft of right tibia with routine healing 04/22/2022    Closed fracture of distal end of right fibula with routine healing 4/22/2022    Epidural abscess     Heart murmur     Hematuria     Heroin abuse (HCC)     Hypertension     Infected wound     Infectious disease     Iron deficiency anemia     IV drug user     Liver disease     Methadone dependence ([facility] [address]     Tobacco abuse         Assessment:   Changes in Assessment throughout shift:       Patient has a central line: no Patient has Benitez Cath: no    Last Vitals:     Vitals:    05/10/22 1556 05/10/22 2000 05/11/22 0734 05/11/22 1635   BP: 115/60 129/66 (!) 151/67 (!) 157/64   Pulse: 63 60 79 61   Resp: 19 18 19 19   Temp: 98.5 °F (36.9 °C) 98 °F (36.7 °C) 98.6 °F (37 °C) 98.4 °F (36.9 °C)   SpO2: 99% 100% 98% 100%   Weight:       Height:            PAIN    Pain Assessment    Pain Intensity 1: 4 (05/11/22 1600) Pain Intensity 1: 2 (12/29/14 1105)    Pain Location 1: Leg Pain Location 1: Abdomen    Pain Intervention(s) 1: Medication (see MAR) Pain Intervention(s) 1: Medication (see MAR)  Patient Stated Pain Goal: 4 Patient Stated Pain Goal: 0  o Intervention effective: yes  o Other actions taken for pain: Medication (see MAR)     Skin Assessment  Skin color    Condition/Temperature    Integrity Skin Integrity: Wound (add Wound LDA)  Turgor    Weekly Pressure Ulcer Documentation  Pressure  Injury Documentation: No Pressure Injury Noted-Pressure Ulcer Prevention Initiated  Wound Prevention & Protection Methods  Orientation of wound Orientation of Wound Prevention: Posterior  Location of Prevention Location of Wound Prevention: Sacrum/Coccyx  Dressing Present Dressing Present : No  Dressing Status    Wound Offloading Wound Offloading (Prevention Methods): Bed, pressure reduction mattress     INTAKE/OUPUT  Date 05/10/22 1900 - 05/11/22 0659 05/11/22 0700 - 05/12/22 0659   Shift 3843-3553 24 Hour Total 5883-9115 8257-4145 24 Hour Total   INTAKE   P.O.  240 240  240     P. O.  240 240  240   Shift Total(mL/kg)  240(2.8) 240(2.8)  240(2.8)   OUTPUT   Urine(mL/kg/hr) 2750 2750 600(0.6)  600     Urine Voided 2750 2750 600  600     Urine Occurrence(s)  1 x 3 x  3 x   Stool          Stool Occurrence(s) 0 x 1 x 0 x  0 x   Shift Total(mL/kg) 2750(32.6) 2750(32.6) 600(7.1)  600(7.1)   NET -2750 -2510 -360  -360   Weight (kg) 84.4 84.4 84.4 84.4 84.4       Recommendations:  1. Patient needs and requests: none    2. Pending tests/procedures: none     3. Functional Level/Equipment: Partial (one person) / Wheelchair    Fall Precautions:   Fall risk precautions were reinforced with the patient; he was instructed to call for help prior to getting up. The following fall risk precautions were continued: bed/ chair alarms, door signage, yellow bracelet and socks as well as update of the Jennifer Cobia tool in the patient's room. Ezio Score: 4    HEALS Safety Check    A safety check occurred in the patient's room between off going nurse and oncoming nurse listed above. The safety check included the below items  Area Items   H  High Alert Medications - Verify all high alert medication drips (heparin, PCA, etc.)   E  Equipment - Suction is set up for ALL patients (with devin)  - Red plugs utilized for all equipment (IV pumps, etc.)  - WOWs wiped down at end of shift.  - Room stocked with oxygen, suction, and other unit-specific supplies   A  Alarms - Bed alarm is set for fall risk patients  - Ensure chair alarm is in place and activated if patient is up in a chair   L  Lines - Check IV for any infiltration  - Benitez bag is empty if patient has a Benitez   - Tubing and IV bags are labeled   S  Safety   - Room is clean, patient is clean, and equipment is clean. - Hallways are clear from equipment besides carts. - Fall bracelet on for fall risk patients  - Ensure room is clear and free of clutter  - Suction is set up for ALL patients (with devin)  - Hallways are clear from equipment besides carts.    - Isolation precautions followed, supplies available outside room, sign posted     Lily Jeong RN

## 2022-05-11 NOTE — PROGRESS NOTES
[facility] PHYSICAL REHABILITATION  [address] [place] [address], [address]     INPATIENT REHABILITATION  DAILY PROGRESS NOTE     Date: 5/11/2022    Name: Moe Solorio Age / Sex: 74 y.o. / male   CSN: [id number] MRN: [id number]   [facility] Date: 4/28/2022 Length of Stay: 13 days     Primary Rehabilitation Diagnosis: Impaired Mobility and ADLs secondary to:  1. Closed displaced comminuted fracture of distal third of shaft of right tibia with routine healing  2. Closed fracture of distal end of right fibula with routine healing  3. S/P Closed IM nailing of the right tibia using the Synthes IM nail system with a double lock proximally and triple lock distally (4/22/2022 - Dr. Bertha Thompson)      Subjective:     Patient seen and examined. Blood pressure better controlled. Patient's Complaint:   No significant medical complaints    Pain Control: ongoing significant pain in which is stable and controlled by current meds      Objective:     Vital Signs:  Patient Vitals for the past 24 hrs:   BP Temp Pulse Resp SpO2   05/11/22 0734 (!) 151/67 98.6 °F (37 °C) 79 19 98 %   05/10/22 2000 129/66 98 °F (36.7 °C) 60 18 100 %   05/10/22 1556 115/60 98.5 °F (36.9 °C) 63 19 99 %   05/10/22 1214 125/65 -- 62 -- --        Physical Examination:  GENERAL SURVEY: Patient is awake, alert, oriented x 3, sitting comfortably on the wheelchair, not in acute respiratory distress.   HEENT: pale palpebral conjunctivae, anicteric sclerae, no nasoaural discharge, moist oral mucosa  NECK: supple, no jugular venous distention, no palpable lymph nodes  CHEST/LUNGS: symmetrical chest expansion, good air entry, clear breath sounds  HEART: adynamic precordium, good S1 S2, no S3, regular rhythm, no murmurs  ABDOMEN: flat, bowel sounds appreciated, soft, non-tender  EXTREMITIES: pale nailbeds, no edema, full and equal pulses, no calf tenderness   NEUROLOGICAL EXAM: The patient is awake, alert and oriented x3, able to answer questions fairly appropriately, able to follow 1 and 2 step commands. Able to tell time from the wall clock. Cranial nerves II-XII are grossly intact. No gross sensory deficit. Motor strength is 4+/5 on BUE and LLE, 4- to 4/5 on the RLE.        Current Medications:  Current Facility-Administered Medications   Medication Dose Route Frequency    telmisartan (MICARDIS) tablet 20 mg  20 mg Oral DAILY    pregabalin (LYRICA) capsule 75 mg  75 mg Oral TID    melatonin tablet 3 mg  3 mg Oral PCD    polyethylene glycol (MIRALAX) packet 17 g  17 g Oral DAILY    acetaminophen (TYLENOL) tablet 650 mg  650 mg Oral Q4H PRN    acetaminophen (TYLENOL) tablet 650 mg  650 mg Oral TID    oxyCODONE IR (ROXICODONE) tablet 10 mg  10 mg Oral Q4H PRN    bisacodyL (DULCOLAX) tablet 10 mg  10 mg Oral DAILY PRN    pantoprazole (PROTONIX) tablet 40 mg  40 mg Oral ACB    amLODIPine (NORVASC) tablet 10 mg  10 mg Oral DAILY    aspirin chewable tablet 81 mg  81 mg Oral BID    ferrous sulfate tablet 325 mg  325 mg Oral BID WITH MEALS    folic acid (FOLVITE) tablet 1 mg  1 mg Oral DAILY    methadone (DOLOPHINE) tablet 45 mg  45 mg Oral DAILY    therapeutic multivitamin (THERAGRAN) tablet 1 Tablet  1 Tablet Oral DAILY    thiamine HCL (B-1) tablet 100 mg  100 mg Oral DAILY    docusate sodium (COLACE) capsule 100 mg  100 mg Oral BID       Allergies:  No Known Allergies      Functional Progress:    OCCUPATIONAL THERAPY    ON ADMISSION MOST RECENT   Eating  Functional Level: 5   Eating  Functional Level: 5     Grooming  Functional Level: 4 (CGA in stance; set-up/ supv wheelchair at sink)   Grooming  Functional Level: 5     Bathing  Functional Level: 4 (UB bathing: set-up; LB bathing: Min A)   Bathing  Functional Level: 5 (setup)     Upper Body Dressing  Functional Level: 5 (set-up/ supv )   Upper Body Dressing  Functional Level: 5 (setup)     Lower Body Dressing  Functional Level: 4 (Min A overall)   Lower Body Dressing  Functional Level: 5 (setup) Toileting  Functional Level: 3 (Mod A)   Toileting  Functional Level:  (Pt declined)     Toilet Transfers  Toilet Transfer Score: 4 (Stand step xfer (Min A) using RW; PWB R LE; gait belt)   Toilet Transfers  Toilet Transfer Score:  (Pt declined)     Tub /Shower Transfers  Tub/Shower Transfer Score: 0 (Not assessed at this time 2/2 pain R LE and safety/ balance)   Tub/Shower Transfers  Tub/Shower Transfer Score:  (Pt declined)       Legend:   7 - Independent   6 - Modified Independent   5 - Standby Assistance / Supervision / Set-up   4 - Minimum Assistance / Contact Guard Assistance   3 - Moderate Assistance   2 - Maximum Assistance   1 - Total Assistance / Dependent       Lab/Data Review:  No results found for this or any previous visit (from the past 24 hour(s)). Assessment:     Primary Rehabilitation Diagnosis  1. Impaired Mobility and ADLs  2. Closed displaced comminuted fracture of distal third of shaft of right tibia with routine healing  3. Closed fracture of distal end of right fibula with routine healing  4. S/P Closed IM nailing of the right tibia using the Synthes IM nail system with a double lock proximally and triple lock distally (4/22/2022 - Dr. Rosalee Munoz)    Comorbidities  Patient Active Problem List   Diagnosis Code    Epidural abscess, L2-L5 G06.1    Polysubstance abuse ([address] F19.10    Leukopenia D72.819    Iron deficiency anemia, unspecified D50.9    Anemia D64.9    Thrombocytopenia ([address] D69.6    Orthopedic aftercare for healing traumatic lower leg fracture, right, closed S82. 91XD    Metatarsal fracture S92.309A    Alcohol abuse F10.10    HTN (hypertension) I10    Liver mass R16.0    Bladder mass N32.89    Hematuria R31.9    Closed displaced comminuted fracture of shaft of right tibia with routine healing S82.251D    Closed fracture of distal end of right fibula with routine healing S82.831D    Impaired mobility and ADLs Z74.09, Z78.9    Acute blood loss as cause of postoperative anemia D62    Methadone dependence ([facility] [address] F11.20       Plan:     1. Justification for continued stay: Good progression towards established rehabilitation goals. 2. Medical Issues being followed closely:    [x]  Fall and safety precautions     [x]  Wound Care     [x]  Bowel and Bladder Function     [x]  Fluid Electrolyte and Nutrition Balance     [x]  Pain Control      3. Issues that 24 hour rehabilitation nursing is following:    [x]  Fall and safety precautions     [x]  Wound Care     [x]  Bowel and Bladder Function     [x]  Fluid Electrolyte and Nutrition Balance     [x]  Pain Control      [x]  Assistance with and education on in-room safety with transfers to and from the bed, wheelchair, toilet and shower. 4. Acute rehabilitation plan of care:    [x]  Continue current care and rehab. [x]  Physical Therapy           [x]  Occupational Therapy           []  Speech Therapy     []  Hold Rehab until further notice     5. Medications:    [x]  MAR Reviewed     [x]  Continue Present Medications     6. Chemical DVT Prophylaxis:      []  Enoxaparin     []  Unfractionated Heparin     []  Warfarin     []  NOAC     [x]  Aspirin 81 mg PO BID (as per Orthopedics)     []  None     7. Mechanical DVT Prophylaxis:      []  RADHA Stockings     [x]  Sequential Compression Device on LLE     []  None     8. GI Prophylaxis:      [x]  PPI     []  H2 Blocker     []  None / Not indicated     9. Code status:    [x]  Full code     []  Partial code     []  Do not intubate     []  Do not resuscitate     10. Diet:  Specifications  Low fat/Low cholesterol/High fiber/2 gm Na   Solids (consistency)  Regular   Liquids (consistency)  Thin   Fluid Restriction  None     11.  Orders:   > Closed displaced comminuted fracture of distal third of shaft of right tibia with routine healing; Closed fracture of distal end of right fibula with routine healing; S/P Closed IM nailing of the right tibia using the Synthes IM nail system with a double lock proximally and triple lock distally (4/22/2022 - Dr. Ulises Patel)   > Partial weightbearing on the right lower extremity using a rolling walker    > Acute postoperative blood loss anemia;  Iron deficiency anemia   > Hgb/Hct (4/29/2022, on admission to the ARU) = 8.9/28.6   > Hgb/Hct (5/3/2022) = 10.3/33.7    > Hgb/Hct (5/9/2022) = 10.5/34.8    > Continue Ferrous sulfate 325 mg PO BID with meals    > Alcohol abuse   > Continue:    > Folic acid 1 mg PO once daily    > Multivitamin 1 tab PO once daily    > Thiamine 100 mg PO once daily    > Constipation   > Continue:    > Docusate sodium 100 mg PO BID    > Miralax 17 grams in 8 oz water PO once daily    > Hypertension   > On 5/8/2022, started Telmisartan 10 mg PO once daily (9AM)   > On 5/10/2022, increased Telmisartan from 10 mg to 20 mg PO once daily (9AM)   > Continue:    > Amlodipine 10 mg PO once daily (9AM)    > Telmisartan 20 mg PO once daily (9AM)    > Methadone dependence   > Continue Methadone 45 mg PO once daily    > Fever, etiology to be determined   > As per note of Dr. Kendal Young dated 4/30/2022, patient had a fever and a work up was initiated   > Work-up:    > WBC count (4/29/2022, on admission to the ARU) = 3.1    > Blood culture x 2 sets (collected 4/29/2022, resulted as of 5/6/2022) yielded no growth after 6 days    > Chest x-ray (4/29/2022) showed perhaps mild bronchial wall thickening    > Urinalysis (4/29/2022): WNL    > Urine culture (collected 4/29/2022, resulted 5/2/2022) yielded growth of 20,000 colonies/ml of Escherichia coli   > WBC count (5/3/2022) = 2.9   > No fever over the past 24 hours   > No antibiotics for now    > Difficulty sleeping   > On 5/3/2022, started Melatonin 3 mg PO daily after dinner   > Continue Melatonin 3 mg PO daily after dinner    > Analgesia   > On 5/3/2022, started:    > Acetaminophen 650 mg PO TID (8AM, 12PM, 4PM)    > Pregabalin 50 mg PO TID (8AM, 2PM, 8PM)   > On 5/6/2022, increased Pregabalin from 50 mg to 75 mg PO TID (8AM, 2PM, 8PM)   > Continue:    > Acetaminophen 650 mg PO TID (8AM, 12PM, 4PM)    > Acetaminophen 650 mg PO q 4 hr PRN for pain level 4/10 or lesser (from 8PM to 4AM only)    > Pregabalin 75 mg PO TID (8AM, 2PM, 8PM)    > Oxycodone 10 mg PO q 4 hr PRN for pain level 5/10 or greater       12. Personal Protective Equipment (N95 face mask) was used while interacting with the patient. Patient was using a surgical mask. 15. Patient's progress in rehabilitation and medical issues discussed with the patient. All questions answered to the best of my ability. Care plan discussed with patient and nurse. 14. Total clinical care time is 30 minutes, including review of chart including all labs, radiology, past medical history, and discussion with patient. Greater than 50% of my time was spent in coordination of care and counseling. 15. Discharge Planning:  Discharge date  5/12/2022 (Thursday)   Discharge location  [x] Home     [] [address]    [] Other:    Follow-up services  [] Outpatient      [x] Home Health       [x] Physical Therapy              [x] Occupational Therapy       [] Speech Therapy                [] Medical Social Worker    [] Aide   [x] Skilled Nursing           [x] Medication reconciliation        [x] Disease education        [] PT/INR monitoring        [] Routine PICC line care        [] IV antibiotic administration            Antibiotic:             Stop date:        [] Tube feeding        [] Indwelling morris catheter care        [x] Wound Care/Dressing             Instructions: Every other day clean incisions to right knee and ankle with wound spray, then apply Xeroform and dry dressing. Wrap the Kerlex then reapply posterior splint with ace wraps. [] Other:      Follow-up appointments  1. PCP (Dr. Rochelle Santana)   2.  Orthopedics (Dr. Galindo Boles)        Signed:    Kaci Benedict MD    May 11, 2022

## 2022-05-11 NOTE — PROGRESS NOTES
Problem: Falls - Risk of  Goal: *Absence of Falls  Description: Document Nasra Dutta Fall Risk and appropriate interventions in the flowsheet. Outcome: Progressing Towards Goal  Note: Fall Risk Interventions:  Mobility Interventions: Bed/chair exit alarm,Patient to call before getting OOB    Mentation Interventions: Bed/chair exit alarm,Evaluate medications/consider consulting pharmacy    Medication Interventions: Bed/chair exit alarm,Evaluate medications/consider consulting pharmacy,Patient to call before getting OOB    Elimination Interventions: Call light in reach,Bed/chair exit alarm,Patient to call for help with toileting needs    History of Falls Interventions: Bed/chair exit alarm,Evaluate medications/consider consulting pharmacy         Problem: Patient Education: Go to Patient Education Activity  Goal: Patient/Family Education  Outcome: Progressing Towards Goal     Problem: Pain  Goal: *Control of Pain  Outcome: Progressing Towards Goal  Goal: *PALLIATIVE CARE:  Alleviation of Pain  Outcome: Progressing Towards Goal     Problem: Patient Education: Go to Patient Education Activity  Goal: Patient/Family Education  Outcome: Progressing Towards Goal     Problem: Patient Education: Go to Patient Education Activity  Goal: Patient/Family Education  Outcome: Progressing Towards Goal     Problem: Patient Education: Go to Patient Education Activity  Goal: Patient/Family Education  Outcome: Progressing Towards Goal     Problem: Pressure Injury - Risk of  Goal: *Prevention of pressure injury  Description: Document Jonathan Scale and appropriate interventions in the flowsheet.   Outcome: Progressing Towards Goal  Note: Pressure Injury Interventions:  Sensory Interventions: Assess changes in LOC    Moisture Interventions: Absorbent underpads    Activity Interventions: Chair cushion,Increase time out of bed,Pressure redistribution bed/mattress(bed type)    Mobility Interventions: Chair cushion,HOB 30 degrees or less,Pressure redistribution bed/mattress (bed type)    Nutrition Interventions: Document food/fluid/supplement intake    Friction and Shear Interventions: HOB 30 degrees or less                Problem: Patient Education: Go to Patient Education Activity  Goal: Patient/Family Education  Outcome: Progressing Towards Goal

## 2022-05-11 NOTE — DISCHARGE SUMMARY
[facility] PHYSICAL REHABILITATION  [address] [place] [address], [phone] [facility] SUMMARY    Name: Deshawn Simmons MRN: [id number]   Age / Sex: 74 y.o. / male CSN: [id number]   YOB: 1955 Length of Stay: 14 days   Admit Date: 4/28/2022 Discharge Date: 5/12/2022       PRIMARY CARE PHYSICIAN: Rubina Paige MD      DISCHARGE DIAGNOSES:    Primary Rehabilitation Diagnosis: Impaired Mobility and ADLs secondary to:  1. Closed displaced comminuted fracture of distal third of shaft of right tibia with routine healing  2. Closed fracture of distal end of right fibula with routine healing  3. S/P Closed IM nailing of the right tibia using the Synthes IM nail system with a double lock proximally and triple lock distally (4/22/2022 - Dr. Rosalee Munoz)    Comorbidities  Patient Active Problem List   Diagnosis Code    Epidural abscess, L2-L5 G06.1    Polysubstance abuse ([address] F19.10    Leukopenia D72.819    Iron deficiency anemia, unspecified D50.9    Anemia D64.9    Thrombocytopenia ([address] D69.6    Orthopedic aftercare for healing traumatic lower leg fracture, right, closed S82. 91XD    Metatarsal fracture S92.309A    Alcohol abuse F10.10    HTN (hypertension) I10    Liver mass R16.0    Bladder mass N32.89    Hematuria R31.9    Closed displaced comminuted fracture of shaft of right tibia with routine healing S82.251D    Closed fracture of distal end of right fibula with routine healing S82.831D    Impaired mobility and ADLs Z74.09, Z78.9    Acute blood loss as cause of postoperative anemia D62    Methadone dependence ([address] F11.20       CONSULTS CALLED: None       PROCEDURES DONE: None      BRIEF HISTORY: (from the history and physical dictated by Dr. Michele Salas)   This is a 55-year-old male who was recently admitted to [facility] after he had a fall. Patient was noted to have a right tib-fib fracture. Orthopedics was consulted. Patient underwent intramedullary nailing. Patient was noted to have chronic thrombocytopenia. Patient has a history of alcohol abuse and substance abuse. Patient had a liver mass and MRI was very suspicious for hepatocellular cancer. There was also concern for bladder mass and patient was seen by urology and outpatient follow-up was recommended. Patient was also seen by gastroenterology as well as oncology as well as interventional radiology. Patient was evaluated by PT and OT and subsequently was transferred to the inpatient rehab. For full details regarding patient's hospital course at [facility] please refer to chart. I saw the patient for the first time in the inpatient rehab facility earlier today. Patient is sitting in bed in no apparent distress. Patient is awake and alert. No history of any chest pain or shortness of breath or palpitations. No history of any nausea vomiting or abdominal pain. No history of any diarrhea or rectal bleeding. No history of any headaches numbness or focal weakness. No history of any rash. COURSE IN THE HOSPITAL: Upon admission to the [facility], the patient underwent physical therapy, occupational therapy and speech therapy. The patient was able to actively participate in the rehabilitation activities and progressed well. On discharge, the patient was able to perform the following activities:    1.  Occupational Therapy    ON ADMISSION ON DISCHARGE   Eating  Functional Level: 5   Eating  Functional Level: 7     Grooming  Functional Level: 4 (CGA in stance; set-up/ supv wheelchair at sink)   Grooming  Functional Level: 6     Bathing  Functional Level: 4 (UB bathing: set-up; LB bathing: Min A)   Bathing  Functional Level: 5     Upper Body Dressing  Functional Level: 5 (set-up/ supv )   Upper Body Dressing  Functional Level: 5     Lower Body Dressing  Functional Level: 4 (Min A overall)   Lower Body Dressing  Functional Level: 5     Toileting  Functional Level: 3 (Mod A)   Toileting  Functional Level: 5     Toilet Transfers  Toilet Transfer Score: 4 (Stand step xfer (Min A) using RW; PWB R LE; gait belt)   Toilet Transfers  Toilet Transfer Score: 5     Tub /Shower Transfers  Tub/Shower Transfer Score: 0 (Not assessed at this time 2/2 pain R LE and safety/ balance)   Tub/Shower Transfers  Tub/Shower Transfer Score: 4       2. Physical Therapy    ON ADMISSION ON DISCHARGE   Wheelchair Mobility/Management  Able to Propel (ft): 270 feet  Functional Level: 4  Curbs/Ramps Assist Required (FIM Score): 0 (Not tested)  Wheelchair Setup Assist Required : 3 (Moderate assistance)  Wheelchair Management: Manages left brake,Manages right brake Wheelchair Mobility/Management  Able to Propel (ft):  (200 ft)  Functional Level: 6  Curbs/Ramps Assist Required (FIM Score): 0 (Not tested)  Wheelchair Setup Assist Required : 4 (Minimal assistance)  Wheelchair Management: Manages left brake,Manages right brake     Gait  Amount of Assistance: 4 (Minimal assistance)  Distance (ft): 6 Feet (ft)  Assistive Device: Walker, rolling Gait  Amount of Assistance: 5 (Supervision/setup)  Distance (ft): 150 Feet (ft) (50 x2, 40)  Assistive Device: Walker, rolling     Balance-Sitting/Standing  Sitting - Static: Good (unsupported)  Sitting - Dynamic: Good (unsupported)  Standing - Static: Poor  Standing - Dynamic : Impaired Balance-Sitting/Standing  Sitting - Static: Good (unsupported)  Sitting - Dynamic: Good (unsupported)  Standing - Static: Good (with UE support)  Standing - Dynamic : Impaired     Bed/Mat Mobility  Rolling Right : 4 (Minimal assistance)  Rolling Left : 4 (Minimal assistance)  Supine to Sit : 5 (Supervision)  Sit to Supine : 4 (Minimal assistance) Bed/Mat Mobility  Rolling Right : 7 (Independent)  Rolling Left : 7 (Independent)  Supine to Sit : 7 (Independent)  Sit to Supine : 7 (Independent)     Transfers  Transfer Type:  Other  Other: stand step with RW  Transfer Assistance : 4 (Minimal assistance)  Sit to Stand Assistance: Minimal assistance  Car Transfers: Not tested (pt declined 2/2 pain)  Car Type: N/A Transfers  Transfer Type:  Other  Other: stand step with RW  Transfer Assistance : 5 (Supervision/setup)  Sit to Stand Assistance: Supervision (required verbal cues 3 times during treatment session )  Car Transfers:  (CGA/SBA)  Car Type:  (car simulator)     Steps or Stairs  Steps/Stairs Ambulated (#): 0  Level of Assist : 0 (Not tested) (2/2 pt ambulating only short distances 2/2 pain)  Rail Use: None Steps or Stairs  Steps/Stairs Ambulated (#): 4  Level of Assist : 5 (Stand-by assistance) (one cue for proper sequencing)  Rail Use: Both       3. Speech and Language Pathology    ON ADMISSION ON DISCHARGE   Comprehension (Native Language)  Primary Mode of Comprehension: Auditory  Score: 5 Comprehension (Native Language)  Primary Mode of Comprehension: Auditory  Score: 5     Expression (Native Language)  Primary Mode of Expression: Verbal  Score: 5   Expression (Native Language)  Primary Mode of Expression: Verbal  Score: 6     Social Interaction/Pragmatics  Score: 5 Social Interaction/Pragmatics  Score: 5     Problem Solving  Score: 5   Problem Solving  Score: 5     Memory  Score: 5 Memory  Score: 5       Legend:   7 - Independent   6 - Modified Independent   5 - Standby Assistance / Supervision / Set-up   4 - Minimum Assistance / Contact Guard Assistance   3 - Moderate Assistance   2 - Maximum Assistance   1 - Total Assistance / Dependent       ACUTE MEDICAL ISSUES ADDRESSED IN INPATIENT REHABILITATION FACILITY:   > Closed displaced comminuted fracture of distal third of shaft of right tibia with routine healing; Closed fracture of distal end of right fibula with routine healing; S/P Closed IM nailing of the right tibia using the Synthes IM nail system with a double lock proximally and triple lock distally (4/22/2022 - Dr. Enrique Enriquez)              > Partial weightbearing on the right lower extremity using a rolling walker     > Acute postoperative blood loss anemia; Iron deficiency anemia              > Hgb/Hct (4/29/2022, on admission to the ARU) = 8.9/28.6              > Hgb/Hct (5/3/2022) = 10.3/33.7               > Hgb/Hct (5/9/2022) = 10.5/34.8               > Continue Ferrous sulfate 325 mg PO BID with meals     > Alcohol abuse              > Continue:                          > Folic acid 1 mg PO once daily                          > Multivitamin 1 tab PO once daily                          > Thiamine 100 mg PO once daily     > Constipation              > During the patient's stay at the ARU, the patient was given:                           > Docusate sodium 100 mg PO BID                          > Miralax 17 grams in 8 oz water PO once daily     > Hypertension              > On 5/8/2022, started Telmisartan 10 mg PO once daily (9AM)              > On 5/10/2022, increased Telmisartan from 10 mg to 20 mg PO once daily (9AM)              > Continue:                          > Amlodipine 10 mg PO once daily (9AM)                          > Telmisartan 20 mg PO once daily (9AM)     > Methadone dependence              > Continue Methadone 45 mg PO once daily     > Fever, etiology to be determined              > As per note of Dr. Valdez Chase dated 4/30/2022, patient had a fever and a work up was initiated              > Work-up:                          > WBC count (4/29/2022, on admission to the ARU) = 3.1                          > Blood culture x 2 sets (collected 4/29/2022, resulted as of 5/6/2022) yielded no growth after 6 days                          > Chest x-ray (4/29/2022) showed perhaps mild bronchial wall thickening                          > Urinalysis (4/29/2022):  WNL                          > Urine culture (collected 4/29/2022, resulted 5/2/2022) yielded growth of 20,000 colonies/ml of Escherichia coli              > WBC count (5/3/2022) = 2.9              > No fever over the past 24 hours              > No antibiotics for now     > Difficulty sleeping              > On 5/3/2022, started Melatonin 3 mg PO daily after dinner              > Continue Melatonin 3 mg PO daily after dinner     > Analgesia              > On 5/3/2022, started:                          > Acetaminophen 650 mg PO TID (8AM, 12PM, 4PM)                          > Pregabalin 50 mg PO TID (8AM, 2PM, 8PM)              > On 5/6/2022, increased Pregabalin from 50 mg to 75 mg PO TID (8AM, 2PM, 8PM)              > During the patient's stay at the ARU, the patient was given Acetaminophen 650 mg PO TID (8AM, 12PM, 4PM)              > Continue:                          > Acetaminophen 650 mg PO q 4 hr PRN for pain level 4/10 or lesser                          > Pregabalin 75 mg PO TID (8AM, 2PM, 8PM)                          > Oxycodone 10 mg PO q 4 hr PRN for pain level 5/10 or greater      MEDICATIONS ON DISCHARGE:    Current Discharge Medication List      START taking these medications    Details   telmisartan (MICARDIS) 20 mg tablet Take 1 Tablet by mouth daily. Indications: high blood pressure  Qty: 30 Tablet, Refills: 0  Start date: 5/12/2022    Associated Diagnoses: Primary hypertension      acetaminophen (TYLENOL) 325 mg tablet Take 2 Tablets by mouth every four (4) hours as needed (for pain level 4/10 or lesser (to be given between 8:00PM and 4:00AM only)). Indications: fever, pain  Qty: 30 Tablet, Refills: 0  Start date: 5/11/2022    Associated Diagnoses: Closed displaced comminuted fracture of shaft of right tibia with routine healing; Other closed fracture of distal end of right fibula with routine healing, subsequent encounter      pregabalin (LYRICA) 75 mg capsule Take 1 Capsule by mouth three (3) times daily. Max Daily Amount: 225 mg.  Indications: acute pain following an operation  Qty: 90 Capsule, Refills: 0  Start date: 5/11/2022    Associated Diagnoses: Closed displaced comminuted fracture of shaft of right tibia with routine healing; Other closed fracture of distal end of right fibula with routine healing, subsequent encounter; Orthopedic aftercare for healing traumatic lower leg fracture, right, closed      pantoprazole (PROTONIX) 40 mg tablet Take 1 Tablet by mouth Daily (before breakfast). Indications: gastroesophageal reflux disease  Qty: 30 Tablet, Refills: 0  Start date: 5/12/2022      melatonin 3 mg tablet Take 1 Tablet by mouth daily (after dinner). Indications: Difficulty sleeping  Qty: 30 Tablet, Refills: 0  Start date: 5/11/2022      oxyCODONE-acetaminophen (Percocet) 5-325 mg per tablet Take 1 Tablet by mouth every six (6) hours as needed for Pain for up to 5 days. Max Daily Amount: 4 Tablets. Indications: Postoperative Pain  Qty: 20 Tablet, Refills: 0  Start date: 5/11/2022, End date: 5/16/2022    Associated Diagnoses: Closed displaced comminuted fracture of shaft of right tibia with routine healing      naloxone (Narcan) 4 mg/actuation nasal spray 1 [place] by IntraNASal route once as needed for Overdose for up to 1 dose. Use 1 spray intranasally, then discard. Repeat with new spray every 2 min as needed for opioid overdose symptoms, alternating nostrils. Indications: opioid overdose, decrease in rate & depth of breathing due to opioid drug  Qty: 1 Each, Refills: 0  Start date: 5/11/2022, End date: 5/11/2022    Associated Diagnoses: Methadone dependence ([facility] Polysubstance abuse ([facility]         CONTINUE these medications which have NOT CHANGED    Details   amLODIPine (NORVASC) 10 mg tablet Take 1 Tablet by mouth daily. Qty: 15 Tablet, Refills: 0  Start date: 4/29/2022      aspirin delayed-release 81 mg tablet Take 1 Tablet by mouth two (2) times a day. Qty: 30 Tablet, Refills: 0  Start date: 4/28/2022      ferrous sulfate 325 mg (65 mg iron) tablet Take 1 Tablet by mouth two (2) times daily (with meals).   Qty: 30 Tablet, Refills: 0  Start date: 4/28/2022 folic acid (FOLVITE) 1 mg tablet Take 1 Tablet by mouth daily. Qty: 15 Tablet, Refills: 0  Start date: 4/29/2022      polyethylene glycol (MIRALAX) 17 gram packet Take 1 Packet by mouth daily. Qty: 30 Packet, Refills: 0  Start date: 4/28/2022      therapeutic multivitamin [facility]) tablet Take 1 Tablet by mouth daily. Qty: 30 Tablet, Refills: 0  Start date: 4/29/2022      thiamine HCL (B-1) 100 mg tablet Take 1 Tablet by mouth daily. Qty: 15 Tablet, Refills: 0  Start date: 4/29/2022      methadone (DOLOPHINE) 10 mg tablet Take 45 mg by mouth daily. STOP taking these medications       oxyCODONE IR (ROXICODONE) 10 mg tab immediate release tablet Comments:   Reason for Stopping:               DISCHARGE VITAL SIGNS:  Visit Vitals  BP (!) 160/80 (BP 1 Location: Right upper arm, BP Patient Position: Sitting)   Pulse 65   Temp 98.3 °F (36.8 °C)   Resp 20   Ht 6' (1.829 m)   Wt 84.4 kg (186 lb)   SpO2 98%   BMI 25.23 kg/m²       DISCHARGE PHYSICAL EXAMINATION:  GENERAL SURVEY: Patient is awake, alert, oriented x 3, sitting comfortably on the wheelchair, not in acute respiratory distress. HEENT: pale palpebral conjunctivae, anicteric sclerae, no nasoaural discharge, moist oral mucosa  NECK: supple, no jugular venous distention, no palpable lymph nodes  CHEST/LUNGS: symmetrical chest expansion, good air entry, clear breath sounds  HEART: adynamic precordium, good S1 S2, no S3, regular rhythm, no murmurs  ABDOMEN: flat, bowel sounds appreciated, soft, non-tender  EXTREMITIES: pale nailbeds, no edema, full and equal pulses, no calf tenderness   NEUROLOGICAL EXAM: The patient is awake, alert and oriented x3, able to answer questions fairly appropriately, able to follow 1 and 2 step commands. Able to tell time from the wall clock. Cranial nerves II-XII are grossly intact. No gross sensory deficit. Motor strength is 4+/5 on BUE and LLE, 4- to 4/5 on the RLE.       CONDITION ON DISCHARGE: Stable. DISPOSITION: Patient clinically improved and was discharged to home with home health physical therapy, occupational therapy and skilled nursing. The patient is temporarily homebound secondary to functional deficits due to Closed displaced comminuted fracture of distal third of shaft of right tibia with routine healing; Closed fracture of distal end of right fibula with routine healing; S/P Closed IM nailing of the right tibia using the Synthes IM nail system with a double lock proximally and triple lock distally (4/22/2022 - Dr. Rosalee Munoz). The patient can ambulate using a rolling walker (see above). The patient would benefit from continued skilled physical therapy in order to improve independent functional mobility within the home with use of least restrictive device. The patient would also benefit from continued skilled occupational therapy in order to improve self care and functional mobility within the home with use of least restrictive device. Short-term skilled nursing is needed for wound care, medication reconciliation and disease education. FOLLOW-UP RECOMMENDATIONS:   Follow-up Information     Follow up With Specialties Details Why Contact Info    Alexa Avelar MD Orthopedic Surgery On 6/2/2022 Patient has an appointment scheduled with Orthopedic Dr. Kayleigh Tariq on June 2, 2022 @ 8:45am. [address] [facility].      Rubina Paige MD Internal Medicine Physician On 5/16/2022 Patient has an appointment scheduled with PCP Dr. Radha Garcia on May 16, 2022 @ 2:00pm. [address] [place] [id number]  [phone]            OTHER INSTRUCTIONS:  1. Diet. Specifications  Low fat/Low cholesterol/High fiber/2 gm Na   Solids (consistency)  Regular   Liquids (consistency)  Thin   Fluid Restriction  None     2. Activity. As tolerated. 3. Safety / fall precautions. 4. Weightbearing status.        TIME SPENT ON DISCHARGE ACTIVITIES: 37 minutes.       Signed:  Cory Bermudez MD    5/12/2022

## 2022-05-11 NOTE — PROGRESS NOTES
Problem: Self Care Deficits Care Plan (Adult)  Goal: *Acute Goals and Plan of Care (Insert Text)  Description: Occupational Therapy Goals   Long Term Goals  Initiated 4/29/2022 and to be accomplished within 2 week(s), 5/13/2022  1. Pt will perform self-feeding with Mod I GM  2. Pt will perform grooming with Mod I. GM  3. Pt will perform UB bathing with Mod I. Pt is CGA  4. Pt will perform LB bathing with Mod I using AE and/ or compensatory strategies as needed. Pt is CGA  5. Pt will perform tub/shower transfer with supv using least restrictive assistive device while maintaining weight bearing restrictions. Pt is CGA  6. Pt will perform UB dressing with Mod I. Pt is set up  7. Pt will perform LB dressing with supv using AE and/ or compensatory strategies as needed. Pt is set up  8. Pt will perform toileting task with Mod I. Per declines toileting with OT  9. Pt will perform toilet transfer with supv using least restrictive assistive device while maintaining weight bearing restrictions. Per 5/5 pt is CGA      Short Term Goals   Initiated 4/29/2022 and to be accomplished within 7 day(s), by 5/6/2022  1. Pt will perform self-feeding with set-up. Goal met 5/5/22  2. Pt will perform grooming with SBA. Goal met 5/5/22  3. Pt will perform UB bathing with set-up. Goal met 5/5/22  4. Pt will perform LB bathing with SBA using AE and/ or compensatory strategies as needed. GM pt is set up  5. Pt will perform tub/shower transfer with Min A using least restrictive assistive device while maintaining weight bearing restrictions. Goal met 5/5/22  6. Pt will perform UB dressing with set-up. Goal met 5/5/22  7. Pt will perform LB dressing with CGA/ SBA using AE and/ or compensatory strategies as needed. Goal met 5/5/22  8. Pt will perform toileting task with Min A/ CGA. 9. Pt will perform toilet transfer with SBA using least restrictive assistive device while maintaining weight bearing restrictions.          Outcome: Progressing Towards Goal   OCCUPATIONAL THERAPY DISCHARGE    Patient: Alyssa Gonzalez (89 y.o. male)  Date: 2022    First Tx Session  Time In: 12:00  Time Out[de-identified] 12:30    Second Tx Session  Time In: 13:30  Time Out[de-identified] 14:30    Primary Diagnosis: Tibia/fibula fracture [S82.209A, S82.409A] Closed displaced comminuted fracture of shaft of right tibia with routine healing    Precautions:   Fall,Other (comment) (PWB Right LE)    Barriers to Learning/Limitations: None  Compensate with: visual, verbal, tactile, kinesthetic cues/model     Patient identified with name and :yes    SUBJECTIVE:   Patient stated I just want to walk so maybe that's why I have pain at night.     OBJECTIVE DATA SUMMARY:     Past Medical History:   Diagnosis Date    Abscess of right shoulder     Abscess of shoulder     Acute blood loss as cause of postoperative anemia 2022    Arthritis     Closed displaced comminuted fracture of shaft of right tibia with routine healing 2022    Closed fracture of distal end of right fibula with routine healing 2022    Epidural abscess     Heart murmur     Hematuria     Heroin abuse (HCC)     Hypertension     Infected wound     Infectious disease     Iron deficiency anemia     IV drug user     Liver disease     [facility]adone dependence ([facility] [address]     Tobacco abuse      Past Surgical History:   Procedure Laterality Date    HX COLONOSCOPY  2016    HX CYST REMOVAL      rt.forearm and rt.foot    HX ENDOSCOPY  2016    HX FRACTURE TX Right 2022    S/P Closed IM nailing of the right tibia using the Synthes IM nail system with a double lock proximally and triple lock distally (2022 - Dr. Lara Cantu)    HX FREE SKIN GRAFT      HX ORTHOPAEDIC      HX ORTHOPAEDIC Right 2022    Closed Fracture of Right Tibia     HX OTHER SURGICAL      right forearm infections and graft     Prior Level of Function/Home Situation: independent   Home Situation  Home Environment: Private residence  # Steps to Enter: 2  Rails to Enter: Yes  Hand Rails : Bilateral  Wheelchair Ramp: No  One/Two Story Residence: One story  Living Alone: Yes  Support Systems: Child(luis),Other Family Member(s),Friend/Neighbor  Patient Expects to be Discharged to[de-identified] Home  Current DME Used/Available at Home: None  Tub or Shower Type: Tub/Shower combination  [x]     Right hand dominant   []     Left hand dominant    Therapeutic Exercise:  Pt performed Power  x 10 min with rest break at 5 min. Pt performed UB there ex with red T band for bicep curls 3x10, shld flexion 3x10, and scapular retraction. protraction 3x10. Pt performed to increase UB strength needed for independence in self care and funcitonal transfers. Pain:  Pt reports 6/10 pain or discomfort prior to treatment. Pt reports 6/10 pain or discomfort post treatment.    Problem List:    Decreased strength B UE  []     Decreased strength trunk/core  []     Decreased AROM   []     Decreased PROM  []     Decreased balance sitting  []     Decreased balance standing  [x]     Decreased endurance  [x]     Pain  [x]       Functional Limitations:   Decreased independence with ADL  [x]     Decreased independence with functional transfers  [x]     Decreased independence with ambulation  [x]     Decreased independence with IADL  [x]       Outcome Measures:      MMT Initial Assessment   Right Upper Extremity  Left Upper Extremity    UE AROM [facility]   Shoulder flexion     Shoulder extension     Shoulder ABDuction     Shoulder ADDUction     Elbow Flexion     Elbow Extension     Wrist Extension/Flexion                   MMT Discharge Assessment   Right Upper Extremity  Left Upper Extremity    UE AROM [facility]   Shoulder flexion     Shoulder extension     Shoulder ABDuction     Shoulder ADDUction     Elbow Flexion     Elbow Extension     Wrist Extension/Flexion              0/5 No palpable muscle contraction  1/5 Palpable muscle contraction, no joint movement  2-/5 Less than full range of motion in gravity eliminated position  2/5 Able to complete full range of motion in gravity eliminated position  2+/5 Able to initiate movement against gravity  3-/5 More than half but not full range of motion against gravity  3/5 Able to complete full range of motion against gravity  3+/5 Completes full range of motion against gravity with minimal resistance  4-/5 Completes full range of motion against gravity with minimal resistance  4/5 Completes full range of motion against gravity with moderate resistance  5/5 Completes full range of motion against gravity with maximum resistance    Coordination: intact  Sensation: intact    FIM SCORES Initial Assessment Discharge Assessment   Eating 5 Functional Level: 7   Grooming 4 (CGA in stance; set-up/ supv wheelchair at sink) Functional Level: 6    Oral Hygiene FIM: 6    Bathing 4 (UB bathing: set-up; LB bathing: Min A) Functional Level: 5         Upper Body Dressing 5 (set-up/ supv ) Functional Level: 5         Lower Body Dressing 4 (Min A overall) Functional Level: 5          Sock and/or Shoe Management FIM: 5   Toileting 3 (Mod A) Functional Level: 5        Tub/Shower Transfer 0 (Not assessed at this time 2/2 pain R LE and safety/ balance) Tub/Shower Transfer Score: 4  Comments: contcat guard with use RW     Toilet Transfer 4 (Stand step xfer (Min A) using RW; PWB R LE; gait belt) Toilet Transfer Score: 5          Comprehension 5  5   Expression 5  5   Social Interaction 5  5   Problem Solving 5  5   Memory 5  5   Please see C Interdisciplinary Eval: Coordination/Balance Section for details regarding FIM score description. Activity Tolerance:   Good    ASSESSMENT:  Pt has increased independence in self care and functional mobility with use of AE, compensatory strategies and AD and is now set up to [facility] for all self care activities.  Pt does require CGA for safe transfers to tub transfer bench as pt attempted to lift leg over tub lip prior to sitting down on tub transfer bench during session this date. Pt demonstrates moderately good ability to maintain PWB on RLE and is able to recover after one LOB this session. Pt educated on use of walker basket for functional independence with ADL and IADL and practiced putting groceries away in upper cabinets with use of walker and walker basket. Pt demonstrates MI with use of w/c mobility in hallway and in his room. Pt seated in w/c at end of session with all needs in reach. Progression toward goals:  [x]      Improving appropriately and progressing toward goals  []      Improving slowly and progressing toward goals  []      Not making progress toward goals and plan of care will be adjusted     PLAN:  Pt would benefit from continued skilled occupational therapy in order to improve ADL function and IADL with use of least restrictive device. Interventions may include range of motion (AROM, PROM B UE/trunk), motor function (B UE/trunk strengthening/coordination), activity tolerance (vitals, oxygen saturation levels), ADL, balance activities, IADL, and functional transfer training. Discharge Recommendations: Home Health  Further Equipment Recommendations for Discharge: bedside commode, transfer bench, rolling walker, wheelchair and walker basket       Please refer to the flow sheet for vital signs taken during this treatment. After treatment:   [x]  Patient left in no apparent distress sitting up in chair  []  Patient left in no apparent distress in bed  []  Call bell left within reach  [x]  Nursing notified  []  Caregiver present  []  Bed alarm activated    COMMUNICATION/EDUCATION:   Communication/Collaboration:  [x]      Home safety education was provided and the patient/caregiver indicated understanding. [x]      Patient/family have participated as able and agree with findings and recommendations. []      Patient is unable to participate in plan of care at this time.     Yossi Epps OT  5/11/2022

## 2022-05-11 NOTE — PROGRESS NOTES
Spoke with patient regarding follow up [place] therapy at discharge. Pt stated that he would like to use [facility] for PT OT follow up. Called referral in to [place] liaison and left a VM.     Marilee Tena, CTRS

## 2022-05-11 NOTE — PROGRESS NOTES
Problem: Falls - Risk of  Goal: *Absence of Falls  Description: Document Joyce Nettles Fall Risk and appropriate interventions in the flowsheet. Outcome: Progressing Towards Goal  Note: Fall Risk Interventions:  Mobility Interventions: Assess mobility with egress test,Bed/chair exit alarm,PT Consult for mobility concerns,PT Consult for assist device competence,Utilize walker, cane, or other assistive device,Utilize gait belt for transfers/ambulation,Patient to call before getting OOB    Mentation Interventions: Adequate sleep, hydration, pain control,Bed/chair exit alarm    Medication Interventions: Assess postural VS orthostatic hypotension,Bed/chair exit alarm    Elimination Interventions: Bed/chair exit alarm,Call light in reach,Urinal in reach,Patient to call for help with toileting needs    History of Falls Interventions: Bed/chair exit alarm         Problem: Patient Education: Go to Patient Education Activity  Goal: Patient/Family Education  Outcome: Progressing Towards Goal     Problem: Pain  Goal: *Control of Pain  Outcome: Progressing Towards Goal  Goal: *PALLIATIVE CARE:  Alleviation of Pain  Outcome: Progressing Towards Goal     Problem: Patient Education: Go to Patient Education Activity  Goal: Patient/Family Education  Outcome: Progressing Towards Goal     Problem: Pressure Injury - Risk of  Goal: *Prevention of pressure injury  Description: Document Jonathan Scale and appropriate interventions in the flowsheet.   Outcome: Progressing Towards Goal  Note: Pressure Injury Interventions:  Sensory Interventions: Assess changes in LOC    Moisture Interventions: Absorbent underpads    Activity Interventions: Increase time out of bed,PT/OT evaluation    Mobility Interventions: Assess need for specialty bed,HOB 30 degrees or less,PT/OT evaluation    Nutrition Interventions: Document food/fluid/supplement intake    Friction and Shear Interventions: HOB 30 degrees or less,Minimize layers                Problem: Patient Education: Go to Patient Education Activity  Goal: Patient/Family Education  Outcome: Progressing Towards Goal

## 2022-05-11 NOTE — ROUTINE PROCESS
SHIFT CHANGE NOTE FOR [facility]    Bedside and Verbal shift change report given to Reg Dunn RN (oncoming nurse) by May Holstein, RN (offgoing nurse). Report included the following information SBAR, Kardex, MAR and Recent Results.     Situation:   Code Status: Full Code   Hospital Day: 13   Problem List:   Hospital Problems  Date Reviewed: 5/10/2022          Codes Class Noted POA    Methadone dependence ([address] (Chronic) ICD-10-CM: F11.20  ICD-9-CM: 304.00  Unknown Yes        Orthopedic aftercare for healing traumatic lower leg fracture, right, closed ICD-10-CM: S82.91XD  ICD-9-CM: V54.16  4/22/2022 Yes    Overview Addendum 5/2/2022 10:49 AM by Clara Gill MD     S/P Closed IM nailing of the right tibia using the Synthes IM nail system with a double lock proximally and triple lock distally (4/22/2022 - Dr. Macario Wilkins)             Alcohol abuse ICD-10-CM: F10.10  ICD-9-CM: 305.00  4/22/2022 Yes        HTN (hypertension) ICD-10-CM: I10  ICD-9-CM: 401.9  4/22/2022 Yes        * (Principal) Closed displaced comminuted fracture of shaft of right tibia with routine healing ICD-10-CM: S82.251D  ICD-9-CM: V54.16  4/22/2022 Yes        Closed fracture of distal end of right fibula with routine healing ICD-10-CM: S82.831D  ICD-9-CM: V54.16  4/22/2022 Yes        Impaired mobility and ADLs ICD-10-CM: Z74.09, Z78.9  ICD-9-CM: V49.89  4/22/2022 Yes        Acute blood loss as cause of postoperative anemia ICD-10-CM: D62  ICD-9-CM: 285.1  4/22/2022 Yes        Iron deficiency anemia, unspecified ICD-10-CM: D50.9  ICD-9-CM: 280.9  8/5/2012 Yes              Background:   Past Medical History:   Past Medical History:   Diagnosis Date    Abscess of right shoulder     Abscess of shoulder     Acute blood loss as cause of postoperative anemia 4/22/2022    Arthritis     Closed displaced comminuted fracture of shaft of right tibia with routine healing 04/22/2022    Closed fracture of distal end of right fibula with routine healing 4/22/2022    Epidural abscess     Heart murmur     Hematuria     [facility] ([facility] [address]     Hypertension     Infected wound     Infectious disease     Iron deficiency anemia     IV drug user     Liver disease     Methadone dependence ([facility] [address]     Tobacco abuse         Assessment:   Changes in Assessment throughout shift: No change to previous assessment     Patient has a central line: no Patient has Benitez Cath: no    Last Vitals:     Vitals:    05/10/22 0749 05/10/22 1214 05/10/22 1556 05/10/22 2000   BP: (!) 144/65 125/65 115/60 129/66   Pulse: 71 62 63 60   Resp: 18  19 18   Temp: 97.8 °F (36.6 °C)  98.5 °F (36.9 °C) 98 °F (36.7 °C)   SpO2: 100%  99% 100%   Weight:       Height:            PAIN    Pain Assessment    Pain Intensity 1: 0 (05/11/22 0330) Pain Intensity 1: 2 (12/29/14 1105)    Pain Location 1: Leg Pain Location 1: Abdomen    Pain Intervention(s) 1: Medication (see MAR) Pain Intervention(s) 1: Medication (see MAR)  Patient Stated Pain Goal: 0 Patient Stated Pain Goal: 0  o Intervention effective: yes  o Other actions taken for pain: Medication (see MAR)     Skin Assessment  Skin color    Condition/Temperature    Integrity Skin Integrity: Incision (comment) (right leg ORIF)  Turgor    Weekly Pressure Ulcer Documentation  Pressure  Injury Documentation: No Pressure Injury Noted-Pressure Ulcer Prevention Initiated  Wound Prevention & Protection Methods  Orientation of wound Orientation of Wound Prevention: Posterior  Location of Prevention Location of Wound Prevention: Sacrum/Coccyx  Dressing Present Dressing Present : No  Dressing Status    Wound Offloading Wound Offloading (Prevention Methods): Bed, pressure redistribution/air,Bed, pressure reduction mattress,Repositioning,Turning     INTAKE/OUPUT  Date 05/10/22 0700 - 05/11/22 0659 05/11/22 0700 - 05/12/22 0659   Shift 8387-9125 2332-9169 24 Hour Total 6781-7321 2957-7463 24 Hour Total   INTAKE   P.O. 240  240        P. O. 240  240      Shift Total(mL/kg) 240(2.8)  240(2.8)      OUTPUT   Urine(mL/kg/hr)  3268(3.5) 2750(1.4)        Urine Voided  2750 2750        Urine Occurrence(s) 1 x  1 x      Stool           Stool Occurrence(s) 1 x 0 x 1 x      Shift Total(mL/kg)  9764(17.1) 0728(67.0)       -2750 -2510      Weight (kg) 84.4 84.4 84.4 84.4 84.4 84.4       Recommendations:  1. Patient needs and requests: none    2. Pending tests/procedures: none     3. Functional Level/Equipment: Partial (one person) / Wheelchair    Fall Precautions:   Fall risk precautions were reinforced with the patient; he was instructed to call for help prior to getting up. The following fall risk precautions were continued: bed/ chair alarms, door signage, yellow bracelet and socks as well as update of the [employer] tool in the patient's room. Ezio Score: 4    HEALS Safety Check    A safety check occurred in the patient's room between off going nurse and oncoming nurse listed above. The safety check included the below items  Area Items   H  High Alert Medications - Verify all high alert medication drips (heparin, PCA, etc.)   E  Equipment - Suction is set up for ALL patients (with yanker)  - Red plugs utilized for all equipment (IV pumps, etc.)  - WOWs wiped down at end of shift.  - Room stocked with oxygen, suction, and other unit-specific supplies   A  Alarms - Bed alarm is set for fall risk patients  - Ensure chair alarm is in place and activated if patient is up in a chair   L  Lines - Check IV for any infiltration  - Benitez bag is empty if patient has a Benitez   - Tubing and IV bags are labeled   S  Safety   - Room is clean, patient is clean, and equipment is clean. - Hallways are clear from equipment besides carts. - Fall bracelet on for fall risk patients  - Ensure room is clear and free of clutter  - Suction is set up for ALL patients (with yanker)  - Hallways are clear from equipment besides carts.    - Isolation precautions followed, supplies available outside room, sign posted     Ace Eaton RN

## 2022-05-11 NOTE — PROGRESS NOTES
Problem: Mobility Impaired (Adult and Pediatric)  Goal: *Acute Goals and Plan of Care (Insert Text)  Description: Physical Therapy Short Term Goals  Initiated 4/29/2022 and re-assessed 5/6/2022to be accomplished within 5-7 day(s) (5/13/2022)  1. Patient will move from supine to sit and sit to supine , scoot up and down, and roll side to side in bed with supervision/set-up. Goal met-currently supervision 5/6/2022  2. Patient will transfer from bed to chair and chair to bed with supervision/set-up using the least restrictive device. Goal partially met-currently SBA 5/6/2022  3. Patient will perform sit to stand with supervision/set-up. Goal met 5/6/2022  4. Patient will ambulate with supervision/set-up for 50 feet with the least restrictive device. Partially met 5/6/2022-150 ft with RW and SBA  5. Patient will ascend/descend 2 stairs with B handrail(s) with minimal assistance/contact guard assist. Goal met 5/6/2022    Physical Therapy Long Term Goals  Initiated 4/29/2022 and to be accomplished within 10-14 day(s) (5/13/2022)  1. Patient will move from supine to sit and sit to supine , scoot up and down, and roll side to side in bed with modified independence. 2.  Patient will transfer from bed to chair and chair to bed with modified independence using the least restrictive device. 3.  Patient will perform sit to stand with modified independence. 4.  Patient will ambulate with modified independence for 150 feet with the least restrictive device. 5.  Patient will ascend/descend 2 stairs with B handrail(s) with modified independence.       Outcome: Progressing Towards Goal   PHYSICAL THERAPY DISCHARGE NOTE    Patient: Libia Wall (80 y.o. male)  Date: 5/11/2022  Diagnosis: Tibia/fibula fracture [S82.209A, S82.409A] Closed displaced comminuted fracture of shaft of right tibia with routine healing  Precautions: Fall,Other (comment) (PWB Right LE)  Chart, physical therapy assessment, plan of care and goals were reviewed. Time in:0800  Time [id number]    Patient seen for: Gait training;Patient education; Therapeutic exercise;Transfer training    Pain:  Pt pain was reported as  *** pre-treatment. Pt pain was reported as *** post-treatment.   Intervention: ***    Patient identified with name and :***    SUBJECTIVE:     Patient stated:***    OBJECTIVE DATA SUMMARY:     GROSS ASSESSMENT Discharge Assessment 2022   AROM Within functional limits (except right ankle)   Strength Within functional limits   Coordination Within functional limits   Tone Normal   Sensation Intact   PROM         POSTURE Discharge Assessment 2022   Posture (WDL)     Posture Assessment    ***         BALANCE Discharge Assessment 2022    Sitting - Static: Good (unsupported)  Sitting - Dynamic: Good (unsupported)  Standing - Static: Good (with UE support)       BED/CHAIR/WHEELCHAIR TRANSFERS Initial Assessment Discharge Assessment   Rolling Right 4 (Minimal assistance) 7 (Independent)   Rolling Left 4 (Minimal assistance) 7 (Independent)   Supine to Sit 5 (Supervision) 7 (Independent)   Sit to Stand Minimal assistance Modified independent   Sit to Supine 4 (Minimal assistance) 7 (Independent)   Transfer Assistance Level 4 (Minimal assistance) 6 (Modified independent)   Transfer Type Other     Comments    ***   Car Transfer Not tested (pt declined 2/2 pain) Supervision   Car Type N/A car simulator       WHEELCHAIR MOBILITY/MANAGEMENT Initial Assessment Discharge Assessment   Able to Propel 270 feet     Assistance Level 4 ***   Curbs/ramps assistance required 0 (Not tested)     Wheelchair set up assistance required 3 (Moderate assistance)     Wheelchair management Manages left brake,Manages right brake Manages right footrest;Manages right brake;Manages left brake       GAIT Discharge Assessment 2022   Gait Description (WDL)     Gait Abnormalities Step to gait       WALKING INDEPENDENCE Initial Assessment Discharge Assessment Assistive device Walker, rolling Walker, rolling   Ambulation assistance - level surface 4 (Minimal assistance) 6 (Modified independent)   Distance 6 Feet (ft) 150 Feet (ft) (80,40)   Comments    ***   Ambulation assistance - unlevel surface           STEPS/STAIRS Initial Assessment Discharge Assessment   Steps/Stairs ambulated 0 (Not tested) (2/2 pt ambulating only short distances 2/2 pain) 4   Rail Use None Both   Assistance Level   5 (Supervision/setup)   Comments    ***   Curbs/Ramps         Neuro Re-Education:  ***  Therapeutic Exercises:   ***    ASSESSMENT:  ***  LTGs: ***     PLAN:  Pt would benefit from continued skilled physical therapy in order to improve independent functional mobility at *** level. Interventions may include range of motion (AROM, PROM B LE/trunk), motor function (B LE/trunk strengthening/coordination), activity tolerance (vitals, oxygen saturation levels), bed mobility training, balance activities, gait training (progressive ambulation program), and functional transfer training. Discharge Recommendations:  {AFTER [facility]:[id number]}  Further Equipment Recommendations for Discharge:  { :[id number]::N/A}       Activity Tolerance:   ***  Please refer to the flowsheet for vital signs taken during this treatment.   After treatment:   [] Patient left in no apparent distress in bed  [] Patient left in no apparent distress sitting up in chair  [] Patient left in no apparent distress in w/c mobilizing under own power  [] Patient left in no apparent distress dining area  [] Patient left in no apparent distress mobilizing under own power  [] Call bell left within reach  [] Nursing notified  [] Caregiver present  [] Bed alarm activated   [] Chair alarm activated      Bianca Duncan, PT  5/11/2022

## 2022-05-12 ENCOUNTER — HOME HEALTH ADMISSION (OUTPATIENT)
Dept: HOME HEALTH SERVICES | Facility: HOME HEALTH | Age: 67
End: 2022-05-12
Payer: MEDICARE

## 2022-05-12 VITALS
RESPIRATION RATE: 20 BRPM | HEART RATE: 65 BPM | BODY MASS INDEX: 25.19 KG/M2 | WEIGHT: 186 LBS | OXYGEN SATURATION: 98 % | TEMPERATURE: 98.3 F | SYSTOLIC BLOOD PRESSURE: 160 MMHG | DIASTOLIC BLOOD PRESSURE: 80 MMHG | HEIGHT: 72 IN

## 2022-05-12 PROCEDURE — 74011250637 HC RX REV CODE- 250/637: Performed by: INTERNAL MEDICINE

## 2022-05-12 PROCEDURE — 74011250637 HC RX REV CODE- 250/637: Performed by: EMERGENCY MEDICINE

## 2022-05-12 RX ADMIN — OXYCODONE HYDROCHLORIDE 10 MG: 5 TABLET ORAL at 06:10

## 2022-05-12 RX ADMIN — FOLIC ACID 1 MG: 1 TABLET ORAL at 08:53

## 2022-05-12 RX ADMIN — PANTOPRAZOLE 40 MG: 40 TABLET, DELAYED RELEASE ORAL at 06:56

## 2022-05-12 RX ADMIN — AMLODIPINE BESYLATE 10 MG: 10 TABLET ORAL at 08:52

## 2022-05-12 RX ADMIN — POLYETHYLENE GLYCOL 3350 17 G: 17 POWDER, FOR SOLUTION ORAL at 08:53

## 2022-05-12 RX ADMIN — OXYCODONE HYDROCHLORIDE 10 MG: 5 TABLET ORAL at 00:00

## 2022-05-12 RX ADMIN — ACETAMINOPHEN 650 MG: 325 TABLET ORAL at 08:53

## 2022-05-12 RX ADMIN — METHADONE HYDROCHLORIDE 45 MG: 10 TABLET ORAL at 08:51

## 2022-05-12 RX ADMIN — TELMISARTAN 20 MG: 20 TABLET ORAL at 08:53

## 2022-05-12 RX ADMIN — DOCUSATE SODIUM 100 MG: 100 CAPSULE, LIQUID FILLED ORAL at 08:52

## 2022-05-12 RX ADMIN — PREGABALIN 75 MG: 75 CAPSULE ORAL at 08:53

## 2022-05-12 RX ADMIN — FERROUS SULFATE TAB 325 MG (65 MG ELEMENTAL FE) 325 MG: 325 (65 FE) TAB at 08:52

## 2022-05-12 RX ADMIN — THERA TABS 1 TABLET: TAB at 08:52

## 2022-05-12 RX ADMIN — Medication 100 MG: at 08:51

## 2022-05-12 RX ADMIN — ASPIRIN 81 MG CHEWABLE TABLET 81 MG: 81 TABLET CHEWABLE at 08:52

## 2022-05-12 NOTE — ROUTINE PROCESS
0800 Pt. Awake sitting up in bed no change in assessment pt. Reported to be feeling fine. Dressing to right lower ext. Dry and intact. 0930 Pt. Sitting up in bed no eating breakfast. Pt. Reported to be ready for discharge home. 1130 pt. Awaiting for family verbalized understanding of discharge instructions and prescriptions.

## 2022-05-12 NOTE — DISCHARGE INSTRUCTIONS
DISCHARGE SUMMARY from Nurse    PATIENT INSTRUCTIONS:    After general anesthesia or intravenous sedation, for 24 hours or while taking prescription Narcotics:  · Limit your activities  · Do not drive and operate hazardous machinery  · Do not make important personal or business decisions  · Do  not drink alcoholic beverages  · If you have not urinated within 8 hours after discharge, please contact your surgeon on call. Report the following to your surgeon:  · Excessive pain, swelling, redness or odor of or around the surgical area  · Temperature over 100.5  · Nausea and vomiting lasting longer than 4 hours or if unable to take medications  · Any signs of decreased circulation or nerve impairment to extremity: change in color, persistent  numbness, tingling, coldness or increase pain  · Any questions    What to do at Home:  Recommended activity: Activity as tolerated, and as directed by your physician. If you experience any of the following symptoms chest pain or difficulty breathing, please follow up with the emergency dept. *  Please give a list of your current medications to your Primary Care Provider. *  Please update this list whenever your medications are discontinued, doses are      changed, or new medications (including over-the-counter products) are added. *  Please carry medication information at all times in case of emergency situations. These are general instructions for a healthy lifestyle:    No smoking/ No tobacco products/ Avoid exposure to second hand smoke  Surgeon General's Warning:  Quitting smoking now greatly reduces serious risk to your health.     Obesity, smoking, and sedentary lifestyle greatly increases your risk for illness    A healthy diet, regular physical exercise & weight monitoring are important for maintaining a healthy lifestyle    You may be retaining fluid if you have a history of heart failure or if you experience any of the following symptoms:  Weight gain of 3 pounds or more overnight or 5 pounds in a week, increased swelling in our hands or feet or shortness of breath while lying flat in bed. Please call your doctor as soon as you notice any of these symptoms; do not wait until your next office visit. The discharge information has been reviewed with the patient. The patient verbalized understanding. Discharge medications reviewed with the patient and appropriate educational materials and side effects teaching were provided. ___________________________________________________________________________________________________________________________________    ------------------------------------------------------------------------------------------------------------    DISCHARGE INSTRUCTIONS    1. Make sure that when you request refills at the pharmacy that the refill requests are sent to your PCP (NOT to the prescriber at the [facility]) to avoid any delays in getting your medication refills. The physician at the [facility] [address] [facility] will not able to order medications or refills after discharge -- Please understand that though we would like to help, it is simply not safe for our physician to order you a medication that we cannot monitor. 2. If any of the prescribed medications require a PRIOR AUTHORIZATION, contact your Primary Care Physician or specialist to EITHER complete prior authorizations and paperwork on your behalf OR prescribe an alternative medication. -- Please understand that though we would like to help, it is simply not safe for our physician to order you a medication that we cannot monitor.      3. If any of your prescriptions medications PRIOR TO ADMISSION TO [place] [address] needs a refill (and either the dosage, frequency or instructions was not changed), kindly contact your Primary Care Physician for refills. -------------------------------------------------------------------------------------------------------------------        Patient armband removed and shredded            MyChart Activation    Thank you for requesting access to [employer]. Please follow the instructions below to securely access and download your online medical record. [employer] allows you to send messages to your doctor, view your test results, renew your prescriptions, schedule appointments, and more. How Do I Sign Up? 1. In your internet browser, go to [email or web address]  2. Click on the First Time User? Click Here link in the Sign In box. You will be redirect to the New Member Sign Up page. 3. Enter your [employer] Access Code exactly as it appears below. You will not need to use this code after youve completed the sign-up process. If you do not sign up before the expiration date, you must request a new code. [employer] Access Code: [email or web address]  Expires: 2022 11:30 AM (This is the date your [employer] access code will )    4. Enter the last four digits of your Social Security Number (xxxx) and Date of Birth (mm/dd/yyyy) as indicated and click Submit. You will be taken to the next sign-up page. 5. Create a [employer] ID. This will be your [employer] login ID and cannot be changed, so think of one that is secure and easy to remember. 6. Create a [employer] password. You can change your password at any time. 7. Enter your Password Reset Question and Answer. This can be used at a later time if you forget your password. 8. Enter your e-mail address. You will receive e-mail notification when new information is available in [address]. 9. Click Sign Up. You can now view and download portions of your medical record. 10. Click the Download Summary menu link to download a portable copy of your medical information.     Additional Information    If you have questions, please visit the Frequently Asked Questions section of the MyChart website at [email or web address] Remember, [facility] is NOT to be used for urgent needs. For medical emergencies, dial 911.

## 2022-05-12 NOTE — FACE TO FACE
[facility] PHYSICAL REHABILITATION  [address] [place] [address], [address]    [address] [place] [address]    Name: Leyda Daniels Age / Sex: 74 y.o. / male   CSN: [id number] MRN: [id number]   [facility] Date: 4/28/2022 Discharge Date: 5/12/2022     Primary Care Provider: Jeanine Dai MD      Primary Rehabilitation Diagnosis  1. Impaired Mobility and ADLs  2. Closed displaced comminuted fracture of distal third of shaft of right tibia with routine healing  3. Closed fracture of distal end of right fibula with routine healing  4. S/P Closed IM nailing of the right tibia using the Synthes IM nail system with a double lock proximally and triple lock distally (4/22/2022 - Dr. Ulises Patel)    Comorbidities  Patient Active Problem List   Diagnosis Code    Epidural abscess, L2-L5 G06.1    Polysubstance abuse ([address] F19.10    Leukopenia D72.819    Iron deficiency anemia, unspecified D50.9    Anemia D64.9    Thrombocytopenia ([address] D69.6    Orthopedic aftercare for healing traumatic lower leg fracture, right, closed S82. 91XD    Metatarsal fracture S92.309A    Alcohol abuse F10.10    HTN (hypertension) I10    Liver mass R16.0    Bladder mass N32.89    Hematuria R31.9    Closed displaced comminuted fracture of shaft of right tibia with routine healing S82.251D    Closed fracture of distal end of right fibula with routine healing S82.831D    Impaired mobility and ADLs Z74.09, Z78.9    Acute blood loss as cause of postoperative anemia D62    Methadone dependence (HCC) F11.20       History of the Present Illness (from the history and physical dictated by Dr. Zac Wu): This is a 57-year-old male who was recently admitted [facility] after he had a fall. Patient was noted to have a right tib-fib fracture. Orthopedics was consulted. Patient underwent intramedullary nailing. Patient was noted to have chronic thrombocytopenia.   Patient has a history of alcohol abuse and substance abuse. Patient had a liver mass and MRI was very suspicious for hepatocellular cancer. There was also concern for bladder mass and patient was seen by urology and outpatient follow-up was recommended. Patient was also seen by gastroenterology as well as oncology as well as interventional radiology. Patient was evaluated by PT and OT and subsequently was transferred to the inpatient rehab. For full details regarding patient's hospital course at [facility] please refer to chart. I saw the patient for the first time in the inpatient rehab facility earlier today. Patient is sitting in bed in no apparent distress. Patient is awake and alert. No history of any chest pain or shortness of breath or palpitations. No history of any nausea vomiting or abdominal pain. No history of any diarrhea or rectal bleeding. No history of any headaches numbness or focal weakness. No history of any rash.       Past Medical History:  Past Medical History:   Diagnosis Date    Abscess of right shoulder     Abscess of shoulder     Acute blood loss as cause of postoperative anemia 4/22/2022    Arthritis     Closed displaced comminuted fracture of shaft of right tibia with routine healing 04/22/2022    Closed fracture of distal end of right fibula with routine healing 4/22/2022    Epidural abscess     Heart murmur     Hematuria     Heroin abuse ([address]     Hypertension     Infected wound     Infectious disease     Iron deficiency anemia     IV drug user     Liver disease     Methadone dependence ([address]     Tobacco abuse        Past Surgical History:  Past Surgical History:   Procedure Laterality Date    HX COLONOSCOPY  07/18/2016    HX CYST REMOVAL      rt.forearm and rt.foot    HX ENDOSCOPY  07/18/2016    HX FRACTURE TX Right 04/22/2022    S/P Closed IM nailing of the right tibia using the Synthes IM nail system with a double lock proximally and triple lock distally (4/22/2022 - Dr. Benitez Gave)    HX FREE SKIN GRAFT      HX ORTHOPAEDIC      HX ORTHOPAEDIC Right 04/23/2022    Closed Fracture of Right Tibia     HX OTHER SURGICAL      right forearm infections and graft       Medications on Discharge:    Current Discharge Medication List      START taking these medications    Details   telmisartan (MICARDIS) 20 mg tablet Take 1 Tablet by mouth daily. Indications: high blood pressure  Qty: 30 Tablet, Refills: 0  Start date: 5/12/2022    Associated Diagnoses: Primary hypertension      acetaminophen (TYLENOL) 325 mg tablet Take 2 Tablets by mouth every four (4) hours as needed (for pain level 4/10 or lesser (to be given between 8:00PM and 4:00AM only)). Indications: fever, pain  Qty: 30 Tablet, Refills: 0  Start date: 5/11/2022    Associated Diagnoses: Closed displaced comminuted fracture of shaft of right tibia with routine healing; Other closed fracture of distal end of right fibula with routine healing, subsequent encounter      pregabalin (LYRICA) 75 mg capsule Take 1 Capsule by mouth three (3) times daily. Max Daily Amount: 225 mg. Indications: acute pain following an operation  Qty: 90 Capsule, Refills: 0  Start date: 5/11/2022    Associated Diagnoses: Closed displaced comminuted fracture of shaft of right tibia with routine healing; Other closed fracture of distal end of right fibula with routine healing, subsequent encounter; Orthopedic aftercare for healing traumatic lower leg fracture, right, closed      pantoprazole (PROTONIX) 40 mg tablet Take 1 Tablet by mouth Daily (before breakfast). Indications: gastroesophageal reflux disease  Qty: 30 Tablet, Refills: 0  Start date: 5/12/2022      melatonin 3 mg tablet Take 1 Tablet by mouth daily (after dinner).  Indications: Difficulty sleeping  Qty: 30 Tablet, Refills: 0  Start date: 5/11/2022      oxyCODONE-acetaminophen (Percocet) 5-325 mg per tablet Take 1 Tablet by mouth every six (6) hours as needed for Pain for up to 5 days. Max Daily Amount: 4 Tablets. Indications: Postoperative Pain  Qty: 20 Tablet, Refills: 0  Start date: 5/11/2022, End date: 5/16/2022    Associated Diagnoses: Closed displaced comminuted fracture of shaft of right tibia with routine healing      naloxone (Narcan) 4 mg/actuation nasal spray 1 [place] by IntraNASal route once as needed for Overdose for up to 1 dose. Use 1 spray intranasally, then discard. Repeat with new spray every 2 min as needed for opioid overdose symptoms, alternating nostrils. Indications: opioid overdose, decrease in rate & depth of breathing due to opioid drug  Qty: 1 Each, Refills: 0  Start date: 5/11/2022, End date: 5/11/2022    Associated Diagnoses: Methadone dependence ([facility] Polysubstance abuse ([facility]         CONTINUE these medications which have NOT CHANGED    Details   amLODIPine (NORVASC) 10 mg tablet Take 1 Tablet by mouth daily. Qty: 15 Tablet, Refills: 0  Start date: 4/29/2022      aspirin delayed-release 81 mg tablet Take 1 Tablet by mouth two (2) times a day. Qty: 30 Tablet, Refills: 0  Start date: 4/28/2022      ferrous sulfate 325 mg (65 mg iron) tablet Take 1 Tablet by mouth two (2) times daily (with meals). Qty: 30 Tablet, Refills: 0  Start date: 0/87/0975      folic acid (FOLVITE) 1 mg tablet Take 1 Tablet by mouth daily. Qty: 15 Tablet, Refills: 0  Start date: 4/29/2022      polyethylene glycol (MIRALAX) 17 gram packet Take 1 Packet by mouth daily. Qty: 30 Packet, Refills: 0  Start date: 4/28/2022      therapeutic multivitamin [facility]) tablet Take 1 Tablet by mouth daily. Qty: 30 Tablet, Refills: 0  Start date: 4/29/2022      thiamine HCL (B-1) 100 mg tablet Take 1 Tablet by mouth daily. Qty: 15 Tablet, Refills: 0  Start date: 4/29/2022      methadone (DOLOPHINE) 10 mg tablet Take 45 mg by mouth daily.          STOP taking these medications       oxyCODONE IR (ROXICODONE) 10 mg tab immediate release tablet Comments:   Reason for Stopping:               Condition on Discharge: Stable. Ambulation Gait  Amount of Assistance: 6 (Modified independent)  Distance (ft): 150 Feet (ft) (80,40)  Assistive Device: Walker, rolling     Wheelchair Mobility Wheelchair Mobility/Management  Able to Propel (ft):  (200 ft)  Functional Level: 6  Curbs/Ramps Assist Required (FIM Score): 0 (Not tested)  Wheelchair Setup Assist Required : 4 (Minimal assistance)  Wheelchair Management: Manages right footrest,Manages right brake,Manages left brake         Disposition: Patient clinically improved and was discharged to home with home health physical therapy, occupational therapy and skilled nursing. The patient is temporarily homebound secondary to functional deficits due to Closed displaced comminuted fracture of distal third of shaft of right tibia with routine healing; Closed fracture of distal end of right fibula with routine healing; S/P Closed IM nailing of the right tibia using the Synthes IM nail system with a double lock proximally and triple lock distally (4/22/2022 - Dr. Enrique Enriquez). The patient can ambulate using a rolling walker (see above). The patient would benefit from continued skilled physical therapy in order to improve independent functional mobility within the home with use of least restrictive device. The patient would also benefit from continued skilled occupational therapy in order to improve self care and functional mobility within the home with use of least restrictive device. Short-term skilled nursing is needed for wound care, medication reconciliation and disease education. I certify that this patient is homebound, that is: 1) patient requires the use of a walker device, special transportation, or assistance of another to leave the home; or 2) patient's condition makes leaving the home medically contraindicated; and 3) patient has a normal inability to leave the home and leaving the home requires considerable and taxing effort.   Patient may leave the home for infrequent and short duration for medical reasons, and occasional absences for non-medical reasons. Homebound status is due to the following functional limitations: Patient with strength deficits limiting the performance of all ADL's without caregiver assistance or the use of an assistive device. Wound Care/Dressing Instructions: Every other day clean incisions to right knee and ankle with wound spray, then apply Xeroform and dry dressing. Wrap the Kerlex then reapply posterior splint with ace wraps. Due to the abovementioned data, I certify that the patient needs intermittent Skilled Nursing, Physical Therapy and Occupational Therapy. I will NOT be following this patient in the Community and Dr. Yareli Stone will be responsible for signing the [address] In compliance with the Affordable Care Act, I certify that this patient was managed by me during this hospitalization and that I had a Face-to-Face Encounter that meets the physician Face-to-Face Encounter requirements.       Signed:    Jerson Mora MD    May 11, 2022

## 2022-05-12 NOTE — HOME CARE
Received home health referral for [facility] for (SN, PT, OT). Spoke with patient via phone;  patient identifiers verified. Explained home care services and routines. Demographics verified including insurance, phone and address confirmed. Patient has the following DME: orders for BSC, TTB, RW noted to be delivered to room prior to d/c. Caregivers available: patient expresses of living by self. However does have friends and family to check and assist when needed.    Orders noted and arranged to be processed to central intake.      ----    Justin Miranda LPN  [address] [place] [address]

## 2022-05-12 NOTE — PROGRESS NOTES
Problem: Falls - Risk of  Goal: *Absence of Falls  Description: Document Taz Sumner Fall Risk and appropriate interventions in the flowsheet.   Outcome: Progressing Towards Goal  Note: Fall Risk Interventions:  Mobility Interventions: Assess mobility with egress test,Bed/chair exit alarm,Communicate number of staff needed for ambulation/transfer,OT consult for ADLs,Patient to call before getting OOB,PT Consult for mobility concerns,PT Consult for assist device competence,Strengthening exercises (ROM-active/passive),Utilize gait belt for transfers/ambulation    Mentation Interventions: Adequate sleep, hydration, pain control,Bed/chair exit alarm,Door open when patient unattended,Gait belt with transfers/ambulation    Medication Interventions: Bed/chair exit alarm,Evaluate medications/consider consulting pharmacy,Patient to call before getting OOB,Utilize gait belt for transfers/ambulation    Elimination Interventions: Bed/chair exit alarm,Call light in reach,Patient to call for help with toileting needs,Stay With Me (per policy),Urinal in reach    History of Falls Interventions: Bed/chair exit alarm,Consult care management for discharge planning,Utilize gait belt for transfer/ambulation         Problem: Pain  Goal: *Control of Pain  Outcome: Progressing Towards Goal

## 2022-05-12 NOTE — PROGRESS NOTES
Pt scheduled to DC home today. Pt awaiting transportation home from friend. DME of RW and 3:1 commode delivered to the patient's room. Confirmed [id number] received referral for [facility] services at discharge. Prescriptions sent to [facility] on [address].     Colton Coyle, CTRS

## 2022-05-12 NOTE — ROUTINE PROCESS
[facility]    Bedside and Verbal shift change report given to Valdemar Brittle (oncoming nurse) by Cici Valderrama RN (offgoing nurse). Report included the following information SBAR, Kardex, MAR and Recent Results.     Situation:   Code Status: Full Code   Hospital Day: 14   Problem List:   Hospital Problems  Date Reviewed: 5/11/2022          Codes Class Noted POA    Methadone dependence ([address] (Chronic) ICD-10-CM: F11.20  ICD-9-CM: 304.00  Unknown Yes        Orthopedic aftercare for healing traumatic lower leg fracture, right, closed ICD-10-CM: S82.91XD  ICD-9-CM: V54.16  4/22/2022 Yes    Overview Addendum 5/2/2022 10:49 AM by Atif Cardenas MD     S/P Closed IM nailing of the right tibia using the Synthes IM nail system with a double lock proximally and triple lock distally (4/22/2022 - Dr. David Weathers)             Alcohol abuse ICD-10-CM: F10.10  ICD-9-CM: 305.00  4/22/2022 Yes        HTN (hypertension) ICD-10-CM: I10  ICD-9-CM: 401.9  4/22/2022 Yes        * (Principal) Closed displaced comminuted fracture of shaft of right tibia with routine healing ICD-10-CM: S82.251D  ICD-9-CM: V54.16  4/22/2022 Yes        Closed fracture of distal end of right fibula with routine healing ICD-10-CM: S82.831D  ICD-9-CM: V54.16  4/22/2022 Yes        Impaired mobility and ADLs ICD-10-CM: Z74.09, Z78.9  ICD-9-CM: V49.89  4/22/2022 Yes        Acute blood loss as cause of postoperative anemia ICD-10-CM: D62  ICD-9-CM: 285.1  4/22/2022 Yes        Iron deficiency anemia, unspecified ICD-10-CM: D50.9  ICD-9-CM: 280.9  8/5/2012 Yes              Background:   Past Medical History:   Past Medical History:   Diagnosis Date    Abscess of right shoulder     Abscess of shoulder     Acute blood loss as cause of postoperative anemia 4/22/2022    Arthritis     Closed displaced comminuted fracture of shaft of right tibia with routine healing 04/22/2022    Closed fracture of distal end of right fibula with routine healing 4/22/2022    Epidural abscess     Heart murmur     Hematuria     [facility] [address] [address]     Hypertension     Infected wound     Infectious disease     Iron deficiency anemia     IV drug user     Liver disease     Methadone dependence ([facility] [facility]     Tobacco abuse         Assessment:   Changes in Assessment throughout shift:       Patient has a central line: no Reasons if yes: n/a  Insertion date:n/a Last dressing date:n/a   Patient has Morris Cath: no Reasons if yes: n/a   Insertion date:n/a  Shift morris care completed: N/A     Last Vitals:     Vitals:    05/10/22 2000 05/11/22 0734 05/11/22 1635 05/11/22 1947   BP: 129/66 (!) 151/67 (!) 157/64 139/64   Pulse: 60 79 61 67   Resp: 18 19 19 20   Temp: 98 °F (36.7 °C) 98.6 °F (37 °C) 98.4 °F (36.9 °C) 97.4 °F (36.3 °C)   SpO2: 100% 98% 100% 99%   Weight:       Height:            PAIN    Pain Assessment    Pain Intensity 1: 0 (05/12/22 0102) Pain Intensity 1: 2 (12/29/14 1105)    Pain Location 1: Leg Pain Location 1: Abdomen    Pain Intervention(s) 1: Medication (see MAR),Rest,Position Pain Intervention(s) 1: Medication (see MAR)  Patient Stated Pain Goal: 0 Patient Stated Pain Goal: 0  o Intervention effective: yes  o Other actions taken for pain: Medication (see MAR); Rest;Position     Skin Assessment  Skin color    Condition/Temperature    Integrity Skin Integrity: Wound (add Wound LDA)  Turgor    Weekly Pressure Ulcer Documentation  Pressure  Injury Documentation: No Pressure Injury Noted-Pressure Ulcer Prevention Initiated  Wound Prevention & Protection Methods  Orientation of wound Orientation of Wound Prevention: Posterior  Location of Prevention Location of Wound Prevention: Buttocks,Sacrum/Coccyx  Dressing Present Dressing Present : No  Dressing Status    Wound Offloading Wound Offloading (Prevention Methods): Bed, pressure reduction mattress     INTAKE/OUPUT  Date 05/11/22 0700 - 05/12/22 0659 05/12/22 0700 - 05/13/22 0659   Shift 2756-0976 8332-0264 68 Hour Total 7493-8345 7319-4875 24 Hour Total   INTAKE   P.O. 240 360 600        P. O. 240 360 600      Shift Total(mL/kg) 240(2.8) 360(4.3) 600(7.1)      OUTPUT   Urine(mL/kg/hr) 600(0.6) 1300 1900        Urine Voided 600 1300 1900        Urine Occurrence(s) 3 x 2 x 5 x      Stool           Stool Occurrence(s) 0 x 1 x 1 x      Shift Total(mL/kg) 600(7.1) 1300(15.4) 1900(22.5)      NET -360 940 -1300      Weight (kg) 84.4 84.4 84.4 84.4 84.4 84.4       Recommendations:  1. Patient needs and requests: assistance with ADL'. Wound care to surgical incision, pain management    2. Pending tests/procedures: none, pt is for d/c to home today     3. Functional Level/Equipment: Partial (one person) / Wheelchair    Fall Precautions:   Fall risk precautions were reinforced with the patient; he was instructed to call for help prior to getting up. The following fall risk precautions were continued: bed/ chair alarms, door signage, yellow bracelet and socks as well as update of the [place] Neigh tool in the patient's room. Ezio Score: 4    HEALS Safety Check    A safety check occurred in the patient's room between off going nurse and oncoming nurse listed above. The safety check included the below items  Area Items   H  High Alert Medications - Verify all high alert medication drips (heparin, PCA, etc.)   E  Equipment - Suction is set up for ALL patients (with yanker)  - Red plugs utilized for all equipment (IV pumps, etc.)  - WOWs wiped down at end of shift.  - Room stocked with oxygen, suction, and other unit-specific supplies   A  Alarms - Bed alarm is set for fall risk patients  - Ensure chair alarm is in place and activated if patient is up in a chair   L  Lines - Check IV for any infiltration  - Benitez bag is empty if patient has a Benitez   - Tubing and IV bags are labeled   S  Safety   - Room is clean, patient is clean, and equipment is clean. - Hallways are clear from equipment besides carts.    - Fall bracelet on for fall risk patients  - Ensure room is clear and free of clutter  - Suction is set up for ALL patients (with devin)  - Hallways are clear from equipment besides carts.    - Isolation precautions followed, supplies available outside room, sign posted     Magaly Cevallos RN

## 2022-05-13 NOTE — PROGRESS NOTES
[] Psychology  [] Social Work [] Recreational Therapy    INTERVENTION  UNITS/TIME OF SERVICE   Assessment    Supportive Counseling  May 12, 2022   Orientation    Discharge Planning    Resource Linkage              Progress/Current Status    Patient seem for individual support  and follow up this morning on ARU and in anticipation of his discharge to home, shortly. Patient did not present with acute emotional nor behavioral difficulties while on ARU that required further intervention. He presents as entirely calm and composed and obviously looking forward to his discharge. He insists that he is aware of safety and pace issues and that he will obviously, still be in recovery post hospital and need to be vigilant, ie., weight bearing status, etc.  He seems entirely reasonable in his thinking and reasoning about continued recovery, and understanding that he will have to take further time in healing. At this time, he does not make mention of concern about \"return to work\" issues and seems to be prioritizing his needs appropriately. Patient reinforced for all safety and pace issues as directed to him and to utilize the resources of home health care for further guidance and support.       Fay Worthy, THE [facility] 5/13/2022 9:45 AM

## 2022-05-14 ENCOUNTER — HOME CARE VISIT (OUTPATIENT)
Dept: SCHEDULING | Facility: HOME HEALTH | Age: 67
End: 2022-05-14
Payer: MEDICARE

## 2022-05-14 PROCEDURE — G0299 HHS/HOSPICE OF RN EA 15 MIN: HCPCS

## 2022-05-14 PROCEDURE — 400013 HH SOC

## 2022-05-15 VITALS
SYSTOLIC BLOOD PRESSURE: 146 MMHG | RESPIRATION RATE: 16 BRPM | OXYGEN SATURATION: 97 % | HEART RATE: 68 BPM | TEMPERATURE: 97 F | DIASTOLIC BLOOD PRESSURE: 78 MMHG

## 2022-05-16 ENCOUNTER — HOME CARE VISIT (OUTPATIENT)
Dept: SCHEDULING | Facility: HOME HEALTH | Age: 67
End: 2022-05-16
Payer: MEDICARE

## 2022-05-16 VITALS
OXYGEN SATURATION: 97 % | HEART RATE: 65 BPM | SYSTOLIC BLOOD PRESSURE: 160 MMHG | DIASTOLIC BLOOD PRESSURE: 80 MMHG | RESPIRATION RATE: 17 BRPM | TEMPERATURE: 97.5 F

## 2022-05-16 PROCEDURE — MED11179 BANDAGE,ELASTIC,MATRIX,STRL,4X10YD,LF

## 2022-05-16 PROCEDURE — G0151 HHCP-SERV OF PT,EA 15 MIN: HCPCS

## 2022-05-16 NOTE — Clinical Note
Dear Dr. Dr. Linus Alvarez,     This message is to inform you that your patient, Kary Cope,  1954, has been admitted to [facility] services under Home PT today. It was determined that pt will benefit from Home PT for  to improve strength and endurance, gait, balance, safety on stairs, and return to PLOF. Any questions please contact me at [phone].     Sincerely,     Nathen Pinon PT

## 2022-05-16 NOTE — HOME HEALTH
Summary of clinical health condition: 22-year-old male who was recently admitted to [facility] after he had a fall. Patient was noted to have a right tib-fib fracture. Orthopedics was consulted. Patient underwent intramedullary nailing. Patient was noted to have chronic thrombocytopenia. Patient has a history of alcohol abuse and substance abuse. Patient ha d a liver mass and MRI was very suspicious for hepatocellular cancer. There was also concern for bladder mass and patient was seen by urology and outpatient follow-up was recommended. Patient was also seen by gastroenterology as well as oncology as well as interventional radiology. Patient was evaluated by PT and OT and subsequently was transferred to the inpatient rehab. Dx; Closed displaced comminuted fracture of distal third of shaft of right tibia        Medications reconciled, all medications listed are at home except; Amlodipine, Ferrous, Folic Acid needs to f/u refill from pharmacy,  Melatonin, Aspirin, B-1, Miralax needs to pick OTC. The following education was provided regarding medications, medication interactions, and look a like medications: N/A all meds reviewed. Discussed importance of compliance, timely taking all prescribed meds, proper dosage and freq. Teaching education provided S/E with Oxycodone; constipation, drowsiness. Recommend taking Oxycodone for pain on reg basis to achieve good pain relief and Tylenol in bet with excruciating and less pain. To call MD with excruciating and uncontrollable pain. Medications are somewhat effective at this time. Caregiver: Primary caregiver/friend, family member is available to run errands, groceries and accompany to MD appt. Skilled care provided: Teaching disease and medication management, completed assessment, assessed RLL surgical wound; cleansed with NS, pat dry, measured,  applied xeroform, gauze pads wrapped with Kerlix, applied splint then wrapped with ACE bandage.  Pt tolerated well during the procedure with no C/O. (Pt verbalized dressing changed in rehab q 4 days with xeroform wrap with Kerlix then ACE bandage.) Completed [facility] admission, explained POC, SNV freq and D/C plan to pt/CG with good understanding. Patient education provided this visit to include: Discussed intervention to prevent infection; hand washing or using hand  when touching contaminated surface, wearing face mask during clinician's visit or going out to public palces and avoiding sick person. Ambulate q 2hrs as tolerated, Elevating RLL, safety and fall prec; clearing walkway, placing walker in easy access, avoid getting up too quickly when feeling weak, dizzy, and to call for assistance prn. Smoking cessation, increasing protein intake to enhance healing process. Reviewed S/S of infection; fever 100.4  increase pain, redness, swelling,  coughing with yellow thick sputum, purulent wound drainage with foul smell, cloudy urine with foul smell, not feeling well 2-3 days, SOB, and to call [facility] or MD for assistance if experiencing any of these S/S. To call 911 with chest pains, facial drooping, difficulty talking, non arousable/unconscious and uncontrollable bleeding. Patient/caregiver degree of understanding: Pt has good understanding of the teaching provided during visit. Home health supplies by type and quantity ordered/delivered this visit include: Has supplies at home. Pt./CG instructed on plan of care, OT, PT eval/treat,  SN freq visit; 1w3,  2 prn and D/C plan. Home exercise program/Homework provided: Ambulation, HEP deep breathing exercises 10x when having SOB, pain and anxiety. Plan of care and admission to home health status called to attending physician: Dr. Ness Jordan, Luca Donahue MD was informed admission completed 5/14/22     Discharge planning discussed with patient and caregiver.  Discharge planning as follows: Pt/CG will be able to manage disease and medication independently, RLL surgical wound is healed and health condition stable. Pt/Caregiver did verbalize understanding of discharge planning. Patient/caregiver encouraged/instructed to keep appointment as lack of follow through with physician appointment could result in discontinuation of home care services for non-compliance. [facility] was verbally reviewed and signed by pt on this visit.  The patient or representative was given the opportunity to ask pertinent questions regarding the bill of rights

## 2022-05-16 NOTE — Clinical Note
Therapy Functional Score Assessment  Question                                  Score   Grooming                    2       Upper Dressing           2      Lower Dressing           2      Bathing                        5      Toilet Transfer            2    Transfer                      2            Ambulation                  3   Dyspnea                     2       Pain Interfering with activity   4  Est number therapy visits      5

## 2022-05-17 ENCOUNTER — HOME CARE VISIT (OUTPATIENT)
Dept: HOME HEALTH SERVICES | Facility: HOME HEALTH | Age: 67
End: 2022-05-17
Payer: MEDICARE

## 2022-05-17 NOTE — HOME HEALTH
PMHx:  Abscess of right shoulder      Abscess of shoulder      Arthritis      Epidural abscess      Heart murmur      Hematuria      Heroin abuse ([facility] [address]      Hypertension      Infected wound      Infectious disease      Iron deficiency anemia      IV drug user      Liver disease      Tobacco abuse  SUBJECTIVE:Pt reports he stepped in a hole and broke his ankle on 4/22 that required surgery to repair. States he spent 1 week in the hospital and 2 weeks on the rehab floor before being discharge home. LIVING SITUATION: Pt lives in a 2 story home, does not have access to second level, with 2 adult roommates with 4 steps to enter w/o a handrail. REQUIRES CAREGIVER ASSISTANCE FOR: Pt friends assist with transportation to appointments  PLOF: Independent with all ADL's and ambulation. MEDICATIONS REVIEWED AND UPDATED: no changes noted  NEXT MD APPT: PCP later today and surgeon on 6/2  ROM:NT d/t nonremoveable splint  WOUNDS:unable to assess d/t nonremoveable splint  BED MOBILITY:Joe  TRANSFERS:SUP with sit to stand and toilet transfers while maintaining PWB on R LE  GAIT: X60ft with FWRW with sup/VC to maintain PWB on R LE. Gait characterized by Antalgic gait   STAIRS:NT  BALANCE: Pt scored 14/28 on Tinetti Balance Assessment placing patient at high risk for falls. PATIENT EDUCATION PROVIDED THIS VISIT: safety, HEP, walking, deep breathing, WB precautions     HEP consisting of:  1. LE strengthening/ankle pumps (toe movement in involved LE)  Written instructions issued. Patient/caregiver verbalized understanding. CONTINUED NEED FOR THE FOLLOWING SKILLS: HH PT is medically necessary to address pain, decreased ROM, decreased strength, increased swelling,  decreased independence with functional transfers, impaired gait, impaired stair negotiation, and impaired balance in order to improve functional independence, quality of life, return to PLOF, and reduce the risk for falls.  Spoke to SERENA CARTER at  [place] office regarding POC and gave contact infor for any questions MD may have. She will notify MD.   PLAN: Gait training, stair training, and transfer training to improve safe mobility in the home.    DISCHARGE PLANNING DISCUSSED: Discharge to self and family under MD supervision once all goals have been met or patient has reached max potential.

## 2022-05-18 ENCOUNTER — TRANSCRIBE ORDER (OUTPATIENT)
Dept: SCHEDULING | Age: 67
End: 2022-05-18

## 2022-05-18 DIAGNOSIS — F17.210 CIGARETTE SMOKER: Primary | ICD-10-CM

## 2022-05-19 ENCOUNTER — HOME CARE VISIT (OUTPATIENT)
Dept: SCHEDULING | Facility: HOME HEALTH | Age: 67
End: 2022-05-19
Payer: MEDICARE

## 2022-05-19 VITALS
DIASTOLIC BLOOD PRESSURE: 68 MMHG | OXYGEN SATURATION: 98 % | HEART RATE: 78 BPM | RESPIRATION RATE: 17 BRPM | SYSTOLIC BLOOD PRESSURE: 138 MMHG | TEMPERATURE: 97.8 F

## 2022-05-19 PROCEDURE — G0151 HHCP-SERV OF PT,EA 15 MIN: HCPCS

## 2022-05-19 NOTE — HOME HEALTH
SUBJECTIVE: Pt reports he is doing okay today. States his appointment with his PCP went well and he was given a new prescription for ibuprofen 800mg. Medications updated  CAREGIVER INVOLVEMENT/ASSISTANCE NEEDED FOR: Pts friends assist with transportation to appointments  [address] ORDERED/DELIVERED THIS VISIT INCLUDE: none  OBJECTIVE:  See interventions. MEDICATIONS: Medications reconciled and all medications are available in the home this visit. The following education was provided regarding medications, medication interactions, and look a like medications: Continue medication as prescribed by MD. Medications are effective at this time. PATIENT RESPONSE TO TREATMENT:  Pt with increased c/o fatigue during strengthening exercises of R LE. Minimal increase in pain in the R LE during treatment    PATIENT LEVEL OF UNDERSTANDING OF EDUCATION PROVIDED: Pt needs ongoing education regarding importance of maintaining PWB precautions of R LE to promote proper healing of the R LE as pt continues to bear full weight during ambulation and especially with stairs. ASSESSMENT OF PROGRESS TOWARD GOALS: Initiated stair training and LE strengthening with c/o fatigue and slight increase in pain of the R LE. Pt is progressing slowly towards goals d/t noncompliance with WB restrictions. CONTINUED NEED FOR THE FOLLOWING SKILLS: Gait training, stair training and LE strengthening to promote safe ambulation and egress from home for appointments while maintaining WB precautions. PLAN FOR NEXT VISIT: Progress R LE strengthening exercises with the addition of standing ther ex and review stairs to improve compliance with WB precautions for safe egress from home for apppointments  THE FOLLOWING DISCHARGE PLANNING WAS DISCUSSED WITH THE PATIENT/CAREGIVER: ANDREW under MD supervision when goals met.

## 2022-05-20 ENCOUNTER — HOME CARE VISIT (OUTPATIENT)
Dept: SCHEDULING | Facility: HOME HEALTH | Age: 67
End: 2022-05-20
Payer: MEDICARE

## 2022-05-20 VITALS
OXYGEN SATURATION: 100 % | RESPIRATION RATE: 18 BRPM | DIASTOLIC BLOOD PRESSURE: 78 MMHG | HEART RATE: 65 BPM | SYSTOLIC BLOOD PRESSURE: 142 MMHG | TEMPERATURE: 97.8 F

## 2022-05-20 PROCEDURE — G0152 HHCP-SERV OF OT,EA 15 MIN: HCPCS

## 2022-05-20 PROCEDURE — G0300 HHS/HOSPICE OF LPN EA 15 MIN: HCPCS

## 2022-05-22 VITALS
OXYGEN SATURATION: 98 % | TEMPERATURE: 98.1 F | HEART RATE: 55 BPM | SYSTOLIC BLOOD PRESSURE: 110 MMHG | DIASTOLIC BLOOD PRESSURE: 78 MMHG

## 2022-05-22 NOTE — HOME HEALTH
Caregiver involvement: Dori (roomate) lives with pt and provides emotional support. Medications reviewed and all medications are not available in the home this visit. Pt needs to  the following:  Amlodipine, Ferrous, Folic Acid needs to f/u refill from pahrmacy,  Melatonin, Aspirin, B-1, Miralax. Wrote medications down for pt and instruted to  from pharmacy. The following education was provided regarding medications, medication interactions, and look alike medications (specify): Continue as directed by MD.    Medications  are effective at this time. Patient education provided this visit: See ADL note    Sharps education provided:  na    Patient level of understanding of education provided: see ADL note    Skilled Care Performed this visit: Completed OT evaluation and assessment for safety with ADL and mobility. Patient response to procedure performed:  Mr. Josh Ardon had a positive response to therapy. He did not have c/o increased pain during assessment. Patient's Progress towards personal goals: Mr. Josh Ardon has excellent rehab potential to return to his independent PLOF with ADL and mobility. He is currently independent with ADL and mobility at the  level. Home exercise program: na    Continued need for the following skills: Nursing and Physical Therapy    Discharge Plans:  1w1 with plans to discharge to self. Pt declines further skilled OT and it is not indicated at this time. MD to be notified of OT discharge. List of Comorbidities:   Abscess of right shoulder   Abscess of shoulder   Arthritis   Epidural abscess   Heart murmur   Hematuria   Heroin abuse ([facility] [address]   Hypertension   Infected wound   Infectious disease   Iron deficiency anemia   IV drug user   Liver disease   Tobacco abuse                 Inpatient Notes         Subjective:   Patient seen and examined. History of the Present Illness:  This is a 42-year-old male who was recently admitted to [facility] [address] after he had a fall. Patient was noted to have a right tib-fib fracture. Orthopedics was consulted. Patient underwent intramedullary nailing. Patient was noted to have chronic thrombocytopenia. Patient has a history of alcohol abuse and substance abuse. Patient ha d a liver mass and MRI was very suspicious for hepatocellular cancer. There was also concern for bladder mass and patient was seen by urology and outpatient follow-up was recommended. Patient was also seen by gastroenterology as well as oncology as well as interventional radiology. Patient was evaluated by PT and OT and subsequently was transferred to the inpatient rehab. For full details regarding patient's hospital cours e at [facility] please refer to chart. I saw the patient for the first time in the inpatient rehab facility earlier today. Patient is sitting in bed in no apparent distress. Patient is awake and alert. No history of any chest pain or shortness of breath or palpitations. No history of any nausea vomiting or abdominal pain. No history of any diarrhea or rectal bleeding. No history of any headaches numbness o r focal weakness. No history of any rash.

## 2022-05-25 ENCOUNTER — HOME CARE VISIT (OUTPATIENT)
Dept: SCHEDULING | Facility: HOME HEALTH | Age: 67
End: 2022-05-25
Payer: MEDICARE

## 2022-05-25 VITALS
SYSTOLIC BLOOD PRESSURE: 150 MMHG | RESPIRATION RATE: 17 BRPM | OXYGEN SATURATION: 98 % | TEMPERATURE: 98.1 F | HEART RATE: 67 BPM | DIASTOLIC BLOOD PRESSURE: 88 MMHG

## 2022-05-25 PROCEDURE — G0151 HHCP-SERV OF PT,EA 15 MIN: HCPCS

## 2022-05-25 NOTE — HOME HEALTH
SUBJECTIVE: Pt reports he is doing okay. Reports he's still having increasing pain at night, throbbing in nature 3-5/10. Denies any falls or medication changes. CAREGIVER INVOLVEMENT/ASSISTANCE NEEDED FOR: Friends assist with transportation to appointments  [address] ORDERED/DELIVERED THIS VISIT INCLUDE: none  OBJECTIVE:  See interventions. MEDICATIONS: Medications reconciled and all medications are available in the home this visit. The following education was provided regarding medications, medication interactions, and look a like medications: Continue medication as prescribed by MD. Medications are effective at this time. PATIENT RESPONSE TO TREATMENT:  No increased c/o pain throughout treatment  PATIENT LEVEL OF UNDERSTANDING OF EDUCATION PROVIDED: Pt with partial understanding of WB precautions through demonstration of difficulty of maintaing his precautions during stairs to enter/exit home  ASSESSMENT OF PROGRESS TOWARD GOALS: Pt is making slow steady progress towards goals. Pt having diffiuclty maintaining WB precautions d/t lack of railings/ramp at entrance. CONTINUED NEED FOR THE FOLLOWING SKILLS: Stair training and LE strengthening for safe transfers and egress from home for appointments  PLAN FOR NEXT VISIT: R LE strengthening, gait training maintaining 50% WB and stair training for safe egress from home  [address] PATIENT/CAREGIVER: DC under MD supervision when goals met.

## 2022-05-27 ENCOUNTER — HOME CARE VISIT (OUTPATIENT)
Dept: HOME HEALTH SERVICES | Facility: HOME HEALTH | Age: 67
End: 2022-05-27
Payer: MEDICARE

## 2022-05-27 ENCOUNTER — TELEPHONE (OUTPATIENT)
Dept: GENERAL RADIOLOGY | Age: 67
End: 2022-05-27

## 2022-05-27 DIAGNOSIS — C22.0 HEPATOCELLULAR CARCINOMA (HCC): Primary | ICD-10-CM

## 2022-05-27 PROCEDURE — G0300 HHS/HOSPICE OF LPN EA 15 MIN: HCPCS

## 2022-05-27 NOTE — TELEPHONE ENCOUNTER
Interventional Radiology Clinic Consultation Note    Patient: Meche Leary         Sex: male      Date of Visit: 5/27/22       YOB: 1955      Age:  74 y.o. Referring Physician: Dr. Ede Perera Do          HPI:     Meche Leary is a 74 y.o. male who has been seen in evaluation of hepatocellular carcinoma at the request of Dr. Patricia Tello. Past medical history is significant for untreated hepatitis C, hepatic cirrhosis, ETOH abuse, and polysubstance use with prior epidural and shoulder abscesses currently on methadone. The patient was recently admitted to [facility] after a fall resulting in a right tib-fib fracture. He has been in a SNF x 1 month recovering from surgery. MRI obtained during his admission (4/25/22) demonstrated multifocal hepatoma in the area of segments 5/6. He was additionally noted to have a bladder mass, and is going to follow with Urology. Today, the patient denies current abdominal pain, anorexia, edema, jaundice, asterixis, confusion, easy bruising, easy bleeding, or alcohol use in the last month. He reports he has an upcoming appointment with Dr. Juan Carlos Marin, and plans to call Urology for his follow up appt today.      Past Medical History:   Diagnosis Date    Abscess of right shoulder     Abscess of shoulder     Acute blood loss as cause of postoperative anemia 4/22/2022    Arthritis     Closed displaced comminuted fracture of shaft of right tibia with routine healing 04/22/2022    Closed fracture of distal end of right fibula with routine healing 4/22/2022    Epidural abscess     Heart murmur     Hematuria     Heroin abuse ([address]     Hypertension     Infected wound     Infectious disease     Iron deficiency anemia     IV drug user     Liver disease     Methadone dependence ([address]     Tobacco abuse      Past Surgical History:   Procedure Laterality Date    HX COLONOSCOPY  07/18/2016    HX CYST REMOVAL      rt.forearm and rt.foot    HX ENDOSCOPY 07/18/2016    HX FRACTURE TX Right 04/22/2022    S/P Closed IM nailing of the right tibia using the Synthes IM nail system with a double lock proximally and triple lock distally (4/22/2022 - Dr. Sybil Landau)    HX FREE SKIN GRAFT      HX ORTHOPAEDIC      HX ORTHOPAEDIC Right 04/23/2022    Closed Fracture of Right Tibia     HX OTHER SURGICAL      right forearm infections and graft     Social History     Socioeconomic History    Marital status:    Tobacco Use    Smoking status: Current Every Day Smoker     Packs/day: 0.50     Years: 38.00     Pack years: 19.00     Types: Cigarettes    Smokeless tobacco: Never Used   Vaping Use    Vaping Use: Never used   Substance and Sexual Activity    Alcohol use: Yes     Alcohol/week: 4.0 standard drinks     Types: 4 Cans of beer per week    Drug use: Yes     Types: Heroin     Comment: last dose 2/15    Sexual activity: Yes   Social History Narrative    ** Merged History Encounter **          Prior to Admission medications    Medication Sig Start Date End Date Taking? Authorizing Provider   ibuprofen (MOTRIN) 800 mg tablet Take 800 mg by mouth every eight (8) hours as needed for Pain. Provider, Historical   telmisartan (MICARDIS) 20 mg tablet Take 1 Tablet by mouth daily. Indications: high blood pressure 5/12/22   Kay PERALTA NP   acetaminophen (TYLENOL) 325 mg tablet Take 2 Tablets by mouth every four (4) hours as needed (for pain level 4/10 or lesser (to be given between 8:00PM and 4:00AM only)). Indications: fever, pain 5/11/22   Kay PERALTA NP   pregabalin (LYRICA) 75 mg capsule Take 1 Capsule by mouth three (3) times daily. Max Daily Amount: 225 mg. Indications: acute pain following an operation 5/11/22   Kay PERALTA NP   pantoprazole (PROTONIX) 40 mg tablet Take 1 Tablet by mouth Daily (before breakfast).  Indications: gastroesophageal reflux disease 5/12/22   Kay PERALTA NP   melatonin 3 mg tablet Take 1 Tablet by mouth daily (after dinner). Indications: Difficulty sleeping 5/11/22   Leonides Lizarraga I, NP   amLODIPine (NORVASC) 10 mg tablet Take 1 Tablet by mouth daily. 4/29/22   Annelise Travis MD   aspirin delayed-release 81 mg tablet Take 1 Tablet by mouth two (2) times a day. 4/28/22   Annelise Travis MD   ferrous sulfate 325 mg (65 mg iron) tablet Take 1 Tablet by mouth two (2) times daily (with meals). 4/28/22   Annelise Travis MD   folic acid (FOLVITE) 1 mg tablet Take 1 Tablet by mouth daily. 4/29/22   Annelise Travis MD   polyethylene glycol (MIRALAX) 17 gram packet Take 1 Packet by mouth daily. 4/28/22   Annelise Travis MD   therapeutic multivitamin [facility]) tablet Take 1 Tablet by mouth daily. 4/29/22   Annelise Travis MD   thiamine HCL (B-1) 100 mg tablet Take 1 Tablet by mouth daily. 4/29/22   Annelise Travis MD   methadone (DOLOPHINE) 10 mg tablet Take 45 mg by mouth daily. Other, MD Jesus Alberto     No Known Allergies  Review of Systems  Pertinent items are noted in the History of Present Illness. Physical Exam:   The physical exam was deferred    Labs Reviewed and notable for: From 4/23/22    AST / ALT: 51/37  T Bili: 0.9  Albumin: 2.2  AFP: 14  Platelets: 92  INR: 1.4    Assessment     Janine Hodgkins is a 74 y.o. male with a history of ETOH use and hepatic cirrhosis and new findings of associated multifocal hepatoma. MELD-Na score: 11    Catherine Goodwin Classification: B    LR5 hepatomas in segments 4 and 5/6    The patient is not currently being considered for surgery    ECOG 0    Goal of treatment - delaying time to progression of disease    The multifocal hepatoma would be best treated with Y90 treatment from an interventional radiology standpoint at this time. The anticipated procedure was discussed in detail with the patient, including risk of injury, infection, and bleeding.   Specifically, risk of liver failure, nontarget embolization, nontargeted radiation, and post embolic syndrome were discussed in detail. Plan   Case and images reviewed by Dr. Sadie Sierra. The plan is as follows:    1. CT liver tumor protocol to be performed at [facility]    2. Mesenteric angiography with subsequent lung shunt study will be performed by IR    3. Pending above, the patient will be scheduled for Y90 radioembolization    4. The patient will be discharged home postprocedure with prescription for 2 weeks of pain and nausea control, omeprazole, and a 10-day prescription of Levaquin. 5. IR clinic follow-up with MRI liver tumor protocol and associated labs (CBC, CMP, INR, AFP with L3 percent) will be ordered for 1 month post procedure    I have spent 45 minutes with the patient with greater than 50% of the time dedicated to the patient's counseling as well as the coordination of patient care.     Thank you,  MIKAYLA Pack

## 2022-05-30 VITALS
OXYGEN SATURATION: 99 % | SYSTOLIC BLOOD PRESSURE: 146 MMHG | HEART RATE: 62 BPM | TEMPERATURE: 98.1 F | RESPIRATION RATE: 18 BRPM | DIASTOLIC BLOOD PRESSURE: 72 MMHG

## 2022-05-31 ENCOUNTER — TRANSCRIBE ORDER (OUTPATIENT)
Dept: SCHEDULING | Age: 67
End: 2022-05-31

## 2022-05-31 DIAGNOSIS — C22.0 ERYTHROCYTOSIS DUE TO HEPATOMA (HCC): Primary | ICD-10-CM

## 2022-05-31 DIAGNOSIS — D75.1 ERYTHROCYTOSIS DUE TO HEPATOMA (HCC): Primary | ICD-10-CM

## 2022-06-01 ENCOUNTER — HOME CARE VISIT (OUTPATIENT)
Dept: SCHEDULING | Facility: HOME HEALTH | Age: 67
End: 2022-06-01
Payer: MEDICARE

## 2022-06-01 VITALS
OXYGEN SATURATION: 98 % | HEART RATE: 78 BPM | SYSTOLIC BLOOD PRESSURE: 138 MMHG | TEMPERATURE: 97.6 F | DIASTOLIC BLOOD PRESSURE: 78 MMHG | RESPIRATION RATE: 17 BRPM

## 2022-06-01 PROCEDURE — G0151 HHCP-SERV OF PT,EA 15 MIN: HCPCS

## 2022-06-01 NOTE — HOME HEALTH
SUBJECTIVE: Pt reports he is doing well. Denies ny falls or med changes. Reports intermittent pain in the R distal LE at 0-1/10  CAREGIVER INVOLVEMENT/ASSISTANCE NEEDED FOR: Pts friends assist with transportation to appointments  [address] ORDERED/DELIVERED THIS VISIT INCLUDE: none  OBJECTIVE:  See interventions. MEDICATIONS: Medications reconciled and all medications are available in the home this visit. The following education was provided regarding medications, medication interactions, and look a like medications: Continue medication as prescribed by MD. Medications are effective at this time. PATIENT RESPONSE TO TREATMENT:  No c/o increased pain or discomfort with ther ex. PATIENT LEVEL OF UNDERSTANDING OF EDUCATION PROVIDED: Pt needs VC for sequencing with current HEP. Pt continues to require constant VC to maintain WB restrictions when performing transfers such as getting into the [facility] for transportation for his appointment today. ASSESSMENT OF PROGRESS TOWARD GOALS: Pt continues to be limited with R knee ROM d/t splint and tightness in the knee. Pt remains at 50% WB on R LE but continues to have difficulty complying, especially with steps to enter/exit home. Pt has f/u with ortho tomorrow and hoping to WB restrictions lifted. CONTINUED NEED FOR THE FOLLOWING SKILLS: LE strengthening, gait training, stair training and transfer training to remain safe in the home. Add ROM ex's once restrictions have been liftef by surgeon. PLAN FOR NEXT VISIT: Reassess if ortho changes WB precautions and splint is removed or DC if no change in status. Pt verbalized understanding of plan.    THE FOLLOWING DISCHARGE PLANNING WAS DISCUSSED WITH THE PATIENT/CAREGIVER: DC under MD supervision when goals met

## 2022-06-03 ENCOUNTER — TRANSCRIBE ORDER (OUTPATIENT)
Dept: INTERVENTIONAL RADIOLOGY/VASCULAR | Age: 67
End: 2022-06-03

## 2022-06-03 DIAGNOSIS — C22.0 HEPATOCELLULAR CARCINOMA (HCC): Primary | ICD-10-CM

## 2022-06-08 ENCOUNTER — OFFICE VISIT (OUTPATIENT)
Dept: ORTHOPEDIC SURGERY | Age: 67
End: 2022-06-08
Payer: MEDICARE

## 2022-06-08 VITALS — BODY MASS INDEX: 25.19 KG/M2 | TEMPERATURE: 97.1 F | HEIGHT: 72 IN | WEIGHT: 186 LBS

## 2022-06-08 DIAGNOSIS — S82.831D OTHER CLOSED FRACTURE OF DISTAL END OF RIGHT FIBULA WITH ROUTINE HEALING, SUBSEQUENT ENCOUNTER: ICD-10-CM

## 2022-06-08 DIAGNOSIS — M79.604 RIGHT LEG PAIN: Primary | ICD-10-CM

## 2022-06-08 PROCEDURE — 73590 X-RAY EXAM OF LOWER LEG: CPT | Performed by: PHYSICIAN ASSISTANT

## 2022-06-08 PROCEDURE — 99024 POSTOP FOLLOW-UP VISIT: CPT | Performed by: PHYSICIAN ASSISTANT

## 2022-06-08 RX ORDER — HYDROCODONE BITARTRATE AND ACETAMINOPHEN 7.5; 325 MG/1; MG/1
1 TABLET ORAL
Qty: 28 TABLET | Refills: 0 | Status: SHIPPED | OUTPATIENT
Start: 2022-06-08 | End: 2022-06-15

## 2022-06-08 NOTE — PROGRESS NOTES
[address] [place] [address]  [phone]           Patient: Maximilian Toth                MRN: [id number]       SSN: [id number]  YOB: 1955        AGE: 74 y.o. SEX: male  Body mass index is 25.23 kg/m². PCP: Justice Montero MD  06/08/22      This office note has been dictated. REVIEW OF SYSTEMS:  Constitutional: Negative for fever, chills, weight loss and malaise/fatigue. HENT: Negative. Eyes: Negative. Respiratory: Negative. Cardiovascular: Negative. Gastrointestinal: No bowel incontinence or constipation. Genitourinary: No bladder incontinence or saddle anesthesia. Skin: Negative. Neurological: Negative. Endo/Heme/Allergies: Negative. Psychiatric/Behavioral: Negative. Musculoskeletal: As per HPI above. Past Medical History:   Diagnosis Date    Abscess of right shoulder     Abscess of shoulder     Acute blood loss as cause of postoperative anemia 4/22/2022    Arthritis     Closed displaced comminuted fracture of shaft of right tibia with routine healing 04/22/2022    Closed fracture of distal end of right fibula with routine healing 4/22/2022    Epidural abscess     Heart murmur     Hematuria     [facility]     Hypertension     Infected wound     Infectious disease     Iron deficiency anemia     IV drug user     Liver disease     Methadone dependence ([facility]     Tobacco abuse          Current Outpatient Medications:     ibuprofen (MOTRIN) 800 mg tablet, Take 800 mg by mouth every eight (8) hours as needed for Pain., Disp: , Rfl:     telmisartan (MICARDIS) 20 mg tablet, Take 1 Tablet by mouth daily. Indications: high blood pressure, Disp: 30 Tablet, Rfl: 0    acetaminophen (TYLENOL) 325 mg tablet, Take 2 Tablets by mouth every four (4) hours as needed (for pain level 4/10 or lesser (to be given between 8:00PM and 4:00AM only)).  Indications: fever, pain, Disp: 30 Tablet, Rfl: 0    pregabalin (LYRICA) 75 mg capsule, Take 1 Capsule by mouth three (3) times daily. Max Daily Amount: 225 mg. Indications: acute pain following an operation, Disp: 90 Capsule, Rfl: 0    pantoprazole (PROTONIX) 40 mg tablet, Take 1 Tablet by mouth Daily (before breakfast). Indications: gastroesophageal reflux disease, Disp: 30 Tablet, Rfl: 0    melatonin 3 mg tablet, Take 1 Tablet by mouth daily (after dinner). Indications: Difficulty sleeping, Disp: 30 Tablet, Rfl: 0    amLODIPine (NORVASC) 10 mg tablet, Take 1 Tablet by mouth daily. , Disp: 15 Tablet, Rfl: 0    aspirin delayed-release 81 mg tablet, Take 1 Tablet by mouth two (2) times a day., Disp: 30 Tablet, Rfl: 0    ferrous sulfate 325 mg (65 mg iron) tablet, Take 1 Tablet by mouth two (2) times daily (with meals). , Disp: 30 Tablet, Rfl: 0    folic acid (FOLVITE) 1 mg tablet, Take 1 Tablet by mouth daily. , Disp: 15 Tablet, Rfl: 0    polyethylene glycol (MIRALAX) 17 gram packet, Take 1 Packet by mouth daily. , Disp: 30 Packet, Rfl: 0    therapeutic multivitamin (THERAGRAN) tablet, Take 1 Tablet by mouth daily. , Disp: 30 Tablet, Rfl: 0    thiamine HCL (B-1) 100 mg tablet, Take 1 Tablet by mouth daily. , Disp: 15 Tablet, Rfl: 0    methadone (DOLOPHINE) 10 mg tablet, Take 45 mg by mouth daily. , Disp: , Rfl:     No Known Allergies    Social History     Socioeconomic History    Marital status:      Spouse name: Not on file    Number of children: Not on file    Years of education: Not on file    Highest education level: Not on file   Occupational History    Not on file   Tobacco Use    Smoking status: Current Every Day Smoker     Packs/day: 0.50     Years: 38.00     Pack years: 19.00     Types: Cigarettes    Smokeless tobacco: Never Used   Vaping Use    Vaping Use: Never used   Substance and Sexual Activity    Alcohol use: Yes     Alcohol/week: 4.0 standard drinks     Types: 4 Cans of beer per week    Drug use:  Yes Types: Heroin     Comment: last dose 2/15    Sexual activity: Yes   Other Topics Concern     Service Not Asked    Blood Transfusions Not Asked    Caffeine Concern Not Asked    Occupational Exposure Not Asked    Hobby Hazards Not Asked    Sleep Concern Not Asked    Stress Concern Not Asked    Weight Concern Not Asked    Special Diet Not Asked    Back Care Not Asked    Exercise Not Asked    Bike Helmet Not Asked    Seat Belt Not Asked    Self-Exams Not Asked   Social History Narrative    ** Merged History Encounter **          Social Determinants of Health     Financial Resource Strain:     Difficulty of Paying Living Expenses: Not on file   Food Insecurity:     Worried About Running Out of Food in the Last Year: Not on file    Adelia of Food in the Last Year: Not on file   Transportation Needs:     Lack of Transportation (Medical): Not on file    Lack of Transportation (Non-Medical):  Not on file   Physical Activity:     Days of Exercise per Week: Not on file    Minutes of Exercise per Session: Not on file   Stress:     Feeling of Stress : Not on file   Social Connections:     Frequency of Communication with Friends and Family: Not on file    Frequency of Social Gatherings with Friends and Family: Not on file    Attends [employer] Services: Not on file    Active Member of [address] [place] [address] [employer] or Organizations: Not on file    Attends Club or Organization Meetings: Not on file    Marital Status: Not on file   Intimate Partner Violence:     Fear of Current or Ex-Partner: Not on file    Emotionally Abused: Not on file    Physically Abused: Not on file    Sexually Abused: Not on file   Housing Stability:     Unable to Pay for Housing in the Last Year: Not on file    Number of [place] in the Last Year: Not on file    Unstable Housing in the Last Year: Not on file       Past Surgical History:   Procedure Laterality Date    HX COLONOSCOPY  07/18/2016    HX CYST REMOVAL      rt.forearm and rt.foot    HX ENDOSCOPY  07/18/2016    HX FRACTURE TX Right 04/22/2022    S/P Closed IM nailing of the right tibia using the Synthes IM nail system with a double lock proximally and triple lock distally (4/22/2022 - Dr. David eWathers)    HX FREE SKIN GRAFT      HX ORTHOPAEDIC      HX ORTHOPAEDIC Right 04/23/2022    Closed Fracture of Right Tibia     HX OTHER SURGICAL      right forearm infections and graft           Patient seen evaluated today for his right lower extremity. He is now approximately 5 weeks status post right tib-fib fracture. This was fixed via IM nailing. This is his first postop appointment that he shown up to. He has been doing well. He has been putting some weight on the leg. He was in a posterior splint. Did have some soreness to his heel and home health did apply a pad. He denies any fevers or chills. His pain is well controlled however requesting something for night. Patient denies recent fevers, chills, chest pain, SOB, or injuries. No recent systemic changes noted. A 12-point review of systems is performed today. Pertinent positives are noted. All other systems reviewed and otherwise are negative. Physical exam: General: Alert and oriented x3, nad.  well-developed, well nourished. normal affect, AF. NC/AT, EOMI, neck supple, trachea midline, no JVD present. Breathing is non-labored. Examination of the right lower extremity with skin intact. The surgical wounds are healed nicely. Staples are in place still. There is no surrounding erythema. Compartments are soft. Neurovascular status intact. Negative calf tenderness. Negative Homans. No signs of DVT present. Knee range of motion is 0-110. Ankle range of motion 20 degrees of plantarflexion, 5 degrees dorsiflexion. No skin breakdown on the heel noted.     Radiographs obtained in the office today 6/8/2022 at [address] location of the right tib-fib show that the fracture is in very good alignment position with hardware in place. No new abnormalities are seen. Assessment: Status post IM nailing right tib-fib fracture    Plan: At this point, the staples were removed. He was placed into a boot. He will be 25% weightbearing on the right lower extremity. He will continue with home health. He is instructed on range of motion activities of his ankle as well as his knee. He will continue with ice therapy. A prescription for [place] be sent to his pharmacy. We will see him back in 3 weeks time for evaluation and x-ray of the right tib-fib.               JR Sanchez ALVAREZ, PA-C, ATC

## 2022-06-09 ENCOUNTER — TELEPHONE (OUTPATIENT)
Dept: ORTHOPEDIC SURGERY | Age: 67
End: 2022-06-09

## 2022-06-09 ENCOUNTER — HOME CARE VISIT (OUTPATIENT)
Dept: SCHEDULING | Facility: HOME HEALTH | Age: 67
End: 2022-06-09
Payer: MEDICARE

## 2022-06-09 PROCEDURE — G0151 HHCP-SERV OF PT,EA 15 MIN: HCPCS

## 2022-06-09 NOTE — LETTER
6/9/2022 4:52 PM    Mr. Doris Odonnell  [address] [facility] [address]      Please remove staples         Sincerely,      Isi Diaz PA-C

## 2022-06-09 NOTE — TELEPHONE ENCOUNTER
Tiffanie Ortega called from [facility] and stated that the patient still has two staples left in the inside of his right ankle and she wants to know should they be taken out or not.  If so, fax the order over to [facility]

## 2022-06-12 VITALS
DIASTOLIC BLOOD PRESSURE: 64 MMHG | RESPIRATION RATE: 16 BRPM | TEMPERATURE: 97.9 F | SYSTOLIC BLOOD PRESSURE: 118 MMHG | OXYGEN SATURATION: 97 % | HEART RATE: 51 BPM

## 2022-06-12 NOTE — HOME HEALTH
SUBJECTIVE:Pt reports he saw the ortho yesterday and they removed the splint and gave him the walking boot and told him he is allowed 25% WB on the R leg. States he was given a script to continue PT with stuff written on it for me. Pt gave me the presciption and it was uploaded to his chart. Pt also reports he was given new medicaiton but has not picked it up from the pharmacy as of yet and will have someone do so later today. Pt denies any falls and reports pain 3/10 in the R distal LE  CAREGIVER ASSISTANCE NEEDED FOR: Pts friends assist with groceries and transportation to appointments  MEDICATIONS REVIEWED AND UPDATED: new presciption ordered per pt but not available in the home yet  WOUNDS:R anterior knee covered in steristrips, R anterior ankle covered in steristrips and 2 staples remain in the R medial ankle. No draingage or signs of infection noted. - See wound addendum. Called Dr. [place] office and spoke to Asiya regarding the 2 remaining staples and if they are to be removed. She will send info to MD and send order over if appropriate to have SN remove the remaining staples. ROM: Limited R ankle and R knee ROM- see objective findings  BED MOBILITY: Joe which is improved from supervision at initial evaluation. TRANSFERS:Supervision with Sit to stand and toilet transfers with FWRW d/t WB restrictions on R LE. Improved from no significan change at initial evaluation d/t remaining WB restrictions and increased risk for falls. Michelle Fierro GAIT TRAINING performed in order to reduce gait impairments and reduce the risk for falls: X60ft with FWRW in the home  Gait deficits noted: antalgic on R LE d/t WB restrictions of 25%. Cues and education provided for: Maintaining WB restrictions to promote proper healing. This is compared to Sup with FWRW with NWB of the R LE on initial evaluation.    STAIRS: SBA X4 steps with FWRW- Pt continues to have difficulty mantaining WB restrictions d/t no hand rails available on steps to enter/exit home  BALANCE: Pt scored 18/28 on Tinetti's which is an 18 point improvement from initial evaluation d/t NT d/t NWB of R LE   PATIENT/CAREGIVER EDUCATION PROVIDED THIS VISIT: Importance of compliance with WB restrictions for proper healing of fractures and smoking cessation to improve healing   RESPONSE TO EDUCATION PROVIDED: needs ongoing reinforcement  RESPONSE TO TREATMETN PROVIDED: Pt reports significant stiffness and discomfort in the R knee and ankle with new ROM exercises. HEP consisting of:  R knee flexion and extension ROM exercises and R ankle exercises to be perform 2-3X/day  ASSESSMENT AND PROGRESS TOWARD GOALS:  Patient is progressing well toward goals and has met goals for bed mobility but has not met goals for gait and stairs. Patient continues to have deficits in balance with standing activities and R knee/ankle ROM and strength which imparis his safe indpeendent transfers and ambulation in the home and would benefit from continued physical therapy with progression of R ankle and R knee strengthening and ROM. Goals updated  PLAN: new order from ortho for 3W6 for R LE ROM, strengthening and pain management. Discussed plan and reassessment need at 60 day interval from initial assessment of PT and pt verbalized understanding. Progress R LE ther ex per pt tolerance and within MD restrictions to improve safe mobility in the home and safe egress for MD appintments.  Pt has f/u with Dr. Bobbi Grubbs on 6/30   DISCHARGE PLANNING DISCUSSED: Discharge to self and family under MD supervision once all goals have been met or patient has reached maximum potential.

## 2022-06-13 ENCOUNTER — HOME CARE VISIT (OUTPATIENT)
Dept: SCHEDULING | Facility: HOME HEALTH | Age: 67
End: 2022-06-13
Payer: MEDICARE

## 2022-06-13 VITALS
OXYGEN SATURATION: 98 % | RESPIRATION RATE: 16 BRPM | SYSTOLIC BLOOD PRESSURE: 142 MMHG | HEART RATE: 56 BPM | TEMPERATURE: 97.7 F | DIASTOLIC BLOOD PRESSURE: 86 MMHG

## 2022-06-13 PROCEDURE — 400013 HH SOC

## 2022-06-13 PROCEDURE — G0151 HHCP-SERV OF PT,EA 15 MIN: HCPCS

## 2022-06-14 ENCOUNTER — HOME CARE VISIT (OUTPATIENT)
Dept: SCHEDULING | Facility: HOME HEALTH | Age: 67
End: 2022-06-14
Payer: MEDICARE

## 2022-06-14 VITALS
HEART RATE: 72 BPM | TEMPERATURE: 97.3 F | SYSTOLIC BLOOD PRESSURE: 148 MMHG | RESPIRATION RATE: 17 BRPM | OXYGEN SATURATION: 96 % | DIASTOLIC BLOOD PRESSURE: 72 MMHG

## 2022-06-14 PROCEDURE — G0151 HHCP-SERV OF PT,EA 15 MIN: HCPCS

## 2022-06-14 NOTE — Clinical Note
SUBJECTIVE: Pt just returning from the methadone clinic upon my arrival. Pt denies any falls or med changes. Reports pain 0-3/10  CAREGIVER INVOLVEMENT/ASSISTANCE NEEDED FOR: Friends assist with groceries and transportation  [address] ORDERED/DELIVERED THIS VISIT INCLUDE: none  OBJECTIVE:  See interventions. MEDICATIONS: Medications reconciled and all medications are available in the home this visit. The following education was provided regarding medications, medication interactions, and look a like medications: Continue medication as prescribed by MD. Medications are effective at this time. PATIENT RESPONSE TO TREATMENT:  Pt with c/o increased stiffness and soreness in the ankle during ROM exercises of the R ankle. PATIENT LEVEL OF UNDERSTANDING OF EDUCATION PROVIDED: Pt needs reinforcement with mainntaining WB precautions to improve healing of fractures. Pt also needs VC for proper technique with ROM exercises with his current hand outs for HEP. ASSESSMENT OF PROGRESS TOWARD GOALS: Pt is making slow steady progress with improved R knee ROM but remains very limited with ankle ROM  CONTINUED NEED FOR THE FOLLOWING SKILLS: Gait training and stair training for safe egress from home for MD appointments while maintaining WB precautions  PLAN FOR NEXT VISIT: Progress ROM exercsies as tolerated  THE FOLLOWING DISCHARGE PLANNING WAS DISCUSSED WITH THE PATIENT/CAREGIVER: DC to self under MD supervision when goals met.

## 2022-06-14 NOTE — HOME HEALTH
SUBJECTIVE: Pt just returning from the methadone clinic upon my arrival. Pt denies any falls or med changes. Reports pain 0-3/10  CAREGIVER INVOLVEMENT/ASSISTANCE NEEDED FOR: Friends assist with groceries and transportation  [address] ORDERED/DELIVERED THIS VISIT INCLUDE: none  OBJECTIVE:  See interventions. MEDICATIONS: Medications reconciled and all medications are available in the home this visit. The following education was provided regarding medications, medication interactions, and look a like medications: Continue medication as prescribed by MD. Medications are effective at this time. PATIENT RESPONSE TO TREATMENT:  Pt with c/o increased stiffness and soreness in the ankle during ROM exercises of the R ankle. PATIENT LEVEL OF UNDERSTANDING OF EDUCATION PROVIDED: Pt needs reinforcement with mainntaining WB precautions to improve healing of fractures. Pt also needs VC for proper technique with ROM exercises with his current hand outs for HEP. ASSESSMENT OF PROGRESS TOWARD GOALS: Pt is making slow steady progress with improved R knee ROM but remains very limited with ankle ROM  CONTINUED NEED FOR THE FOLLOWING SKILLS: Gait training and stair training for safe egress from home for MD appointments while maintaining WB precautions  PLAN FOR NEXT VISIT: Progress ROM exercsies as tolerated  THE FOLLOWING DISCHARGE PLANNING WAS DISCUSSED WITH THE PATIENT/CAREGIVER: DC to self under MD supervision when goals met.

## 2022-06-15 ENCOUNTER — HOME CARE VISIT (OUTPATIENT)
Dept: SCHEDULING | Facility: HOME HEALTH | Age: 67
End: 2022-06-15
Payer: MEDICARE

## 2022-06-15 PROCEDURE — G0151 HHCP-SERV OF PT,EA 15 MIN: HCPCS

## 2022-06-15 PROCEDURE — G0299 HHS/HOSPICE OF RN EA 15 MIN: HCPCS

## 2022-06-16 VITALS — HEART RATE: 72 BPM | OXYGEN SATURATION: 99 % | TEMPERATURE: 97.9 F | RESPIRATION RATE: 17 BRPM

## 2022-06-16 NOTE — HOME HEALTH
SUBJECTIVE: Pt reports he needed to take some medication this AM because the ankle was sore when he woke up. States he's been working hard at the ROM exercises. Instructed pt to only perform HEP as instructed and not over do it. CAREGIVER INVOLVEMENT/ASSISTANCE NEEDED FOR: Pts friends assist with groceries and transportation to appointments  [address] ORDERED/DELIVERED THIS VISIT INCLUDE: none  OBJECTIVE:  See interventions. MEDICATIONS: Medications reconciled and all medications are available in the home this visit. The following education was provided regarding medications, medication interactions, and look a like medications: Continue medication as prescribed by MD. Medications are effective at this time. PATIENT RESPONSE TO TREATMENT:  C/o increased stiffness in ankle today with ROM exercises. PATIENT LEVEL OF UNDERSTANDING OF EDUCATION PROVIDED: Pt requiring fewer VC with sequencing and proper techniques with ROM exercises of the R ankle and knee today. ASSESSMENT OF PROGRESS TOWARD GOALS: Pt is making slow progress with R ankle ROM. Pt given ski's for his walker to easier gliding over multiple surfaces when ambulating.  Pt continues to have difficulty maintaining WB precautions with steps d/t lack of railings  CONTINUED NEED FOR THE FOLLOWING SKILLS: R ankle and R knee ROM and strengthening to improve safety and good mechanics with transfers and ambulation  PLAN FOR NEXT VISIT: Con't with R ankle and R knee ROM and strengthening to improve safety and good mechanics with transfers and ambulation  THE FOLLOWING DISCHARGE PLANNING WAS DISCUSSED WITH THE PATIENT/CAREGIVER: DC to self under MD supervision when goals met

## 2022-06-17 VITALS
TEMPERATURE: 96.8 F | SYSTOLIC BLOOD PRESSURE: 128 MMHG | OXYGEN SATURATION: 97 % | RESPIRATION RATE: 16 BRPM | DIASTOLIC BLOOD PRESSURE: 70 MMHG | HEART RATE: 53 BPM

## 2022-06-20 ENCOUNTER — HOME CARE VISIT (OUTPATIENT)
Dept: SCHEDULING | Facility: HOME HEALTH | Age: 67
End: 2022-06-20
Payer: MEDICARE

## 2022-06-22 ENCOUNTER — HOME CARE VISIT (OUTPATIENT)
Dept: HOME HEALTH SERVICES | Facility: HOME HEALTH | Age: 67
End: 2022-06-22
Payer: MEDICARE

## 2022-06-24 ENCOUNTER — HOME CARE VISIT (OUTPATIENT)
Dept: SCHEDULING | Facility: HOME HEALTH | Age: 67
End: 2022-06-24
Payer: MEDICARE

## 2022-06-24 VITALS
HEART RATE: 57 BPM | TEMPERATURE: 97.4 F | DIASTOLIC BLOOD PRESSURE: 70 MMHG | SYSTOLIC BLOOD PRESSURE: 150 MMHG | OXYGEN SATURATION: 98 %

## 2022-06-24 PROCEDURE — G0151 HHCP-SERV OF PT,EA 15 MIN: HCPCS

## 2022-06-24 NOTE — HOME HEALTH
Pt agreed to PT visit but then called to cancel secondary to death in family and ambulance of way. Will plan to reschedule for 6/22/22.

## 2022-06-24 NOTE — Clinical Note
Patient was discharged due to goals met for present level of function and insyrance denial of coverage. Patient declined self pay for further visits. He has made good gains and is safe and indeoendent in the home. His R ankle ROM is still very restricted, and he will need further therapy to restore the ROM to that extremity. At this time he is independen with his transfers and gait in the home, he has another orthopedic follow up next week.   He is currently at 25% WB for R LE.

## 2022-06-30 ENCOUNTER — OFFICE VISIT (OUTPATIENT)
Dept: ORTHOPEDIC SURGERY | Age: 67
End: 2022-06-30
Payer: MEDICARE

## 2022-06-30 VITALS
WEIGHT: 176 LBS | BODY MASS INDEX: 23.84 KG/M2 | HEIGHT: 72 IN | HEART RATE: 66 BPM | RESPIRATION RATE: 18 BRPM | OXYGEN SATURATION: 98 %

## 2022-06-30 DIAGNOSIS — M79.604 RIGHT LEG PAIN: Primary | ICD-10-CM

## 2022-06-30 PROCEDURE — 99024 POSTOP FOLLOW-UP VISIT: CPT | Performed by: PHYSICIAN ASSISTANT

## 2022-06-30 PROCEDURE — 73590 X-RAY EXAM OF LOWER LEG: CPT | Performed by: PHYSICIAN ASSISTANT

## 2022-06-30 NOTE — PROGRESS NOTES
[address] [place] [address]  [phone]           Patient: Jozef Kramer                MRN: [id number]       SSN: [id number]  YOB: 1955        AGE: 74 y.o. SEX: male  Body mass index is 23.87 kg/m². PCP: Radha Casanova MD  06/30/22      This office note has been dictated. REVIEW OF SYSTEMS:  Constitutional: Negative for fever, chills, weight loss and malaise/fatigue. HENT: Negative. Eyes: Negative. Respiratory: Negative. Cardiovascular: Negative. Gastrointestinal: No bowel incontinence or constipation. Genitourinary: No bladder incontinence or saddle anesthesia. Skin: Negative. Neurological: Negative. Endo/Heme/Allergies: Negative. Psychiatric/Behavioral: Negative. Musculoskeletal: As per HPI above. Past Medical History:   Diagnosis Date    Abscess of right shoulder     Abscess of shoulder     Acute blood loss as cause of postoperative anemia 4/22/2022    Arthritis     Closed displaced comminuted fracture of shaft of right tibia with routine healing 04/22/2022    Closed fracture of distal end of right fibula with routine healing 4/22/2022    Epidural abscess     Heart murmur     Hematuria     Heroin abuse ([facility] [address]     Hypertension     Infected wound     Infectious disease     Iron deficiency anemia     IV drug user     Liver disease     Methadone dependence ([facility] [address]     Tobacco abuse          Current Outpatient Medications:     ibuprofen (MOTRIN) 800 mg tablet, Take 800 mg by mouth every eight (8) hours as needed for Pain., Disp: , Rfl:     telmisartan (MICARDIS) 20 mg tablet, Take 1 Tablet by mouth daily. Indications: high blood pressure, Disp: 30 Tablet, Rfl: 0    acetaminophen (TYLENOL) 325 mg tablet, Take 2 Tablets by mouth every four (4) hours as needed (for pain level 4/10 or lesser (to be given between 8:00PM and 4:00AM only)).  Indications: fever, pain, Disp: 30 Tablet, Rfl: 0    pregabalin (LYRICA) 75 mg capsule, Take 1 Capsule by mouth three (3) times daily. Max Daily Amount: 225 mg. Indications: acute pain following an operation, Disp: 90 Capsule, Rfl: 0    pantoprazole (PROTONIX) 40 mg tablet, Take 1 Tablet by mouth Daily (before breakfast). Indications: gastroesophageal reflux disease, Disp: 30 Tablet, Rfl: 0    melatonin 3 mg tablet, Take 1 Tablet by mouth daily (after dinner). Indications: Difficulty sleeping, Disp: 30 Tablet, Rfl: 0    amLODIPine (NORVASC) 10 mg tablet, Take 1 Tablet by mouth daily. , Disp: 15 Tablet, Rfl: 0    aspirin delayed-release 81 mg tablet, Take 1 Tablet by mouth two (2) times a day., Disp: 30 Tablet, Rfl: 0    ferrous sulfate 325 mg (65 mg iron) tablet, Take 1 Tablet by mouth two (2) times daily (with meals). , Disp: 30 Tablet, Rfl: 0    folic acid (FOLVITE) 1 mg tablet, Take 1 Tablet by mouth daily. , Disp: 15 Tablet, Rfl: 0    polyethylene glycol (MIRALAX) 17 gram packet, Take 1 Packet by mouth daily. , Disp: 30 Packet, Rfl: 0    therapeutic multivitamin (THERAGRAN) tablet, Take 1 Tablet by mouth daily. , Disp: 30 Tablet, Rfl: 0    thiamine HCL (B-1) 100 mg tablet, Take 1 Tablet by mouth daily. , Disp: 15 Tablet, Rfl: 0    methadone (DOLOPHINE) 10 mg tablet, Take 45 mg by mouth daily. , Disp: , Rfl:   No current facility-administered medications for this visit.     Facility-Administered Medications Ordered in Other Visits:     0.9% sodium chloride infusion, 25 mL/hr, IntraVENous, CONTINUOUS, Chris Pederson MD    No Known Allergies    Social History     Socioeconomic History    Marital status:      Spouse name: Not on file    Number of children: Not on file    Years of education: Not on file    Highest education level: Not on file   Occupational History    Not on file   Tobacco Use    Smoking status: Current Every Day Smoker     Packs/day: 0.50     Years: 38.00     Pack years: 19.00     Types: Cigarettes    Smokeless tobacco: Never Used   Vaping Use    Vaping Use: Never used   Substance and Sexual Activity    Alcohol use: Yes     Alcohol/week: 4.0 standard drinks     Types: 4 Cans of beer per week    Drug use: Yes     Types: Heroin     Comment: last dose 2/15    Sexual activity: Yes   Other Topics Concern     Service Not Asked    Blood Transfusions Not Asked    Caffeine Concern Not Asked    Occupational Exposure Not Asked    Hobby Hazards Not Asked    Sleep Concern Not Asked    Stress Concern Not Asked    Weight Concern Not Asked    Special Diet Not Asked    Back Care Not Asked    Exercise Not Asked    Bike Helmet Not Asked    Seat Belt Not Asked    Self-Exams Not Asked   Social History Narrative    ** Merged History Encounter **          Social Determinants of Health     Financial Resource Strain:     Difficulty of Paying Living Expenses: Not on file   Food Insecurity:     Worried About Running Out of Food in the Last Year: Not on file    Adelia of Food in the Last Year: Not on file   Transportation Needs:     Lack of Transportation (Medical): Not on file    Lack of Transportation (Non-Medical):  Not on file   Physical Activity:     Days of Exercise per Week: Not on file    Minutes of Exercise per Session: Not on file   Stress:     Feeling of Stress : Not on file   Social Connections:     Frequency of Communication with Friends and Family: Not on file    Frequency of Social Gatherings with Friends and Family: Not on file    Attends Muslim Services: Not on file    Active Member of Clubs or Organizations: Not on file    Attends Club or Organization Meetings: Not on file    Marital Status: Not on file   Intimate Partner Violence:     Fear of Current or Ex-Partner: Not on file    Emotionally Abused: Not on file    Physically Abused: Not on file    Sexually Abused: Not on file   Housing Stability:     Unable to Pay for Housing in the Last Year: Not on file    Number of Places Lived in the Last Year: Not on file    Unstable Housing in the Last Year: Not on file       Past Surgical History:   Procedure Laterality Date    HX COLONOSCOPY  07/18/2016    HX CYST REMOVAL      rt.forearm and rt.foot    HX ENDOSCOPY  07/18/2016    HX FRACTURE TX Right 04/22/2022    S/P Closed IM nailing of the right tibia using the Synthes IM nail system with a double lock proximally and triple lock distally (4/22/2022 - Dr. Vaibhav Salter)    HX FREE SKIN GRAFT      HX ORTHOPAEDIC      HX ORTHOPAEDIC Right 04/23/2022    Closed Fracture of Right Tibia     HX OTHER SURGICAL      right forearm infections and graft           Patient seen evaluated today for his right lower extremity. He is approximate week status post right tibial IM rodding. He is doing well. Is been compliant with his boot. He has been toe-touch weightbearing. He is using a walker for ambulation. Pain is well controlled. He has been working on his knee range of motion as well as ankle range of motion at home. He had no troubles the wound. Patient denies recent fevers, chills, chest pain, SOB, or injuries. No recent systemic changes noted. A 12-point review of systems is performed today. Pertinent positives are noted. All other systems reviewed and otherwise are negative. Physical exam: General: Alert and oriented x3, nad.  well-developed, well nourished. normal affect, AF. NC/AT, EOMI, neck supple, trachea midline, no JVD present. Breathing is non-labored. Examination of the right lower extremity with skin intact. The surgical wounds are healed nicely. There is no erythema or ecchymosis noted. There are no signs of infection or cellulitis present. Compartments are soft. Full knee range of motion noted. Ankle range of motion is neutral dorsiflexion, 20 degrees plantarflexion.     Radiographs in office today 6/30/2022 at the [facility] location include AP and lateral of the right tib-fib shows that the fracture is maintained in very good alignment position with hardware in place. Films reviewed with Dr. Rosita Jeong. Assessment: Status post right tibial IM nail    Plan: At this point, the patient will continue in his boot. He will move up to partial weightbearing on the right lower extremity. He will continue with his ankle and knee range of motion exercises. He denies any for analgesics. We will see him back in office in 4 weeks time for evaluation and repeat x-ray.             JR Sanchez ALVAREZ, PAJohnC, ATC

## 2022-07-13 ENCOUNTER — TELEPHONE (OUTPATIENT)
Dept: GENERAL RADIOLOGY | Age: 67
End: 2022-07-13

## 2022-07-13 DIAGNOSIS — C22.0 HEPATOCELLULAR CARCINOMA (HCC): Primary | ICD-10-CM

## 2022-07-13 NOTE — TELEPHONE ENCOUNTER
Interventional Radiology    The patient has now been a no - show for his Y90 work up appts x 3. Attempted to call the phone numbers listed in the chart - no answer. We will refer Mr. Megan Escudero back to Dr. Neftaly Wood as he is non compliant with our appointments for radioembolization work up and treatment.        Thank you,  Mayte Hopson, [facility] [address]

## 2022-07-29 ENCOUNTER — OFFICE VISIT (OUTPATIENT)
Dept: ORTHOPEDIC SURGERY | Age: 67
End: 2022-07-29
Payer: MEDICARE

## 2022-07-29 VITALS
OXYGEN SATURATION: 91 % | HEIGHT: 71 IN | HEART RATE: 77 BPM | WEIGHT: 176 LBS | BODY MASS INDEX: 24.64 KG/M2 | TEMPERATURE: 97.1 F

## 2022-07-29 DIAGNOSIS — S82.91XD: ICD-10-CM

## 2022-07-29 DIAGNOSIS — M79.604 RIGHT LEG PAIN: Primary | ICD-10-CM

## 2022-07-29 DIAGNOSIS — S82.251D CLOSED DISPLACED COMMINUTED FRACTURE OF SHAFT OF RIGHT TIBIA WITH ROUTINE HEALING: ICD-10-CM

## 2022-07-29 PROCEDURE — G8420 CALC BMI NORM PARAMETERS: HCPCS | Performed by: PHYSICIAN ASSISTANT

## 2022-07-29 PROCEDURE — 99213 OFFICE O/P EST LOW 20 MIN: CPT | Performed by: PHYSICIAN ASSISTANT

## 2022-07-29 PROCEDURE — G8536 NO DOC ELDER MAL SCRN: HCPCS | Performed by: PHYSICIAN ASSISTANT

## 2022-07-29 PROCEDURE — 1101F PT FALLS ASSESS-DOCD LE1/YR: CPT | Performed by: PHYSICIAN ASSISTANT

## 2022-07-29 PROCEDURE — 73590 X-RAY EXAM OF LOWER LEG: CPT | Performed by: PHYSICIAN ASSISTANT

## 2022-07-29 PROCEDURE — G8756 NO BP MEASURE DOC: HCPCS | Performed by: PHYSICIAN ASSISTANT

## 2022-07-29 PROCEDURE — 1123F ACP DISCUSS/DSCN MKR DOCD: CPT | Performed by: PHYSICIAN ASSISTANT

## 2022-07-29 PROCEDURE — 3017F COLORECTAL CA SCREEN DOC REV: CPT | Performed by: PHYSICIAN ASSISTANT

## 2022-07-29 PROCEDURE — G8427 DOCREV CUR MEDS BY ELIG CLIN: HCPCS | Performed by: PHYSICIAN ASSISTANT

## 2022-07-29 PROCEDURE — G8432 DEP SCR NOT DOC, RNG: HCPCS | Performed by: PHYSICIAN ASSISTANT

## 2022-07-29 NOTE — LETTER
7/29/2022 10:49 AM    Mr. Federico Nance  [address] [facility] [address]    To whom it may concern:    Above patient is currently under our care recovering from a severe fracture of his right lower leg below the knee. I am initiating physical therapy for full weightbearing of the right lower extremity and gentle stretching with strengthening. I anticipate that he will be returning to work by mid September 2022.               Sincerely,      Jerome Knowles PA-C

## 2022-07-29 NOTE — PROGRESS NOTES
Patient: Yordy Worthy                MRN: [id number]       SSN: [id number]  YOB: 1955        AGE: 74 y.o. SEX: male          PCP: Tracie Kendrick MD  07/29/22    Chief Complaint   Patient presents with    Ankle Injury     Rt tibia         HISTORY:  Yordy Worthy is a 74 y.o. male returns to the office of follow-up regarding comminuted tibia and proximal fibula fractures as result of a fall back in April 2022. Patient is currently in a tall fracture walker 75% weightbearing of his right lower extremity using a 4 post rolling walker for ambulation assistance. He has minimal pain associated with his right lower leg. He is anxious to return to work as soon as possible but feels weak in his leg therefore he is requesting physical therapy.       Pain Assessment  7/29/2022   Location of Pain -   Pain Location Comment -   Location Modifiers -   Severity of Pain -   Quality of Pain -   Duration of Pain -   Frequency of Pain -   Date Pain First Started -   Date Pain First Started Comment -   Aggravating Factors -   Limiting Behavior -   Relieving Factors -   Result of Injury Yes   Type of Injury Fall           Lab Results   Component Value Date/Time    Hemoglobin A1c 6.2 (H) 07/24/2012 02:05 AM     Weight Metrics 7/29/2022 6/30/2022 6/8/2022 5/9/2022 4/22/2022 3/6/2020 2/21/2020   Weight 176 lb 176 lb 186 lb 186 lb 180 lb 177 lb 12.8 oz 178 lb   BMI 24.55 kg/m2 23.87 kg/m2 25.23 kg/m2 25.23 kg/m2 24.41 kg/m2 24.8 kg/m2 24.83 kg/m2            Problem List Items Addressed This Visit          Orthopedic Problems    Orthopedic aftercare for healing traumatic lower leg fracture, right, closed    Relevant Orders    REFERRAL TO PHYSICAL THERAPY    Closed displaced comminuted fracture of shaft of right tibia with routine healing    Relevant Orders    REFERRAL TO PHYSICAL THERAPY     Other Visit Diagnoses       Right leg pain    -  Primary Relevant Orders    AMB POC XRAY; TIBIA & FIBULA, TWO VIE (Completed)    REFERRAL TO PHYSICAL THERAPY            PAST MEDICAL HISTORY:   Past Medical History:   Diagnosis Date    Abscess of right shoulder     Abscess of shoulder     Acute blood loss as cause of postoperative anemia 4/22/2022    Arthritis     Closed displaced comminuted fracture of shaft of right tibia with routine healing 04/22/2022    Closed fracture of distal end of right fibula with routine healing 4/22/2022    Epidural abscess     Heart murmur     Hematuria     Heroin abuse ([facility] [address]     Hypertension     Infected wound     Infectious disease     Iron deficiency anemia     IV drug user     Liver disease     Methadone dependence ([facility] [address]     Tobacco abuse        PAST SURGICAL HISTORY:   Past Surgical History:   Procedure Laterality Date    HX COLONOSCOPY  07/18/2016    HX CYST REMOVAL      rt.forearm and rt.foot    HX ENDOSCOPY  07/18/2016    HX FRACTURE TX Right 04/22/2022    S/P Closed IM nailing of the right tibia using the Synthes IM nail system with a double lock proximally and triple lock distally (4/22/2022 - Dr. Susanna Zapata)    HX FREE SKIN GRAFT      HX ORTHOPAEDIC      HX ORTHOPAEDIC Right 04/23/2022    Closed Fracture of Right Tibia     HX OTHER SURGICAL      right forearm infections and graft       ALLERGIES: No Known Allergies     CURRENT MEDICATIONS:  A list of medications prior to the time of admission include:  Prior to Admission medications    Medication Sig Start Date End Date Taking? Authorizing Provider   ibuprofen (MOTRIN) 800 mg tablet Take 800 mg by mouth every eight (8) hours as needed for Pain. Provider, Historical   telmisartan (MICARDIS) 20 mg tablet Take 1 Tablet by mouth daily. Indications: high blood pressure 5/12/22   Torey Cardenas I, NP   acetaminophen (TYLENOL) 325 mg tablet Take 2 Tablets by mouth every four (4) hours as needed (for pain level 4/10 or lesser (to be given between 8:00PM and 4:00AM only)). Indications: fever, pain 5/11/22   Jacquie PERALTA NP   pregabalin (LYRICA) 75 mg capsule Take 1 Capsule by mouth three (3) times daily. Max Daily Amount: 225 mg. Indications: acute pain following an operation 5/11/22   Jacquie PERALTA NP   pantoprazole (PROTONIX) 40 mg tablet Take 1 Tablet by mouth Daily (before breakfast). Indications: gastroesophageal reflux disease 5/12/22   Jacquie PERALTA NP   melatonin 3 mg tablet Take 1 Tablet by mouth daily (after dinner). Indications: Difficulty sleeping 5/11/22   Jacquie PERALTA NP   amLODIPine (NORVASC) 10 mg tablet Take 1 Tablet by mouth daily. 4/29/22   Zhanna Maldonado MD   aspirin delayed-release 81 mg tablet Take 1 Tablet by mouth two (2) times a day. 4/28/22   Zhanna Maldonado MD   ferrous sulfate 325 mg (65 mg iron) tablet Take 1 Tablet by mouth two (2) times daily (with meals). 4/28/22   Zhanna Maldonado MD   folic acid (FOLVITE) 1 mg tablet Take 1 Tablet by mouth daily. 4/29/22   Zhanna Maldonado MD   polyethylene glycol (MIRALAX) 17 gram packet Take 1 Packet by mouth daily. 4/28/22   Zhanna Maldonado MD   therapeutic multivitamin [facility]) tablet Take 1 Tablet by mouth daily. 4/29/22   Zhanna Maldonado MD   thiamine HCL (B-1) 100 mg tablet Take 1 Tablet by mouth daily. 4/29/22   Zhanna Maldonado MD   methadone (DOLOPHINE) 10 mg tablet Take 45 mg by mouth daily. Other, MD Jesus Alberto       FAMILY HISTORY: History reviewed. No pertinent family history. SOCIAL HISTORY:   Social History     Socioeconomic History    Marital status:    Tobacco Use    Smoking status: Every Day     Packs/day: 0.50     Years: 38.00     Pack years: 19.00     Types: Cigarettes    Smokeless tobacco: Never   Vaping Use    Vaping Use: Never used   Substance and Sexual Activity    Alcohol use:  Yes     Alcohol/week: 4.0 standard drinks     Types: 4 Cans of beer per week    Drug use: Yes     Types: Heroin     Comment: last dose 2/15    Sexual activity: Yes   Social History Narrative    ** Merged History Encounter **            ROS:No CP, No SOB, No fever/chills nor night sweats. No headaches, vision abnormalities to include double and or loss of vision. No dizziness. No hearing abnormalities. No Chest Pain nor Shortness of breath. Pt denies h/o spinal surgery, injections, or PT/chiropractor. Patient has attempted self treatment with less than adequate relief on oral and topical analgesic / anti inflammatory medications . Pt denies change in bowel or bladder habits. No saddle paresthesia / anesthesia. Pt denies fever, unplanned weight loss / weight gains, and no skin changes. Musculoskeletal pain per HPI. Pain is exacerbated positionally. PHYSICAL EXAM:    Visit Vitals  Pulse 77   Temp 97.1 °F (36.2 °C) (Temporal)   Ht 5' 11\" (1.803 m)   Wt 176 lb (79.8 kg)   SpO2 91%   BMI 24.55 kg/m²       Constitutional: Appears well-developed and well-nourished. No distress. Sitting comfortably in the exam room, interacting with conversation with pleasant affect. Gait appears steady and patient exhibits no evidence of ataxia. Patient is able to ambulate with caution. No focal neurological deficit noted. No facial droop, slurred speech, or evidence of altered mentation noted on exam.   Skin: Skin over the head, neck, bilateral limbs, and trunk is warm and dry. No rash or erythema noted. Cranial Nerves II-XII grossly intact  HENT: NC/AT. Normal symmetry, bulk and tone of facial and neck musculature. Trachea midline. No discernible thyromegaly or masses. No involuntary movements. Lymphatic: No preauricular, submandibuar, anterior or posterior cervical lymphadenopathy. Psychiatric: The patient is awake, alert, and oriented to person, place and time. Behavior is normal. Thought content normal.   Cardiovascular: No clubbing, cyanosis. No edema bilateral lower extremities. Pulmonary: No tripoding nor accessory muscle recruitment.  Breathing normally, no distress, no audible wheezing. Distal cap refill intact at 2/2 Salvador UE / LE. Neuro intact Salvador UE/LE to noxious stimuli        Ortho Specific exam:    Right knee over the midline craniocaudal orientation reveals an 8 cm surgical incision well-healed. Nontender throughout. Minimal keloid noted. Active range of motion 105 degrees -0 with no pain or instability. Medial proximal tibia reveals surgical site intact measuring 2.5 cm craniocaudal with palpable hardware in the subcutaneous tissue noted. Distal anterior right lower leg over the tibia reveals a 2 cm surgical incision again with palpable hardware in the subcu. Incision is well-healed. He is tested weak today and is anterior tibialis musculature as well as plantar flexion and dorsiflexion against resistance at 4-/5. Patient has no ankle pain reported today and has plantar flexion at 10 degrees dorsiflexion 5 degrees. [facility] view of the right tib-fib AP and lateral reveals long IM nail spanning a healing tibial shaft fracture. 2 screws are noted proximal and 2 distal.  There is comminution and healing noted to the proximal fibular head and neck fracture. No lytic or blastic lesions. No soft tissue ossifications. IMPRESSION:      ICD-10-CM ICD-9-CM    1. Right leg pain  M79.604 729.5 AMB POC XRAY; TIBIA & FIBULA, TWO VIE      REFERRAL TO PHYSICAL THERAPY      2. Orthopedic aftercare for healing traumatic lower leg fracture, right, closed  S82. 91XD V54.16 REFERRAL TO PHYSICAL THERAPY      3. Closed displaced comminuted fracture of shaft of right tibia with routine healing  S82.251D V54.16 REFERRAL TO PHYSICAL THERAPY           PLAN: Today we discussed alternatives care to include but not limited to starting physical therapy to work on range of motion and stretching as well as gentle strengthening of the right leg. Patient will return to the office in 3 weeks.   Anticipate return to work full-time sometime around the mid portion of September 2022. Today's x-rays reviewed copies provided all of his questions answered to his satisfaction. PT ordered per protocol. Patient may advance to 100% weightbearing right lower extremity and discontinue use of his tall fracture walker moving from a 4 post rolling walker to a single post cane with therapy assistance. Care plan outlined and precautions discussed. Results were reviewed with the patient. All medications were reviewed with the patient. All of pt's questions and concerns were addressed. Alarm symptoms and return precautions associated with chief complaint and evaluation were reviewed with the patient in detail. The patient demonstrated adequate understanding. The patient expresses willing compliance with the treatment plan. Special note: Medication management discussed in detail all patient's questions answered to their satisfaction. No Narcotic indicated today. Patient given pain medication for short term acute pain relief. Goal is to treat patient according to above plan and to ultimately have patient off all pain medications once appropriate. If chronic pain management is required beyond what is expected for current orthopedic problem, will refer patient to pain management.  was reviewed and will be reviewed with every medication refill request.         Patient provided a reminder for a \"due or due soon\" health maintenance. I have asked the patient to schedule an appointment with their primary care provider for follow-up on general health maintenance concerns. Today all the patient's questions were answered to their satisfaction. Copies of x-rays reviewed if obtained this visit, and provided to patient. Dictation disclaimer:  Please note that this dictation was completed with [employer], the [employer] voice recognition software.   Quite often unanticipated grammatical, syntax, homophones, and other interpretive errors are inadvertently transcribed by the computer software. Please disregard these errors. Please excuse any errors that have escaped final proofreading. Ana YORK, APC, MPAS, PA-C  [facility]

## 2022-08-02 ENCOUNTER — APPOINTMENT (OUTPATIENT)
Dept: PHYSICAL THERAPY | Age: 67
End: 2022-08-02

## 2022-08-02 ENCOUNTER — TELEPHONE (OUTPATIENT)
Dept: PHYSICAL THERAPY | Age: 67
End: 2022-08-02

## 2022-08-15 ENCOUNTER — HOSPITAL ENCOUNTER (OUTPATIENT)
Dept: PHYSICAL THERAPY | Age: 67
Discharge: HOME | End: 2022-08-15
Attending: PHYSICIAN ASSISTANT
Payer: MEDICARE

## 2022-08-15 DIAGNOSIS — S82.251D CLOSED DISPLACED COMMINUTED FRACTURE OF SHAFT OF RIGHT TIBIA WITH ROUTINE HEALING: ICD-10-CM

## 2022-08-15 DIAGNOSIS — S82.91XD: ICD-10-CM

## 2022-08-15 DIAGNOSIS — M79.604 RIGHT LEG PAIN: Primary | ICD-10-CM

## 2022-08-15 PROCEDURE — 97162 PT EVAL MOD COMPLEX 30 MIN: CPT

## 2022-08-15 NOTE — PROGRESS NOTES
PT DAILY TREATMENT NOTE     Patient Name: Annie Hsu  Date:8/15/2022  : 1955  [x]  Patient  Verified  Payor: BLUE CROSS MEDICARE / Plan: Romel Chow[employer]ury [id number] HMO / Product Type: Managed Care Medicare /    In time:2:20  Out time:3:00  Total Treatment Time (min): 40  Visit #: 1 of 6    Medicare/BCBS Only   Total Timed Codes (min):  8 1:1 Treatment Time:  40       Treatment Area: Right leg pain [D41.275]  Orthopedic aftercare for healing traumatic lower leg fracture, right, closed [S82.91XD]  Closed displaced comminuted fracture of shaft of right tibia with routine healing [S82.251D]    SUBJECTIVE  Pain Level (0-10 scale): 0/10  Any medication changes, allergies to medications, adverse drug reactions, diagnosis change, or new procedure performed?: [x] No    [] Yes (see summary sheet for update)  Subjective functional status/changes:   [] No changes reported    Chief Complaint: Right leg, ankle pain  History/Mechanism of Injury: Pt was walking down a grassy slope and right foot stepped into a small hole, twisted, and Pt fell, suffering right displaced, comminuted tibial shaft fracture on 2022, Pt was unable to stand and called EMS and was taken to ED where fracture was revealed with imaging. Surgery for closed IM nailing of tibia performed on 22. Pt was discharged on 22 to inpatient rehab and then sent home on 22. Pt then had HHPT. Pt is currently in CAM boot and using SPC.       Current Symptoms/Functional Deficits: Pt notes intermittent pain, especially along incision sites  Pain-  Current: 0/10     Worst: 5/10   Best: 0/10  Previous Treatment/Compliance: Continues with HHPT exercises daily at night time - ankle alphabet, ankle circles, ankle pumps, seated marches, SLR ABD  Mobility Devices: SPC in home, community but has started walking in home without SPC at times  PMHx/Surgical Hx:   Work Hx: not working currently  Living Situation: 1-story home with two roommates; maintains bedroom and household chores; not responsible for yard work  Household Modifications: none  Hobbies: watch football/sports  Pt Goals: \"I just want to walk and go back to work. \"    OBJECTIVE    32 min [x]Eval                  []Re-Eval     8 min Therapeutic Activity:  []  See flow sheet : Patient education on therapy assessment, prognosis, expectations for therapy sessions, patient goals, and HEP. Rationale: to improve the patients ability to adhere to HEP and therapy sessions for increased compliance when working toward therapy goals. With   [] TE   [x] TA   [] neuro   [] other: Patient Education: [x] Review HEP    [] Progressed/Changed HEP based on:   [] positioning   [] body mechanics   [] transfers   [] heat/ice application    [] other:      Other Objective/Functional Measures: FOTO 54 pts    Observation: well-healed incision  Palpation: TTP at incision site at distal tib-fibula    Ankle AROM:                                           AROM (deg)                 Right Left Effect   Dorsiflexion with Knee Flexed 0 5    Dorsiflexion with Knee Extended 0 5    Plantar Flexion 37 45    Inversion 12 10    Eversion 0 5    Great Toe Extension            Hip Extension 3+ 3+                                               -  Strength:   Right (/5) Left (/5)   Hip     Flexion 5 5             Abduction 4- 4-             Adduction 5 5             Extension 3+ 3+             ER 5 5             IR 5 5   Knee   Extension 5 5              Flexion 5 5   Ankle   Dorsiflexion 5 5               PF 5  5                Inversion 5 5               Eversion 5 5     Girth:    Right Left   Malleoli  32 28   Midfoot 26 25   Figure 8 61 59          -    Lumbar/SIJ Screen:  WNL    Gait: antalgic limp that is relieved with use of CAM boot     Functional Squat: 62 deg knee flexion angle - tightness right ankle    Stair Negotiation: step to pattern with 3 steps to front door with use of handrail    Balance: Romberg EC - LOB; MSR EO/EC - LOB; SLS 3 seconds right, 6 seconds left - No CAM boot    Reflexes/Sensation: intact to light touch      Special Tests:      Right Left   Navicular Drop       -   Right Left   Anterior Drawer       Talar Tilt     Posterior Drawer     Tinel's       -      Pain Level (0-10 scale) post treatment: 0/10    ASSESSMENT/Changes in Function: See POC    Patient will continue to benefit from skilled PT services to modify and progress therapeutic interventions, address functional mobility deficits, address ROM deficits, address strength deficits, analyze and address soft tissue restrictions, analyze and cue movement patterns, analyze and modify body mechanics/ergonomics, assess and modify postural abnormalities, address imbalance/dizziness and instruct in home and community integration to attain remaining goals. [x]  See Plan of Care  []  See progress note/recertification  []  See Discharge Summary         Progress towards goals / Updated goals:  See POC    PLAN  [x]  Upgrade activities as tolerated     []  Continue plan of care  [x]  Update interventions per flow sheet       []  Discharge due to:_  []  Other:_      Kadeem Butler, PT 8/15/2022  2:24 PM    No future appointments.

## 2022-08-15 NOTE — PROGRESS NOTES
[facility] - [facility], [address] [place] [address]  [phone] fax  Plan of Care/ Statement of Necessity for Physical Therapy Services    Patient name: Christophe Nissen Start of Care: 8/15/2022   Referral source: Felicia Waterman : 1955    Medical Diagnosis: Right leg pain [M79.604]  Orthopedic aftercare for healing traumatic lower leg fracture, right, closed [S82.91XD]  Closed displaced comminuted fracture of shaft of right tibia with routine healing [S82.251D]  Payor: Mable Bagley / Plan: Romel Chowdhury 8141 HMO / Product Type: [employer] Medicare /  Onset Date:22 (surgery)    Treatment Diagnosis: Right Leg/Ankle pain   Prior Hospitalization: see medical history Provider#: [id number]   Medications: Verified on Patient summary List    Comorbidities: Hx right shoulder abscess; Heart murmur; HTN; Liver disease; hx substance abuse   Prior Level of Function: Not working; lives in [facility] with two roommates; functionally independent without A.D. The Plan of Care and following information is based on the information from the initial evaluation. Assessment/ key information: Pt is a 74 y.o. male who presents with c/o right leg/ankle pain, limited right ankle mobility, strength, and stability, impacting balance and gait. Pt suffered displaced comminuted tib-fib fracture with IM nailing surgery to correct on 22. Pt presents with routine healing with well-healed incision, presence of CAM boot. Functional deficits include: limited standing/amb tolerance, difficulty squatting, and step to pattern with stair negotiation. Upon exam, Pt exhibited impaired right ankle AROM, impaired B hip strength; impaired standing balance, requiring CGA to Bakari x 1 to prevent LOB, and gait deviations of antalgic limp right LE in CAM boot with SPC.    Pt would benefit from skilled PT to address above deficits to improve Pt's function and ability to return to premorbid status without pain or difficulty. Evaluation Complexity History MEDIUM  Complexity : 1-2 comorbidities / personal factors will impact the outcome/ POC ; Examination MEDIUM Complexity : 3 Standardized tests and measures addressing body structure, function, activity limitation and / or participation in recreation  ;Presentation MEDIUM Complexity : Evolving with changing characteristics  ; Clinical Decision Making MEDIUM Complexity : FOTO score of 26-74  Overall Complexity Rating: MEDIUM  Problem List: pain affecting function, decrease ROM, decrease strength, edema affecting function, impaired gait/ balance, decrease ADL/ functional abilitiies, decrease activity tolerance, decrease flexibility/ joint mobility, and decrease transfer abilities   Treatment Plan may include any combination of the following: Therapeutic exercise, Therapeutic activities, Neuromuscular re-education, Physical agent/modality, Gait/balance training, Manual therapy, Patient education, Self Care training, Functional mobility training, and Stair training  Patient / Family readiness to learn indicated by: asking questions, trying to perform skills, and interest  Persons(s) to be included in education: patient (P)  Barriers to Learning/Limitations: None  Patient Goal (s): I just want to walk and go back to work.   Patient Self Reported Health Status: fair  Rehabilitation Potential: good    Short Term Goals: To be accomplished in 1 weeks:  Goal: Pt to be compliant with initial HEP to improve right ankle AROM to assist with normalization of gait pattern. Status at last note/certification: Established and reviewed with Pt  Long Term Goals: To be accomplished in 6 weeks:  Goal: Pt to increase right ankle AROM by 5 deg all planes to assist with normalization of gait pattern.   Status at last note/certification: DF 0 deg, PF 37 deg, IV 12 deg, EV 0 deg  Goal: Pt to increase B hip strength to increase stability with standing and work towards independent gait without A.D or CAM boot when cleared to discharge by MD.  Status at last note/certification: hip ABD 4-/5 B, hip Ext 3+/5 B  Goal: Pt to perform SLS x 15 seconds bilaterally without deviations or pain to increase stability and safety with gait without A.D. Status at last note/certification: 3 seconds right, 6 seconds left  Goal: Pt to report < 1/10 pain at worst to increase ease with ADLs. Status at last note/certification: 0/44 pain at worst  Goal: Pt to report FOTO score of 73 pts to show improved function and quality of life. Status at last note/certification: FOTO 54 pts     Frequency / Duration: Patient to be seen 1 times per week for 6 weeks. Patient/ Caregiver education and instruction: Diagnosis, prognosis, exercises   [x]  Plan of care has been reviewed with PTA    Certification Period: 8/15/22 - 9/13/22  Deana Butler, PT 8/15/2022 3:10 PM  _____________________________________________________________________  I certify that the above Therapy Services are being furnished while the patient is under my care. I agree with the treatment plan and certify that this therapy is necessary.     Physician's Signature:____________Date:_________TIME:________     Snow Valentine PA-C  ** Signature, Date and Time must be completed for valid certification **    Please sign and return to [facility] - [address]  [phone] fax

## 2022-08-22 ENCOUNTER — TELEPHONE (OUTPATIENT)
Dept: PHYSICAL THERAPY | Age: 67
End: 2022-08-22

## 2022-09-14 NOTE — PROGRESS NOTES
[facility] - [facility], [address] [place] [address]  [phone] fax    Discharge Summary    Patient name: Bushra Dominguez Start of Care: 8/15/2022   Referral source: Hayden Win : 1955               Medical Diagnosis: Right leg pain [M79.604]  Orthopedic aftercare for healing traumatic lower leg fracture, right, closed [S82.91XD]  Closed displaced comminuted fracture of shaft of right tibia with routine healing [S82.251D]  Payor: BLUE CROSS MEDICARE / Plan: [employer] 8141 HMO / Product Type: Managed Care Medicare /  Onset Date:22 (surgery)               Treatment Diagnosis: Right Leg/Ankle pain   Prior Hospitalization: see medical history Provider#: [id number]   Medications: Verified on Patient summary List    Comorbidities: Hx right shoulder abscess; Heart murmur; HTN; Liver disease; hx substance abuse   Prior Level of Function: Not working; lives in [facility] with two roommates; functionally independent without A.D. Visits from Start of Care: 1    Missed Visits: 2    Reporting Period : 8/15/22 to 22      Assessment/ Summary of Care: Dear / HARSHALP/ PA:  Julius Delgado PA-C, under your direction, we have been providing physical therapy for your patient Bushra Dominguez, for a diagnosis of Pain in right leg [M79.604]  Unspecified fracture of right lower leg, subsequent encounter for closed fracture with routine healing [S82.91XD]  Displaced comminuted fracture of shaft of right tibia, subsequent encounter for closed fracture with routine healing [S82.251D]. The patient was scheduled for 6 visits. They attended 1 of them and was last seen on 8/15/22. On the last visit they reported right leg pain - refer to plan of care written on 8/15/22. Due to the inability to further their care from non-attendance, we are discharging the patient from physical therapy at this time.       We appreciate the kind referral and would willingly work with this patient again, should they be able to arrange regular attendance. Your patient's health is our primary concern. Should you have any further questions or concerns, please feel free to contact me at your convenience.       RECOMMENDATIONS:  [x]Discontinue therapy: []Patient has reached or is progressing toward set goals      [x]Patient is non-compliant or has abdicated      []Due to lack of appreciable progress towards set goals    Alfredito Butler, PT 9/14/2022 2:09 PM

## 2023-01-31 DIAGNOSIS — C22.0 HEPATOCELLULAR CARCINOMA (HCC): Primary | ICD-10-CM

## 2023-02-01 DIAGNOSIS — C22.0 HEPATOCELLULAR CARCINOMA (HCC): Primary | ICD-10-CM

## 2023-02-03 DIAGNOSIS — C22.0 HEPATOCELLULAR CARCINOMA (HCC): Primary | ICD-10-CM

## 2023-02-05 DIAGNOSIS — C22.0 HEPATOCELLULAR CARCINOMA (HCC): Primary | ICD-10-CM

## 2023-02-06 DIAGNOSIS — C22.0 HEPATOCELLULAR CARCINOMA (HCC): Primary | ICD-10-CM

## 2023-02-07 DIAGNOSIS — C22.0 HEPATOCELLULAR CARCINOMA (HCC): Primary | ICD-10-CM

## 2023-02-10 ENCOUNTER — HOSPITAL ENCOUNTER (INPATIENT)
Facility: HOSPITAL | Age: 68
LOS: 7 days | Discharge: HOME HEALTH | DRG: 196 | End: 2023-02-18
Attending: HOSPITALIST | Admitting: HOSPITALIST
Payer: MEDICARE

## 2023-02-10 ENCOUNTER — APPOINTMENT (OUTPATIENT)
Dept: CT IMAGING | Age: 68
DRG: 948 | End: 2023-02-10
Payer: MEDICARE

## 2023-02-10 ENCOUNTER — APPOINTMENT (OUTPATIENT)
Dept: GENERAL RADIOLOGY | Age: 68
DRG: 948 | End: 2023-02-10
Payer: MEDICARE

## 2023-02-10 ENCOUNTER — HOSPITAL ENCOUNTER (INPATIENT)
Age: 68
LOS: 1 days | Discharge: STILL ADMITTED OR ADMINISTRATIVE | DRG: 948 | End: 2023-02-11
Attending: STUDENT IN AN ORGANIZED HEALTH CARE EDUCATION/TRAINING PROGRAM | Admitting: HOSPITALIST
Payer: MEDICARE

## 2023-02-10 ENCOUNTER — APPOINTMENT (OUTPATIENT)
Dept: NUCLEAR MEDICINE | Age: 68
DRG: 948 | End: 2023-02-10
Attending: STUDENT IN AN ORGANIZED HEALTH CARE EDUCATION/TRAINING PROGRAM
Payer: MEDICARE

## 2023-02-10 VITALS
SYSTOLIC BLOOD PRESSURE: 162 MMHG | TEMPERATURE: 97.6 F | RESPIRATION RATE: 18 BRPM | HEART RATE: 86 BPM | OXYGEN SATURATION: 95 % | DIASTOLIC BLOOD PRESSURE: 73 MMHG

## 2023-02-10 DIAGNOSIS — J18.9 MULTIFOCAL PNEUMONIA: Primary | ICD-10-CM

## 2023-02-10 DIAGNOSIS — J18.9 COMMUNITY ACQUIRED PNEUMONIA OF RIGHT MIDDLE LOBE OF LUNG: Primary | ICD-10-CM

## 2023-02-10 DIAGNOSIS — N17.9 AKI (ACUTE KIDNEY INJURY) (HCC): ICD-10-CM

## 2023-02-10 PROBLEM — Z91.148 NONCOMPLIANCE WITH MEDICATIONS: Status: ACTIVE | Noted: 2023-02-10

## 2023-02-10 PROBLEM — R79.89 POSITIVE D DIMER: Status: ACTIVE | Noted: 2023-02-10

## 2023-02-10 PROBLEM — K21.9 GERD (GASTROESOPHAGEAL REFLUX DISEASE): Status: ACTIVE | Noted: 2023-02-10

## 2023-02-10 PROBLEM — Z91.14 NONCOMPLIANCE WITH MEDICATIONS: Status: ACTIVE | Noted: 2023-02-10

## 2023-02-10 LAB
ALBUMIN SERPL-MCNC: 2.5 G/DL (ref 3.4–5)
ALBUMIN/GLOB SERPL: 0.4 (ref 0.8–1.7)
ALP SERPL-CCNC: 118 U/L (ref 45–117)
ALT SERPL-CCNC: 40 U/L (ref 16–61)
ANION GAP SERPL CALC-SCNC: 4 MMOL/L (ref 3–18)
ANION GAP SERPL CALC-SCNC: 5 MMOL/L (ref 3–18)
AST SERPL-CCNC: 114 U/L (ref 10–38)
BASOPHILS # BLD: 0 K/UL (ref 0–0.1)
BASOPHILS NFR BLD: 1 % (ref 0–2)
BILIRUB SERPL-MCNC: 1.2 MG/DL (ref 0.2–1)
BNP SERPL-MCNC: 772 PG/ML (ref 0–900)
BUN SERPL-MCNC: 23 MG/DL (ref 7–18)
BUN SERPL-MCNC: 23 MG/DL (ref 7–18)
BUN/CREAT SERPL: 13 (ref 12–20)
BUN/CREAT SERPL: 13 (ref 12–20)
CALCIUM SERPL-MCNC: 9.1 MG/DL (ref 8.5–10.1)
CALCIUM SERPL-MCNC: 9.2 MG/DL (ref 8.5–10.1)
CHLORIDE SERPL-SCNC: 100 MMOL/L (ref 100–111)
CHLORIDE SERPL-SCNC: 99 MMOL/L (ref 100–111)
CO2 SERPL-SCNC: 25 MMOL/L (ref 21–32)
CO2 SERPL-SCNC: 28 MMOL/L (ref 21–32)
CREAT SERPL-MCNC: 1.72 MG/DL (ref 0.6–1.3)
CREAT SERPL-MCNC: 1.72 MG/DL (ref 0.6–1.3)
D DIMER PPP FEU-MCNC: 1.79 UG/ML(FEU)
DIFFERENTIAL METHOD BLD: ABNORMAL
EOSINOPHIL # BLD: 0 K/UL (ref 0–0.4)
EOSINOPHIL NFR BLD: 0 % (ref 0–5)
ERYTHROCYTE [DISTWIDTH] IN BLOOD BY AUTOMATED COUNT: 15.2 % (ref 11.6–14.5)
FLUAV RNA SPEC QL NAA+PROBE: NOT DETECTED
FLUBV RNA SPEC QL NAA+PROBE: NOT DETECTED
GLOBULIN SER CALC-MCNC: 5.8 G/DL (ref 2–4)
GLUCOSE SERPL-MCNC: 71 MG/DL (ref 74–99)
GLUCOSE SERPL-MCNC: 79 MG/DL (ref 74–99)
HCT VFR BLD AUTO: 42.2 % (ref 36–48)
HGB BLD-MCNC: 14.5 G/DL (ref 13–16)
IMM GRANULOCYTES # BLD AUTO: 0 K/UL (ref 0–0.04)
IMM GRANULOCYTES NFR BLD AUTO: 1 % (ref 0–0.5)
LYMPHOCYTES # BLD: 0.9 K/UL (ref 0.9–3.6)
LYMPHOCYTES NFR BLD: 17 % (ref 21–52)
MCH RBC QN AUTO: 31.5 PG (ref 24–34)
MCHC RBC AUTO-ENTMCNC: 34.4 G/DL (ref 31–37)
MCV RBC AUTO: 91.7 FL (ref 78–100)
MONOCYTES # BLD: 0.6 K/UL (ref 0.05–1.2)
MONOCYTES NFR BLD: 11 % (ref 3–10)
NEUTS SEG # BLD: 4 K/UL (ref 1.8–8)
NEUTS SEG NFR BLD: 72 % (ref 40–73)
NRBC # BLD: 0 K/UL (ref 0–0.01)
NRBC BLD-RTO: 0 PER 100 WBC
PLATELET # BLD AUTO: 89 K/UL (ref 135–420)
PMV BLD AUTO: 12.4 FL (ref 9.2–11.8)
POTASSIUM SERPL-SCNC: 4.4 MMOL/L (ref 3.5–5.5)
POTASSIUM SERPL-SCNC: 7 MMOL/L (ref 3.5–5.5)
PROT SERPL-MCNC: 8.3 G/DL (ref 6.4–8.2)
RBC # BLD AUTO: 4.6 M/UL (ref 4.35–5.65)
SARS-COV-2 RNA RESP QL NAA+PROBE: NOT DETECTED
SODIUM SERPL-SCNC: 129 MMOL/L (ref 136–145)
SODIUM SERPL-SCNC: 132 MMOL/L (ref 136–145)
TROPONIN I SERPL HS-MCNC: 33 NG/L (ref 0–78)
WBC # BLD AUTO: 5.6 K/UL (ref 4.6–13.2)

## 2023-02-10 PROCEDURE — 84484 ASSAY OF TROPONIN QUANT: CPT

## 2023-02-10 PROCEDURE — 71275 CT ANGIOGRAPHY CHEST: CPT

## 2023-02-10 PROCEDURE — 96374 THER/PROPH/DIAG INJ IV PUSH: CPT

## 2023-02-10 PROCEDURE — 83880 ASSAY OF NATRIURETIC PEPTIDE: CPT

## 2023-02-10 PROCEDURE — 96365 THER/PROPH/DIAG IV INF INIT: CPT

## 2023-02-10 PROCEDURE — 85379 FIBRIN DEGRADATION QUANT: CPT

## 2023-02-10 PROCEDURE — 85025 COMPLETE CBC W/AUTO DIFF WBC: CPT

## 2023-02-10 PROCEDURE — A9539 TC99M PENTETATE: HCPCS

## 2023-02-10 PROCEDURE — 87636 SARSCOV2 & INF A&B AMP PRB: CPT

## 2023-02-10 PROCEDURE — 99285 EMERGENCY DEPT VISIT HI MDM: CPT

## 2023-02-10 PROCEDURE — 71046 X-RAY EXAM CHEST 2 VIEWS: CPT

## 2023-02-10 PROCEDURE — 96375 TX/PRO/DX INJ NEW DRUG ADDON: CPT

## 2023-02-10 PROCEDURE — 74011000250 HC RX REV CODE- 250

## 2023-02-10 PROCEDURE — 99223 1ST HOSP IP/OBS HIGH 75: CPT | Performed by: HOSPITALIST

## 2023-02-10 PROCEDURE — 74011000258 HC RX REV CODE- 258

## 2023-02-10 PROCEDURE — 74011000636 HC RX REV CODE- 636: Performed by: HOSPITALIST

## 2023-02-10 PROCEDURE — 9990 CHARGE CONVERSION

## 2023-02-10 PROCEDURE — 96361 HYDRATE IV INFUSION ADD-ON: CPT

## 2023-02-10 PROCEDURE — 74011250636 HC RX REV CODE- 250/636

## 2023-02-10 PROCEDURE — 80053 COMPREHEN METABOLIC PANEL: CPT

## 2023-02-10 PROCEDURE — 78582 LUNG VENTILAT&PERFUS IMAGING: CPT

## 2023-02-10 PROCEDURE — 74011636637 HC RX REV CODE- 636/637

## 2023-02-10 PROCEDURE — APPSS180 APP SPLIT SHARED TIME > 60 MINUTES

## 2023-02-10 PROCEDURE — 74011250637 HC RX REV CODE- 250/637

## 2023-02-10 PROCEDURE — 65270000029 HC RM PRIVATE

## 2023-02-10 PROCEDURE — 74011000250 HC RX REV CODE- 250: Performed by: STUDENT IN AN ORGANIZED HEALTH CARE EDUCATION/TRAINING PROGRAM

## 2023-02-10 RX ORDER — SODIUM CHLORIDE 0.9 % (FLUSH) 0.9 %
5-40 SYRINGE (ML) INJECTION AS NEEDED
Status: DISCONTINUED | OUTPATIENT
Start: 2023-02-10 | End: 2023-02-11 | Stop reason: HOSPADM

## 2023-02-10 RX ORDER — PREDNISONE 20 MG/1
60 TABLET ORAL
Status: COMPLETED | OUTPATIENT
Start: 2023-02-10 | End: 2023-02-10

## 2023-02-10 RX ORDER — LORAZEPAM 1 MG/1
1 TABLET ORAL
Status: DISCONTINUED | OUTPATIENT
Start: 2023-02-10 | End: 2023-02-11 | Stop reason: HOSPADM

## 2023-02-10 RX ORDER — POLYETHYLENE GLYCOL 3350 17 G/17G
17 POWDER, FOR SOLUTION ORAL DAILY PRN
Status: DISCONTINUED | OUTPATIENT
Start: 2023-02-10 | End: 2023-02-11 | Stop reason: HOSPADM

## 2023-02-10 RX ORDER — CALCIUM GLUCONATE 20 MG/ML
1 INJECTION, SOLUTION INTRAVENOUS ONCE
Status: DISCONTINUED | OUTPATIENT
Start: 2023-02-10 | End: 2023-02-10

## 2023-02-10 RX ORDER — LORAZEPAM 2 MG/ML
3 INJECTION INTRAMUSCULAR
Status: DISCONTINUED | OUTPATIENT
Start: 2023-02-10 | End: 2023-02-11 | Stop reason: HOSPADM

## 2023-02-10 RX ORDER — ENOXAPARIN SODIUM 100 MG/ML
40 INJECTION SUBCUTANEOUS DAILY
Status: DISCONTINUED | OUTPATIENT
Start: 2023-02-11 | End: 2023-02-10

## 2023-02-10 RX ORDER — ONDANSETRON 2 MG/ML
4 INJECTION INTRAMUSCULAR; INTRAVENOUS
Status: DISCONTINUED | OUTPATIENT
Start: 2023-02-10 | End: 2023-02-11 | Stop reason: HOSPADM

## 2023-02-10 RX ORDER — IPRATROPIUM BROMIDE AND ALBUTEROL SULFATE 2.5; .5 MG/3ML; MG/3ML
3 SOLUTION RESPIRATORY (INHALATION)
Status: DISCONTINUED | OUTPATIENT
Start: 2023-02-10 | End: 2023-02-11 | Stop reason: HOSPADM

## 2023-02-10 RX ORDER — ASPIRIN 81 MG/1
81 TABLET ORAL 2 TIMES DAILY
Status: DISCONTINUED | OUTPATIENT
Start: 2023-02-10 | End: 2023-02-11 | Stop reason: HOSPADM

## 2023-02-10 RX ORDER — LORAZEPAM 2 MG/ML
2 INJECTION INTRAMUSCULAR
Status: DISCONTINUED | OUTPATIENT
Start: 2023-02-10 | End: 2023-02-11 | Stop reason: HOSPADM

## 2023-02-10 RX ORDER — AMLODIPINE BESYLATE 10 MG/1
10 TABLET ORAL DAILY
Status: DISCONTINUED | OUTPATIENT
Start: 2023-02-11 | End: 2023-02-11 | Stop reason: HOSPADM

## 2023-02-10 RX ORDER — SODIUM CHLORIDE 0.9 % (FLUSH) 0.9 %
5-40 SYRINGE (ML) INJECTION EVERY 8 HOURS
Status: DISCONTINUED | OUTPATIENT
Start: 2023-02-10 | End: 2023-02-10

## 2023-02-10 RX ORDER — SODIUM CHLORIDE 9 MG/ML
75 INJECTION, SOLUTION INTRAVENOUS CONTINUOUS
Status: DISCONTINUED | OUTPATIENT
Start: 2023-02-10 | End: 2023-02-11 | Stop reason: HOSPADM

## 2023-02-10 RX ORDER — SODIUM CHLORIDE 0.9 % (FLUSH) 0.9 %
5-40 SYRINGE (ML) INJECTION EVERY 8 HOURS
Status: DISCONTINUED | OUTPATIENT
Start: 2023-02-10 | End: 2023-02-11 | Stop reason: HOSPADM

## 2023-02-10 RX ORDER — IPRATROPIUM BROMIDE AND ALBUTEROL SULFATE 2.5; .5 MG/3ML; MG/3ML
3 SOLUTION RESPIRATORY (INHALATION)
Status: COMPLETED | OUTPATIENT
Start: 2023-02-10 | End: 2023-02-10

## 2023-02-10 RX ORDER — LORAZEPAM 1 MG/1
2 TABLET ORAL
Status: DISCONTINUED | OUTPATIENT
Start: 2023-02-10 | End: 2023-02-11 | Stop reason: HOSPADM

## 2023-02-10 RX ORDER — BUDESONIDE 0.25 MG/2ML
250 INHALANT ORAL
Status: DISCONTINUED | OUTPATIENT
Start: 2023-02-10 | End: 2023-02-11 | Stop reason: HOSPADM

## 2023-02-10 RX ORDER — ACETAMINOPHEN 325 MG/1
650 TABLET ORAL
Status: DISCONTINUED | OUTPATIENT
Start: 2023-02-10 | End: 2023-02-11 | Stop reason: HOSPADM

## 2023-02-10 RX ORDER — SODIUM CHLORIDE 0.9 % (FLUSH) 0.9 %
5-40 SYRINGE (ML) INJECTION AS NEEDED
Status: DISCONTINUED | OUTPATIENT
Start: 2023-02-10 | End: 2023-02-10

## 2023-02-10 RX ORDER — FUROSEMIDE 10 MG/ML
40 INJECTION INTRAMUSCULAR; INTRAVENOUS ONCE
Status: DISCONTINUED | OUTPATIENT
Start: 2023-02-10 | End: 2023-02-10

## 2023-02-10 RX ORDER — DEXTROSE MONOHYDRATE 100 MG/ML
250 INJECTION, SOLUTION INTRAVENOUS ONCE
Status: DISCONTINUED | OUTPATIENT
Start: 2023-02-10 | End: 2023-02-10

## 2023-02-10 RX ORDER — ARFORMOTEROL TARTRATE 15 UG/2ML
15 SOLUTION RESPIRATORY (INHALATION)
Status: DISCONTINUED | OUTPATIENT
Start: 2023-02-10 | End: 2023-02-11 | Stop reason: HOSPADM

## 2023-02-10 RX ORDER — ONDANSETRON 4 MG/1
4 TABLET, ORALLY DISINTEGRATING ORAL
Status: DISCONTINUED | OUTPATIENT
Start: 2023-02-10 | End: 2023-02-11 | Stop reason: HOSPADM

## 2023-02-10 RX ORDER — LORAZEPAM 2 MG/ML
1 INJECTION INTRAMUSCULAR
Status: DISCONTINUED | OUTPATIENT
Start: 2023-02-10 | End: 2023-02-11 | Stop reason: HOSPADM

## 2023-02-10 RX ORDER — ACETAMINOPHEN 650 MG/1
650 SUPPOSITORY RECTAL
Status: DISCONTINUED | OUTPATIENT
Start: 2023-02-10 | End: 2023-02-11 | Stop reason: HOSPADM

## 2023-02-10 RX ORDER — HYDRALAZINE HYDROCHLORIDE 20 MG/ML
10 INJECTION INTRAMUSCULAR; INTRAVENOUS
Status: DISCONTINUED | OUTPATIENT
Start: 2023-02-10 | End: 2023-02-11 | Stop reason: HOSPADM

## 2023-02-10 RX ORDER — PANTOPRAZOLE SODIUM 40 MG/1
40 TABLET, DELAYED RELEASE ORAL
Status: DISCONTINUED | OUTPATIENT
Start: 2023-02-11 | End: 2023-02-11 | Stop reason: HOSPADM

## 2023-02-10 RX ORDER — AMOXICILLIN 250 MG
1 CAPSULE ORAL DAILY
Status: DISCONTINUED | OUTPATIENT
Start: 2023-02-10 | End: 2023-02-11 | Stop reason: HOSPADM

## 2023-02-10 RX ADMIN — SODIUM CHLORIDE 1000 ML: 900 INJECTION, SOLUTION INTRAVENOUS at 15:38

## 2023-02-10 RX ADMIN — SODIUM CHLORIDE 75 ML/HR: 9 INJECTION, SOLUTION INTRAVENOUS at 19:33

## 2023-02-10 RX ADMIN — ARFORMOTEROL TARTRATE 15 MCG: 15 SOLUTION RESPIRATORY (INHALATION) at 19:31

## 2023-02-10 RX ADMIN — IPRATROPIUM BROMIDE AND ALBUTEROL SULFATE 3 ML: 2.5; .5 SOLUTION RESPIRATORY (INHALATION) at 19:31

## 2023-02-10 RX ADMIN — SODIUM CHLORIDE, PRESERVATIVE FREE 10 ML: 5 INJECTION INTRAVENOUS at 16:58

## 2023-02-10 RX ADMIN — IOPAMIDOL 72 ML: 755 INJECTION, SOLUTION INTRAVENOUS at 23:06

## 2023-02-10 RX ADMIN — DOXYCYCLINE 100 MG: 100 INJECTION, POWDER, LYOPHILIZED, FOR SOLUTION INTRAVENOUS at 17:07

## 2023-02-10 RX ADMIN — CEFTRIAXONE 1 G: 1 INJECTION, POWDER, FOR SOLUTION INTRAMUSCULAR; INTRAVENOUS at 17:07

## 2023-02-10 RX ADMIN — ASPIRIN 81 MG: 81 TABLET ORAL at 20:25

## 2023-02-10 RX ADMIN — IPRATROPIUM BROMIDE AND ALBUTEROL SULFATE 3 ML: .5; 2.5 SOLUTION RESPIRATORY (INHALATION) at 14:37

## 2023-02-10 RX ADMIN — PREDNISONE 60 MG: 20 TABLET ORAL at 14:37

## 2023-02-10 RX ADMIN — BUDESONIDE 250 MCG: 0.25 SUSPENSION RESPIRATORY (INHALATION) at 19:31

## 2023-02-10 NOTE — ED PROVIDER NOTES
EMERGENCY DEPARTMENT HISTORY AND PHYSICAL EXAM    2:03 PM    Date: 2/10/2023  Patient Name: Ellis Soares    History of Presenting Illness     Chief Complaint   Patient presents with    Flu Like Symptoms    Shortness of Breath       History Provided By: Patient  78-year-old male long smoking history, hypertension, presenting to the ER with flulike symptoms and shortness of breath. He reports 4 days of body aches, rhinorrhea, sore throat, cough, with worsening shortness of breath. He also endorses chills. Denies fevers he denies chest pain. Denies abdominal pain, nausea, vomiting. He denies inhaler use or history of COPD/asthma.         PCP: Eileen Lane MD    Current Facility-Administered Medications   Medication Dose Route Frequency Provider Last Rate Last Admin    budesonide (PULMICORT) 250 mcg/2ml nebulizer susp  250 mcg Nebulization BID RT Sammi Goss NP   250 mcg at 02/10/23 1931    arformoteroL (BROVANA) neb solution 15 mcg  15 mcg Nebulization BID RT Sammi Goss NP   15 mcg at 02/10/23 1931    albuterol-ipratropium (DUO-NEB) 2.5 MG-0.5 MG/3 ML  3 mL Nebulization Q4H RT Sammi Goss NP   3 mL at 02/10/23 1931    hydrALAZINE (APRESOLINE) 20 mg/mL injection 10 mg  10 mg IntraVENous Q6H PRN EDER Personman ON 2/11/2023] amLODIPine (NORVASC) tablet 10 mg  10 mg Oral DAILY Sammi Goss NP        aspirin delayed-release tablet 81 mg  81 mg Oral BID Sammi Goss NP   81 mg at 02/10/23 2025    [START ON 2/11/2023] pantoprazole (PROTONIX) tablet 40 mg  40 mg Oral ACB Sammi Goss NP        sodium chloride (NS) flush 5-40 mL  5-40 mL IntraVENous Q8H Sammi Goss NP        sodium chloride (NS) flush 5-40 mL  5-40 mL IntraVENous PRN Sammi Goss NP        acetaminophen (TYLENOL) tablet 650 mg  650 mg Oral Q6H PRN Sammi Goss NP        Or    acetaminophen (TYLENOL) suppository 650 mg  650 mg Rectal Q6H PRN Sammi Goss NP        polyethylene glycol (MIRALAX) packet 17 g  17 g Oral DAILY PRN Luci Rodriguez, NP        ondansetron (ZOFRAN ODT) tablet 4 mg  4 mg Oral Q8H PRN Norbertoe Seals, NP        Or    ondansetron [facility]) injection 4 mg  4 mg IntraVENous Q6H PRN Norbertoe Lauros, NP        0.9% sodium chloride infusion  75 mL/hr IntraVENous CONTINUOUS Norbertoe Lauros, NP 75 mL/hr at 02/10/23 1933 75 mL/hr at 02/10/23 1933    sodium chloride (NS) flush 5-40 mL  5-40 mL IntraVENous Q8H Norbertoe Seals, NP        sodium chloride (NS) flush 5-40 mL  5-40 mL IntraVENous PRN Norbertoe Seals, NP        LORazepam (ATIVAN) tablet 1 mg  1 mg Oral Q1H PRN Donnise Seals, NP        Or    LORazepam (ATIVAN) injection 1 mg  1 mg IntraVENous Q1H PRN Norbertoe Seals, NP        LORazepam (ATIVAN) tablet 2 mg  2 mg Oral Q1H PRN Donnise Seals, NP        Or    LORazepam (ATIVAN) injection 2 mg  2 mg IntraVENous Q1H PRN Donnise Seals, NP        LORazepam (ATIVAN) injection 3 mg  3 mg IntraVENous Q15MIN PRN Norbertoe Seals, EDER        . Please enter patient's Height  and Weight for drug/monitoring purposes. Thank you. 1 Each Other Teofilo Nation, EDER        senna-docusate (PERICOLACE) 8.6-50 mg per tablet 1 Tablet  1 Tablet Oral DAILY EDER Mullins ON 2/11/2023] cefTRIAXone (ROCEPHIN) 2 g in sterile water (preservative free) 20 mL IV syringe  2 g IntraVENous Q24H EDER Mullins ON 2/11/2023] doxycycline (VIBRAMYCIN) 100 mg in 0.9% sodium chloride (MBP/ADV) 100 mL MBP  100 mg IntraVENous Q12H Luci Rodriguez, EDER         Current Outpatient Medications   Medication Sig Dispense Refill    ibuprofen (MOTRIN) 800 mg tablet Take 800 mg by mouth every eight (8) hours as needed for Pain. telmisartan (MICARDIS) 20 mg tablet Take 1 Tablet by mouth daily.  Indications: high blood pressure 30 Tablet 0    acetaminophen (TYLENOL) 325 mg tablet Take 2 Tablets by mouth every four (4) hours as needed (for pain level 4/10 or lesser (to be given between 8:00PM and 4:00AM only)). Indications: fever, pain 30 Tablet 0    pregabalin (LYRICA) 75 mg capsule Take 1 Capsule by mouth three (3) times daily. Max Daily Amount: 225 mg. Indications: acute pain following an operation 90 Capsule 0    pantoprazole (PROTONIX) 40 mg tablet Take 1 Tablet by mouth Daily (before breakfast). Indications: gastroesophageal reflux disease 30 Tablet 0    melatonin 3 mg tablet Take 1 Tablet by mouth daily (after dinner). Indications: Difficulty sleeping 30 Tablet 0    amLODIPine (NORVASC) 10 mg tablet Take 1 Tablet by mouth daily. 15 Tablet 0    aspirin delayed-release 81 mg tablet Take 1 Tablet by mouth two (2) times a day. 30 Tablet 0    ferrous sulfate 325 mg (65 mg iron) tablet Take 1 Tablet by mouth two (2) times daily (with meals). 30 Tablet 0    folic acid (FOLVITE) 1 mg tablet Take 1 Tablet by mouth daily. 15 Tablet 0    polyethylene glycol (MIRALAX) 17 gram packet Take 1 Packet by mouth daily. 30 Packet 0    therapeutic multivitamin (THERAGRAN) tablet Take 1 Tablet by mouth daily. 30 Tablet 0    thiamine HCL (B-1) 100 mg tablet Take 1 Tablet by mouth daily. 15 Tablet 0    methadone (DOLOPHINE) 10 mg tablet Take 45 mg by mouth daily.          Past History     Past Medical History:  Past Medical History:   Diagnosis Date    Abscess of right shoulder     Abscess of shoulder     Acute blood loss as cause of postoperative anemia 4/22/2022    Arthritis     Closed displaced comminuted fracture of shaft of right tibia with routine healing 04/22/2022    Closed fracture of distal end of right fibula with routine healing 4/22/2022    Epidural abscess     GERD (gastroesophageal reflux disease) 2/10/2023    Heart murmur     Hematuria     Heroin abuse ([facility] [address]     Hypertension     Infected wound     Infectious disease     Iron deficiency anemia     IV drug user     Liver disease     Methadone dependence ([facility] [address]     Tobacco abuse        Past Surgical History:  Past Surgical History: Procedure Laterality Date    HX COLONOSCOPY  07/18/2016    HX CYST REMOVAL      rt.forearm and rt.foot    HX ENDOSCOPY  07/18/2016    HX FRACTURE TX Right 04/22/2022    S/P Closed IM nailing of the right tibia using the Synthes IM nail system with a double lock proximally and triple lock distally (4/22/2022 - Dr. Lauren Zamorano)    HX FREE SKIN GRAFT      HX ORTHOPAEDIC      HX ORTHOPAEDIC Right 04/23/2022    Closed Fracture of Right Tibia     HX OTHER SURGICAL      right forearm infections and graft       Family History:  No family history on file. Social History:  Social History     Tobacco Use    Smoking status: Every Day     Packs/day: 0.50     Years: 38.00     Pack years: 19.00     Types: Cigarettes    Smokeless tobacco: Never   Vaping Use    Vaping Use: Never used   Substance Use Topics    Alcohol use: Yes     Alcohol/week: 4.0 standard drinks     Types: 4 Cans of beer per week    Drug use: Yes     Types: Heroin     Comment: last dose 2/15       Allergies:  No Known Allergies    Review of Systems       Review of Systems   All other systems reviewed and are negative. Physical Exam   Visit Vitals  BP (!) 170/78   Pulse 74   Temp 97.7 °F (36.5 °C)   Resp 18   SpO2 94%         Physical Exam  Vitals (Hypoxic to the 70s on room air, 98 to 95% on 3 L nasal cannula) and nursing note reviewed. Constitutional:       General: He is not in acute distress. Appearance: He is well-developed and normal weight. HENT:      Head: Normocephalic and atraumatic. Mouth/Throat:      Mouth: Mucous membranes are moist.      Pharynx: Oropharynx is clear. No pharyngeal swelling or oropharyngeal exudate. Eyes:      Extraocular Movements: Extraocular movements intact. Pupils: Pupils are equal, round, and reactive to light. Neck:      Thyroid: No thyromegaly. Vascular: No JVD. Trachea: No tracheal deviation. Cardiovascular:      Rate and Rhythm: Normal rate and regular rhythm.       Pulses: Radial pulses are 2+ on the right side and 2+ on the left side. Heart sounds: Normal heart sounds. No murmur heard. No systolic murmur is present. No diastolic murmur is present. No gallop. No S3 sounds. Pulmonary:      Effort: Pulmonary effort is normal. No accessory muscle usage or respiratory distress. Breath sounds: Examination of the right-upper field reveals decreased breath sounds. Examination of the left-upper field reveals decreased breath sounds. Examination of the right-middle field reveals wheezing, rhonchi and rales. Examination of the left-middle field reveals wheezing. Examination of the right-lower field reveals wheezing, rhonchi and rales. Examination of the left-lower field reveals wheezing. Decreased breath sounds, wheezing, rhonchi and rales present. Comments: Mild expiratory wheezes, rhonchi and rales present posteriorly posteriorly on the right side  Chest:      Chest wall: No tenderness or crepitus. Abdominal:      Palpations: Abdomen is soft. Tenderness: There is no abdominal tenderness. There is no guarding or rebound. Musculoskeletal:         General: Normal range of motion. Cervical back: Normal range of motion and neck supple. Right lower leg: No edema. Left lower leg: No edema. Skin:     General: Skin is warm and dry. Capillary Refill: Capillary refill takes less than 2 seconds. Findings: No rash. Nails: There is no clubbing. Neurological:      General: No focal deficit present. Mental Status: He is alert and oriented to person, place, and time. Cranial Nerves: No cranial nerve deficit. Motor: No weakness.    Psychiatric:         Mood and Affect: Mood normal.         Behavior: Behavior normal.       Diagnostic Study Results     Labs -  Recent Results (from the past 12 hour(s))   COVID-19 WITH INFLUENZA A/B    Collection Time: 02/10/23  1:44 PM   Result Value Ref Range    SARS-CoV-2 by PCR Not detected NOTD Influenza A by PCR Not detected NOTD      Influenza B by PCR Not detected NOTD     CBC WITH AUTOMATED DIFF    Collection Time: 02/10/23  2:32 PM   Result Value Ref Range    WBC 5.6 4.6 - 13.2 K/uL    RBC 4.60 4.35 - 5.65 M/uL    HGB 14.5 13.0 - 16.0 g/dL    HCT 42.2 36.0 - 48.0 %    MCV 91.7 78.0 - 100.0 FL    MCH 31.5 24.0 - 34.0 PG    MCHC 34.4 31.0 - 37.0 g/dL    RDW 15.2 (H) 11.6 - 14.5 %    PLATELET 89 (L) 669 - 420 K/uL    MPV 12.4 (H) 9.2 - 11.8 FL    NRBC 0.0 0  WBC    ABSOLUTE NRBC 0.00 0.00 - 0.01 K/uL    NEUTROPHILS 72 40 - 73 %    LYMPHOCYTES 17 (L) 21 - 52 %    MONOCYTES 11 (H) 3 - 10 %    EOSINOPHILS 0 0 - 5 %    BASOPHILS 1 0 - 2 %    IMMATURE GRANULOCYTES 1 (H) 0.0 - 0.5 %    ABS. NEUTROPHILS 4.0 1.8 - 8.0 K/UL    ABS. LYMPHOCYTES 0.9 0.9 - 3.6 K/UL    ABS. MONOCYTES 0.6 0.05 - 1.2 K/UL    ABS. EOSINOPHILS 0.0 0.0 - 0.4 K/UL    ABS. BASOPHILS 0.0 0.0 - 0.1 K/UL    ABS. IMM.  GRANS. 0.0 0.00 - 0.04 K/UL    DF AUTOMATED     METABOLIC PANEL, BASIC    Collection Time: 02/10/23  2:32 PM   Result Value Ref Range    Sodium 129 (L) 136 - 145 mmol/L    Potassium 7.0 (HH) 3.5 - 5.5 mmol/L    Chloride 100 100 - 111 mmol/L    CO2 25 21 - 32 mmol/L    Anion gap 4 3.0 - 18 mmol/L    Glucose 79 74 - 99 mg/dL    BUN 23 (H) 7.0 - 18 MG/DL    Creatinine 1.72 (H) 0.6 - 1.3 MG/DL    BUN/Creatinine ratio 13 12 - 20      eGFR 42 (L) >60 ml/min/1.73m2    Calcium 9.1 8.5 - 10.1 MG/DL   TROPONIN-HIGH SENSITIVITY    Collection Time: 02/10/23  2:32 PM   Result Value Ref Range    Troponin-High Sensitivity 33 0 - 78 ng/L   NT-PRO BNP    Collection Time: 02/10/23  2:32 PM   Result Value Ref Range    NT pro- 0 - 900 PG/ML   D DIMER    Collection Time: 02/10/23  2:32 PM   Result Value Ref Range    D DIMER 1.79 (H) <0.46 ug/ml(FEU)   METABOLIC PANEL, COMPREHENSIVE    Collection Time: 02/10/23  4:18 PM   Result Value Ref Range    Sodium 132 (L) 136 - 145 mmol/L    Potassium 4.4 3.5 - 5.5 mmol/L    Chloride 99 (L) 100 - 111 mmol/L    CO2 28 21 - 32 mmol/L    Anion gap 5 3.0 - 18 mmol/L    Glucose 71 (L) 74 - 99 mg/dL    BUN 23 (H) 7.0 - 18 MG/DL    Creatinine 1.72 (H) 0.6 - 1.3 MG/DL    BUN/Creatinine ratio 13 12 - 20      eGFR 42 (L) >60 ml/min/1.73m2    Calcium 9.2 8.5 - 10.1 MG/DL    Bilirubin, total 1.2 (H) 0.2 - 1.0 MG/DL    ALT (SGPT) 40 16 - 61 U/L    AST (SGOT) 114 (H) 10 - 38 U/L    Alk. phosphatase 118 (H) 45 - 117 U/L    Protein, total 8.3 (H) 6.4 - 8.2 g/dL    Albumin 2.5 (L) 3.4 - 5.0 g/dL    Globulin 5.8 (H) 2.0 - 4.0 g/dL    A-G Ratio 0.4 (L) 0.8 - 1.7         Radiologic Studies -   NM LUNG PERFUSION W VENT   Final Result      Intermediate to high probability VQ scan. XR CHEST PA LAT   Final Result   Multifocal interstitial and airspace opacities, greatest in the right middle   lobe. Findings likely represent multifocal pneumonia. Medical Decision Making   I am the first provider for this patient. I reviewed the vital signs, available nursing notes, past medical history, past surgical history, family history and social history. Vital Signs-Reviewed the patient's vital signs.     EKG:   EKG as read by me shows normal sinus rhythm 67 bpm, , QTc 439, left anterior fascicular block, LVH with early repolarization across the precordium      Records Reviewed: Prior medical records, Previous Radiology studies, Previous Laboratory studies, Previous EKGs, and Nursing notes (Time of Review: 2:03 PM)    ED Course: Progress Notes, Reevaluation, and Consults:    ED Course as of 02/10/23 2104   Fri Feb 10, 2023   1506 CBC unremarkable for leukocytosis, no anemia, of note thrombocytopenia with platelet count 89, appears chronic and at patient's baseline [LC]   6021 Spoke with Dr. Kristine Fish met the patient for pneumonia and EMBER [LC]      ED Course User Index  [LC] Barbara Lee DO       Provider Notes (Medical Decision Making):   MDM  Number of Diagnoses or Management Options  Acute blood loss as cause of postoperative anemia  Closed displaced comminuted fracture of shaft of right tibia with routine healing  Gross hematuria  Open nondisplaced fracture of metatarsal bone, unspecified laterality, unspecified metatarsal, initial encounter  Polysubstance abuse ([facility] [address]  Diagnosis management comments: 77-year-old male presenting with cough, shortness of breath, upper respiratory tract symptoms. Differential diagnoses include pneumonia, PE, ACS, upper respiratory tract infection, pneumothorax, acute bronchitis, among others. Chest x-ray concerning for multifocal pneumonia in the right lung. No leukocytosis. Metabolic panel showed new EMBER. Potassium was originally found to be elevated at 7.0, however repeat BMP showed potassium 4.4. Given new oxygen requirement, shortness of breath, proceeded with CTA but unable to perform due to EMBER. Patient ultimately admitted for pneumonia pending VQ scan for PE. Procedures    Critical Care Time: None    Diagnosis     Clinical Impression:   1. Community acquired pneumonia of right middle lobe of lung    2. EMBER (acute kidney injury) ([facility] [address]        Disposition: Admission for IV antibiotics, further evaluation. Follow-up Information    None          Patient's Medications   Start Taking    No medications on file   Continue Taking    ACETAMINOPHEN (TYLENOL) 325 MG TABLET    Take 2 Tablets by mouth every four (4) hours as needed (for pain level 4/10 or lesser (to be given between 8:00PM and 4:00AM only)). Indications: fever, pain    AMLODIPINE (NORVASC) 10 MG TABLET    Take 1 Tablet by mouth daily. ASPIRIN DELAYED-RELEASE 81 MG TABLET    Take 1 Tablet by mouth two (2) times a day. FERROUS SULFATE 325 MG (65 MG IRON) TABLET    Take 1 Tablet by mouth two (2) times daily (with meals). FOLIC ACID (FOLVITE) 1 MG TABLET    Take 1 Tablet by mouth daily. IBUPROFEN (MOTRIN) 800 MG TABLET    Take 800 mg by mouth every eight (8) hours as needed for Pain.     MELATONIN 3 MG TABLET    Take 1 Tablet by mouth daily (after dinner). Indications: Difficulty sleeping    METHADONE (DOLOPHINE) 10 MG TABLET    Take 45 mg by mouth daily. PANTOPRAZOLE (PROTONIX) 40 MG TABLET    Take 1 Tablet by mouth Daily (before breakfast). Indications: gastroesophageal reflux disease    POLYETHYLENE GLYCOL (MIRALAX) 17 GRAM PACKET    Take 1 Packet by mouth daily. PREGABALIN (LYRICA) 75 MG CAPSULE    Take 1 Capsule by mouth three (3) times daily. Max Daily Amount: 225 mg. Indications: acute pain following an operation    TELMISARTAN (MICARDIS) 20 MG TABLET    Take 1 Tablet by mouth daily. Indications: high blood pressure    THERAPEUTIC MULTIVITAMIN (THERAGRAN) TABLET    Take 1 Tablet by mouth daily. THIAMINE HCL (B-1) 100 MG TABLET    Take 1 Tablet by mouth daily. These Medications have changed    No medications on file   Stop Taking    No medications on file         The patient was personally evaluated by myself and Dr. Nain Hayes who agrees with the above assessment and plan. Jerilyn Osler,   [facility]  February 10, 2023    My signature above authenticates this document and my orders, the final    diagnosis (es), discharge prescription (s), and instructions in the Epic    record. If you have any questions please contact [phone]. Nursing notes have been reviewed by the physician/ advanced practice    Clinician. Disclaimer: Sections of this note are dictated using utilizing voice recognition software. Minor typographical errors may be present. If questions arise, please do not hesitate to contact me or call our department.

## 2023-02-10 NOTE — H&P
History and Physical          Subjective     HPI: Ed Fany is a 69 y.o. male with a PMHx of heroin abuse, HTN, Methadone dependence who presented to the ED with complaints of fatigue, fever, chills, intermittent SOB, cough, decreased appetite since 3 days. States he cough up thick mucus mostly green in color. Has not had much appetite since 3 days, first meal was today at the hospital in 3 days. Last BM was 3 days ago. Patient was seen at nuclear medicine waiting area by me, patient not in any acute distress, on room air, but states he has conversational dyspnea. Medication non compliance. States he does not take any medications at home besides methadone. Denies chest pain, nvd, abdominal pain, headache, dizziness. Admits to smoking cigarettes daily, drinks 'few' beers daily, denies illicit drug use. In the ED, Temp 97.7, HR 74, /83 - 170/78, Resp 18-20, Oxygen 79% but came up to 91-92% on 6L NC. CBC without significant abnormality except Plt 89. D-Dimer 1.79. Na 132, Cr 1.72. Trop 33. proBNP 772. Flu and COVID negative. CXR consistent with multifocal pneumonia. VQ scan pending. Received duoneb, ceftriaxone, doxy, prednisone, 1L NS bolus in the ED. Medication list could not be verified as patient does not take anything at home.   Code status discussed- FULL code     PMHx:  Past Medical History:   Diagnosis Date    Abscess of right shoulder     Abscess of shoulder     Acute blood loss as cause of postoperative anemia 4/22/2022    Arthritis     Closed displaced comminuted fracture of shaft of right tibia with routine healing 04/22/2022    Closed fracture of distal end of right fibula with routine healing 4/22/2022    Epidural abscess     Heart murmur     Hematuria     Heroin abuse ([address]     Hypertension     Infected wound     Infectious disease     Iron deficiency anemia     IV drug user     Liver disease     Methadone dependence ([address]     Tobacco abuse        PSurgHx:  Past Surgical History:   Procedure Laterality Date    HX COLONOSCOPY  07/18/2016    HX CYST REMOVAL      rt.forearm and rt.foot    HX ENDOSCOPY  07/18/2016    HX FRACTURE TX Right 04/22/2022    S/P Closed IM nailing of the right tibia using the Synthes IM nail system with a double lock proximally and triple lock distally (4/22/2022 - Dr. Ty Gomez)    HX FREE SKIN GRAFT      HX ORTHOPAEDIC      HX ORTHOPAEDIC Right 04/23/2022    Closed Fracture of Right Tibia     HX OTHER SURGICAL      right forearm infections and graft       SocialHx:  Social History     Socioeconomic History    Marital status:    Tobacco Use    Smoking status: Every Day     Packs/day: 0.50     Years: 38.00     Pack years: 19.00     Types: Cigarettes    Smokeless tobacco: Never   Vaping Use    Vaping Use: Never used   Substance and Sexual Activity    Alcohol use: Yes     Alcohol/week: 4.0 standard drinks     Types: 4 Cans of beer per week    Drug use: Yes     Types: Heroin     Comment: last dose 2/15    Sexual activity: Yes   Social History Narrative    ** Merged History Encounter **            FamilyHx:  No family history on file. Home Medications:  Prior to Admission Medications   Prescriptions Last Dose Informant Patient Reported? Taking?   acetaminophen (TYLENOL) 325 mg tablet   No No   Sig: Take 2 Tablets by mouth every four (4) hours as needed (for pain level 4/10 or lesser (to be given between 8:00PM and 4:00AM only)). Indications: fever, pain   amLODIPine (NORVASC) 10 mg tablet   No No   Sig: Take 1 Tablet by mouth daily. aspirin delayed-release 81 mg tablet   No No   Sig: Take 1 Tablet by mouth two (2) times a day. ferrous sulfate 325 mg (65 mg iron) tablet   No No   Sig: Take 1 Tablet by mouth two (2) times daily (with meals). folic acid (FOLVITE) 1 mg tablet   No No   Sig: Take 1 Tablet by mouth daily.    ibuprofen (MOTRIN) 800 mg tablet   Yes No   Sig: Take 800 mg by mouth every eight (8) hours as needed for Pain.   melatonin 3 mg tablet   No No   Sig: Take 1 Tablet by mouth daily (after dinner). Indications: Difficulty sleeping   methadone (DOLOPHINE) 10 mg tablet   Yes No   Sig: Take 45 mg by mouth daily. pantoprazole (PROTONIX) 40 mg tablet   No No   Sig: Take 1 Tablet by mouth Daily (before breakfast). Indications: gastroesophageal reflux disease   polyethylene glycol (MIRALAX) 17 gram packet   No No   Sig: Take 1 Packet by mouth daily. pregabalin (LYRICA) 75 mg capsule   No No   Sig: Take 1 Capsule by mouth three (3) times daily. Max Daily Amount: 225 mg. Indications: acute pain following an operation   telmisartan (MICARDIS) 20 mg tablet   No No   Sig: Take 1 Tablet by mouth daily. Indications: high blood pressure   therapeutic multivitamin (THERAGRAN) tablet   No No   Sig: Take 1 Tablet by mouth daily. thiamine HCL (B-1) 100 mg tablet   No No   Sig: Take 1 Tablet by mouth daily. Facility-Administered Medications: None       Allergies:  No Known Allergies     Review of Systems:  CONST: + fever + chills, + fatigue  Eyes: No change in vision, no itching or drainage  ENT: No earache, no tinnitus, no sore throat or sinus congestion. PULM: + shortness of breath, + cough no wheeze. CV: no pnd or orthopnea, no CP, no palpitations, no edema  GI: No abdominal pain, no nausea, no vomiting or diarrhea  : No urinary frequency, no urgency, no hesitancy or dysuria. MSK: No joint or muscle pain, no back pain, no neck pain, no recent trauma. INTEG: No rash, no itching, no lesions. ENDO: No polyuria, no polydipsia, no heat or cold intolerance. HEME: No anemia or easy bruising or bleeding. NEURO: No headache, no dizziness, no seizures, no numbness, no tingling or weakness.    PSYCH: No anxiety, no depression      Objective     Physical Exam:  Visit Vitals  /83 (BP 1 Location: Left upper arm, BP Patient Position: At rest)   Pulse 76   Temp 97.7 °F (36.5 °C)   Resp 20   SpO2 92%       General: NAD, appears stated age, alert  Skin: warm, dry, no rashes  Eyes: PERRL, sclera is non-icteric  HENT: normocephalic/atraumatic, moist mucus membranes  Respiratory: CTA with no signs of respiratory distress  Cardiovascular: RRR, no m/r/g, no cyanosis + 1 peripheral edema of BLE with calf tightness and warm to touch   GI: soft, non-tender, normal bowel sounds  Neuro: moves all extremities, no focal deficits, normal speech  Psych: appropriate mood and affect, no visual or auditory hallucinations    Laboratory Studies:  Recent Results (from the past 24 hour(s))   COVID-19 WITH INFLUENZA A/B    Collection Time: 02/10/23  1:44 PM   Result Value Ref Range    SARS-CoV-2 by PCR Not detected NOTD      Influenza A by PCR Not detected NOTD      Influenza B by PCR Not detected NOTD     CBC WITH AUTOMATED DIFF    Collection Time: 02/10/23  2:32 PM   Result Value Ref Range    WBC 5.6 4.6 - 13.2 K/uL    RBC 4.60 4.35 - 5.65 M/uL    HGB 14.5 13.0 - 16.0 g/dL    HCT 42.2 36.0 - 48.0 %    MCV 91.7 78.0 - 100.0 FL    MCH 31.5 24.0 - 34.0 PG    MCHC 34.4 31.0 - 37.0 g/dL    RDW 15.2 (H) 11.6 - 14.5 %    PLATELET 89 (L) 481 - 420 K/uL    MPV 12.4 (H) 9.2 - 11.8 FL    NRBC 0.0 0  WBC    ABSOLUTE NRBC 0.00 0.00 - 0.01 K/uL    NEUTROPHILS 72 40 - 73 %    LYMPHOCYTES 17 (L) 21 - 52 %    MONOCYTES 11 (H) 3 - 10 %    EOSINOPHILS 0 0 - 5 %    BASOPHILS 1 0 - 2 %    IMMATURE GRANULOCYTES 1 (H) 0.0 - 0.5 %    ABS. NEUTROPHILS 4.0 1.8 - 8.0 K/UL    ABS. LYMPHOCYTES 0.9 0.9 - 3.6 K/UL    ABS. MONOCYTES 0.6 0.05 - 1.2 K/UL    ABS. EOSINOPHILS 0.0 0.0 - 0.4 K/UL    ABS. BASOPHILS 0.0 0.0 - 0.1 K/UL    ABS. IMM.  GRANS. 0.0 0.00 - 0.04 K/UL    DF AUTOMATED     METABOLIC PANEL, BASIC    Collection Time: 02/10/23  2:32 PM   Result Value Ref Range    Sodium 129 (L) 136 - 145 mmol/L    Potassium 7.0 (HH) 3.5 - 5.5 mmol/L    Chloride 100 100 - 111 mmol/L    CO2 25 21 - 32 mmol/L    Anion gap 4 3.0 - 18 mmol/L    Glucose 79 74 - 99 mg/dL    BUN 23 (H) 7.0 - 18 MG/DL Creatinine 1.72 (H) 0.6 - 1.3 MG/DL    BUN/Creatinine ratio 13 12 - 20      eGFR 42 (L) >60 ml/min/1.73m2    Calcium 9.1 8.5 - 10.1 MG/DL   TROPONIN-HIGH SENSITIVITY    Collection Time: 02/10/23  2:32 PM   Result Value Ref Range    Troponin-High Sensitivity 33 0 - 78 ng/L   NT-PRO BNP    Collection Time: 02/10/23  2:32 PM   Result Value Ref Range    NT pro- 0 - 900 PG/ML   D DIMER    Collection Time: 02/10/23  2:32 PM   Result Value Ref Range    D DIMER 1.79 (H) <0.46 ug/ml(FEU)   METABOLIC PANEL, COMPREHENSIVE    Collection Time: 02/10/23  4:18 PM   Result Value Ref Range    Sodium 132 (L) 136 - 145 mmol/L    Potassium 4.4 3.5 - 5.5 mmol/L    Chloride 99 (L) 100 - 111 mmol/L    CO2 28 21 - 32 mmol/L    Anion gap 5 3.0 - 18 mmol/L    Glucose 71 (L) 74 - 99 mg/dL    BUN 23 (H) 7.0 - 18 MG/DL    Creatinine 1.72 (H) 0.6 - 1.3 MG/DL    BUN/Creatinine ratio 13 12 - 20      eGFR 42 (L) >60 ml/min/1.73m2    Calcium 9.2 8.5 - 10.1 MG/DL    Bilirubin, total 1.2 (H) 0.2 - 1.0 MG/DL    ALT (SGPT) 40 16 - 61 U/L    AST (SGOT) 114 (H) 10 - 38 U/L    Alk. phosphatase 118 (H) 45 - 117 U/L    Protein, total 8.3 (H) 6.4 - 8.2 g/dL    Albumin 2.5 (L) 3.4 - 5.0 g/dL    Globulin 5.8 (H) 2.0 - 4.0 g/dL    A-G Ratio 0.4 (L) 0.8 - 1.7         Imaging Reviewed:  XR CHEST PA LAT    Result Date: 2/10/2023  EXAM:  XR CHEST PA LAT INDICATION:   sob COMPARISON: Chest radiograph April 29, 2022. FINDINGS: Multifocal interstitial and airspace opacities, greatest in the right middle lobe. No pneumothorax or pleural effusion. Calcified aortic arch. Cardiomediastinal contours are otherwise unremarkable. No acute osseous findings. Multifocal interstitial and airspace opacities, greatest in the right middle lobe. Findings likely represent multifocal pneumonia.           Assessment/Plan     Hospital Problems  Date Reviewed: 6/30/2022            Codes Class Noted POA    Multifocal pneumonia ICD-10-CM: J18.9  ICD-9-CM: 139  2/10/2023 Unknown EMBER (acute kidney injury) ([facility] ICD-10-CM: N17.9  ICD-9-CM: 584.9  2/10/2023 Unknown        GERD (gastroesophageal reflux disease) ICD-10-CM: K21.9  ICD-9-CM: 530.81  2/10/2023 Yes        Positive D dimer ICD-10-CM: R79.89  ICD-9-CM: 790.92  2/10/2023 Yes        Noncompliance with medications ICD-10-CM: Z91.14  ICD-9-CM: V15.81  2/10/2023 Yes        Methadone dependence (HCC) (Chronic) ICD-10-CM: F11.20  ICD-9-CM: 304.00  Unknown Yes        Thrombocytopenia ([facility] ICD-10-CM: D69.6  ICD-9-CM: 287.5  4/22/2022 Yes        Alcohol abuse ICD-10-CM: F10.10  ICD-9-CM: 305.00  4/22/2022 Yes        HTN (hypertension) ICD-10-CM: I10  ICD-9-CM: 401.9  4/22/2022 Yes        Polysubstance abuse ([facility] ICD-10-CM: F19.10  ICD-9-CM: 305.90  8/4/2012 Yes         Multifocal Pneumonia-   - IV Abx - Rocephin and Doxy   - Scheduled pulmicort, brovana, duoneb x 6 doses  - Obtain Blood cultures (received 1 dose of abx in the ER) and sputum cultures  - Check legionella, strep, mycoplasma   - wean oxygen as tolerated (patient on room air during my interview)  - monitor temp curve, CBC  - IS     Elevated D-Dimer- suspicion for PE; follow up VQ scan if positive start anticoagulation with Heparin gtt. EMBER- Cr 1.72; likely pre-renal   - IVF - NS 75ml/hr   - Avoid nephrotoxins (holding micardis)   - Monitor AM labs     Chronic Thrombocytopenia- Plt 89   - SCD's   - Monitor      Hypertension-   - Con't amlodipine   - Hold micardis d/t above   - PRN Hydralazine for SBP >180     Polysubstance Abuse- check UA, UDS   - CIWA protocol- monitor for S&S of withdrawal   - Please call methadone clinic tomorrow to verify dose [phone] (Dr Munson Child?). Patient states he takes 70mg Daily. On chart we have 45mg Daily. Constipation-   - Scheduled pericolace, miralax     GERD-   - Con't protonix     Medication Noncompliance-   - Educated patient on the importance of it.  At discharge please ensure patient has medications to take home PT/OT    Anticipated Discharge: 2 days     DVT Prophylaxis:  []Lovenox  []Hep SQ  [x]SCDs  []Coumadin []DOAC  []On Heparin gtt     I have personally reviewed all pertinent labs, films and EKGs that have officially resulted. I reviewed available electronic documentation outlining the initial presentation as well as the emergency room physician's encounter.     Time spent reviewing records, independently interpreting results, obtaining history from patient or caregiver, performing physical exam, ordering tests and medications, communicating with specialists, documenting in the chart, and coordinating overall care is  >55 minutes     Lionel Rubalcava, FNP-C  [address] [facility]  Office:  [phone]

## 2023-02-10 NOTE — ED NOTES
Dr. Jaida Koehler notified patient is 79% on room air . Pt was placed on 02 4L NC and went up to 94%. Recheck on multiple 02 sensor sites.

## 2023-02-10 NOTE — Clinical Note
Status[de-identified] INPATIENT [101]   Type of Bed: Medical [8]   Cardiac Monitoring Required?: Yes   Inpatient Hospitalization Certified Necessary for the Following Reasons: 3.  Patient receiving treatment that can only be provided in an inpatient setting (further clarification in H&P documentation)   Admitting Diagnosis: Pneumonia [[id number]]   Admitting Diagnosis: EMBER (acute kidney injury) [facility]) [[id number]]   Admitting Physician: Baron Ruano [[id number]]   Attending Physician: Baron Ruano [[id number]]   Estimated Length of Stay: 2 Midnights   Discharge Plan[de-identified] Home with Office Follow-up

## 2023-02-11 PROBLEM — Z91.148 NONCOMPLIANCE WITH MEDICATIONS: Status: ACTIVE | Noted: 2023-02-10

## 2023-02-11 PROBLEM — J18.9 MULTIFOCAL PNEUMONIA: Status: ACTIVE | Noted: 2023-02-10

## 2023-02-11 PROBLEM — J96.01 ACUTE RESPIRATORY FAILURE WITH HYPOXIA AND HYPERCARBIA (HCC): Status: ACTIVE | Noted: 2023-02-11

## 2023-02-11 PROBLEM — J96.02 ACUTE RESPIRATORY FAILURE WITH HYPOXIA AND HYPERCARBIA (HCC): Status: ACTIVE | Noted: 2023-02-11

## 2023-02-11 PROBLEM — I10 HTN (HYPERTENSION): Status: ACTIVE | Noted: 2022-04-22

## 2023-02-11 PROBLEM — J96.01 ACUTE RESPIRATORY FAILURE WITH HYPOXIA (HCC): Status: ACTIVE | Noted: 2023-02-11

## 2023-02-11 PROBLEM — Z91.14 NONCOMPLIANCE WITH MEDICATIONS: Status: ACTIVE | Noted: 2023-02-10

## 2023-02-11 PROBLEM — D69.6 THROMBOCYTOPENIA (HCC): Status: ACTIVE | Noted: 2022-04-22

## 2023-02-11 LAB
AMPHET UR QL SCN: NEGATIVE
ANION GAP SERPL CALC-SCNC: 6 MMOL/L (ref 3–18)
APPEARANCE UR: CLEAR
B PERT DNA SPEC QL NAA+PROBE: NOT DETECTED
BACTERIA URNS QL MICRO: NEGATIVE /HPF
BARBITURATES UR QL SCN: NEGATIVE
BENZODIAZ UR QL: NEGATIVE
BILIRUB UR QL: NEGATIVE
BORDETELLA PARAPERTUSSIS BY PCR: NOT DETECTED
BUN SERPL-MCNC: 23 MG/DL (ref 7–18)
BUN/CREAT SERPL: 21 (ref 12–20)
C PNEUM DNA SPEC QL NAA+PROBE: NOT DETECTED
CALCIUM SERPL-MCNC: 8.7 MG/DL (ref 8.5–10.1)
CANNABINOIDS UR QL SCN: NEGATIVE
CHLORIDE SERPL-SCNC: 106 MMOL/L (ref 100–111)
CK SERPL-CCNC: 299 U/L (ref 39–308)
CO2 SERPL-SCNC: 26 MMOL/L (ref 21–32)
COCAINE UR QL SCN: NEGATIVE
COLOR UR: ABNORMAL
CREAT SERPL-MCNC: 1.07 MG/DL (ref 0.6–1.3)
CRP SERPL-MCNC: 5.4 MG/DL (ref 0–0.3)
EPITH CASTS URNS QL MICRO: NEGATIVE /LPF (ref 0–5)
ERYTHROCYTE [DISTWIDTH] IN BLOOD BY AUTOMATED COUNT: 15.1 % (ref 11.6–14.5)
ERYTHROCYTE [SEDIMENTATION RATE] IN BLOOD: 65 MM/HR (ref 0–20)
FLUAV SUBTYP SPEC NAA+PROBE: NOT DETECTED
FLUBV RNA SPEC QL NAA+PROBE: NOT DETECTED
GLUCOSE SERPL-MCNC: 128 MG/DL (ref 74–99)
GLUCOSE UR STRIP.AUTO-MCNC: NEGATIVE MG/DL
HADV DNA SPEC QL NAA+PROBE: NOT DETECTED
HCOV 229E RNA SPEC QL NAA+PROBE: NOT DETECTED
HCOV HKU1 RNA SPEC QL NAA+PROBE: NOT DETECTED
HCOV NL63 RNA SPEC QL NAA+PROBE: NOT DETECTED
HCOV OC43 RNA SPEC QL NAA+PROBE: NOT DETECTED
HCT VFR BLD AUTO: 36.9 % (ref 36–48)
HGB BLD-MCNC: 12.6 G/DL (ref 13–16)
HGB UR QL STRIP: NEGATIVE
HMPV RNA SPEC QL NAA+PROBE: NOT DETECTED
HPIV1 RNA SPEC QL NAA+PROBE: NOT DETECTED
HPIV2 RNA SPEC QL NAA+PROBE: NOT DETECTED
HPIV3 RNA SPEC QL NAA+PROBE: NOT DETECTED
HPIV4 RNA SPEC QL NAA+PROBE: NOT DETECTED
INR PPP: 1.1 (ref 0.8–1.2)
KETONES UR QL STRIP.AUTO: NEGATIVE MG/DL
L PNEUMO AG UR QL IA: NEGATIVE
LEUKOCYTE ESTERASE UR QL STRIP.AUTO: NEGATIVE
Lab: ABNORMAL
M PNEUMO DNA SPEC QL NAA+PROBE: NOT DETECTED
MCH RBC QN AUTO: 31.8 PG (ref 24–34)
MCHC RBC AUTO-ENTMCNC: 34.1 G/DL (ref 31–37)
MCV RBC AUTO: 93.2 FL (ref 78–100)
METHADONE UR QL: POSITIVE
NITRITE UR QL STRIP.AUTO: NEGATIVE
NRBC # BLD: 0 K/UL (ref 0–0.01)
NRBC BLD-RTO: 0 PER 100 WBC
OPIATES UR QL: NEGATIVE
PCP UR QL: NEGATIVE
PH UR STRIP: 6 (ref 5–8)
PLATELET # BLD AUTO: 71 K/UL (ref 135–420)
PMV BLD AUTO: 10.9 FL (ref 9.2–11.8)
POTASSIUM SERPL-SCNC: 4 MMOL/L (ref 3.5–5.5)
PROCALCITONIN SERPL-MCNC: 2.63 NG/ML
PROT UR STRIP-MCNC: ABNORMAL MG/DL
PROTHROMBIN TIME: 14.7 SEC (ref 11.5–15.2)
RBC # BLD AUTO: 3.96 M/UL (ref 4.35–5.65)
RBC #/AREA URNS HPF: NEGATIVE /HPF (ref 0–5)
RHEUMATOID FACT SERPL-ACNC: 15 IU/ML
RSV RNA SPEC QL NAA+PROBE: NOT DETECTED
RV+EV RNA SPEC QL NAA+PROBE: NOT DETECTED
S PNEUM AG UR QL: NEGATIVE
SARS-COV-2 RNA RESP QL NAA+PROBE: NOT DETECTED
SODIUM SERPL-SCNC: 138 MMOL/L (ref 136–145)
SP GR UR REFRACTOMETRY: 1.02 (ref 1–1.03)
UROBILINOGEN UR QL STRIP.AUTO: 2 EU/DL (ref 0.2–1)
WBC # BLD AUTO: 3.6 K/UL (ref 4.6–13.2)
WBC URNS QL MICRO: NEGATIVE /HPF (ref 0–4)

## 2023-02-11 PROCEDURE — 87449 NOS EACH ORGANISM AG IA: CPT

## 2023-02-11 PROCEDURE — 82550 ASSAY OF CK (CPK): CPT

## 2023-02-11 PROCEDURE — 80307 DRUG TEST PRSMV CHEM ANLYZR: CPT

## 2023-02-11 PROCEDURE — 6370000000 HC RX 637 (ALT 250 FOR IP): Performed by: HOSPITALIST

## 2023-02-11 PROCEDURE — 0202U NFCT DS 22 TRGT SARS-COV-2: CPT

## 2023-02-11 PROCEDURE — 97535 SELF CARE MNGMENT TRAINING: CPT

## 2023-02-11 PROCEDURE — 85610 PROTHROMBIN TIME: CPT

## 2023-02-11 PROCEDURE — 86738 MYCOPLASMA ANTIBODY: CPT

## 2023-02-11 PROCEDURE — 86038 ANTINUCLEAR ANTIBODIES: CPT

## 2023-02-11 PROCEDURE — 86200 CCP ANTIBODY: CPT

## 2023-02-11 PROCEDURE — 2580000003 HC RX 258: Performed by: HOSPITALIST

## 2023-02-11 PROCEDURE — 84145 PROCALCITONIN (PCT): CPT

## 2023-02-11 PROCEDURE — 2500000003 HC RX 250 WO HCPCS: Performed by: HOSPITALIST

## 2023-02-11 PROCEDURE — 9990 CHARGE CONVERSION

## 2023-02-11 PROCEDURE — 6360000002 HC RX W HCPCS: Performed by: HOSPITALIST

## 2023-02-11 PROCEDURE — 6360000002 HC RX W HCPCS: Performed by: INTERNAL MEDICINE

## 2023-02-11 PROCEDURE — 87305 ASPERGILLUS AG IA: CPT

## 2023-02-11 PROCEDURE — 2709999900 HC NON-CHARGEABLE SUPPLY

## 2023-02-11 PROCEDURE — 82085 ASSAY OF ALDOLASE: CPT

## 2023-02-11 PROCEDURE — 80048 BASIC METABOLIC PNL TOTAL CA: CPT

## 2023-02-11 PROCEDURE — 94640 AIRWAY INHALATION TREATMENT: CPT

## 2023-02-11 PROCEDURE — 82784 ASSAY IGA/IGD/IGG/IGM EACH: CPT

## 2023-02-11 PROCEDURE — 81001 URINALYSIS AUTO W/SCOPE: CPT

## 2023-02-11 PROCEDURE — 1100000000 HC RM PRIVATE

## 2023-02-11 PROCEDURE — 36415 COLL VENOUS BLD VENIPUNCTURE: CPT

## 2023-02-11 PROCEDURE — 85027 COMPLETE CBC AUTOMATED: CPT

## 2023-02-11 PROCEDURE — 87389 HIV-1 AG W/HIV-1&-2 AB AG IA: CPT

## 2023-02-11 PROCEDURE — 86431 RHEUMATOID FACTOR QUANT: CPT

## 2023-02-11 PROCEDURE — 85652 RBC SED RATE AUTOMATED: CPT

## 2023-02-11 PROCEDURE — 86606 ASPERGILLUS ANTIBODY: CPT

## 2023-02-11 PROCEDURE — 99232 SBSQ HOSP IP/OBS MODERATE 35: CPT | Performed by: INTERNAL MEDICINE

## 2023-02-11 PROCEDURE — 2700000000 HC OXYGEN THERAPY PER DAY

## 2023-02-11 PROCEDURE — 86140 C-REACTIVE PROTEIN: CPT

## 2023-02-11 PROCEDURE — 97165 OT EVAL LOW COMPLEX 30 MIN: CPT

## 2023-02-11 RX ORDER — ACETAMINOPHEN 325 MG/1
650 TABLET ORAL EVERY 4 HOURS PRN
Status: DISCONTINUED | OUTPATIENT
Start: 2023-02-11 | End: 2023-02-18 | Stop reason: HOSPADM

## 2023-02-11 RX ORDER — PANTOPRAZOLE SODIUM 40 MG/1
40 TABLET, DELAYED RELEASE ORAL
Status: DISCONTINUED | OUTPATIENT
Start: 2023-02-11 | End: 2023-02-18 | Stop reason: HOSPADM

## 2023-02-11 RX ORDER — AMLODIPINE BESYLATE 10 MG/1
10 TABLET ORAL DAILY
Status: DISCONTINUED | OUTPATIENT
Start: 2023-02-11 | End: 2023-02-18 | Stop reason: HOSPADM

## 2023-02-11 RX ORDER — POLYETHYLENE GLYCOL 3350 17 G/17G
17 POWDER, FOR SOLUTION ORAL DAILY
Status: DISCONTINUED | OUTPATIENT
Start: 2023-02-11 | End: 2023-02-18 | Stop reason: HOSPADM

## 2023-02-11 RX ORDER — HYDRALAZINE HYDROCHLORIDE 20 MG/ML
10 INJECTION INTRAMUSCULAR; INTRAVENOUS EVERY 6 HOURS PRN
Status: DISCONTINUED | OUTPATIENT
Start: 2023-02-11 | End: 2023-02-18 | Stop reason: HOSPADM

## 2023-02-11 RX ORDER — METHYLPREDNISOLONE SODIUM SUCCINATE 40 MG/ML
40 INJECTION, POWDER, LYOPHILIZED, FOR SOLUTION INTRAMUSCULAR; INTRAVENOUS EVERY 8 HOURS
Status: DISCONTINUED | OUTPATIENT
Start: 2023-02-11 | End: 2023-02-12

## 2023-02-11 RX ORDER — ASPIRIN 81 MG/1
81 TABLET ORAL DAILY
Status: DISCONTINUED | OUTPATIENT
Start: 2023-02-11 | End: 2023-02-18 | Stop reason: HOSPADM

## 2023-02-11 RX ORDER — ARFORMOTEROL TARTRATE 15 UG/2ML
15 SOLUTION RESPIRATORY (INHALATION) 2 TIMES DAILY
Status: DISCONTINUED | OUTPATIENT
Start: 2023-02-11 | End: 2023-02-18 | Stop reason: HOSPADM

## 2023-02-11 RX ORDER — ONDANSETRON 2 MG/ML
4 INJECTION INTRAMUSCULAR; INTRAVENOUS EVERY 6 HOURS PRN
Status: DISCONTINUED | OUTPATIENT
Start: 2023-02-11 | End: 2023-02-18 | Stop reason: HOSPADM

## 2023-02-11 RX ORDER — SODIUM CHLORIDE 9 MG/ML
INJECTION, SOLUTION INTRAVENOUS CONTINUOUS
Status: DISCONTINUED | OUTPATIENT
Start: 2023-02-11 | End: 2023-02-11

## 2023-02-11 RX ORDER — ONDANSETRON 4 MG/1
4 TABLET, ORALLY DISINTEGRATING ORAL EVERY 8 HOURS PRN
Status: DISCONTINUED | OUTPATIENT
Start: 2023-02-11 | End: 2023-02-18 | Stop reason: HOSPADM

## 2023-02-11 RX ORDER — BUDESONIDE 0.25 MG/2ML
0.25 INHALANT ORAL 2 TIMES DAILY
Status: DISCONTINUED | OUTPATIENT
Start: 2023-02-11 | End: 2023-02-18 | Stop reason: HOSPADM

## 2023-02-11 RX ORDER — LORAZEPAM 1 MG/1
2 TABLET ORAL
Status: DISCONTINUED | OUTPATIENT
Start: 2023-02-11 | End: 2023-02-18 | Stop reason: HOSPADM

## 2023-02-11 RX ORDER — IPRATROPIUM BROMIDE AND ALBUTEROL SULFATE 2.5; .5 MG/3ML; MG/3ML
1 SOLUTION RESPIRATORY (INHALATION) EVERY 4 HOURS
Status: DISCONTINUED | OUTPATIENT
Start: 2023-02-11 | End: 2023-02-13

## 2023-02-11 RX ORDER — SODIUM CHLORIDE 0.9 % (FLUSH) 0.9 %
5-40 SYRINGE (ML) INJECTION EVERY 8 HOURS
Status: DISCONTINUED | OUTPATIENT
Start: 2023-02-11 | End: 2023-02-18 | Stop reason: HOSPADM

## 2023-02-11 RX ORDER — LORAZEPAM 2 MG/ML
3 INJECTION INTRAMUSCULAR
Status: DISCONTINUED | OUTPATIENT
Start: 2023-02-11 | End: 2023-02-18 | Stop reason: HOSPADM

## 2023-02-11 RX ORDER — SODIUM CHLORIDE 0.9 % (FLUSH) 0.9 %
5-40 SYRINGE (ML) INJECTION PRN
Status: DISCONTINUED | OUTPATIENT
Start: 2023-02-11 | End: 2023-02-18 | Stop reason: HOSPADM

## 2023-02-11 RX ORDER — LORAZEPAM 1 MG/1
1 TABLET ORAL
Status: DISCONTINUED | OUTPATIENT
Start: 2023-02-11 | End: 2023-02-18 | Stop reason: HOSPADM

## 2023-02-11 RX ORDER — IPRATROPIUM BROMIDE AND ALBUTEROL SULFATE 2.5; .5 MG/3ML; MG/3ML
1 SOLUTION RESPIRATORY (INHALATION) EVERY 4 HOURS
Status: DISCONTINUED | OUTPATIENT
Start: 2023-02-11 | End: 2023-02-11

## 2023-02-11 RX ORDER — LORAZEPAM 2 MG/ML
2 INJECTION INTRAMUSCULAR
Status: DISCONTINUED | OUTPATIENT
Start: 2023-02-11 | End: 2023-02-18 | Stop reason: HOSPADM

## 2023-02-11 RX ORDER — ACETAMINOPHEN 650 MG/1
650 SUPPOSITORY RECTAL EVERY 4 HOURS PRN
Status: DISCONTINUED | OUTPATIENT
Start: 2023-02-11 | End: 2023-02-18 | Stop reason: HOSPADM

## 2023-02-11 RX ORDER — LORAZEPAM 2 MG/ML
1 INJECTION INTRAMUSCULAR
Status: DISCONTINUED | OUTPATIENT
Start: 2023-02-11 | End: 2023-02-18 | Stop reason: HOSPADM

## 2023-02-11 RX ORDER — SENNA AND DOCUSATE SODIUM 50; 8.6 MG/1; MG/1
2 TABLET, FILM COATED ORAL DAILY
Status: DISCONTINUED | OUTPATIENT
Start: 2023-02-11 | End: 2023-02-18 | Stop reason: HOSPADM

## 2023-02-11 RX ADMIN — ARFORMOTEROL TARTRATE 15 MCG: 15 SOLUTION RESPIRATORY (INHALATION) at 20:07

## 2023-02-11 RX ADMIN — PANTOPRAZOLE SODIUM 40 MG: 40 TABLET, DELAYED RELEASE ORAL at 11:38

## 2023-02-11 RX ADMIN — IPRATROPIUM BROMIDE AND ALBUTEROL SULFATE 1 AMPULE: 2.5; .5 SOLUTION RESPIRATORY (INHALATION) at 12:17

## 2023-02-11 RX ADMIN — BUDESONIDE 250 MCG: 0.25 SUSPENSION RESPIRATORY (INHALATION) at 20:07

## 2023-02-11 RX ADMIN — POLYETHYLENE GLYCOL 3350 17 G: 17 POWDER, FOR SOLUTION ORAL at 11:39

## 2023-02-11 RX ADMIN — SODIUM CHLORIDE, PRESERVATIVE FREE 10 ML: 5 INJECTION INTRAVENOUS at 11:45

## 2023-02-11 RX ADMIN — ARFORMOTEROL TARTRATE 15 MCG: 15 SOLUTION RESPIRATORY (INHALATION) at 08:12

## 2023-02-11 RX ADMIN — SENNOSIDES AND DOCUSATE SODIUM 2 TABLET: 50; 8.6 TABLET ORAL at 11:46

## 2023-02-11 RX ADMIN — BUDESONIDE 250 MCG: 0.25 SUSPENSION RESPIRATORY (INHALATION) at 08:12

## 2023-02-11 RX ADMIN — DOXYCYCLINE 100 MG: 100 INJECTION, POWDER, LYOPHILIZED, FOR SOLUTION INTRAVENOUS at 21:07

## 2023-02-11 RX ADMIN — SODIUM CHLORIDE, PRESERVATIVE FREE 10 ML: 5 INJECTION INTRAVENOUS at 18:49

## 2023-02-11 RX ADMIN — IPRATROPIUM BROMIDE AND ALBUTEROL SULFATE 1 AMPULE: 2.5; .5 SOLUTION RESPIRATORY (INHALATION) at 08:21

## 2023-02-11 RX ADMIN — CEFTRIAXONE 2000 MG: 2 INJECTION, POWDER, FOR SOLUTION INTRAMUSCULAR; INTRAVENOUS at 11:38

## 2023-02-11 RX ADMIN — METHYLPREDNISOLONE SODIUM SUCCINATE 40 MG: 40 INJECTION, POWDER, FOR SOLUTION INTRAMUSCULAR; INTRAVENOUS at 22:23

## 2023-02-11 RX ADMIN — DOXYCYCLINE 100 MG: 100 INJECTION, POWDER, LYOPHILIZED, FOR SOLUTION INTRAVENOUS at 11:38

## 2023-02-11 RX ADMIN — AMLODIPINE BESYLATE 10 MG: 10 TABLET ORAL at 11:38

## 2023-02-11 RX ADMIN — IPRATROPIUM BROMIDE AND ALBUTEROL SULFATE 1 AMPULE: 2.5; .5 SOLUTION RESPIRATORY (INHALATION) at 16:36

## 2023-02-11 RX ADMIN — IPRATROPIUM BROMIDE AND ALBUTEROL SULFATE 1 AMPULE: 2.5; .5 SOLUTION RESPIRATORY (INHALATION) at 20:07

## 2023-02-11 RX ADMIN — ASPIRIN 81 MG: 81 TABLET, COATED ORAL at 11:38

## 2023-02-11 RX ADMIN — METHYLPREDNISOLONE SODIUM SUCCINATE 40 MG: 40 INJECTION, POWDER, FOR SOLUTION INTRAMUSCULAR; INTRAVENOUS at 16:36

## 2023-02-11 ASSESSMENT — PAIN SCALES - GENERAL
PAINLEVEL_OUTOF10: 0

## 2023-02-11 NOTE — PROGRESS NOTES
VQ scan results noted. CTA chest w and wo contrast ordered to rule out PE. Cr slightly elevated at 1.72. Hydrate post CTA. Plan discussed with Dr Erick Dalton.

## 2023-02-11 NOTE — CONSULTS
[facility]  Pulmonary, Critical Care, and Sleep Medicine    Initial Patient Consult    Name: John Zhao MRN: [id number]   : 1955 Hospital: [address] [place] [address] Dr   Date: 2023        IMPRESSION:   Acute hypoxemic respiratory failure - CT with diffuse upper lobe predominant GGOs with interstitial thickening (new since CT chest in 2022) on underlying emphysema. Differential is extensive and includes inhalational lung injury, hypersensitivity pneumonitis, inflammatory interstitial pneumonitis, or infection to include potential opportunistic infections. Rapid flu and COVID negative. Currently on 10L nasal cannula. Centribolular emphysema  Tobacco dependence  Polysubstance abuse, heroin abuse, enrolled in methadone program  Acute kidney injury, resolved  Cirrhosis of the liver with associated portal hypertension  Elevated liver enzymes likely secondary to alcohol use with AST/ALT ratio >2  Mild leukopenia with lymphopenia  Thrombocytopenia      RECOMMENDATIONS:   Supplemental oxygen to maintain SpO2 >88-92%  Bronchial hygiene protocol  Bronchodilators: Brovana BID and Duoneb q4h  Steroids: Solumedrol IV 40mg q8h for now, may need to adjust based on response/etiology, Pulmicort inh BID  Antibiotics: Currently on ceftriaxone and doxycycline (D1). Considering adjusting doxycycline to azithromycin for Legionella coverage/antiinflammatory properties if no prolonged Qtc or other contraindication  Aspiration precautions  Need for further diagnostics: Echo pending; obtaining ILD serologies, inflammatory markers, HIV, fungitell, galactomannan, procalcitonin, sputum cultures to include AFB and fungal, respiratory viral panel. Will need interval CT imaging to assess response to therapy. If no improvement, may need diagnostic bronchoscopy with BAL +/- transbronchial biopsies.     Will need to assess home oxygen when ready for discharge  PT, OOB and ambulate  DVT prophylaxis  Will follow Subjective: This patient has been seen and evaluated at the request of Dr. Diane Flores for respiratory failure. Patient is a 69 y.o. male with a history of cirrhosis with portal HTN, tobacco dependence, and polysubstance abuse who presented to the [facility] ER with approx 3 days of progressive SOB/QUIROZ. States that prior to that, he had been in his usual state of health. Denies any known sick contacts. Reports associated mostly dry cough (scant yellow mucus a couple of days ago), no hemoptysis. Has some chest pain associated with coughing. Denies any baseline respiratory problems or cardiac problems. Reports associated subjective fevers, chills, sweats, and decreased appetite. Has not noticed any weight loss. Vomited once, but no persistent nausea, vomiting, or diarrhea and denies any abdominal pain. Has chronic pain in his knees; had surgery in 2022, but persistent pain. No new rashes. He currently smokes approx 1ppd and marijuana cigarettes, also snorts heroin, most recently in the days preceding admission. Uses a humidifier in the home. No pets/birds. No known family history of lung disease. Drinks about 4 cans of beer/day. I have reviewed all of the available data including the patient's previous history external records and radiological imaging available for review. In addition applicable cardiology and other lab data were also reviewed.     Past Medical History:   Diagnosis Date    Abscess of right shoulder     Abscess of shoulder     Acute blood loss as cause of postoperative anemia 4/22/2022    Arthritis     Closed displaced comminuted fracture of shaft of right tibia with routine healing 04/22/2022    Closed fracture of distal end of right fibula with routine healing 4/22/2022    Epidural abscess     Heart murmur     Hematuria     Heroin abuse ([facility] [address]     Hypertension     Infected wound     Infectious disease     Iron deficiency anemia     IV drug user     Liver disease     Methadone dependence ([facility] [address] Tobacco abuse       Past Surgical History:   Procedure Laterality Date    COLONOSCOPY  07/18/2016    CYST REMOVAL      rt.forearm and rt.foot    FRACTURE SURGERY Right 04/22/2022    S/P Closed IM nailing of the right tibia using the Synthes IM nail system with a double lock proximally and triple lock distally (4/22/2022 - Dr. Davy Fisher)    [address] Right 04/23/2022    Closed Fracture of Right Tibia     OTHER SURGICAL HISTORY      right forearm infections and graft    SKIN GRAFT      UPPER GASTROINTESTINAL ENDOSCOPY  07/18/2016      Prior to Admission medications    Medication Sig Start Date End Date Taking? Authorizing Provider   acetaminophen (TYLENOL) 325 MG tablet Take 650 mg by mouth every 4 hours as needed 5/11/22   Ar Automatic Reconciliation   amLODIPine (NORVASC) 10 MG tablet Take 10 mg by mouth daily 4/29/22   Ar Automatic Reconciliation   aspirin 81 MG EC tablet Take 81 mg by mouth 2 times daily 4/28/22   Ar Automatic Reconciliation   ferrous sulfate (IRON 325) 325 (65 Fe) MG tablet Take 325 mg by mouth 2 times daily (with meals) 4/28/22   Ar Automatic Reconciliation   folic acid (FOLVITE) 1 MG tablet Take 1 mg by mouth daily 4/29/22   Ar Automatic Reconciliation   ibuprofen (ADVIL;MOTRIN) 800 MG tablet Take 800 mg by mouth every 8 hours as needed    Ar Automatic Reconciliation   melatonin 3 MG TABS tablet Take 3 mg by mouth 5/11/22   Ar Automatic Reconciliation   methadone (DOLOPHINE) 10 MG tablet Take 45 mg by mouth daily. Ar Automatic Reconciliation   pantoprazole (PROTONIX) 40 MG tablet Take 40 mg by mouth every morning (before breakfast) 5/12/22   Ar Automatic Reconciliation   polyethylene glycol (GLYCOLAX) 17 GM/SCOOP powder Take 17 g by mouth daily 4/28/22   Ar Automatic Reconciliation   pregabalin (LYRICA) 75 MG capsule Take 75 mg by mouth 3 times daily.  5/11/22   Ar Automatic Reconciliation   telmisartan (MICARDIS) 20 MG tablet Take 20 mg by mouth daily 22   Ar Automatic Reconciliation   thiamine 100 MG tablet Take 100 mg by mouth daily 22   Ar Automatic Reconciliation     No Known Allergies   Social History     Tobacco Use    Smoking status: Every Day     Packs/day: 0.50     Types: Cigarettes    Smokeless tobacco: Never   Substance Use Topics    Alcohol use: Yes     Alcohol/week: 4.0 standard drinks      No family history on file. Immunization status: unknown status, parent to bring shot records. Review of Systems:  Pertinent items are noted in HPI. Objective:   Vital Signs:    BP (!) 156/72   Pulse (!) 107   Temp 98.4 °F (36.9 °C) (Oral)   Resp 18   Ht 5' 11\" (1.803 m)   SpO2 96%   BMI 24.55 kg/m²             Temp (24hrs), Av.4 °F (36.9 °C), Min:98.4 °F (36.9 °C), Max:98.4 °F (36.9 °C)       Intake/Output:   Last shift:       0701 -  1900  In: -   Out: 1000 [Urine:1000]  Last 3 shifts: No intake/output data recorded. Intake/Output Summary (Last 24 hours) at 2023 1446  Last data filed at 2023 1245  Gross per 24 hour   Intake --   Output 1000 ml   Net -1000 ml      Physical Exam:   General:  Alert, cooperative, no distress, appears stated age. Thin. Head:  Normocephalic, without obvious abnormality, atraumatic. Eyes:  Conjunctivae/corneas clear. PERRL, EOMs intact. Nose: Nares normal. Septum midline. Mucosa normal. No drainage or sinus tenderness. Nasal cannula in place. Throat: Lips, mucosa, and tongue normal. Poor dentition. Neck: Supple, symmetrical, trachea midline, no adenopathy or thyromegaly: no enlargment/tenderness/nodules, no no JVD. Back:   Symmetric, no curvature. Lungs:   End inspiratory squeaks diffusely more pronounced in the upper lung fields. No wheezes or rhonchi. Chest wall:  No tenderness or deformity. Heart:  Regular rate and rhythm, S1, S2 normal, no murmur, click, rub or gallop. Abdomen:   Soft, non-tender. Bowel sounds normal. No masses,  No organomegaly. Extremities: Extremities with well healed scars from prior R knee surgery, atraumatic, no cyanosis or edema. Pulses: 1-2+ and symmetric all extremities.    Skin: Skin color, texture, turgor normal. No rashes or lesions   Lymph nodes: Cervical, supraclavicular nodes normal.   Neurologic: Grossly nonfocal     Data review:     Recent Results (from the past 24 hour(s))   Comprehensive Metabolic Panel    Collection Time: 02/10/23  4:18 PM   Result Value Ref Range    Sodium 132 (L) 136 - 145 mmol/L    Potassium 4.4 3.5 - 5.5 mmol/L    Chloride 99 (L) 100 - 111 mmol/L    CO2 28 21 - 32 mmol/L    Anion Gap 5 3.0 - 18 mmol/L    Glucose 71 (L) 74 - 99 mg/dL    BUN 23 (H) 7.0 - 18 MG/DL    Creatinine 1.72 (H) 0.6 - 1.3 MG/DL    Bun/Cre Ratio 13 12 - 20      ESTIMATED GLOMERULAR FILTRATION RATE 42 (L) >60 ml/min/1.73m2    Calcium 9.2 8.5 - 10.1 MG/DL    Total Bilirubin 1.2 (H) 0.2 - 1.0 MG/DL    ALT 40 16 - 61 U/L     (H) 10 - 38 U/L    Alkaline Phosphatase 118 (H) 45 - 117 U/L    Total Protein 8.3 (H) 6.4 - 8.2 g/dL    Albumin 2.5 (L) 3.4 - 5.0 g/dL    Globulin 5.8 (H) 2.0 - 4.0 g/dL    Albumin/Globulin Ratio 0.4 (L) 0.8 - 1.7     Basic Metabolic Panel    Collection Time: 02/11/23  7:35 AM   Result Value Ref Range    Sodium 138 136 - 145 mmol/L    Potassium 4.0 3.5 - 5.5 mmol/L    Chloride 106 100 - 111 mmol/L    CO2 26 21 - 32 mmol/L    Anion Gap 6 3.0 - 18 mmol/L    Glucose 128 (H) 74 - 99 mg/dL    BUN 23 (H) 7.0 - 18 MG/DL    Creatinine 1.07 0.6 - 1.3 MG/DL    Bun/Cre Ratio 21 (H) 12 - 20      Est, Glom Filt Rate >60 >60 ml/min/1.73m2    Calcium 8.7 8.5 - 10.1 MG/DL   CBC    Collection Time: 02/11/23  7:35 AM   Result Value Ref Range    WBC 3.6 (L) 4.6 - 13.2 K/uL    RBC 3.96 (L) 4.35 - 5.65 M/uL    Hemoglobin 12.6 (L) 13.0 - 16.0 g/dL    Hematocrit 36.9 36.0 - 48.0 %    MCV 93.2 78.0 - 100.0 FL    MCH 31.8 24.0 - 34.0 PG    MCHC 34.1 31.0 - 37.0 g/dL    RDW 15.1 (H) 11.6 - 14.5 %    Platelets 71 (L) 188 - 420 K/uL    MPV 10.9 9.2 - 11.8 FL    Nucleated RBCs 0.0 0  WBC    nRBC 0.00 0.00 - 0.01 K/uL       Imaging:  I have personally reviewed the patients radiographs and have reviewed the reports:  XR Results (most recent):  XR CHEST (2 VW) 02/10/2023    Narrative  EXAM:  XR CHEST PA LAT    INDICATION:   sob    COMPARISON: Chest radiograph April 29, 2022. FINDINGS: Multifocal interstitial and airspace opacities, greatest in the right  middle lobe. No pneumothorax or pleural effusion. Calcified aortic arch. Cardiomediastinal contours are otherwise unremarkable. No acute osseous  findings. Impression  Multifocal interstitial and airspace opacities, greatest in the right middle  lobe. Findings likely represent multifocal pneumonia. CT Results (most recent):  CTA CHEST W WO CONTRAST 02/10/2023    Narrative  EXAM: CT Angiogram of the Chest    CLINICAL INDICATION:  rule out pe . Smoker with hypertension. Patient having  shortness of breath and bodyaches. TECHNIQUE: CT angiogram of the chest performed. MIPS performed    All CT scans at this facility are performed using dose optimization technique as  appropriate to a performed exam, to include automated exposure control,  adjustment of the mA and/or kV according to patient size (including appropriate  matching for site specific examination) or use of iterative reconstruction  technique. IV CONTRAST: 100 cc of Isovue 370    COMPARISON: None    FINDINGS:  Limitations: Breathing motion is present. Thyroid: Unremarkable. Mediastinum: Hilar adenopathy is noted. There is subcarinal adenopathy. Heart: Unremarkable    Pericardium: Unremarkable    Aorta: No evidence of aortic dissection or aneurysm. Pulmonary Arteries: No evidence of pulmonary embolus. Trachea and Bronchi: Unremarkable. Pleura: No evidence of pleural effusion. Lungs: Centrilobular emphysematous changes are present.  There is interstitial  thickening and groundglass changes throughout the lungs most pronounced in the  mid portions. Axilla/Chest wall: Unremarkable. Upper Abdomen: No acute findings. Musculoskeletal: No acute osseous findings. Impression  1. Findings concerning for interstitial pneumonitis possibly some process like  alveolar proteinosis or hypersensitivity pneumonitis    2. No evidence of pulmonary embolus or aortic dissection. 3.  Mediastinal and hilar adenopathy. Consider short-term CT 3 months to  evaluate stability. 4.  Centrilobular emphysematous changes. VQ Scan     NM LUNG VENT/PERFUSION (VQ) 02/10/2023    Narrative  VQ SCAN    CPT CODE: 53211    INDICATION: Fatigue, fever and chills with intermittent shortness of breath and  cough. COMPARISON: None Available. TECHNIQUE: After aerosolization of 30 mCi 99mTc-DTPA, which delivers  approximately 1-2 mCi of radiopharmaceutical to the lungs, ventilatory images of  the lungs were obtained in multiple projections. After intravenous  administration of 5 mCi 99mTc-MAA, perfusion images of the lungs were obtained  in multiple projections. Images are correlated with PA and lateral chest  radiographs dated 2/10/2023. FINDINGS:  Heterogeneous distribution of radiopharmaceutical on ventilatory images. Moderate to large mismatched segmental perfusion defects in the medial lower  lobes bilaterally with corresponding lung opacities on the radiographs. There is  also reverse mismatch with decreased ventilation in the upper lungs but normal  perfusion. Impression  Intermediate to high probability VQ scan. Complex decision making was made in the evaluation and management plans during this consultation. More than 50% of time was spent in counseling and coordination of care including review of data and discussion with other team members.          Vik Bravo,   Pulmonary & Critical Care

## 2023-02-11 NOTE — PROGRESS NOTES
OCCUPATIONAL THERAPY EVALUATION/DISCHARGE    Patient: Chris Montoya (1 y.o. male)  Date: 2/11/2023  Primary Diagnosis: No admission diagnoses are documented for this encounter. Precautions:General Precautions  PLOF: Pt lives with roommates in [facility] with bedroom on 1st floor. ASSESSMENT AND RECOMMENDATIONS:  Based on the objective data below, pt presents with no functional deficits that would impede participation and independence in ADLs and functional transfers. Pt demonstrates appropriate safety awareness with functional mobility, however, benefits from Supervision due to slight unsteadiness. We will defer mobility concerns to PT. Pt reports no further concerns to OT at this time. We will sign off. Maximum therapeutic gains met at current level of care and patient will be discharged from occupational therapy at this time. Further Equipment Recommendations for Discharge: NA    Discharge Recommendation: Discharge Recommendations: Home independently    AMPAC: At this time and based on an AM-PAC score of 24/24, no further OT is recommended upon discharge due to (i.e. patient at baseline functional statusetc). Recommend patient returns to prior setting with prior services. This AMPAC score should be considered in conjunction with interdisciplinary team recommendations to determine the most appropriate discharge setting. Patient's social support, diagnosis, medical stability, and prior level of function should also be taken into consideration.      SUBJECTIVE:   Patient stated \"I have been getting up to go to the bathroom\"    OBJECTIVE DATA SUMMARY:     Past Medical History:   Diagnosis Date    Abscess of right shoulder     Abscess of shoulder     Acute blood loss as cause of postoperative anemia 4/22/2022    Arthritis     Closed displaced comminuted fracture of shaft of right tibia with routine healing 04/22/2022    Closed fracture of distal end of right fibula with routine healing 4/22/2022 Epidural abscess     Heart murmur     Hematuria     Heroin abuse (HCC)     Hypertension     Infected wound     Infectious disease     Iron deficiency anemia     IV drug user     Liver disease     Methadone dependence ([facility] [address]     Tobacco abuse      Past Surgical History:   Procedure Laterality Date    COLONOSCOPY  07/18/2016    CYST REMOVAL      rt.forearm and rt.foot    FRACTURE SURGERY Right 04/22/2022    S/P Closed IM nailing of the right tibia using the Synthes IM nail system with a double lock proximally and triple lock distally (4/22/2022 - Dr. Ronnell Merlin)    [address] Right 04/23/2022    Closed Fracture of Right Tibia     OTHER SURGICAL HISTORY      right forearm infections and graft    SKIN GRAFT      UPPER GASTROINTESTINAL ENDOSCOPY  07/18/2016     Barriers to Learning/Limitations: None  Compensate with: visual, verbal, tactile, kinesthetic cues/model    Home Situation:   Social/Functional History  Lives With: Other (comment) (Roommates)  Type of Home: House  Home Layout: Two level, Able to Live on Main level with bedroom/bathroom  Home Access: Stairs to enter with rails  Entrance Stairs - Number of Steps: 3  Bathroom Shower/Tub: Tub/Shower unit  Bathroom Toilet: Standard  Bathroom Equipment: Shower chair (doesn't use)  Home Equipment: Zayas Knife, rolling  ADL Assistance: Independent  Homemaking Assistance: Independent  Ambulation Assistance: Independent  Transfer Assistance: Independent  [x]  Right hand dominant   []  Left hand dominant    Cognitive/Behavioral Status:  Orientation Level: Oriented X4  WFL   Skin: visible skin intact  Edema: none noted    Vision/Perceptual:    Vision: Within Functional Limits         Coordination: BUE  Coordination: Generally decreased, functional (tremors)     Balance:     Balance  Sitting: Intact  Standing: Impaired  Standing - Static: Good  Standing - Dynamic: Good;Fair (Good to Fair plus)    Strength: BUE     Strength: Generally decreased, functional    Tone & Sensation: BUE   Tone: Normal  Sensation: Intact    Range of Motion: BUE        AROM: Generally decreased, functional  PROM: Generally decreased, functional    Functional Mobility and Transfers for ADLs:  Bed Mobility:     Bed Mobility Training  Bed Mobility Training: Yes  Overall Level of Assistance: Modified independent  Supine to Sit: Modified independent  Sit to Supine: Modified independent  Scooting: Modified independent  Transfers:   Transfer Training  Transfer Training: Yes  Sit to Stand: Supervision  Stand to Sit: Modified independent  Stand Pivot Transfers: Modified independent  Toilet Transfer: Modified independent    ADL Assessment:   Equipment Provided: Long-handled sponge  Feeding: Independent  Grooming: Independent  UE Bathing: Modified independent   LE Bathing: Modified independent   UE Dressing: Modified independent   LE Dressing: Modified independent   Toileting: Modified independent     ADL Intervention: UB dressing and LB dressing task in sitting and standing with Mod Ind    Pain:  Pain level pre-treatment: not rated  Pain level post-treatment: not rated    Activity Tolerance:   Activity Tolerance: Patient limited by fatigue    After treatment:   [] Patient left in no apparent distress sitting up in chair  [x] Patient left in no apparent distress in bed  [x] Call bell left within reach  [x] Nursing notified  [] Caregiver present  [] Bed alarm activated    COMMUNICATION/EDUCATION:   Patient Education  Education Given To: Patient  Education Provided: Role of Therapy;Plan of Care;ADL Adaptive Strategies;IADL Safety; Energy Conservation  Education Method: Demonstration;Verbal;Teach Back  Barriers to Learning: None  Education Outcome: Verbalized understanding    Thank you for this referral.  MAAME Muse/L  Minutes: 20 min    Eval Complexity: Decision [facility] [place] AM-PAC® Daily Activity Inpatient Short Form (6-Clicks)*    How much HELP from another person does the patient currently need    (If the patient hasn't done an activity recently, how much help from another person do you think he/she would need if he/she tried?)   Total (Total A or Dep)   A Lot  (Mod to Max A)   A Little (Sup or Min A)   None (Mod I to I)   Putting on and taking off regular lower body clothing? [] 1 [] 2 [] 3 [x] 4   2. Bathing (including washing, rinsing,      drying)? [] 1 [] 2 [] 3 [x] 4   3. Toileting, which includes using toilet, bedpan or urinal?   [] 1 [] 2 [] 3 [x] 4   4. Putting on and taking off regular upper body clothing? [] 1 [] 2 [] 3 [x] 4   5. Taking care of personal grooming such as brushing teeth? [] 1 [] 2 [] 3 [x] 4   6. Eating meals? [] 1 [] 2 [] 3 [x] 4     At this time and based on an AM-PAC score of 24/24, no further OT is recommended upon discharge due to (i.e. patient at baseline functional statusetc). Recommend patient returns to prior setting with prior services.

## 2023-02-11 NOTE — PROGRESS NOTES
conducted an initial consultation and Spiritual Assessment for Alejandra Adam, who is a 69 y.o.,male. Patients Primary Language is: [place]. According to the patients EMR Spiritism Affiliation is: None. The reason the Patient came to the hospital is:   Patient Active Problem List    Diagnosis Date Noted    Acute respiratory failure with hypoxia ([facility] [address] 02/11/2023    Acute respiratory failure with hypoxia and hypercarbia (HCC) 02/11/2023    GAYATHRI (acute kidney injury) ([facility] [address] 02/10/2023    GERD (gastroesophageal reflux disease) 02/10/2023    Positive D dimer 02/10/2023    Noncompliance with medications 02/10/2023    Multifocal pneumonia 02/10/2023    Methadone dependence ([facility] [address]     Liver mass 04/24/2022    Bladder mass 04/24/2022    Hematuria 04/24/2022    Anemia 04/22/2022    Thrombocytopenia ([facility] [address] 04/22/2022    Metatarsal fracture 04/22/2022    Alcohol abuse 04/22/2022    HTN (hypertension) 04/22/2022    Orthopedic aftercare for healing traumatic lower leg fracture, right, closed 04/22/2022    Impaired mobility and ADLs 04/22/2022    Acute blood loss as cause of postoperative anemia 04/22/2022    Closed displaced comminuted fracture of shaft of right tibia with routine healing 04/22/2022    Closed fracture of distal end of right fibula with routine healing 04/22/2022    Leukopenia 08/05/2012    Iron deficiency anemia, unspecified 08/05/2012    Epidural abscess, L2-L5 08/04/2012    Polysubstance abuse ([facility] 08/04/2012        The  provided the following Interventions:  Initiated a relationship of care and support. Patient has slight difficulty of breathing upon visit. Patient on nasal oxygen. Explored issues of radha, belief, spirituality and Worship/ritual needs while hospitalized. Listened empathically. Provided chaplaincy education. Provided information about Spiritual Care Services.   Offered assistance in executing a POA under Advance medical directive and offered  assurance of continued prayers on patient's behalf. Chart reviewed. Patient postponed AMD execution for 2/12/23 due to his breathing issues. The following outcomes where achieved:  Patient shared limited information about both his medical narrative and spiritual journey/beliefs.  confirmed Patient's Temple Affiliation. Patient processed feeling about current hospitalization. Patient expressed gratitude for 's visit. Assessment:  Patient does not have any Zoroastrianism/cultural needs that will affect patients preferences in health care. There are no spiritual or Zoroastrianism issues which require intervention at this time. Plan:  RN informed of patient's breathing issues. Chaplains will continue to follow and will provide pastoral care on an as needed/requested basis.  recommends bedside caregivers page  on duty if patient shows signs of acute spiritual or emotional distress.     [address]te Shaver5 [phone]

## 2023-02-11 NOTE — PROGRESS NOTES
[facility]  Progress Note    Patient: Haja Cool Age: 69 y.o. : 1955 MR#: [id number] SSN: [id number]  Date/Time: 2023    Subjective:     Patient continues to have pleuritic chest pain. He remains on 8 L oxygen. Denies any cough. No nausea or vomiting. No headaches or dizziness. No abdominal pain. Assessment:   1. Acute hypoxic respiratory failure due to #2 and #3  2. Interstitial pneumonitis could be due to hypersensitivity pneumonitis versus alveolar proteinosis  3. COPD/emphysema  4. GAYATHRI, improved  5. Chronic thrombocytopenia  6. Polysubstance use disorder  7. Hypertension  8. GERD  9. History of constipation  10. Medical noncompliance    PLAN:    CT chest report reviewed. No PE  Continue current oxygen and nebulizer. Pulmonology has been consulted. Patient be maintained on Rocephin and doxycycline at this point. We will also start patient on low-dose IV steroid for treatment of presumed hypersensitivity pneumonitis  We will discontinue IV fluid and monitor renal function  Platelet counts are 71 today. We will continue to monitor it.   Continue Norvasc for hypertension management  Continue MiraLAX and senna \"for constipation treatment  Continue Protonix for GERD treatment  PT and OT to follow this patient    Discharge barriers for today: High flow oxygen, pulmonology evaluation, pending blood culture report    Anticipated date of discharge: 2023 if remains stable clinically    Case discussed with:  []Patient  []Family  []Nursing  []Case Management  DVT Prophylaxis:  []Lovenox  []Hep SQ  []SCDs  []Coumadin   []On Heparin gtt    Objective:   VS: BP (!) 156/72   Pulse (!) 107   Temp 98.4 °F (36.9 °C) (Oral)   Resp 18   Ht 5' 11\" (1.803 m)   SpO2 96%   BMI 24.55 kg/m²    Tmax/24hrs: Temp (24hrs), Av.4 °F (36.9 °C), Min:98.4 °F (36.9 °C), Max:98.4 °F (36.9 °C)    Input/Output:   Intake/Output Summary (Last 24 hours) at 2/11/2023 1350  Last data filed at 2/11/2023 1245  Gross per 24 hour   Intake --   Output 1000 ml   Net -1000 ml       General appearance - alert, cachectic, nasal cannula in situ, and in no distress  Eyes - sclera anicteric, no pallor  Nose - no obvious nasal discharge. Neck - supple, no JVD, trachea is midline  Chest -diminished air entry noted in bases, rhonchi present. Heart - S1 and S2 normal  Abdomen - soft, nontender, nondistended, Bowel sounds present  Neurological - alert, oriented, normal speech, no focal findings noted, no tremors noted.   Musculoskeletal - no joint tenderness or swelling of knees bilaterally  Extremities - no pedal edema noted      Labs:    Recent Results (from the past 24 hour(s))   CBC with Auto Differential    Collection Time: 02/10/23  2:32 PM   Result Value Ref Range    WBC 5.6 4.6 - 13.2 K/uL    RBC 4.60 4.35 - 5.65 M/uL    Hemoglobin 14.5 13.0 - 16.0 g/dL    Hematocrit 42.2 36.0 - 48.0 %    MCV 91.7 78.0 - 100.0 FL    MCH 31.5 24.0 - 34.0 PG    MCHC 34.4 31.0 - 37.0 g/dL    RDW 15.2 (H) 11.6 - 14.5 %    Platelets 89 (L) 328 - 420 K/uL    MPV 12.4 (H) 9.2 - 11.8 FL    Nucleated RBCs 0.0 0  WBC    NRBC Absolute 0.00 0.00 - 0.01 K/uL    Neutrophils % 72 40 - 73 %    Lymphocytes % 17 (L) 21 - 52 %    Monocytes % 11 (H) 3 - 10 %    Eosinophils % 0 0 - 5 %    Basophils % 1 0 - 2 %    Immature Granulocytes 1 (H) 0.0 - 0.5 %    Neutrophils Absolute 4.0 1.8 - 8.0 K/UL    Lymphocytes Absolute 0.9 0.9 - 3.6 K/UL    Monocytes Absolute 0.6 0.05 - 1.2 K/UL    Eosinophils Absolute 0.0 0.0 - 0.4 K/UL    Basophils Absolute 0.0 0.0 - 0.1 K/UL    Granulocyte Absolute Count 0.0 0.00 - 0.04 K/UL    Differential Type AUTOMATED     Basic Metabolic Panel    Collection Time: 02/10/23  2:32 PM   Result Value Ref Range    Sodium 129 (L) 136 - 145 mmol/L    Potassium 7.0 (HH) 3.5 - 5.5 mmol/L    Chloride 100 100 - 111 mmol/L    CO2 25 21 - 32 mmol/L    Anion Gap 4 3.0 - 18 mmol/L Glucose 79 74 - 99 mg/dL    BUN 23 (H) 7.0 - 18 MG/DL    Creatinine 1.72 (H) 0.6 - 1.3 MG/DL    Bun/Cre Ratio 13 12 - 20      ESTIMATED GLOMERULAR FILTRATION RATE 42 (L) >60 ml/min/1.73m2    Calcium 9.1 8.5 - 10.1 MG/DL   D-Dimer, Quantitative    Collection Time: 02/10/23  2:32 PM   Result Value Ref Range    D-Dimer, Quant 1.79 (H) <0.46 ug/ml(FEU)   Troponin, High Sensitivity    Collection Time: 02/10/23  2:32 PM   Result Value Ref Range    Troponin, High Sensitivity 33 0 - 78 ng/L   proBNP, N-TERMINAL    Collection Time: 02/10/23  2:32 PM   Result Value Ref Range     0 - 900 PG/ML   Comprehensive Metabolic Panel    Collection Time: 02/10/23  4:18 PM   Result Value Ref Range    Sodium 132 (L) 136 - 145 mmol/L    Potassium 4.4 3.5 - 5.5 mmol/L    Chloride 99 (L) 100 - 111 mmol/L    CO2 28 21 - 32 mmol/L    Anion Gap 5 3.0 - 18 mmol/L    Glucose 71 (L) 74 - 99 mg/dL    BUN 23 (H) 7.0 - 18 MG/DL    Creatinine 1.72 (H) 0.6 - 1.3 MG/DL    Bun/Cre Ratio 13 12 - 20      ESTIMATED GLOMERULAR FILTRATION RATE 42 (L) >60 ml/min/1.73m2    Calcium 9.2 8.5 - 10.1 MG/DL    Total Bilirubin 1.2 (H) 0.2 - 1.0 MG/DL    ALT 40 16 - 61 U/L     (H) 10 - 38 U/L    Alkaline Phosphatase 118 (H) 45 - 117 U/L    Total Protein 8.3 (H) 6.4 - 8.2 g/dL    Albumin 2.5 (L) 3.4 - 5.0 g/dL    Globulin 5.8 (H) 2.0 - 4.0 g/dL    Albumin/Globulin Ratio 0.4 (L) 0.8 - 1.7     Basic Metabolic Panel    Collection Time: 02/11/23  7:35 AM   Result Value Ref Range    Sodium 138 136 - 145 mmol/L    Potassium 4.0 3.5 - 5.5 mmol/L    Chloride 106 100 - 111 mmol/L    CO2 26 21 - 32 mmol/L    Anion Gap 6 3.0 - 18 mmol/L    Glucose 128 (H) 74 - 99 mg/dL    BUN 23 (H) 7.0 - 18 MG/DL    Creatinine 1.07 0.6 - 1.3 MG/DL    Bun/Cre Ratio 21 (H) 12 - 20      Est, Glom Filt Rate >60 >60 ml/min/1.73m2    Calcium 8.7 8.5 - 10.1 MG/DL   CBC    Collection Time: 02/11/23  7:35 AM   Result Value Ref Range    WBC 3.6 (L) 4.6 - 13.2 K/uL    RBC 3.96 (L) 4.35 - 5.65 M/uL    Hemoglobin 12.6 (L) 13.0 - 16.0 g/dL    Hematocrit 36.9 36.0 - 48.0 %    MCV 93.2 78.0 - 100.0 FL    MCH 31.8 24.0 - 34.0 PG    MCHC 34.1 31.0 - 37.0 g/dL    RDW 15.1 (H) 11.6 - 14.5 %    Platelets 71 (L) 431 - 420 K/uL    MPV 10.9 9.2 - 11.8 FL    Nucleated RBCs 0.0 0  WBC    nRBC 0.00 0.00 - 0.01 K/uL     Total time is greater than 40 minutes    Signed By: Constantine Guzman MD     February 11, 2023      Disclaimer: Sections of this note are dictated using utilizing voice recognition software, which may have resulted in some phonetic based errors in grammar and contents. Even though attempts were made to correct all the mistakes, some may have been missed, and remained in the body of the document. If questions arise, please contact our department.

## 2023-02-12 ENCOUNTER — APPOINTMENT (OUTPATIENT)
Facility: HOSPITAL | Age: 68
DRG: 196 | End: 2023-02-12
Payer: MEDICARE

## 2023-02-12 LAB
ANION GAP SERPL CALC-SCNC: 5 MMOL/L (ref 3–18)
BACTERIA SPEC CULT: NORMAL
BUN SERPL-MCNC: 17 MG/DL (ref 7–18)
BUN/CREAT SERPL: 17 (ref 12–20)
CALCIUM SERPL-MCNC: 9 MG/DL (ref 8.5–10.1)
CHLORIDE SERPL-SCNC: 106 MMOL/L (ref 100–111)
CO2 SERPL-SCNC: 25 MMOL/L (ref 21–32)
CREAT SERPL-MCNC: 1.03 MG/DL (ref 0.6–1.3)
GLUCOSE SERPL-MCNC: 189 MG/DL (ref 74–99)
POTASSIUM SERPL-SCNC: 4.3 MMOL/L (ref 3.5–5.5)
SERVICE CMNT-IMP: NORMAL
SODIUM SERPL-SCNC: 136 MMOL/L (ref 136–145)

## 2023-02-12 PROCEDURE — 6370000000 HC RX 637 (ALT 250 FOR IP): Performed by: HOSPITALIST

## 2023-02-12 PROCEDURE — 94640 AIRWAY INHALATION TREATMENT: CPT

## 2023-02-12 PROCEDURE — 6360000002 HC RX W HCPCS: Performed by: HOSPITALIST

## 2023-02-12 PROCEDURE — 2140000001 HC CVICU INTERMEDIATE R&B

## 2023-02-12 PROCEDURE — 2580000003 HC RX 258: Performed by: HOSPITALIST

## 2023-02-12 PROCEDURE — 87106 FUNGI IDENTIFICATION YEAST: CPT

## 2023-02-12 PROCEDURE — 87070 CULTURE OTHR SPECIMN AEROBIC: CPT

## 2023-02-12 PROCEDURE — 36415 COLL VENOUS BLD VENIPUNCTURE: CPT

## 2023-02-12 PROCEDURE — 87077 CULTURE AEROBIC IDENTIFY: CPT

## 2023-02-12 PROCEDURE — 2580000003 HC RX 258: Performed by: STUDENT IN AN ORGANIZED HEALTH CARE EDUCATION/TRAINING PROGRAM

## 2023-02-12 PROCEDURE — 80048 BASIC METABOLIC PNL TOTAL CA: CPT

## 2023-02-12 PROCEDURE — 93306 TTE W/DOPPLER COMPLETE: CPT

## 2023-02-12 PROCEDURE — 87116 MYCOBACTERIA CULTURE: CPT

## 2023-02-12 PROCEDURE — 99232 SBSQ HOSP IP/OBS MODERATE 35: CPT | Performed by: STUDENT IN AN ORGANIZED HEALTH CARE EDUCATION/TRAINING PROGRAM

## 2023-02-12 PROCEDURE — 87186 SC STD MICRODIL/AGAR DIL: CPT

## 2023-02-12 PROCEDURE — 2500000003 HC RX 250 WO HCPCS: Performed by: HOSPITALIST

## 2023-02-12 PROCEDURE — 6360000002 HC RX W HCPCS: Performed by: INTERNAL MEDICINE

## 2023-02-12 PROCEDURE — 6360000002 HC RX W HCPCS: Performed by: STUDENT IN AN ORGANIZED HEALTH CARE EDUCATION/TRAINING PROGRAM

## 2023-02-12 PROCEDURE — 87102 FUNGUS ISOLATION CULTURE: CPT

## 2023-02-12 RX ORDER — METHYLPREDNISOLONE SODIUM SUCCINATE 40 MG/ML
40 INJECTION, POWDER, LYOPHILIZED, FOR SOLUTION INTRAMUSCULAR; INTRAVENOUS EVERY 6 HOURS
Status: DISCONTINUED | OUTPATIENT
Start: 2023-02-12 | End: 2023-02-17

## 2023-02-12 RX ADMIN — SODIUM CHLORIDE, PRESERVATIVE FREE 10 ML: 5 INJECTION INTRAVENOUS at 09:09

## 2023-02-12 RX ADMIN — IPRATROPIUM BROMIDE AND ALBUTEROL SULFATE 1 AMPULE: 2.5; .5 SOLUTION RESPIRATORY (INHALATION) at 08:29

## 2023-02-12 RX ADMIN — AMLODIPINE BESYLATE 10 MG: 10 TABLET ORAL at 08:58

## 2023-02-12 RX ADMIN — LORAZEPAM 1 MG: 1 TABLET ORAL at 14:08

## 2023-02-12 RX ADMIN — IPRATROPIUM BROMIDE AND ALBUTEROL SULFATE 1 AMPULE: 2.5; .5 SOLUTION RESPIRATORY (INHALATION) at 16:04

## 2023-02-12 RX ADMIN — METHYLPREDNISOLONE SODIUM SUCCINATE 40 MG: 40 INJECTION, POWDER, FOR SOLUTION INTRAMUSCULAR; INTRAVENOUS at 19:49

## 2023-02-12 RX ADMIN — ARFORMOTEROL TARTRATE 15 MCG: 15 SOLUTION RESPIRATORY (INHALATION) at 08:35

## 2023-02-12 RX ADMIN — BUDESONIDE 250 MCG: 0.25 SUSPENSION RESPIRATORY (INHALATION) at 08:51

## 2023-02-12 RX ADMIN — DOXYCYCLINE 100 MG: 100 INJECTION, POWDER, LYOPHILIZED, FOR SOLUTION INTRAVENOUS at 08:58

## 2023-02-12 RX ADMIN — ASPIRIN 81 MG: 81 TABLET, COATED ORAL at 08:58

## 2023-02-12 RX ADMIN — SODIUM CHLORIDE, PRESERVATIVE FREE 10 ML: 5 INJECTION INTRAVENOUS at 18:27

## 2023-02-12 RX ADMIN — METHYLPREDNISOLONE SODIUM SUCCINATE 40 MG: 40 INJECTION, POWDER, FOR SOLUTION INTRAMUSCULAR; INTRAVENOUS at 06:48

## 2023-02-12 RX ADMIN — METHYLPREDNISOLONE SODIUM SUCCINATE 40 MG: 40 INJECTION, POWDER, FOR SOLUTION INTRAMUSCULAR; INTRAVENOUS at 13:35

## 2023-02-12 RX ADMIN — PANTOPRAZOLE SODIUM 40 MG: 40 TABLET, DELAYED RELEASE ORAL at 06:48

## 2023-02-12 RX ADMIN — SODIUM CHLORIDE, PRESERVATIVE FREE 10 ML: 5 INJECTION INTRAVENOUS at 02:17

## 2023-02-12 RX ADMIN — IPRATROPIUM BROMIDE AND ALBUTEROL SULFATE 1 AMPULE: 2.5; .5 SOLUTION RESPIRATORY (INHALATION) at 13:35

## 2023-02-12 RX ADMIN — IPRATROPIUM BROMIDE AND ALBUTEROL SULFATE 1 AMPULE: 2.5; .5 SOLUTION RESPIRATORY (INHALATION) at 01:01

## 2023-02-12 RX ADMIN — CEFTRIAXONE 2000 MG: 2 INJECTION, POWDER, FOR SOLUTION INTRAMUSCULAR; INTRAVENOUS at 08:58

## 2023-02-12 RX ADMIN — AZITHROMYCIN MONOHYDRATE 500 MG: 500 INJECTION, POWDER, LYOPHILIZED, FOR SOLUTION INTRAVENOUS at 16:42

## 2023-02-12 ASSESSMENT — PAIN SCALES - GENERAL
PAINLEVEL_OUTOF10: 0

## 2023-02-12 NOTE — CONSULTS
[facility]  Pulmonary, Critical Care, and Sleep Medicine  Progress note    Name: Shai Gonzalez MRN: [id number]   : 1955 Hospital: [facility]   Date: 2023        IMPRESSION:   Acute hypoxemic respiratory failure - CT with diffuse upper-mid lobe predominant GGOs with interstitial thickening (new since CT chest in 2022) on underlying emphysema. Differential is extensive and includes inhalational lung injury, hypersensitivity pneumonitis, inflammatory interstitial pneumonitis, or infection to include potential atypical/opportunistic infections. Rapid flu & COVID negative; respiratory PCR negative. Procalcitonin elevated at 2.63. ESR/CRP elevated. Currently on 10L nasal cannula   Centribolular emphysema  Tobacco dependence  Polysubstance abuse, heroin abuse, enrolled in methadone program  Acute kidney injury, resolved  Cirrhosis of the liver with associated portal hypertension  Elevated liver enzymes likely secondary to alcohol use with AST/ALT ratio >2  Mild leukopenia with lymphopenia  Thrombocytopenia likely secondary to chronic liver disease      PLAN:   Supplemental oxygen to maintain SpO2 88-94% --> increasing to [facility] given nursing reports of desaturations with exertion; discussed with Dr. Orozco Her  Bronchial hygiene protocol  Bronchodilators: Brovana BID and Duoneb q4h  Steroids: Increasing Solumedrol IV to 40mg q6h (which is approx 2mg/kg); may need to adjust based on response/etiology. Pulmicort inh BID  Antibiotics: Currently on ceftriaxone (D2) and changed doxycycline to azithromycin (D1) for Legionella coverage/antiinflammatory properties (Qtc normal)  Aspiration precautions  Need for further diagnostics: Echo pending; ILD serologies, HIV, fungitell, galactomannan, sputum cultures to include AFB and fungal pending. Will need interval CT imaging to assess response to therapy.  If no improvement, may need diagnostic bronchoscopy with BAL +/- transbronchial biopsies. Will need to assess home oxygen when ready for discharge  PT, OOB and ambulate  DVT prophylaxis with SCDs (chemical had been held in setting of his thrombocytopenia)  Will follow      Subjective/Interval History:   Initial HPI/Interval:   This patient has been seen and evaluated at the request of Dr. Yari Rivera for respiratory failure. Patient is a 69 y.o. male with a history of cirrhosis with portal HTN, tobacco dependence, and polysubstance abuse who presented to the [facility] ER with approx 3 days of progressive SOB/QUIROZ. States that prior to that, he had been in his usual state of health. Denies any known sick contacts. Reports associated mostly dry cough (scant yellow mucus a couple of days ago), no hemoptysis. Has some chest pain associated with coughing. Denies any baseline respiratory problems or cardiac problems. Reports associated subjective fevers, chills, sweats, and decreased appetite. Has not noticed any weight loss. Vomited once, but no persistent nausea, vomiting, or diarrhea and denies any abdominal pain. Has chronic pain in his knees; had surgery in , but persistent pain. No new rashes. He currently smokes approx 1ppd and marijuana cigarettes, also snorts heroin, most recently in the days preceding admission. Uses a humidifier in the home. No pets/birds. No known family history of lung disease. Drinks about 4 cans of beer/day.     23   No acute events overnight. Patient denies any significant change in symptoms. Reports continuing to feel weak. Still very dyspneic with exertion, even just to the bathroom. Was able to get some sputum up for culture, but cough remains mostly dry. Appetite still decreased. No subjective fevers or night sweats overnight. ROS:Pertinent items are noted in HPI.     Objective:   Vital Signs:    BP (!) 166/79   Pulse 69   Temp 97.4 °F (36.3 °C) (Oral)   Resp 20   Ht 5' 11\" (1.803 m)   SpO2 90%   BMI 24.55 kg/m²             Temp (24hrs), Av.1 °F (36.7 °C), Min:97.3 °F (36.3 °C), Max:99.6 °F (37.6 °C)       Intake/Output:   Last shift:      02/12 0701 - 02/12 1900  In: -   Out: 450 [Urine:450]  Last 3 shifts: 02/10 1901 - 02/12 0700  In: 920 [P.O.:720]  Out: 2400 [Urine:2400]    Intake/Output Summary (Last 24 hours) at 2/12/2023 1349  Last data filed at 2/12/2023 0858  Gross per 24 hour   Intake 920 ml   Output 1850 ml   Net -930 ml        Physical Exam:    General: alert, oriented times 3, cooperative, and moderately ill   HEENT: NCAT   Neck: No abnormally enlarged lymph nodes. Chest: normal   Lungs: Fewer but persistent end inspiratory squeaks, more pronounced in the upper lung fields. Heart: Regular rate and rhythm, S1S2 present, or without murmur or extra heart sounds   Abdomen: abdomen is soft without significant tenderness, masses, organomegaly or guarding   Extremity: none edema   Neuro: alert, grossly nonfocal   Skin: Skin color, texture, turgor normal. No rashes or lesions        DATA:  Labs:  Recent Labs     02/10/23  1432 02/11/23  0735   WBC 5.6 3.6*   HGB 14.5 12.6*   HCT 42.2 36.9   PLT 89* 71*     Recent Labs     02/10/23  1618 02/11/23  0735 02/11/23  1805 02/12/23  0437   * 138  --  136   K 4.4 4.0  --  4.3   CL 99* 106  --  106   CO2 28 26  --  25   BUN 23* 23*  --  17   ALT 40  --   --   --    INR  --   --  1.1  --      No results for input(s): PH, PCO2, PO2, HCO3, FIO2 in the last 72 hours. Imaging:  [x]I have personally reviewed the patients radiographs  XR Results (most recent):  XR CHEST (2 VW) 02/10/2023    Narrative  EXAM:  XR CHEST PA LAT    INDICATION:   sob    COMPARISON: Chest radiograph April 29, 2022. FINDINGS: Multifocal interstitial and airspace opacities, greatest in the right  middle lobe. No pneumothorax or pleural effusion. Calcified aortic arch. Cardiomediastinal contours are otherwise unremarkable. No acute osseous  findings.     Impression  Multifocal interstitial and airspace opacities, greatest in the right middle  lobe. Findings likely represent multifocal pneumonia. CT Results (most recent):  CTA CHEST W WO CONTRAST 02/10/2023    Narrative  EXAM: CT Angiogram of the Chest    CLINICAL INDICATION:  rule out pe . Smoker with hypertension. Patient having  shortness of breath and bodyaches. TECHNIQUE: CT angiogram of the chest performed. MIPS performed    All CT scans at this facility are performed using dose optimization technique as  appropriate to a performed exam, to include automated exposure control,  adjustment of the mA and/or kV according to patient size (including appropriate  matching for site specific examination) or use of iterative reconstruction  technique. IV CONTRAST: 100 cc of Isovue 370    COMPARISON: None    FINDINGS:  Limitations: Breathing motion is present. Thyroid: Unremarkable. Mediastinum: Hilar adenopathy is noted. There is subcarinal adenopathy. Heart: Unremarkable    Pericardium: Unremarkable    Aorta: No evidence of aortic dissection or aneurysm. Pulmonary Arteries: No evidence of pulmonary embolus. Trachea and Bronchi: Unremarkable. Pleura: No evidence of pleural effusion. Lungs: Centrilobular emphysematous changes are present. There is interstitial  thickening and groundglass changes throughout the lungs most pronounced in the  mid portions. Axilla/Chest wall: Unremarkable. Upper Abdomen: No acute findings. Musculoskeletal: No acute osseous findings. Impression  1. Findings concerning for interstitial pneumonitis possibly some process like  alveolar proteinosis or hypersensitivity pneumonitis    2. No evidence of pulmonary embolus or aortic dissection. 3.  Mediastinal and hilar adenopathy. Consider short-term CT 3 months to  evaluate stability. 4.  Centrilobular emphysematous changes. No valid procedures specified.    High complexity decision making was performed during the evaluation of this patient at high risk for decompensation with multiple organ involvement     Above mentioned total time spent on reviewing the case/medical record/data/notes/EMR/patient examination/documentation/coordinating care with nurse/consultants, exclusive of procedures with complex decision making performed and > 50% time spent in face to face evaluation.     Jerica Camacho DO  Pulmonary & Critical Care Medicine

## 2023-02-12 NOTE — PLAN OF CARE
Problem: Pain  Goal: Verbalizes/displays adequate comfort level or baseline comfort level  2/12/2023 0336 by Connie Weston RN  Outcome: Progressing  2/11/2023 1821 by Sandra Pennington RN  Outcome: Progressing     Problem: Safety - Adult  Goal: Free from fall injury  2/12/2023 0336 by Connie Weston RN  Outcome: Progressing  2/11/2023 1821 by Sandra Pennington RN  Outcome: Progressing     Problem: ABCDS Injury Assessment  Goal: Absence of physical injury  2/12/2023 0336 by Connie Weston RN  Outcome: Progressing  2/11/2023 1821 by Sandra Pennington RN  Outcome: Progressing

## 2023-02-12 NOTE — PROGRESS NOTES
1815: Pt tranferred to [address] from [facility]. Pt A/Ox4 with no complaints of pain and in NAD. Pt transferred with Hi-Flow O2 at 40 LPM. Dual skin assessment performed with Lakhwinder Pacheco RN. Pt educated on how to use the call light for assistance with all needs. Skin Assessment:     Right side    Old scar from previous infection on underside of right FA.      Old skin graph on Upper Right thigh    Healed right knee replacement incision    Left side     Old scar on lower left calf    Old scar on left knee cap

## 2023-02-12 NOTE — PROGRESS NOTES
PT eval order received and chart reviewed. Spoke with patient who reports they are at functional baseline for mobility. Patient reports he has been amb to the bathroom without any issues and that they have no further need for assist or AD/DME. Will sign off per protocol and educated patient that if their functional mobility needs should change that they may have MD re-order PT at any time. Thank you for this referral.     Saravanan Jung, PT, DPT.

## 2023-02-12 NOTE — PROGRESS NOTES
Patient oxygen sustaining at 89%. Dr. Juma Shelby paged. Patient currently on 10 L nasal cannula. Dr. Juma Shelby to put order in for high flow.

## 2023-02-12 NOTE — PROGRESS NOTES
[facility]  Progress Note    Patient: John Zhao Age: 69 y.o. : 1955 MR#: [id number] SSN: [id number]  Date/Time: 2023    Subjective:     Patient continues to have pleuritic chest pain. He remains on 10 L oxygen. Denies any cough. No nausea or vomiting. No headaches or dizziness. No abdominal pain. Assessment:   1. Acute hypoxic respiratory failure due to #2 and #3  2. Interstitial pneumonitis could be due to hypersensitivity pneumonitis versus alveolar proteinosis  3. COPD/emphysema  4. GAYATHRI, improved  5. Chronic thrombocytopenia  6. Polysubstance use disorder  7. Hypertension  8. GERD  9. History of constipation  10. Medical noncompliance    PLAN:    CT chest report reviewed. No PE  Continue current oxygen and nebulizer. Pulmonology has been consulted. Discussed with pulmonary, patient will be transferred to stepdown unit since he will require high flow. Patient be maintained on Rocephin and doxycycline at this point. We will also start patient on low-dose IV steroid for treatment of presumed hypersensitivity pneumonitis  We will discontinue IV fluid and monitor renal function  Platelet counts are 71 today. Patient has a known history of cirrhosis.   Continue Norvasc for hypertension management  Continue MiraLAX and senna \"for constipation treatment  Continue Protonix for GERD treatment  PT and OT to follow this patient    Discharge barriers for today: High flow oxygen, pulmonology evaluation, pending blood culture report    Anticipated date of discharge: 2023 if remains stable clinically    Case discussed with:  []Patient  []Family  []Nursing  []Case Management  DVT Prophylaxis:  []Lovenox  []Hep SQ  []SCDs  []Coumadin   []On Heparin gtt    Objective:   VS: BP (!) 166/79   Pulse 69   Temp 97.4 °F (36.3 °C) (Oral)   Resp 20   Ht 5' 11\" (1.803 m)   SpO2 90%   BMI 24.55 kg/m²    Tmax/24hrs: Temp (24hrs), Av.1 °F (36.7 °C), Min:97.3 °F (36.3 °C), Max:99.6 °F (37.6 °C)    Input/Output:   Intake/Output Summary (Last 24 hours) at 2/12/2023 1440  Last data filed at 2/12/2023 0858  Gross per 24 hour   Intake 920 ml   Output 1850 ml   Net -930 ml         General appearance - alert, cachectic, nasal cannula in situ, and in no distress  Eyes - sclera anicteric, no pallor  Nose - no obvious nasal discharge. Neck - supple, no JVD, trachea is midline  Chest -diminished air entry noted in bases, rhonchi present. Heart - S1 and S2 normal  Abdomen - soft, nontender, nondistended, Bowel sounds present  Neurological - alert, oriented, normal speech, no focal findings noted, no tremors noted.   Musculoskeletal - no joint tenderness or swelling of knees bilaterally  Extremities - no pedal edema noted      Labs:    Recent Results (from the past 24 hour(s))   CK    Collection Time: 02/11/23  6:05 PM   Result Value Ref Range    Total  39 - 308 U/L   Sedimentation Rate    Collection Time: 02/11/23  6:05 PM   Result Value Ref Range    Sed Rate, Automated 65 (H) 0 - 20 mm/hr   C-Reactive Protein    Collection Time: 02/11/23  6:05 PM   Result Value Ref Range    CRP 5.4 (H) 0 - 0.3 mg/dL   Procalcitonin    Collection Time: 02/11/23  6:05 PM   Result Value Ref Range    Procalcitonin 2.63 ng/mL   Protime-INR    Collection Time: 02/11/23  6:05 PM   Result Value Ref Range    Protime 14.7 11.5 - 15.2 sec    INR 1.1 0.8 - 1.2     Urine Drug Screen    Collection Time: 02/11/23  8:30 PM   Result Value Ref Range    Benzodiazepines, Urine Negative NEG      Barbiturates, Urine Negative NEG      THC, TH-Cannabinol, Urine Negative NEG      Opiates, Urine Negative NEG      PCP, Urine Negative NEG      Cocaine, Urine Negative NEG      Amphetamine, Urine Negative NEG      Methadone, Urine Positive (A) NEG      Comments: (NOTE)    Legionella antigen, urine    Collection Time: 02/11/23  8:30 PM    Specimen: Urine, random   Result Value Ref Range    Legionella Antigen, Urine Negative NEG     Urinalysis with Microscopic    Collection Time: 02/11/23  8:30 PM   Result Value Ref Range    Color, UA DARK YELLOW      Appearance CLEAR      Specific Gravity, UA 1.025 1.005 - 1.030      pH, Urine 6.0 5.0 - 8.0      Protein, UA TRACE (A) NEG mg/dL    Glucose, UA Negative NEG mg/dL    Ketones, Urine Negative NEG mg/dL    Bilirubin Urine Negative NEG      Blood, Urine Negative NEG      Urobilinogen, Urine 2.0 (H) 0.2 - 1.0 EU/dL    Nitrite, Urine Negative NEG      Leukocyte Esterase, Urine Negative NEG      WBC, UA Negative 0 - 4 /hpf    RBC, UA Negative 0 - 5 /hpf    Epithelial Cells UA Negative 0 - 5 /lpf    BACTERIA, URINE Negative NEG /hpf   Strep Pneumoniae Antigen    Collection Time: 02/11/23  8:30 PM    Specimen: Urine, random   Result Value Ref Range    STREP PNEUMONIAE ANTIGEN, URINE Negative NEG     Respiratory Panel, Molecular, with COVID-19 (Restricted: peds pts or suitable admitted adults)    Collection Time: 02/11/23  8:30 PM    Specimen: Nasopharyngeal   Result Value Ref Range    Adenovirus by PCR Not detected NOTD      Coronavirus 229E by PCR Not detected NOTD      Coronavirus HKU1 by PCR Not detected NOTD      Coronavirus NL63 by PCR Not detected NOTD      Coronavirus OC43 by PCR Not detected NOTD      SARS-CoV-2, PCR Not detected NOTD      Human Metapneumovirus by PCR Not detected NOTD      Rhinovirus Enterovirus PCR Not detected NOTD      Influenza A by PCR Not detected NOTD      Influenza B PCR Not detected NOTD      Parainfluenza 1 PCR Not detected NOTD      Parainfluenza 2 PCR Not detected NOTD      Parainfluenza 3 PCR Not detected NOTD      Parainfluenza 4 PCR Not detected NOTD      Respiratory Syncytial Virus by PCR Not detected NOTD      Bordetella parapertussis by PCR Not detected NOTD      Bordetella pertussis by PCR Not detected NOTD      Chlamydophila Pneumonia PCR Not detected NOTD      Mycoplasma pneumo by PCR Not detected NOTD     Basic Metabolic Panel Collection Time: 02/12/23  4:37 AM   Result Value Ref Range    Sodium 136 136 - 145 mmol/L    Potassium 4.3 3.5 - 5.5 mmol/L    Chloride 106 100 - 111 mmol/L    CO2 25 21 - 32 mmol/L    Anion Gap 5 3.0 - 18 mmol/L    Glucose 189 (H) 74 - 99 mg/dL    BUN 17 7.0 - 18 MG/DL    Creatinine 1.03 0.6 - 1.3 MG/DL    Bun/Cre Ratio 17 12 - 20      Est, Glom Filt Rate >60 >60 ml/min/1.73m2    Calcium 9.0 8.5 - 10.1 MG/DL     Total time is greater than 40 minutes    Signed By: Yanci Pierre MD     February 12, 2023      Disclaimer: Sections of this note are dictated using utilizing voice recognition software, which may have resulted in some phonetic based errors in grammar and contents. Even though attempts were made to correct all the mistakes, some may have been missed, and remained in the body of the document. If questions arise, please contact our department.

## 2023-02-12 NOTE — PROGRESS NOTES
Patient O2 sats are at 88% with patient movement and cough. Patient was originally at 86% on 10L nasal cannula. Dr. Kinsey Brown with pulmonology paged for the o2 sats and ordered high flow. Dr. Ani Prescott notified and patient to be transferred due to the patient needing high flow. Order placed. Respiratory notified that patient needs high flow set up.

## 2023-02-13 LAB
ANION GAP SERPL CALC-SCNC: 2 MMOL/L (ref 3–18)
BASOPHILS # BLD: 0 K/UL (ref 0–0.1)
BASOPHILS NFR BLD: 0 % (ref 0–2)
BUN SERPL-MCNC: 19 MG/DL (ref 7–18)
BUN/CREAT SERPL: 22 (ref 12–20)
CALCIUM SERPL-MCNC: 9.2 MG/DL (ref 8.5–10.1)
CHLORIDE SERPL-SCNC: 107 MMOL/L (ref 100–111)
CO2 SERPL-SCNC: 28 MMOL/L (ref 21–32)
CREAT SERPL-MCNC: 0.87 MG/DL (ref 0.6–1.3)
DIFFERENTIAL METHOD BLD: ABNORMAL
ECHO AO ASC DIAM: 2.9 CM
ECHO AO ASCENDING AORTA INDEX: 1.45 CM/M2
ECHO AO ROOT DIAM: 3.1 CM
ECHO AO ROOT INDEX: 1.55 CM/M2
ECHO BSA: 2 M2
ECHO EST RA PRESSURE: 3 MMHG
ECHO LA VOL 2C: 61 ML (ref 18–58)
ECHO LA VOL 4C: 60 ML (ref 18–58)
ECHO LA VOL BP: 57 ML (ref 18–58)
ECHO LA VOL/BSA BIPLANE: 29 ML/M2 (ref 16–34)
ECHO LA VOLUME AREA LENGTH: 61 ML
ECHO LA VOLUME INDEX A2C: 31 ML/M2 (ref 16–34)
ECHO LA VOLUME INDEX A4C: 30 ML/M2 (ref 16–34)
ECHO LA VOLUME INDEX AREA LENGTH: 31 ML/M2 (ref 16–34)
ECHO LV E' LATERAL VELOCITY: 7 CM/S
ECHO LV E' SEPTAL VELOCITY: 7 CM/S
ECHO LV FRACTIONAL SHORTENING: 42 % (ref 28–44)
ECHO LV GLOBAL LONGITUDINAL STRAIN (GLS): -14.8 %
ECHO LV INTERNAL DIMENSION DIASTOLE INDEX: 1.3 CM/M2
ECHO LV INTERNAL DIMENSION DIASTOLIC: 2.6 CM (ref 4.2–5.9)
ECHO LV INTERNAL DIMENSION SYSTOLIC INDEX: 0.75 CM/M2
ECHO LV INTERNAL DIMENSION SYSTOLIC: 1.5 CM
ECHO LV IVSD: 2 CM (ref 0.6–1)
ECHO LV MASS 2D: 184.3 G (ref 88–224)
ECHO LV MASS INDEX 2D: 92.1 G/M2 (ref 49–115)
ECHO LV POSTERIOR WALL DIASTOLIC: 1.6 CM (ref 0.6–1)
ECHO LV RELATIVE WALL THICKNESS RATIO: 1.23
ECHO LVOT AREA: 3.5 CM2
ECHO LVOT DIAM: 2.1 CM
ECHO LVOT MEAN GRADIENT: 8 MMHG
ECHO LVOT PEAK GRADIENT: 11 MMHG
ECHO LVOT PEAK VELOCITY: 1.7 M/S
ECHO LVOT STROKE VOLUME INDEX: 46.4 ML/M2
ECHO LVOT SV: 92.8 ML
ECHO LVOT VTI: 26.8 CM
ECHO MV A VELOCITY: 1.29 M/S
ECHO MV E DECELERATION TIME (DT): 203.4 MS
ECHO MV E VELOCITY: 0.92 M/S
ECHO MV E/A RATIO: 0.71
ECHO MV E/E' LATERAL: 13.14
ECHO MV E/E' RATIO (AVERAGED): 13.14
ECHO MV E/E' SEPTAL: 13.14
ECHO RIGHT VENTRICULAR SYSTOLIC PRESSURE (RVSP): 43 MMHG
ECHO RV MID DIMENSION: 3.6 CM
ECHO RV TAPSE: 2.9 CM (ref 1.7–?)
ECHO TV REGURGITANT MAX VELOCITY: 3.18 M/S
ECHO TV REGURGITANT PEAK GRADIENT: 40 MMHG
EOSINOPHIL # BLD: 0 K/UL (ref 0–0.4)
EOSINOPHIL NFR BLD: 0 % (ref 0–5)
ERYTHROCYTE [DISTWIDTH] IN BLOOD BY AUTOMATED COUNT: 15.4 % (ref 11.6–14.5)
GLUCOSE SERPL-MCNC: 139 MG/DL (ref 74–99)
HCT VFR BLD AUTO: 37.8 % (ref 36–48)
HGB BLD-MCNC: 12.8 G/DL (ref 13–16)
HIV 1+2 AB+HIV1 P24 AG SERPL QL IA: NONREACTIVE
HIV 1/2 RESULT COMMENT: NORMAL
IMM GRANULOCYTES # BLD AUTO: 0 K/UL (ref 0–0.04)
IMM GRANULOCYTES NFR BLD AUTO: 1 % (ref 0–0.5)
LV EF: 63 %
LVEF MODALITY: ABNORMAL
LYMPHOCYTES # BLD: 0.2 K/UL (ref 0.9–3.6)
LYMPHOCYTES NFR BLD: 6 % (ref 21–52)
MCH RBC QN AUTO: 31.3 PG (ref 24–34)
MCHC RBC AUTO-ENTMCNC: 33.9 G/DL (ref 31–37)
MCV RBC AUTO: 92.4 FL (ref 78–100)
MONOCYTES # BLD: 0.1 K/UL (ref 0.05–1.2)
MONOCYTES NFR BLD: 4 % (ref 3–10)
NEUTS SEG # BLD: 2.8 K/UL (ref 1.8–8)
NEUTS SEG NFR BLD: 89 % (ref 40–73)
NRBC # BLD: 0 K/UL (ref 0–0.01)
NRBC BLD-RTO: 0 PER 100 WBC
PLATELET # BLD AUTO: 70 K/UL (ref 135–420)
PMV BLD AUTO: 10.8 FL (ref 9.2–11.8)
POTASSIUM SERPL-SCNC: 4.7 MMOL/L (ref 3.5–5.5)
RBC # BLD AUTO: 4.09 M/UL (ref 4.35–5.65)
SODIUM SERPL-SCNC: 137 MMOL/L (ref 136–145)
WBC # BLD AUTO: 3.1 K/UL (ref 4.6–13.2)

## 2023-02-13 PROCEDURE — 94761 N-INVAS EAR/PLS OXIMETRY MLT: CPT

## 2023-02-13 PROCEDURE — 6360000002 HC RX W HCPCS: Performed by: HOSPITALIST

## 2023-02-13 PROCEDURE — 2700000000 HC OXYGEN THERAPY PER DAY

## 2023-02-13 PROCEDURE — 2580000003 HC RX 258: Performed by: HOSPITALIST

## 2023-02-13 PROCEDURE — 36415 COLL VENOUS BLD VENIPUNCTURE: CPT

## 2023-02-13 PROCEDURE — 2580000003 HC RX 258: Performed by: STUDENT IN AN ORGANIZED HEALTH CARE EDUCATION/TRAINING PROGRAM

## 2023-02-13 PROCEDURE — 2140000001 HC CVICU INTERMEDIATE R&B

## 2023-02-13 PROCEDURE — 6370000000 HC RX 637 (ALT 250 FOR IP): Performed by: HOSPITALIST

## 2023-02-13 PROCEDURE — 99233 SBSQ HOSP IP/OBS HIGH 50: CPT | Performed by: INTERNAL MEDICINE

## 2023-02-13 PROCEDURE — 6360000002 HC RX W HCPCS: Performed by: STUDENT IN AN ORGANIZED HEALTH CARE EDUCATION/TRAINING PROGRAM

## 2023-02-13 PROCEDURE — 80048 BASIC METABOLIC PNL TOTAL CA: CPT

## 2023-02-13 PROCEDURE — 94640 AIRWAY INHALATION TREATMENT: CPT

## 2023-02-13 PROCEDURE — 85025 COMPLETE CBC W/AUTO DIFF WBC: CPT

## 2023-02-13 RX ORDER — IPRATROPIUM BROMIDE AND ALBUTEROL SULFATE 2.5; .5 MG/3ML; MG/3ML
1 SOLUTION RESPIRATORY (INHALATION) EVERY 4 HOURS
Status: DISCONTINUED | OUTPATIENT
Start: 2023-02-13 | End: 2023-02-17

## 2023-02-13 RX ADMIN — METHYLPREDNISOLONE SODIUM SUCCINATE 40 MG: 40 INJECTION, POWDER, FOR SOLUTION INTRAMUSCULAR; INTRAVENOUS at 01:16

## 2023-02-13 RX ADMIN — AZITHROMYCIN MONOHYDRATE 500 MG: 500 INJECTION, POWDER, LYOPHILIZED, FOR SOLUTION INTRAVENOUS at 14:54

## 2023-02-13 RX ADMIN — AMLODIPINE BESYLATE 10 MG: 10 TABLET ORAL at 09:12

## 2023-02-13 RX ADMIN — BUDESONIDE 250 MCG: 0.25 SUSPENSION RESPIRATORY (INHALATION) at 21:13

## 2023-02-13 RX ADMIN — IPRATROPIUM BROMIDE AND ALBUTEROL SULFATE 1 AMPULE: 2.5; .5 SOLUTION RESPIRATORY (INHALATION) at 13:00

## 2023-02-13 RX ADMIN — METHYLPREDNISOLONE SODIUM SUCCINATE 40 MG: 40 INJECTION, POWDER, FOR SOLUTION INTRAMUSCULAR; INTRAVENOUS at 09:12

## 2023-02-13 RX ADMIN — ASPIRIN 81 MG: 81 TABLET, COATED ORAL at 09:12

## 2023-02-13 RX ADMIN — ARFORMOTEROL TARTRATE 15 MCG: 15 SOLUTION RESPIRATORY (INHALATION) at 09:09

## 2023-02-13 RX ADMIN — IPRATROPIUM BROMIDE AND ALBUTEROL SULFATE 1 AMPULE: 2.5; .5 SOLUTION RESPIRATORY (INHALATION) at 09:09

## 2023-02-13 RX ADMIN — ARFORMOTEROL TARTRATE 15 MCG: 15 SOLUTION RESPIRATORY (INHALATION) at 21:13

## 2023-02-13 RX ADMIN — CEFTRIAXONE 2000 MG: 2 INJECTION, POWDER, FOR SOLUTION INTRAMUSCULAR; INTRAVENOUS at 09:00

## 2023-02-13 RX ADMIN — PANTOPRAZOLE SODIUM 40 MG: 40 TABLET, DELAYED RELEASE ORAL at 06:38

## 2023-02-13 RX ADMIN — BUDESONIDE 250 MCG: 0.25 SUSPENSION RESPIRATORY (INHALATION) at 09:09

## 2023-02-13 RX ADMIN — IPRATROPIUM BROMIDE AND ALBUTEROL SULFATE 1 AMPULE: 2.5; .5 SOLUTION RESPIRATORY (INHALATION) at 21:12

## 2023-02-13 RX ADMIN — SODIUM CHLORIDE, PRESERVATIVE FREE 10 ML: 5 INJECTION INTRAVENOUS at 01:16

## 2023-02-13 RX ADMIN — SENNOSIDES AND DOCUSATE SODIUM 2 TABLET: 50; 8.6 TABLET ORAL at 09:12

## 2023-02-13 RX ADMIN — METHYLPREDNISOLONE SODIUM SUCCINATE 40 MG: 40 INJECTION, POWDER, FOR SOLUTION INTRAMUSCULAR; INTRAVENOUS at 14:54

## 2023-02-13 RX ADMIN — METHYLPREDNISOLONE SODIUM SUCCINATE 40 MG: 40 INJECTION, POWDER, FOR SOLUTION INTRAMUSCULAR; INTRAVENOUS at 21:09

## 2023-02-13 RX ADMIN — POLYETHYLENE GLYCOL 3350 17 G: 17 POWDER, FOR SOLUTION ORAL at 09:12

## 2023-02-13 RX ADMIN — SODIUM CHLORIDE, PRESERVATIVE FREE 10 ML: 5 INJECTION INTRAVENOUS at 10:59

## 2023-02-13 ASSESSMENT — PAIN SCALES - GENERAL
PAINLEVEL_OUTOF10: 0

## 2023-02-13 NOTE — PROGRESS NOTES
[facility]  Pulmonary, Critical Care, and Sleep Medicine  Progress note    Name: Kailash Stephen MRN: [id number]   : 1955 Hospital: [address] [place] [address] Dr   Date: 2023        IMPRESSION:   Acute hypoxemic respiratory failure - CT with diffuse upper-mid lobe predominant GGOs with interstitial thickening (new since CT chest in 2022) on underlying emphysema. Differential is extensive and includes inhalational lung injury, hypersensitivity pneumonitis, inflammatory interstitial pneumonitis, or infection to include potential atypical/opportunistic infections. Radiologic pattern suggestive of diffuse infiltrative alveolar and interstitial process. Rapid flu & COVID negative; respiratory PCR negative. Procalcitonin elevated at 2.63. ESR/CRP elevated. Patient requiring high flow 40 L 80% FiO2 but clinically compensated. Centribolular emphysema  Tobacco dependence  Polysubstance abuse, heroin abuse, enrolled in methadone program  Acute kidney injury, resolved  Cirrhosis of the liver with associated portal hypertension  Elevated liver enzymes likely secondary to alcohol use with AST/ALT ratio >2  Mild leukopenia with lymphopenia  Thrombocytopenia likely secondary to chronic liver disease      PLAN:   Supplemental oxygen to maintain SpO2 88-94% --continue high flow FiO2-we will wean as allowed by patient's oxygenation  Bronchial hygiene protocol  Bronchodilators: Brovana BID and Duoneb q4h  Steroids: Increased Solumedrol IV to 40mg q6h (which is approx 2mg/kg); may need to adjust based on response/etiology. Pulmicort inh BID  Antibiotics: Currently on ceftriaxone (D3) and changed doxycycline to azithromycin (D2) for Legionella coverage/antiinflammatory properties (Qtc normal)  Aspiration precautions  Need for further diagnostics: Echo pending; ILD serologies, HIV, fungitell, galactomannan, sputum cultures to include AFB and fungal pending.  Will need interval CT imaging to assess response to therapy. If no improvement, may need diagnostic bronchoscopy with BAL +/- transbronchial biopsies. Will need to assess home oxygen when ready for discharge  PT, OOB and ambulate  DVT prophylaxis with SCDs (chemical had been held in setting of his thrombocytopenia)  Will follow      Subjective/Interval History:   Initial HPI/Interval:   This patient has been seen and evaluated at the request of Dr. Dasia Helton for respiratory failure. Patient is a 69 y.o. male with a history of cirrhosis with portal HTN, tobacco dependence, and polysubstance abuse who presented to the [facility] ER with approx 3 days of progressive SOB/QUIROZ. States that prior to that, he had been in his usual state of health. Denies any known sick contacts. Reports associated mostly dry cough (scant yellow mucus a couple of days ago), no hemoptysis. Has some chest pain associated with coughing. Denies any baseline respiratory problems or cardiac problems. Reports associated subjective fevers, chills, sweats, and decreased appetite. Has not noticed any weight loss. Vomited once, but no persistent nausea, vomiting, or diarrhea and denies any abdominal pain. Has chronic pain in his knees; had surgery in 2022, but persistent pain. No new rashes. He currently smokes approx 1ppd and marijuana cigarettes, also snorts heroin, most recently in the days preceding admission. Uses a humidifier in the home. No pets/birds. No known family history of lung disease. Drinks about 4 cans of beer/day.     02/13/23     No acute events overnight. Patient denies any significant change in symptoms. Reports continuing to feel weak. Still very dyspneic with exertion, even just to the bathroom. Was able to get some sputum up for culture, but cough remains mostly dry. Appetite still decreased. No subjective fevers or night sweats overnight. All ordered test results are pending  Sputum growing few yeast  Afebrile    ROS:Pertinent items are noted in HPI.     Objective: Vital Signs:    BP (!) 146/76   Pulse 65   Temp 97.8 °F (36.6 °C) (Oral)   Resp 22   Ht 5' 11\" (1.803 m)   Wt 176 lb (79.8 kg)   SpO2 95%   BMI 24.55 kg/m²             Temp (24hrs), Av.8 °F (36.6 °C), Min:97.5 °F (36.4 °C), Max:98.1 °F (36.7 °C)       Intake/Output:   Last shift:      No intake/output data recorded. Last 3 shifts:  1901 -  0700  In: 1220 [P.O.:1020]  Out: 2450 [Urine:2450]    Intake/Output Summary (Last 24 hours) at 2023 1040  Last data filed at 2023 8757  Gross per 24 hour   Intake 300 ml   Output 600 ml   Net -300 ml          Physical Exam:    General: alert, oriented times 3, cooperative, and moderately ill   HEENT: NCAT   Neck: No abnormally enlarged lymph nodes. Chest: normal   Lungs: Fewer but persistent end inspiratory squeaks, more pronounced in the upper lung fields. Heart: Regular rate and rhythm, S1S2 present, or without murmur or extra heart sounds   Abdomen: abdomen is soft without significant tenderness, masses, organomegaly or guarding   Extremity: none edema   Neuro: alert, grossly nonfocal   Skin: Skin color, texture, turgor normal. No rashes or lesions        DATA:  Labs:  Recent Labs     02/10/23  1432 23  0735 23  0307   WBC 5.6 3.6* 3.1*   HGB 14.5 12.6* 12.8*   HCT 42.2 36.9 37.8   PLT 89* 71* 70*       Recent Labs     02/10/23  1618 23  0735 23  1805 23  0437 23  0307   * 138  --  136 137   K 4.4 4.0  --  4.3 4.7   CL 99* 106  --  106 107   CO2 28 26  --  25 28   BUN 23* 23*  --  17 19*   ALT 40  --   --   --   --    INR  --   --  1.1  --   --        No results for input(s): PH, PCO2, PO2, HCO3, FIO2 in the last 72 hours. Imaging:  [x]I have personally reviewed the patients radiographs  XR Results (most recent):  XR CHEST (2 VW) 02/10/2023    Narrative  EXAM:  XR CHEST PA LAT    INDICATION:   sob    COMPARISON: Chest radiograph 2022.     FINDINGS: Multifocal interstitial and airspace opacities, greatest in the right  middle lobe. No pneumothorax or pleural effusion. Calcified aortic arch. Cardiomediastinal contours are otherwise unremarkable. No acute osseous  findings. Impression  Multifocal interstitial and airspace opacities, greatest in the right middle  lobe. Findings likely represent multifocal pneumonia. CT Results (most recent):  CTA CHEST W WO CONTRAST 02/10/2023    Narrative  EXAM: CT Angiogram of the Chest    CLINICAL INDICATION:  rule out pe . Smoker with hypertension. Patient having  shortness of breath and bodyaches. TECHNIQUE: CT angiogram of the chest performed. MIPS performed    All CT scans at this facility are performed using dose optimization technique as  appropriate to a performed exam, to include automated exposure control,  adjustment of the mA and/or kV according to patient size (including appropriate  matching for site specific examination) or use of iterative reconstruction  technique. IV CONTRAST: 100 cc of Isovue 370    COMPARISON: None    FINDINGS:  Limitations: Breathing motion is present. Thyroid: Unremarkable. Mediastinum: Hilar adenopathy is noted. There is subcarinal adenopathy. Heart: Unremarkable    Pericardium: Unremarkable    Aorta: No evidence of aortic dissection or aneurysm. Pulmonary Arteries: No evidence of pulmonary embolus. Trachea and Bronchi: Unremarkable. Pleura: No evidence of pleural effusion. Lungs: Centrilobular emphysematous changes are present. There is interstitial  thickening and groundglass changes throughout the lungs most pronounced in the  mid portions. Axilla/Chest wall: Unremarkable. Upper Abdomen: No acute findings. Musculoskeletal: No acute osseous findings. Impression  1. Findings concerning for interstitial pneumonitis possibly some process like  alveolar proteinosis or hypersensitivity pneumonitis    2. No evidence of pulmonary embolus or aortic dissection.     3. Mediastinal and hilar adenopathy. Consider short-term CT 3 months to  evaluate stability. 4.  Centrilobular emphysematous changes. High complexity decision making was performed during the evaluation of this patient at high risk for decompensation with multiple organ involvement     Above mentioned total time spent on reviewing the case/medical record/data/notes/EMR/patient examination/documentation/coordinating care with nurse/consultants, exclusive of procedures with complex decision making performed and > 50% time spent in face to face evaluation.     Render MD Adela, MD  Pulmonary & Critical Care Medicine

## 2023-02-13 NOTE — PROGRESS NOTES
[facility]  Progress Note    Patient: Angelita Gibson Age: 69 y.o. : 1955 MR#: [id number] SSN: [id number]  Date/Time: 2023    Subjective:     Patient seen and evaluated, sitting up in bed, no acute distress. No new events overnight. Assessment:   1. Acute hypoxic respiratory failure due to #2 and #3  2. Interstitial pneumonitis could be due to hypersensitivity pneumonitis versus alveolar proteinosis  3. COPD/emphysema  4. GAYATHRI, improved  5. Chronic thrombocytopenia  6. Polysubstance use disorder  7. Hypertension  8. GERD  9. History of constipation  10. Medical noncompliance    PLAN:    CT chest report reviewed. No PE -shows diffuse upper mid lobe predominant GGO's with interstitial thickening and underlying emphysema. Differential is extensive and includes inhalational lung injury, hypersensitivity pneumonitis and inflammatory interstitial pneumonitis. Continue current oxygen and nebulizer. Pulmonology has been consulted. Discussed with pulmonary, patient will be transferred to stepdown unit since he will require high flow. Patient be maintained on Rocephin and doxycycline at this point. We will also start patient on low-dose IV steroid for treatment of presumed hypersensitivity pneumonitis  We will discontinue IV fluid and monitor renal function  Platelet counts are 70 today. Patient has a known history of cirrhosis.   Continue Norvasc for hypertension management  Continue MiraLAX and senna \"for constipation treatment  Continue Protonix for GERD treatment  PT and OT to follow this patient    Discharge barriers for today: High flow oxygen, pulmonology evaluation, pending blood culture report    Anticipated date of discharge: 2023 if remains stable clinically    Case discussed with:  []Patient  []Family  []Nursing  []Case Management  DVT Prophylaxis:  []Lovenox  []Hep SQ  []SCDs  []Coumadin   []On Heparin gtt    Objective: VS: BP (!) 141/74   Pulse 57   Temp 97.5 °F (36.4 °C) (Oral)   Resp 26   Ht 5' 11\" (1.803 m)   Wt 176 lb (79.8 kg)   SpO2 100%   BMI 24.55 kg/m²    Tmax/24hrs: Temp (24hrs), Av.7 °F (36.5 °C), Min:97.4 °F (36.3 °C), Max:98.1 °F (36.7 °C)    Input/Output:   Intake/Output Summary (Last 24 hours) at 2023 1641  Last data filed at 2023 7393  Gross per 24 hour   Intake 300 ml   Output 600 ml   Net -300 ml         General appearance - alert, cachectic, nasal cannula in situ, and in no distress  Eyes - sclera anicteric, no pallor  Nose - no obvious nasal discharge. Neck - supple, no JVD, trachea is midline  Chest -diminished air entry noted in bases, rhonchi present. Heart - S1 and S2 normal  Abdomen - soft, nontender, nondistended, Bowel sounds present  Neurological - alert, oriented, normal speech, no focal findings noted, no tremors noted.   Musculoskeletal - no joint tenderness or swelling of knees bilaterally  Extremities - no pedal edema noted      Labs:    Recent Results (from the past 24 hour(s))   Basic Metabolic Panel    Collection Time: 23  3:07 AM   Result Value Ref Range    Sodium 137 136 - 145 mmol/L    Potassium 4.7 3.5 - 5.5 mmol/L    Chloride 107 100 - 111 mmol/L    CO2 28 21 - 32 mmol/L    Anion Gap 2 (L) 3.0 - 18 mmol/L    Glucose 139 (H) 74 - 99 mg/dL    BUN 19 (H) 7.0 - 18 MG/DL    Creatinine 0.87 0.6 - 1.3 MG/DL    Bun/Cre Ratio 22 (H) 12 - 20      Est, Glom Filt Rate >60 >60 ml/min/1.73m2    Calcium 9.2 8.5 - 10.1 MG/DL   CBC with Auto Differential    Collection Time: 23  3:07 AM   Result Value Ref Range    WBC 3.1 (L) 4.6 - 13.2 K/uL    RBC 4.09 (L) 4.35 - 5.65 M/uL    Hemoglobin 12.8 (L) 13.0 - 16.0 g/dL    Hematocrit 37.8 36.0 - 48.0 %    MCV 92.4 78.0 - 100.0 FL    MCH 31.3 24.0 - 34.0 PG    MCHC 33.9 31.0 - 37.0 g/dL    RDW 15.4 (H) 11.6 - 14.5 %    Platelets 70 (L) 030 - 420 K/uL    MPV 10.8 9.2 - 11.8 FL    Nucleated RBCs 0.0 0  WBC    nRBC 0.00 0.00 - 0.01 K/uL    Seg Neutrophils 89 (H) 40 - 73 %    Lymphocytes 6 (L) 21 - 52 %    Monocytes 4 3 - 10 %    Eosinophils % 0 0 - 5 %    Basophils 0 0 - 2 %    Immature Granulocytes 1 (H) 0.0 - 0.5 %    Segs Absolute 2.8 1.8 - 8.0 K/UL    Absolute Lymph # 0.2 (L) 0.9 - 3.6 K/UL    Absolute Mono # 0.1 0.05 - 1.2 K/UL    Absolute Eos # 0.0 0.0 - 0.4 K/UL    Basophils Absolute 0.0 0.0 - 0.1 K/UL    Absolute Immature Granulocyte 0.0 0.00 - 0.04 K/UL    Differential Type AUTOMATED       Total time is greater than 40 minutes    Signed By: Eddy Desai MD     February 13, 2023      Disclaimer: Sections of this note are dictated using utilizing voice recognition software, which may have resulted in some phonetic based errors in grammar and contents. Even though attempts were made to correct all the mistakes, some may have been missed, and remained in the body of the document. If questions arise, please contact our department.

## 2023-02-13 NOTE — CONSULTS
Comprehensive Nutrition Assessment    Type and Reason for Visit:  Initial, Consult    Nutrition Recommendations/Plan:   Continue current diet and monitor PO intake, weight, and POC while admitted. Malnutrition Assessment:  Malnutrition Status: At risk for malnutrition (Comment) (r/t h/o substance use and currently requiring supplemental oxygen) (02/13/23 1324)      Nutrition History and Allergies:   PMHx of HTN, methadone use, heroin abuse, iron deficiency anemia, current daily cigarette smoker 1 PPD. Pt came in c/o fatigue, fever, intermittent SOB, cough, decreased appetite x3 days PTA. Acute respiratory failure and thrombocytopenia noted. Wt hx review: c wt- 176 lb; 7/29/22- 176 lb; 4/08/22 - 178.56 lb (-1.4%). Pt without significant wt loss noted. Nutrition Assessment:    Received consult for ONS. Pt with airborne precautions for pulmonary tuberculosis rule-out. Receiving oxygen via [facility]. No PO data recorded. Per MD note, pt described one episode of emesis with decreased appetite x3 days PTA. Spoke to pt on phone- denied decreased appetite, said normal eating was 3 meals per day and he is eating all of his meals received inpatient. Pt not interested in trying supplements. Will continue to monitor intake. Multiple provider notes describe pt with poor intake, but pt denies. Nutrition Related Findings:    Last BM. Pertinent labs: BUN (19) H. Pertinent meds reviewed. Wound Type: None       Current Nutrition Intake & Therapies:    Average Meal Intake: % (per pt description)  Average Supplements Intake: None Ordered  ADULT DIET; Regular; Low Fat/Low Chol/High Fiber/BRITTANI; No Added Salt (3-4 gm)    Anthropometric Measures:  Height: 5' 11\" (180.3 cm)  Ideal Body Weight (IBW): 172 lbs (78 kg)       Current Body Weight: 176 lb (79.8 kg), 102.3 % IBW.  Weight Source: Not Specified  Current BMI (kg/m2): 24.6  Usual Body Weight: 176 lb (79.8 kg) (7/29/22)  % Weight Change (Calculated): 0  Weight Adjustment For: No Adjustment  BMI Categories: Normal Weight (BMI 22.0 to 24.9) age over 72    Estimated Daily Nutrient Needs:  Energy Requirements Based On: Formula (MSJ x 1.2-1.4)  Weight Used for Energy Requirements: Current  Energy (kcal/day): 1903 - 2220  Weight Used for Protein Requirements: Current (1-1.2)  Protein (g/day): 80 - 96  Method Used for Fluid Requirements: 1 ml/kcal  Fluid (ml/day): 1903 - 2220    Nutrition Diagnosis:   No nutrition diagnosis at this time     Nutrition Interventions:   Food and/or Nutrient Delivery: Continue Current Diet  Nutrition Education/Counseling: Education not indicated, No recommendation at this time  Coordination of Nutrition Care: Continue to monitor while inpatient  Plan of Care discussed with: Pt    Goals:     Goals: Meet at least 75% of estimated needs, by next RD assessment       Nutrition Monitoring and Evaluation:   Behavioral-Environmental Outcomes: None Identified  Food/Nutrient Intake Outcomes: Food and Nutrient Intake  Physical Signs/Symptoms Outcomes: Biochemical Data, GI Status, Meal Time Behavior, Weight    Discharge Planning:    Continue current diet     Fabiola Officer, [address]  Contact: [phone]

## 2023-02-13 NOTE — PROGRESS NOTES
TRANSFER - IN REPORT:    Verbal report received from Diana soelr Chelsey, [address] on [address]  being received from [id number] [facility] for routine progression of patient care      Report consisted of patient's Situation, Background, Assessment and   Recommendations(SBAR). Information from the following report(s) Nurse Handoff Report, Index, Intake/Output, MAR, and Recent Results was reviewed with the receiving nurse. Opportunity for questions and clarification was provided. Assessment completed upon patient's arrival to unit and care assumed.

## 2023-02-13 NOTE — PROGRESS NOTES
1930: Bedside and Verbal shift change report given to Sabino Phipps RN (oncoming nurse) by Princess Clark RN (offgoing nurse). Report included the following information Nurse Handoff Report, Index, Intake/Output, MAR, Recent Results, and Cardiac Rhythm NSR .

## 2023-02-14 LAB
ACID FAST STN SPEC: NEGATIVE
ALDOLASE SERPL-CCNC: 15 U/L (ref 3.3–10.3)
BACTERIA SPEC CULT: ABNORMAL
BACTERIA SPEC CULT: ABNORMAL
BASOPHILS # BLD: 0 K/UL (ref 0–0.1)
BASOPHILS NFR BLD: 0 % (ref 0–2)
CCP IGA+IGG SERPL IA-ACNC: 6 UNITS (ref 0–19)
DIFFERENTIAL METHOD BLD: ABNORMAL
EOSINOPHIL # BLD: 0 K/UL (ref 0–0.4)
EOSINOPHIL NFR BLD: 0 % (ref 0–5)
ERYTHROCYTE [DISTWIDTH] IN BLOOD BY AUTOMATED COUNT: 15.5 % (ref 11.6–14.5)
HCT VFR BLD AUTO: 38.6 % (ref 36–48)
HGB BLD-MCNC: 12.9 G/DL (ref 13–16)
IMM GRANULOCYTES # BLD AUTO: 0 K/UL (ref 0–0.04)
IMM GRANULOCYTES NFR BLD AUTO: 1 % (ref 0–0.5)
LYMPHOCYTES # BLD: 0.2 K/UL (ref 0.9–3.6)
LYMPHOCYTES NFR BLD: 6 % (ref 21–52)
M PNEUMO IGG SER IA-ACNC: 574 U/ML (ref 0–99)
M PNEUMO IGM SER IA-ACNC: <770 U/ML (ref 0–769)
MCH RBC QN AUTO: 31.9 PG (ref 24–34)
MCHC RBC AUTO-ENTMCNC: 33.4 G/DL (ref 31–37)
MCV RBC AUTO: 95.3 FL (ref 78–100)
MONOCYTES # BLD: 0.2 K/UL (ref 0.05–1.2)
MONOCYTES NFR BLD: 4 % (ref 3–10)
MYCOBACTERIUM SPEC QL CULT: NORMAL
NEUTS SEG # BLD: 3.4 K/UL (ref 1.8–8)
NEUTS SEG NFR BLD: 90 % (ref 40–73)
NRBC # BLD: 0 K/UL (ref 0–0.01)
NRBC BLD-RTO: 0 PER 100 WBC
PLATELET # BLD AUTO: 74 K/UL (ref 135–420)
PMV BLD AUTO: 10.4 FL (ref 9.2–11.8)
RBC # BLD AUTO: 4.05 M/UL (ref 4.35–5.65)
SERVICE CMNT-IMP: ABNORMAL
SPECIMEN PREPARATION: NORMAL
SPECIMEN SOURCE: NORMAL
WBC # BLD AUTO: 3.8 K/UL (ref 4.6–13.2)

## 2023-02-14 PROCEDURE — 86606 ASPERGILLUS ANTIBODY: CPT

## 2023-02-14 PROCEDURE — 6360000002 HC RX W HCPCS: Performed by: STUDENT IN AN ORGANIZED HEALTH CARE EDUCATION/TRAINING PROGRAM

## 2023-02-14 PROCEDURE — 2700000000 HC OXYGEN THERAPY PER DAY

## 2023-02-14 PROCEDURE — 6370000000 HC RX 637 (ALT 250 FOR IP): Performed by: HOSPITALIST

## 2023-02-14 PROCEDURE — 86480 TB TEST CELL IMMUN MEASURE: CPT

## 2023-02-14 PROCEDURE — 2140000001 HC CVICU INTERMEDIATE R&B

## 2023-02-14 PROCEDURE — 2580000003 HC RX 258: Performed by: STUDENT IN AN ORGANIZED HEALTH CARE EDUCATION/TRAINING PROGRAM

## 2023-02-14 PROCEDURE — 86235 NUCLEAR ANTIGEN ANTIBODY: CPT

## 2023-02-14 PROCEDURE — 99232 SBSQ HOSP IP/OBS MODERATE 35: CPT | Performed by: HOSPITALIST

## 2023-02-14 PROCEDURE — 36415 COLL VENOUS BLD VENIPUNCTURE: CPT

## 2023-02-14 PROCEDURE — 6360000002 HC RX W HCPCS: Performed by: HOSPITALIST

## 2023-02-14 PROCEDURE — 94640 AIRWAY INHALATION TREATMENT: CPT

## 2023-02-14 PROCEDURE — 2580000003 HC RX 258: Performed by: HOSPITALIST

## 2023-02-14 PROCEDURE — 85025 COMPLETE CBC W/AUTO DIFF WBC: CPT

## 2023-02-14 PROCEDURE — 99233 SBSQ HOSP IP/OBS HIGH 50: CPT | Performed by: INTERNAL MEDICINE

## 2023-02-14 PROCEDURE — 86038 ANTINUCLEAR ANTIBODIES: CPT

## 2023-02-14 PROCEDURE — 94761 N-INVAS EAR/PLS OXIMETRY MLT: CPT

## 2023-02-14 RX ADMIN — PANTOPRAZOLE SODIUM 40 MG: 40 TABLET, DELAYED RELEASE ORAL at 06:12

## 2023-02-14 RX ADMIN — SENNOSIDES AND DOCUSATE SODIUM 2 TABLET: 50; 8.6 TABLET ORAL at 11:06

## 2023-02-14 RX ADMIN — SODIUM CHLORIDE, PRESERVATIVE FREE 10 ML: 5 INJECTION INTRAVENOUS at 17:27

## 2023-02-14 RX ADMIN — BUDESONIDE 250 MCG: 0.25 SUSPENSION RESPIRATORY (INHALATION) at 07:43

## 2023-02-14 RX ADMIN — METHYLPREDNISOLONE SODIUM SUCCINATE 40 MG: 40 INJECTION, POWDER, FOR SOLUTION INTRAMUSCULAR; INTRAVENOUS at 16:56

## 2023-02-14 RX ADMIN — CEFTRIAXONE 2000 MG: 2 INJECTION, POWDER, FOR SOLUTION INTRAMUSCULAR; INTRAVENOUS at 11:06

## 2023-02-14 RX ADMIN — IPRATROPIUM BROMIDE AND ALBUTEROL SULFATE 1 AMPULE: 2.5; .5 SOLUTION RESPIRATORY (INHALATION) at 16:20

## 2023-02-14 RX ADMIN — ASPIRIN 81 MG: 81 TABLET, COATED ORAL at 11:06

## 2023-02-14 RX ADMIN — IPRATROPIUM BROMIDE AND ALBUTEROL SULFATE 1 AMPULE: 2.5; .5 SOLUTION RESPIRATORY (INHALATION) at 20:59

## 2023-02-14 RX ADMIN — BUDESONIDE 250 MCG: 0.25 SUSPENSION RESPIRATORY (INHALATION) at 20:59

## 2023-02-14 RX ADMIN — AMLODIPINE BESYLATE 10 MG: 10 TABLET ORAL at 11:06

## 2023-02-14 RX ADMIN — IPRATROPIUM BROMIDE AND ALBUTEROL SULFATE 1 AMPULE: 2.5; .5 SOLUTION RESPIRATORY (INHALATION) at 07:43

## 2023-02-14 RX ADMIN — IPRATROPIUM BROMIDE AND ALBUTEROL SULFATE 1 AMPULE: 2.5; .5 SOLUTION RESPIRATORY (INHALATION) at 00:55

## 2023-02-14 RX ADMIN — SODIUM CHLORIDE, PRESERVATIVE FREE 10 ML: 5 INJECTION INTRAVENOUS at 05:11

## 2023-02-14 RX ADMIN — ARFORMOTEROL TARTRATE 15 MCG: 15 SOLUTION RESPIRATORY (INHALATION) at 07:43

## 2023-02-14 RX ADMIN — METHYLPREDNISOLONE SODIUM SUCCINATE 40 MG: 40 INJECTION, POWDER, FOR SOLUTION INTRAMUSCULAR; INTRAVENOUS at 20:19

## 2023-02-14 RX ADMIN — METHYLPREDNISOLONE SODIUM SUCCINATE 40 MG: 40 INJECTION, POWDER, FOR SOLUTION INTRAMUSCULAR; INTRAVENOUS at 02:56

## 2023-02-14 RX ADMIN — ARFORMOTEROL TARTRATE 15 MCG: 15 SOLUTION RESPIRATORY (INHALATION) at 20:58

## 2023-02-14 RX ADMIN — AZITHROMYCIN MONOHYDRATE 500 MG: 500 INJECTION, POWDER, LYOPHILIZED, FOR SOLUTION INTRAVENOUS at 16:56

## 2023-02-14 RX ADMIN — IPRATROPIUM BROMIDE AND ALBUTEROL SULFATE 1 AMPULE: 2.5; .5 SOLUTION RESPIRATORY (INHALATION) at 03:23

## 2023-02-14 RX ADMIN — METHYLPREDNISOLONE SODIUM SUCCINATE 40 MG: 40 INJECTION, POWDER, FOR SOLUTION INTRAMUSCULAR; INTRAVENOUS at 11:07

## 2023-02-14 RX ADMIN — POLYETHYLENE GLYCOL 3350 17 G: 17 POWDER, FOR SOLUTION ORAL at 11:06

## 2023-02-14 ASSESSMENT — PAIN SCALES - GENERAL
PAINLEVEL_OUTOF10: 0

## 2023-02-14 NOTE — PLAN OF CARE
Problem: Pain  Goal: Verbalizes/displays adequate comfort level or baseline comfort level  2/13/2023 2031 by Liv Burgos RN  Outcome: Progressing  2/13/2023 1459 by Leo Ferrell RN  Outcome: Progressing     Problem: Safety - Adult  Goal: Free from fall injury  2/13/2023 2031 by Liv Burgos RN  Outcome: Progressing  2/13/2023 1459 by Leo Ferrell RN  Outcome: Progressing     Problem: ABCDS Injury Assessment  Goal: Absence of physical injury  2/13/2023 2031 by Liv Burgos RN  Outcome: Progressing  2/13/2023 1459 by Leo Ferrell RN  Outcome: Progressing     Problem: Discharge Planning  Goal: Discharge to home or other facility with appropriate resources  2/13/2023 2031 by Liv Burgos RN  Outcome: Progressing  2/13/2023 1459 by Leo Ferrell RN  Outcome: Progressing

## 2023-02-14 NOTE — PROGRESS NOTES
02/13/23 2113   Oxygen Therapy/Pulse Ox   O2 Therapy Oxygen humidified   $Oxygen $Daily Charge   O2 Device High flow nasal cannula  (SALTER)   O2 Flow Rate (L/min) (S)  7 L/min  (DECREASED FROM 11)   Heart Rate 56   Resp 18   SpO2 100 %   Skin Assessment Clean, dry, & intact   Skin Protection for O2 Device No   Pulse Oximeter Device Mode Continuous   Pulse Oximeter Device Location Right;Finger   $Pulse Oximeter $Spot check (multiple/continuous)   Blood Gas  Performed?  No

## 2023-02-14 NOTE — PROGRESS NOTES
[facility] Hospitalist Group  Progress Note    Patient: Griselda Pence Age: 69 y.o. : 1955 MR#: [id number] SSN: [id number]  Date/Time: 2023    Subjective:     Patient seen and evaluated, sitting up in bed, no acute distress. No new events overnight. -nursing staff mentions that patient is on methadone 70 mg daily. Unable to verify from methadone clinic at this time since it was closed. We will try to verify in a.m. Per pulmonary note, patient is now on 4 L nasal cannula. Plan for bronchoscopy on . Assessment:   1. Acute hypoxic respiratory failure due to #2 and #3  2. Interstitial pneumonitis could be due to hypersensitivity pneumonitis versus alveolar proteinosis  3. COPD/emphysema  4. GAYATHRI, improved  5. Chronic thrombocytopenia  6. Polysubstance use disorder  7. Hypertension  8. GERD  9. History of constipation  10. Medical noncompliance    PLAN:    CT chest report reviewed. No PE -shows diffuse upper mid lobe predominant GGO's with interstitial thickening and underlying emphysema. Differential is extensive and includes inhalational lung injury, hypersensitivity pneumonitis and inflammatory interstitial pneumonitis. Continue current oxygen and nebulizer. Pulmonology has been consulted. Discussed with pulmonary, patient will be transferred to stepdown unit, he is currently on 4 L nasal cannula. Per pulmonary, follow-up Fungitell, sputum cultures to include AFB and fungal pending. Plan for bronchoscopy on . Continue Rocephin and doxycycline. Continue IV steroids. We will discontinue IV fluid and monitor renal function  Platelet counts are 74 today. Patient has a known history of cirrhosis.   Continue Norvasc for hypertension management  Continue MiraLAX and senna \"for constipation treatment  Continue Protonix for GERD treatment  PT and OT to follow this patient            Case discussed with:  [x]Patient  []Family  [x]Nursing []Case Management  DVT Prophylaxis:  []Lovenox  []Hep SQ  []SCDs  []Coumadin   []On Heparin gtt    Objective:   VS: BP (!) 148/83   Pulse 74   Temp 97.6 °F (36.4 °C) (Oral)   Resp 20   Ht 5' 11\" (1.803 m)   Wt 176 lb (79.8 kg)   SpO2 98%   BMI 24.55 kg/m²    Tmax/24hrs: Temp (24hrs), Av.6 °F (36.4 °C), Min:97 °F (36.1 °C), Max:98.1 °F (36.7 °C)    Input/Output:   Intake/Output Summary (Last 24 hours) at 2023 1728  Last data filed at 2023 0323  Gross per 24 hour   Intake 240 ml   Output 1500 ml   Net -1260 ml         General appearance - alert, cachectic, nasal cannula in situ, and in no distress  Eyes - sclera anicteric, no pallor  Nose - no obvious nasal discharge.   Neck - supple, no JVD, trachea is midline  Chest -diminished air entry noted in bases, rhonchi present.  Heart - S1 and S2 normal  Abdomen - soft, nontender, nondistended, Bowel sounds present  Neurological - alert, oriented, normal speech, no focal findings noted, no tremors noted.  Musculoskeletal - no joint tenderness or swelling of knees bilaterally  Extremities - no pedal edema noted      Labs:    Recent Results (from the past 24 hour(s))   CBC with Auto Differential    Collection Time: 23 12:48 AM   Result Value Ref Range    WBC 3.8 (L) 4.6 - 13.2 K/uL    RBC 4.05 (L) 4.35 - 5.65 M/uL    Hemoglobin 12.9 (L) 13.0 - 16.0 g/dL    Hematocrit 38.6 36.0 - 48.0 %    MCV 95.3 78.0 - 100.0 FL    MCH 31.9 24.0 - 34.0 PG    MCHC 33.4 31.0 - 37.0 g/dL    RDW 15.5 (H) 11.6 - 14.5 %    Platelets 74 (L) 135 - 420 K/uL    MPV 10.4 9.2 - 11.8 FL    Nucleated RBCs 0.0 0  WBC    nRBC 0.00 0.00 - 0.01 K/uL    Seg Neutrophils 90 (H) 40 - 73 %    Lymphocytes 6 (L) 21 - 52 %    Monocytes 4 3 - 10 %    Eosinophils % 0 0 - 5 %    Basophils 0 0 - 2 %    Immature Granulocytes 1 (H) 0.0 - 0.5 %    Segs Absolute 3.4 1.8 - 8.0 K/UL    Absolute Lymph # 0.2 (L) 0.9 - 3.6 K/UL    Absolute Mono # 0.2 0.05 - 1.2 K/UL    Absolute Eos # 0.0 0.0 -  0.4 K/UL    Basophils Absolute 0.0 0.0 - 0.1 K/UL    Absolute Immature Granulocyte 0.0 0.00 - 0.04 K/UL    Differential Type AUTOMATED       Total time is greater than 40 minutes    Signed By: Daniel Almeida MD     February 14, 2023      Disclaimer: Sections of this note are dictated using utilizing voice recognition software, which may have resulted in some phonetic based errors in grammar and contents. Even though attempts were made to correct all the mistakes, some may have been missed, and remained in the body of the document. If questions arise, please contact our department.

## 2023-02-14 NOTE — PROGRESS NOTES
02/14/23 0055   Oxygen Therapy/Pulse Ox   O2 Therapy Oxygen humidified   O2 Device High flow nasal cannula  (SALTER)   O2 Flow Rate (L/min) (S)  4 L/min   Heart Rate 63   Resp 16   SpO2 98 %   Skin Assessment Clean, dry, & intact   Skin Protection for O2 Device No   Pulse Oximeter Device Mode Continuous

## 2023-02-14 NOTE — PROGRESS NOTES
[facility]  Pulmonary, Critical Care, and Sleep Medicine  Progress note    Name: Shai Gonzalez MRN: [id number]   : 1955 Hospital: [address] [place] [address] Dr   Date: 2023        IMPRESSION:   Acute hypoxemic respiratory failure - CT with diffuse upper-mid lobe predominant GGOs with interstitial thickening (new since CT chest in 2022) on underlying emphysema. Differential is extensive and includes inhalational lung injury, hypersensitivity pneumonitis, inflammatory interstitial pneumonitis, or infection to include potential atypical/opportunistic infections. Radiologic pattern suggestive of diffuse infiltrative alveolar and interstitial process. Rapid flu & COVID negative; respiratory PCR negative. , HIV negative , RA factor normal -mildly elevated ,procalcitonin elevated at 2.63. ESR/CRP elevated. Elevated IgG , IgM. IgE is pending. Cultures to date growing Candida in sputum , AFB is pending. Patient requiring high flow 40 L 80% FiO2 initially but now weaned to 4 L nasal cannula. Echocardiogram with PAP 43 mm- moderate pulmonary hypertension remains clinically compensated. Centribolular emphysema  Tobacco dependence  Polysubstance abuse, heroin abuse, enrolled in methadone program  Acute kidney injury, resolved  Cirrhosis of the liver with associated portal hypertension  Elevated liver enzymes likely secondary to alcohol use with AST/ALT ratio >2  Mild leukopenia with lymphopenia  Thrombocytopenia likely secondary to chronic liver disease      PLAN:   Supplemental oxygen to maintain SpO2 88-94% --currently on 4 L nasal cannula  Bronchial hygiene protocol  Bronchodilators: Brovana BID and Duoneb q4h  Steroids: Continue Solumedrol IV to 40mg q6h (which is approx 2mg/kg); may need to adjust based on response/etiology.  Pulmicort inh BID  Antibiotics: Currently on ceftriaxone (D4) and changed doxycycline   We will follow-up cultures  Aspiration precautions  Need for further diagnostics: Follow-up fungitell, galactomannan, sputum cultures to include AFB and fungal pending. Follow-up QuantiFERON gold, ARLETTE, HSP panel and IgE  We will plan for bronchoscopy-BAL on 2/16/2023 awaiting AFB results. Discussed with patient procedure and explained indications. He is agreeable. Will need to assess home oxygen when ready for discharge  PT, OOB and ambulate  DVT prophylaxis with SCDs (chemical had been held in setting of his thrombocytopenia)  Will follow      Subjective/Interval History:   Initial HPI/Interval:   This patient has been seen and evaluated at the request of Dr. Kahlil Garcia for respiratory failure. Patient is a 69 y.o. male with a history of cirrhosis with portal HTN, tobacco dependence, and polysubstance abuse who presented to the [facility] ER with approx 3 days of progressive SOB/QUIROZ. States that prior to that, he had been in his usual state of health. Denies any known sick contacts. Reports associated mostly dry cough (scant yellow mucus a couple of days ago), no hemoptysis. Has some chest pain associated with coughing. Denies any baseline respiratory problems or cardiac problems. Reports associated subjective fevers, chills, sweats, and decreased appetite. Has not noticed any weight loss. Vomited once, but no persistent nausea, vomiting, or diarrhea and denies any abdominal pain. Has chronic pain in his knees; had surgery in 2022, but persistent pain. No new rashes. He currently smokes approx 1ppd and marijuana cigarettes, also snorts heroin, most recently in the days preceding admission. Uses a humidifier in the home. No pets/birds. No known family history of lung disease. Drinks about 4 cans of beer/day. 02/14/23     No acute events overnight.    Sitting up in bed-states he feels better  Less short of breath  Has dry cough  Denies any chest pain  Oxygen was weaned down from high flow Vapotherm to salter 6 L and now down to 4 L-patient saturating 98%    Sputum growing few yeast  Afebrile  Discussed with patient current diagnosis and need for further evaluation with bronchoscopy for further delineation of definitive etiology of bilateral extensive ILD. Planning for procedure on 2023-after AFBs resulted    ROS:Pertinent items are noted in HPI. Objective:   Vital Signs:    BP (!) 144/78   Pulse 87   Temp 97.5 °F (36.4 °C) (Oral)   Resp 21   Ht 5' 11\" (1.803 m)   Wt 176 lb (79.8 kg)   SpO2 99%   BMI 24.55 kg/m²             Temp (24hrs), Av.5 °F (36.4 °C), Min:97 °F (36.1 °C), Max:98.1 °F (36.7 °C)       Intake/Output:   Last shift:      No intake/output data recorded. Last 3 shifts:  1901 -  0700  In: 540 [P.O.:540]  Out: 2100 [Urine:2100]    Intake/Output Summary (Last 24 hours) at 2023 1152  Last data filed at 2023 0323  Gross per 24 hour   Intake 240 ml   Output 1500 ml   Net -1260 ml          Physical Exam:    General: alert, oriented times 3, cooperative,    HEENT: NCAT   Neck: No abnormally enlarged lymph nodes. Chest: normal   Lungs: Fewer but persistent end inspiratory squeaks at lung bases   Heart: Regular rate and rhythm, S1S2 present, or without murmur or extra heart sounds   Abdomen: abdomen is soft without significant tenderness, masses, organomegaly or guarding   Extremity: none edema   Neuro: alert, grossly nonfocal   Skin: Skin color, texture, turgor normal. No rashes or lesions        DATA:  Labs:  Recent Labs     23  0307 23  0048   WBC 3.1* 3.8*   HGB 12.8* 12.9*   HCT 37.8 38.6   PLT 70* 74*       Recent Labs     23  1805 23  0437 23  0307   NA  --  136 137   K  --  4.3 4.7   CL  --  106 107   CO2  --  25 28   BUN  --  17 19*   INR 1.1  --   --        No results for input(s): PH, PCO2, PO2, HCO3, FIO2 in the last 72 hours.         Imaging:  [x]I have personally reviewed the patients radiographs  XR Results (most recent):  XR CHEST (2 VW) 02/10/2023    Narrative  EXAM:  XR CHEST PA LAT    INDICATION:   sob    COMPARISON: Chest radiograph April 29, 2022. FINDINGS: Multifocal interstitial and airspace opacities, greatest in the right  middle lobe. No pneumothorax or pleural effusion. Calcified aortic arch. Cardiomediastinal contours are otherwise unremarkable. No acute osseous  findings. Impression  Multifocal interstitial and airspace opacities, greatest in the right middle  lobe. Findings likely represent multifocal pneumonia. CT Results (most recent):  CTA CHEST W WO CONTRAST 02/10/2023    Narrative  EXAM: CT Angiogram of the Chest    CLINICAL INDICATION:  rule out pe . Smoker with hypertension. Patient having  shortness of breath and bodyaches. TECHNIQUE: CT angiogram of the chest performed. MIPS performed    All CT scans at this facility are performed using dose optimization technique as  appropriate to a performed exam, to include automated exposure control,  adjustment of the mA and/or kV according to patient size (including appropriate  matching for site specific examination) or use of iterative reconstruction  technique. IV CONTRAST: 100 cc of Isovue 370    COMPARISON: None    FINDINGS:  Limitations: Breathing motion is present. Thyroid: Unremarkable. Mediastinum: Hilar adenopathy is noted. There is subcarinal adenopathy. Heart: Unremarkable    Pericardium: Unremarkable    Aorta: No evidence of aortic dissection or aneurysm. Pulmonary Arteries: No evidence of pulmonary embolus. Trachea and Bronchi: Unremarkable. Pleura: No evidence of pleural effusion. Lungs: Centrilobular emphysematous changes are present. There is interstitial  thickening and groundglass changes throughout the lungs most pronounced in the  mid portions. Axilla/Chest wall: Unremarkable. Upper Abdomen: No acute findings. Musculoskeletal: No acute osseous findings. Impression  1.    Findings concerning for interstitial pneumonitis possibly some process like  alveolar proteinosis or hypersensitivity pneumonitis    2. No evidence of pulmonary embolus or aortic dissection. 3.  Mediastinal and hilar adenopathy. Consider short-term CT 3 months to  evaluate stability. 4.  Centrilobular emphysematous changes. High complexity decision making was performed during the evaluation of this patient at high risk for decompensation with multiple organ involvement     Above mentioned total time spent on reviewing the case/medical record/data/notes/EMR/patient examination/documentation/coordinating care with nurse/consultants, exclusive of procedures with complex decision making performed and > 50% time spent in face to face evaluation. Discussion included informing patient of all test results and planning for bronchoscopy.     Sg Rich MD, MD  Pulmonary & Critical Care Medicine

## 2023-02-14 NOTE — PROGRESS NOTES
Bedside and Verbal shift change  Received from [facility] (outgoing nurse), to NORA Lee (incoming)  Pt. Is AOX 4. IV SL, Pt. denies pain at this time. Report included the following information SBAR, Procedure Summary, Intake/Output, MAR, Recent Results, Med Rec Status, and Cardiac Rhythm @ SR. Will Resume care and monitor Pt. Condition. 1950 Pt. Head to Toe Assessment Done and documented. Pt. Educated on call bell when in need of help and assistance. Pt. verbalized understanding. 2040  Pt. Made no complaints. 100  Pt not in distress. 2200  Pt denies pain. 2300  Pt made no complaints. 0000  Pt resting with eyes closed easily awaken. 0200  Pt. Made no complaints. 0400  Pt denies discomfort.    0600  Pt. Able to rest and sleep well throughout the shift. Verbal and bedside Shift changed report given to Emilio Long RN (oncoming RN) on Pt. Condition. Report consisted of patients Situation, History, Activities, intake/output,  Background, Assessment and Recommendations(SBAR). Information from the following report(s) Kardex, order Summary, Lab results and MAR was reviewed with the receiving nurse. Opportunity for questions and clarification was provided. PRESENTING CC: No chest pain and no new complaints.    SUBJ: 43M PMHx HLD, who presented to ED c/o lightheadedness and frontal headache x1 week, along with left arm pain and LUQ pain, no dyspnea/palpitations. Risk factors are hyperlipidemia and family history of hypertension in father.        PMH -reviewed admission note, no change since admission  Heart failure: acute [ ] chronic [ ] acute or chronic [ ] diastolic [ ] systolic [ ] combined systolic and diastolic[ ]  LORRAINE: ATN[ ] renal medullary necrosis [ ] CKD I [ ]CKDII [ ]CKD III [ ]CKD IV [ ]CKD V [ ]Other pathological lesions [ ]    MEDICATIONS  (STANDING):  aspirin  chewable 81 milliGRAM(s) Oral daily  atorvastatin 40 milliGRAM(s) Oral at bedtime  enoxaparin Injectable 40 milliGRAM(s) SubCutaneous daily  lisinopril 20 milliGRAM(s) Oral daily  metoprolol tartrate 12.5 milliGRAM(s) Oral two times a day  nicotine - 21 mG/24Hr(s) Patch 1 patch Transdermal daily  sodium chloride 0.9%. 1000 milliLiter(s) (75 mL/Hr) IV Continuous <Continuous>    MEDICATIONS  (PRN):  acetaminophen   Tablet. 650 milliGRAM(s) Oral every 6 hours PRN Mild Pain (1 - 3)          FAMILY HISTORY:  Family history of diabetes mellitus in mother (Mother)  Family history of hypertension in father (Father)    No family history of premature coronary artery disease or sudden cardiac death      REVIEW OF SYSTEMS:  Constitutional: [ ] fever, [ ]weight loss,  [ ]fatigue  Eyes: [ ] visual changes  Respiratory: [ ]shortness of breath;  [ ] cough, [ ]wheezing, [ ]chills, [ ]hemoptysis  Cardiovascular: [ ] chest pain, [ ]palpitations, [ ]dizziness,  [ ]leg swelling[ ]orthopnea[ ]PND  Gastrointestinal: [x ] abdominal pain, [ ]nausea, [ ]vomiting,  [ ]diarrhea   Genitourinary: [ ] dysuria, [ ] hematuria  Neurologic: [ ] headaches [ ] tremors[ ]weakness  Skin: [ ] itching, [ ]burning, [ ] rashes  Endocrine: [ ] heat or cold intolerance  Musculoskeletal: [ ] joint pain or swelling; [ ] muscle, back, or extremity pain  Psychiatric: [ ] depression, [ ]anxiety, [ ]mood swings, or [ ]difficulty sleeping  Hematologic: [ ] easy bruising, [ ] bleeding gums    [x] All remaining systems negative except as per above.   [ ]Unable to obtain.    Vital Signs Last 24 Hrs  T(C): 36.9 (05 Jul 2018 11:12), Max: 37.1 (04 Jul 2018 13:21)  T(F): 98.4 (05 Jul 2018 11:12), Max: 98.8 (04 Jul 2018 13:21)  HR: 78 (05 Jul 2018 11:12) (64 - 78)  BP: 150/96 (05 Jul 2018 11:12) (137/96 - 155/92)  BP(mean): --  RR: 16 (05 Jul 2018 11:12) (16 - 18)  SpO2: 100% (05 Jul 2018 11:12) (97% - 100%)  I&O's Summary      PHYSICAL EXAM:  General: No acute distress BMI-25.1 kg/m2  HEENT: EOMI, PERRL  Neck: Supple, [ ] JVD  Lungs: Equal air entry bilaterally; [ ] rales [ ] wheezing [ ] rhonchi  Heart: Regular rate and rhythm; [ ] murmur   /6 [ ] systolic [ ] diastolic [ ] radiation[ ] rubs [ ]  gallops  Abdomen: Nontender, bowel sounds present  Extremities: No clubbing, cyanosis, [ ] edema  Nervous system:  Alert & Oriented X3, no focal deficits  Psychiatric: Normal affect  Skin: No rashes or lesions    LABS:  07-05    139  |  105  |  13  ----------------------------<  105<H>  3.9   |  26  |  0.85    Ca    8.9      05 Jul 2018 06:37  Phos  4.2     07-04  Mg     2.2     07-04    TPro  7.7  /  Alb  3.8  /  TBili  0.8  /  DBili  x   /  AST  23  /  ALT  36  /  AlkPhos  55  07-04    Creatinine Trend: 0.85<--, 0.78<--, 0.85<--                        14.5   7.7   )-----------( 159      ( 05 Jul 2018 06:37 )             42.0       Lipid Panel:   Cardiac Enzymes: CARDIAC MARKERS ( 04 Jul 2018 07:25 )  0.068 ng/mL / x     / 97 U/L / x     / <1.0 ng/mL  CARDIAC MARKERS ( 03 Jul 2018 22:14 )  0.082 ng/mL / x     / 121 U/L / x     / <1.0 ng/mL  CARDIAC MARKERS ( 03 Jul 2018 18:53 )  0.067 ng/mL / x     / 141 U/L / x     / <1.0 ng/mL            RADIOLOGY:  < from: CT Cervical Spine No Cont (07.04.18 @ 13:04) >  IMPRESSION:  No evidence for acute fracture. Intervertebral disc space   narrowing is identifiedat C5-C6 and C6-C7, with associated degenerative   osteophyte formation and resulting moderate spinal canal stenosis at   those levels. Consider MRI for further assessment if the patient is able   to undergo that examination.    < from: CT Angio Abdomen and Pelvis w/ IV Cont (07.04.18 @ 13:16) >    MPRESSION:  There is no evidence for active extravasation of   intravascular contrast at the time of CT evaluation. Patency of the   origins of the celiac, superior mesenteric and inferior mesenteric   arteries identified, without evidence for origin stenosis. No evidence   for mechanical bowel obstruction. No colonic wall thickening. No free   intraperitoneal air or fluid identified.      ECG [my interpretation]:    TELEMETRY:    ECHO:  < from: Transthoracic Echocardiogram (07.04.18 @ 15:34) >  CONCLUSIONS:  1. Normal mitral valve. Trace mitral regurgitation.  2. Probably trileaflet aortic valve is not well seen. No  aortic stenosis. No aortic valve regurgitation seen.  3. Normal aortic root.        STRESS TEST:    CATHETERIZATION:      IMPRESSION AND PLAN:  Continue current medications.  F/U stress test. PRESENTING CC: No chest pain and no new complaints.    SUBJ: 43M PMHx HLD, who presented to ED c/o lightheadedness and frontal headache x1 week, along with left arm pain and LUQ pain, no dyspnea/palpitations. Risk factors are hyperlipidemia and family history of hypertension in father.        PMH -reviewed admission note, no change since admission  Heart failure: acute [ ] chronic [ ] acute or chronic [ ] diastolic [ ] systolic [ ] combined systolic and diastolic[ ]  LORRAINE: ATN[ ] renal medullary necrosis [ ] CKD I [ ]CKDII [ ]CKD III [ ]CKD IV [ ]CKD V [ ]Other pathological lesions [ ]    MEDICATIONS  (STANDING):  aspirin  chewable 81 milliGRAM(s) Oral daily  atorvastatin 40 milliGRAM(s) Oral at bedtime  enoxaparin Injectable 40 milliGRAM(s) SubCutaneous daily  lisinopril 20 milliGRAM(s) Oral daily  metoprolol tartrate 12.5 milliGRAM(s) Oral two times a day  nicotine - 21 mG/24Hr(s) Patch 1 patch Transdermal daily  sodium chloride 0.9%. 1000 milliLiter(s) (75 mL/Hr) IV Continuous <Continuous>    MEDICATIONS  (PRN):  acetaminophen   Tablet. 650 milliGRAM(s) Oral every 6 hours PRN Mild Pain (1 - 3)          FAMILY HISTORY:  Family history of diabetes mellitus in mother (Mother)  Family history of hypertension in father (Father)    No family history of premature coronary artery disease or sudden cardiac death      REVIEW OF SYSTEMS:  Constitutional: [ ] fever, [ ]weight loss,  [ ]fatigue  Eyes: [ ] visual changes  Respiratory: [ ]shortness of breath;  [ ] cough, [ ]wheezing, [ ]chills, [ ]hemoptysis  Cardiovascular: [ ] chest pain, [ ]palpitations, [ ]dizziness,  [ ]leg swelling[ ]orthopnea[ ]PND  Gastrointestinal: [x ] abdominal pain, [ ]nausea, [ ]vomiting,  [ ]diarrhea   Genitourinary: [ ] dysuria, [ ] hematuria  Neurologic: [ ] headaches [ ] tremors[ ]weakness  Skin: [ ] itching, [ ]burning, [ ] rashes  Endocrine: [ ] heat or cold intolerance  Musculoskeletal: [ ] joint pain or swelling; [ ] muscle, back, or extremity pain  Psychiatric: [ ] depression, [ ]anxiety, [ ]mood swings, or [ ]difficulty sleeping  Hematologic: [ ] easy bruising, [ ] bleeding gums    [x] All remaining systems negative except as per above.   [ ]Unable to obtain.    Vital Signs Last 24 Hrs  T(C): 36.9 (05 Jul 2018 11:12), Max: 37.1 (04 Jul 2018 13:21)  T(F): 98.4 (05 Jul 2018 11:12), Max: 98.8 (04 Jul 2018 13:21)  HR: 78 (05 Jul 2018 11:12) (64 - 78)  BP: 150/96 (05 Jul 2018 11:12) (137/96 - 155/92)  BP(mean): --  RR: 16 (05 Jul 2018 11:12) (16 - 18)  SpO2: 100% (05 Jul 2018 11:12) (97% - 100%)  I&O's Summary      PHYSICAL EXAM:  General: No acute distress BMI-25.1 kg/m2  HEENT: EOMI, PERRL  Neck: Supple, [ ] JVD  Lungs: Equal air entry bilaterally; [ ] rales [ ] wheezing [ ] rhonchi  Heart: Regular rate and rhythm; [ ] murmur   /6 [ ] systolic [ ] diastolic [ ] radiation[ ] rubs [ ]  gallops  Abdomen: Nontender, bowel sounds present  Extremities: No clubbing, cyanosis, [ ] edema  Nervous system:  Alert & Oriented X3, no focal deficits  Psychiatric: Normal affect  Skin: No rashes or lesions    LABS:  07-05    139  |  105  |  13  ----------------------------<  105<H>  3.9   |  26  |  0.85    Ca    8.9      05 Jul 2018 06:37  Phos  4.2     07-04  Mg     2.2     07-04    TPro  7.7  /  Alb  3.8  /  TBili  0.8  /  DBili  x   /  AST  23  /  ALT  36  /  AlkPhos  55  07-04    Creatinine Trend: 0.85<--, 0.78<--, 0.85<--                        14.5   7.7   )-----------( 159      ( 05 Jul 2018 06:37 )             42.0       Lipid Panel:   Cardiac Enzymes: CARDIAC MARKERS ( 04 Jul 2018 07:25 )  0.068 ng/mL / x     / 97 U/L / x     / <1.0 ng/mL  CARDIAC MARKERS ( 03 Jul 2018 22:14 )  0.082 ng/mL / x     / 121 U/L / x     / <1.0 ng/mL  CARDIAC MARKERS ( 03 Jul 2018 18:53 )  0.067 ng/mL / x     / 141 U/L / x     / <1.0 ng/mL            RADIOLOGY:  < from: CT Cervical Spine No Cont (07.04.18 @ 13:04) >  IMPRESSION:  No evidence for acute fracture. Intervertebral disc space   narrowing is identifiedat C5-C6 and C6-C7, with associated degenerative   osteophyte formation and resulting moderate spinal canal stenosis at   those levels. Consider MRI for further assessment if the patient is able   to undergo that examination.    < from: CT Angio Abdomen and Pelvis w/ IV Cont (07.04.18 @ 13:16) >    MPRESSION:  There is no evidence for active extravasation of   intravascular contrast at the time of CT evaluation. Patency of the   origins of the celiac, superior mesenteric and inferior mesenteric   arteries identified, without evidence for origin stenosis. No evidence   for mechanical bowel obstruction. No colonic wall thickening. No free   intraperitoneal air or fluid identified.      ECG [my interpretation]:    TELEMETRY:    ECHO:  < from: Transthoracic Echocardiogram (07.04.18 @ 15:34) >  CONCLUSIONS:  1. Normal mitral valve. Trace mitral regurgitation.  2. Probably trileaflet aortic valve is not well seen. No  aortic stenosis. No aortic valve regurgitation seen.  3. Normal aortic root.        STRESS TEST:  < from: Nuclear Stress Test-Exercise (07.05.18 @ 08:10) >  * Exercise capacity: 13 METS, Excellent for age and  gender. 87% of MPHR.  * The left ventricle was normal in size. Normal myocardial  perfusion scan, with no evidence of infarction or  inducible ischemia.  * Post-stress resting myocardial perfusion gated SPECT  imaging was performed (LVEF = 48 %;LVEDV = 117 ml.)        CATHETERIZATION:      IMPRESSION AND PLAN:  no chest pain.  Continue current medications.

## 2023-02-15 ENCOUNTER — APPOINTMENT (OUTPATIENT)
Facility: HOSPITAL | Age: 68
DRG: 196 | End: 2023-02-15
Payer: MEDICARE

## 2023-02-15 ENCOUNTER — ANESTHESIA EVENT (OUTPATIENT)
Facility: HOSPITAL | Age: 68
End: 2023-02-15
Payer: MEDICARE

## 2023-02-15 LAB
BASOPHILS # BLD: 0 K/UL (ref 0–0.1)
BASOPHILS NFR BLD: 0 % (ref 0–2)
DIFFERENTIAL METHOD BLD: ABNORMAL
EOSINOPHIL # BLD: 0 K/UL (ref 0–0.4)
EOSINOPHIL NFR BLD: 0 % (ref 0–5)
ERYTHROCYTE [DISTWIDTH] IN BLOOD BY AUTOMATED COUNT: 15.4 % (ref 11.6–14.5)
HCT VFR BLD AUTO: 39.5 % (ref 36–48)
HGB BLD-MCNC: 13.1 G/DL (ref 13–16)
IMM GRANULOCYTES # BLD AUTO: 0 K/UL (ref 0–0.04)
IMM GRANULOCYTES NFR BLD AUTO: 0 % (ref 0–0.5)
LYMPHOCYTES # BLD: 0.2 K/UL (ref 0.9–3.6)
LYMPHOCYTES NFR BLD: 5 % (ref 21–52)
MCH RBC QN AUTO: 31.4 PG (ref 24–34)
MCHC RBC AUTO-ENTMCNC: 33.2 G/DL (ref 31–37)
MCV RBC AUTO: 94.7 FL (ref 78–100)
MONOCYTES # BLD: 0.1 K/UL (ref 0.05–1.2)
MONOCYTES NFR BLD: 5 % (ref 3–10)
NEUTS SEG # BLD: 2.6 K/UL (ref 1.8–8)
NEUTS SEG NFR BLD: 90 % (ref 40–73)
NRBC # BLD: 0 K/UL (ref 0–0.01)
NRBC BLD-RTO: 0 PER 100 WBC
PLATELET # BLD AUTO: 72 K/UL (ref 135–420)
PMV BLD AUTO: 10.6 FL (ref 9.2–11.8)
RBC # BLD AUTO: 4.17 M/UL (ref 4.35–5.65)
WBC # BLD AUTO: 2.9 K/UL (ref 4.6–13.2)

## 2023-02-15 PROCEDURE — 85025 COMPLETE CBC W/AUTO DIFF WBC: CPT

## 2023-02-15 PROCEDURE — 71045 X-RAY EXAM CHEST 1 VIEW: CPT

## 2023-02-15 PROCEDURE — 2580000003 HC RX 258: Performed by: HOSPITALIST

## 2023-02-15 PROCEDURE — 2700000000 HC OXYGEN THERAPY PER DAY

## 2023-02-15 PROCEDURE — 6360000002 HC RX W HCPCS: Performed by: HOSPITALIST

## 2023-02-15 PROCEDURE — 6370000000 HC RX 637 (ALT 250 FOR IP): Performed by: HOSPITALIST

## 2023-02-15 PROCEDURE — 6370000000 HC RX 637 (ALT 250 FOR IP): Performed by: EMERGENCY MEDICINE

## 2023-02-15 PROCEDURE — 36415 COLL VENOUS BLD VENIPUNCTURE: CPT

## 2023-02-15 PROCEDURE — 2140000001 HC CVICU INTERMEDIATE R&B

## 2023-02-15 PROCEDURE — 94640 AIRWAY INHALATION TREATMENT: CPT

## 2023-02-15 PROCEDURE — 99233 SBSQ HOSP IP/OBS HIGH 50: CPT | Performed by: INTERNAL MEDICINE

## 2023-02-15 PROCEDURE — 2580000003 HC RX 258: Performed by: STUDENT IN AN ORGANIZED HEALTH CARE EDUCATION/TRAINING PROGRAM

## 2023-02-15 PROCEDURE — 6360000002 HC RX W HCPCS: Performed by: STUDENT IN AN ORGANIZED HEALTH CARE EDUCATION/TRAINING PROGRAM

## 2023-02-15 PROCEDURE — 94761 N-INVAS EAR/PLS OXIMETRY MLT: CPT

## 2023-02-15 PROCEDURE — 36410 VNPNXR 3YR/> PHY/QHP DX/THER: CPT

## 2023-02-15 PROCEDURE — 87206 SMEAR FLUORESCENT/ACID STAI: CPT

## 2023-02-15 RX ORDER — METHADONE HYDROCHLORIDE 10 MG/ML
60 CONCENTRATE ORAL DAILY
Status: DISCONTINUED | OUTPATIENT
Start: 2023-02-15 | End: 2023-02-18 | Stop reason: HOSPADM

## 2023-02-15 RX ADMIN — CEFTRIAXONE 2000 MG: 2 INJECTION, POWDER, FOR SOLUTION INTRAMUSCULAR; INTRAVENOUS at 11:06

## 2023-02-15 RX ADMIN — SENNOSIDES AND DOCUSATE SODIUM 2 TABLET: 50; 8.6 TABLET ORAL at 10:57

## 2023-02-15 RX ADMIN — METHYLPREDNISOLONE SODIUM SUCCINATE 40 MG: 40 INJECTION, POWDER, FOR SOLUTION INTRAMUSCULAR; INTRAVENOUS at 17:44

## 2023-02-15 RX ADMIN — IPRATROPIUM BROMIDE AND ALBUTEROL SULFATE 1 AMPULE: 2.5; .5 SOLUTION RESPIRATORY (INHALATION) at 09:05

## 2023-02-15 RX ADMIN — POLYETHYLENE GLYCOL 3350 17 G: 17 POWDER, FOR SOLUTION ORAL at 10:57

## 2023-02-15 RX ADMIN — METHADONE HYDROCHLORIDE 60 MG: 10 CONCENTRATE ORAL at 12:24

## 2023-02-15 RX ADMIN — SODIUM CHLORIDE, PRESERVATIVE FREE 10 ML: 5 INJECTION INTRAVENOUS at 03:08

## 2023-02-15 RX ADMIN — AZITHROMYCIN MONOHYDRATE 500 MG: 500 INJECTION, POWDER, LYOPHILIZED, FOR SOLUTION INTRAVENOUS at 17:44

## 2023-02-15 RX ADMIN — AMLODIPINE BESYLATE 10 MG: 10 TABLET ORAL at 10:57

## 2023-02-15 RX ADMIN — METHYLPREDNISOLONE SODIUM SUCCINATE 40 MG: 40 INJECTION, POWDER, FOR SOLUTION INTRAMUSCULAR; INTRAVENOUS at 03:07

## 2023-02-15 RX ADMIN — METHYLPREDNISOLONE SODIUM SUCCINATE 40 MG: 40 INJECTION, POWDER, FOR SOLUTION INTRAMUSCULAR; INTRAVENOUS at 11:06

## 2023-02-15 RX ADMIN — ARFORMOTEROL TARTRATE 15 MCG: 15 SOLUTION RESPIRATORY (INHALATION) at 09:05

## 2023-02-15 RX ADMIN — BUDESONIDE 250 MCG: 0.25 SUSPENSION RESPIRATORY (INHALATION) at 09:05

## 2023-02-15 RX ADMIN — SODIUM CHLORIDE, PRESERVATIVE FREE 10 ML: 5 INJECTION INTRAVENOUS at 11:11

## 2023-02-15 RX ADMIN — PANTOPRAZOLE SODIUM 40 MG: 40 TABLET, DELAYED RELEASE ORAL at 06:07

## 2023-02-15 RX ADMIN — SODIUM CHLORIDE, PRESERVATIVE FREE 10 ML: 5 INJECTION INTRAVENOUS at 17:54

## 2023-02-15 RX ADMIN — IPRATROPIUM BROMIDE AND ALBUTEROL SULFATE 1 AMPULE: 2.5; .5 SOLUTION RESPIRATORY (INHALATION) at 15:46

## 2023-02-15 ASSESSMENT — PAIN SCALES - GENERAL
PAINLEVEL_OUTOF10: 0

## 2023-02-15 NOTE — PROGRESS NOTES
02/14/23 2059   Oxygen Therapy/Pulse Ox   O2 Device Nasal cannula   O2 Flow Rate (L/min) 4 L/min   Heart Rate 63   Resp 17   SpO2 100 %   Pt wean off from HF salter to nasal cannula. No respiratory issue at this time.

## 2023-02-15 NOTE — PROGRESS NOTES
Patient refused 1200 breathing treatment at this time. No respiratory distress or issues noted. Patient stated will he'll take next breathing treatment at 1600.

## 2023-02-15 NOTE — PROGRESS NOTES
[facility]  Pulmonary, Critical Care, and Sleep Medicine  Progress note    Name: Bubba Randhawa MRN: [id number]   : 1955 Hospital: [address] [place] [address] Dr   Date: 2/15/2023        IMPRESSION:   Acute hypoxemic respiratory failure - CT with diffuse upper-mid lobe predominant GGOs with interstitial thickening (new since CT chest in 2022) on underlying emphysema. Differential is extensive and includes inhalational lung injury, hypersensitivity pneumonitis, inflammatory interstitial pneumonitis, or infection to include potential atypical/opportunistic infections. Radiologic pattern suggestive of diffuse infiltrative alveolar and interstitial process. Rapid flu & COVID negative; respiratory PCR negative. , HIV negative , RA factor normal -mildly elevated ,procalcitonin elevated at 2.63. ESR/CRP elevated. Elevated IgG , IgM. IgE is pending. Cultures to date growing Candida in sputum , AFB is pending. Patient requiring high flow 40 L 80% FiO2 initially but now weaned to 4 L nasal cannula. Echocardiogram with PAP 43 mm- moderate pulmonary hypertension remains clinically compensated. Centribolular emphysema  Tobacco dependence  Polysubstance abuse, heroin abuse, enrolled in methadone program  Acute kidney injury, resolved  Cirrhosis of the liver with associated portal hypertension  Elevated liver enzymes likely secondary to alcohol use with AST/ALT ratio >2  Mild leukopenia with lymphopenia  Thrombocytopenia likely secondary to chronic liver disease      PLAN:   Supplemental oxygen to maintain SpO2 88-94% --currently on 4 L nasal cannula  Bronchial hygiene protocol  Bronchodilators: Brovana BID and Duoneb q4h  Steroids: Continue Solumedrol IV to 40mg q6h (which is approx 2mg/kg); may need to adjust based on response/etiology.  Pulmicort inh BID  Antibiotics: Currently on ceftriaxone (D4) and changed doxycycline   We will follow-up cultures  Aspiration precautions  Need for further diagnostics: Follow-up fungitell, galactomannan, sputum cultures to include AFB and fungal pending. Follow-up QuantiFERON gold, ARLETTE, HSP panel and IgE  We will plan for bronchoscopy-BAL on 2/16/2023 awaiting AFB results. Discussed with patient procedure and explained indications. He is agreeable. Will need to assess home oxygen when ready for discharge  PT, OOB and ambulate  DVT prophylaxis with SCDs (chemical had been held in setting of his thrombocytopenia)  Will follow      Subjective/Interval History:   Initial HPI/Interval:   This patient has been seen and evaluated at the request of Dr. Princess Cutler for respiratory failure. Patient is a 69 y.o. male with a history of cirrhosis with portal HTN, tobacco dependence, and polysubstance abuse who presented to the [facility] ER with approx 3 days of progressive SOB/QUIROZ. States that prior to that, he had been in his usual state of health. Denies any known sick contacts. Reports associated mostly dry cough (scant yellow mucus a couple of days ago), no hemoptysis. Has some chest pain associated with coughing. Denies any baseline respiratory problems or cardiac problems. Reports associated subjective fevers, chills, sweats, and decreased appetite. Has not noticed any weight loss. Vomited once, but no persistent nausea, vomiting, or diarrhea and denies any abdominal pain. Has chronic pain in his knees; had surgery in 2022, but persistent pain. No new rashes. He currently smokes approx 1ppd and marijuana cigarettes, also snorts heroin, most recently in the days preceding admission. Uses a humidifier in the home. No pets/birds. No known family history of lung disease. Drinks about 4 cans of beer/day. 02/15/23     No acute events overnight. Sitting up in bed-states he feels better  Less short of breath  Has been able to expectorate mucus and submitted 2 specimens for AFB.   First AFB smear from 2/11/2023 is negative  Denies any chest pain  Maintaining adequate oxygen saturation on 4 L nasal cannula  Chest x-ray today-reviewed by myself no significant change  Sputum growing few yeast  Afebrile  Discussed with patient current diagnosis and need for further evaluation with bronchoscopy for further delineation of definitive etiology of bilateral extensive ILD. Planning for procedure on 2023-after all AFBs resulted. Currently scheduled for 2 PM 0n 23    ROS:Pertinent items are noted in HPI. Objective:   Vital Signs:    BP (!) 173/84   Pulse 68   Temp 98.3 °F (36.8 °C) (Oral)   Resp 15   Ht 5' 11\" (1.803 m)   Wt 176 lb (79.8 kg)   SpO2 100%   BMI 24.55 kg/m²             Temp (24hrs), Av.8 °F (36.6 °C), Min:97.6 °F (36.4 °C), Max:98.3 °F (36.8 °C)       Intake/Output:   Last shift:      02/15 07 - 02/15 190  In: -   Out: 380 [Urine:380]  Last 3 shifts: 1901 - 02/15 0700  In: 240 [P.O.:240]  Out: 4390 [Urine:3375]    Intake/Output Summary (Last 24 hours) at 2/15/2023 1130  Last data filed at 2/15/2023 0843  Gross per 24 hour   Intake --   Output 2255 ml   Net -2255 ml          Physical Exam:    General: alert, oriented times 3, cooperative,    HEENT: NCAT   Neck: No abnormally enlarged lymph nodes. Chest: normal   Lungs: Fewer but persistent end inspiratory squeaks at lung bases   Heart: Regular rate and rhythm, S1S2 present, or without murmur or extra heart sounds   Abdomen: abdomen is soft without significant tenderness, masses, organomegaly or guarding   Extremity: none edema   Neuro: alert, grossly nonfocal   Skin: Skin color, texture, turgor normal. No rashes or lesions        DATA:  Labs:  Recent Labs     23  0307 23  0048 02/15/23  0030   WBC 3.1* 3.8* 2.9*   HGB 12.8* 12.9* 13.1   HCT 37.8 38.6 39.5   PLT 70* 74* 72*       Recent Labs     23  0307      K 4.7      CO2 28   BUN 19*       No results for input(s): PH, PCO2, PO2, HCO3, FIO2 in the last 72 hours.         Imaging:  [x]I have personally reviewed the patients radiographs  XR Results (most recent):  Xray Result (most recent):  XR CHEST PORTABLE 02/15/2023    Narrative  CHEST PORTABLE    CPT CODE: 60400    COMPARISON: 2/10/2023    INDICATIONS: Pneumonia    FINDINGS: The heart is probably normal in size. There are diffuse bilateral  interstitial infiltrates greatest in the right lung base not significantly  changed since 2/10/2023. No pleural effusions are seen. No significant osseous  abnormalities. Impression  Bilateral interstitial infiltrates not significantly changed in the four-day  interval.   XR CHEST (2 VW) 02/10/2023    Narrative  EXAM:  XR CHEST PA LAT    INDICATION:   sob    COMPARISON: Chest radiograph April 29, 2022. FINDINGS: Multifocal interstitial and airspace opacities, greatest in the right  middle lobe. No pneumothorax or pleural effusion. Calcified aortic arch. Cardiomediastinal contours are otherwise unremarkable. No acute osseous  findings. Impression  Multifocal interstitial and airspace opacities, greatest in the right middle  lobe. Findings likely represent multifocal pneumonia. CT Results (most recent):  CTA CHEST W WO CONTRAST 02/10/2023    Narrative  EXAM: CT Angiogram of the Chest    CLINICAL INDICATION:  rule out pe . Smoker with hypertension. Patient having  shortness of breath and bodyaches. TECHNIQUE: CT angiogram of the chest performed. MIPS performed    All CT scans at this facility are performed using dose optimization technique as  appropriate to a performed exam, to include automated exposure control,  adjustment of the mA and/or kV according to patient size (including appropriate  matching for site specific examination) or use of iterative reconstruction  technique. IV CONTRAST: 100 cc of Isovue 370    COMPARISON: None    FINDINGS:  Limitations: Breathing motion is present. Thyroid: Unremarkable. Mediastinum: Hilar adenopathy is noted. There is subcarinal adenopathy.     Heart: Unremarkable    Pericardium: Unremarkable    Aorta: No evidence of aortic dissection or aneurysm. Pulmonary Arteries: No evidence of pulmonary embolus. Trachea and Bronchi: Unremarkable. Pleura: No evidence of pleural effusion. Lungs: Centrilobular emphysematous changes are present. There is interstitial  thickening and groundglass changes throughout the lungs most pronounced in the  mid portions. Axilla/Chest wall: Unremarkable. Upper Abdomen: No acute findings. Musculoskeletal: No acute osseous findings. Impression  1. Findings concerning for interstitial pneumonitis possibly some process like  alveolar proteinosis or hypersensitivity pneumonitis    2. No evidence of pulmonary embolus or aortic dissection. 3.  Mediastinal and hilar adenopathy. Consider short-term CT 3 months to  evaluate stability. 4.  Centrilobular emphysematous changes. High complexity decision making was performed during the evaluation of this patient at high risk for decompensation with multiple organ involvement     Above mentioned total time spent on reviewing the case/medical record/data/notes/EMR/patient examination/documentation/coordinating care with nurse/consultants, exclusive of procedures with complex decision making performed and > 50% time spent in face to face evaluation. Discussion included informing patient of all test results and planning for bronchoscopy.     Sg Rich MD, MD  Pulmonary & Critical Care Medicine

## 2023-02-16 ENCOUNTER — ANESTHESIA (OUTPATIENT)
Facility: HOSPITAL | Age: 68
End: 2023-02-16
Payer: MEDICARE

## 2023-02-16 PROBLEM — J84.9 ILD (INTERSTITIAL LUNG DISEASE) (HCC): Status: ACTIVE | Noted: 2023-02-16

## 2023-02-16 LAB
ACID FAST STN SPEC: NEGATIVE
ANA TITR SER IF: NEGATIVE
ANION GAP SERPL CALC-SCNC: 3 MMOL/L (ref 3–18)
BACTERIA SPEC CULT: ABNORMAL
BASOPHILS # BLD: 0 K/UL (ref 0–0.1)
BASOPHILS NFR BLD: 0 % (ref 0–2)
BUN SERPL-MCNC: 21 MG/DL (ref 7–18)
BUN/CREAT SERPL: 27 (ref 12–20)
CALCIUM SERPL-MCNC: 8.7 MG/DL (ref 8.5–10.1)
CHLORIDE SERPL-SCNC: 107 MMOL/L (ref 100–111)
CO2 SERPL-SCNC: 27 MMOL/L (ref 21–32)
CREAT SERPL-MCNC: 0.79 MG/DL (ref 0.6–1.3)
DIFFERENTIAL METHOD BLD: ABNORMAL
DIFFERENTIAL: NORMAL
DIFFERENTIAL: NORMAL
EOSINOPHIL # BLD: 0 K/UL (ref 0–0.4)
EOSINOPHIL NFR BLD: 0 % (ref 0–5)
EOSINOPHIL NFR BRONCH MANUAL: 0 %
EOSINOPHIL NFR BRONCH MANUAL: 2 %
ERYTHROCYTE [DISTWIDTH] IN BLOOD BY AUTOMATED COUNT: 15.1 % (ref 11.6–14.5)
GALACTOMANNAN AG SERPL IA-ACNC: 2.52
GLUCOSE SERPL-MCNC: 129 MG/DL (ref 74–99)
GRAM STN SPEC: ABNORMAL
HCT VFR BLD AUTO: 41 % (ref 36–48)
HGB BLD-MCNC: 13.7 G/DL (ref 13–16)
IMM GRANULOCYTES # BLD AUTO: 0 K/UL (ref 0–0.04)
IMM GRANULOCYTES NFR BLD AUTO: 0 % (ref 0–0.5)
LABORATORY COMMENT REPORT: NORMAL
LYMPHOCYTES # BLD: 0.3 K/UL (ref 0.9–3.6)
LYMPHOCYTES NFR BLD: 9 % (ref 21–52)
LYMPHOCYTES NFR BRONCH MANUAL: 82 %
LYMPHOCYTES NFR BRONCH MANUAL: 82 %
MACROPHAGES NFR BRONCH MANUAL: 10 %
MACROPHAGES NFR BRONCH MANUAL: 6 %
MCH RBC QN AUTO: 31 PG (ref 24–34)
MCHC RBC AUTO-ENTMCNC: 33.4 G/DL (ref 31–37)
MCV RBC AUTO: 92.8 FL (ref 78–100)
MONOCYTES # BLD: 0.2 K/UL (ref 0.05–1.2)
MONOCYTES NFR BLD: 6 % (ref 3–10)
MYCOBACTERIUM SPEC QL CULT: NORMAL
NEUTROPHILS NFR BRONCH MANUAL: 12 %
NEUTROPHILS NFR BRONCH MANUAL: 6 %
NEUTS SEG # BLD: 2.9 K/UL (ref 1.8–8)
NEUTS SEG NFR BLD: 85 % (ref 40–73)
NRBC # BLD: 0 K/UL (ref 0–0.01)
NRBC BLD-RTO: 0 PER 100 WBC
PLATELET # BLD AUTO: 76 K/UL (ref 135–420)
PMV BLD AUTO: 10.3 FL (ref 9.2–11.8)
POTASSIUM SERPL-SCNC: 5 MMOL/L (ref 3.5–5.5)
RBC # BLD AUTO: 4.42 M/UL (ref 4.35–5.65)
SARS-COV-2 RDRP RESP QL NAA+PROBE: NOT DETECTED
SERVICE CMNT-IMP: ABNORMAL
SODIUM SERPL-SCNC: 137 MMOL/L (ref 136–145)
SOURCE: NORMAL
SPECIMEN PREPARATION: NORMAL
SPECIMEN SOURCE: NORMAL
WBC # BLD AUTO: 3.5 K/UL (ref 4.6–13.2)

## 2023-02-16 PROCEDURE — 87116 MYCOBACTERIA CULTURE: CPT

## 2023-02-16 PROCEDURE — 3700000000 HC ANESTHESIA ATTENDED CARE: Performed by: INTERNAL MEDICINE

## 2023-02-16 PROCEDURE — 87070 CULTURE OTHR SPECIMN AEROBIC: CPT

## 2023-02-16 PROCEDURE — 6360000002 HC RX W HCPCS: Performed by: HOSPITALIST

## 2023-02-16 PROCEDURE — 2580000003 HC RX 258: Performed by: STUDENT IN AN ORGANIZED HEALTH CARE EDUCATION/TRAINING PROGRAM

## 2023-02-16 PROCEDURE — 6360000002 HC RX W HCPCS: Performed by: STUDENT IN AN ORGANIZED HEALTH CARE EDUCATION/TRAINING PROGRAM

## 2023-02-16 PROCEDURE — 88305 TISSUE EXAM BY PATHOLOGIST: CPT

## 2023-02-16 PROCEDURE — 87206 SMEAR FLUORESCENT/ACID STAI: CPT

## 2023-02-16 PROCEDURE — 6370000000 HC RX 637 (ALT 250 FOR IP): Performed by: HOSPITALIST

## 2023-02-16 PROCEDURE — 6370000000 HC RX 637 (ALT 250 FOR IP): Performed by: EMERGENCY MEDICINE

## 2023-02-16 PROCEDURE — 3600007502: Performed by: INTERNAL MEDICINE

## 2023-02-16 PROCEDURE — 2140000001 HC CVICU INTERMEDIATE R&B

## 2023-02-16 PROCEDURE — 89051 BODY FLUID CELL COUNT: CPT

## 2023-02-16 PROCEDURE — 87205 SMEAR GRAM STAIN: CPT

## 2023-02-16 PROCEDURE — 87102 FUNGUS ISOLATION CULTURE: CPT

## 2023-02-16 PROCEDURE — 87635 SARS-COV-2 COVID-19 AMP PRB: CPT

## 2023-02-16 PROCEDURE — 6370000000 HC RX 637 (ALT 250 FOR IP): Performed by: NURSE ANESTHETIST, CERTIFIED REGISTERED

## 2023-02-16 PROCEDURE — 0BD98ZX EXTRACTION OF LINGULA BRONCHUS, VIA NATURAL OR ARTIFICIAL OPENING ENDOSCOPIC, DIAGNOSTIC: ICD-10-PCS | Performed by: INTERNAL MEDICINE

## 2023-02-16 PROCEDURE — 88112 CYTOPATH CELL ENHANCE TECH: CPT

## 2023-02-16 PROCEDURE — 3700000001 HC ADD 15 MINUTES (ANESTHESIA): Performed by: INTERNAL MEDICINE

## 2023-02-16 PROCEDURE — 0B9D8ZX DRAINAGE OF RIGHT MIDDLE LUNG LOBE, VIA NATURAL OR ARTIFICIAL OPENING ENDOSCOPIC, DIAGNOSTIC: ICD-10-PCS | Performed by: INTERNAL MEDICINE

## 2023-02-16 PROCEDURE — 2580000003 HC RX 258: Performed by: HOSPITALIST

## 2023-02-16 PROCEDURE — 87305 ASPERGILLUS AG IA: CPT

## 2023-02-16 PROCEDURE — 94640 AIRWAY INHALATION TREATMENT: CPT

## 2023-02-16 PROCEDURE — 87798 DETECT AGENT NOS DNA AMP: CPT

## 2023-02-16 PROCEDURE — 85025 COMPLETE CBC W/AUTO DIFF WBC: CPT

## 2023-02-16 PROCEDURE — 0BD58ZX EXTRACTION OF RIGHT MIDDLE LOBE BRONCHUS, VIA NATURAL OR ARTIFICIAL OPENING ENDOSCOPIC, DIAGNOSTIC: ICD-10-PCS | Performed by: INTERNAL MEDICINE

## 2023-02-16 PROCEDURE — 7100000010 HC PHASE II RECOVERY - FIRST 15 MIN: Performed by: INTERNAL MEDICINE

## 2023-02-16 PROCEDURE — 80048 BASIC METABOLIC PNL TOTAL CA: CPT

## 2023-02-16 PROCEDURE — 2709999900 HC NON-CHARGEABLE SUPPLY: Performed by: INTERNAL MEDICINE

## 2023-02-16 PROCEDURE — 88312 SPECIAL STAINS GROUP 1: CPT

## 2023-02-16 PROCEDURE — 3600007512: Performed by: INTERNAL MEDICINE

## 2023-02-16 PROCEDURE — 6360000002 HC RX W HCPCS: Performed by: ANESTHESIOLOGY

## 2023-02-16 PROCEDURE — 2700000000 HC OXYGEN THERAPY PER DAY

## 2023-02-16 PROCEDURE — 0B9H8ZX DRAINAGE OF LUNG LINGULA, VIA NATURAL OR ARTIFICIAL OPENING ENDOSCOPIC, DIAGNOSTIC: ICD-10-PCS | Performed by: INTERNAL MEDICINE

## 2023-02-16 PROCEDURE — 88313 SPECIAL STAINS GROUP 2: CPT

## 2023-02-16 PROCEDURE — 99233 SBSQ HOSP IP/OBS HIGH 50: CPT | Performed by: INTERNAL MEDICINE

## 2023-02-16 PROCEDURE — 87252 VIRUS INOCULATION TISSUE: CPT

## 2023-02-16 PROCEDURE — 31624 DX BRONCHOSCOPE/LAVAGE: CPT | Performed by: INTERNAL MEDICINE

## 2023-02-16 PROCEDURE — 2500000003 HC RX 250 WO HCPCS: Performed by: ANESTHESIOLOGY

## 2023-02-16 PROCEDURE — 87077 CULTURE AEROBIC IDENTIFY: CPT

## 2023-02-16 PROCEDURE — 2580000003 HC RX 258: Performed by: ANESTHESIOLOGY

## 2023-02-16 PROCEDURE — 94761 N-INVAS EAR/PLS OXIMETRY MLT: CPT

## 2023-02-16 PROCEDURE — 36415 COLL VENOUS BLD VENIPUNCTURE: CPT

## 2023-02-16 RX ORDER — LIDOCAINE HYDROCHLORIDE 20 MG/ML
JELLY TOPICAL ONCE
Status: DISCONTINUED | OUTPATIENT
Start: 2023-02-16 | End: 2023-02-16 | Stop reason: HOSPADM

## 2023-02-16 RX ORDER — FAMOTIDINE 20 MG/1
20 TABLET, FILM COATED ORAL ONCE
Status: COMPLETED | OUTPATIENT
Start: 2023-02-16 | End: 2023-02-16

## 2023-02-16 RX ORDER — SODIUM CHLORIDE 9 MG/ML
INJECTION, SOLUTION INTRAVENOUS PRN
Status: DISCONTINUED | OUTPATIENT
Start: 2023-02-16 | End: 2023-02-16 | Stop reason: HOSPADM

## 2023-02-16 RX ORDER — SODIUM CHLORIDE 0.9 % (FLUSH) 0.9 %
5-40 SYRINGE (ML) INJECTION EVERY 12 HOURS SCHEDULED
Status: DISCONTINUED | OUTPATIENT
Start: 2023-02-16 | End: 2023-02-16 | Stop reason: HOSPADM

## 2023-02-16 RX ORDER — GLYCOPYRROLATE 0.2 MG/ML
INJECTION INTRAMUSCULAR; INTRAVENOUS PRN
Status: DISCONTINUED | OUTPATIENT
Start: 2023-02-16 | End: 2023-02-16 | Stop reason: SDUPTHER

## 2023-02-16 RX ORDER — SODIUM CHLORIDE, SODIUM LACTATE, POTASSIUM CHLORIDE, CALCIUM CHLORIDE 600; 310; 30; 20 MG/100ML; MG/100ML; MG/100ML; MG/100ML
INJECTION, SOLUTION INTRAVENOUS CONTINUOUS PRN
Status: DISCONTINUED | OUTPATIENT
Start: 2023-02-16 | End: 2023-02-16 | Stop reason: SDUPTHER

## 2023-02-16 RX ORDER — SODIUM CHLORIDE 0.9 % (FLUSH) 0.9 %
5-40 SYRINGE (ML) INJECTION PRN
Status: DISCONTINUED | OUTPATIENT
Start: 2023-02-16 | End: 2023-02-16 | Stop reason: HOSPADM

## 2023-02-16 RX ORDER — LIDOCAINE HYDROCHLORIDE 40 MG/ML
2.5 INJECTION, SOLUTION RETROBULBAR; TOPICAL ONCE
Status: DISCONTINUED | OUTPATIENT
Start: 2023-02-16 | End: 2023-02-16 | Stop reason: HOSPADM

## 2023-02-16 RX ORDER — PROPOFOL 10 MG/ML
INJECTION, EMULSION INTRAVENOUS PRN
Status: DISCONTINUED | OUTPATIENT
Start: 2023-02-16 | End: 2023-02-16 | Stop reason: SDUPTHER

## 2023-02-16 RX ORDER — LIDOCAINE HYDROCHLORIDE 10 MG/ML
1 INJECTION, SOLUTION EPIDURAL; INFILTRATION; INTRACAUDAL; PERINEURAL
Status: DISCONTINUED | OUTPATIENT
Start: 2023-02-16 | End: 2023-02-16 | Stop reason: HOSPADM

## 2023-02-16 RX ADMIN — PROPOFOL 100 MG: 10 INJECTION, EMULSION INTRAVENOUS at 15:56

## 2023-02-16 RX ADMIN — METHYLPREDNISOLONE SODIUM SUCCINATE 40 MG: 40 INJECTION, POWDER, FOR SOLUTION INTRAMUSCULAR; INTRAVENOUS at 00:35

## 2023-02-16 RX ADMIN — PROPOFOL 150 MG: 10 INJECTION, EMULSION INTRAVENOUS at 15:50

## 2023-02-16 RX ADMIN — ARFORMOTEROL TARTRATE 15 MCG: 15 SOLUTION RESPIRATORY (INHALATION) at 20:57

## 2023-02-16 RX ADMIN — PROPOFOL 150 MG: 10 INJECTION, EMULSION INTRAVENOUS at 15:42

## 2023-02-16 RX ADMIN — AZITHROMYCIN MONOHYDRATE 500 MG: 500 INJECTION, POWDER, LYOPHILIZED, FOR SOLUTION INTRAVENOUS at 14:12

## 2023-02-16 RX ADMIN — METHYLPREDNISOLONE SODIUM SUCCINATE 40 MG: 40 INJECTION, POWDER, FOR SOLUTION INTRAMUSCULAR; INTRAVENOUS at 05:23

## 2023-02-16 RX ADMIN — SODIUM CHLORIDE, PRESERVATIVE FREE 10 ML: 5 INJECTION INTRAVENOUS at 17:21

## 2023-02-16 RX ADMIN — BUDESONIDE 250 MCG: 0.25 SUSPENSION RESPIRATORY (INHALATION) at 20:57

## 2023-02-16 RX ADMIN — FAMOTIDINE 20 MG: 20 TABLET, FILM COATED ORAL at 14:12

## 2023-02-16 RX ADMIN — METHADONE HYDROCHLORIDE 60 MG: 10 CONCENTRATE ORAL at 09:43

## 2023-02-16 RX ADMIN — BUDESONIDE 250 MCG: 0.25 SUSPENSION RESPIRATORY (INHALATION) at 09:10

## 2023-02-16 RX ADMIN — IPRATROPIUM BROMIDE AND ALBUTEROL SULFATE 1 AMPULE: 2.5; .5 SOLUTION RESPIRATORY (INHALATION) at 20:57

## 2023-02-16 RX ADMIN — PANTOPRAZOLE SODIUM 40 MG: 40 TABLET, DELAYED RELEASE ORAL at 09:43

## 2023-02-16 RX ADMIN — METHYLPREDNISOLONE SODIUM SUCCINATE 40 MG: 40 INJECTION, POWDER, FOR SOLUTION INTRAMUSCULAR; INTRAVENOUS at 13:00

## 2023-02-16 RX ADMIN — AMLODIPINE BESYLATE 10 MG: 10 TABLET ORAL at 09:42

## 2023-02-16 RX ADMIN — GLYCOPYRROLATE 0.2 MG: 0.2 INJECTION, SOLUTION INTRAMUSCULAR; INTRAVENOUS at 16:02

## 2023-02-16 RX ADMIN — SODIUM CHLORIDE, PRESERVATIVE FREE 10 ML: 5 INJECTION INTRAVENOUS at 05:23

## 2023-02-16 RX ADMIN — SODIUM CHLORIDE, SODIUM LACTATE, POTASSIUM CHLORIDE, AND CALCIUM CHLORIDE: 600; 310; 30; 20 INJECTION, SOLUTION INTRAVENOUS at 15:25

## 2023-02-16 RX ADMIN — METHYLPREDNISOLONE SODIUM SUCCINATE 40 MG: 40 INJECTION, POWDER, FOR SOLUTION INTRAMUSCULAR; INTRAVENOUS at 23:31

## 2023-02-16 RX ADMIN — SENNOSIDES AND DOCUSATE SODIUM 2 TABLET: 50; 8.6 TABLET ORAL at 09:42

## 2023-02-16 RX ADMIN — PROPOFOL 100 MG: 10 INJECTION, EMULSION INTRAVENOUS at 15:47

## 2023-02-16 RX ADMIN — ARFORMOTEROL TARTRATE 15 MCG: 15 SOLUTION RESPIRATORY (INHALATION) at 09:10

## 2023-02-16 RX ADMIN — METHYLPREDNISOLONE SODIUM SUCCINATE 40 MG: 40 INJECTION, POWDER, FOR SOLUTION INTRAMUSCULAR; INTRAVENOUS at 17:21

## 2023-02-16 RX ADMIN — SODIUM CHLORIDE, PRESERVATIVE FREE 10 ML: 5 INJECTION INTRAVENOUS at 09:45

## 2023-02-16 RX ADMIN — IPRATROPIUM BROMIDE AND ALBUTEROL SULFATE 1 AMPULE: 2.5; .5 SOLUTION RESPIRATORY (INHALATION) at 00:57

## 2023-02-16 RX ADMIN — POLYETHYLENE GLYCOL 3350 17 G: 17 POWDER, FOR SOLUTION ORAL at 09:43

## 2023-02-16 RX ADMIN — CEFTRIAXONE 2000 MG: 2 INJECTION, POWDER, FOR SOLUTION INTRAMUSCULAR; INTRAVENOUS at 09:00

## 2023-02-16 RX ADMIN — IPRATROPIUM BROMIDE AND ALBUTEROL SULFATE 1 AMPULE: 2.5; .5 SOLUTION RESPIRATORY (INHALATION) at 09:10

## 2023-02-16 RX ADMIN — IPRATROPIUM BROMIDE AND ALBUTEROL SULFATE 1 AMPULE: 2.5; .5 SOLUTION RESPIRATORY (INHALATION) at 15:15

## 2023-02-16 RX ADMIN — IPRATROPIUM BROMIDE AND ALBUTEROL SULFATE 1 AMPULE: 2.5; .5 SOLUTION RESPIRATORY (INHALATION) at 12:24

## 2023-02-16 ASSESSMENT — PAIN SCALES - GENERAL
PAINLEVEL_OUTOF10: 0

## 2023-02-16 NOTE — PROGRESS NOTES
[facility]  Pulmonary, Critical Care, and Sleep Medicine  Progress note    Name: Angelita Gibson MRN: [id number]   : 1955 Hospital: [address] [place] [address] Dr   Date: 2023        IMPRESSION:   Acute hypoxemic respiratory failure - CT with diffuse upper-mid lobe predominant GGOs with interstitial thickening (new since CT chest in 2022) on underlying emphysema. Differential is extensive and includes inhalational lung injury, hypersensitivity pneumonitis, inflammatory interstitial pneumonitis, or infection to include potential atypical/opportunistic infections. Radiologic pattern suggestive of diffuse infiltrative alveolar and interstitial process. Rapid flu & COVID negative; respiratory PCR negative. , HIV negative , RA factor normal -mildly elevated ,procalcitonin elevated at 2.63. ESR/CRP elevated. Elevated IgG , IgM. IgE is pending. Cultures to date growing Candida in sputum , 1-AFB is negative.  galactomannan markedly elevated-2525 . Echocardiogram with PAP 43 mm- moderate pulmonary hypertension remains clinically compensated. Improved oxygenation now on 4 L saturating adequately  Centribolular emphysema  Tobacco dependence  Polysubstance abuse, heroin abuse, enrolled in methadone program  Acute kidney injury, resolved  Cirrhosis of the liver with associated portal hypertension  Elevated liver enzymes likely secondary to alcohol use with AST/ALT ratio >2  Mild leukopenia with lymphopenia  Thrombocytopenia likely secondary to chronic liver disease      PLAN:   Supplemental oxygen to maintain SpO2 88-94% --currently on 4 L nasal cannula  Bronchial hygiene protocol  Bronchodilators: Brovana BID and Duoneb q4h  Steroids: Continue Solumedrol IV to 40mg q6h (which is approx 2mg/kg); may need to adjust based on response/etiology. Pulmicort inh BID  Antibiotics: Currently on ceftriaxone,doxycycline.   Will consult ID and make appropriate changes once all cultures resulted  We will follow-up cultures  Aspiration precautions  Need for further diagnostics: Follow-up fungitell,sputum cultures to include AFB and fungal pending. Follow-up QuantiFERON gold, ARLETTE, HSP panel and IgE  We will plan for bronchoscopy-BAL on 2/16/2023 at 2:30 PM discussed with patient procedure and explained indications. Informed consent obtained with witness RN Jonell Duverney, explained indications risk benefits. He is agreeable. Will need to assess home oxygen when ready for discharge  PT, OOB and ambulate  DVT prophylaxis with SCDs (chemical had been held in setting of his thrombocytopenia)  Will follow      Subjective/Interval History:   Initial HPI/Interval:   This patient has been seen and evaluated at the request of Dr. Henrique Brandt for respiratory failure. Patient is a 69 y.o. male with a history of cirrhosis with portal HTN, tobacco dependence, and polysubstance abuse who presented to the [facility] ER with approx 3 days of progressive SOB/QUIROZ. States that prior to that, he had been in his usual state of health. Denies any known sick contacts. Reports associated mostly dry cough (scant yellow mucus a couple of days ago), no hemoptysis. Has some chest pain associated with coughing. Denies any baseline respiratory problems or cardiac problems. Reports associated subjective fevers, chills, sweats, and decreased appetite. Has not noticed any weight loss. Vomited once, but no persistent nausea, vomiting, or diarrhea and denies any abdominal pain. Has chronic pain in his knees; had surgery in 2022, but persistent pain. No new rashes. He currently smokes approx 1ppd and marijuana cigarettes, also snorts heroin, most recently in the days preceding admission. Uses a humidifier in the home. No pets/birds. No known family history of lung disease. Drinks about 4 cans of beer/day.     02/16/23     No acute events overnight. Sitting up in bed-states he feels better  Less short of breath  First AFB smear from 2/11/2023 is negative.   Second specimen should be resulted today  A galactomannan markedly elevated to 2525  Denies any chest pain  Maintaining adequate oxygen saturation on 4 L nasal cannula  Chest x-ray today-reviewed by myself no significant change  Sputum growing few yeast  Afebrile  Discussed with patient current diagnosis and need for further evaluation with bronchoscopy for further delineation of definitive etiology of bilateral extensive ILD. Planning for procedure on 2023. Currently scheduled for 2.30 PM 0n 23    ROS:Pertinent items are noted in HPI. Objective:   Vital Signs:    BP (!) 156/85   Pulse 68   Temp 98 °F (36.7 °C) (Oral)   Resp 18   Ht 5' 11\" (1.803 m)   Wt 176 lb (79.8 kg)   SpO2 98%   BMI 24.55 kg/m²             Temp (24hrs), Av.3 °F (36.8 °C), Min:98 °F (36.7 °C), Max:98.6 °F (37 °C)       Intake/Output:   Last shift:       07 -  190  In: -   Out: 475 [Urine:475]  Last 3 shifts:  190 -  0700  In: 440 [P.O.:718]  Out: 1121 [Urine:4555]    Intake/Output Summary (Last 24 hours) at 2023 1120  Last data filed at 2023 0903  Gross per 24 hour   Intake 718 ml   Output 2775 ml   Net -2057 ml          Physical Exam:    General: alert, oriented times 3, cooperative,    HEENT: NCAT   Neck: No abnormally enlarged lymph nodes.    Chest: normal   Lungs: Fewer but persistent end inspiratory squeaks at lung bases   Heart: Regular rate and rhythm, S1S2 present, or without murmur or extra heart sounds   Abdomen: abdomen is soft without significant tenderness, masses, organomegaly or guarding   Extremity: none edema   Neuro: alert, grossly nonfocal   Skin: Skin color, texture, turgor normal. No rashes or lesions        DATA:  Labs:  Lab Results   Component Value Date    WBC 3.5 (L) 2023    HGB 13.7 2023    HCT 41.0 2023    MCV 92.8 2023    PLT 76 (L) 2023      Recent Labs     23  0048 02/15/23  0030 23  0142   WBC 3.8* 2.9* 3.5*   HGB 12.9* 13.1 13.7   HCT 38.6 39.5 41.0   PLT 74* 72* 76*       Recent Labs     02/16/23  0142      K 5.0      CO2 27   BUN 21*     Results       Procedure Component Value Units Date/Time    Culture with Smear, Acid Fast Bacillius [[id number]]     Order Status: Sent Specimen: Sputum Induced     Culture with Smear, Acid Fast Bacillius [[id number]] Collected: 02/15/23 0900    Order Status: Sent Specimen: Sputum Induced Updated: 02/15/23 1122    Quantiferon, Incubated [[id number]] Collected: 02/14/23 1216    Order Status: Sent Specimen: Blood Updated: 02/14/23 1222    Culture, Respiratory [[id number]]  (Abnormal)  (Susceptibility) Collected: 02/12/23 0315    Order Status: Completed Specimen: Sputum Expectorated Updated: 02/16/23 0928     Special Requests NO SPECIAL REQUESTS        Gram stain RARE WBCS SEEN               RARE EPITHELIAL CELLS SEEN            FEW Gram positive cocci         FEW GRAM VARIABLE RODS         RARE YEAST        Culture       LIGHT YEAST, (APPARENT CANDIDA ALBICANS)            Escherichia coli               SCANT NORMAL RESPIRATORY BELL          Susceptibility        Escherichia coli      BACTERIAL SUSCEPTIBILITY PANEL WIHT      amikacin <=2 ug/mL Sensitive      ampicillin >=32 ug/mL Resistant      ampicillin-sulbactam >=32 ug/mL Resistant      ceFAZolin 16 ug/mL Sensitive      cefepime <=1 ug/mL Sensitive      cefOXitin <=4 ug/mL Sensitive      cefTAZidime <=1 ug/mL Sensitive      cefTRIAXone <=1 ug/mL Sensitive      ciprofloxacin <=0.25 ug/mL Sensitive      gentamicin <=1 ug/mL Sensitive      levofloxacin <=0.12 ug/mL Sensitive      meropenem <=0.25 ug/mL Sensitive      piperacillin-tazobactam <=4 ug/mL Sensitive      tobramycin <=1 ug/mL Sensitive      trimethoprim-sulfamethoxazole <=20 ug/mL Sensitive                           Culture with Smear, Acid Fast Bacillius [[id number]] Collected: 02/12/23 0315    Order Status: Completed Specimen: Miscellaneous sample Updated: 02/14/23 1816 Sample Site SPUTUM        AFB SPECIMEN PROCESSING Concentration     AFB Smear Negative        Comment: (NOTE)  Performed At: [facility] & [address] [id number]  [place] KINSEY:[id number]          ACID FAST CULTURE PENDING    Culture, Fungus [[id number]]  (Abnormal) Collected: 02/12/23 0315    Order Status: Completed Specimen: Sputum Expectorated Updated: 02/14/23 0943     Special Requests NO SPECIAL REQUESTS        Culture FEW LENIN ALBICANS               CULTURE WILL BE HELD FOR 4 WEEKS. IF THERE IS ADDITIONAL FUNGAL GROWTH, A NEW REPORT WILL FOLLOW.           Legionella antigen, urine [[id number]] Collected: 02/11/23 2030    Order Status: Completed Specimen: Urine, random Updated: 02/12/23 1340     Legionella Antigen, Urine Negative       Strep Pneumoniae Antigen [[id number]] Collected: 02/11/23 2030    Order Status: Completed Specimen: Urine, random Updated: 02/12/23 1340     STREP PNEUMONIAE ANTIGEN, URINE Negative       Respiratory Panel, Molecular, with COVID-19 (Restricted: peds pts or suitable admitted adults) [[id number]] Collected: 02/11/23 2030    Order Status: Completed Specimen: Nasopharyngeal Updated: 02/11/23 2210     Adenovirus by PCR Not detected        Coronavirus 229E by PCR Not detected        Coronavirus HKU1 by PCR Not detected        Coronavirus NL63 by PCR Not detected        Coronavirus OC43 by PCR Not detected        SARS-CoV-2, PCR Not detected        Human Metapneumovirus by PCR Not detected        Rhinovirus Enterovirus PCR Not detected        Influenza A by PCR Not detected        Influenza B PCR Not detected        Parainfluenza 1 PCR Not detected        Parainfluenza 2 PCR Not detected        Parainfluenza 3 PCR Not detected        Parainfluenza 4 PCR Not detected        Respiratory Syncytial Virus by PCR Not detected        Bordetella parapertussis by PCR Not detected        Bordetella pertussis by PCR Not detected        Chlamydophila Pneumonia PCR Not detected        Mycoplasma pneumo by PCR Not detected       Culture, Respiratory [[id number]] Collected: 02/10/23 2228    Order Status: Canceled Specimen: Sputum Expectorated              Imaging:  [x]I have personally reviewed the patients radiographs  XR Results (most recent):  Xray Result (most recent):  XR CHEST PORTABLE 02/15/2023    Narrative  CHEST PORTABLE    CPT CODE: 83325    COMPARISON: 2/10/2023    INDICATIONS: Pneumonia    FINDINGS: The heart is probably normal in size. There are diffuse bilateral  interstitial infiltrates greatest in the right lung base not significantly  changed since 2/10/2023. No pleural effusions are seen. No significant osseous  abnormalities. Impression  Bilateral interstitial infiltrates not significantly changed in the four-day  interval.     XR CHEST (2 VW) 02/10/2023    Narrative  EXAM:  XR CHEST PA LAT    INDICATION:   sob    COMPARISON: Chest radiograph April 29, 2022. FINDINGS: Multifocal interstitial and airspace opacities, greatest in the right  middle lobe. No pneumothorax or pleural effusion. Calcified aortic arch. Cardiomediastinal contours are otherwise unremarkable. No acute osseous  findings. Impression  Multifocal interstitial and airspace opacities, greatest in the right middle  lobe. Findings likely represent multifocal pneumonia. CT Results (most recent):  CTA CHEST W WO CONTRAST 02/10/2023    Narrative  EXAM: CT Angiogram of the Chest    CLINICAL INDICATION:  rule out pe . Smoker with hypertension. Patient having  shortness of breath and bodyaches. TECHNIQUE: CT angiogram of the chest performed. MIPS performed    All CT scans at this facility are performed using dose optimization technique as  appropriate to a performed exam, to include automated exposure control,  adjustment of the mA and/or kV according to patient size (including appropriate  matching for site specific examination) or use of iterative reconstruction  technique.     IV CONTRAST: 100 cc of Isovue 370    COMPARISON: None    FINDINGS:  Limitations: Breathing motion is present. Thyroid: Unremarkable. Mediastinum: Hilar adenopathy is noted. There is subcarinal adenopathy. Heart: Unremarkable    Pericardium: Unremarkable    Aorta: No evidence of aortic dissection or aneurysm. Pulmonary Arteries: No evidence of pulmonary embolus. Trachea and Bronchi: Unremarkable. Pleura: No evidence of pleural effusion. Lungs: Centrilobular emphysematous changes are present. There is interstitial  thickening and groundglass changes throughout the lungs most pronounced in the  mid portions. Axilla/Chest wall: Unremarkable. Upper Abdomen: No acute findings. Musculoskeletal: No acute osseous findings. Impression  1. Findings concerning for interstitial pneumonitis possibly some process like  alveolar proteinosis or hypersensitivity pneumonitis    2. No evidence of pulmonary embolus or aortic dissection. 3.  Mediastinal and hilar adenopathy. Consider short-term CT 3 months to  evaluate stability. 4.  Centrilobular emphysematous changes. High complexity decision making was performed during the evaluation of this patient at high risk for decompensation with multiple organ involvement     Above mentioned total time spent on reviewing the case/medical record/data/notes/EMR/patient examination/documentation/coordinating care with nurse/consultants, exclusive of procedures with complex decision making performed and > 50% time spent in face to face evaluation. Discussion included informing patient of all test results and planning for bronchoscopy.     Vandana Quiroz MD, MD  Pulmonary & Critical Care Medicine

## 2023-02-16 NOTE — CONSULTS
Infectious Disease Consultation Note        Reason: Bilateral community-acquired pneumonia, ? Disseminated aspergillosis    Current abx Prior abx   Ceftriaxone since 2/10/2023  Azithromycin since 2/11   Doxycycline 2/10-2/11     Lines:       Assessment :   69 y.o. male with a PMHx of heroin abuse, HTN, Methadone dependence who presented to the ED on 2/10/23 with complaints of fatigue,  fever, chills, intermittent SOB, cough, decreased appetite since 3 days. Clinical presentation consistent with acute hypoxic respiratory failure-present on admission due to bacterial pneumonia/aspiration pneumonia superimposed on underlying interstitial lung disease (hypersensitivity pneumonitis/inhalational lung injury)    Sputum culture 2/12-Candida albicans, E. Coli    E. coli in sputum culture concerning for aspiration/?undiagnosed reflux    Clinical presentation not c/w pulmonary TB  Negative sputum AFB 2/12, 2/15    Positive aspergillus galactomannan antigen: 2.5 on 2/11/23 concerning for aspergillosis. Galactomannan antigen test can be false positive with environmental contamination/beta-lactam's. No definitive clinical evidence of pulmonary aspergillosis/disseminated aspergillus at this time. Will await findings of bronchoscopy    Centrilobular emphysema      Chronic hepatitis C/ Cirrhosis of liver with associated portal hypertension    Pulmonary follow-up appreciated. Plans for bronchoscopy noted    Leukopenia: r/o HIV. Patient denies HIV risk factors.   Tested negative last year at work per patient report    Acute kidney injury- improved      Recommendations:    Continue ceftriaxone today  Recommend to obtain galactomannan antigen from BAL fluid  We will hold off on initiating antifungal therapy since no definitive clinical evidence to suggest disseminated aspergillosis at this time  Obtain HIV serology  Follow-up results of bronchoscopy  Will discontinue airborne isolation in a.m. if AFB stain done today is negative  7. Wean oxygen as tolerated    Thank you for consultation request. Above plan was discussed in details with patient, and dr Kevin Shannon. Please call me if any further questions or concerns. Will continue to participate in the care of this patient. HPI:     69 y.o. male with a PMHx of heroin abuse, HTN, Methadone dependence who presented to the ED on 2/10/23 with complaints of fatigue,  fever, chills, intermittent SOB, cough, decreased appetite since 3 days. States he cough up thick mucus mostly green in color. Has not had much appetite since 3 days, first meal was today at the hospital in 3 days. Last BM was 3 days ago. Patient was  seen at nuclear medicine waiting area by me, patient not in any acute distress, on room air, but states he has conversational dyspnea. Medication non compliance. States he does not take any medications at home besides methadone. Denies chest pain, nvd, abdominal pain, headache, dizziness. Admits to smoking cigarettes daily, drinks 'few' beers daily, denies illicit drug use. In the ED, Temp 97.7, HR 74, /83 - 170/78, Resp 18-20, Oxygen 79% but came up to 91-92% on 6L NC. CBC without significant abnormality except Plt 89. D-Dimer 1.79. Na 132, Cr 1.72. Trop 33. proBNP 772. Flu and COVID negative. CXR consistent with multifocal pneumonia. VQ scan pending. Received duoneb, ceftriaxone, doxy, prednisone, 1L NS bolus in the ED.        Past Medical History:   Diagnosis Date    Abscess of right shoulder     Abscess of shoulder     Acute blood loss as cause of postoperative anemia 4/22/2022    Arthritis     Closed displaced comminuted fracture of shaft of right tibia with routine healing 04/22/2022    Closed fracture of distal end of right fibula with routine healing 4/22/2022    Epidural abscess     Heart murmur     Hematuria     Heroin abuse ([facility] [address]     Hypertension     Infected wound     Infectious disease     Iron deficiency anemia     IV drug user     Liver disease     Methadone dependence ([facility] [address]     Tobacco abuse        Past Surgical History:   Procedure Laterality Date    COLONOSCOPY  07/18/2016    CYST REMOVAL      rt.forearm and rt.foot    FRACTURE SURGERY Right 04/22/2022    S/P Closed IM nailing of the right tibia using the Synthes IM nail system with a double lock proximally and triple lock distally (4/22/2022 - Dr. Jennifer Oshea)    [facility]    Closed Fracture of Right Tibia     OTHER SURGICAL HISTORY      right forearm infections and graft    SKIN GRAFT      UPPER GASTROINTESTINAL ENDOSCOPY  07/18/2016       [unfilled]    Current Facility-Administered Medications   Medication Dose Route Frequency    sodium chloride flush 0.9 % injection 5-40 mL  5-40 mL IntraVENous 2 times per day    sodium chloride flush 0.9 % injection 5-40 mL  5-40 mL IntraVENous PRN    0.9 % sodium chloride infusion   IntraVENous PRN    lidocaine PF 4 % injection 2.5 mL  2.5 mL Other Once    lidocaine (XYLOCAINE) 2 % jelly   Topical Once    sodium chloride flush 0.9 % injection 5-40 mL  5-40 mL IntraVENous 2 times per day    sodium chloride flush 0.9 % injection 5-40 mL  5-40 mL IntraVENous PRN    0.9 % sodium chloride infusion   IntraVENous PRN    lidocaine PF 1 % injection 1 mL  1 mL IntraDERmal Once PRN    methadone (DOLOPHINE) 10 MG/ML solution 60 mg  60 mg Oral Daily    ipratropium-albuterol (DUONEB) nebulizer solution 1 ampule  1 ampule Inhalation Q4H    methylPREDNISolone sodium (SOLU-MEDROL) injection 40 mg  40 mg IntraVENous Q6H    acetaminophen (TYLENOL) tablet 650 mg  650 mg Oral Q4H PRN    Or    acetaminophen (TYLENOL) suppository 650 mg  650 mg Rectal Q4H PRN    amLODIPine (NORVASC) tablet 10 mg  10 mg Oral Daily    budesonide (PULMICORT) nebulizer suspension 250 mcg  0.25 mg Nebulization BID    And    arformoterol tartrate (BROVANA) nebulizer solution 15 mcg  15 mcg Nebulization BID    [Held by provider] aspirin EC tablet 81 mg  81 mg Oral Daily    cefTRIAXone (ROCEPHIN) 2,000 mg in sterile water 20 mL IV syringe  2,000 mg IntraVENous Q24H    hydrALAZINE (APRESOLINE) injection 10 mg  10 mg IntraVENous Q6H PRN    LORazepam (ATIVAN) tablet 1 mg  1 mg Oral Q1H PRN    LORazepam (ATIVAN) injection 1 mg  1 mg IntraVENous Q1H PRN    LORazepam (ATIVAN) tablet 2 mg  2 mg Oral Q1H PRN    LORazepam (ATIVAN) injection 2 mg  2 mg IntraVENous Q1H PRN    LORazepam (ATIVAN) injection 3 mg  3 mg IntraVENous Q15 Min PRN    ondansetron (ZOFRAN-ODT) disintegrating tablet 4 mg  4 mg Oral Q8H PRN    Or    ondansetron (ZOFRAN) injection 4 mg  4 mg IntraVENous Q6H PRN    pantoprazole (PROTONIX) tablet 40 mg  40 mg Oral QAM AC    polyethylene glycol (GLYCOLAX) packet 17 g  17 g Oral Daily    sennosides-docusate sodium (SENOKOT-S) 8.6-50 MG tablet 2 tablet  2 tablet Oral Daily    sodium chloride flush 0.9 % injection 5-40 mL  5-40 mL IntraVENous Q8H    sodium chloride flush 0.9 % injection 5-40 mL  5-40 mL IntraVENous PRN     Facility-Administered Medications Ordered in Other Encounters   Medication Dose Route Frequency    propofol injection   IntraVENous PRN       Allergies: Patient has no known allergies. History reviewed. No pertinent family history. Social History     Socioeconomic History    Marital status:      Spouse name: Not on file    Number of children: Not on file    Years of education: Not on file    Highest education level: Not on file   Occupational History    Not on file   Tobacco Use    Smoking status: Every Day     Packs/day: 0.50     Types: Cigarettes    Smokeless tobacco: Never   Substance and Sexual Activity    Alcohol use:  Yes     Alcohol/week: 4.0 standard drinks    Drug use: Yes     Types: Heroin    Sexual activity: Not on file   Other Topics Concern    Not on file   Social History Narrative         ** Merged History Encounter **     Social Determinants of Health     Financial Resource Strain: Not on file   Food Insecurity: Not on file   Transportation Needs: Not on file   Physical Activity: Not on file   Stress: Not on file   Social Connections: Not on file   Intimate Partner Violence: Not on file   Housing Stability: Not on file     Social History     Tobacco Use   Smoking Status Every Day    Packs/day: 0.50    Types: Cigarettes   Smokeless Tobacco Never        Temp (24hrs), Av.2 °F (36.8 °C), Min:97.7 °F (36.5 °C), Max:98.6 °F (37 °C)    BP (!) 144/79   Pulse 64   Temp 97.7 °F (36.5 °C) (Oral)   Resp 20   Ht 5' 11\" (1.803 m)   Wt 176 lb (79.8 kg)   SpO2 100%   BMI 24.55 kg/m²     ROS: 12 point ROS obtained in details. Pertinent positives as mentioned in HPI,   otherwise negative    Physical Exam:     General:          Alert, in NAD   HEENT:          Sclera anicteric. Conjunctiva pink. Mucous membranes                           Moist, no ear or nasal discharge   Neck:               Supple. Trachea midline. Mild accessory muscle use. No jugular venous distention, no carotid bruit   CV:                  Regular rate and rhythm. S1S2+   Lungs:             Rhonchi noted bilaterally   Abdomen:        Soft, non-tender. Not distended. Bowel sounds normal.    Extremities:     No cyanosis. No edema. Pulses 2+ b/l   Neurologic:      Alert and oriented X 4. Follows commands, responds appropriately. No focal neurological deficit was noted   Skin:                Warm and dry. No rashes.      Labs: Results:   Chemistry Recent Labs     23  0142   GLUCOSE 129*      K 5.0      CO2 27   BUN 21*   CREATININE 0.79      CBC w/Diff Recent Labs     23  0048 02/15/23  0030 23  0142   WBC 3.8* 2.9* 3.5*   RBC 4.05* 4.17* 4.42   HGB 12.9* 13.1 13.7   HCT 38.6 39.5 41.0   PLT 74* 72* 76*      Microbiology Results       Procedure Component Value Units Date/Time    COVID-19, Rapid [[id number]] Collected: 23 1338    Order Status: Completed Specimen: Nasopharyngeal Updated: 23 [facility]        SARS-CoV-2, Rapid Not detected        Comment: Rapid Abbott ID Now       Rapid NAAT:  The specimen is NEGATIVE for SARS-CoV-2, the novel coronavirus associated with COVID-19. Negative results should be treated as presumptive and, if inconsistent with clinical signs and symptoms or necessary for patient management, should be tested with an alternative molecular assay. Negative results do not preclude SARS-CoV-2 infection and should not be used as the sole basis for patient management decisions. This test has been authorized by the FDA under an Emergency Use Authorization (EUA) for use by authorized laboratories.    Fact sheet for Healthcare Providers:  http://www.jessica.alex/  Fact sheet for Patients: http://www.dagoberto-kali.bimegha/       Methodology: Isothermal Nucleic Acid Amplification         Culture with Smear, Acid Fast Bacillius [[id number]]     Order Status: Sent Specimen: Sputum Induced     Culture with Smear, Acid Fast Bacillius [[id number]] Collected: 02/15/23 0900    Order Status: Completed Specimen: Miscellaneous sample Updated: 02/16/23 1537     Sample Site SPUTUM     AFB SPECIMEN PROCESSING Concentration     AFB Smear Negative        Comment: (NOTE)  Performed At: [facility] & [address] [id number]  Jimmie Chavis MD JR:[id number]          ACID FAST CULTURE PENDING    Zev Choudhary [[id number]] Collected: 02/14/23 1216    Order Status: Sent Specimen: Blood Updated: 02/14/23 1222    Culture, Respiratory [[id number]]  (Abnormal)  (Susceptibility) Collected: 02/12/23 0315    Order Status: Completed Specimen: Sputum Expectorated Updated: 02/16/23 0928     Special Requests NO SPECIAL REQUESTS        Gram stain RARE WBCS SEEN               RARE EPITHELIAL CELLS SEEN            FEW Gram positive cocci         FEW GRAM VARIABLE RODS         RARE YEAST        Culture       LIGHT YEAST, (APPARENT LENIN ALBICANS)            Escherichia coli               SCANT NORMAL RESPIRATORY BELL          Susceptibility        Escherichia coli      BACTERIAL SUSCEPTIBILITY PANEL WHIT      amikacin <=2 ug/mL Sensitive      ampicillin >=32 ug/mL Resistant      ampicillin-sulbactam >=32 ug/mL Resistant      ceFAZolin 16 ug/mL Sensitive      cefepime <=1 ug/mL Sensitive      cefOXitin <=4 ug/mL Sensitive      cefTAZidime <=1 ug/mL Sensitive      cefTRIAXone <=1 ug/mL Sensitive      ciprofloxacin <=0.25 ug/mL Sensitive      gentamicin <=1 ug/mL Sensitive      levofloxacin <=0.12 ug/mL Sensitive      meropenem <=0.25 ug/mL Sensitive      piperacillin-tazobactam <=4 ug/mL Sensitive      tobramycin <=1 ug/mL Sensitive      trimethoprim-sulfamethoxazole <=20 ug/mL Sensitive                           Culture with Smear, Acid Fast Bacillius [[id number]] Collected: 02/12/23 0315    Order Status: Completed Specimen: Miscellaneous sample Updated: 02/14/23 1536     Sample Site SPUTUM        AFB SPECIMEN PROCESSING Concentration     AFB Smear Negative        Comment: (NOTE)  Performed At: [facility] & [address] [id number]  Rose Chan MD AE:[id number]          ACID FAST CULTURE PENDING    Culture, Fungus [[id number]]  (Abnormal) Collected: 02/12/23 0315    Order Status: Completed Specimen: Sputum Expectorated Updated: 02/14/23 0943     Special Requests NO SPECIAL REQUESTS        Culture FEW LENIN ALBICANS               CULTURE WILL BE HELD FOR 4 WEEKS. IF THERE IS ADDITIONAL FUNGAL GROWTH, A NEW REPORT WILL FOLLOW.           Legionella antigen, urine [[id number]] Collected: 02/11/23 2030    Order Status: Completed Specimen: Urine, random Updated: 02/12/23 1340     Legionella Antigen, Urine Negative       Strep Pneumoniae Antigen [[id number]] Collected: 02/11/23 2030    Order Status: Completed Specimen: Urine, random Updated: 02/12/23 1340     STREP PNEUMONIAE ANTIGEN, URINE Negative       Respiratory Panel, Molecular, with COVID-19 (Restricted: peds pts or suitable admitted adults) [[id number]] Collected: 02/11/23 2030    Order Status: Completed Specimen: Nasopharyngeal Updated: 02/11/23 2210     Adenovirus by PCR Not detected        Coronavirus 229E by PCR Not detected        Coronavirus HKU1 by PCR Not detected        Coronavirus NL63 by PCR Not detected        Coronavirus OC43 by PCR Not detected        SARS-CoV-2, PCR Not detected        Human Metapneumovirus by PCR Not detected        Rhinovirus Enterovirus PCR Not detected        Influenza A by PCR Not detected        Influenza B PCR Not detected        Parainfluenza 1 PCR Not detected        Parainfluenza 2 PCR Not detected        Parainfluenza 3 PCR Not detected        Parainfluenza 4 PCR Not detected        Respiratory Syncytial Virus by PCR Not detected        Bordetella parapertussis by PCR Not detected        Bordetella pertussis by PCR Not detected        Chlamydophila Pneumonia PCR Not detected        Mycoplasma pneumo by PCR Not detected       Culture, Respiratory [[id number]] Collected: 02/10/23 2245    Order Status: Canceled Specimen: Sputum Expectorated                 RADIOLOGY:    All available imaging studies/reports in [facility] care for this admission were reviewed      Disclaimer: Sections of this note are dictated utilizing voice recognition software, which may have resulted in some phonetic based errors in grammar and contents. Even though attempts were made to correct all the mistakes, some may have been missed, and remained in the body of the document. If questions arise, please contact our department.     Dr. Tal Cowart, Infectious Disease Specialist  [phone]  February 16, 2023  3:54 PM

## 2023-02-16 NOTE — PERIOP NOTE
TRANSFER - IN REPORT:    Verbal report received from [facility] on Sharron Ing  being received from [facility] for ordered procedure      Report consisted of patient's Situation, Background, Assessment and   Recommendations(SBAR). Information from the following report(s) Nurse Handoff Report was reviewed with the receiving nurse. Opportunity for questions and clarification was provided. Assessment completed upon patient's arrival to unit and care assumed.

## 2023-02-16 NOTE — PERIOP NOTE
TRANSFER - OUT REPORT:    Verbal report given to Luciana Garcia RN on Redd Carter  being transferred to [facility] for routine post-op       Report consisted of patient's Situation, Background, Assessment and   Recommendations(SBAR). Information from the following report(s) MAR was reviewed with the receiving nurse. Rufe Assessment: No data recorded  Lines:   Midline Single Lumen 02/15/23 Left Brachial (Active)   Criteria for Appropriate Use Limited/no vessel suitable for conventional peripheral access 02/16/23 1223   Site Assessment Clean, dry & intact 02/16/23 1223   Phlebitis Assessment No symptoms 02/16/23 1223   Infiltration Assessment 0 02/16/23 1223   Extremity Circumference (cm) 33 cm 02/16/23 1223   External Catheter Length (cm) 0 cm 02/16/23 1223   Lumen Color/Status White 02/16/23 0030   Line Care Cap changed;Ports disinfected 02/16/23 1223   Alcohol Cap Used Yes 02/16/23 1223   Date of Last Dressing Change 02/15/23 02/16/23 1223   Dressing Type Transparent;Gauze 02/16/23 1223   Dressing Status Clean, dry & intact 02/16/23 1223   Dressing Intervention New 02/16/23 0030        Opportunity for questions and clarification was provided.       Patient transported with:  Registered Nurse

## 2023-02-16 NOTE — PROCEDURES
[facility]                  Pulmonary, Critical Care, and Sleep Medicine     Bronchoscopy Report with , BAL (bronchoalveolar lavage), bronchial washing,     Pre-procedure diagnosis, Indication  Pulmonary infiltrates. R91.8  Pneumonia. J18.9  Abnormal CT chest.  R93.7    Post procedure diagnosis  Same    Procedures Performed  Diagnostic bronchoscopy. Flexible Fiberoptic Diagnostic Bronchoscopy. BAL (bronchoalveolar lavage) from right middle lobe and left lingula  lobe. Bronchial washings from bilateral airways . Consent/Treatment: Informed consent was obtained from the  patient after risks, benefits and alternatives were explained. Timeout verified the correct patient and correct procedure. Anesthesia:   Moderate conscious sedation performed by anesthesiology    Procedure Details:   -- The bronchoscope was introduced through the  right nare. -- The vocal cords were found to be normal, moving bilaterally equally, without erythema/mass/polyp. -- The trachea and amilcar were completely inspected and were found to be normal.  -- The right-sided endobronchial anatomy was completely inspected and was found to be normal.mucus plugs removed from right bronchial tree. -- The left-sided endobronchial anatomy was completely inspected and was found to be normal.         Specimens:   BAL and bronchial washings sent for culture, afb, and cell count  and diff, Fungal stains, cultures, A. Galactomannan, viral cultures, special stains for PJP, congo red, pAS      Implants: none, NA    Complications: none  Vital signs remained stable throughout the procedure. Patient was woken up in endoscopy suite and then transferred to recovery area in a stable condition. Estimated Blood Loss: none    PLAN:  Recover per anesthesia  Await micro and path results.       Daylin Zurita DO PGY4 PCCM fellow   February 16, 2023

## 2023-02-16 NOTE — PROGRESS NOTES
[facility] Hospitalist Group  Progress Note    Patient: Roshan Rubalcava Age: 69 y.o. : 1955 MR#: [id number] SSN: [id number]  Date/Time: 2023    Subjective:     Patient is laying in bed in no apparent distress    Assessment:   1. Acute hypoxic respiratory failure due to #2 and #3  2. Interstitial pneumonitis could be due to hypersensitivity pneumonitis versus alveolar proteinosis  3. COPD/emphysema  4. GAYATHRI, improved  5. Chronic thrombocytopenia  6. Polysubstance use disorder  7. Hypertension  8. GERD  9. History of constipation  10. Medical noncompliance    PLAN:    Status post bronchoscopy, follow cultures  ID is following  Continue airborne precautions for now  Wean oxygen, bronchial hygiene  Antibiotics per ID. Continue IV steroids.   Thrombocytopenia in the setting of cirrhosis   on amlodipine  Bowel regimen  PT and OT     Case discussed with:  [x]Patient  []Family  [x]Nursing  []Case Management  DVT Prophylaxis:  []Lovenox  []Hep SQ  [x]SCDs  []Coumadin   []On Heparin gtt    Objective:   VS: BP (!) 143/75   Pulse 95   Temp 97.7 °F (36.5 °C) (Oral)   Resp 24   Ht 5' 11\" (1.803 m)   Wt 176 lb (79.8 kg)   SpO2 96%   BMI 24.55 kg/m²    Tmax/24hrs: Temp (24hrs), Av.1 °F (36.7 °C), Min:97.7 °F (36.5 °C), Max:98.6 °F (37 °C)    Input/Output:   Intake/Output Summary (Last 24 hours) at 2023 1811  Last data filed at 2023 1600  Gross per 24 hour   Intake 450 ml   Output 1855 ml   Net -1405 ml       General:  Awake, alert  Cardiovascular:  S1S2+, RRR  Pulmonary:  CTA b/l  GI:  Soft, BS+, NT, ND  Extremities:  No edema      Labs:    Recent Results (from the past 24 hour(s))   CBC with Auto Differential    Collection Time: 23  1:42 AM   Result Value Ref Range    WBC 3.5 (L) 4.6 - 13.2 K/uL    RBC 4.42 4.35 - 5.65 M/uL    Hemoglobin 13.7 13.0 - 16.0 g/dL    Hematocrit 41.0 36.0 - 48.0 %    MCV 92.8 78.0 - 100.0 FL    MCH 31.0 24.0 - 34.0 PG    MCHC 33.4 31.0 - 37.0 g/dL    RDW 15.1 (H) 11.6 - 14.5 %    Platelets 76 (L) 503 - 420 K/uL    MPV 10.3 9.2 - 11.8 FL    Nucleated RBCs 0.0 0  WBC    nRBC 0.00 0.00 - 0.01 K/uL    Seg Neutrophils 85 (H) 40 - 73 %    Lymphocytes 9 (L) 21 - 52 %    Monocytes 6 3 - 10 %    Eosinophils % 0 0 - 5 %    Basophils 0 0 - 2 %    Immature Granulocytes 0 0.0 - 0.5 %    Segs Absolute 2.9 1.8 - 8.0 K/UL    Absolute Lymph # 0.3 (L) 0.9 - 3.6 K/UL    Absolute Mono # 0.2 0.05 - 1.2 K/UL    Absolute Eos # 0.0 0.0 - 0.4 K/UL    Basophils Absolute 0.0 0.0 - 0.1 K/UL    Absolute Immature Granulocyte 0.0 0.00 - 0.04 K/UL    Differential Type AUTOMATED     Basic Metabolic Panel    Collection Time: 02/16/23  1:42 AM   Result Value Ref Range    Sodium 137 136 - 145 mmol/L    Potassium 5.0 3.5 - 5.5 mmol/L    Chloride 107 100 - 111 mmol/L    CO2 27 21 - 32 mmol/L    Anion Gap 3 3.0 - 18 mmol/L    Glucose 129 (H) 74 - 99 mg/dL    BUN 21 (H) 7.0 - 18 MG/DL    Creatinine 0.79 0.6 - 1.3 MG/DL    Bun/Cre Ratio 27 (H) 12 - 20      Est, Glom Filt Rate >60 >60 ml/min/1.73m2    Calcium 8.7 8.5 - 10.1 MG/DL   COVID-19, Rapid    Collection Time: 02/16/23  1:37 PM    Specimen: Nasopharyngeal   Result Value Ref Range    Source Nasopharyngeal      SARS-CoV-2, Rapid Not detected NOTD           Signed By: Gm Mcnamara MD     February 16, 2023      Dragon medical dictation software was used for portions of this report. Unintended errors may occur.

## 2023-02-16 NOTE — ANESTHESIA PRE PROCEDURE
Department of Anesthesiology  Preprocedure Note       Name:  Bubba Randhawa   Age:  69 y.o.  :  1955                                          MRN:  [id number]         Date:  2023      Surgeon: Janett Jansen):  Earlene Estrada MD    Procedure: Procedure(s):  BRONCHOSCOPY/LAVAGE; NO C-ARM    Medications prior to admission:   Prior to Admission medications    Medication Sig Start Date End Date Taking? Authorizing Provider   acetaminophen (TYLENOL) 325 MG tablet Take 650 mg by mouth every 4 hours as needed 22   Ar Automatic Reconciliation   amLODIPine (NORVASC) 10 MG tablet Take 10 mg by mouth daily 22   Ar Automatic Reconciliation   aspirin 81 MG EC tablet Take 81 mg by mouth 2 times daily 22   Ar Automatic Reconciliation   ferrous sulfate (IRON 325) 325 (65 Fe) MG tablet Take 325 mg by mouth 2 times daily (with meals) 22   Ar Automatic Reconciliation   folic acid (FOLVITE) 1 MG tablet Take 1 mg by mouth daily 22   Ar Automatic Reconciliation   ibuprofen (ADVIL;MOTRIN) 800 MG tablet Take 800 mg by mouth every 8 hours as needed    Ar Automatic Reconciliation   melatonin 3 MG TABS tablet Take 3 mg by mouth 22   Ar Automatic Reconciliation   methadone (DOLOPHINE) 10 MG tablet Take 45 mg by mouth daily. Ar Automatic Reconciliation   pantoprazole (PROTONIX) 40 MG tablet Take 40 mg by mouth every morning (before breakfast) 22   Ar Automatic Reconciliation   polyethylene glycol (GLYCOLAX) 17 GM/SCOOP powder Take 17 g by mouth daily 22   Ar Automatic Reconciliation   pregabalin (LYRICA) 75 MG capsule Take 75 mg by mouth 3 times daily.  22   Ar Automatic Reconciliation   telmisartan (MICARDIS) 20 MG tablet Take 20 mg by mouth daily 22   Ar Automatic Reconciliation   thiamine 100 MG tablet Take 100 mg by mouth daily 22   Ar Automatic Reconciliation       Current medications:    Current Facility-Administered Medications   Medication Dose Route Frequency Provider Last Rate Last Admin    sodium chloride flush 0.9 % injection 5-40 mL  5-40 mL IntraVENous 2 times per day Earlene Estrada MD        sodium chloride flush 0.9 % injection 5-40 mL  5-40 mL IntraVENous PRN Earlene Estrada MD        0.9 % sodium chloride infusion   IntraVENous PRN Earlene Estrada MD        lidocaine PF 4 % injection 2.5 mL  2.5 mL Other Once Earlene Estrada MD        lidocaine (XYLOCAINE) 2 % jelly   Topical Once Earlene Estrada MD        sodium chloride flush 0.9 % injection 5-40 mL  5-40 mL IntraVENous 2 times per day Osvaldo Coup, APRN - CRNA        sodium chloride flush 0.9 % injection 5-40 mL  5-40 mL IntraVENous PRN Osvaldo Coup, APRN - CRNA        0.9 % sodium chloride infusion   IntraVENous PRN Osvaldo Coup, APRN - CRNA        lidocaine PF 1 % injection 1 mL  1 mL IntraDERmal Once PRN Osvaldo Coup, APRN - CRNA        methadone (DOLOPHINE) 10 MG/ML solution 60 mg  60 mg Oral Daily Jojo Young MD   60 mg at 02/16/23 0943    ipratropium-albuterol (DUONEB) nebulizer solution 1 ampule  1 ampule Inhalation Q4H Elise Devine MD   1 ampule at 02/16/23 1515    methylPREDNISolone sodium (SOLU-MEDROL) injection 40 mg  40 mg IntraVENous Q6H Nancy Cross DO   40 mg at 02/16/23 1300    acetaminophen (TYLENOL) tablet 650 mg  650 mg Oral Q4H PRN Brendan Casper MD        Or    acetaminophen (TYLENOL) suppository 650 mg  650 mg Rectal Q4H PRN Brendan Casper MD        amLODIPine (NORVASC) tablet 10 mg  10 mg Oral Daily Brendan Casper MD   10 mg at 02/16/23 0942    budesonide (PULMICORT) nebulizer suspension 250 mcg  0.25 mg Nebulization BID Brendan Casper MD   250 mcg at 02/16/23 0910    And    arformoterol tartrate (BROVANA) nebulizer solution 15 mcg  15 mcg Nebulization BID Brendan Casper MD   15 mcg at 02/16/23 0910    [Held by provider] aspirin EC tablet 81 mg  81 mg Oral Daily Brendan Casper MD   81 mg at 02/14/23 1106    cefTRIAXone (ROCEPHIN) 2,000 mg in sterile water 20 mL IV syringe  2,000 mg IntraVENous Q24H Aren Martinez MD   2,000 mg at 02/16/23 0900    hydrALAZINE (APRESOLINE) injection 10 mg  10 mg IntraVENous Q6H PRN Aren Martinez MD        LORazepam (ATIVAN) tablet 1 mg  1 mg Oral Q1H PRN Aren Martinez MD   1 mg at 02/12/23 1408    LORazepam (ATIVAN) injection 1 mg  1 mg IntraVENous Q1H PRN Aren Martinez MD        LORazepam (ATIVAN) tablet 2 mg  2 mg Oral Q1H PRN Aren Martinez MD        LORazepam (ATIVAN) injection 2 mg  2 mg IntraVENous Q1H PRN Aren Martinez MD        LORazepam (ATIVAN) injection 3 mg  3 mg IntraVENous Q15 Min PRN Aren Martinez MD        ondansetron (ZOFRAN-ODT) disintegrating tablet 4 mg  4 mg Oral Q8H PRN Aren Martinez MD        Or    ondansetron (ZOFRAN) injection 4 mg  4 mg IntraVENous Q6H PRN Aren Martinez MD        pantoprazole (PROTONIX) tablet 40 mg  40 mg Oral QAM AC Aren Martinez MD   40 mg at 02/16/23 0943    polyethylene glycol (GLYCOLAX) packet 17 g  17 g Oral Daily Aren Martinez MD   17 g at 02/16/23 0943    sennosides-docusate sodium (SENOKOT-S) 8.6-50 MG tablet 2 tablet  2 tablet Oral Daily Aren Martinez MD   2 tablet at 02/16/23 7874    sodium chloride flush 0.9 % injection 5-40 mL  5-40 mL IntraVENous Q8H Aren Martinez MD   10 mL at 02/16/23 0945    sodium chloride flush 0.9 % injection 5-40 mL  5-40 mL IntraVENous PRN Aren Martinez MD           Allergies:  No Known Allergies    Problem List:    Patient Active Problem List   Diagnosis Code    Epidural abscess, L2-L5 G06.1    Leukopenia D72.819    Iron deficiency anemia, unspecified D50.9    Anemia D64.9    Thrombocytopenia ([address] D69.6    Metatarsal fracture S92.309A    Polysubstance abuse ([facility] [address] F19.10    Alcohol abuse F10.10    HTN (hypertension) I10    Liver mass R16.0    Bladder mass N32.89    Hematuria R31.9    Orthopedic aftercare for healing traumatic lower leg fracture, right, closed S82. 91XD    Impaired mobility and ADLs Z74.09, Z78.9    Acute blood loss as cause of postoperative anemia D62    Methadone dependence (formerly [facility]) F11.20    Closed displaced comminuted fracture of shaft of right tibia with routine healing S82.251D    Closed fracture of distal end of right fibula with routine healing S82.831D    GAYATHRI (acute kidney injury) ([facility] [address] N17.9    GERD (gastroesophageal reflux disease) K21.9    Positive D dimer R79.89    Noncompliance with medications Z91.14    Multifocal pneumonia J18.9    Acute respiratory failure with hypoxia (formerly [facility]) J96.01    Acute respiratory failure with hypoxia and hypercarbia (formerly [facility]) J96.01, J96.02       Past Medical History:        Diagnosis Date    Abscess of right shoulder     Abscess of shoulder     Acute blood loss as cause of postoperative anemia 4/22/2022    Arthritis     Closed displaced comminuted fracture of shaft of right tibia with routine healing 04/22/2022    Closed fracture of distal end of right fibula with routine healing 4/22/2022    Epidural abscess     Heart murmur     Hematuria     Heroin abuse (formerly [facility])     Hypertension     Infected wound     Infectious disease     Iron deficiency anemia     IV drug user     Liver disease     [facility]     Tobacco abuse        Past Surgical History:        Procedure Laterality Date    COLONOSCOPY  07/18/2016    CYST REMOVAL      rt.forearm and rt.foot    FRACTURE SURGERY Right 04/22/2022    S/P Closed IM nailing of the right tibia using the [employer] IM nail system with a double lock proximally and triple lock distally (4/22/2022 - Dr. Cinthia Sanabria)   [address] Right 04/23/2022    Closed Fracture of Right Tibia     OTHER SURGICAL HISTORY      right forearm infections and graft    SKIN GRAFT      UPPER GASTROINTESTINAL ENDOSCOPY  07/18/2016       Social History:    Social History     Tobacco Use    Smoking status: Every Day     Packs/day: 0.50     Types: Cigarettes  Smokeless tobacco: Never   Substance Use Topics    Alcohol use: Yes     Alcohol/week: 4.0 standard drinks                                Ready to quit: Not Answered  Counseling given: Not Answered      Vital Signs (Current):   Vitals:    02/16/23 0918 02/16/23 1223 02/16/23 1225 02/16/23 1516   BP:  (!) 144/79     Pulse: 68 66 72 64   Resp: 18 13 22 20   Temp:  97.7 °F (36.5 °C)     TempSrc:  Oral     SpO2: 98% 99% 100% 100%   Weight:       Height:                                                  BP Readings from Last 3 Encounters:   02/16/23 (!) 144/79   02/10/23 (!) 162/73   06/24/22 (!) 150/70       NPO Status: Time of last liquid consumption: 2200                        Time of last solid consumption: 2200                        Date of last liquid consumption: 02/15/23                        Date of last solid food consumption: 02/15/23    BMI:   Wt Readings from Last 3 Encounters:   02/12/23 176 lb (79.8 kg)   07/29/22 176 lb (79.8 kg)   06/30/22 176 lb (79.8 kg)     Body mass index is 24.55 kg/m².     CBC:   Lab Results   Component Value Date/Time    WBC 3.5 02/16/2023 01:42 AM    RBC 4.42 02/16/2023 01:42 AM    HGB 13.7 02/16/2023 01:42 AM    HCT 41.0 02/16/2023 01:42 AM    MCV 92.8 02/16/2023 01:42 AM    RDW 15.1 02/16/2023 01:42 AM    PLT 76 02/16/2023 01:42 AM       CMP:   Lab Results   Component Value Date/Time     02/16/2023 01:42 AM    K 5.0 02/16/2023 01:42 AM     02/16/2023 01:42 AM    CO2 27 02/16/2023 01:42 AM    BUN 21 02/16/2023 01:42 AM    CREATININE 0.79 02/16/2023 01:42 AM    GFRAA >60 05/09/2022 06:45 AM    AGRATIO 0.4 02/10/2023 04:18 PM    LABGLOM >60 02/16/2023 01:42 AM    GLUCOSE 129 02/16/2023 01:42 AM    PROT 8.3 02/10/2023 04:18 PM    CALCIUM 8.7 02/16/2023 01:42 AM    BILITOT 1.2 02/10/2023 04:18 PM    ALKPHOS 118 02/10/2023 04:18 PM     02/10/2023 04:18 PM    ALT 40 02/10/2023 04:18 PM       POC Tests: No results for input(s): POCGLU, POCNA, POCK, POCCL, Ernestina Rodriguez, POCHCT in the last 72 hours. Coags:   Lab Results   Component Value Date/Time    PROTIME 14.7 02/11/2023 06:05 PM    INR 1.1 02/11/2023 06:05 PM    APTT 38.2 04/08/2022 02:12 PM       HCG (If Applicable): No results found for: PREGTESTUR, PREGSERUM, HCG, HCGQUANT     ABGs: No results found for: PHART, PO2ART, DIK4CAT, KQW5FPA, BEART, M3EAJLBN     Type & Screen (If Applicable):  No results found for: LABABO, LABRH    Drug/Infectious Status (If Applicable):  No results found for: HIV, HEPCAB    COVID-19 Screening (If Applicable):   Lab Results   Component Value Date/Time    COVID19 Not detected 02/11/2023 08:30 PM           Anesthesia Evaluation  Patient summary reviewed  Airway: Mallampati: II          Dental: normal exam         Pulmonary:normal exam    (+) pneumonia:                             Cardiovascular:            Rhythm: regular  Rate: normal                    Neuro/Psych:   Negative Neuro/Psych ROS              GI/Hepatic/Renal:   (+) GERD:,           Endo/Other: Negative Endo/Other ROS                    Abdominal:             Vascular: Other Findings:           Anesthesia Plan      MAC     ASA 3       Induction: intravenous. Anesthetic plan and risks discussed with patient.         Attending anesthesiologist reviewed and agrees with Preprocedure content                Chip Christianson MD   2/16/2023

## 2023-02-16 NOTE — PROGRESS NOTES
[facility] Hospitalist Group  Progress Note    Patient: Susie Moreland Age: 69 y.o. : 1955 MR#: [id number] SSN: [id number]  Date/Time: 2/15/2023    Subjective:     Patient is sitting in bed in no apparent distress, awake and alert. Denies any shortness of breath    Assessment:   1. Acute hypoxic respiratory failure due to #2 and #3  2. Interstitial pneumonitis could be due to hypersensitivity pneumonitis versus alveolar proteinosis  3. COPD/emphysema  4. GAYATHRI, improved  5. Chronic thrombocytopenia  6. Polysubstance use disorder  7. Hypertension  8. GERD  9. History of constipation  10. Medical noncompliance    PLAN:    Pulmonary is following. Plans for bronchoscopy needed. Continue airborne precautions for now  Wean oxygen, bronchial hygiene  On ceftriaxone and Zithromax. Continue IV steroids.   Thrombocytopenia in the setting of cirrhosis  Continue Norvasc for hypertension management  Bowel regimen  PT and OT  Discussed with patient      Case discussed with:  [x]Patient  []Family  [x]Nursing  []Case Management  DVT Prophylaxis:  []Lovenox  []Hep SQ  [x]SCDs  []Coumadin   []On Heparin gtt    Objective:   VS: BP (!) 149/71   Pulse 70   Temp 98.5 °F (36.9 °C) (Oral)   Resp 18   Ht 5' 11\" (1.803 m)   Wt 176 lb (79.8 kg)   SpO2 99%   BMI 24.55 kg/m²    Tmax/24hrs: Temp (24hrs), Av °F (36.7 °C), Min:97.6 °F (36.4 °C), Max:98.5 °F (36.9 °C)    Input/Output:   Intake/Output Summary (Last 24 hours) at 2/15/2023 1936  Last data filed at 2/15/2023 1656  Gross per 24 hour   Intake 718 ml   Output 3175 ml   Net -2457 ml       General:  Awake, alert  Cardiovascular:  S1S2+, RRR  Pulmonary: Coarse breath sounds bilaterally  GI:  Soft, BS+, NT, ND  Extremities:  No edema        Labs:    Recent Results (from the past 24 hour(s))   CBC with Auto Differential    Collection Time: 02/15/23 12:30 AM   Result Value Ref Range    WBC 2.9 (L) 4.6 - 13.2 K/uL    RBC 4.17 (L) 4.35 - 5.65 M/uL    Hemoglobin 13.1 13.0 - 16.0 g/dL    Hematocrit 39.5 36.0 - 48.0 %    MCV 94.7 78.0 - 100.0 FL    MCH 31.4 24.0 - 34.0 PG    MCHC 33.2 31.0 - 37.0 g/dL    RDW 15.4 (H) 11.6 - 14.5 %    Platelets 72 (L) 487 - 420 K/uL    MPV 10.6 9.2 - 11.8 FL    Nucleated RBCs 0.0 0  WBC    nRBC 0.00 0.00 - 0.01 K/uL    Seg Neutrophils 90 (H) 40 - 73 %    Lymphocytes 5 (L) 21 - 52 %    Monocytes 5 3 - 10 %    Eosinophils % 0 0 - 5 %    Basophils 0 0 - 2 %    Immature Granulocytes 0 0.0 - 0.5 %    Segs Absolute 2.6 1.8 - 8.0 K/UL    Absolute Lymph # 0.2 (L) 0.9 - 3.6 K/UL    Absolute Mono # 0.1 0.05 - 1.2 K/UL    Absolute Eos # 0.0 0.0 - 0.4 K/UL    Basophils Absolute 0.0 0.0 - 0.1 K/UL    Absolute Immature Granulocyte 0.0 0.00 - 0.04 K/UL    Differential Type AUTOMATED           Signed By: Caitie Fontenot MD     February 15, 2023      Dragon medical dictation software was used for portions of this report. Unintended errors may occur.

## 2023-02-17 LAB
1,3 BETA GLUCAN SER-MCNC: 36 PG/ML
A FUMIGATUS1 AB SER QL ID: NEGATIVE
A PULLULANS AB SER QL: NEGATIVE
ACID FAST STN SPEC: NEGATIVE
ACID FAST STN SPEC: NEGATIVE
BACTERIA SPEC CULT: NORMAL
EKG ATRIAL RATE: 67 BPM
EKG DIAGNOSIS: NORMAL
EKG P AXIS: 26 DEGREES
EKG P-R INTERVAL: 142 MS
EKG Q-T INTERVAL: 416 MS
EKG QRS DURATION: 84 MS
EKG QTC CALCULATION (BAZETT): 439 MS
EKG R AXIS: -46 DEGREES
EKG T AXIS: 101 DEGREES
EKG VENTRICULAR RATE: 67 BPM
LACEYELLA SACCHARI AB SER QL: NEGATIVE
MYCOBACTERIUM SPEC QL CULT: NORMAL
MYCOBACTERIUM SPEC QL CULT: NORMAL
PIGEON SERUM AB QL ID: NEGATIVE
S RECTIVIRGULA IGG SER QL ID: NEGATIVE
SERVICE CMNT-IMP: NORMAL
SPECIMEN PREPARATION: NORMAL
SPECIMEN PREPARATION: NORMAL
SPECIMEN SOURCE: NORMAL
SPECIMEN SOURCE: NORMAL
T VULGARIS AB SER QL ID: NEGATIVE

## 2023-02-17 PROCEDURE — 2700000000 HC OXYGEN THERAPY PER DAY

## 2023-02-17 PROCEDURE — 99232 SBSQ HOSP IP/OBS MODERATE 35: CPT | Performed by: INTERNAL MEDICINE

## 2023-02-17 PROCEDURE — 87389 HIV-1 AG W/HIV-1&-2 AB AG IA: CPT

## 2023-02-17 PROCEDURE — 6360000002 HC RX W HCPCS: Performed by: STUDENT IN AN ORGANIZED HEALTH CARE EDUCATION/TRAINING PROGRAM

## 2023-02-17 PROCEDURE — 2140000001 HC CVICU INTERMEDIATE R&B

## 2023-02-17 PROCEDURE — 6360000002 HC RX W HCPCS: Performed by: HOSPITALIST

## 2023-02-17 PROCEDURE — 2580000003 HC RX 258: Performed by: HOSPITALIST

## 2023-02-17 PROCEDURE — 6370000000 HC RX 637 (ALT 250 FOR IP): Performed by: HOSPITALIST

## 2023-02-17 PROCEDURE — 6370000000 HC RX 637 (ALT 250 FOR IP): Performed by: EMERGENCY MEDICINE

## 2023-02-17 PROCEDURE — 99232 SBSQ HOSP IP/OBS MODERATE 35: CPT | Performed by: EMERGENCY MEDICINE

## 2023-02-17 PROCEDURE — 36415 COLL VENOUS BLD VENIPUNCTURE: CPT

## 2023-02-17 PROCEDURE — 94640 AIRWAY INHALATION TREATMENT: CPT

## 2023-02-17 PROCEDURE — 94761 N-INVAS EAR/PLS OXIMETRY MLT: CPT

## 2023-02-17 RX ORDER — IPRATROPIUM BROMIDE AND ALBUTEROL SULFATE 2.5; .5 MG/3ML; MG/3ML
1 SOLUTION RESPIRATORY (INHALATION)
Status: DISCONTINUED | OUTPATIENT
Start: 2023-02-17 | End: 2023-02-17

## 2023-02-17 RX ORDER — ALBUTEROL SULFATE 2.5 MG/3ML
2.5 SOLUTION RESPIRATORY (INHALATION)
Status: DISCONTINUED | OUTPATIENT
Start: 2023-02-17 | End: 2023-02-18

## 2023-02-17 RX ORDER — CEFUROXIME AXETIL 250 MG/1
500 TABLET ORAL EVERY 12 HOURS SCHEDULED
Status: DISCONTINUED | OUTPATIENT
Start: 2023-02-18 | End: 2023-02-18 | Stop reason: HOSPADM

## 2023-02-17 RX ORDER — PREDNISONE 20 MG/1
40 TABLET ORAL DAILY
Status: DISCONTINUED | OUTPATIENT
Start: 2023-02-17 | End: 2023-02-18 | Stop reason: HOSPADM

## 2023-02-17 RX ADMIN — SENNOSIDES AND DOCUSATE SODIUM 2 TABLET: 50; 8.6 TABLET ORAL at 09:01

## 2023-02-17 RX ADMIN — ARFORMOTEROL TARTRATE 15 MCG: 15 SOLUTION RESPIRATORY (INHALATION) at 20:02

## 2023-02-17 RX ADMIN — SODIUM CHLORIDE, PRESERVATIVE FREE 10 ML: 5 INJECTION INTRAVENOUS at 09:02

## 2023-02-17 RX ADMIN — IPRATROPIUM BROMIDE AND ALBUTEROL SULFATE 1 AMPULE: 2.5; .5 SOLUTION RESPIRATORY (INHALATION) at 00:56

## 2023-02-17 RX ADMIN — PANTOPRAZOLE SODIUM 40 MG: 40 TABLET, DELAYED RELEASE ORAL at 06:13

## 2023-02-17 RX ADMIN — IPRATROPIUM BROMIDE AND ALBUTEROL SULFATE 1 AMPULE: 2.5; .5 SOLUTION RESPIRATORY (INHALATION) at 12:05

## 2023-02-17 RX ADMIN — POLYETHYLENE GLYCOL 3350 17 G: 17 POWDER, FOR SOLUTION ORAL at 09:01

## 2023-02-17 RX ADMIN — IPRATROPIUM BROMIDE AND ALBUTEROL SULFATE 1 AMPULE: 2.5; .5 SOLUTION RESPIRATORY (INHALATION) at 07:43

## 2023-02-17 RX ADMIN — IPRATROPIUM BROMIDE AND ALBUTEROL SULFATE 1 AMPULE: 2.5; .5 SOLUTION RESPIRATORY (INHALATION) at 15:39

## 2023-02-17 RX ADMIN — METHYLPREDNISOLONE SODIUM SUCCINATE 40 MG: 40 INJECTION, POWDER, FOR SOLUTION INTRAMUSCULAR; INTRAVENOUS at 06:13

## 2023-02-17 RX ADMIN — CEFTRIAXONE 2000 MG: 2 INJECTION, POWDER, FOR SOLUTION INTRAMUSCULAR; INTRAVENOUS at 09:01

## 2023-02-17 RX ADMIN — IPRATROPIUM BROMIDE AND ALBUTEROL SULFATE 1 AMPULE: 2.5; .5 SOLUTION RESPIRATORY (INHALATION) at 04:39

## 2023-02-17 RX ADMIN — BUDESONIDE 250 MCG: 0.25 SUSPENSION RESPIRATORY (INHALATION) at 07:43

## 2023-02-17 RX ADMIN — ARFORMOTEROL TARTRATE 15 MCG: 15 SOLUTION RESPIRATORY (INHALATION) at 07:43

## 2023-02-17 RX ADMIN — SODIUM CHLORIDE, PRESERVATIVE FREE 10 ML: 5 INJECTION INTRAVENOUS at 18:14

## 2023-02-17 RX ADMIN — IPRATROPIUM BROMIDE 0.5 MG: 0.5 SOLUTION RESPIRATORY (INHALATION) at 20:02

## 2023-02-17 RX ADMIN — SODIUM CHLORIDE, PRESERVATIVE FREE 10 ML: 5 INJECTION INTRAVENOUS at 06:11

## 2023-02-17 RX ADMIN — PREDNISONE 40 MG: 20 TABLET ORAL at 11:29

## 2023-02-17 RX ADMIN — ALBUTEROL SULFATE 2.5 MG: 2.5 SOLUTION RESPIRATORY (INHALATION) at 20:02

## 2023-02-17 RX ADMIN — AMLODIPINE BESYLATE 10 MG: 10 TABLET ORAL at 09:01

## 2023-02-17 RX ADMIN — BUDESONIDE 250 MCG: 0.25 SUSPENSION RESPIRATORY (INHALATION) at 20:02

## 2023-02-17 RX ADMIN — METHADONE HYDROCHLORIDE 60 MG: 10 CONCENTRATE ORAL at 09:15

## 2023-02-17 ASSESSMENT — PAIN SCALES - GENERAL
PAINLEVEL_OUTOF10: 0

## 2023-02-17 NOTE — PLAN OF CARE
Problem: Pain  Goal: Verbalizes/displays adequate comfort level or baseline comfort level  2/16/2023 2050 by Joanie Boss RN  Outcome: Progressing  2/16/2023 1248 by Maggie Carballo RN  Outcome: Progressing     Problem: Safety - Adult  Goal: Free from fall injury  2/16/2023 2050 by Joanie Boss RN  Outcome: Progressing  2/16/2023 1248 by Maggie Carballo RN  Outcome: Progressing     Problem: ABCDS Injury Assessment  Goal: Absence of physical injury  2/16/2023 2050 by Joanie Boss RN  Outcome: Progressing  Flowsheets (Taken 2/16/2023 1507 by Mary Anne Pan RN)  Absence of Physical Injury: Implement safety measures based on patient assessment  2/16/2023 1248 by Maggie Carballo RN  Outcome: Progressing     Problem: Discharge Planning  Goal: Discharge to home or other facility with appropriate resources  2/16/2023 2050 by Joanie Boss RN  Outcome: Progressing  2/16/2023 1248 by Maggie Carballo RN  Outcome: Progressing

## 2023-02-17 NOTE — PROGRESS NOTES
Infectious Disease progress Note        Reason: Bilateral community-acquired pneumonia, ? Disseminated aspergillosis    Current abx Prior abx   Ceftriaxone since 2/10/2023  Azithromycin since 2/11   Doxycycline 2/10-2/11     Lines:       Assessment :   69 y.o. male with a PMHx of heroin abuse, HTN, Methadone dependence who presented to the ED on 2/10/23 with complaints of fatigue,  fever, chills, intermittent SOB, cough, decreased appetite since 3 days. Clinical presentation consistent with acute hypoxic respiratory failure-present on admission due to bacterial pneumonia/aspiration pneumonia superimposed on underlying interstitial lung disease (hypersensitivity pneumonitis/inhalational lung injury)    Sputum culture 2/12-Candida albicans, E. Coli    E. coli in sputum culture concerning for aspiration/?undiagnosed reflux    Clinical presentation not c/w pulmonary TB  Negative sputum AFB 2/12, 2/15    Positive aspergillus galactomannan antigen: 2.5 on 2/11/23 concerning for aspergillosis. Galactomannan antigen test can be false positive with environmental contamination/beta-lactam's. No definitive clinical evidence of pulmonary aspergillosis/disseminated aspergillus at this time. Will await findings of bronchoscopy    Centrilobular emphysema      Chronic hepatitis C/ Cirrhosis of liver with associated portal hypertension    Pulmonary follow-up appreciated. S/p bronchoscopy 2/16. Findings discussed with Dr. Jay Garrison. No significant airway inflammation noted. Evidence of mucous plugging    Leukopenia: r/o HIV. Patient denies HIV risk factors.   Tested negative last year at work per patient report    Acute kidney injury- improved      Recommendations:    discontinue ceftriaxone -start p.o. cefuroxime till 2/19/2023  Obtain HIV serology-ordered on 2/16  Taper steroids per pulmonary  Will discontinue airborne isolation   Wean oxygen as tolerated  Follow-up results of BAL fluid as outpatient if results not available prior to discharge    Above plan was discussed in details with patient, and dr Hesham Reyes, RN, dr. Rosette Latham. Please call me if any further questions or concerns. Will continue to participate in the care of this patient. HPI:  Feels better.   Denies increasing chest pain, cough      Past Medical History:   Diagnosis Date    Abscess of right shoulder     Abscess of shoulder     Acute blood loss as cause of postoperative anemia 4/22/2022    Arthritis     Closed displaced comminuted fracture of shaft of right tibia with routine healing 04/22/2022    Closed fracture of distal end of right fibula with routine healing 4/22/2022    Epidural abscess     Heart murmur     Hematuria     Heroin abuse ([facility] [address]     Hypertension     Infected wound     Infectious disease     Iron deficiency anemia     IV drug user     Liver disease     Methadone dependence ([facility] [address]     Tobacco abuse        Past Surgical History:   Procedure Laterality Date    BRONCHOSCOPY N/A 2/16/2023    BRONCHOSCOPY/LAVAGE; NO C-ARM performed by Mitchel Zelaya MD at [address]  07/18/2016    CYST REMOVAL      rt.forearm and rt.foot    FRACTURE SURGERY Right 04/22/2022    S/P Closed IM nailing of the right tibia using the Synthes IM nail system with a double lock proximally and triple lock distally (4/22/2022 - Dr. Cathy Escamilla)    [facility] [place] Right 04/23/2022    Closed Fracture of Right Tibia     OTHER SURGICAL HISTORY      right forearm infections and graft    SKIN GRAFT      UPPER GASTROINTESTINAL ENDOSCOPY  07/18/2016       [unfilled]    Current Facility-Administered Medications   Medication Dose Route Frequency    methadone (DOLOPHINE) 10 MG/ML solution 60 mg  60 mg Oral Daily    ipratropium-albuterol (DUONEB) nebulizer solution 1 ampule  1 ampule Inhalation Q4H    methylPREDNISolone sodium (SOLU-MEDROL) injection 40 mg  40 mg IntraVENous Q6H    acetaminophen (TYLENOL) tablet 650 mg  650 mg Oral Q4H PRN Or    acetaminophen (TYLENOL) suppository 650 mg  650 mg Rectal Q4H PRN    amLODIPine (NORVASC) tablet 10 mg  10 mg Oral Daily    budesonide (PULMICORT) nebulizer suspension 250 mcg  0.25 mg Nebulization BID    And    arformoterol tartrate (BROVANA) nebulizer solution 15 mcg  15 mcg Nebulization BID    [Held by provider] aspirin EC tablet 81 mg  81 mg Oral Daily    cefTRIAXone (ROCEPHIN) 2,000 mg in sterile water 20 mL IV syringe  2,000 mg IntraVENous Q24H    hydrALAZINE (APRESOLINE) injection 10 mg  10 mg IntraVENous Q6H PRN    LORazepam (ATIVAN) tablet 1 mg  1 mg Oral Q1H PRN    LORazepam (ATIVAN) injection 1 mg  1 mg IntraVENous Q1H PRN    LORazepam (ATIVAN) tablet 2 mg  2 mg Oral Q1H PRN    LORazepam (ATIVAN) injection 2 mg  2 mg IntraVENous Q1H PRN    LORazepam (ATIVAN) injection 3 mg  3 mg IntraVENous Q15 Min PRN    ondansetron (ZOFRAN-ODT) disintegrating tablet 4 mg  4 mg Oral Q8H PRN    Or    ondansetron (ZOFRAN) injection 4 mg  4 mg IntraVENous Q6H PRN    pantoprazole (PROTONIX) tablet 40 mg  40 mg Oral QAM AC    polyethylene glycol (GLYCOLAX) packet 17 g  17 g Oral Daily    sennosides-docusate sodium (SENOKOT-S) 8.6-50 MG tablet 2 tablet  2 tablet Oral Daily    sodium chloride flush 0.9 % injection 5-40 mL  5-40 mL IntraVENous Q8H    sodium chloride flush 0.9 % injection 5-40 mL  5-40 mL IntraVENous PRN       Allergies: Patient has no known allergies. History reviewed. No pertinent family history. Social History     Socioeconomic History    Marital status:      Spouse name: Not on file    Number of children: Not on file    Years of education: Not on file    Highest education level: Not on file   Occupational History    Not on file   Tobacco Use    Smoking status: Every Day     Packs/day: 0.50     Types: Cigarettes    Smokeless tobacco: Never   Substance and Sexual Activity    Alcohol use:  Yes     Alcohol/week: 4.0 standard drinks    Drug use: Yes     Types: Heroin    Sexual activity: Not on file   Other Topics Concern    Not on file   Social History Narrative         ** Merged History Encounter **     Social Determinants of Health     Financial Resource Strain: Not on file   Food Insecurity: Not on file   Transportation Needs: Not on file   Physical Activity: Not on file   Stress: Not on file   Social Connections: Not on file   Intimate Partner Violence: Not on file   Housing Stability: Not on file     Social History     Tobacco Use   Smoking Status Every Day    Packs/day: 0.50    Types: Cigarettes   Smokeless Tobacco Never        Temp (24hrs), Av.8 °F (36.6 °C), Min:97.4 °F (36.3 °C), Max:98 °F (36.7 °C)    BP (!) 151/76   Pulse 57   Temp 98 °F (36.7 °C) (Oral)   Resp 11   Ht 5' 11\" (1.803 m)   Wt 176 lb (79.8 kg)   SpO2 100%   BMI 24.55 kg/m²     ROS: 12 point ROS obtained in details. Pertinent positives as mentioned in HPI,   otherwise negative    Physical Exam:     General:          Alert, in NAD   HEENT:          Sclera anicteric. Conjunctiva pink. Mucous membranes                           Moist, no ear or nasal discharge   Neck:               Supple. Trachea midline. Mild accessory muscle use. No jugular venous distention, no carotid bruit   CV:                  Regular rate and rhythm. S1S2+   Lungs:             Rhonchi noted bilaterally   Abdomen:        Soft, non-tender. Not distended. Bowel sounds normal.    Extremities:     No cyanosis. No edema. Pulses 2+ b/l   Neurologic:      Alert and oriented X 4. Follows commands, responds appropriately. No focal neurological deficit was noted   Skin:                Warm and dry. No rashes.      Labs: Results:   Chemistry Recent Labs     23  0142   GLUCOSE 129*      K 5.0      CO2 27   BUN 21*   CREATININE 0.79        CBC w/Diff Recent Labs     02/15/23  0030 23  0142   WBC 2.9* 3.5*   RBC 4.17* 4.42   HGB 13.1 13.7   HCT 39.5 41.0   PLT 72* 76*        Microbiology Results Procedure Component Value Units Date/Time    Culture, Respiratory [[id number]] Collected: 02/16/23 1556    Order Status: Completed Specimen: BAL- Bronch. Lavage Updated: 02/17/23 0044     Special Requests L LINGULA BAL     Gram stain OCCASIONAL WBCS SEEN         NO EPITHELIAL CELLS SEEN         NO ORGANISMS SEEN        Culture PENDING    Culture, Fungus [[id number]] Collected: 02/16/23 1556    Order Status: No result Updated: 02/16/23 2312    Culture with Smear, Acid Fast Bacillius [[id number]] Collected: 02/16/23 1556    Order Status: No result Updated: 02/16/23 1706    Pneumocystis Jirovecii PCR-A [[id number]] Collected: 02/16/23 1556    Order Status: No result Updated: 02/16/23 1712    Culture, Fungus [[id number]] Collected: 02/16/23 1553    Order Status: No result Updated: 02/16/23 2312    Culture, Respiratory [[id number]] Collected: 02/16/23 1553    Order Status: Completed Specimen: BAL- Bronch.  Lavage Updated: 02/16/23 2322     Special Requests RML BAL     Gram stain OCCASIONAL WBCS SEEN         NO EPITHELIAL CELLS SEEN         NO ORGANISMS SEEN        Culture PENDING    Culture, Viral [[id number]] Collected: 02/16/23 1553    Order Status: No result Updated: 02/16/23 1649    Culture, Viral [[id number]] Collected: 02/16/23 1553    Order Status: No result Updated: 02/16/23 1653    Pneumocystis Jirovecii PCR-A [[id number]] Collected: 02/16/23 1553    Order Status: No result Updated: 02/16/23 1657    Culture with Smear, Acid Fast Bacillius [[id number]] Collected: 02/16/23 1553    Order Status: No result Updated: 02/16/23 1700    Culture, Fungus [[id number]] Collected: 02/16/23 1543    Order Status: No result Updated: 02/16/23 2312    Culture, Respiratory [[id number]] Collected: 02/16/23 1543    Order Status: Completed Specimen: Bronchial Washing Updated: 02/16/23 2324     Special Requests BRONCHIAL WASHING        Gram stain FEW WBCS SEEN               RARE EPITHELIAL CELLS SEEN            NO ORGANISMS SEEN Culture PENDING    COVID-19, Rapid [[id number]] Collected: 02/16/23 1337    Order Status: Completed Specimen: Nasopharyngeal Updated: 02/16/23 1521     Source Nasopharyngeal        SARS-CoV-2, Rapid Not detected        Comment: Rapid Abbott ID Now       Rapid NAAT:  The specimen is NEGATIVE for SARS-CoV-2, the novel coronavirus associated with COVID-19. Negative results should be treated as presumptive and, if inconsistent with clinical signs and symptoms or necessary for patient management, should be tested with an alternative molecular assay. Negative results do not preclude SARS-CoV-2 infection and should not be used as the sole basis for patient management decisions. This test has been authorized by the FDA under an Emergency Use Authorization (EUA) for use by authorized laboratories.    Fact sheet for Healthcare Providers:  http://www.jessica.alex/  Fact sheet for Patients: http://www.jessica.alex/       Methodology: Isothermal Nucleic Acid Amplification         Culture with Smear, Acid Fast Bacillius [[id number]]     Order Status: Sent Specimen: Sputum Induced     Culture with Smear, Acid Fast Bacillius [[id number]] Collected: 02/15/23 0900    Order Status: Completed Specimen: Miscellaneous sample Updated: 02/16/23 1537     Sample Site SPUTUM     AFB SPECIMEN PROCESSING Concentration     AFB Smear Negative        Comment: (NOTE)  Performed At: [facility] & [address] [id number]  Halie Gambino MD VC:[id number]          ACID FAST CULTURE PENDING    Moises Choudhary [[id number]] Collected: 02/14/23 1216    Order Status: Sent Specimen: Blood Updated: 02/14/23 1222    Culture, Respiratory [[id number]]  (Abnormal)  (Susceptibility) Collected: 02/12/23 0315    Order Status: Completed Specimen: Sputum Expectorated Updated: 02/16/23 0928     Special Requests NO SPECIAL REQUESTS        Gram stain RARE WBCS SEEN               RARE EPITHELIAL CELLS SEEN            FEW Gram positive cocci         FEW GRAM VARIABLE RODS         RARE YEAST        Culture       LIGHT YEAST, (APPARENT CANDIDA ALBICANS)            Escherichia coli               SCANT NORMAL RESPIRATORY BELL          Susceptibility        Escherichia coli      BACTERIAL SUSCEPTIBILITY PANEL WHIT      amikacin <=2 ug/mL Sensitive      ampicillin >=32 ug/mL Resistant      ampicillin-sulbactam >=32 ug/mL Resistant      ceFAZolin 16 ug/mL Sensitive      cefepime <=1 ug/mL Sensitive      cefOXitin <=4 ug/mL Sensitive      cefTAZidime <=1 ug/mL Sensitive      cefTRIAXone <=1 ug/mL Sensitive      ciprofloxacin <=0.25 ug/mL Sensitive      gentamicin <=1 ug/mL Sensitive      levofloxacin <=0.12 ug/mL Sensitive      meropenem <=0.25 ug/mL Sensitive      piperacillin-tazobactam <=4 ug/mL Sensitive      tobramycin <=1 ug/mL Sensitive      trimethoprim-sulfamethoxazole <=20 ug/mL Sensitive                           Culture with Smear, Acid Fast Bacillius [[id number]] Collected: 02/12/23 0315    Order Status: Completed Specimen: Miscellaneous sample Updated: 02/14/23 1536     Sample Site SPUTUM        AFB SPECIMEN PROCESSING Concentration     AFB Smear Negative        Comment: (NOTE)  Performed At: [facility] & [address] [id number]  Rose Chan MD LF:[id number]          ACID FAST CULTURE PENDING    Culture, Fungus [[id number]]  (Abnormal) Collected: 02/12/23 0315    Order Status: Completed Specimen: Sputum Expectorated Updated: 02/14/23 0943     Special Requests NO SPECIAL REQUESTS        Culture FEW LENIN ALBICANS               CULTURE WILL BE HELD FOR 4 WEEKS. IF THERE IS ADDITIONAL FUNGAL GROWTH, A NEW REPORT WILL FOLLOW.           Legionella antigen, urine [[id number]] Collected: 02/11/23 2030    Order Status: Completed Specimen: Urine, random Updated: 02/12/23 1340     Legionella Antigen, Urine Negative       Strep Pneumoniae Antigen [[id number]] Collected: 02/11/23 2030    Order Status: Completed Specimen: Urine, random Updated: 02/12/23 1340     STREP PNEUMONIAE ANTIGEN, URINE Negative       Respiratory Panel, Molecular, with COVID-19 (Restricted: peds pts or suitable admitted adults) [[id number]] Collected: 02/11/23 2030    Order Status: Completed Specimen: Nasopharyngeal Updated: 02/11/23 2210     Adenovirus by PCR Not detected        Coronavirus 229E by PCR Not detected        Coronavirus HKU1 by PCR Not detected        Coronavirus NL63 by PCR Not detected        Coronavirus OC43 by PCR Not detected        SARS-CoV-2, PCR Not detected        Human Metapneumovirus by PCR Not detected        Rhinovirus Enterovirus PCR Not detected        Influenza A by PCR Not detected        Influenza B PCR Not detected        Parainfluenza 1 PCR Not detected        Parainfluenza 2 PCR Not detected        Parainfluenza 3 PCR Not detected        Parainfluenza 4 PCR Not detected        Respiratory Syncytial Virus by PCR Not detected        Bordetella parapertussis by PCR Not detected        Bordetella pertussis by PCR Not detected        Chlamydophila Pneumonia PCR Not detected        Mycoplasma pneumo by PCR Not detected       Culture, Respiratory [[id number]] Collected: 02/10/23 2245    Order Status: Canceled Specimen: Sputum Expectorated                 RADIOLOGY:    All available imaging studies/reports in [facility] care for this admission were reviewed    Total time spent >35 minutes. High complexity decision making was performed during the evaluation of this patient at high risk for decompensation with multiple organ involvement     Above mentioned total time spent on reviewing the case/medical record/data/notes/EMR/patient examination/documentation/coordinating care with nurse/consultants, exclusive of procedures with complex decision making performed and > 50% time spent in face to face evaluation.      Disclaimer: Sections of this note are dictated utilizing voice recognition software, which may have resulted in some phonetic based errors in grammar and contents. Even though attempts were made to correct all the mistakes, some may have been missed, and remained in the body of the document. If questions arise, please contact our department.     Dr. Reyes Terrazas, Infectious Disease Specialist  [phone]  February 17, 2023  8:32 AM

## 2023-02-17 NOTE — ANESTHESIA POSTPROCEDURE EVALUATION
Department of Anesthesiology  Postprocedure Note    Patient: Bubba Randhawa  MRN: [id number]  YOB: 1955  Date of evaluation: 2/16/2023      Procedure Summary     Date: 02/16/23 Room / Location: [facility] ENDO 01 / [facility] END[facility]    Anesthesia Start: 7824 Anesthesia Stop: 0254    Procedure: Rj Going; NO C-ARM (Chest) Diagnosis:       Pneumonia of both lungs due to infectious organism, unspecified part of lung      (Pneumonia of both lungs due to infectious organism, unspecified part of lung [J18.9])    Surgeons: Earlene Estrada MD Responsible Provider: Ata Garcia MD    Anesthesia Type: MAC ASA Status: 3          Anesthesia Type: MAC    Ramakrishna Phase I: Ramakrishna Score: 10    Ramakrishna Phase II: Ramakrishna Score: 10      Anesthesia Post Evaluation    Patient location during evaluation: bedside  Patient participation: complete - patient participated  Level of consciousness: awake and alert  Airway patency: patent  Nausea & Vomiting: no nausea and no vomiting  Complications: no  Cardiovascular status: hemodynamically stable  Respiratory status: acceptable  Hydration status: stable  Multimodal analgesia pain management approach

## 2023-02-17 NOTE — PROGRESS NOTES
1935  Bedside and Verbal shift change  Received from [facility] (outgoing nurse), to NORA De Jesus (incoming)  Pt. Is AOX 4. IV SL, Pt. denies pain at this time. Report included the following information SBAR, Procedure Summary, Intake/Output, MAR, Recent Results, Med Rec Status, and Cardiac Rhythm @ SR. Will Resume care and monitor Pt. Condition. 1945  Pt. Head to Toe Assessment Done and documented. Pt. Educated on call bell when in need of help and assistance. Pt. verbalized understanding. 2045  Pt denies discomfort. 2200  Pt made no complaints. 2300  Pt. Given snack. 0030  Pt. Resting with eyes closed, easily awaken. 0230  Pt. Made no complaints. 0430  Pt. Resting with eyes closed, easily awaken. 0600  Pt. Able to rest and sleep throughout the shift. Verbal and bedside Shift changed report given to Sadi Norwood RN (oncoming RN) on Pt. Condition. Report consisted of patients Situation, History, Activities, intake/output,  Background, Assessment and Recommendations(SBAR). Information from the following report(s) Kardex, order Summary, Lab results and MAR was reviewed with the receiving nurse. Opportunity for questions and clarification was provided.

## 2023-02-17 NOTE — PROGRESS NOTES
1455: Walk test performed. Pt at 99% on RA. Pt at 94% while ambulating on RA. Pt at 98% while ambulating on 2L.

## 2023-02-17 NOTE — PROGRESS NOTES
[facility]  Pulmonary, Critical Care, and Sleep Medicine  Progress note    Name: Chris Montoya MRN: [id number]   : 1955 Hospital: [address] [place] [address]   Date: 2023        IMPRESSION:   Acute hypoxemic respiratory failure - CT with diffuse upper-mid lobe predominant GGOs with interstitial thickening (new since CT chest in 2022) on underlying emphysema. Differential is extensive and includes inhalational lung injury, hypersensitivity pneumonitis, inflammatory interstitial pneumonitis, or infection to include potential atypical/opportunistic infections. Radiologic pattern suggestive of diffuse infiltrative alveolar and interstitial process. Rapid flu & COVID negative; respiratory PCR negative. , HIV negative , RA factor normal -mildly elevated ,procalcitonin elevated at 2.63. ESR/CRP elevated. Elevated IgG , IgM. IgE is pending. Cultures to date growing Candida in sputum , 1-AFB is negative.  galactomannan markedly elevated-2525 . Echocardiogram with PAP 43 mm- moderate pulmonary hypertension remains clinically compensated.   Improved oxygenation now on 4 L saturating adequately  Centribolular emphysema  Tobacco dependence  Polysubstance abuse, heroin abuse, enrolled in methadone program  Acute kidney injury, resolved  Cirrhosis of the liver with associated portal hypertension  Transaminitis: Elevated liver enzymes likely secondary to alcohol use with AST/ALT ratio >2  Mild leukopenia with lymphopenia  Thrombocytopenia likely secondary to chronic liver disease      PLAN:   Supplemental oxygen to maintain SpO2 88-94% --currently on room air  Bronchial hygiene protocol  Bronchodilators: Brovana BID and Duoneb q4h  Steroids: Prednisone 40mg daily- monitor response  Pulmicort Neb BID  Antibiotics: Per ID  We will follow-up cultures  Aspiration precautions    Will need to assess home oxygen when ready for discharge  PT, OOB and ambulate  DVT prophylaxis with SCDs (chemical had been held in setting of his thrombocytopenia)  Will follow          Subjective/Interval History:     Initial HPI/Interval:   This patient has been seen and evaluated at the request of Dr. Maximiliano Argueta for respiratory failure. Patient is a 69 y.o. male with a history of cirrhosis with portal HTN, tobacco dependence, and polysubstance abuse who presented to the [facility] ER with approx 3 days of progressive SOB/QUIROZ. States that prior to that, he had been in his usual state of health. Denies any known sick contacts. Reports associated mostly dry cough (scant yellow mucus a couple of days ago), no hemoptysis. Has some chest pain associated with coughing. Denies any baseline respiratory problems or cardiac problems. Reports associated subjective fevers, chills, sweats, and decreased appetite. Has not noticed any weight loss. Vomited once, but no persistent nausea, vomiting, or diarrhea and denies any abdominal pain. Has chronic pain in his knees; had surgery in 2022, but persistent pain. No new rashes. He currently smokes approx 1ppd and marijuana cigarettes, also snorts heroin, most recently in the days preceding admission. Uses a humidifier in the home. No pets/birds. No known family history of lung disease. Drinks about 4 cans of beer/day. 02/17/23     No acute events overnight. Sitting up in bed  Currently no oxygen requirement  Appears to have responded nicely to steroids  No complaints- eating, no nausea or emesis, able to ambulate to bathroom, no chest pain, no hemoptysis  Remains on Respiratory Isolation- awaiting AFB result   Awaiting results from several pending studies- case discussed with ID staff.    A galactomannan markedly elevated to 2525- awaiting repeat testing on BAL- clinically does not appear to have active fungal infection  Hypersensitivity Panel 2/14 pending, 2/11 negative  IgE still pending from 2/11/23  Afebrile     Inpat Anti-Infectives (From admission, onward)       Start     Ordered Stop 23 0900  cefUROXime (CEFTIN) tablet 500 mg  500 mg,   Oral,   2 times per day         23 1748 23 0859                      ROS:Pertinent items are noted in HPI. Objective:   Vital Signs:    BP (!) 160/72   Pulse 74   Temp 98.9 °F (37.2 °C) (Oral)   Resp 16   Ht 5' 11\" (1.803 m)   Wt 176 lb (79.8 kg)   SpO2 98%   BMI 24.55 kg/m²             Temp (24hrs), Av.1 °F (36.7 °C), Min:97.4 °F (36.3 °C), Max:98.9 °F (37.2 °C)       Intake/Output:   Last shift:       07 - 1900  In: 600 [P.O.:600]  Out: 400 [Urine:400]  Last 3 shifts: 02/15 1901 -  0700  In: 450 [P.O.:50; I.V.:400]  Out: 5152 [Urine:2555]    Intake/Output Summary (Last 24 hours) at 2023 1532  Last data filed at 2023 1454  Gross per 24 hour   Intake 1000 ml   Output 400 ml   Net 600 ml          Physical Exam:    General: alert, oriented times 3, cooperative,    HEENT: NCAT, normal oral mucosa, no exudates, PERRL   Neck: No abnormally enlarged lymph nodes. Supple   Chest: normal   Lungs: Fewer but persistent end inspiratory squeaks at lung bases   Heart: Regular rate and rhythm, S1S2 present, or without murmur or extra heart sounds   Abdomen: abdomen is soft without significant tenderness, masses, organomegaly or guarding   Extremity: none edema   Neuro: alert, grossly nonfocal   Skin: Skin color, texture, turgor normal. No rashes or lesions        DATA:  Labs:  Lab Results   Component Value Date    WBC 3.5 (L) 2023    HGB 13.7 2023    HCT 41.0 2023    MCV 92.8 2023    PLT 76 (L) 2023      Recent Labs     02/15/23  0030 23  0142   WBC 2.9* 3.5*   HGB 13.1 13.7   HCT 39.5 41.0   PLT 72* 76*       Recent Labs     23  0142      K 5.0      CO2 27   BUN 21*       Component Ref Range & Units 23 1805    A fumigatus #1 Negative   Negative    M faeni Negative   Negative    T Vulgaris #1 Negative   Negative    A.  Pullulans Abs Negative   Negative    T sacchari Negative   Negative    [place] Serum Negative   Negative    Comment: (NOTE)         Latest Reference Range & Units 2/11/23 18:05   IgA 61 - 437 mg/dL 316   IGE IU/mL PENDING   IgG, Serum 603 - 1,613 mg/dL 2,291 (H)   IgM 20 - 172 mg/dL 488 (H)   (H): Data is abnormally high    Results       Procedure Component Value Units Date/Time    Culture, Respiratory [[id number]]  (Abnormal) Collected: 02/16/23 1556    Order Status: Completed Specimen: BAL- Bronch. Lavage Updated: 02/17/23 1447     Special Requests L LINGULA BAL     Gram stain OCCASIONAL WBCS SEEN         NO EPITHELIAL CELLS SEEN         NO ORGANISMS SEEN        Culture       RARE YEAST, (APPARENT CANDIDA ALBICANS)                  NO NORMAL RESPIRATORY BELL ISOLATED SO FAR          Culture, Fungus [[id number]] Collected: 02/16/23 1556    Order Status: No result Updated: 02/16/23 2312    Culture with Smear, Acid Fast Bacillius [[id number]] Collected: 02/16/23 1556    Order Status: No result Updated: 02/16/23 1706    Pneumocystis Jirovecii PCR-A [[id number]] Collected: 02/16/23 1556    Order Status: No result Updated: 02/16/23 1712    Culture, Fungus [[id number]] Collected: 02/16/23 1553    Order Status: No result Updated: 02/16/23 2312    Culture, Respiratory [[id number]]  (Abnormal) Collected: 02/16/23 1553    Order Status: Completed Specimen: BAL- Bronch.  Lavage Updated: 02/17/23 1446     Special Requests RML BAL     Gram stain OCCASIONAL WBCS SEEN         NO EPITHELIAL CELLS SEEN         NO ORGANISMS SEEN        Culture       RARE YEAST, (APPARENT CANDIDA ALBICANS)                  NO NORMAL RESPIRATORY BELL ISOLATED SO FAR          Culture, Viral [[id number]] Collected: 02/16/23 1553    Order Status: No result Updated: 02/16/23 1649    Culture, Viral [[id number]] Collected: 02/16/23 1553    Order Status: No result Updated: 02/16/23 1653    Pneumocystis Jirovecii PCR-A [[id number]] Collected: 02/16/23 1553    Order Status: No result Updated: 02/16/23 1657    Culture with Smear, Acid Fast Bacillius [[id number]] Collected: 02/16/23 1553    Order Status: No result Updated: 02/16/23 1700    Culture, Fungus [[id number]] Collected: 02/16/23 1543    Order Status: No result Updated: 02/16/23 2312    Culture, Respiratory [[id number]]  (Abnormal) Collected: 02/16/23 1543    Order Status: Completed Specimen: Bronchial Washing Updated: 02/17/23 1445     Special Requests BRONCHIAL WASHING        Gram stain FEW WBCS SEEN               RARE EPITHELIAL CELLS SEEN            NO ORGANISMS SEEN        Culture       LIGHT YEAST, (APPARENT CANDIDA ALBICANS)                  NO NORMAL RESPIRATORY BELL ISOLATED SO FAR          COVID-19, Rapid [[id number]] Collected: 02/16/23 1337    Order Status: Completed Specimen: Nasopharyngeal Updated: 02/16/23 1521     Source Nasopharyngeal        SARS-CoV-2, Rapid Not detected        Comment: Rapid Abbott ID Now       Rapid NAAT:  The specimen is NEGATIVE for SARS-CoV-2, the novel coronavirus associated with COVID-19. Negative results should be treated as presumptive and, if inconsistent with clinical signs and symptoms or necessary for patient management, should be tested with an alternative molecular assay. Negative results do not preclude SARS-CoV-2 infection and should not be used as the sole basis for patient management decisions. This test has been authorized by the FDA under an Emergency Use Authorization (EUA) for use by authorized laboratories.    Fact sheet for Healthcare Providers:  http://www.jessica.alex/  Fact sheet for Patients: http://www.dagoberto-kali.biz/       Methodology: Isothermal Nucleic Acid Amplification         Culture with Smear, Acid Fast Bacillius [[id number]]     Order Status: Sent Specimen: Sputum Induced     Culture with Smear, Acid Fast Bacillius [[id number]] Collected: 02/15/23 0900    Order Status: Completed Specimen: Miscellaneous sample Updated: 02/16/23 1537 Sample Site SPUTUM     AFB SPECIMEN PROCESSING Concentration     AFB Smear Negative        Comment: (NOTE)  Performed At: [address] [id number]  Neil Mohs MD PN:[id number]          ACID FAST CULTURE PENDING    Beckey Najjar Incubated [[id number]] Collected: 02/14/23 1216    Order Status: Sent Specimen: Blood Updated: 02/14/23 1222    Culture, Respiratory [[id number]]  (Abnormal)  (Susceptibility) Collected: 02/12/23 0315    Order Status: Completed Specimen: Sputum Expectorated Updated: 02/16/23 0928     Special Requests NO SPECIAL REQUESTS        Gram stain RARE WBCS SEEN               RARE EPITHELIAL CELLS SEEN            FEW Gram positive cocci         FEW GRAM VARIABLE RODS         RARE YEAST        Culture       LIGHT YEAST, (APPARENT CANDIDA ALBICANS)            Escherichia coli               SCANT NORMAL RESPIRATORY BELL          Susceptibility        Escherichia coli      BACTERIAL SUSCEPTIBILITY PANEL WHIT      amikacin <=2 ug/mL Sensitive      ampicillin >=32 ug/mL Resistant      ampicillin-sulbactam >=32 ug/mL Resistant      ceFAZolin 16 ug/mL Sensitive      cefepime <=1 ug/mL Sensitive      cefOXitin <=4 ug/mL Sensitive      cefTAZidime <=1 ug/mL Sensitive      cefTRIAXone <=1 ug/mL Sensitive      ciprofloxacin <=0.25 ug/mL Sensitive      gentamicin <=1 ug/mL Sensitive      levofloxacin <=0.12 ug/mL Sensitive      meropenem <=0.25 ug/mL Sensitive      piperacillin-tazobactam <=4 ug/mL Sensitive      tobramycin <=1 ug/mL Sensitive      trimethoprim-sulfamethoxazole <=20 ug/mL Sensitive                           Culture with Smear, Acid Fast Bacillius [[id number]] Collected: 02/12/23 0315    Order Status: Completed Specimen: Miscellaneous sample Updated: 02/14/23 1536     Sample Site SPUTUM        AFB SPECIMEN PROCESSING Concentration     AFB Smear Negative        Comment: (NOTE)  Performed At: [address] [id number]  Adonis Lewis MD VO:[id number]          ACID FAST CULTURE PENDING    Culture, Fungus [[id number]]  (Abnormal) Collected: 02/12/23 0315    Order Status: Completed Specimen: Sputum Expectorated Updated: 02/14/23 0943     Special Requests NO SPECIAL REQUESTS        Culture FEW LENIN ALBICANS               CULTURE WILL BE HELD FOR 4 WEEKS. IF THERE IS ADDITIONAL FUNGAL GROWTH, A NEW REPORT WILL FOLLOW.           Legionella antigen, urine [[id number]] Collected: 02/11/23 2030    Order Status: Completed Specimen: Urine, random Updated: 02/12/23 1340     Legionella Antigen, Urine Negative       Strep Pneumoniae Antigen [[id number]] Collected: 02/11/23 2030    Order Status: Completed Specimen: Urine, random Updated: 02/12/23 1340     STREP PNEUMONIAE ANTIGEN, URINE Negative       Respiratory Panel, Molecular, with COVID-19 (Restricted: peds pts or suitable admitted adults) [[id number]] Collected: 02/11/23 2030    Order Status: Completed Specimen: Nasopharyngeal Updated: 02/11/23 2210     Adenovirus by PCR Not detected        Coronavirus 229E by PCR Not detected        Coronavirus HKU1 by PCR Not detected        Coronavirus NL63 by PCR Not detected        Coronavirus OC43 by PCR Not detected        SARS-CoV-2, PCR Not detected        Human Metapneumovirus by PCR Not detected        Rhinovirus Enterovirus PCR Not detected        Influenza A by PCR Not detected        Influenza B PCR Not detected        Parainfluenza 1 PCR Not detected        Parainfluenza 2 PCR Not detected        Parainfluenza 3 PCR Not detected        Parainfluenza 4 PCR Not detected        Respiratory Syncytial Virus by PCR Not detected        Bordetella parapertussis by PCR Not detected        Bordetella pertussis by PCR Not detected        Chlamydophila Pneumonia PCR Not detected        Mycoplasma pneumo by PCR Not detected       Culture, Respiratory [[id number]] Collected: 02/10/23 2245    Order Status: Canceled Specimen: Sputum Expectorated              Imaging:  [x]I have personally reviewed the patients radiographs  XR Results (most recent):         Xray Result (most recent):  XR CHEST PORTABLE 02/15/2023    Narrative  CHEST PORTABLE    CPT CODE: 40979    COMPARISON: 2/10/2023    INDICATIONS: Pneumonia    FINDINGS: The heart is probably normal in size. There are diffuse bilateral  interstitial infiltrates greatest in the right lung base not significantly  changed since 2/10/2023. No pleural effusions are seen. No significant osseous  abnormalities. Impression  Bilateral interstitial infiltrates not significantly changed in the four-day  interval.     XR CHEST (2 VW) 02/10/2023    Narrative  EXAM:  XR CHEST PA LAT    INDICATION:   sob    COMPARISON: Chest radiograph April 29, 2022. FINDINGS: Multifocal interstitial and airspace opacities, greatest in the right  middle lobe. No pneumothorax or pleural effusion. Calcified aortic arch. Cardiomediastinal contours are otherwise unremarkable. No acute osseous  findings. Impression  Multifocal interstitial and airspace opacities, greatest in the right middle  lobe. Findings likely represent multifocal pneumonia. CT Results (most recent):  CTA CHEST W WO CONTRAST 02/10/2023    Narrative  EXAM: CT Angiogram of the Chest    CLINICAL INDICATION:  rule out pe . Smoker with hypertension. Patient having  shortness of breath and bodyaches. TECHNIQUE: CT angiogram of the chest performed. MIPS performed    All CT scans at this facility are performed using dose optimization technique as  appropriate to a performed exam, to include automated exposure control,  adjustment of the mA and/or kV according to patient size (including appropriate  matching for site specific examination) or use of iterative reconstruction  technique. IV CONTRAST: 100 cc of Isovue 370    COMPARISON: None    FINDINGS:  Limitations: Breathing motion is present. Thyroid: Unremarkable.     Mediastinum: Hilar adenopathy is noted. There is subcarinal adenopathy. Heart: Unremarkable    Pericardium: Unremarkable    Aorta: No evidence of aortic dissection or aneurysm. Pulmonary Arteries: No evidence of pulmonary embolus. Trachea and Bronchi: Unremarkable. Pleura: No evidence of pleural effusion. Lungs: Centrilobular emphysematous changes are present. There is interstitial  thickening and groundglass changes throughout the lungs most pronounced in the  mid portions. Axilla/Chest wall: Unremarkable. Upper Abdomen: No acute findings. Musculoskeletal: No acute osseous findings. Impression  1. Findings concerning for interstitial pneumonitis possibly some process like  alveolar proteinosis or hypersensitivity pneumonitis    2. No evidence of pulmonary embolus or aortic dissection. 3.  Mediastinal and hilar adenopathy. Consider short-term CT 3 months to  evaluate stability. 4.  Centrilobular emphysematous changes. Complex decision making was made in the evaluation and management plans during this consultation. More than 50% of time was spent in counseling and coordination of care including review of data and discussion with other team members.           Mimi Osborn, DO  Pulmonary & Critical Care Medicine

## 2023-02-18 ENCOUNTER — HOME HEALTH ADMISSION (OUTPATIENT)
Age: 68
End: 2023-02-18

## 2023-02-18 ENCOUNTER — APPOINTMENT (OUTPATIENT)
Facility: HOSPITAL | Age: 68
DRG: 196 | End: 2023-02-18
Payer: MEDICARE

## 2023-02-18 VITALS
RESPIRATION RATE: 18 BRPM | WEIGHT: 175 LBS | SYSTOLIC BLOOD PRESSURE: 137 MMHG | TEMPERATURE: 98.2 F | HEIGHT: 71 IN | OXYGEN SATURATION: 100 % | DIASTOLIC BLOOD PRESSURE: 68 MMHG | BODY MASS INDEX: 24.5 KG/M2 | HEART RATE: 68 BPM

## 2023-02-18 LAB
ANA HOMOGEN TITR SER: NORMAL {TITER}
ANA INTERCELL BRIDGE TITR SER IF: NORMAL {TITER}
ANA NUCLEAR DOTS TITR SER IF: NORMAL {TITER}
ANA NUCLEAR MEMBRANE PORES TITR SER IF: NORMAL {TITER}
ANA NUCLEOLAR TITR SER: NORMAL {TITER}
ANA SPECKLED TITR SER: NORMAL {TITER}
ANA TITR SER IF: POSITIVE
ANION GAP SERPL CALC-SCNC: 3 MMOL/L (ref 3–18)
BACTERIA SPEC CULT: ABNORMAL
BASOPHILS # BLD: 0 K/UL (ref 0–0.1)
BASOPHILS NFR BLD: 0 % (ref 0–2)
BUN SERPL-MCNC: 22 MG/DL (ref 7–18)
BUN/CREAT SERPL: 24 (ref 12–20)
CALCIUM SERPL-MCNC: 8.9 MG/DL (ref 8.5–10.1)
CENTROMERE AB TITR SER IF: NORMAL {TITER}
CENTROMERE B AB SER-ACNC: <0.2 AI (ref 0–0.9)
CHLORIDE SERPL-SCNC: 106 MMOL/L (ref 100–111)
CHROMATIN AB SERPL-ACNC: 0.2 AI (ref 0–0.9)
CO2 SERPL-SCNC: 27 MMOL/L (ref 21–32)
CREAT SERPL-MCNC: 0.9 MG/DL (ref 0.6–1.3)
DEPRECATED CENTRIOLE AB TITR SER IF: NORMAL {TITER}
DIFFERENTIAL METHOD BLD: ABNORMAL
DSDNA AB SER-ACNC: 5 IU/ML (ref 0–9)
ENA JO1 AB SER-ACNC: 0.2 AI (ref 0–0.9)
ENA PCNA AB TITR SER IF: NORMAL {TITER}
ENA RNP AB SER-ACNC: 0.4 AI (ref 0–0.9)
ENA SCL70 AB SER-ACNC: 0.4 AI (ref 0–0.9)
ENA SM AB SER-ACNC: 0.7 AI (ref 0–0.9)
ENA SS-A AB SER-ACNC: 0.2 AI (ref 0–0.9)
ENA SS-B AB SER-ACNC: 0.2 AI (ref 0–0.9)
EOSINOPHIL # BLD: 0 K/UL (ref 0–0.4)
EOSINOPHIL NFR BLD: 0 % (ref 0–5)
ERYTHROCYTE [DISTWIDTH] IN BLOOD BY AUTOMATED COUNT: 14.6 % (ref 11.6–14.5)
GLUCOSE SERPL-MCNC: 112 MG/DL (ref 74–99)
GRAM STN SPEC: ABNORMAL
HCT VFR BLD AUTO: 38.8 % (ref 36–48)
HGB BLD-MCNC: 13.3 G/DL (ref 13–16)
IGA SERPL-MCNC: 316 MG/DL (ref 61–437)
IGE SERPL-ACNC: 207 IU/ML (ref 6–495)
IGG SERPL-MCNC: 2291 MG/DL (ref 603–1613)
IGM SERPL-MCNC: 488 MG/DL (ref 20–172)
IMM GRANULOCYTES # BLD AUTO: 0 K/UL (ref 0–0.04)
IMM GRANULOCYTES NFR BLD AUTO: 1 % (ref 0–0.5)
LABORATORY COMMENT REPORT: ABNORMAL
LYMPHOCYTES # BLD: 0.6 K/UL (ref 0.9–3.6)
LYMPHOCYTES NFR BLD: 19 % (ref 21–52)
Lab: NORMAL
MCH RBC QN AUTO: 31.7 PG (ref 24–34)
MCHC RBC AUTO-ENTMCNC: 34.3 G/DL (ref 31–37)
MCV RBC AUTO: 92.6 FL (ref 78–100)
MITOTIC SPINDLE APP AB TITR SERPL IF: NORMAL {TITER}
MONOCYTES # BLD: 0.3 K/UL (ref 0.05–1.2)
MONOCYTES NFR BLD: 10 % (ref 3–10)
NEUTS SEG # BLD: 2.2 K/UL (ref 1.8–8)
NEUTS SEG NFR BLD: 70 % (ref 40–73)
NOTE: NORMAL
NRBC # BLD: 0 K/UL (ref 0–0.01)
NRBC BLD-RTO: 0 PER 100 WBC
PLATELET # BLD AUTO: 65 K/UL (ref 135–420)
PMV BLD AUTO: 10.9 FL (ref 9.2–11.8)
POTASSIUM SERPL-SCNC: 4.2 MMOL/L (ref 3.5–5.5)
RBC # BLD AUTO: 4.19 M/UL (ref 4.35–5.65)
SERVICE CMNT-IMP: ABNORMAL
SERVICE CMNT-IMP: ABNORMAL
SODIUM SERPL-SCNC: 136 MMOL/L (ref 136–145)
SPECIMEN SOURCE: NORMAL
SPECIMEN SOURCE: NORMAL
VIRUS SPEC CULT: NORMAL
VIRUS SPEC CULT: NORMAL
WBC # BLD AUTO: 3.2 K/UL (ref 4.6–13.2)

## 2023-02-18 PROCEDURE — 2700000000 HC OXYGEN THERAPY PER DAY

## 2023-02-18 PROCEDURE — 6370000000 HC RX 637 (ALT 250 FOR IP): Performed by: EMERGENCY MEDICINE

## 2023-02-18 PROCEDURE — 6370000000 HC RX 637 (ALT 250 FOR IP): Performed by: HOSPITALIST

## 2023-02-18 PROCEDURE — 71046 X-RAY EXAM CHEST 2 VIEWS: CPT

## 2023-02-18 PROCEDURE — 36415 COLL VENOUS BLD VENIPUNCTURE: CPT

## 2023-02-18 PROCEDURE — 2580000003 HC RX 258: Performed by: HOSPITALIST

## 2023-02-18 PROCEDURE — 6370000000 HC RX 637 (ALT 250 FOR IP): Performed by: INTERNAL MEDICINE

## 2023-02-18 PROCEDURE — 6360000002 HC RX W HCPCS: Performed by: HOSPITALIST

## 2023-02-18 PROCEDURE — 6360000002 HC RX W HCPCS: Performed by: STUDENT IN AN ORGANIZED HEALTH CARE EDUCATION/TRAINING PROGRAM

## 2023-02-18 PROCEDURE — 99232 SBSQ HOSP IP/OBS MODERATE 35: CPT | Performed by: INTERNAL MEDICINE

## 2023-02-18 PROCEDURE — 94760 N-INVAS EAR/PLS OXIMETRY 1: CPT

## 2023-02-18 PROCEDURE — 80048 BASIC METABOLIC PNL TOTAL CA: CPT

## 2023-02-18 PROCEDURE — 99239 HOSP IP/OBS DSCHRG MGMT >30: CPT | Performed by: EMERGENCY MEDICINE

## 2023-02-18 PROCEDURE — 85025 COMPLETE CBC W/AUTO DIFF WBC: CPT

## 2023-02-18 PROCEDURE — 94640 AIRWAY INHALATION TREATMENT: CPT

## 2023-02-18 RX ORDER — FLUTICASONE PROPIONATE AND SALMETEROL 250; 50 UG/1; UG/1
1 POWDER RESPIRATORY (INHALATION) EVERY 12 HOURS
Qty: 60 EACH | Refills: 0 | Status: SHIPPED | OUTPATIENT
Start: 2023-02-18

## 2023-02-18 RX ORDER — PREDNISONE 20 MG/1
TABLET ORAL
Qty: 35 TABLET | Refills: 0 | Status: SHIPPED | OUTPATIENT
Start: 2023-02-18

## 2023-02-18 RX ORDER — IPRATROPIUM BROMIDE AND ALBUTEROL SULFATE 2.5; .5 MG/3ML; MG/3ML
1 SOLUTION RESPIRATORY (INHALATION) EVERY 4 HOURS PRN
Qty: 360 ML | Refills: 0 | Status: SHIPPED | OUTPATIENT
Start: 2023-02-18

## 2023-02-18 RX ORDER — ALBUTEROL SULFATE 2.5 MG/3ML
2.5 SOLUTION RESPIRATORY (INHALATION) EVERY 6 HOURS PRN
Status: DISCONTINUED | OUTPATIENT
Start: 2023-02-18 | End: 2023-02-18 | Stop reason: HOSPADM

## 2023-02-18 RX ORDER — CEFUROXIME AXETIL 500 MG/1
500 TABLET ORAL EVERY 12 HOURS SCHEDULED
Qty: 3 TABLET | Refills: 0 | Status: SHIPPED | OUTPATIENT
Start: 2023-02-18 | End: 2023-02-19

## 2023-02-18 RX ADMIN — ALBUTEROL SULFATE 2.5 MG: 2.5 SOLUTION RESPIRATORY (INHALATION) at 09:08

## 2023-02-18 RX ADMIN — BUDESONIDE 250 MCG: 0.25 SUSPENSION RESPIRATORY (INHALATION) at 09:09

## 2023-02-18 RX ADMIN — POLYETHYLENE GLYCOL 3350 17 G: 17 POWDER, FOR SOLUTION ORAL at 09:22

## 2023-02-18 RX ADMIN — PREDNISONE 40 MG: 20 TABLET ORAL at 09:22

## 2023-02-18 RX ADMIN — HYDRALAZINE HYDROCHLORIDE 10 MG: 20 INJECTION INTRAMUSCULAR; INTRAVENOUS at 05:14

## 2023-02-18 RX ADMIN — ARFORMOTEROL TARTRATE 15 MCG: 15 SOLUTION RESPIRATORY (INHALATION) at 09:09

## 2023-02-18 RX ADMIN — AMLODIPINE BESYLATE 10 MG: 10 TABLET ORAL at 09:22

## 2023-02-18 RX ADMIN — IPRATROPIUM BROMIDE 0.5 MG: 0.5 SOLUTION RESPIRATORY (INHALATION) at 09:08

## 2023-02-18 RX ADMIN — METHADONE HYDROCHLORIDE 60 MG: 10 CONCENTRATE ORAL at 09:21

## 2023-02-18 RX ADMIN — PANTOPRAZOLE SODIUM 40 MG: 40 TABLET, DELAYED RELEASE ORAL at 09:22

## 2023-02-18 RX ADMIN — SODIUM CHLORIDE, PRESERVATIVE FREE 10 ML: 5 INJECTION INTRAVENOUS at 03:09

## 2023-02-18 RX ADMIN — CEFUROXIME AXETIL 500 MG: 250 TABLET, FILM COATED ORAL at 09:22

## 2023-02-18 RX ADMIN — SENNOSIDES AND DOCUSATE SODIUM 2 TABLET: 50; 8.6 TABLET ORAL at 09:22

## 2023-02-18 NOTE — RT PROTOCOL NOTE
NEB PROTOCOL:  ASSESSMENT    Patient  Susie Moreland     69 y.o.   male     2/17/2023  7:22 PM    Breath Sounds Pre Procedure:                                               Breath Sounds Post Procedure:                                                 Breathing pattern: Pre procedure             Post procedure       Cough: Pre procedure                  Post procedure      Heart Rate: Pre procedure             Post procedure      Resp Rate: Pre procedure              Post procedure          Nebulizer Therapy: Current medications         Problem List:   Patient Active Problem List   Diagnosis    Epidural abscess, L2-L5    Leukopenia    Iron deficiency anemia, unspecified    Anemia    Thrombocytopenia (HCC)    Metatarsal fracture    Polysubstance abuse (HCC)    Alcohol abuse    HTN (hypertension)    Liver mass    Bladder mass    Hematuria    Orthopedic aftercare for healing traumatic lower leg fracture, right, closed    Impaired mobility and ADLs    Acute blood loss as cause of postoperative anemia    Methadone dependence (HCC)    Closed displaced comminuted fracture of shaft of right tibia with routine healing    Closed fracture of distal end of right fibula with routine healing    GAYATHRI (acute kidney injury) ([facility] [address]    GERD (gastroesophageal reflux disease)    Positive D dimer    Noncompliance with medications    Multifocal pneumonia    Acute respiratory failure with hypoxia (HCC)    Acute respiratory failure with hypoxia and hypercarbia (HCC)    ILD (interstitial lung disease) ([facility] [address]       Patient alert and cooperative to use MDI: YES    Home Respiratory Therapy Regimen/Frequency:  NO  Medication   Device  Frequency     SEVERITY INDEX:    ITEM 0 1 2 3 4 Score   Respiratory Pattern and or Rate Regular  10-19 Regular  20-24   24-30    30-34 Severe SOB or   Greater than 35 1   Breath Sounds Clear Occasional Wheeze Mild Wheezing Moderate Wheezing  wheezing/Absent breath sounds 0   Shortness of Breath None Dyspnea on Exertion Dyspnea at Rest Moderate Shortness of Breath at Rest Severe Shortness of Breath - Limited Speech 1       Total Score:  2    * Scoring Guidelines  0-4 pts:  PRN-BID   5-7 pts:  BID, TID, QID  8-9 pts:  TID, QID, Q6  10-12 pts:  Q4-Q6  * - Guidelines used with clinical judgement. PRN Treatments can be ordered to supplement scheduled treatments. Regardless of score, frequency should not be less than normal home regimen. Recommended Order/Frequency:  Change bronchodilator to BID. Comments: Pt not on any home respiratory therapy medications.         Respiratory Therapist: Yolis Santamaria RT

## 2023-02-18 NOTE — PROGRESS NOTES
6 Minute Walk Test   Pre-Test Vitals:  ;     /64  O2 Satutration 99% RA  HR:84    Post-Test Vitals:        BP: 138/70  O2 Sat: 97%  Distance: 300ft ;      No acute distress noted; tolerated ambulation well

## 2023-02-18 NOTE — CARE COORDINATION
CM spoke with patient, [address] verbal consent given for [facility]. ALFONZO spoke with Benji Hernández with [facility], said they can take patient. CM put patient in the queue for [facility].              Anuj Bowman, RN  Case Management [phone]

## 2023-02-18 NOTE — PROGRESS NOTES
[facility]  Pulmonary, Critical Care, and Sleep Medicine  Progress note    Name: Glenard Boeck MRN: [id number]   : 1955 Hospital: [address] [place] [address]   Date: 2023        IMPRESSION:   Acute hypoxemic respiratory failure - CT with diffuse upper-mid lobe predominant GGOs with interstitial thickening (new since CT chest in 2022) on underlying emphysema. Differential is extensive and includes inhalational lung injury, hypersensitivity pneumonitis, inflammatory interstitial pneumonitis, or infection to include potential atypical/opportunistic infections. Radiologic pattern suggestive of diffuse infiltrative alveolar and interstitial process. Rapid flu & COVID negative; respiratory PCR negative. , HIV negative , RA factor normal -mildly elevated ,procalcitonin elevated at 2.63. ESR/CRP elevated. Elevated IgG , IgM. IgE is pending. Cultures to date growing Candida in sputum , 1-AFB is negative.  galactomannan markedly elevated-2525 . Echocardiogram with PAP 43 mm- moderate pulmonary hypertension remains clinically compensated.   Improved oxygenation now on 4 L saturating adequately  Centribolular emphysema  Tobacco dependence  Hilar Adenopathy: Unclear clinical significance at this time- needs follow up OP imaging  Polysubstance abuse, heroin abuse, enrolled in methadone program  Acute kidney injury, resolved  Cirrhosis of the liver with associated portal hypertension  Transaminitis: Elevated liver enzymes likely secondary to alcohol use with AST/ALT ratio >2  Mild leukopenia with lymphopenia  Thrombocytopenia - Chronic likely secondary to chronic liver disease      PLAN:   Supplemental oxygen to maintain SpO2 88-94% --currently on room air    Obtain CXR: PA/LAT today  Bronchial hygiene protocol  Bronchodilators: Brovana BID and Duoneb q4h  Steroids: Prednisone 40mg daily- monitor response continue for 5 more days then 20mg daily until follow up with OP Pulmonary  Pulmicort Neb BID  Antibiotics: Per ID  We will follow-up cultures  Aspiration precautions  Smoking cessation dicussed. Will need to assess home oxygen when ready for discharge  PT, OOB and ambulate  DVT prophylaxis with SCDs (chemical had been held in setting of his thrombocytopenia)  Will follow - Needs Op Pulmonary Follow up- Dr. Crystal Brandt         Subjective/Interval History:     Initial HPI/Interval:   This patient has been seen and evaluated at the request of Dr. Beti Ferrari for respiratory failure. Patient is a 69 y.o. male with a history of cirrhosis with portal HTN, tobacco dependence, and polysubstance abuse who presented to the [facility] ER with approx 3 days of progressive SOB/QUIROZ. States that prior to that, he had been in his usual state of health. Denies any known sick contacts. Reports associated mostly dry cough (scant yellow mucus a couple of days ago), no hemoptysis. Has some chest pain associated with coughing. Denies any baseline respiratory problems or cardiac problems. Reports associated subjective fevers, chills, sweats, and decreased appetite. Has not noticed any weight loss. Vomited once, but no persistent nausea, vomiting, or diarrhea and denies any abdominal pain. Has chronic pain in his knees; had surgery in 2022, but persistent pain. No new rashes. He currently smokes approx 1ppd and marijuana cigarettes, also snorts heroin, most recently in the days preceding admission. Uses a humidifier in the home. No pets/birds. No known family history of lung disease. Drinks about 4 cans of beer/day.     02/18/23     S/P Bronch w/ BAL: 2/16/23    No acute events overnight. I asked the patient again about any smoking or vaping and patient denied. Specifically, he denied vaping THC. I stressed the importance of not smoking or inhaling any agents. 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    Sitting up in bed  Currently no oxygen requirement  Off respiratory isolation- BAL AFB stain negative  Appears to have responded nicely to steroids  No complaints- eating, no nausea or emesis, able to ambulate to bathroom, no chest pain, no hemoptysis  Awaiting results from several pending studies- case discussed with hospitalist today  A galactomannan markedly elevated to 2525- awaiting repeat testing on BAL- clinically does not appear to have active fungal infection  Hypersensitivity Panel  pending,  negative  IgE still pending from 23  PLT count remains low- no bleeding issues at this time  Afebrile     Inpat Anti-Infectives (From admission, onward)       Start     Ordered Stop    23 0900  cefUROXime (CEFTIN) tablet 500 mg  500 mg,   Oral,   2 times per day         23 1748 23 0859                      ROS:Pertinent items are noted in HPI. Objective:   Vital Signs:    /64   Pulse 60   Temp 98.1 °F (36.7 °C) (Oral)   Resp 15   Ht 5' 11\" (1.803 m)   Wt 175 lb (79.4 kg)   SpO2 100%   BMI 24.41 kg/m²             Temp (24hrs), Av.4 °F (36.9 °C), Min:98.1 °F (36.7 °C), Max:98.9 °F (37.2 °C)       Intake/Output:   Last shift:       0701 -  1900  In: 320 [P.O.:320]  Out: 500 [Urine:500]  Last 3 shifts:  1901 -  0700  In: 960 [P.O.:960]  Out: 2200 [Urine:2200]    Intake/Output Summary (Last 24 hours) at 2023 1227  Last data filed at 2023 6543  Gross per 24 hour   Intake 1040 ml   Output 2300 ml   Net -1260 ml          Physical Exam:    General: alert, oriented times 3, cooperative,    HEENT: NCAT, normal oral mucosa, no exudates, PERRL   Neck: No abnormally enlarged lymph nodes.  Supple   Chest: normal   Lungs: Fewer but persistent end inspiratory squeaks at lung bases   Heart: Regular rate and rhythm, S1S2 present, or without murmur or extra heart sounds   Abdomen: abdomen is soft without significant tenderness, masses, organomegaly or guarding   Extremity: none edema   Neuro: alert, grossly nonfocal   Skin: Skin color, texture, turgor normal. No rashes or lesions        DATA:  Labs:  Lab Results   Component Value Date    WBC 3.2 (L) 2023    HGB 13.3 2023    HCT 38.8 2023    MCV 92.6 2023    PLT 65 (L) 2023      Recent Labs     23  0142 23  0323   WBC 3.5* 3.2*   HGB 13.7 13.3   HCT 41.0 38.8   PLT 76* 65*       Recent Labs     23  0142 23  0323    136   K 5.0 4.2    106   CO2 27 27   BUN 21* 22*       Component Ref Range & Units 23 1805    A fumigatus #1 Negative   Negative    M faeni Negative   Negative    T Vulgaris #1 Negative   Negative    A. Pullulans Abs Negative   Negative    T sacchari Negative   Negative    Lincoln Serum Negative   Negative    Comment: (NOTE)         Latest Reference Range & Units 23 18:05   IgA 61 - 437 mg/dL 316   IGE IU/mL PENDING   IgG, Serum 603 - 1,613 mg/dL 2,291 (H)   IgM 20 - 172 mg/dL 488 (H)   (H): Data is abnormally high    Results       Procedure Component Value Units Date/Time    Culture, Viral [[id number]] Collected: 23    Order Status: Completed Specimen: Miscellaneous sample Updated: 23 0637     Sample Site BRONCHIAL LAVAGE        Comment: L LINGULA BAL        Viral Culture: Comment        Comment: (NOTE)  Preliminary Report:  No virus isolated at 24 hours. Next report to follow after 4 days. Performed At: [facility] & [address] [id number]  Shanice Khan MD X         Culture, Respiratory [[id number]]  (Abnormal) Collected: 23    Order Status: Completed Specimen: BAL- Bronch.  Lavage Updated: 23 1447     Special Requests L LINGULA BAL     Gram stain OCCASIONAL WBCS SEEN         NO EPITHELIAL CELLS SEEN         NO ORGANISMS SEEN        Culture       RARE YEAST, (APPARENT CANDIDA ALBICANS)                  NO NORMAL RESPIRATORY BELL ISOLATED SO FAR          Culture, Fungus [[id number]] Collected: 23 Order Status: No result Updated: 02/16/23 2312    Culture with Smear, Acid Fast Bacillius [[id number]] Collected: 02/16/23 1556    Order Status: Completed Specimen: Miscellaneous sample Updated: 02/17/23 1537     Sample Site BRONCHIAL LAVAGE        Comment:  L  LINGULA BAL        AFB SPECIMEN PROCESSING Concentration     AFB Smear Negative        Comment: (NOTE)  [place] At[place] [facility] [place]  [employer] [address] [facility] [id number]  Josue Mcnally MD QQ:[id number]          ACID FAST CULTURE PENDING    Pneumocystis Jirovecii PCR-A [[id number]] Collected: 02/16/23 1556    Order Status: No result Updated: 02/16/23 1712    Culture, Fungus [[id number]] Collected: 02/16/23 1553    Order Status: No result Updated: 02/16/23 2312    Culture, Respiratory [[id number]]  (Abnormal) Collected: 02/16/23 1553    Order Status: Completed Specimen: BAL- Bronch. Lavage Updated: 02/17/23 1446     Special Requests RML BAL     Gram stain OCCASIONAL WBCS SEEN         NO EPITHELIAL CELLS SEEN         NO ORGANISMS SEEN        Culture       RARE YEAST, (APPARENT CANDIDA ALBICANS)                  NO NORMAL RESPIRATORY BELL ISOLATED SO FAR          Culture, Viral [[id number]] Collected: 02/16/23 1553    Order Status: Completed Specimen: Miscellaneous sample Updated: 02/18/23 0637     Sample Site BRONCHIAL LAVAGE        Comment: RML BAL        Viral Culture: Comment        Comment: (NOTE)  Preliminary Report:  No virus isolated at 24 hours. Next report to follow after 4 days.   [place] At: [facility] [place]  [employer] [address] [facility] [id number]  Josue Mcnally MD QO:[id number]         Pneumocystis Malva Golas [[id number]] Collected: 02/16/23 1553    Order Status: No result Updated: 02/16/23 1657    Culture with Smear, Acid Fast Viann Tellez [[id number]] Collected: 02/16/23 1553    Order Status: Completed Specimen: Miscellaneous sample Updated: 02/17/23 1638     Sample Site BRONCHIAL LAVAGE        Comment: RML BAL AFB SPECIMEN PROCESSING Concentration     AFB Smear Negative        Comment: (NOTE)  Performed At: [facility] & [place] [id number]  Olena Paul MD TZ:[id number]          ACID FAST CULTURE PENDING    Culture, Fungus [[id number]] Collected: 02/16/23 1543    Order Status: No result Updated: 02/16/23 2312    Culture, Respiratory [[id number]]  (Abnormal) Collected: 02/16/23 1543    Order Status: Completed Specimen: Bronchial Washing Updated: 02/17/23 1445     Special Requests BRONCHIAL WASHING        Gram stain FEW WBCS SEEN               RARE EPITHELIAL CELLS SEEN            NO ORGANISMS SEEN        Culture       LIGHT YEAST, (APPARENT CANDIDA ALBICANS)                  NO NORMAL RESPIRATORY BELL ISOLATED SO FAR          COVID-19, Rapid [[id number]] Collected: 02/16/23 1337    Order Status: Completed Specimen: Nasopharyngeal Updated: 02/16/23 1521     Source Nasopharyngeal        SARS-CoV-2, Rapid Not detected        Comment: Rapid Abbott ID Now       Rapid NAAT:  The specimen is NEGATIVE for SARS-CoV-2, the novel coronavirus associated with COVID-19. Negative results should be treated as presumptive and, if inconsistent with clinical signs and symptoms or necessary for patient management, should be tested with an alternative molecular assay. Negative results do not preclude SARS-CoV-2 infection and should not be used as the sole basis for patient management decisions. This test has been authorized by the FDA under an Emergency Use Authorization (EUA) for use by authorized laboratories.    Fact sheet for Healthcare Providers:  http://www.dagoberto-kali.alex/  Fact sheet for Patients: http://www.dagoberto-kali.biz/       Methodology: Isothermal Nucleic Acid Amplification         Culture with Smear, Acid Fast Bacillius [[id number]]     Order Status: Sent Specimen: Sputum Induced     Culture with Smear, Acid Fast Bacillius [[id number]] Collected: 02/15/23 0900    Order Status: Completed Specimen: Miscellaneous sample Updated: 02/16/23 1537     Sample Site SPUTUM     AFB SPECIMEN PROCESSING Concentration     AFB Smear Negative        Comment: (NOTE)  Performed At: [facility] & [address] [id number]  Halie Gambino MD UJ:[id number]          ACID FAST CULTURE PENDING    Moises Gorman Incubated [[id number]] Collected: 02/14/23 1216    Order Status: Sent Specimen: Blood Updated: 02/14/23 1222    Culture, Respiratory [[id number]]  (Abnormal)  (Susceptibility) Collected: 02/12/23 0315    Order Status: Completed Specimen: Sputum Expectorated Updated: 02/16/23 0928     Special Requests NO SPECIAL REQUESTS        Gram stain RARE WBCS SEEN               RARE EPITHELIAL CELLS SEEN            FEW Gram positive cocci         FEW GRAM VARIABLE RODS         RARE YEAST        Culture       LIGHT YEAST, (APPARENT CANDIDA ALBICANS)            Escherichia coli               SCANT NORMAL RESPIRATORY BELL          Susceptibility        Escherichia coli      BACTERIAL SUSCEPTIBILITY PANEL WHIT      amikacin <=2 ug/mL Sensitive      ampicillin >=32 ug/mL Resistant      ampicillin-sulbactam >=32 ug/mL Resistant      ceFAZolin 16 ug/mL Sensitive      cefepime <=1 ug/mL Sensitive      cefOXitin <=4 ug/mL Sensitive      cefTAZidime <=1 ug/mL Sensitive      cefTRIAXone <=1 ug/mL Sensitive      ciprofloxacin <=0.25 ug/mL Sensitive      gentamicin <=1 ug/mL Sensitive      levofloxacin <=0.12 ug/mL Sensitive      meropenem <=0.25 ug/mL Sensitive      piperacillin-tazobactam <=4 ug/mL Sensitive      tobramycin <=1 ug/mL Sensitive      trimethoprim-sulfamethoxazole <=20 ug/mL Sensitive                           Culture with Smear, Acid Fast Bacillius [[id number]] Collected: 02/12/23 0315    Order Status: Completed Specimen: Miscellaneous sample Updated: 02/14/23 1536     Sample Site SPUTUM        AFB SPECIMEN PROCESSING Concentration     AFB Smear Negative Comment: (NOTE)  Performed At: [facility] & [address] [id number]  Aguilar Macario MD BL:[id number]          ACID FAST CULTURE PENDING    Culture, Fungus [[id number]]  (Abnormal) Collected: 02/12/23 0315    Order Status: Completed Specimen: Sputum Expectorated Updated: 02/14/23 0943     Special Requests NO SPECIAL REQUESTS        Culture FEW LENIN ALBICANS               CULTURE WILL BE HELD FOR 4 WEEKS. IF THERE IS ADDITIONAL FUNGAL GROWTH, A NEW REPORT WILL FOLLOW.           Legionella antigen, urine [[id number]] Collected: 02/11/23 2030    Order Status: Completed Specimen: Urine, random Updated: 02/12/23 1340     Legionella Antigen, Urine Negative       Strep Pneumoniae Antigen [[id number]] Collected: 02/11/23 2030    Order Status: Completed Specimen: Urine, random Updated: 02/12/23 1340     STREP PNEUMONIAE ANTIGEN, URINE Negative       Respiratory Panel, Molecular, with COVID-19 (Restricted: peds pts or suitable admitted adults) [[id number]] Collected: 02/11/23 2030    Order Status: Completed Specimen: Nasopharyngeal Updated: 02/11/23 2210     Adenovirus by PCR Not detected        Coronavirus 229E by PCR Not detected        Coronavirus HKU1 by PCR Not detected        Coronavirus NL63 by PCR Not detected        Coronavirus OC43 by PCR Not detected        SARS-CoV-2, PCR Not detected        Human Metapneumovirus by PCR Not detected        Rhinovirus Enterovirus PCR Not detected        Influenza A by PCR Not detected        Influenza B PCR Not detected        Parainfluenza 1 PCR Not detected        Parainfluenza 2 PCR Not detected        Parainfluenza 3 PCR Not detected        Parainfluenza 4 PCR Not detected        Respiratory Syncytial Virus by PCR Not detected        Bordetella parapertussis by PCR Not detected        Bordetella pertussis by PCR Not detected        Chlamydophila Pneumonia PCR Not detected        Mycoplasma pneumo by PCR Not detected       Culture, Respiratory [[id number]] Collected: 02/10/23 2245    Order Status: Canceled Specimen: Sputum Expectorated              Imaging:  [x]I have personally reviewed the patients radiographs  XR Results (most recent):         Xray Result (most recent):  XR CHEST PORTABLE 02/15/2023    Narrative  CHEST PORTABLE    CPT CODE: 48468    COMPARISON: 2/10/2023    INDICATIONS: Pneumonia    FINDINGS: The heart is probably normal in size. There are diffuse bilateral  interstitial infiltrates greatest in the right lung base not significantly  changed since 2/10/2023. No pleural effusions are seen. No significant osseous  abnormalities. Impression  Bilateral interstitial infiltrates not significantly changed in the four-day  interval.     XR CHEST (2 VW) 02/10/2023    Narrative  EXAM:  XR CHEST PA LAT    INDICATION:   sob    COMPARISON: Chest radiograph April 29, 2022. FINDINGS: Multifocal interstitial and airspace opacities, greatest in the right  middle lobe. No pneumothorax or pleural effusion. Calcified aortic arch. Cardiomediastinal contours are otherwise unremarkable. No acute osseous  findings. Impression  Multifocal interstitial and airspace opacities, greatest in the right middle  lobe. Findings likely represent multifocal pneumonia. CT Results (most recent):  CTA CHEST W WO CONTRAST 02/10/2023    Narrative  EXAM: CT Angiogram of the Chest    CLINICAL INDICATION:  rule out pe . Smoker with hypertension. Patient having  shortness of breath and bodyaches. TECHNIQUE: CT angiogram of the chest performed. MIPS performed    All CT scans at this facility are performed using dose optimization technique as  appropriate to a performed exam, to include automated exposure control,  adjustment of the mA and/or kV according to patient size (including appropriate  matching for site specific examination) or use of iterative reconstruction  technique.     IV CONTRAST: 100 cc of Isovue 370    COMPARISON: None    FINDINGS:  Limitations: Breathing motion is present. Thyroid: Unremarkable. Mediastinum: Hilar adenopathy is noted. There is subcarinal adenopathy. Heart: Unremarkable    Pericardium: Unremarkable    Aorta: No evidence of aortic dissection or aneurysm. Pulmonary Arteries: No evidence of pulmonary embolus. Trachea and Bronchi: Unremarkable. Pleura: No evidence of pleural effusion. Lungs: Centrilobular emphysematous changes are present. There is interstitial  thickening and groundglass changes throughout the lungs most pronounced in the  mid portions. Axilla/Chest wall: Unremarkable. Upper Abdomen: No acute findings. Musculoskeletal: No acute osseous findings. Impression  1. Findings concerning for interstitial pneumonitis possibly some process like  alveolar proteinosis or hypersensitivity pneumonitis    2. No evidence of pulmonary embolus or aortic dissection. 3.  Mediastinal and hilar adenopathy. Consider short-term CT 3 months to  evaluate stability. 4.  Centrilobular emphysematous changes. Complex decision making was made in the evaluation and management plans during this consultation. More than 50% of time was spent in counseling and coordination of care including review of data and discussion with other team members.           Afshan Brown DO  Pulmonary & Critical Care Medicine

## 2023-02-18 NOTE — PROGRESS NOTES
[facility]  Progress Note    Patient: Ailyn Harden Age: 69 y.o. : 1955 MR#: [id number] SSN: [id number]  Date/Time: 2023    Subjective:     Patient is sitting in bed in no apparent distress, awake and alert. Feels a little better    Assessment:   1. Acute hypoxic respiratory failure due to #2 and #3  2. Interstitial pneumonitis could be due to hypersensitivity pneumonitis versus alveolar proteinosis  3. COPD/emphysema  4. GAYATHRI, improved  5. Chronic thrombocytopenia  6. Polysubstance use disorder  7. Hypertension  8. GERD  9. History of constipation  10. Medical noncompliance    PLAN:    Status post bronchoscopy, follow cultures  ID is following ID has switched to p.o. antibiotics and discontinued airborne isolation  Will transition to p.o. prednisone  Wean oxygen  Home once cleared by pulmonary  Thrombocytopenia in the setting of cirrhosis   on amlodipine  Bowel regimen  Physical therapy and Occupational Therapy    Case discussed with:  [x]Patient  []Family  [x]Nursing  []Case Management  DVT Prophylaxis:  []Lovenox  []Hep SQ  [x]SCDs  []Coumadin   []On Heparin gtt    Objective:   VS: BP (!) 160/72   Pulse 64   Temp 98.9 °F (37.2 °C) (Oral)   Resp 12   Ht 5' 11\" (1.803 m)   Wt 176 lb (79.8 kg)   SpO2 100%   BMI 24.55 kg/m²    Tmax/24hrs: Temp (24hrs), Av.1 °F (36.7 °C), Min:97.4 °F (36.3 °C), Max:98.9 °F (37.2 °C)    Input/Output:   Intake/Output Summary (Last 24 hours) at 2023 1935  Last data filed at 2023 1807  Gross per 24 hour   Intake 600 ml   Output 1200 ml   Net -600 ml       General:  Awake, alert  Cardiovascular:  S1S2+, RRR  Pulmonary:  CTA b/l  GI:  Soft, BS+, NT, ND  Extremities:  No edema        Labs:    No results found for this or any previous visit (from the past 24 hour(s)). Signed By: Caitie Fontenot MD     2023      Dragon medical dictation software was used for portions of this report. Unintended errors may occur.

## 2023-02-18 NOTE — DISCHARGE SUMMARY
[address], [address]     [address] [place] SUMMARY    Name: Connor England MRN: [id number]   Age / Sex: 69 y.o. / male CSN: [id number]   YOB: 1955 Length of Stay: 7 days   Admit Date: 2/10/2023 Discharge Date:        PRIMARY CARE PHYSICIAN: Love Dewey MD      DISCHARGE DIAGNOSES:    1. Acute hypoxic respiratory failure due to #2 and #3  2. Interstitial pneumonitis could be due to hypersensitivity pneumonitis versus alveolar proteinosis  3. COPD/emphysema  4. GAYATHRI, improved  5. Chronic thrombocytopenia  6. Polysubstance use disorder  7. Hypertension  8. GERD  9. History of constipation  10. Medical noncompliance    CONSULTS CALLED: Pulmonary, ID      PROCEDURES DONE: Bronchoscopy      COURSE IN THE HOSPITAL: This is a 57-year-old male with a history of substance abuse, on methadone, history of hypertension presented to the ED with complaints of fever chills and shortness of breath. Patient was evaluated and was admitted. There was concern for multifocal pneumonia. Patient was started on antibiotics. Cultures were obtained. Pulmonary was consulted. ID was consulted. Patient has chronic thrombocytopenia likely secondary to liver disease. Patient was placed on airborne isolation. AFB cultures were sent. Subsequently patient had bronchoscopy done. Again bronchoalveolar lavage was done and cultures were sent. Patient has been continued on steroids. Patient's breathing has shown improvement. At the time of admission patient also had some acute kidney injury which is showing improvement. I have counseled patient regarding compliance with medications and regular follow-up with physician and following physician instructions. Patient verbalized understanding. ID has transitioned patient to p.o. antibiotics and cleared the patient for discharge. Airborne isolation has been discontinued.   I discussed with the pulmonologist today and she cleared patient for discharge and recommended prednisone 40 mg p.o. daily for the next 5 days and then to continue 20 mg p.o. daily until the patient sees the pulmonologist as an outpatient and then as per pulmonologist recommendations. I discussed this plan with the patient and he verbalized understanding and agreed. MEDICATIONS ON DISCHARGE:       Medication List        START taking these medications      cefUROXime 500 MG tablet  Commonly known as: CEFTIN  Take 1 tablet by mouth every 12 hours for 1 day     fluticasone-salmeterol 250-50 MCG/ACT Aepb diskus inhaler  Commonly known as: Advair Diskus  Inhale 1 puff into the lungs in the morning and 1 puff in the evening. ipratropium-albuterol 0.5-2.5 (3) MG/3ML Soln nebulizer solution  Commonly known as: DUONEB  Inhale 3 mLs into the lungs every 4 hours as needed for Shortness of Breath     predniSONE 20 MG tablet  Commonly known as: DELTASONE  Take 2 tablets (40 mg) p.o. once daily through February 22, 2023. Then starting from February 23, 2023 continue taking 1 tablet (20 mg) p.o. daily until you see the pulmonologist and then as per the pulmonologist recommendation. Do not stop taking this medication unless instructed by clinician.             CONTINUE taking these medications      amLODIPine 10 MG tablet  Commonly known as: NORVASC     ferrous sulfate 325 (65 Fe) MG tablet  Commonly known as: IRON 831     folic acid 1 MG tablet  Commonly known as: FOLVITE     melatonin 3 MG Tabs tablet     methadone 10 MG tablet  Commonly known as: DOLOPHINE     pantoprazole 40 MG tablet  Commonly known as: PROTONIX     polyethylene glycol 17 GM/SCOOP powder  Commonly known as: GLYCOLAX     thiamine 100 MG tablet            STOP taking these medications      acetaminophen 325 MG tablet  Commonly known as: TYLENOL     aspirin 81 MG EC tablet     ibuprofen 800 MG tablet  Commonly known as: ADVIL;MOTRIN     pregabalin 75 MG capsule  Commonly known as: LYRICA     telmisartan 20 MG tablet  Commonly known as: MICARDIS               Where to Get Your Medications        Information about where to get these medications is not yet available    Ask your nurse or doctor about these medications  cefUROXime 500 MG tablet  fluticasone-salmeterol 250-50 MCG/ACT Aepb diskus inhaler  ipratropium-albuterol 0.5-2.5 (3) MG/3ML Soln nebulizer solution  predniSONE 20 MG tablet           DISCHARGE VITAL SIGNS:  Vitals:    02/18/23 1200   BP: 137/68   Pulse: 68   Resp: 18   Temp: 98.2 °F (36.8 °C)   SpO2:        DISCHARGE PHYSICAL EXAMINATION:  General:  Awake, alert  Cardiovascular:  S1S2+, RRR  Pulmonary:  CTA b/l  GI:  Soft, BS+, NT, ND  Extremities:  No edema      CONDITION ON DISCHARGE: Stable. DISPOSITION: Home with home health care      FOLLOW-UP RECOMMENDATIONS:   Follow-up with PCP in 1 week, follow-up with pulmonary in 10 days, follow-up with ID in 10 days    OTHER INSTRUCTIONS:        TIME SPENT ON DISCHARGE ACTIVITIES: More than 35 minutes. Dragon medical dictation software was used for portions of this report. Unintended errors may occur.       Signed:  Carl Gordon MD      2/18/2023

## 2023-02-18 NOTE — CARE COORDINATION
02/11/23 1411   Service Assessment   Patient Orientation Alert and Oriented   Cognition Alert   History Provided By Patient   Primary [address]  Family Members;Friends/Neighbors   PCP Verified by CM Yes   Last Visit to PCP Within last 3 months   Prior Functional Level Independent in ADLs/IADLs   Current Functional Level Independent in ADLs/IADLs   Can patient return to prior living arrangement Yes   Family able to assist with home care needs: Yes   Would you like for me to discuss the discharge plan with any other family members/significant others, and if so, who? No   Social/Functional History   Lives With Other (comment)  (Roommates)   Type of [address] [place] [address] Two level; Able to Live on Main level with bedroom/bathroom   Home Access Stairs to enter with rails   Entrance Stairs - Number of Steps 3   Bathroom Shower/Tub Tub/Shower unit   Bathroom Toilet Standard   Bathroom Equipment Shower chair  (doesn't use)   Home Equipment  --    [address] [facility]   Ambulation Assistance Independent   Transfer Assistance Independent   Mode of Transportation Bus;Family   Occupation Unemployed   Discharge Planning   Type of [facility]   (Roomates)   Current Services Prior To Admission None   Potential Assistance Purchasing Medications No   Patient expects to be discharged to: House   One/Two Story Residence Two story   # of [address]  [address] [facility] Discharge None   [employer] Information Provided? No   Mode of Transport at Discharge Other (see comment)  (Family)   Confirm Follow Up Transport Family   Condition of Participation: Discharge Planning   The Patient and/or Patient Representative was provided with a Choice of Provider?  Patient   Freedom of Choice list was provided with basic dialogue that supports the patient's individualized plan of care/goals, treatment preferences, and shares the quality data associated with the providers?   Yes  ([address] verbal consent given for [facility].)

## 2023-02-18 NOTE — CARE COORDINATION
Discharge order noted for today. Pt has been accepted to [facility] agency. Met with patient and he is agreeable to the transition plan today. Transport has been arranged through patient's neighbor. Patient's home health  orders have been forwarded to [facility] home health  agency via [facility]  Discharge information has been documented on the AVS.             Khoi Eldridge RN  Case Management [phone]

## 2023-02-18 NOTE — PROGRESS NOTES
Discharge education done at bedside; DC packet provided to patient; Stroke and Cardiac education reviewed; opportunity to ask/answer questions provided; Patient verbalizes understanding;  Scripts given to patient in packet; personal belongings packed and bagged for patient;  L Arm Midline removed; Awaiting patient ride to arrive.

## 2023-02-19 LAB
BACTERIA SPEC CULT: ABNORMAL
BACTERIA SPEC CULT: ABNORMAL
GRAM STN SPEC: ABNORMAL
M TB IFN-G BLD-IMP: ABNORMAL
M TB IFN-G CD4+ T-CELLS BLD-ACNC: 0 IU/ML
M TBIFN-G CD4+ CD8+T-CELLS BLD-ACNC: 0 IU/ML
QUANTIFERON CRITERIA: ABNORMAL
QUANTIFERON MITOGEN VALUE: 0.44 IU/ML
QUANTIFERON NIL VALUE: 0 IU/ML
SERVICE CMNT-IMP: ABNORMAL

## 2023-02-20 ENCOUNTER — CLINICAL DOCUMENTATION (OUTPATIENT)
Age: 68
End: 2023-02-20

## 2023-02-20 ENCOUNTER — HOME CARE VISIT (OUTPATIENT)
Age: 68
End: 2023-02-20

## 2023-02-20 LAB
A FUMIGATUS1 AB SER QL ID: NEGATIVE
A PULLULANS AB SER QL: NEGATIVE
HIV 1+2 AB+HIV1 P24 AG SERPL QL IA: NONREACTIVE
HIV 1/2 RESULT COMMENT: NORMAL
LACEYELLA SACCHARI AB SER QL: NEGATIVE
P JIROVECII DNA # BAL NAA+PROBE: NEGATIVE COPIES/ML
P JIROVECII DNA # BAL NAA+PROBE: NEGATIVE COPIES/ML
PIGEON SERUM AB QL ID: NEGATIVE
S RECTIVIRGULA IGG SER QL ID: NEGATIVE
SOURCE: NORMAL
SOURCE: NORMAL
SPECIMEN SOURCE: NORMAL
SPECIMEN SOURCE: NORMAL
T VULGARIS AB SER QL ID: NEGATIVE

## 2023-02-20 NOTE — PROGRESS NOTES
Lf  for pt on home#, 2ndary # was wrong #-attempting to reach pt to make hosp fu for this week in [facility]

## 2023-02-20 NOTE — PROGRESS NOTES
Attempted to reach pt again by using all numbers in chart-none of numbers in chart are pt's numbers.

## 2023-02-21 ENCOUNTER — HOME CARE VISIT (OUTPATIENT)
Age: 68
End: 2023-02-21

## 2023-02-21 LAB
BACTERIA SPEC CULT: ABNORMAL
GALACTOMANNAN AG SERPL IA-ACNC: 0.06
GALACTOMANNAN AG SERPL IA-ACNC: 0.06
SERVICE CMNT-IMP: ABNORMAL
SERVICE CMNT-IMP: ABNORMAL

## 2023-02-21 NOTE — CASE COMMUNICATION
The visit was moved due to the following reason(s): patient called several times throughout the day, no answer. Several voicemails left with no returned call.  notified, visit will be moved visit for another attempt at admission. Late SOC order placed and sent to MD for signature.

## 2023-02-23 ENCOUNTER — HOME CARE VISIT (OUTPATIENT)
Age: 68
End: 2023-02-23

## 2023-02-23 LAB
BACTERIA SPEC CULT: ABNORMAL
BACTERIA SPEC CULT: ABNORMAL
SERVICE CMNT-IMP: ABNORMAL

## 2023-02-24 LAB
SPECIMEN SOURCE: NORMAL
SPECIMEN SOURCE: NORMAL
VIRUS SPEC CULT: NORMAL
VIRUS SPEC CULT: NORMAL

## 2023-04-02 LAB
ACID FAST STN SPEC: NEGATIVE
MYCOBACTERIUM SPEC QL CULT: NEGATIVE
SPECIMEN PREPARATION: NORMAL
SPECIMEN SOURCE: NORMAL

## 2023-04-06 ENCOUNTER — HOSPITAL ENCOUNTER (OUTPATIENT)
Facility: HOSPITAL | Age: 68
Discharge: HOME | End: 2023-04-08
Payer: MEDICARE

## 2023-04-06 DIAGNOSIS — M79.89 SWELLING OF LIMB: ICD-10-CM

## 2023-04-06 PROCEDURE — 93970 EXTREMITY STUDY: CPT

## 2023-04-14 ENCOUNTER — HOSPITAL ENCOUNTER (OUTPATIENT)
Facility: HOSPITAL | Age: 68
Discharge: HOME | End: 2023-04-17
Payer: MEDICARE

## 2023-04-14 DIAGNOSIS — M25.571 RIGHT ANKLE PAIN, UNSPECIFIED CHRONICITY: ICD-10-CM

## 2023-04-14 DIAGNOSIS — M25.572 LEFT ANKLE PAIN, UNSPECIFIED CHRONICITY: ICD-10-CM

## 2023-04-14 DIAGNOSIS — R74.8 ABNORMAL LIVER ENZYMES: ICD-10-CM

## 2023-04-14 PROCEDURE — 76705 ECHO EXAM OF ABDOMEN: CPT

## 2023-04-14 PROCEDURE — 73610 X-RAY EXAM OF ANKLE: CPT

## 2023-09-17 ENCOUNTER — HOSPITAL ENCOUNTER (EMERGENCY)
Facility: HOSPITAL | Age: 68
Discharge: HOME | End: 2023-09-17
Attending: EMERGENCY MEDICINE
Payer: MEDICARE

## 2023-09-17 ENCOUNTER — APPOINTMENT (OUTPATIENT)
Facility: HOSPITAL | Age: 68
End: 2023-09-17
Payer: MEDICARE

## 2023-09-17 VITALS
HEART RATE: 70 BPM | TEMPERATURE: 98 F | HEIGHT: 72 IN | RESPIRATION RATE: 18 BRPM | BODY MASS INDEX: 23.7 KG/M2 | OXYGEN SATURATION: 99 % | WEIGHT: 175 LBS

## 2023-09-17 DIAGNOSIS — M79.669 PAIN IN SHIN, UNSPECIFIED LATERALITY: Primary | ICD-10-CM

## 2023-09-17 PROCEDURE — 73590 X-RAY EXAM OF LOWER LEG: CPT

## 2023-09-17 PROCEDURE — 6370000000 HC RX 637 (ALT 250 FOR IP): Performed by: EMERGENCY MEDICINE

## 2023-09-17 PROCEDURE — 99283 EMERGENCY DEPT VISIT LOW MDM: CPT

## 2023-09-17 RX ORDER — ACETAMINOPHEN 500 MG
1000 TABLET ORAL
Status: COMPLETED | OUTPATIENT
Start: 2023-09-17 | End: 2023-09-17

## 2023-09-17 RX ADMIN — ACETAMINOPHEN 1000 MG: 500 TABLET ORAL at 04:45

## 2023-09-17 ASSESSMENT — PAIN SCALES - GENERAL
PAINLEVEL_OUTOF10: 6
PAINLEVEL_OUTOF10: 6

## 2023-09-17 ASSESSMENT — PAIN DESCRIPTION - LOCATION: LOCATION: KNEE

## 2023-09-17 ASSESSMENT — LIFESTYLE VARIABLES
HOW MANY STANDARD DRINKS CONTAINING ALCOHOL DO YOU HAVE ON A TYPICAL DAY: 3 OR 4
HOW OFTEN DO YOU HAVE A DRINK CONTAINING ALCOHOL: 4 OR MORE TIMES A WEEK

## 2023-09-17 ASSESSMENT — PAIN DESCRIPTION - PAIN TYPE: TYPE: CHRONIC PAIN

## 2023-09-17 ASSESSMENT — PAIN - FUNCTIONAL ASSESSMENT: PAIN_FUNCTIONAL_ASSESSMENT: 0-10

## 2023-09-17 ASSESSMENT — PAIN DESCRIPTION - ORIENTATION: ORIENTATION: RIGHT;LEFT

## 2023-09-17 NOTE — ED NOTES
Pt resting on stretcher eyes open NAD, pt reports pain 6/10.  Pt awaiting xrays pt aware of plans voiced no issues or concerns      Tim Shanks RN  09/17/23 5527

## 2023-09-17 NOTE — ED TRIAGE NOTES
Pt to Ed reports pain , painful to bear weight onset x 2wks. Pt reports 6/10. Pt denies injury pt reports rods in right knee Pt reports  ETOH and drug use fentanyl.

## 2023-09-17 NOTE — ED PROVIDER NOTES
EMERGENCY DEPARTMENT HISTORY AND PHYSICAL EXAM    3:56 AM EDT seen at this time in room 18        Date: 9/17/2023  Patient Name: Moises Hoffman II    History of Presenting Illness     Chief Complaint   Patient presents with    Knee Pain     Pt to Ed reports pain , painful to bear weight onset x 2wks. Pt reports 6/10. Pt denies injury pt reports rods in right knee          History Provided By: patient    Additional History (Context): Moises Hoffman II is a 77 y.o. male presents with bilateral leg pain, worse when he walks, progressing in severity for 2 months. He is concerned something is broken. However no mechanism to suggest this, no high-energy trauma no falls. He says he has good foot wear. Does note his right tibia was previously fractured. This was a distant injury. No history of blood clots in the legs. Not diabetic. Varsha Gordon PCP: FARIBA Villagran NP    Chief Complaint:   Duration:    Timing:    Location:   Quality:   Severity:   Modifying Factors:   Associated Symptoms:       No current facility-administered medications for this encounter. Current Outpatient Medications   Medication Sig Dispense Refill    ipratropium-albuterol (DUONEB) 0.5-2.5 (3) MG/3ML SOLN nebulizer solution Inhale 3 mLs into the lungs every 4 hours as needed for Shortness of Breath 360 mL 0    fluticasone-salmeterol (ADVAIR DISKUS) 250-50 MCG/ACT AEPB diskus inhaler Inhale 1 puff into the lungs in the morning and 1 puff in the evening. 60 each 0    predniSONE (DELTASONE) 20 MG tablet Take 2 tablets (40 mg) p.o. once daily through February 22, 2023. Then starting from February 23, 2023 continue taking 1 tablet (20 mg) p.o. daily until you see the pulmonologist and then as per the pulmonologist recommendation. Do not stop taking this medication unless instructed by clinician.  35 tablet 0    amLODIPine (NORVASC) 10 MG tablet Take 10 mg by mouth daily      ferrous sulfate (IRON 325) 325 (65 Fe) MG tablet Take 325 mg

## 2023-09-17 NOTE — ED NOTES
Pt discharged. Discharge instructions reviewed with pt, patient verbalized understanding.       Nohelia Guevara RN  09/17/23 3713

## 2023-10-30 ENCOUNTER — OFFICE VISIT (OUTPATIENT)
Age: 68
End: 2023-10-30
Payer: MEDICARE

## 2023-10-30 VITALS — HEIGHT: 72 IN | BODY MASS INDEX: 23.16 KG/M2 | WEIGHT: 171 LBS

## 2023-10-30 DIAGNOSIS — S82.91XD UNSPECIFIED FRACTURE OF RIGHT LOWER LEG, SUBSEQUENT ENCOUNTER FOR CLOSED FRACTURE WITH ROUTINE HEALING: ICD-10-CM

## 2023-10-30 DIAGNOSIS — M79.604 PAIN IN RIGHT LEG: ICD-10-CM

## 2023-10-30 DIAGNOSIS — S82.251D DISPLACED COMMINUTED FRACTURE OF SHAFT OF RIGHT TIBIA, SUBSEQUENT ENCOUNTER FOR CLOSED FRACTURE WITH ROUTINE HEALING: ICD-10-CM

## 2023-10-30 DIAGNOSIS — M17.31 POST-TRAUMATIC OSTEOARTHRITIS OF RIGHT KNEE: Primary | ICD-10-CM

## 2023-10-30 DIAGNOSIS — S82.241S: ICD-10-CM

## 2023-10-30 DIAGNOSIS — Z96.9 RETAINED ORTHOPEDIC HARDWARE: ICD-10-CM

## 2023-10-30 DIAGNOSIS — M25.661 DECREASED RANGE OF MOTION (ROM) OF RIGHT KNEE: ICD-10-CM

## 2023-10-30 PROCEDURE — 73590 X-RAY EXAM OF LOWER LEG: CPT | Performed by: PHYSICIAN ASSISTANT

## 2023-10-30 PROCEDURE — 99213 OFFICE O/P EST LOW 20 MIN: CPT | Performed by: PHYSICIAN ASSISTANT

## 2023-10-30 PROCEDURE — 1123F ACP DISCUSS/DSCN MKR DOCD: CPT | Performed by: PHYSICIAN ASSISTANT

## 2023-10-30 PROCEDURE — 73562 X-RAY EXAM OF KNEE 3: CPT | Performed by: PHYSICIAN ASSISTANT

## 2023-10-30 RX ORDER — LIDOCAINE 50 MG/G
1-3 PATCH TOPICAL DAILY
Qty: 90 PATCH | Refills: 0 | Status: SHIPPED | OUTPATIENT
Start: 2023-10-30 | End: 2023-11-29

## 2023-10-30 RX ORDER — CYCLOBENZAPRINE HCL 10 MG
10 TABLET ORAL 3 TIMES DAILY PRN
Qty: 30 TABLET | Refills: 0 | Status: SHIPPED | OUTPATIENT
Start: 2023-10-30 | End: 2023-11-09

## 2023-10-30 NOTE — PROGRESS NOTES
Patient: Shahzad Salcido                MRN: [id number]       SSN: [id number]  YOB: 1955        AGE: 77 y.o. SEX: male      OFFICE NOTE DICTATED    PCP: FARIBA Palacios NP  10/30/23    Chief Complaint   Patient presents with    Leg Pain     right       HISTORY:    Maury Hoffman II is a 77 y.o. male presents to the office with complaint of bilateral knee pain right greater than left. Patient has a history of a fall resulting in a comminuted somewhat spiral fracture of the right distal tibial shaft requiring an intramedullary nail placement under the care of Dr. Araseli Jones. Patient did well following the injury and did maintain generally normal ambulation but over the past several months has had increased pain with swelling of both knees with the right knee being more symptomatic regarding pain than the left. Some mornings patient cannot get out of bed due to severe bilateral knee pain. He has a history of heroin usage which she notes and is not currently using up. He has tried fentanyl despite numerous warnings from his family and friends. He reports nothing associated that he has tried illicit or by prescription helped his pain. He is now ambulating with a 4 post rolling walker. No flowsheet data found.     No results found for: \"HBA1C\", \"VDT2LJBO\"      10/30/2023     2:20 PM 9/17/2023     4:39 AM 2/18/2023     5:51 AM 2/12/2023     2:51 PM 7/29/2022    10:26 AM 6/30/2022    10:30 AM 6/8/2022     9:06 AM   Weight Metrics   Weight 171 lb 175 lb 175 lb 176 lb 176 lb 176 lb 186 lb   BMI (Calculated) 23.2 kg/m2 23.8 kg/m2 24.5 kg/m2 24.6 kg/m2 24.6 kg/m2 23.9 kg/m2 25.3 kg/m2          Problem List Items Addressed This Visit    None  Visit Diagnoses       Post-traumatic osteoarthritis of right knee    -  Primary    Relevant Orders    [65900] Knee 3V (Completed)    Unspecified fracture of right lower leg, subsequent encounter for closed fracture with

## 2023-10-31 DIAGNOSIS — M17.31 POST-TRAUMATIC OSTEOARTHRITIS OF RIGHT KNEE: ICD-10-CM

## 2023-11-06 ENCOUNTER — HOSPITAL ENCOUNTER (OUTPATIENT)
Facility: HOSPITAL | Age: 68
Discharge: HOME | End: 2023-11-09

## 2023-11-06 LAB — LABCORP SPECIMEN COLLECTION: NORMAL

## 2023-11-07 LAB
BASOPHILS # BLD AUTO: 0 X10E3/UL (ref 0–0.2)
BASOPHILS NFR BLD AUTO: 0 %
CRP SERPL-MCNC: <1 MG/L (ref 0–10)
EOSINOPHIL # BLD AUTO: 0 X10E3/UL (ref 0–0.4)
EOSINOPHIL NFR BLD AUTO: 0 %
ERYTHROCYTE [DISTWIDTH] IN BLOOD BY AUTOMATED COUNT: 14.4 % (ref 11.6–15.4)
ERYTHROCYTE [SEDIMENTATION RATE] IN BLOOD BY WESTERGREN METHOD: 61 MM/HR (ref 0–30)
HCT VFR BLD AUTO: 40.7 % (ref 37.5–51)
HGB BLD-MCNC: 13.4 G/DL (ref 13–17.7)
IMM GRANULOCYTES # BLD AUTO: 0 X10E3/UL (ref 0–0.1)
IMM GRANULOCYTES NFR BLD AUTO: 0 %
LYMPHOCYTES # BLD AUTO: 1 X10E3/UL (ref 0.7–3.1)
LYMPHOCYTES NFR BLD AUTO: 22 %
MCH RBC QN AUTO: 27.8 PG (ref 26.6–33)
MCHC RBC AUTO-ENTMCNC: 32.9 G/DL (ref 31.5–35.7)
MCV RBC AUTO: 84 FL (ref 79–97)
MONOCYTES # BLD AUTO: 0.4 X10E3/UL (ref 0.1–0.9)
MONOCYTES NFR BLD AUTO: 8 %
MORPHOLOGY BLD-IMP: ABNORMAL
NEUTROPHILS # BLD AUTO: 3.3 X10E3/UL (ref 1.4–7)
NEUTROPHILS NFR BLD AUTO: 70 %
PLATELET # BLD AUTO: 88 X10E3/UL (ref 150–450)
RBC # BLD AUTO: 4.82 X10E6/UL (ref 4.14–5.8)
SPECIMEN STATUS REPORT: NORMAL
WBC # BLD AUTO: 4.6 X10E3/UL (ref 3.4–10.8)

## 2023-11-13 ENCOUNTER — OFFICE VISIT (OUTPATIENT)
Age: 68
End: 2023-11-13

## 2023-11-13 VITALS — BODY MASS INDEX: 24.55 KG/M2 | WEIGHT: 181 LBS

## 2023-11-13 DIAGNOSIS — I15.9 SECONDARY HYPERTENSION: ICD-10-CM

## 2023-11-13 DIAGNOSIS — F10.10 ALCOHOL ABUSE: ICD-10-CM

## 2023-11-13 DIAGNOSIS — M17.12 PRIMARY OSTEOARTHRITIS OF LEFT KNEE: Primary | ICD-10-CM

## 2023-11-13 DIAGNOSIS — M87.9 OSTEONECROSIS OF RIGHT HIP (HCC): ICD-10-CM

## 2023-11-13 DIAGNOSIS — D72.819 LEUKOPENIA, UNSPECIFIED TYPE: ICD-10-CM

## 2023-11-13 DIAGNOSIS — M17.31 POST-TRAUMATIC OSTEOARTHRITIS OF RIGHT KNEE: ICD-10-CM

## 2023-11-13 DIAGNOSIS — M16.11 PRIMARY OSTEOARTHRITIS OF RIGHT HIP: ICD-10-CM

## 2023-11-13 DIAGNOSIS — Z72.0 TOBACCO ABUSE: ICD-10-CM

## 2023-11-13 DIAGNOSIS — F11.20 METHADONE DEPENDENCE (HCC): ICD-10-CM

## 2023-11-13 DIAGNOSIS — F19.10 POLYSUBSTANCE ABUSE (HCC): ICD-10-CM

## 2023-11-13 DIAGNOSIS — S82.251D CLOSED DISPLACED COMMINUTED FRACTURE OF SHAFT OF RIGHT TIBIA WITH ROUTINE HEALING: ICD-10-CM

## 2023-11-13 DIAGNOSIS — D69.6 THROMBOCYTOPENIA (HCC): ICD-10-CM

## 2023-11-13 RX ORDER — MELOXICAM 15 MG/1
15 TABLET ORAL DAILY
Qty: 30 TABLET | Refills: 2 | Status: SHIPPED | OUTPATIENT
Start: 2023-11-13 | End: 2024-02-11

## 2023-11-13 RX ORDER — ACETAMINOPHEN 500 MG
1000 TABLET ORAL EVERY 8 HOURS
Qty: 180 TABLET | Refills: 0 | Status: SHIPPED | OUTPATIENT
Start: 2023-11-13 | End: 2023-12-13

## 2023-11-13 RX ORDER — LIDOCAINE 50 MG/G
1 PATCH TOPICAL DAILY
Qty: 30 PATCH | Refills: 0 | Status: SHIPPED | OUTPATIENT
Start: 2023-11-13 | End: 2023-12-13

## 2023-11-17 ENCOUNTER — TELEPHONE (OUTPATIENT)
Age: 68
End: 2023-11-17

## 2023-11-17 NOTE — TELEPHONE ENCOUNTER
Yulisa Mcginnis w/Nuclear Medicine advise they need an order for 3 Phrase R Tibia using the S[employer] IM nail system, and a Bone Scan.     Pt will be reschedule do to the [facility]

## 2023-11-20 DIAGNOSIS — S82.251D CLOSED DISPLACED COMMINUTED FRACTURE OF SHAFT OF RIGHT TIBIA WITH ROUTINE HEALING: Primary | ICD-10-CM

## 2023-11-20 NOTE — TELEPHONE ENCOUNTER
[facility] called to check status of order request below. They have patient rescheduled for tomorrow, however, he needs to arrange transportation for 3 phase procedures and [facility] is trying to confirm they will have his orders in time. Please prepare orders or contact Rhoda Jensen to further advise at [phone].

## 2023-11-21 ENCOUNTER — HOSPITAL ENCOUNTER (OUTPATIENT)
Facility: HOSPITAL | Age: 68
Discharge: HOME | End: 2023-11-24
Attending: ORTHOPAEDIC SURGERY
Payer: MEDICARE

## 2023-11-21 DIAGNOSIS — S82.251D CLOSED DISPLACED COMMINUTED FRACTURE OF SHAFT OF RIGHT TIBIA WITH ROUTINE HEALING: ICD-10-CM

## 2023-11-21 DIAGNOSIS — M17.31 POST-TRAUMATIC OSTEOARTHRITIS OF RIGHT KNEE: ICD-10-CM

## 2023-11-21 PROCEDURE — 3430000000 HC RX DIAGNOSTIC RADIOPHARMACEUTICAL: Performed by: ORTHOPAEDIC SURGERY

## 2023-11-21 PROCEDURE — A9503 TC99M MEDRONATE: HCPCS | Performed by: ORTHOPAEDIC SURGERY

## 2023-11-21 PROCEDURE — 78800 RP LOCLZJ TUM 1 AREA 1 D IMG: CPT

## 2023-11-21 PROCEDURE — A9547 IN111 OXYQUINOLINE: HCPCS | Performed by: ORTHOPAEDIC SURGERY

## 2023-11-21 RX ORDER — TC 99M MEDRONATE 20 MG/10ML
24 INJECTION, POWDER, LYOPHILIZED, FOR SOLUTION INTRAVENOUS
Status: COMPLETED | OUTPATIENT
Start: 2023-11-21 | End: 2023-11-21

## 2023-11-21 RX ORDER — INDIUM IN-111 OXYQUINOLINE 1 UG/ML
583 SOLUTION INTRAVENOUS
Status: COMPLETED | OUTPATIENT
Start: 2023-11-21 | End: 2023-11-21

## 2023-11-21 RX ADMIN — TC 99M MEDRONATE 24 MILLICURIE: 20 INJECTION, POWDER, LYOPHILIZED, FOR SOLUTION INTRAVENOUS at 14:52

## 2023-11-21 RX ADMIN — INDIUM IN-111 OXYQUINOLINE 0.58 MILLICURIE: 1 SOLUTION INTRAVENOUS at 14:40

## 2023-11-22 ENCOUNTER — TELEPHONE (OUTPATIENT)
Age: 68
End: 2023-11-22

## 2023-11-22 ENCOUNTER — HOSPITAL ENCOUNTER (OUTPATIENT)
Facility: HOSPITAL | Age: 68
Discharge: HOME | End: 2023-11-25
Attending: ORTHOPAEDIC SURGERY

## 2023-11-22 NOTE — TELEPHONE ENCOUNTER
Talk to Chance Arambula from [facility] Medicine and states can you co-sign the imaging that were already place.

## 2023-11-22 NOTE — TELEPHONE ENCOUNTER
Renetta Bailey from [facility] needs tib/fib xrays from 10/30 (done by [facility] for BWW) pushed to PACS - they need them for patients appointment with them on Friday. Renetta Bailey can be reached at [phone] if needed.

## 2023-11-24 ENCOUNTER — HOSPITAL ENCOUNTER (OUTPATIENT)
Facility: HOSPITAL | Age: 68
End: 2023-11-24
Attending: ORTHOPAEDIC SURGERY
Payer: MEDICARE

## 2023-11-24 DIAGNOSIS — B99.9 INFECTION: ICD-10-CM

## 2023-11-24 PROCEDURE — A9541 TC99M SULFUR COLLOID: HCPCS | Performed by: ORTHOPAEDIC SURGERY

## 2023-11-24 PROCEDURE — 3430000000 HC RX DIAGNOSTIC RADIOPHARMACEUTICAL: Performed by: ORTHOPAEDIC SURGERY

## 2023-11-24 RX ORDER — TECHNETIUM TC 99M SULFUR COLLOID 2 MG
10.4 KIT MISCELLANEOUS
Status: COMPLETED | OUTPATIENT
Start: 2023-11-24 | End: 2023-11-24

## 2023-11-24 RX ADMIN — Medication 10.4 MILLICURIE: at 10:46

## 2023-12-26 ENCOUNTER — OFFICE VISIT (OUTPATIENT)
Age: 68
End: 2023-12-26

## 2023-12-26 VITALS — HEIGHT: 72 IN | WEIGHT: 181 LBS | BODY MASS INDEX: 24.52 KG/M2

## 2023-12-26 DIAGNOSIS — F11.91 HISTORY OF METHADONE USE: ICD-10-CM

## 2023-12-26 DIAGNOSIS — M17.32 POST-TRAUMATIC OSTEOARTHRITIS OF LEFT KNEE: ICD-10-CM

## 2023-12-26 DIAGNOSIS — M17.31 POST-TRAUMATIC OSTEOARTHRITIS OF RIGHT KNEE: Primary | ICD-10-CM

## 2023-12-26 DIAGNOSIS — M87.9 OSTEONECROSIS OF RIGHT HIP (HCC): ICD-10-CM

## 2023-12-26 DIAGNOSIS — Z72.0 TOBACCO ABUSE: ICD-10-CM

## 2023-12-26 RX ORDER — TRIAMCINOLONE ACETONIDE 40 MG/ML
80 INJECTION, SUSPENSION INTRA-ARTICULAR; INTRAMUSCULAR ONCE
Status: COMPLETED | OUTPATIENT
Start: 2023-12-26 | End: 2023-12-26

## 2023-12-26 RX ADMIN — TRIAMCINOLONE ACETONIDE 80 MG: 40 INJECTION, SUSPENSION INTRA-ARTICULAR; INTRAMUSCULAR at 09:47

## 2023-12-26 NOTE — ASSESSMENT & PLAN NOTE
After obtaining consent for a right knee aspiration, the right knee at the superolateral portal was prepped with alcohol and freeze spray and injected with 3 mL of 2% lidocaine along the injection tract. An 18-gauge spinal needle and the stylus in place was then introduced intra-articularly. The stylus was removed and replaced with a 60 cc syringe. 25 mL of blood-tinged synovial fluid was aspirated and the needle was removed. The area was cleansed and a bandage was applied. The patient tolerated procedure well. The aspirated fluid was distributed to 3 blood tubes, red top, a green top, and lavender top and sent for analysis.

## 2023-12-26 NOTE — ASSESSMENT & PLAN NOTE
After obtaining consent for a left knee aspiration and injection of corticosteroids. The superolateral portal was prepped with alcohol and anesthetized with freeze spray. An 18-gauge needle was placed intra-articularly. A 60 cc syringe was attached and upon attempted aspiration was noted that there was a mismatch between the attachment devices of the needle and the syringe. I was able to aspirate a small amount of the aspirate equaling about 5 mL. I detect attached the syringe and was able to push an additional approximately 10 mL of aspirated out through the needle into gauze. I attempted to attach the steroid injection to this needle but was unable to get a good seal we will inject. The patient declined putting a new needle and attempting to regain access to the knee for injection of the steroid. The area was cleansed and a Band-Aid was placed over the injection site. The aspirate was sent for analysis.

## 2023-12-26 NOTE — PROGRESS NOTES
have him back in 2 weeks to analyze the results of the aspiration of the bilateral knees. We will discuss reattempt at injection of steroids. November 13, 2023: Right hip osteonecrosis and bilateral knee posttraumatic osteoarthritis. The patient has significant medical comorbidities with a history of respiratory failure, drug abuse, tobacco use. I like to get a bone scan to examine the right tibial fracture area and ensure that there is no signs of infection. The patient is no longer on methadone or any other drugs. And only smokes 3 cigarettes a day. He would benefit from right total hip arthroplasty and bilateral knee replacement and will likely need removal of the tibial nail. If his leukocytopenia and thrombocytopenia and history of epidural abscess he is a potentially fragile patient would like to proceed cautiously. No data to display              Tobacco Use: High Risk (12/26/2023)    Patient History     Smoking Tobacco Use: Every Day     Smokeless Tobacco Use: Never     Passive Exposure: Not on file         Past Medical History:   Diagnosis Date    Abscess of right shoulder     Abscess of shoulder     Acute blood loss as cause of postoperative anemia 4/22/2022    Arthritis     Closed displaced comminuted fracture of shaft of right tibia with routine healing 04/22/2022    Closed fracture of distal end of right fibula with routine healing 4/22/2022    Epidural abscess     Heart murmur     Hematuria     Heroin abuse ([address])     Hypertension     Infected wound     Infectious disease     Iron deficiency anemia     IV drug user     Liver disease     Methadone dependence ([address])     Tobacco abuse        No family history on file. Current Outpatient Medications   Medication Sig Dispense Refill    acetaminophen (TYLENOL) 500 MG tablet Take 2 tablets by mouth in the morning and 2 tablets at noon and 2 tablets in the evening.  180 tablet 0    diclofenac sodium (VOLTAREN) 1 % GEL Apply 4 g topically 4

## 2023-12-27 LAB
APPEARANCE FLD: ABNORMAL
COLOR FLD: ABNORMAL
EOSINOPHIL NFR FLD MANUAL: 0 %
LYMPHOCYTES NFR FLD MANUAL: 17 %
MACROPHAGES NFR FLD MANUAL: 0 %
NEUTROPHILS NFR FLD MANUAL: 83 %
NUC CELL # FLD: 1303 CELLS/UL (ref 0–200)
RBC # FLD AUTO: ABNORMAL /UL
SYNOVIOCYTES NFR SNV: 0 %

## 2023-12-30 LAB — BACTERIA FLD CULT: NORMAL

## 2024-01-04 LAB — MICROORGANISM/AGENT SPEC: NORMAL

## 2024-01-09 ENCOUNTER — OFFICE VISIT (OUTPATIENT)
Age: 69
End: 2024-01-09
Payer: MEDICARE

## 2024-01-09 VITALS — HEIGHT: 72 IN | BODY MASS INDEX: 24.52 KG/M2 | WEIGHT: 181 LBS

## 2024-01-09 DIAGNOSIS — Z01.818 PRE-OP TESTING: ICD-10-CM

## 2024-01-09 DIAGNOSIS — M17.32 POST-TRAUMATIC OSTEOARTHRITIS OF LEFT KNEE: ICD-10-CM

## 2024-01-09 DIAGNOSIS — Z72.0 TOBACCO ABUSE: ICD-10-CM

## 2024-01-09 DIAGNOSIS — M21.961 ACQUIRED DEFORMITY OF RIGHT KNEE: ICD-10-CM

## 2024-01-09 DIAGNOSIS — M21.962 ACQUIRED DEFORMITY OF LEFT KNEE: ICD-10-CM

## 2024-01-09 DIAGNOSIS — F11.91 HISTORY OF METHADONE USE: ICD-10-CM

## 2024-01-09 DIAGNOSIS — M17.31 POST-TRAUMATIC OSTEOARTHRITIS OF RIGHT KNEE: Primary | ICD-10-CM

## 2024-01-09 DIAGNOSIS — M87.9 OSTEONECROSIS OF RIGHT HIP (HCC): ICD-10-CM

## 2024-01-09 PROCEDURE — 1123F ACP DISCUSS/DSCN MKR DOCD: CPT | Performed by: ORTHOPAEDIC SURGERY

## 2024-01-09 PROCEDURE — 99214 OFFICE O/P EST MOD 30 MIN: CPT | Performed by: ORTHOPAEDIC SURGERY

## 2024-01-09 NOTE — PROGRESS NOTES
the right knee.  Will move forward with a right total knee arthroplasty.  Patient is struggling significantly at home doing his activities of daily living.  I will order home health for him to start physical therapy as well as mobility training leading up to surgery.  He will also need to be evaluated for the safety of staying at home due to his decreased mobility he may need time in assisted living facility.  We will plan on doing a right total knee arthroplasty and will eventually need to do left total knee arthroplasty as well as a right hip replacement.  I did  the patient on the importance of smoking cessation he believes he can do this as he is smoking about a pack a week at this point.      December 26, 2023:  Right hip avascular necrosis, right knee and left knee posttraumatic osteoarthritis with effusions.  The right knee was aspirated today due to increased activity on the bone scan with an elevated ESR and normal CRP.  The left knee also showed some activity on bone scan and has a large effusion.  An aspiration of the left knee was performed today.  Again, the patient will need at some point right hip replacement and bilateral knee replacements.  I tried to give him some relief with a steroid injection to the left knee today, but he asked me to stop after the aspiration.  I will have him back in 2 weeks to analyze the results of the aspiration of the bilateral knees.  We will discuss reattempt at injection of steroids.     November 13, 2023: Right hip osteonecrosis and bilateral knee posttraumatic osteoarthritis.  The patient has significant medical comorbidities with a history of respiratory failure, drug abuse, tobacco use.  I like to get a bone scan to examine the right tibial fracture area and ensure that there is no signs of infection.  The patient is no longer on methadone or any other drugs.  And only smokes 3 cigarettes a day.  He would benefit from right total hip arthroplasty and

## 2024-01-11 ENCOUNTER — HOME HEALTH ADMISSION (OUTPATIENT)
Age: 69
End: 2024-01-11
Payer: MEDICARE

## 2024-01-12 ENCOUNTER — HOME CARE VISIT (OUTPATIENT)
Age: 69
End: 2024-01-12

## 2024-01-13 ENCOUNTER — HOME CARE VISIT (OUTPATIENT)
Age: 69
End: 2024-01-13
Payer: MEDICARE

## 2024-01-13 VITALS
DIASTOLIC BLOOD PRESSURE: 85 MMHG | TEMPERATURE: 97.8 F | HEART RATE: 78 BPM | RESPIRATION RATE: 18 BRPM | SYSTOLIC BLOOD PRESSURE: 130 MMHG | OXYGEN SATURATION: 97 %

## 2024-01-13 PROCEDURE — G0151 HHCP-SERV OF PT,EA 15 MIN: HCPCS

## 2024-01-13 NOTE — HOME HEALTH
Skilled services/Home bound verification:     Skilled Reason for admission/summary of clinical condition:  Primary diagnosis of OA on both knees   This patient is homebound for the following reasons Requires considerable and taxing effort to leave the home .    Caregiver: other.  Caregiver assists with IADL's.    Medications reconciled and all medications are available in the home this visit.        High risk medication teaching regarding anticoagulants, antiplatelets, antibiotics, antipsychotics, hypoglycemic agents, or opioid/narcotics performed (specify): oxycodone acetaminophen for risk of overdose     Dr. Gisela Anderson notified of any discrepancies/look a like medications/medication interactions 1w1 2w4 for PT.     Home health supplies by type and quantity ordered/delivered this visit include: none     Patient education provided this visit to include: HEP, fall prevention, dc planning     Patient level of understanding of education provided: patient was able to partiallt teach back HEP     Sharps Education Provided: n/a  Patient response to procedure performed:  Patient needed Min A x1 with bed, chair and toilet transfers using RW     Home exercise program/Homework provided: patient educated with HEP including seated hip flex, knee extension, hip abduction, hip adduction, ankle PF and DF and ball squeeze x 20 reps x 3 sets daily to improve MMT on BLE to facilitate with improved bed mobility, transfers and gait with AD. patient also educated with deep breathing exercises to be done daily x 10 reps x 3 sets to prevent SOB during activity. Patient educated with fall prevention technique by decluttering space, proper use of AD and footwear    Pt/Caregiver instructed on plan of care and are agreeable to plan of care at this time.      Physician Dr. Srinivas Anderson notified of patient admission to home health and plan of care including anticipated frequency of 1w1 2w4 for PT      Discharge planning discussed with

## 2024-01-15 ENCOUNTER — HOME CARE VISIT (OUTPATIENT)
Age: 69
End: 2024-01-15
Payer: MEDICARE

## 2024-01-15 PROCEDURE — G0157 HHC PT ASSISTANT EA 15: HCPCS

## 2024-01-16 ENCOUNTER — HOME CARE VISIT (OUTPATIENT)
Age: 69
End: 2024-01-16
Payer: MEDICARE

## 2024-01-16 VITALS — HEART RATE: 66 BPM | TEMPERATURE: 98.3 F | OXYGEN SATURATION: 99 %

## 2024-01-16 PROCEDURE — G0155 HHCP-SVS OF CSW,EA 15 MIN: HCPCS

## 2024-01-16 ASSESSMENT — ENCOUNTER SYMPTOMS: PAIN LOCATION - PAIN QUALITY: ACHING

## 2024-01-16 NOTE — HOME HEALTH
TOWARDS GOALS:  Pain inhibits pt's ability to stand for long periods of time to prep and cook meals. He is currently home bound d/t having no personal transportation, and inability to walk community distances for trips to grocery stores/etc. Pt presents w/ poor standing posture secondary to decreased knee extension ROM from tightness/weakness in hamstrings/quadriceps muscles. Outside home mobility is of short frequency and duration d/t limited mobility and B knee pain. Pt would benefit from continued skilled interventions to address pain management, encourage pt to schedule appt w/ orthopedic MD, improve his functional mobility, activity tolerance, confidence and safety w/ stair negotiation to leave the home, bed mobility, fall prevention, and to improve overall (I) w/ ADLs/IDLs and quality of life.       Continued need for the following skills:  Physical Therapy    PLAN: Establish formal HEP, review bed mobility, and progress towards Tinetti Balance/Gait Assessment.    The following discharge planning was discussed with the pt/caregiver: HHPT frequency 1w1, 2w4  w/ planned sup visit on 1/24  and DC on 2/8 to self, family, and under MD supervision when goals met, or max benefits achieved.- pt/cg verbalized understanding.

## 2024-01-17 ENCOUNTER — HOME CARE VISIT (OUTPATIENT)
Age: 69
End: 2024-01-17
Payer: MEDICARE

## 2024-01-17 PROCEDURE — G0157 HHC PT ASSISTANT EA 15: HCPCS

## 2024-01-17 NOTE — HOME HEALTH
MSW met with the pt privately, who voice having chronic pain and was observed to have significant difficulty ambulating. Pt has a limited support system and voiced that although he could use additional assistance, his housing arrangements (boarding home) and spacing make assistance difficult. MSW discussed hobby development and mental health support/counseling being covered by pt's insurance. MSW invited a return call if resource questions arise.

## 2024-01-18 ENCOUNTER — HOME CARE VISIT (OUTPATIENT)
Age: 69
End: 2024-01-18
Payer: MEDICARE

## 2024-01-18 VITALS
RESPIRATION RATE: 17 BRPM | TEMPERATURE: 99.5 F | OXYGEN SATURATION: 98 % | DIASTOLIC BLOOD PRESSURE: 70 MMHG | HEART RATE: 98 BPM | SYSTOLIC BLOOD PRESSURE: 154 MMHG

## 2024-01-18 ASSESSMENT — ENCOUNTER SYMPTOMS: PAIN LOCATION - PAIN QUALITY: ACHING

## 2024-01-19 ENCOUNTER — HOSPITAL ENCOUNTER (OUTPATIENT)
Facility: HOSPITAL | Age: 69
End: 2024-01-19
Attending: ORTHOPAEDIC SURGERY
Payer: MEDICARE

## 2024-01-19 DIAGNOSIS — M21.961 ACQUIRED DEFORMITY OF RIGHT KNEE: ICD-10-CM

## 2024-01-19 PROCEDURE — 73700 CT LOWER EXTREMITY W/O DYE: CPT

## 2024-01-19 NOTE — HOME HEALTH
SUBJECTIVE: \"I have an appt on Friday at [facility] to get some CT scans. I have to have help getting around [facility] because I can't walk down there. I always need help.  It seems like my hips are going bad. I think that medication is starting to work a little. The pain is getting somewhat better. I used to get those shooting pains. I still have to wear my knee braces because it gives me some support. I keep having swelling and numbness in my legs and feet.\"    Caregiver involvement: Pt lives alone with a roommate in a boarding house, but has family members that come occasionally to help as needed. Pt relies on medical transport for transportation needs to MD appts. Family occasionally brings food he can microwave.    PATIENT EDUCATION provided this visit: Keep a calendar to keep track of medical appts. Contact PCP regarding swelling and numbness in feet.       PATIENT/CG RESPONSE TO EDUCATION: Pt verbalized understanding.       OBJECTIVES:  See Care Plan Interventions      RESPONSE TO TREATMENT:  No increased pain reported s/p gait training and review of Tinetti Balance/Gait Assessment. Pt (I) w/ bed mobility w/o difficulty    ASSESSMENT/PATIENT RESPONSE TO TREATMENT/PROGRESS TOWARDS GOALS:  Pt scored 17/28 indicates medium fall risk on the Tinetti Balance/Gait assessment. Bed Mobility goal resolved this visit. Pt continues to have difficulty w/ sit <> stand transfers requiring increased effort and discomfort. Note R knee genu valgus deformity which coincides w/ pt's complaint of R medial knee pain. He has reported decreased confidence w/ stair mobility d/t not having rails, and will need reinforcement and possible training using SPC to improve balance and safety. Pt would benefit from continued skilled interventions to address pain management, encourage pt to schedule appt w/ orthopedic MD, improve his functional mobility, activity tolerance, confidence and safety w/ stair negotiation to leave the home, bed

## 2024-01-22 ENCOUNTER — HOME CARE VISIT (OUTPATIENT)
Age: 69
End: 2024-01-22
Payer: MEDICARE

## 2024-01-24 ENCOUNTER — HOME CARE VISIT (OUTPATIENT)
Age: 69
End: 2024-01-24
Payer: MEDICARE

## 2024-01-24 ENCOUNTER — TELEPHONE (OUTPATIENT)
Age: 69
End: 2024-01-24

## 2024-01-24 PROCEDURE — G0151 HHCP-SERV OF PT,EA 15 MIN: HCPCS

## 2024-01-24 NOTE — TELEPHONE ENCOUNTER
Medication list includes:      celecoxib (CELEBREX) 100 MG capsule    oxyCODONE-acetaminophen (PERCOCET) 5-325 MG per tablet     However patient does not have either of these and only has Flexeril for pain.  Per patient, Tylenol is not effective.  Please review to call in pain medication for patient or advise him further at [phone]..

## 2024-01-25 ENCOUNTER — HOME CARE VISIT (OUTPATIENT)
Age: 69
End: 2024-01-25
Payer: MEDICARE

## 2024-01-25 VITALS
OXYGEN SATURATION: 99 % | DIASTOLIC BLOOD PRESSURE: 70 MMHG | RESPIRATION RATE: 17 BRPM | HEART RATE: 82 BPM | SYSTOLIC BLOOD PRESSURE: 136 MMHG | TEMPERATURE: 98.4 F

## 2024-01-25 DIAGNOSIS — M17.31 POST-TRAUMATIC OSTEOARTHRITIS OF RIGHT KNEE: Primary | ICD-10-CM

## 2024-01-25 DIAGNOSIS — M17.32 POST-TRAUMATIC OSTEOARTHRITIS OF LEFT KNEE: ICD-10-CM

## 2024-01-25 DIAGNOSIS — M87.9 OSTEONECROSIS OF RIGHT HIP (HCC): ICD-10-CM

## 2024-01-25 RX ORDER — MELOXICAM 15 MG/1
15 TABLET ORAL DAILY
Qty: 30 TABLET | Refills: 2 | Status: SHIPPED | OUTPATIENT
Start: 2024-01-25 | End: 2024-04-24

## 2024-01-25 RX ORDER — ACETAMINOPHEN 500 MG
1000 TABLET ORAL EVERY 8 HOURS
Qty: 180 TABLET | Refills: 0 | Status: SHIPPED | OUTPATIENT
Start: 2024-01-25 | End: 2024-02-24

## 2024-01-25 RX ORDER — LIDOCAINE 50 MG/G
1 PATCH TOPICAL DAILY
Qty: 30 PATCH | Refills: 0 | Status: SHIPPED | OUTPATIENT
Start: 2024-01-25 | End: 2024-02-24

## 2024-01-25 ASSESSMENT — ENCOUNTER SYMPTOMS: PAIN LOCATION - PAIN QUALITY: SHARP

## 2024-01-25 NOTE — TELEPHONE ENCOUNTER
Spoke to patient and informed me that he like the Meloxicam, tylenol, lidocaine patches and voltaren gel sent to his pharmacy.    His pharmacy is [facility]

## 2024-01-25 NOTE — HOME HEALTH
HPI:  B OA that is limiting patient activity.  He is getting lceared for surgery, has some cardiac appointments to clear him for surgery.  He has received 3 HH PT visits.  He is also declining HHA.  .  Prior Medical History:   (List of Comorbidities per chart)   Abscess of right shoulder   Abscess of shoulder   Acute blood loss as cause of postoperative anemia -4/22/2022   Arthritis   Closed displaced comminuted fracture of shaft of right tibia with routine healing -04/22/2022   Closed fracture of distal end of right fibula with routine healing  -4/22/2022   Epidural abscess   Heart murmur   Hematuria   Heroin abuse    Hypertension   Infected wound   Infectious disease   Iron deficiency anemia   IV drug user   Liver disease   Methadone dependence   Tobacco abuse   .  Subjective   Patient reports severe pain with activity today, that he ran out of his pain meds several days ago.  He has questions about upcomig tests:  echocardiogram, Stress test and an ultrasound.  It appears he is being cleared for surgery.    Living /Home Situation:   He lives in a home with steps to enter with some housemates.    Caregiver involvement:  He has friends/family who intermittently assist him.    PT Evaluation:  Strength:  B hips and knees:  3/5.  ROM: Patient is limiting AROM today in functional activity due to pain.  Transfers: Unsafe transfers due to B knee pain:  Patient is avoiding use of knees and is unsteady throughout the transfer sit to and from standing.  Needs min A to support walker and guard patiet to prevent a fall.  Gait:  AD:  FWW, Gait pattern: Short step lengths, uses UEs to avoid FWB and short uneven step lengths, minimal B knee flexion, uses hip flexion/ext to advance gait and keeping knees sfiff, Verbal cues needed for: WB and heel-toe pattern.  Balance/ Fall risk:  Patient scored 8/28 on Tinetti Balance Test, indicating a high risk for falls. Balance is impaired by B knee pain and associated guarding and

## 2024-01-27 ENCOUNTER — HOME CARE VISIT (OUTPATIENT)
Age: 69
End: 2024-01-27
Payer: MEDICARE

## 2024-01-27 VITALS
SYSTOLIC BLOOD PRESSURE: 142 MMHG | OXYGEN SATURATION: 98 % | DIASTOLIC BLOOD PRESSURE: 72 MMHG | RESPIRATION RATE: 16 BRPM | HEART RATE: 83 BPM | TEMPERATURE: 97.9 F

## 2024-01-27 LAB — FUNGUS SPEC CULT: NORMAL

## 2024-01-27 PROCEDURE — G0157 HHC PT ASSISTANT EA 15: HCPCS

## 2024-01-27 ASSESSMENT — ENCOUNTER SYMPTOMS: PAIN LOCATION - PAIN QUALITY: ACHE, BURNING

## 2024-01-29 ENCOUNTER — HOME CARE VISIT (OUTPATIENT)
Age: 69
End: 2024-01-29
Payer: MEDICARE

## 2024-01-29 PROCEDURE — G0157 HHC PT ASSISTANT EA 15: HCPCS

## 2024-01-30 VITALS
RESPIRATION RATE: 17 BRPM | HEART RATE: 82 BPM | OXYGEN SATURATION: 96 % | DIASTOLIC BLOOD PRESSURE: 70 MMHG | SYSTOLIC BLOOD PRESSURE: 120 MMHG | TEMPERATURE: 97.3 F

## 2024-01-30 ASSESSMENT — ENCOUNTER SYMPTOMS: PAIN LOCATION - PAIN QUALITY: ACHING

## 2024-01-31 ENCOUNTER — HOME CARE VISIT (OUTPATIENT)
Age: 69
End: 2024-01-31
Payer: MEDICARE

## 2024-01-31 NOTE — HOME HEALTH
SUBJECTIVE: I just called the pharmacy and they will have my prescription tomorrow. \"When I stretch my legs out when I go to bed, sometimes my legs   I have to wrestle to get out the bed.       Caregiver involvement: Pt lives alone with a roommate in a boarding house, but has family members that come occasionally to help as needed. Pt relies on medical transport for transportation needs to MD appts. Family occasionally brings food he can microwave.      PATIENT EDUCATION PROVIDED THIS VISIT: Continue to wear braces as they add slight compression for pain relief. (pressure at nerve endings can help reduce the pain.       PATIENT/CG RESPONSE TO EDUCATION: Pt verbalized understanding.     OBJECTIVES:  See Care Plan Interventions    RESPONSE TO TREATMENT:  Pt reported increased pain to 7/10 during gait training today, with some support from wearing knee braces. No pain reported w/ added hip abduction/adduction there ex w/ heavy resistance band, however w/ increased time to complete d/t guarding. Continue to note impaired transfer mobility from lower surfaces noting increased risk or fall and inability to reposition when placed on pillow to elevate seat surface. d/t poor B knee flexion/extension ROM, decreased weight bearing through BLE.     ASSESSMENT/PATIENT RESPONSE TO TREATMENT/PROGRESS TOWARDS GOALS:  Pt continues to participate in therapy sessions despite reported pain. Pain and limited B knee ROM are primary factors to pt's physical limitations and safety with transfers, and gait mobility. He is at high risk for falls and will require continued skilled interventions to improve his functional transfer safety and mobility, address stair goals, and continue pain relief methods. Pt will  prescription refill tomorrow (only 4 pills refilled), and will continue to follow up with his PCP on 2/7. At this time orthopedic MD appt has not been set d/t surgery preparations (stress test to be completed on Thursday 2/1).

## 2024-02-01 ENCOUNTER — HOME CARE VISIT (OUTPATIENT)
Age: 69
End: 2024-02-01
Payer: MEDICARE

## 2024-02-03 ENCOUNTER — HOME CARE VISIT (OUTPATIENT)
Age: 69
End: 2024-02-03
Payer: MEDICARE

## 2024-02-03 PROCEDURE — G0157 HHC PT ASSISTANT EA 15: HCPCS

## 2024-02-05 ENCOUNTER — HOME CARE VISIT (OUTPATIENT)
Age: 69
End: 2024-02-05
Payer: MEDICARE

## 2024-02-05 PROCEDURE — G0157 HHC PT ASSISTANT EA 15: HCPCS

## 2024-02-05 NOTE — HOME HEALTH
SUBJECTIVE: Pt reports he is in alot of pain and has run out of pain meds.  Pt reports he cannot get a new Rx for pain meds until his appt on 2/7/24.  CAREGIVER INVOLVEMENT/ASSISTANCE NEEDED FOR: Pts friend assist pt with all needs prn but not present during PT session.  .  OBJECTIVE:  See interventions.  PATIENT EDUCATION PROVIDED THIS VISIT: Pt instructed to continue HEP 2x/day and amb with FWW in home as tolerated.  Pt may use heating pad to B knees prn using skin barrier monitor skin for redness.  PATIENT RESPONSE TO EDUCATION PROVIDED: Pt reports he tries to do his sitting exercises daily.  PATIENT RESPONSE TO TREATMENT: Pt declined stair amb and had decreased tolerance for PT overall today secondary to reporting running out of pain meds.  .  ASSESSMENT OF PROGRESS TOWARD GOALS:Pt reports he has increased pain in B knees secondary to running out of pain meds but does still have flexiril.  Pt declined stair amb today secondary to c/o pain/fatigue.  .  PLAN FOR NEXT VISIT: Will plan to attempt amb and transfer training with pt next visit.  THE FOLLOWING DISCHARGE PLANNING WAS DISCUSSED WITH THE PATIENT/CAREGIVER:Pt is scheduled to continue PT 2 more visits next week.

## 2024-02-06 VITALS
OXYGEN SATURATION: 98 % | HEART RATE: 67 BPM | DIASTOLIC BLOOD PRESSURE: 60 MMHG | SYSTOLIC BLOOD PRESSURE: 130 MMHG | RESPIRATION RATE: 16 BRPM | TEMPERATURE: 98.6 F

## 2024-02-06 VITALS
TEMPERATURE: 98.2 F | OXYGEN SATURATION: 98 % | DIASTOLIC BLOOD PRESSURE: 76 MMHG | HEART RATE: 82 BPM | SYSTOLIC BLOOD PRESSURE: 120 MMHG | RESPIRATION RATE: 17 BRPM

## 2024-02-06 ASSESSMENT — ENCOUNTER SYMPTOMS
PAIN LOCATION - PAIN QUALITY: ACHE
PAIN LOCATION - PAIN QUALITY: ACHING

## 2024-02-07 NOTE — HOME HEALTH
SUBJECTIVE: \"They gave me the patches and cream, that doesn't help. I'm kind of just giving up on everything. I have these braces on today because my legs have been hurting.\"     Caregiver involvement: Pt lives alone with a roommate in a boarding house, but has family members that come occasionally to help as needed. Pt relies on medical transport for transportation needs to MD appts. Family occasionally brings food he can microwave.      PATIENT EDUCATION PROVIDED THIS VISIT: Discussed contacting [facility], or ask assistance from friends/family to set up [facility] delivery services. Continue to follow up with MD for pain management. If surgery is scheduled, recommended pt discuss w/ surgical team need to stay in a rehab facility d/t not having consistent assistance in the home.     PATIENT/CG RESPONSE TO EDUCATION: Pt verbalized understanding.     OBJECTIVES:  See Care Plan Interventions    RESPONSE TO TREATMENT:  Pt continues to demonstrate difficulty w/ sit <> stand transfers d/t increased pain and limited ROM avoiding to flex knees during transitions.  He continues to require verbal cues for safety using AD for transfers as he attempts to pull on walker to stand which increases his risk for falls. Pt declined stair training this visit d/t pain and difficulty - pt verbalized he usually requires assistance from medical transport personnel, or family/friends for safety.     ASSESSMENT/PATIENT RESPONSE TO TREATMENT/PROGRESS TOWARDS GOALS:  Limited progress has been made towards pt's functional mobility and (I). Pt verbalized being fearful of standing to make his own meals, leaving the home completing stair mobility,unless necessary for MD appts, emergencies, or handling IADLs. He is able to transfer w/o assistance, however painful and unsafe d/t limited B knee ROM and pain.  Pt is utilizing lidocaine 5% patches, and Diclofenac rub daily prn.   He does not have any more oxycodone, however is utilizing OTC Tylenol for

## 2024-02-08 ENCOUNTER — HOME CARE VISIT (OUTPATIENT)
Age: 69
End: 2024-02-08
Payer: MEDICARE

## 2024-02-08 PROCEDURE — G0151 HHCP-SERV OF PT,EA 15 MIN: HCPCS

## 2024-02-09 VITALS
OXYGEN SATURATION: 98 % | HEART RATE: 76 BPM | DIASTOLIC BLOOD PRESSURE: 60 MMHG | SYSTOLIC BLOOD PRESSURE: 118 MMHG | TEMPERATURE: 98.2 F | RESPIRATION RATE: 17 BRPM

## 2024-02-09 ASSESSMENT — ENCOUNTER SYMPTOMS
DYSPNEA ACTIVITY LEVEL: AFTER AMBULATING MORE THAN 20 FT
PAIN LOCATION - PAIN QUALITY: PAIN

## 2024-02-10 NOTE — HOME HEALTH
Pt. clinically discharged and documentation finalized for completion of agency discharge.  Please see Discharge Summary for details.    Patient Status:    Patient is a 68 y.o. male referred to [facility] by his orthopedic surgeon, to address mobility impairment and B knee pain.   He has gained strength and improved safety at home, however, he continues to report severe and debilitating B knee pain with all movement.  He reports that due to the pain, he will go without eating in order to avoid the pain of getting up and walking to the kitchen.  Wounds: n/a  Caregiver involvement:  Friends, family assist intermittently as needed.  Medications reconciled and all medications are available in the home this visit.   Medications  are not effective for pain control at this time.    Home health supplies by type and quantity ordered/delivered this visit include: n/a  Patient education provided:  Educated patient re: progress made, next steps to consult with orthopedics and determine next course of action.  Patient was educated in safety, fall prevention, HEP and purpose for the exercises.  Patient/caregiver response to education:  Patient demonstrates and verbalizes understanding.  Progress toward goals/ Patient response to treatment: as follows:  Gait:  Slow reciprocal gait, B foot clearance, maintains a crouched posture, short step lengths.  He pulls himself up the stairs by holding a vertical support bar at the top of the stairs.  Transfers:  Uses hand support and struggles to stand due to B knee pain, Mod I.  Improved from Min A x1.   Balance:  High fall risk per Tinetti:  9/28, no improvement and he had declined from an initial improvement when pain was better controlled.  He lacks the ability to use hip and knee balance reactions to control LOB.  Strength:  B knees: 4-/5, B hips 3+/5, improved from initial eval:  B knees 3-/5.  ROM:  B knee ROM, able to move through full ROM with pain and effort.  Other ROM WFL.  Bed

## 2024-02-15 ENCOUNTER — TELEPHONE (OUTPATIENT)
Age: 69
End: 2024-02-15

## 2024-02-15 NOTE — TELEPHONE ENCOUNTER
Patient called and said he see  for his knees.    Patient said that  sent him out to see a Cardiologist in [place]. That he was seen by them over a week ago.    Patient said that the Cardiologist cleared him, that he would like to know when he can have surgery on his knees.     Patient tel. [phone].

## 2024-03-29 ENCOUNTER — HOSPITAL ENCOUNTER (OUTPATIENT)
Facility: HOSPITAL | Age: 69
End: 2024-03-29
Payer: MEDICARE

## 2024-03-29 DIAGNOSIS — Z01.818 PRE-OP TESTING: ICD-10-CM

## 2024-03-29 LAB
ALBUMIN SERPL-MCNC: 3.2 G/DL (ref 3.4–5)
ALBUMIN/GLOB SERPL: 0.7 (ref 0.8–1.7)
ALP SERPL-CCNC: 325 U/L (ref 45–117)
ALT SERPL-CCNC: 84 U/L (ref 16–61)
AMPHET UR QL SCN: NEGATIVE
ANION GAP SERPL CALC-SCNC: 5 MMOL/L (ref 3–18)
APPEARANCE UR: CLEAR
APTT PPP: 35.9 SEC (ref 23–36.4)
AST SERPL-CCNC: 120 U/L (ref 10–38)
BACTERIA URNS QL MICRO: NEGATIVE /HPF
BARBITURATES UR QL SCN: NEGATIVE
BASOPHILS # BLD: 0 K/UL (ref 0–0.1)
BASOPHILS NFR BLD: 0 % (ref 0–2)
BENZODIAZ UR QL: NEGATIVE
BILIRUB SERPL-MCNC: 0.8 MG/DL (ref 0.2–1)
BILIRUB UR QL: NEGATIVE
BUN SERPL-MCNC: 14 MG/DL (ref 7–18)
BUN/CREAT SERPL: 19 (ref 12–20)
CALCIUM SERPL-MCNC: 9.8 MG/DL (ref 8.5–10.1)
CANNABINOIDS UR QL SCN: NEGATIVE
CHLORIDE SERPL-SCNC: 107 MMOL/L (ref 100–111)
CO2 SERPL-SCNC: 27 MMOL/L (ref 21–32)
COCAINE UR QL SCN: NEGATIVE
COLOR UR: NORMAL
CREAT SERPL-MCNC: 0.73 MG/DL (ref 0.6–1.3)
DIFFERENTIAL METHOD BLD: ABNORMAL
EKG ATRIAL RATE: 60 BPM
EKG DIAGNOSIS: NORMAL
EKG P AXIS: 37 DEGREES
EKG P-R INTERVAL: 164 MS
EKG Q-T INTERVAL: 412 MS
EKG QRS DURATION: 82 MS
EKG QTC CALCULATION (BAZETT): 412 MS
EKG R AXIS: 203 DEGREES
EKG T AXIS: -14 DEGREES
EKG VENTRICULAR RATE: 60 BPM
EOSINOPHIL # BLD: 0.1 K/UL (ref 0–0.4)
EOSINOPHIL NFR BLD: 6 % (ref 0–5)
EPITH CASTS URNS QL MICRO: NEGATIVE /LPF (ref 0–5)
ERYTHROCYTE [DISTWIDTH] IN BLOOD BY AUTOMATED COUNT: 15.1 % (ref 11.6–14.5)
EST. AVERAGE GLUCOSE BLD GHB EST-MCNC: 91 MG/DL
GLOBULIN SER CALC-MCNC: 4.6 G/DL (ref 2–4)
GLUCOSE SERPL-MCNC: 131 MG/DL (ref 74–99)
GLUCOSE UR STRIP.AUTO-MCNC: NEGATIVE MG/DL
HBA1C MFR BLD: 4.8 % (ref 4.2–5.6)
HCT VFR BLD AUTO: 38.9 % (ref 36–48)
HGB BLD-MCNC: 12.7 G/DL (ref 13–16)
HGB UR QL STRIP: NEGATIVE
IMM GRANULOCYTES # BLD AUTO: 0 K/UL (ref 0–0.04)
IMM GRANULOCYTES NFR BLD AUTO: 0 % (ref 0–0.5)
INR PPP: 1.2 (ref 0.9–1.1)
KETONES UR QL STRIP.AUTO: NEGATIVE MG/DL
LEUKOCYTE ESTERASE UR QL STRIP.AUTO: NEGATIVE
LYMPHOCYTES # BLD: 0.4 K/UL (ref 0.9–3.6)
LYMPHOCYTES NFR BLD: 20 % (ref 21–52)
Lab: ABNORMAL
MCH RBC QN AUTO: 29.6 PG (ref 24–34)
MCHC RBC AUTO-ENTMCNC: 32.6 G/DL (ref 31–37)
MCV RBC AUTO: 90.7 FL (ref 78–100)
METHADONE UR QL: NEGATIVE
MONOCYTES # BLD: 0.2 K/UL (ref 0.05–1.2)
MONOCYTES NFR BLD: 10 % (ref 3–10)
NEUTS SEG # BLD: 1.2 K/UL (ref 1.8–8)
NEUTS SEG NFR BLD: 58 % (ref 40–73)
NITRITE UR QL STRIP.AUTO: NEGATIVE
NRBC # BLD: 0 K/UL (ref 0–0.01)
NRBC BLD-RTO: 0 PER 100 WBC
OPIATES UR QL: POSITIVE
OTHER CELLS NFR BLD MANUAL: 6
PCP UR QL: NEGATIVE
PH UR STRIP: 6 (ref 5–8)
PLATELET # BLD AUTO: 61 K/UL (ref 135–420)
PLATELET COMMENT: ABNORMAL
PMV BLD AUTO: 11.8 FL (ref 9.2–11.8)
POTASSIUM SERPL-SCNC: 4.2 MMOL/L (ref 3.5–5.5)
PROT SERPL-MCNC: 7.8 G/DL (ref 6.4–8.2)
PROT UR STRIP-MCNC: NEGATIVE MG/DL
PROTHROMBIN TIME: 15.4 SEC (ref 11.9–14.7)
RBC # BLD AUTO: 4.29 M/UL (ref 4.35–5.65)
RBC #/AREA URNS HPF: NEGATIVE /HPF (ref 0–5)
RBC MORPH BLD: ABNORMAL
SODIUM SERPL-SCNC: 139 MMOL/L (ref 136–145)
SP GR UR REFRACTOMETRY: 1.02 (ref 1–1.03)
TOTAL CELLS COUNTED SPEC: 50
UROBILINOGEN UR QL STRIP.AUTO: 1 EU/DL (ref 0.2–1)
WBC # BLD AUTO: 2.1 K/UL (ref 4.6–13.2)
WBC URNS QL MICRO: NEGATIVE /HPF (ref 0–4)

## 2024-03-29 PROCEDURE — 85025 COMPLETE CBC W/AUTO DIFF WBC: CPT

## 2024-03-29 PROCEDURE — 80307 DRUG TEST PRSMV CHEM ANLYZR: CPT

## 2024-03-29 PROCEDURE — 85610 PROTHROMBIN TIME: CPT

## 2024-03-29 PROCEDURE — 80361 OPIATES 1 OR MORE: CPT

## 2024-03-29 PROCEDURE — 80053 COMPREHEN METABOLIC PANEL: CPT

## 2024-03-29 PROCEDURE — 85730 THROMBOPLASTIN TIME PARTIAL: CPT

## 2024-03-29 PROCEDURE — 83036 HEMOGLOBIN GLYCOSYLATED A1C: CPT

## 2024-03-29 PROCEDURE — 36415 COLL VENOUS BLD VENIPUNCTURE: CPT

## 2024-03-29 PROCEDURE — 93005 ELECTROCARDIOGRAM TRACING: CPT

## 2024-03-29 PROCEDURE — 71045 X-RAY EXAM CHEST 1 VIEW: CPT

## 2024-03-29 PROCEDURE — 93010 ELECTROCARDIOGRAM REPORT: CPT | Performed by: INTERNAL MEDICINE

## 2024-03-29 PROCEDURE — 80354 DRUG SCREENING FENTANYL: CPT

## 2024-03-29 PROCEDURE — 81001 URINALYSIS AUTO W/SCOPE: CPT

## 2024-03-30 LAB
AMPHETAMINES UR QL SCN: NEGATIVE NG/ML
BARBITURATES UR QL SCN: NEGATIVE NG/ML
BENZODIAZ UR QL SCN: NEGATIVE NG/ML
BZE UR QL SCN: NEGATIVE NG/ML
CANNABINOIDS UR QL SCN: NEGATIVE NG/ML
CREAT UR-MCNC: 152 MG/DL (ref 20–300)
FENTANYL+NORFENTANYL UR QL SCN: NORMAL PG/ML
LABORATORY COMMENT REPORT: NORMAL
MEPERIDINE UR QL: NEGATIVE NG/ML
METHADONE UR QL SCN: NEGATIVE NG/ML
OPIATES UR QL SCN: NORMAL NG/ML
OXYCODONE+OXYMORPHONE UR QL SCN: NEGATIVE NG/ML
PCP UR QL: NEGATIVE NG/ML
PH UR: 5.8 (ref 4.5–8.9)
PROPOXYPH UR QL SCN: NEGATIVE NG/ML
SP GR UR: 1.02
TRAMADOL UR QL SCN: NEGATIVE NG/ML

## 2024-04-04 ENCOUNTER — CLINICAL DOCUMENTATION (OUTPATIENT)
Age: 69
End: 2024-04-04

## 2024-04-04 NOTE — PROGRESS NOTES
Spoke to pcp to make sure they got labs so pt can be cleared for surgery 4/17 they will call back once they get an answer from pcp on Monday 4/8/24.  Spoke to pt.  Spoke to ins co and asked if pt is covered to go to rehab after surgery. Rivka advised that if a rehab facility that is in network can accept the pt after surgery then they will cover and I can put in auth request on their website to cover the rehab.  Left vm at [facility] to find out if they take his insurance and if he had to pay anything out of pocket and any info regarding rehab waiting on call back.  Called pt to advise what insurance relayed and that I would do all that I could to help him get into a nice rehab after his surgery and that insurance would cover and no out of pocket costs.  Pt understood.

## 2024-05-13 ENCOUNTER — TRANSCRIBE ORDERS (OUTPATIENT)
Facility: HOSPITAL | Age: 69
End: 2024-05-13

## 2024-05-13 DIAGNOSIS — D69.6 THROMBOCYTOPENIA (HCC): Primary | ICD-10-CM

## 2024-05-13 DIAGNOSIS — D61.818 PANCYTOPENIA (HCC): ICD-10-CM

## 2024-05-13 DIAGNOSIS — C85.90 NON-HODGKIN'S LYMPHOMA, UNSPECIFIED BODY REGION, UNSPECIFIED NON-HODGKIN LYMPHOMA TYPE (HCC): Primary | ICD-10-CM

## 2024-05-13 DIAGNOSIS — R63.4 WEIGHT LOSS: ICD-10-CM

## 2024-05-16 ENCOUNTER — HOSPITAL ENCOUNTER (OUTPATIENT)
Facility: HOSPITAL | Age: 69
Discharge: HOME | End: 2024-05-16
Payer: MEDICARE

## 2024-05-16 DIAGNOSIS — C85.90 NON-HODGKIN'S LYMPHOMA, UNSPECIFIED BODY REGION, UNSPECIFIED NON-HODGKIN LYMPHOMA TYPE (HCC): ICD-10-CM

## 2024-05-16 PROCEDURE — 6360000004 HC RX CONTRAST MEDICATION

## 2024-05-16 PROCEDURE — 74177 CT ABD & PELVIS W/CONTRAST: CPT

## 2024-05-16 RX ADMIN — IOPAMIDOL 100 ML: 612 INJECTION, SOLUTION INTRAVENOUS at 17:51

## 2024-05-16 RX ADMIN — DIATRIZOATE MEGLUMINE AND DIATRIZOATE SODIUM 30 ML: 660; 100 LIQUID ORAL; RECTAL at 17:51

## 2024-06-20 ENCOUNTER — OFFICE VISIT (OUTPATIENT)
Age: 69
End: 2024-06-20
Payer: MEDICARE

## 2024-06-20 VITALS
DIASTOLIC BLOOD PRESSURE: 68 MMHG | TEMPERATURE: 97.7 F | RESPIRATION RATE: 16 BRPM | BODY MASS INDEX: 23.57 KG/M2 | SYSTOLIC BLOOD PRESSURE: 150 MMHG | HEIGHT: 72 IN | OXYGEN SATURATION: 98 % | HEART RATE: 60 BPM | WEIGHT: 174 LBS

## 2024-06-20 DIAGNOSIS — I50.32 CHRONIC DIASTOLIC (CONGESTIVE) HEART FAILURE (HCC): ICD-10-CM

## 2024-06-20 DIAGNOSIS — R01.1 SYSTOLIC MURMUR: ICD-10-CM

## 2024-06-20 DIAGNOSIS — I51.7 CARDIOMEGALY: ICD-10-CM

## 2024-06-20 DIAGNOSIS — R06.02 EXERTIONAL SHORTNESS OF BREATH: Primary | ICD-10-CM

## 2024-06-20 DIAGNOSIS — Z72.0 TOBACCO ABUSE: ICD-10-CM

## 2024-06-20 DIAGNOSIS — R60.0 BILATERAL LEG EDEMA: ICD-10-CM

## 2024-06-20 PROCEDURE — 3077F SYST BP >= 140 MM HG: CPT | Performed by: INTERNAL MEDICINE

## 2024-06-20 PROCEDURE — 1123F ACP DISCUSS/DSCN MKR DOCD: CPT | Performed by: INTERNAL MEDICINE

## 2024-06-20 PROCEDURE — 99205 OFFICE O/P NEW HI 60 MIN: CPT | Performed by: INTERNAL MEDICINE

## 2024-06-20 PROCEDURE — 3078F DIAST BP <80 MM HG: CPT | Performed by: INTERNAL MEDICINE

## 2024-06-20 RX ORDER — BUMETANIDE 1 MG/1
1 TABLET ORAL DAILY
Qty: 30 TABLET | Refills: 1 | Status: SHIPPED | OUTPATIENT
Start: 2024-06-20

## 2024-06-20 ASSESSMENT — ENCOUNTER SYMPTOMS
SHORTNESS OF BREATH: 1
EYES NEGATIVE: 1
ALLERGIC/IMMUNOLOGIC NEGATIVE: 1
ABDOMINAL PAIN: 1
ABDOMINAL DISTENTION: 1

## 2024-06-20 ASSESSMENT — LIFESTYLE VARIABLES
HOW OFTEN DO YOU HAVE A DRINK CONTAINING ALCOHOL: MONTHLY OR LESS
HOW MANY STANDARD DRINKS CONTAINING ALCOHOL DO YOU HAVE ON A TYPICAL DAY: 1 OR 2

## 2024-06-20 NOTE — PROGRESS NOTES
1. \"Have you been to the ER, urgent care clinic since your last visit?  Hospitalized since your last visit?\" Reviewed by Dr. Ludwin Segura    2. \"Have you seen or consulted any other health care providers outside of the [facility] since your last visit?\" Reviewed by Dr. Ludwin Segura    
Cigarettes     Start date: 1/1/1970    Smokeless tobacco: Never   Substance Use Topics    Alcohol use: Yes     Alcohol/week: 4.0 standard drinks of alcohol    Drug use: Yes     Types: Heroin     Comment: On Methadone        No family history on file.     Review of Systems   Constitutional: Negative.    HENT: Negative.     Eyes: Negative.    Respiratory:  Positive for shortness of breath.    Cardiovascular:  Negative for chest pain and palpitations.   Gastrointestinal:  Positive for abdominal distention and abdominal pain.   Endocrine: Negative.    Genitourinary: Negative.    Allergic/Immunologic: Negative.    Neurological: Negative.    Hematological: Negative.    Psychiatric/Behavioral: Negative.       Physical Exam  Vitals and nursing note reviewed.   Constitutional:       Appearance: Normal appearance.   HENT:      Head: Normocephalic and atraumatic.      Right Ear: External ear normal.      Left Ear: External ear normal.      Nose: Nose normal.      Mouth/Throat:      Mouth: Mucous membranes are dry.      Pharynx: Oropharynx is clear.   Eyes:      Extraocular Movements: Extraocular movements intact.      Conjunctiva/sclera: Conjunctivae normal.      Pupils: Pupils are equal, round, and reactive to light.   Neck:      Thyroid: No thyroid mass.      Vascular: Carotid bruit and JVD present.      Trachea: Trachea normal.   Cardiovascular:      Rate and Rhythm: Normal rate and regular rhythm.      Pulses:           Carotid pulses are 1+ on the right side and 1+ on the left side.       Radial pulses are 1+ on the right side and 1+ on the left side.        Femoral pulses are 1+ on the right side and 1+ on the left side.       Popliteal pulses are 1+ on the right side and 1+ on the left side.        Dorsalis pedis pulses are 1+ on the right side and 1+ on the left side.        Posterior tibial pulses are 1+ on the right side and 1+ on the left side.      Heart sounds: S1 normal and S2 normal. Murmur heard.      [place]

## 2024-06-25 ENCOUNTER — TELEPHONE (OUTPATIENT)
Age: 69
End: 2024-06-25

## 2024-06-25 NOTE — TELEPHONE ENCOUNTER
[employer] member services called stating the office needs to request an authorization for transportation for his upcoming appointments. He has hit his limit of authorized rides. [employer] can be reached at [phone].

## 2024-07-16 ENCOUNTER — APPOINTMENT (OUTPATIENT)
Facility: HOSPITAL | Age: 69
DRG: 436 | End: 2024-07-16
Payer: MEDICARE

## 2024-07-16 ENCOUNTER — HOSPITAL ENCOUNTER (INPATIENT)
Facility: HOSPITAL | Age: 69
LOS: 14 days | Discharge: NURSING, REHAB OR LONG-TERM CARE | DRG: 436 | End: 2024-07-30
Attending: STUDENT IN AN ORGANIZED HEALTH CARE EDUCATION/TRAINING PROGRAM | Admitting: HOSPITALIST
Payer: MEDICARE

## 2024-07-16 DIAGNOSIS — R18.8 ASCITES OF LIVER: ICD-10-CM

## 2024-07-16 DIAGNOSIS — I50.30 HEART FAILURE WITH PRESERVED EJECTION FRACTION, UNSPECIFIED HF CHRONICITY (HCC): ICD-10-CM

## 2024-07-16 DIAGNOSIS — D61.818 PANCYTOPENIA (HCC): ICD-10-CM

## 2024-07-16 DIAGNOSIS — N17.9 ACUTE KIDNEY INJURY (HCC): ICD-10-CM

## 2024-07-16 DIAGNOSIS — R06.02 SHORTNESS OF BREATH: Primary | ICD-10-CM

## 2024-07-16 DIAGNOSIS — F11.91 HISTORY OF METHADONE USE: ICD-10-CM

## 2024-07-16 PROBLEM — K74.60 CIRRHOSIS (HCC): Status: ACTIVE | Noted: 2024-07-16

## 2024-07-16 LAB
ALBUMIN SERPL-MCNC: 3.1 G/DL (ref 3.4–5)
ALBUMIN/GLOB SERPL: 0.6 (ref 0.8–1.7)
ALP SERPL-CCNC: 258 U/L (ref 45–117)
ALT SERPL-CCNC: 68 U/L (ref 16–61)
AMPHET UR QL SCN: NEGATIVE
ANION GAP SERPL CALC-SCNC: 6 MMOL/L (ref 3–18)
AST SERPL-CCNC: 141 U/L (ref 10–38)
BARBITURATES UR QL SCN: NEGATIVE
BASOPHILS # BLD: 0 K/UL (ref 0–0.1)
BASOPHILS NFR BLD: 1 % (ref 0–2)
BENZODIAZ UR QL: NEGATIVE
BILIRUB SERPL-MCNC: 1 MG/DL (ref 0.2–1)
BUN SERPL-MCNC: 28 MG/DL (ref 7–18)
BUN/CREAT SERPL: 18 (ref 12–20)
CALCIUM SERPL-MCNC: 9.9 MG/DL (ref 8.5–10.1)
CANNABINOIDS UR QL SCN: NEGATIVE
CHLORIDE SERPL-SCNC: 99 MMOL/L (ref 100–111)
CO2 SERPL-SCNC: 32 MMOL/L (ref 21–32)
COCAINE UR QL SCN: NEGATIVE
CREAT SERPL-MCNC: 1.54 MG/DL (ref 0.6–1.3)
DIFFERENTIAL METHOD BLD: ABNORMAL
ECHO BSA: 2.02 M2
EOSINOPHIL # BLD: 0 K/UL (ref 0–0.4)
EOSINOPHIL NFR BLD: 1 % (ref 0–5)
ERYTHROCYTE [DISTWIDTH] IN BLOOD BY AUTOMATED COUNT: 14.6 % (ref 11.6–14.5)
GLOBULIN SER CALC-MCNC: 5.2 G/DL (ref 2–4)
GLUCOSE SERPL-MCNC: 98 MG/DL (ref 74–99)
HCT VFR BLD AUTO: 36.9 % (ref 36–48)
HGB BLD-MCNC: 11.8 G/DL (ref 13–16)
IMM GRANULOCYTES # BLD AUTO: 0 K/UL (ref 0–0.04)
IMM GRANULOCYTES NFR BLD AUTO: 1 % (ref 0–0.5)
LYMPHOCYTES # BLD: 0.6 K/UL (ref 0.9–3.6)
LYMPHOCYTES NFR BLD: 14 % (ref 21–52)
Lab: ABNORMAL
MAGNESIUM SERPL-MCNC: 2.5 MG/DL (ref 1.6–2.6)
MCH RBC QN AUTO: 28.9 PG (ref 24–34)
MCHC RBC AUTO-ENTMCNC: 32 G/DL (ref 31–37)
MCV RBC AUTO: 90.2 FL (ref 78–100)
METHADONE UR QL: POSITIVE
MONOCYTES # BLD: 0.4 K/UL (ref 0.05–1.2)
MONOCYTES NFR BLD: 9 % (ref 3–10)
NEUTS SEG # BLD: 3.2 K/UL (ref 1.8–8)
NEUTS SEG NFR BLD: 75 % (ref 40–73)
NRBC # BLD: 0 K/UL (ref 0–0.01)
NRBC BLD-RTO: 0 PER 100 WBC
NT PRO BNP: 979 PG/ML (ref 0–900)
OPIATES UR QL: POSITIVE
PCP UR QL: NEGATIVE
PLATELET # BLD AUTO: 114 K/UL (ref 135–420)
PMV BLD AUTO: 10.8 FL (ref 9.2–11.8)
POTASSIUM SERPL-SCNC: 3.8 MMOL/L (ref 3.5–5.5)
PROT SERPL-MCNC: 8.3 G/DL (ref 6.4–8.2)
RBC # BLD AUTO: 4.09 M/UL (ref 4.35–5.65)
SODIUM SERPL-SCNC: 137 MMOL/L (ref 136–145)
WBC # BLD AUTO: 4.3 K/UL (ref 4.6–13.2)

## 2024-07-16 PROCEDURE — 71275 CT ANGIOGRAPHY CHEST: CPT

## 2024-07-16 PROCEDURE — 74177 CT ABD & PELVIS W/CONTRAST: CPT

## 2024-07-16 PROCEDURE — 80053 COMPREHEN METABOLIC PANEL: CPT

## 2024-07-16 PROCEDURE — 99285 EMERGENCY DEPT VISIT HI MDM: CPT

## 2024-07-16 PROCEDURE — 83880 ASSAY OF NATRIURETIC PEPTIDE: CPT

## 2024-07-16 PROCEDURE — 80307 DRUG TEST PRSMV CHEM ANLYZR: CPT

## 2024-07-16 PROCEDURE — 93005 ELECTROCARDIOGRAM TRACING: CPT | Performed by: HEALTH CARE PROVIDER

## 2024-07-16 PROCEDURE — 93970 EXTREMITY STUDY: CPT

## 2024-07-16 PROCEDURE — 6360000002 HC RX W HCPCS: Performed by: HOSPITALIST

## 2024-07-16 PROCEDURE — 2580000003 HC RX 258: Performed by: HOSPITALIST

## 2024-07-16 PROCEDURE — 99223 1ST HOSP IP/OBS HIGH 75: CPT | Performed by: HOSPITALIST

## 2024-07-16 PROCEDURE — 85025 COMPLETE CBC W/AUTO DIFF WBC: CPT

## 2024-07-16 PROCEDURE — 93970 EXTREMITY STUDY: CPT | Performed by: INTERNAL MEDICINE

## 2024-07-16 PROCEDURE — 6370000000 HC RX 637 (ALT 250 FOR IP): Performed by: HOSPITALIST

## 2024-07-16 PROCEDURE — 1100000000 HC RM PRIVATE

## 2024-07-16 PROCEDURE — 83735 ASSAY OF MAGNESIUM: CPT

## 2024-07-16 PROCEDURE — 6360000002 HC RX W HCPCS: Performed by: HEALTH CARE PROVIDER

## 2024-07-16 PROCEDURE — 6360000004 HC RX CONTRAST MEDICATION: Performed by: HEALTH CARE PROVIDER

## 2024-07-16 RX ORDER — ACETAMINOPHEN 650 MG/1
650 SUPPOSITORY RECTAL EVERY 6 HOURS PRN
Status: DISCONTINUED | OUTPATIENT
Start: 2024-07-16 | End: 2024-07-30 | Stop reason: HOSPADM

## 2024-07-16 RX ORDER — PANTOPRAZOLE SODIUM 40 MG/1
40 TABLET, DELAYED RELEASE ORAL
Status: DISCONTINUED | OUTPATIENT
Start: 2024-07-17 | End: 2024-07-30 | Stop reason: HOSPADM

## 2024-07-16 RX ORDER — ONDANSETRON 2 MG/ML
4 INJECTION INTRAMUSCULAR; INTRAVENOUS EVERY 6 HOURS PRN
Status: DISCONTINUED | OUTPATIENT
Start: 2024-07-16 | End: 2024-07-30 | Stop reason: HOSPADM

## 2024-07-16 RX ORDER — ENOXAPARIN SODIUM 100 MG/ML
40 INJECTION SUBCUTANEOUS DAILY
Status: DISCONTINUED | OUTPATIENT
Start: 2024-07-16 | End: 2024-07-16

## 2024-07-16 RX ORDER — GAUZE BANDAGE 2" X 2"
100 BANDAGE TOPICAL DAILY
Status: DISCONTINUED | OUTPATIENT
Start: 2024-07-16 | End: 2024-07-30 | Stop reason: HOSPADM

## 2024-07-16 RX ORDER — ONDANSETRON 4 MG/1
4 TABLET, ORALLY DISINTEGRATING ORAL EVERY 8 HOURS PRN
Status: DISCONTINUED | OUTPATIENT
Start: 2024-07-16 | End: 2024-07-30 | Stop reason: HOSPADM

## 2024-07-16 RX ORDER — ENOXAPARIN SODIUM 100 MG/ML
40 INJECTION SUBCUTANEOUS DAILY
Status: DISCONTINUED | OUTPATIENT
Start: 2024-07-16 | End: 2024-07-30 | Stop reason: HOSPADM

## 2024-07-16 RX ORDER — FUROSEMIDE 10 MG/ML
40 INJECTION INTRAMUSCULAR; INTRAVENOUS ONCE
Status: COMPLETED | OUTPATIENT
Start: 2024-07-16 | End: 2024-07-16

## 2024-07-16 RX ORDER — METHADONE HYDROCHLORIDE 5 MG/5ML
45 SOLUTION ORAL DAILY
Status: DISCONTINUED | OUTPATIENT
Start: 2024-07-17 | End: 2024-07-16 | Stop reason: CLARIF

## 2024-07-16 RX ORDER — METHADONE HYDROCHLORIDE 10 MG/ML
45 CONCENTRATE ORAL DAILY
Status: DISCONTINUED | OUTPATIENT
Start: 2024-07-17 | End: 2024-07-19

## 2024-07-16 RX ORDER — TRAMADOL HYDROCHLORIDE 50 MG/1
50 TABLET ORAL EVERY 6 HOURS PRN
Status: DISCONTINUED | OUTPATIENT
Start: 2024-07-16 | End: 2024-07-30 | Stop reason: HOSPADM

## 2024-07-16 RX ORDER — SODIUM CHLORIDE 0.9 % (FLUSH) 0.9 %
5-40 SYRINGE (ML) INJECTION EVERY 12 HOURS SCHEDULED
Status: DISCONTINUED | OUTPATIENT
Start: 2024-07-16 | End: 2024-07-30 | Stop reason: HOSPADM

## 2024-07-16 RX ORDER — POLYETHYLENE GLYCOL 3350 17 G/17G
17 POWDER, FOR SOLUTION ORAL DAILY PRN
Status: DISCONTINUED | OUTPATIENT
Start: 2024-07-16 | End: 2024-07-20

## 2024-07-16 RX ORDER — FUROSEMIDE 10 MG/ML
20 INJECTION INTRAMUSCULAR; INTRAVENOUS 2 TIMES DAILY
Status: DISCONTINUED | OUTPATIENT
Start: 2024-07-17 | End: 2024-07-22

## 2024-07-16 RX ORDER — SODIUM CHLORIDE 0.9 % (FLUSH) 0.9 %
5-40 SYRINGE (ML) INJECTION PRN
Status: DISCONTINUED | OUTPATIENT
Start: 2024-07-16 | End: 2024-07-30 | Stop reason: HOSPADM

## 2024-07-16 RX ORDER — SPIRONOLACTONE 25 MG/1
25 TABLET ORAL DAILY
Status: DISCONTINUED | OUTPATIENT
Start: 2024-07-16 | End: 2024-07-22

## 2024-07-16 RX ORDER — MAGNESIUM SULFATE IN WATER 40 MG/ML
2000 INJECTION, SOLUTION INTRAVENOUS PRN
Status: DISCONTINUED | OUTPATIENT
Start: 2024-07-16 | End: 2024-07-30 | Stop reason: HOSPADM

## 2024-07-16 RX ORDER — ACETAMINOPHEN 325 MG/1
650 TABLET ORAL EVERY 6 HOURS PRN
Status: DISCONTINUED | OUTPATIENT
Start: 2024-07-16 | End: 2024-07-30 | Stop reason: HOSPADM

## 2024-07-16 RX ORDER — POTASSIUM CHLORIDE 7.45 MG/ML
10 INJECTION INTRAVENOUS PRN
Status: DISCONTINUED | OUTPATIENT
Start: 2024-07-16 | End: 2024-07-30 | Stop reason: HOSPADM

## 2024-07-16 RX ORDER — FOLIC ACID 1 MG/1
1 TABLET ORAL DAILY
Status: DISCONTINUED | OUTPATIENT
Start: 2024-07-17 | End: 2024-07-30 | Stop reason: HOSPADM

## 2024-07-16 RX ORDER — SODIUM CHLORIDE 9 MG/ML
INJECTION, SOLUTION INTRAVENOUS PRN
Status: DISCONTINUED | OUTPATIENT
Start: 2024-07-16 | End: 2024-07-30 | Stop reason: HOSPADM

## 2024-07-16 RX ORDER — 0.9 % SODIUM CHLORIDE 0.9 %
500 INTRAVENOUS SOLUTION INTRAVENOUS ONCE
Status: DISCONTINUED | OUTPATIENT
Start: 2024-07-16 | End: 2024-07-16

## 2024-07-16 RX ORDER — POTASSIUM CHLORIDE 20 MEQ/1
40 TABLET, EXTENDED RELEASE ORAL PRN
Status: DISCONTINUED | OUTPATIENT
Start: 2024-07-16 | End: 2024-07-30 | Stop reason: HOSPADM

## 2024-07-16 RX ADMIN — SODIUM CHLORIDE, PRESERVATIVE FREE 10 ML: 5 INJECTION INTRAVENOUS at 23:17

## 2024-07-16 RX ADMIN — IOPAMIDOL 80 ML: 755 INJECTION, SOLUTION INTRAVENOUS at 16:46

## 2024-07-16 RX ADMIN — TRAMADOL HYDROCHLORIDE 50 MG: 50 TABLET ORAL at 23:02

## 2024-07-16 RX ADMIN — ENOXAPARIN SODIUM 40 MG: 100 INJECTION SUBCUTANEOUS at 23:07

## 2024-07-16 RX ADMIN — FUROSEMIDE 40 MG: 10 INJECTION, SOLUTION INTRAMUSCULAR; INTRAVENOUS at 19:16

## 2024-07-16 RX ADMIN — Medication 100 MG: at 23:18

## 2024-07-16 RX ADMIN — SPIRONOLACTONE 25 MG: 25 TABLET ORAL at 23:02

## 2024-07-16 RX ADMIN — ARFORMOTEROL TARTRATE: 15 SOLUTION RESPIRATORY (INHALATION) at 23:05

## 2024-07-16 ASSESSMENT — PAIN DESCRIPTION - ORIENTATION
ORIENTATION: LEFT;RIGHT
ORIENTATION: RIGHT;LEFT

## 2024-07-16 ASSESSMENT — PAIN - FUNCTIONAL ASSESSMENT
PAIN_FUNCTIONAL_ASSESSMENT: ACTIVITIES ARE NOT PREVENTED
PAIN_FUNCTIONAL_ASSESSMENT: 0-10

## 2024-07-16 ASSESSMENT — PAIN DESCRIPTION - DESCRIPTORS
DESCRIPTORS: ACHING
DESCRIPTORS: ACHING

## 2024-07-16 ASSESSMENT — PAIN DESCRIPTION - FREQUENCY: FREQUENCY: CONTINUOUS

## 2024-07-16 ASSESSMENT — PAIN DESCRIPTION - LOCATION
LOCATION: HIP;LEG
LOCATION: HIP
LOCATION: HIP;LEG

## 2024-07-16 ASSESSMENT — PAIN DESCRIPTION - ONSET: ONSET: GRADUAL

## 2024-07-16 ASSESSMENT — PAIN SCALES - GENERAL
PAINLEVEL_OUTOF10: 7
PAINLEVEL_OUTOF10: 5
PAINLEVEL_OUTOF10: 7
PAINLEVEL_OUTOF10: 7

## 2024-07-16 ASSESSMENT — PAIN DESCRIPTION - PAIN TYPE: TYPE: CHRONIC PAIN

## 2024-07-16 NOTE — ED TRIAGE NOTES
Pt states that having right hip pain and left thigh burning and tingling since yesterday. States that he is scheduled to have surgery soon. Pt with knee brace and walker at triage.

## 2024-07-16 NOTE — H&P
duplex lower extremity venous bilateral    Collection Time: 07/16/24  4:19 PM   Result Value Ref Range    Body Surface Area 2.02 m2       Imaging Reviewed:  CTA CHEST W WO CONTRAST PE Eval    Result Date: 7/16/2024  No CT evidence of pulmonary embolus. Enlarging bilateral pulmonary nodules, largest measuring up to 1 cm (previously 6 mm), given 2-month interval growth are suspicious for metastatic nodules. Electronically signed by Morris Hart    CT ABDOMEN PELVIS W IV CONTRAST Additional Contrast? None    Result Date: 7/16/2024  Cirrhotic liver with approximately 3.5 cm heterogeneous nodule along with subdiaphragmatic surface. Irregular appearance could represent malignancy and does not correlate with previously demonstrated nodule. Moderate volume ascites and splenomegaly likely secondary to cirrhosis. Punctate left intrarenal stone. Cholelithiasis. Mild wall thickening may represent sequela of ascites. Right hip degenerative joint disease. Electronically signed by Morris Douglas MD  7/16/2024, 7:55 PM        Disclaimer: Sections of this note are dictated using utilizing voice recognition software.  Minor typographical errors may be present. If questions arise, please do not hesitate to contact me or call our department.

## 2024-07-17 LAB
ANION GAP SERPL CALC-SCNC: 6 MMOL/L (ref 3–18)
BASOPHILS # BLD: 0 K/UL (ref 0–0.1)
BASOPHILS NFR BLD: 0 % (ref 0–2)
BUN SERPL-MCNC: 26 MG/DL (ref 7–18)
BUN/CREAT SERPL: 21 (ref 12–20)
CALCIUM SERPL-MCNC: 9.1 MG/DL (ref 8.5–10.1)
CHLORIDE SERPL-SCNC: 101 MMOL/L (ref 100–111)
CO2 SERPL-SCNC: 30 MMOL/L (ref 21–32)
CREAT SERPL-MCNC: 1.21 MG/DL (ref 0.6–1.3)
DIFFERENTIAL METHOD BLD: ABNORMAL
EKG ATRIAL RATE: 69 BPM
EKG DIAGNOSIS: NORMAL
EKG P AXIS: 44 DEGREES
EKG P-R INTERVAL: 154 MS
EKG Q-T INTERVAL: 426 MS
EKG QRS DURATION: 86 MS
EKG QTC CALCULATION (BAZETT): 456 MS
EKG R AXIS: -45 DEGREES
EKG T AXIS: 62 DEGREES
EKG VENTRICULAR RATE: 69 BPM
EOSINOPHIL # BLD: 0 K/UL (ref 0–0.4)
EOSINOPHIL NFR BLD: 1 % (ref 0–5)
ERYTHROCYTE [DISTWIDTH] IN BLOOD BY AUTOMATED COUNT: 14.5 % (ref 11.6–14.5)
GLUCOSE SERPL-MCNC: 91 MG/DL (ref 74–99)
HBV SURFACE AB SER QL IA: NEGATIVE
HBV SURFACE AB SERPL IA-ACNC: <3.1 MIU/ML
HCT VFR BLD AUTO: 34.1 % (ref 36–48)
HEP BS AB COMMENT: ABNORMAL
HGB BLD-MCNC: 11.2 G/DL (ref 13–16)
IMM GRANULOCYTES # BLD AUTO: 0 K/UL (ref 0–0.04)
IMM GRANULOCYTES NFR BLD AUTO: 0 % (ref 0–0.5)
LYMPHOCYTES # BLD: 0.6 K/UL (ref 0.9–3.6)
LYMPHOCYTES NFR BLD: 20 % (ref 21–52)
MAGNESIUM SERPL-MCNC: 2.5 MG/DL (ref 1.6–2.6)
MCH RBC QN AUTO: 29 PG (ref 24–34)
MCHC RBC AUTO-ENTMCNC: 32.8 G/DL (ref 31–37)
MCV RBC AUTO: 88.3 FL (ref 78–100)
MONOCYTES # BLD: 0.4 K/UL (ref 0.05–1.2)
MONOCYTES NFR BLD: 11 % (ref 3–10)
NEUTS SEG # BLD: 2.1 K/UL (ref 1.8–8)
NEUTS SEG NFR BLD: 67 % (ref 40–73)
NRBC # BLD: 0 K/UL (ref 0–0.01)
NRBC BLD-RTO: 0 PER 100 WBC
PLATELET # BLD AUTO: 92 K/UL (ref 135–420)
PMV BLD AUTO: 11.8 FL (ref 9.2–11.8)
POTASSIUM SERPL-SCNC: 3.2 MMOL/L (ref 3.5–5.5)
RBC # BLD AUTO: 3.86 M/UL (ref 4.35–5.65)
SODIUM SERPL-SCNC: 137 MMOL/L (ref 136–145)
WBC # BLD AUTO: 3.2 K/UL (ref 4.6–13.2)

## 2024-07-17 PROCEDURE — 87902 NFCT AGT GNTYP ALYS HEP C: CPT

## 2024-07-17 PROCEDURE — 6370000000 HC RX 637 (ALT 250 FOR IP): Performed by: HOSPITALIST

## 2024-07-17 PROCEDURE — 82107 ALPHA-FETOPROTEIN L3: CPT

## 2024-07-17 PROCEDURE — 87340 HEPATITIS B SURFACE AG IA: CPT

## 2024-07-17 PROCEDURE — 1100000000 HC RM PRIVATE

## 2024-07-17 PROCEDURE — 99232 SBSQ HOSP IP/OBS MODERATE 35: CPT | Performed by: HOSPITALIST

## 2024-07-17 PROCEDURE — 94761 N-INVAS EAR/PLS OXIMETRY MLT: CPT

## 2024-07-17 PROCEDURE — 80048 BASIC METABOLIC PNL TOTAL CA: CPT

## 2024-07-17 PROCEDURE — 85025 COMPLETE CBC W/AUTO DIFF WBC: CPT

## 2024-07-17 PROCEDURE — 86706 HEP B SURFACE ANTIBODY: CPT

## 2024-07-17 PROCEDURE — 87522 HEPATITIS C REVRS TRNSCRPJ: CPT

## 2024-07-17 PROCEDURE — 36415 COLL VENOUS BLD VENIPUNCTURE: CPT

## 2024-07-17 PROCEDURE — 6360000002 HC RX W HCPCS: Performed by: HOSPITALIST

## 2024-07-17 PROCEDURE — 87350 HEPATITIS BE AG IA: CPT

## 2024-07-17 PROCEDURE — 2580000003 HC RX 258: Performed by: HOSPITALIST

## 2024-07-17 PROCEDURE — 93010 ELECTROCARDIOGRAM REPORT: CPT | Performed by: INTERNAL MEDICINE

## 2024-07-17 PROCEDURE — 86707 HEPATITIS BE ANTIBODY: CPT

## 2024-07-17 PROCEDURE — 83735 ASSAY OF MAGNESIUM: CPT

## 2024-07-17 RX ORDER — BUDESONIDE 0.5 MG/2ML
0.5 INHALANT ORAL
Status: DISCONTINUED | OUTPATIENT
Start: 2024-07-18 | End: 2024-07-30

## 2024-07-17 RX ORDER — ARFORMOTEROL TARTRATE 15 UG/2ML
15 SOLUTION RESPIRATORY (INHALATION)
Status: DISCONTINUED | OUTPATIENT
Start: 2024-07-18 | End: 2024-07-30

## 2024-07-17 RX ORDER — POTASSIUM CHLORIDE 20 MEQ/1
40 TABLET, EXTENDED RELEASE ORAL 2 TIMES DAILY
Status: COMPLETED | OUTPATIENT
Start: 2024-07-17 | End: 2024-07-17

## 2024-07-17 RX ADMIN — TRAMADOL HYDROCHLORIDE 50 MG: 50 TABLET ORAL at 14:47

## 2024-07-17 RX ADMIN — SPIRONOLACTONE 25 MG: 25 TABLET ORAL at 09:03

## 2024-07-17 RX ADMIN — TRAMADOL HYDROCHLORIDE 50 MG: 50 TABLET ORAL at 20:31

## 2024-07-17 RX ADMIN — Medication 100 MG: at 09:03

## 2024-07-17 RX ADMIN — FUROSEMIDE 20 MG: 10 INJECTION, SOLUTION INTRAMUSCULAR; INTRAVENOUS at 17:24

## 2024-07-17 RX ADMIN — SODIUM CHLORIDE, PRESERVATIVE FREE 10 ML: 5 INJECTION INTRAVENOUS at 09:06

## 2024-07-17 RX ADMIN — PANTOPRAZOLE SODIUM 40 MG: 40 TABLET, DELAYED RELEASE ORAL at 09:03

## 2024-07-17 RX ADMIN — ARFORMOTEROL TARTRATE: 15 SOLUTION RESPIRATORY (INHALATION) at 20:33

## 2024-07-17 RX ADMIN — ENOXAPARIN SODIUM 40 MG: 100 INJECTION SUBCUTANEOUS at 20:33

## 2024-07-17 RX ADMIN — ARFORMOTEROL TARTRATE: 15 SOLUTION RESPIRATORY (INHALATION) at 09:03

## 2024-07-17 RX ADMIN — SODIUM CHLORIDE, PRESERVATIVE FREE 10 ML: 5 INJECTION INTRAVENOUS at 20:42

## 2024-07-17 RX ADMIN — TRAMADOL HYDROCHLORIDE 50 MG: 50 TABLET ORAL at 09:04

## 2024-07-17 RX ADMIN — FOLIC ACID 1 MG: 1 TABLET ORAL at 09:03

## 2024-07-17 RX ADMIN — METHADONE HYDROCHLORIDE 45 MG: 10 CONCENTRATE ORAL at 09:03

## 2024-07-17 RX ADMIN — POTASSIUM CHLORIDE 40 MEQ: 1500 TABLET, EXTENDED RELEASE ORAL at 20:32

## 2024-07-17 RX ADMIN — POTASSIUM CHLORIDE 40 MEQ: 1500 TABLET, EXTENDED RELEASE ORAL at 12:56

## 2024-07-17 RX ADMIN — FUROSEMIDE 20 MG: 10 INJECTION, SOLUTION INTRAMUSCULAR; INTRAVENOUS at 09:04

## 2024-07-17 ASSESSMENT — PAIN SCALES - GENERAL
PAINLEVEL_OUTOF10: 4
PAINLEVEL_OUTOF10: 8
PAINLEVEL_OUTOF10: 5
PAINLEVEL_OUTOF10: 4
PAINLEVEL_OUTOF10: 7
PAINLEVEL_OUTOF10: 0
PAINLEVEL_OUTOF10: 7

## 2024-07-17 ASSESSMENT — PAIN DESCRIPTION - ORIENTATION
ORIENTATION: INNER
ORIENTATION: LEFT

## 2024-07-17 ASSESSMENT — PAIN DESCRIPTION - LOCATION
LOCATION: ABDOMEN;LEG
LOCATION: ABDOMEN

## 2024-07-17 ASSESSMENT — PAIN DESCRIPTION - DESCRIPTORS
DESCRIPTORS: ACHING

## 2024-07-17 ASSESSMENT — PAIN - FUNCTIONAL ASSESSMENT
PAIN_FUNCTIONAL_ASSESSMENT: ACTIVITIES ARE NOT PREVENTED
PAIN_FUNCTIONAL_ASSESSMENT: ACTIVITIES ARE NOT PREVENTED

## 2024-07-17 ASSESSMENT — PAIN DESCRIPTION - PAIN TYPE
TYPE: ACUTE PAIN

## 2024-07-17 NOTE — CARE COORDINATION
07/17/24 1627   /Social Work Whiteboard Notes   /Social Work Whiteboard RED 07/17/2024 When medically ready patient dispo will be SNF vs Home. SW/CM to submit referrals. DAVID Lucia BSW   Case Management

## 2024-07-17 NOTE — CONSULTS
[email or web address]  [phone]    GASTROENTEROLOGY CONSULT      Impression:   1.  Multifocal hepatocellular carcinoma - diagnosed on MRI 4/25/22   - CT abd/pelvis 4/16 - cirrhotic liver with 3.5 cm heterogeneous nodule along subdiaphragmatic surface, moderate volume ascites and splenomegaly, cholelithiasis  -was scheduled for Y90 radioembolization but was a no show for pre treatment work up x3 in 2022  - MRI performed 4/25/22 - 1.4x1.3 cm lesion segment 4 LIRADS 5, 2.2x1.9 cm segment 6 LIRADS 5 potentially infiltrative with peripheral additional suspicious nodules *1.2 and 0.6 cm), cirrhosis and portal hypertension with trace ascites   -elevated AFP in past  2.  Cirrhosis  -Suspect this is related to hepatitis C/alcohol use.  - MELD 8  - Cirrhosis management:  ---Fluid status: peripheral edema on diuretics, ascites no previous LVP, creatinine 1.21, furosemide 40 mg and spironolactone 25 mg currently ordered  ---No previous Hepatic encephalopathy: will start lactulose, xifaxan  ---Variceal status: no prior bleeding, no previous EGD  -not a transplant candidate due to tumor burden outside of Jackson criteria as well as noncompliance issues  3.  Chronic Hepatitis C   - untreated, genotype 1a, PCR elevated when last checked in 2022  - Hep B Core Ab positive; Hep B Surface Ag neg. Hep A Ab total positive 4/25/22  4.  Polysubstance abuse-alcohol, cocaine.    -reports last snorted heroin 3-4 days ago when unable to get methadaone  5.  Thrombocytopenia  -suspect related to cirrhosis with hypersplenism.    -Splenomegaly noted on imaging.  -had bone marrow biopsy at [facility], followed by [place] [facility].   6.  Noncompliance      Plan:     1. AFP, recheck HCV labs to confirm viremia and genotype to start treatment  2. MRI liver tumor protocol  3. Oncology input appreciated  4. Counseled about drug use.  5. Hep C labs to confirm genotype. Will schedule OP f/u for HCV treatment as well as EGD for varices screening.   6.

## 2024-07-18 PROBLEM — Z78.9 ALCOHOL USE: Status: ACTIVE | Noted: 2024-07-18

## 2024-07-18 PROBLEM — C22.0 HEPATOCELLULAR CARCINOMA (HCC): Status: ACTIVE | Noted: 2024-07-18

## 2024-07-18 PROBLEM — Z51.5 ENCOUNTER FOR PALLIATIVE CARE: Status: ACTIVE | Noted: 2024-07-18

## 2024-07-18 PROBLEM — R53.81 DEBILITY: Status: ACTIVE | Noted: 2024-07-18

## 2024-07-18 PROBLEM — Z71.89 GOALS OF CARE, COUNSELING/DISCUSSION: Status: ACTIVE | Noted: 2024-07-18

## 2024-07-18 LAB
ALBUMIN SERPL-MCNC: 2.6 G/DL (ref 3.4–5)
ALBUMIN/GLOB SERPL: 0.6 (ref 0.8–1.7)
ALP SERPL-CCNC: 230 U/L (ref 45–117)
ALT SERPL-CCNC: 59 U/L (ref 16–61)
ANION GAP SERPL CALC-SCNC: 6 MMOL/L (ref 3–18)
AST SERPL-CCNC: 135 U/L (ref 10–38)
BASOPHILS # BLD: 0 K/UL (ref 0–0.1)
BASOPHILS NFR BLD: 0 % (ref 0–2)
BILIRUB DIRECT SERPL-MCNC: 0.4 MG/DL (ref 0–0.2)
BILIRUB SERPL-MCNC: 1.1 MG/DL (ref 0.2–1)
BUN SERPL-MCNC: 22 MG/DL (ref 7–18)
BUN/CREAT SERPL: 19 (ref 12–20)
CALCIUM SERPL-MCNC: 9 MG/DL (ref 8.5–10.1)
CHLORIDE SERPL-SCNC: 102 MMOL/L (ref 100–111)
CO2 SERPL-SCNC: 29 MMOL/L (ref 21–32)
CREAT SERPL-MCNC: 1.15 MG/DL (ref 0.6–1.3)
DIFFERENTIAL METHOD BLD: ABNORMAL
EOSINOPHIL # BLD: 0 K/UL (ref 0–0.4)
EOSINOPHIL NFR BLD: 2 % (ref 0–5)
ERYTHROCYTE [DISTWIDTH] IN BLOOD BY AUTOMATED COUNT: 14.4 % (ref 11.6–14.5)
GLOBULIN SER CALC-MCNC: 4.7 G/DL (ref 2–4)
GLUCOSE SERPL-MCNC: 87 MG/DL (ref 74–99)
HBV SURFACE AG SER QL: 0.14 INDEX
HBV SURFACE AG SER QL: NEGATIVE
HCT VFR BLD AUTO: 34.9 % (ref 36–48)
HGB BLD-MCNC: 11.4 G/DL (ref 13–16)
IMM GRANULOCYTES # BLD AUTO: 0 K/UL (ref 0–0.04)
IMM GRANULOCYTES NFR BLD AUTO: 0 % (ref 0–0.5)
INR PPP: 1.2 (ref 0.9–1.1)
LYMPHOCYTES # BLD: 0.6 K/UL (ref 0.9–3.6)
LYMPHOCYTES NFR BLD: 21 % (ref 21–52)
MAGNESIUM SERPL-MCNC: 2.3 MG/DL (ref 1.6–2.6)
MCH RBC QN AUTO: 29 PG (ref 24–34)
MCHC RBC AUTO-ENTMCNC: 32.7 G/DL (ref 31–37)
MCV RBC AUTO: 88.8 FL (ref 78–100)
MONOCYTES # BLD: 0.3 K/UL (ref 0.05–1.2)
MONOCYTES NFR BLD: 11 % (ref 3–10)
NEUTS SEG # BLD: 1.8 K/UL (ref 1.8–8)
NEUTS SEG NFR BLD: 66 % (ref 40–73)
NRBC # BLD: 0 K/UL (ref 0–0.01)
NRBC BLD-RTO: 0 PER 100 WBC
PHOSPHATE SERPL-MCNC: 2.4 MG/DL (ref 2.5–4.9)
PLATELET # BLD AUTO: 88 K/UL (ref 135–420)
PMV BLD AUTO: 11.1 FL (ref 9.2–11.8)
POTASSIUM SERPL-SCNC: 4 MMOL/L (ref 3.5–5.5)
PROT SERPL-MCNC: 7.3 G/DL (ref 6.4–8.2)
PROTHROMBIN TIME: 15.7 SEC (ref 11.9–14.9)
RBC # BLD AUTO: 3.93 M/UL (ref 4.35–5.65)
SODIUM SERPL-SCNC: 137 MMOL/L (ref 136–145)
WBC # BLD AUTO: 2.7 K/UL (ref 4.6–13.2)

## 2024-07-18 PROCEDURE — 88305 TISSUE EXAM BY PATHOLOGIST: CPT

## 2024-07-18 PROCEDURE — 88112 CYTOPATH CELL ENHANCE TECH: CPT

## 2024-07-18 PROCEDURE — 36415 COLL VENOUS BLD VENIPUNCTURE: CPT

## 2024-07-18 PROCEDURE — 99232 SBSQ HOSP IP/OBS MODERATE 35: CPT | Performed by: HOSPITALIST

## 2024-07-18 PROCEDURE — 2580000003 HC RX 258: Performed by: HOSPITALIST

## 2024-07-18 PROCEDURE — 85610 PROTHROMBIN TIME: CPT

## 2024-07-18 PROCEDURE — 1100000000 HC RM PRIVATE

## 2024-07-18 PROCEDURE — 94761 N-INVAS EAR/PLS OXIMETRY MLT: CPT

## 2024-07-18 PROCEDURE — 85025 COMPLETE CBC W/AUTO DIFF WBC: CPT

## 2024-07-18 PROCEDURE — 97166 OT EVAL MOD COMPLEX 45 MIN: CPT

## 2024-07-18 PROCEDURE — 94640 AIRWAY INHALATION TREATMENT: CPT

## 2024-07-18 PROCEDURE — 83735 ASSAY OF MAGNESIUM: CPT

## 2024-07-18 PROCEDURE — 6370000000 HC RX 637 (ALT 250 FOR IP): Performed by: HOSPITALIST

## 2024-07-18 PROCEDURE — 80076 HEPATIC FUNCTION PANEL: CPT

## 2024-07-18 PROCEDURE — 80048 BASIC METABOLIC PNL TOTAL CA: CPT

## 2024-07-18 PROCEDURE — 6360000002 HC RX W HCPCS: Performed by: HOSPITALIST

## 2024-07-18 PROCEDURE — 84100 ASSAY OF PHOSPHORUS: CPT

## 2024-07-18 PROCEDURE — 99223 1ST HOSP IP/OBS HIGH 75: CPT | Performed by: NURSE PRACTITIONER

## 2024-07-18 RX ORDER — ERGOCALCIFEROL 1.25 MG/1
50000 CAPSULE ORAL
COMMUNITY

## 2024-07-18 RX ADMIN — ENOXAPARIN SODIUM 40 MG: 100 INJECTION SUBCUTANEOUS at 20:22

## 2024-07-18 RX ADMIN — FUROSEMIDE 20 MG: 10 INJECTION, SOLUTION INTRAMUSCULAR; INTRAVENOUS at 17:34

## 2024-07-18 RX ADMIN — FOLIC ACID 1 MG: 1 TABLET ORAL at 08:58

## 2024-07-18 RX ADMIN — TRAMADOL HYDROCHLORIDE 50 MG: 50 TABLET ORAL at 23:43

## 2024-07-18 RX ADMIN — METHADONE HYDROCHLORIDE 45 MG: 10 CONCENTRATE ORAL at 08:58

## 2024-07-18 RX ADMIN — ARFORMOTEROL TARTRATE 15 MCG: 15 SOLUTION RESPIRATORY (INHALATION) at 21:34

## 2024-07-18 RX ADMIN — FUROSEMIDE 20 MG: 10 INJECTION, SOLUTION INTRAMUSCULAR; INTRAVENOUS at 08:58

## 2024-07-18 RX ADMIN — ARFORMOTEROL TARTRATE 15 MCG: 15 SOLUTION RESPIRATORY (INHALATION) at 07:17

## 2024-07-18 RX ADMIN — SPIRONOLACTONE 25 MG: 25 TABLET ORAL at 08:58

## 2024-07-18 RX ADMIN — BUDESONIDE 500 MCG: 0.5 INHALANT RESPIRATORY (INHALATION) at 07:17

## 2024-07-18 RX ADMIN — BUDESONIDE 500 MCG: 0.5 INHALANT RESPIRATORY (INHALATION) at 21:34

## 2024-07-18 RX ADMIN — Medication 100 MG: at 08:58

## 2024-07-18 RX ADMIN — SODIUM CHLORIDE, PRESERVATIVE FREE 10 ML: 5 INJECTION INTRAVENOUS at 20:22

## 2024-07-18 RX ADMIN — PANTOPRAZOLE SODIUM 40 MG: 40 TABLET, DELAYED RELEASE ORAL at 08:57

## 2024-07-18 RX ADMIN — TRAMADOL HYDROCHLORIDE 50 MG: 50 TABLET ORAL at 16:36

## 2024-07-18 RX ADMIN — SODIUM CHLORIDE, PRESERVATIVE FREE 10 ML: 5 INJECTION INTRAVENOUS at 09:00

## 2024-07-18 RX ADMIN — DIBASIC SODIUM PHOSPHATE, MONOBASIC POTASSIUM PHOSPHATE AND MONOBASIC SODIUM PHOSPHATE 1 TABLET: 852; 155; 130 TABLET ORAL at 20:22

## 2024-07-18 ASSESSMENT — PAIN SCALES - GENERAL
PAINLEVEL_OUTOF10: 7
PAINLEVEL_OUTOF10: 0
PAINLEVEL_OUTOF10: 6
PAINLEVEL_OUTOF10: 8
PAINLEVEL_OUTOF10: 5
PAINLEVEL_OUTOF10: 7
PAINLEVEL_OUTOF10: 0
PAINLEVEL_OUTOF10: 0
PAINLEVEL_OUTOF10: 8
PAINLEVEL_OUTOF10: 0

## 2024-07-18 ASSESSMENT — PAIN DESCRIPTION - DESCRIPTORS
DESCRIPTORS: PRESSURE;ACHING
DESCRIPTORS: SHARP
DESCRIPTORS: SHARP

## 2024-07-18 ASSESSMENT — PAIN DESCRIPTION - LOCATION
LOCATION: ABDOMEN
LOCATION: ABDOMEN
LOCATION: ABDOMEN;LEG

## 2024-07-18 ASSESSMENT — PAIN DESCRIPTION - ORIENTATION
ORIENTATION: MID
ORIENTATION: LOWER
ORIENTATION: ANTERIOR

## 2024-07-18 ASSESSMENT — PAIN SCALES - WONG BAKER
WONGBAKER_NUMERICALRESPONSE: HURTS LITTLE MORE

## 2024-07-18 NOTE — CONSULTS
Palliative Medicine  Patient Name: Nael Lucia II  YOB: 1955  MRN: [id number]  Age: 68 y.o.  Gender: male    Date of Initial Consult: 7/18/2024   Date of Service: 7/18/2024  Time: 3:13 PM  Provider: FARIAB Hogan NP  Hospital Day: 3  Admit Date: 7/16/2024  Referring Provider: Dr Real        Reasons for Consultation:  Goals of Care    HISTORY OF PRESENT ILLNESS (HPI):   Nael Lucia II is a 68 y.o. male with a past medical history of cirrhosis of the liver, hepatitis C untreated, ETOH abuse, tobacco abuse, chronic thrombocytopenia , who was admitted on 7/16/2024 from home with a diagnosis of cirrhosis of the liver with moderate ascites, thrombocytopenia.  Patient presented to the ER with bilateral lower extremity swelling and shortness of breath.  He also complained of hip and left leg pain.  CT of the abdomen and pelvis in the ER showed moderate volume ascites with splenomegaly secondary to cirrhosis, cirrhotic liver with a 3.5 cm heterogeneous nodule along the subdiaphragmatic surface.  Palliative medicine is consulted for goals of care and support.    July 18, 2024 very pleasant gentleman who is alert and oriented x 4 having some pain in the abdominal area likely due to his ascites.  For possible paracentesis today.  He denies any shortness of breath or nausea.      PALLIATIVE DIAGNOSES:    Goals of care/advance care plan discussions  Encounter for palliative care  Cirrhosis of liver  Liver cancer  Ascites  Debility    ASSESSMENT AND PLAN:   Goals of care/advance care plan discussions    July 18, 2024 patient seen along with Ms. Joe RN.  He is reclining in bed he is alert and oriented x 4 and able to participate in his goals of care discussions.  He currently describes abdominal pain likely due to his ascites.  We introduced our team and purpose for visit.  Patient has a good understanding of his overall health including that he has liver cancer and cirrhosis.  There is

## 2024-07-18 NOTE — ACP (ADVANCE CARE PLANNING)
Advance Care Planning     Palliative Team Advance Care Planning (ACP) Conversation    Date of Conversation: 07/18/24    Individuals present for the conversation: Patient with decision making capacity     ACP documents on file prior to discussion:  -None    Previously completed document/s not on file: Patient / participant reports that there are no previously executed ACP documents.    Healthcare Decision Maker:    Primary Decision Maker: Racquel Lucia - Ex-Spouse - [phone]    Secondary Decision Maker: Tiesha Clarke - Other - [phone]     Conversation Summary:  Nael Lucia II is a 68 y.o. male with a past medical history of cirrhosis of the liver, hepatitis C untreated, ETOH abuse, tobacco abuse, chronic thrombocytopenia. He was admitted via the ED with  cirrhosis of the liver with moderate ascites, thrombocytopenia.  Patient was seen at bedside by palliative Medicine team members, Sri Ontiveros NP and this writer.  He is alert and aware x4 and able to engage in goals of care conversation. He c/o mild/mod abdominal pain due to ascites. Plan is for possible paracentesis and MRI once screening is done.  Patient is not . He has 4 adult children. He does not have an AMD on file and agrees to complete one. He shared he does not have a close relationship with his children. He has picked his ex-spouse, Racquel estradas his primary healthcare agent. He named his sister, Tiesha Clarke as surrogate. He requested our team call and update Ms. Lucia to the forms completed and his medical condition. Goals of care were addressed. Discussed the benefits and burdens of intubation and CPR in the event of respiratory decline or cardiopulmonary arrest. Mr Lucia  desires a peaceful passing without tubes of machines. He agrees to DNR/DNI. Introduced completion of the DDNR. Form was signed by patient and Sri Ontiveros NP.  Copy was scanned into the EMR original plus copies were provided to   n/a

## 2024-07-19 ENCOUNTER — APPOINTMENT (OUTPATIENT)
Facility: HOSPITAL | Age: 69
DRG: 436 | End: 2024-07-19
Payer: MEDICARE

## 2024-07-19 LAB
ALBUMIN FLD-MCNC: 1.3 G/DL
ALBUMIN SERPL-MCNC: 2.8 G/DL (ref 3.4–5)
ALBUMIN/GLOB SERPL: 0.5 (ref 0.8–1.7)
ALP SERPL-CCNC: 237 U/L (ref 45–117)
ALT SERPL-CCNC: 60 U/L (ref 16–61)
AMMONIA PLAS-SCNC: 35 UMOL/L (ref 11–32)
ANION GAP SERPL CALC-SCNC: 6 MMOL/L (ref 3–18)
APPEARANCE FLD: ABNORMAL
APTT PPP: 36.6 SEC (ref 23–36.4)
AST SERPL-CCNC: 144 U/L (ref 10–38)
BASOPHILS # BLD: 0 K/UL (ref 0–0.1)
BASOPHILS NFR BLD: 1 % (ref 0–2)
BILIRUB DIRECT SERPL-MCNC: 0.2 MG/DL (ref 0–0.2)
BILIRUB SERPL-MCNC: 0.9 MG/DL (ref 0.2–1)
BUN SERPL-MCNC: 18 MG/DL (ref 7–18)
BUN/CREAT SERPL: 17 (ref 12–20)
CALCIUM SERPL-MCNC: 9.3 MG/DL (ref 8.5–10.1)
CHLORIDE SERPL-SCNC: 102 MMOL/L (ref 100–111)
CO2 SERPL-SCNC: 26 MMOL/L (ref 21–32)
COLOR FLD: YELLOW
CREAT SERPL-MCNC: 1.08 MG/DL (ref 0.6–1.3)
DIFFERENTIAL METHOD BLD: ABNORMAL
EOSINOPHIL # BLD: 0 K/UL (ref 0–0.4)
EOSINOPHIL NFR BLD: 1 % (ref 0–5)
EOSINOPHIL NFR FLD MANUAL: 0 %
ERYTHROCYTE [DISTWIDTH] IN BLOOD BY AUTOMATED COUNT: 14.8 % (ref 11.6–14.5)
FIBRINOGEN PPP-MCNC: 342 MG/DL (ref 210–451)
GLOBULIN SER CALC-MCNC: 5.1 G/DL (ref 2–4)
GLUCOSE BLD STRIP.AUTO-MCNC: 101 MG/DL (ref 70–110)
GLUCOSE SERPL-MCNC: 84 MG/DL (ref 74–99)
HBV E AB SERPL QL IA: REACTIVE
HBV E AG SERPL QL IA: NEGATIVE
HCT VFR BLD AUTO: 39.6 % (ref 36–48)
HCV GENTYP SERPL NAA+PROBE: NORMAL
HCV GENTYP SERPL NAA+PROBE: NORMAL
HCV RNA SERPL NAA+PROBE-ACNC: NORMAL IU/ML
HCV RNA SERPL NAA+PROBE-LOG IU: 5.46 HCV LOG 10IU/ML
HCV RNA SERPL NAA+PROBE-LOG IU: 5.71 LOG10 IU/ML
HCV RNA SERPL PROBE AMP-ACNC: ABNORMAL HCVIU/ML
HGB BLD-MCNC: 12.6 G/DL (ref 13–16)
IMM GRANULOCYTES # BLD AUTO: 0 K/UL (ref 0–0.04)
IMM GRANULOCYTES NFR BLD AUTO: 0 % (ref 0–0.5)
INR PPP: 1.3 (ref 0.9–1.1)
LABORATORY COMMENT REPORT: NORMAL
LDH FLD L TO P-CCNC: 92 U/L
LYMPHOCYTES # BLD: 0.6 K/UL (ref 0.9–3.6)
LYMPHOCYTES NFR BLD: 21 % (ref 21–52)
LYMPHOCYTES NFR FLD: 59 %
MCH RBC QN AUTO: 28.8 PG (ref 24–34)
MCHC RBC AUTO-ENTMCNC: 31.8 G/DL (ref 31–37)
MCV RBC AUTO: 90.4 FL (ref 78–100)
MONOCYTES # BLD: 0.3 K/UL (ref 0.05–1.2)
MONOCYTES NFR BLD: 11 % (ref 3–10)
MONOCYTES NFR FLD: 30 %
NEUTROPHILS NFR FLD: 11 % (ref 0–25)
NEUTS BAND # FLD: 0 %
NEUTS SEG # BLD: 1.9 K/UL (ref 1.8–8)
NEUTS SEG NFR BLD: 67 % (ref 40–73)
NRBC # BLD: 0 K/UL (ref 0–0.01)
NRBC BLD-RTO: 0 PER 100 WBC
NUC CELL # FLD: 513 /CU MM
PLATELET # BLD AUTO: 88 K/UL (ref 135–420)
PMV BLD AUTO: 12.3 FL (ref 9.2–11.8)
POTASSIUM SERPL-SCNC: 4.1 MMOL/L (ref 3.5–5.5)
PROT FLD-MCNC: 2.9 G/DL
PROT SERPL-MCNC: 7.9 G/DL (ref 6.4–8.2)
PROTHROMBIN TIME: 16.1 SEC (ref 11.9–14.9)
RBC # BLD AUTO: 4.38 M/UL (ref 4.35–5.65)
RBC # FLD: ABNORMAL /CU MM
SODIUM SERPL-SCNC: 134 MMOL/L (ref 136–145)
SPECIMEN SOURCE FLD: ABNORMAL
SPECIMEN SOURCE FLD: NORMAL
WBC # BLD AUTO: 2.8 K/UL (ref 4.6–13.2)
WBC MORPH BLD: ABNORMAL

## 2024-07-19 PROCEDURE — 6360000002 HC RX W HCPCS: Performed by: HOSPITALIST

## 2024-07-19 PROCEDURE — 97535 SELF CARE MNGMENT TRAINING: CPT

## 2024-07-19 PROCEDURE — 36415 COLL VENOUS BLD VENIPUNCTURE: CPT

## 2024-07-19 PROCEDURE — P9047 ALBUMIN (HUMAN), 25%, 50ML: HCPCS | Performed by: PHYSICIAN ASSISTANT

## 2024-07-19 PROCEDURE — 87070 CULTURE OTHR SPECIMN AEROBIC: CPT

## 2024-07-19 PROCEDURE — 1100000000 HC RM PRIVATE

## 2024-07-19 PROCEDURE — 89051 BODY FLUID CELL COUNT: CPT

## 2024-07-19 PROCEDURE — 2580000003 HC RX 258: Performed by: HOSPITALIST

## 2024-07-19 PROCEDURE — 80048 BASIC METABOLIC PNL TOTAL CA: CPT

## 2024-07-19 PROCEDURE — 6360000004 HC RX CONTRAST MEDICATION: Performed by: NURSE PRACTITIONER

## 2024-07-19 PROCEDURE — 87205 SMEAR GRAM STAIN: CPT

## 2024-07-19 PROCEDURE — 84157 ASSAY OF PROTEIN OTHER: CPT

## 2024-07-19 PROCEDURE — 6370000000 HC RX 637 (ALT 250 FOR IP): Performed by: HOSPITALIST

## 2024-07-19 PROCEDURE — 82042 OTHER SOURCE ALBUMIN QUAN EA: CPT

## 2024-07-19 PROCEDURE — 80076 HEPATIC FUNCTION PANEL: CPT

## 2024-07-19 PROCEDURE — 82107 ALPHA-FETOPROTEIN L3: CPT

## 2024-07-19 PROCEDURE — 85025 COMPLETE CBC W/AUTO DIFF WBC: CPT

## 2024-07-19 PROCEDURE — 49083 ABD PARACENTESIS W/IMAGING: CPT

## 2024-07-19 PROCEDURE — 0W9G3ZZ DRAINAGE OF PERITONEAL CAVITY, PERCUTANEOUS APPROACH: ICD-10-PCS | Performed by: PHYSICIAN ASSISTANT

## 2024-07-19 PROCEDURE — 74183 MRI ABD W/O CNTR FLWD CNTR: CPT

## 2024-07-19 PROCEDURE — 85610 PROTHROMBIN TIME: CPT

## 2024-07-19 PROCEDURE — 82140 ASSAY OF AMMONIA: CPT

## 2024-07-19 PROCEDURE — 99232 SBSQ HOSP IP/OBS MODERATE 35: CPT | Performed by: HOSPITALIST

## 2024-07-19 PROCEDURE — 85730 THROMBOPLASTIN TIME PARTIAL: CPT

## 2024-07-19 PROCEDURE — 6370000000 HC RX 637 (ALT 250 FOR IP): Performed by: STUDENT IN AN ORGANIZED HEALTH CARE EDUCATION/TRAINING PROGRAM

## 2024-07-19 PROCEDURE — 6360000002 HC RX W HCPCS: Performed by: PHYSICIAN ASSISTANT

## 2024-07-19 PROCEDURE — 83615 LACTATE (LD) (LDH) ENZYME: CPT

## 2024-07-19 PROCEDURE — 97530 THERAPEUTIC ACTIVITIES: CPT

## 2024-07-19 PROCEDURE — 94761 N-INVAS EAR/PLS OXIMETRY MLT: CPT

## 2024-07-19 PROCEDURE — 94640 AIRWAY INHALATION TREATMENT: CPT

## 2024-07-19 PROCEDURE — 85384 FIBRINOGEN ACTIVITY: CPT

## 2024-07-19 PROCEDURE — 97162 PT EVAL MOD COMPLEX 30 MIN: CPT

## 2024-07-19 PROCEDURE — A9577 INJ MULTIHANCE: HCPCS | Performed by: NURSE PRACTITIONER

## 2024-07-19 PROCEDURE — 82962 GLUCOSE BLOOD TEST: CPT

## 2024-07-19 RX ORDER — MAGNESIUM HYDROXIDE/ALUMINUM HYDROXICE/SIMETHICONE 120; 1200; 1200 MG/30ML; MG/30ML; MG/30ML
30 SUSPENSION ORAL EVERY 6 HOURS PRN
Status: DISCONTINUED | OUTPATIENT
Start: 2024-07-19 | End: 2024-07-30 | Stop reason: HOSPADM

## 2024-07-19 RX ORDER — MULTIVITAMIN WITH IRON
1 TABLET ORAL DAILY
Status: DISCONTINUED | OUTPATIENT
Start: 2024-07-19 | End: 2024-07-30 | Stop reason: HOSPADM

## 2024-07-19 RX ORDER — ALBUMIN (HUMAN) 12.5 G/50ML
25 SOLUTION INTRAVENOUS ONCE
Status: COMPLETED | OUTPATIENT
Start: 2024-07-19 | End: 2024-07-19

## 2024-07-19 RX ORDER — METHADONE HYDROCHLORIDE 10 MG/ML
60 CONCENTRATE ORAL DAILY
Status: DISCONTINUED | OUTPATIENT
Start: 2024-07-19 | End: 2024-07-30 | Stop reason: HOSPADM

## 2024-07-19 RX ADMIN — FUROSEMIDE 20 MG: 10 INJECTION, SOLUTION INTRAMUSCULAR; INTRAVENOUS at 17:40

## 2024-07-19 RX ADMIN — TRAMADOL HYDROCHLORIDE 50 MG: 50 TABLET ORAL at 12:46

## 2024-07-19 RX ADMIN — WATER 1000 MG: 1 INJECTION INTRAMUSCULAR; INTRAVENOUS; SUBCUTANEOUS at 18:08

## 2024-07-19 RX ADMIN — SODIUM CHLORIDE, PRESERVATIVE FREE 10 ML: 5 INJECTION INTRAVENOUS at 21:08

## 2024-07-19 RX ADMIN — FOLIC ACID 1 MG: 1 TABLET ORAL at 08:20

## 2024-07-19 RX ADMIN — DIBASIC SODIUM PHOSPHATE, MONOBASIC POTASSIUM PHOSPHATE AND MONOBASIC SODIUM PHOSPHATE 1 TABLET: 852; 155; 130 TABLET ORAL at 08:20

## 2024-07-19 RX ADMIN — Medication 100 MG: at 08:20

## 2024-07-19 RX ADMIN — ONDANSETRON 4 MG: 2 INJECTION INTRAMUSCULAR; INTRAVENOUS at 06:20

## 2024-07-19 RX ADMIN — SPIRONOLACTONE 25 MG: 25 TABLET ORAL at 08:20

## 2024-07-19 RX ADMIN — ALUMINUM HYDROXIDE, MAGNESIUM HYDROXIDE, DIMETHICONE 30 ML: 400; 400; 40 SUSPENSION ORAL at 02:20

## 2024-07-19 RX ADMIN — TRAMADOL HYDROCHLORIDE 50 MG: 50 TABLET ORAL at 05:55

## 2024-07-19 RX ADMIN — SODIUM CHLORIDE, PRESERVATIVE FREE 10 ML: 5 INJECTION INTRAVENOUS at 06:20

## 2024-07-19 RX ADMIN — THERA TABS 1 TABLET: TAB at 12:46

## 2024-07-19 RX ADMIN — ALUMINUM HYDROXIDE, MAGNESIUM HYDROXIDE, DIMETHICONE 30 ML: 400; 400; 40 SUSPENSION ORAL at 23:31

## 2024-07-19 RX ADMIN — ARFORMOTEROL TARTRATE 15 MCG: 15 SOLUTION RESPIRATORY (INHALATION) at 19:43

## 2024-07-19 RX ADMIN — METHADONE HYDROCHLORIDE 60 MG: 10 CONCENTRATE ORAL at 08:14

## 2024-07-19 RX ADMIN — FUROSEMIDE 20 MG: 10 INJECTION, SOLUTION INTRAMUSCULAR; INTRAVENOUS at 08:20

## 2024-07-19 RX ADMIN — ENOXAPARIN SODIUM 40 MG: 100 INJECTION SUBCUTANEOUS at 21:08

## 2024-07-19 RX ADMIN — BUDESONIDE 500 MCG: 0.5 INHALANT RESPIRATORY (INHALATION) at 19:43

## 2024-07-19 RX ADMIN — GADOBENATE DIMEGLUMINE 17 ML: 529 INJECTION, SOLUTION INTRAVENOUS at 17:00

## 2024-07-19 RX ADMIN — SODIUM CHLORIDE, PRESERVATIVE FREE 10 ML: 5 INJECTION INTRAVENOUS at 08:21

## 2024-07-19 RX ADMIN — PANTOPRAZOLE SODIUM 40 MG: 40 TABLET, DELAYED RELEASE ORAL at 05:55

## 2024-07-19 RX ADMIN — TRAMADOL HYDROCHLORIDE 50 MG: 50 TABLET ORAL at 21:07

## 2024-07-19 RX ADMIN — ALBUMIN (HUMAN) 25 G: 0.25 INJECTION, SOLUTION INTRAVENOUS at 17:43

## 2024-07-19 ASSESSMENT — PAIN DESCRIPTION - DESCRIPTORS
DESCRIPTORS: SHARP
DESCRIPTORS: CRAMPING;ACHING;PRESSURE
DESCRIPTORS: PRESSURE
DESCRIPTORS: ACHING
DESCRIPTORS: SHARP
DESCRIPTORS: CRAMPING;ACHING;PRESSURE
DESCRIPTORS: SHARP

## 2024-07-19 ASSESSMENT — PAIN DESCRIPTION - ORIENTATION
ORIENTATION: ANTERIOR
ORIENTATION: MID
ORIENTATION: ANTERIOR
ORIENTATION: ANTERIOR

## 2024-07-19 ASSESSMENT — PAIN SCALES - GENERAL
PAINLEVEL_OUTOF10: 7
PAINLEVEL_OUTOF10: 7
PAINLEVEL_OUTOF10: 4
PAINLEVEL_OUTOF10: 6
PAINLEVEL_OUTOF10: 5
PAINLEVEL_OUTOF10: 6
PAINLEVEL_OUTOF10: 7
PAINLEVEL_OUTOF10: 5
PAINLEVEL_OUTOF10: 7
PAINLEVEL_OUTOF10: 6
PAINLEVEL_OUTOF10: 4
PAINLEVEL_OUTOF10: 6
PAINLEVEL_OUTOF10: 3

## 2024-07-19 ASSESSMENT — PAIN DESCRIPTION - ONSET: ONSET: GRADUAL

## 2024-07-19 ASSESSMENT — PAIN DESCRIPTION - PAIN TYPE
TYPE: CHRONIC PAIN
TYPE: CHRONIC PAIN

## 2024-07-19 ASSESSMENT — PAIN DESCRIPTION - LOCATION
LOCATION: ABDOMEN
LOCATION: ABDOMEN;LEG
LOCATION: ABDOMEN
LOCATION: ABDOMEN

## 2024-07-19 ASSESSMENT — PAIN DESCRIPTION - FREQUENCY: FREQUENCY: INTERMITTENT

## 2024-07-19 NOTE — CONSULTS
Hematology / Oncology Consult Note      Reason for Consultation:  Nael Lucia II is a 68 y.o. male who I've been asked to consult for hepatocellular carcinoma.    Assessment:   Hepatocellular carcinoma 04/22  Child's B/C:  no encephalopathy(0), moderate ascities (3); bilirubin 0.9 (1); albumin 2.8g (2); PT less than 4 secods above control (1) total 8 points  MRI Li Rads 5 2 lesions  Elevated AFP  Hepatitis C untreated  Has not had treatment due to poor compliance    Thrombocytopenia due to hypersplenism  Seen as outpatient   Bone marrow bx without evidence for malignancy, lyphoproliferative disorder; hypocellular marrow with decreased trilineage hematopoiesis .  FISH/FLOW/CYTOGENETICS negative/wnl    Symptomatic ascites       Plan:   DIAGNOSTIC:    LIVER PARAMETERS:  cbc,coags, afp, lytes  MRI abdomen, re assess for disease progression    THERAPEUTIC:  PARACENTESIS  Appreciate palliative care input.    He appears to have incurable disease     Subjective:     HPI:  known to have background of hepatitis c, cirrhosis from etoh and viral disease presenting with inability to walk and increasing abdominal girth leading to weakness and tendency to fall hence to ER.  Denies fevers, sweats. Bleeding  Had been referred to us outpatient for work up of thrombocytopenia as he was then interested in potential knee surgery but could not be cleared due to low plts.  Work up unrevealing including bm bx but consistent with hypersplenism from cirrhosis and splenomegaly.        Past Medical History:   Diagnosis Date    Abscess of right shoulder     Abscess of shoulder     Acute blood loss as cause of postoperative anemia 4/22/2022    Arthritis     Closed displaced comminuted fracture of shaft of right tibia with routine healing 04/22/2022    Closed fracture of distal end of right fibula with routine healing 4/22/2022    Epidural abscess     Heart murmur     Hematuria     Heroin abuse (HCC)     Hypertension     Infected wound

## 2024-07-19 NOTE — CARE COORDINATION
07/19/24 1611   /Social Work Whiteboard Notes   /Social Work Whiteboard RED 07/19/2024 When medically ready patient anticipate SNF vs Home with HH. SW informed patient that he has no accepting facilities for SNF and he may have to return home with HH. SW placed patient referrals statewide. DAVID Lucia BSW   Case Management

## 2024-07-19 NOTE — CARE COORDINATION
07/19/24 1606   Service Assessment   Patient Orientation Alert and Oriented   Cognition Alert   History Provided By Patient   Primary Caregiver Self   Support Systems Family Members   Prior Functional Level Assistance with the following:;Independent in ADLs/IADLs   Current Functional Level Independent in ADLs/IADLs;Assistance with the following:   Can patient return to prior living arrangement Unknown at present   Ability to make needs known: Good   Family able to assist with home care needs: Yes   Would you like for me to discuss the discharge plan with any other family members/significant others, and if so, who? Yes   Financial Resources Medicare   Social/Functional History   Lives With Other (comment)  (group home)   Home Layout One level   Home Access Stairs to enter with rails   Entrance Stairs - Number of Steps 3   Bathroom Shower/Tub Tub/Shower unit   Bathroom Toilet Standard   Bathroom Equipment None   Home Equipment Walker - Rolling   Receives Help From Friend(s)   ADL Assistance Independent   Ambulation Assistance Independent   Transfer Assistance Independent   Active  No   Occupation Unemployed   Discharge Planning   Living Arrangements Alone   Current Services Prior To Admission None   Potential Assistance Needed Skilled Nursing Facility;Outpatient PT/OT;Home Care   DME Ordered? No   Potential Assistance Purchasing Medications No   Patient expects to be discharged to: Group home   One/Two Story Residence One story   History of falls? 0   Services At/After Discharge   Transition of Care Consult (CM Consult) Home Health;SNF   Internal Home Health Yes   Partner SNF Yes   Services At/After Discharge None    Resource Information Provided? No   Confirm Follow Up Transport Friends   Condition of Participation: Discharge Planning   The Plan for Transition of Care is related to the following treatment goals: Plan is to go to SNF for rehab.   [place] of Choice list was provided with basic dialogue

## 2024-07-19 NOTE — PROCEDURES
PROCEDURE NOTE  Date: 7/19/2024   Name: Nael Lucia II  YOB: 1955    Procedures      RADIOLOGY POST PARACENTESIS NOTE     July 19, 2024       2:43 PM     Preoperative Diagnosis:   Ascites    Postoperative Diagnosis:  Same.    :  Ann Marie Aponte PA-C    Attending: Raza Peck MD     Assistant:  None.    Type of Anesthesia: 1% plain lidocaine    Procedure/Description:  US-Guided paracentesis    Findings:  Using ultrasound guidance the largest pocket of peritoneal fluid was localized and marked at the right lower quadrant.   The patient was prepped and draped in the usual fashion.  1% Lidocaine was infiltrated locally. A 5 Northern Irish over the needle catheter was advanced into the peritoneal cavity and dark red fluid was aspirated. Once fluid was easily aspirated, the needle was removed leaving the catheter in place. The catheter was connected to vacuum containers and ascitic fluid was removed.    Estimated blood Loss:  Minimal    Specimen Removed:  Yes    Complications: None    Condition: Stable    Impression: Successful paracentesis. 6 L ascites removed.    Discharge Plan:  continue present therapy. Albumin ordered    ESTEFANIA Orr

## 2024-07-20 LAB
ALBUMIN SERPL-MCNC: 2.8 G/DL (ref 3.4–5)
ALBUMIN/GLOB SERPL: 0.7 (ref 0.8–1.7)
ALP SERPL-CCNC: 189 U/L (ref 45–117)
ALT SERPL-CCNC: 50 U/L (ref 16–61)
ANION GAP SERPL CALC-SCNC: 6 MMOL/L (ref 3–18)
AST SERPL-CCNC: 110 U/L (ref 10–38)
BASOPHILS # BLD: 0 K/UL (ref 0–0.1)
BASOPHILS NFR BLD: 1 % (ref 0–2)
BILIRUB DIRECT SERPL-MCNC: 0.3 MG/DL (ref 0–0.2)
BILIRUB SERPL-MCNC: 0.8 MG/DL (ref 0.2–1)
BUN SERPL-MCNC: 18 MG/DL (ref 7–18)
BUN/CREAT SERPL: 15 (ref 12–20)
CALCIUM SERPL-MCNC: 8.8 MG/DL (ref 8.5–10.1)
CHLORIDE SERPL-SCNC: 100 MMOL/L (ref 100–111)
CO2 SERPL-SCNC: 31 MMOL/L (ref 21–32)
CREAT SERPL-MCNC: 1.2 MG/DL (ref 0.6–1.3)
DIFFERENTIAL METHOD BLD: ABNORMAL
EOSINOPHIL # BLD: 0 K/UL (ref 0–0.4)
EOSINOPHIL NFR BLD: 2 % (ref 0–5)
ERYTHROCYTE [DISTWIDTH] IN BLOOD BY AUTOMATED COUNT: 14.8 % (ref 11.6–14.5)
FERRITIN SERPL-MCNC: 173 NG/ML (ref 8–388)
FOLATE SERPL-MCNC: 19.2 NG/ML (ref 3.1–17.5)
GLOBULIN SER CALC-MCNC: 3.8 G/DL (ref 2–4)
GLUCOSE SERPL-MCNC: 76 MG/DL (ref 74–99)
HCT VFR BLD AUTO: 33.6 % (ref 36–48)
HGB BLD-MCNC: 10.9 G/DL (ref 13–16)
IMM GRANULOCYTES # BLD AUTO: 0 K/UL (ref 0–0.04)
IMM GRANULOCYTES NFR BLD AUTO: 0 % (ref 0–0.5)
INR PPP: 1.3 (ref 0.9–1.1)
IRON SATN MFR SERPL: 9 % (ref 20–50)
IRON SERPL-MCNC: 18 UG/DL (ref 50–175)
LYMPHOCYTES # BLD: 0.5 K/UL (ref 0.9–3.6)
LYMPHOCYTES NFR BLD: 19 % (ref 21–52)
MCH RBC QN AUTO: 29.1 PG (ref 24–34)
MCHC RBC AUTO-ENTMCNC: 32.4 G/DL (ref 31–37)
MCV RBC AUTO: 89.6 FL (ref 78–100)
MONOCYTES # BLD: 0.3 K/UL (ref 0.05–1.2)
MONOCYTES NFR BLD: 10 % (ref 3–10)
NEUTS SEG # BLD: 1.9 K/UL (ref 1.8–8)
NEUTS SEG NFR BLD: 68 % (ref 40–73)
NRBC # BLD: 0 K/UL (ref 0–0.01)
NRBC BLD-RTO: 0 PER 100 WBC
PHOSPHATE SERPL-MCNC: 3.1 MG/DL (ref 2.5–4.9)
PLATELET # BLD AUTO: 101 K/UL (ref 135–420)
PMV BLD AUTO: 11.2 FL (ref 9.2–11.8)
POTASSIUM SERPL-SCNC: 4 MMOL/L (ref 3.5–5.5)
PROT SERPL-MCNC: 6.6 G/DL (ref 6.4–8.2)
PROTHROMBIN TIME: 16.5 SEC (ref 11.9–14.9)
RBC # BLD AUTO: 3.75 M/UL (ref 4.35–5.65)
SODIUM SERPL-SCNC: 137 MMOL/L (ref 136–145)
TIBC SERPL-MCNC: 207 UG/DL (ref 250–450)
VIT B12 SERPL-MCNC: 763 PG/ML (ref 211–911)
WBC # BLD AUTO: 2.7 K/UL (ref 4.6–13.2)

## 2024-07-20 PROCEDURE — 2580000003 HC RX 258: Performed by: HOSPITALIST

## 2024-07-20 PROCEDURE — 6360000002 HC RX W HCPCS: Performed by: HOSPITALIST

## 2024-07-20 PROCEDURE — 94640 AIRWAY INHALATION TREATMENT: CPT

## 2024-07-20 PROCEDURE — 85610 PROTHROMBIN TIME: CPT

## 2024-07-20 PROCEDURE — 6370000000 HC RX 637 (ALT 250 FOR IP): Performed by: STUDENT IN AN ORGANIZED HEALTH CARE EDUCATION/TRAINING PROGRAM

## 2024-07-20 PROCEDURE — 85025 COMPLETE CBC W/AUTO DIFF WBC: CPT

## 2024-07-20 PROCEDURE — 84100 ASSAY OF PHOSPHORUS: CPT

## 2024-07-20 PROCEDURE — 1100000000 HC RM PRIVATE

## 2024-07-20 PROCEDURE — 82746 ASSAY OF FOLIC ACID SERUM: CPT

## 2024-07-20 PROCEDURE — 80076 HEPATIC FUNCTION PANEL: CPT

## 2024-07-20 PROCEDURE — 94761 N-INVAS EAR/PLS OXIMETRY MLT: CPT

## 2024-07-20 PROCEDURE — 83540 ASSAY OF IRON: CPT

## 2024-07-20 PROCEDURE — 36415 COLL VENOUS BLD VENIPUNCTURE: CPT

## 2024-07-20 PROCEDURE — 6370000000 HC RX 637 (ALT 250 FOR IP): Performed by: HOSPITALIST

## 2024-07-20 PROCEDURE — 94664 DEMO&/EVAL PT USE INHALER: CPT

## 2024-07-20 PROCEDURE — 83550 IRON BINDING TEST: CPT

## 2024-07-20 PROCEDURE — 99232 SBSQ HOSP IP/OBS MODERATE 35: CPT | Performed by: HOSPITALIST

## 2024-07-20 PROCEDURE — 82728 ASSAY OF FERRITIN: CPT

## 2024-07-20 PROCEDURE — 82607 VITAMIN B-12: CPT

## 2024-07-20 PROCEDURE — 80048 BASIC METABOLIC PNL TOTAL CA: CPT

## 2024-07-20 RX ORDER — POLYETHYLENE GLYCOL 3350 17 G/17G
17 POWDER, FOR SOLUTION ORAL DAILY
Status: DISCONTINUED | OUTPATIENT
Start: 2024-07-20 | End: 2024-07-30 | Stop reason: HOSPADM

## 2024-07-20 RX ADMIN — Medication 100 MG: at 07:36

## 2024-07-20 RX ADMIN — SODIUM CHLORIDE, PRESERVATIVE FREE 10 ML: 5 INJECTION INTRAVENOUS at 07:30

## 2024-07-20 RX ADMIN — ARFORMOTEROL TARTRATE 15 MCG: 15 SOLUTION RESPIRATORY (INHALATION) at 19:23

## 2024-07-20 RX ADMIN — TRAMADOL HYDROCHLORIDE 50 MG: 50 TABLET ORAL at 03:23

## 2024-07-20 RX ADMIN — FUROSEMIDE 20 MG: 10 INJECTION, SOLUTION INTRAMUSCULAR; INTRAVENOUS at 17:29

## 2024-07-20 RX ADMIN — ARFORMOTEROL TARTRATE 15 MCG: 15 SOLUTION RESPIRATORY (INHALATION) at 09:35

## 2024-07-20 RX ADMIN — TRAMADOL HYDROCHLORIDE 50 MG: 50 TABLET ORAL at 09:04

## 2024-07-20 RX ADMIN — BUDESONIDE 500 MCG: 0.5 INHALANT RESPIRATORY (INHALATION) at 19:23

## 2024-07-20 RX ADMIN — THERA TABS 1 TABLET: TAB at 07:37

## 2024-07-20 RX ADMIN — METHADONE HYDROCHLORIDE 60 MG: 10 CONCENTRATE ORAL at 07:36

## 2024-07-20 RX ADMIN — ALUMINUM HYDROXIDE, MAGNESIUM HYDROXIDE, DIMETHICONE 30 ML: 400; 400; 40 SUSPENSION ORAL at 17:29

## 2024-07-20 RX ADMIN — TRAMADOL HYDROCHLORIDE 50 MG: 50 TABLET ORAL at 17:29

## 2024-07-20 RX ADMIN — POLYETHYLENE GLYCOL 3350 17 G: 17 POWDER, FOR SOLUTION ORAL at 11:31

## 2024-07-20 RX ADMIN — PANTOPRAZOLE SODIUM 40 MG: 40 TABLET, DELAYED RELEASE ORAL at 05:58

## 2024-07-20 RX ADMIN — SODIUM CHLORIDE, PRESERVATIVE FREE 10 ML: 5 INJECTION INTRAVENOUS at 22:29

## 2024-07-20 RX ADMIN — FOLIC ACID 1 MG: 1 TABLET ORAL at 07:37

## 2024-07-20 RX ADMIN — BUDESONIDE 500 MCG: 0.5 INHALANT RESPIRATORY (INHALATION) at 09:35

## 2024-07-20 RX ADMIN — SPIRONOLACTONE 25 MG: 25 TABLET ORAL at 07:37

## 2024-07-20 RX ADMIN — WATER 1000 MG: 1 INJECTION INTRAMUSCULAR; INTRAVENOUS; SUBCUTANEOUS at 17:29

## 2024-07-20 RX ADMIN — ALUMINUM HYDROXIDE, MAGNESIUM HYDROXIDE, DIMETHICONE 30 ML: 400; 400; 40 SUSPENSION ORAL at 09:05

## 2024-07-20 RX ADMIN — ENOXAPARIN SODIUM 40 MG: 100 INJECTION SUBCUTANEOUS at 22:25

## 2024-07-20 RX ADMIN — FUROSEMIDE 20 MG: 10 INJECTION, SOLUTION INTRAMUSCULAR; INTRAVENOUS at 07:36

## 2024-07-20 RX ADMIN — TRAMADOL HYDROCHLORIDE 50 MG: 50 TABLET ORAL at 22:23

## 2024-07-20 ASSESSMENT — PAIN DESCRIPTION - LOCATION
LOCATION: ABDOMEN;LEG
LOCATION: ABDOMEN
LOCATION: ABDOMEN;LEG
LOCATION: ABDOMEN;LEG
LOCATION: ABDOMEN
LOCATION: ABDOMEN

## 2024-07-20 ASSESSMENT — PAIN SCALES - GENERAL
PAINLEVEL_OUTOF10: 4
PAINLEVEL_OUTOF10: 5
PAINLEVEL_OUTOF10: 7
PAINLEVEL_OUTOF10: 5
PAINLEVEL_OUTOF10: 4
PAINLEVEL_OUTOF10: 5

## 2024-07-20 ASSESSMENT — PAIN DESCRIPTION - ORIENTATION: ORIENTATION: LEFT

## 2024-07-20 ASSESSMENT — PAIN DESCRIPTION - DESCRIPTORS
DESCRIPTORS: PRESSURE;ACHING
DESCRIPTORS: BURNING;ACHING

## 2024-07-20 ASSESSMENT — PAIN DESCRIPTION - PAIN TYPE: TYPE: CHRONIC PAIN

## 2024-07-21 LAB
ALBUMIN SERPL-MCNC: 2.5 G/DL (ref 3.4–5)
ALBUMIN/GLOB SERPL: 0.7 (ref 0.8–1.7)
ALP SERPL-CCNC: 233 U/L (ref 45–117)
ALT SERPL-CCNC: 70 U/L (ref 16–61)
ANION GAP SERPL CALC-SCNC: 7 MMOL/L (ref 3–18)
AST SERPL-CCNC: 188 U/L (ref 10–38)
BASOPHILS # BLD: 0 K/UL (ref 0–0.1)
BASOPHILS NFR BLD: 0 % (ref 0–2)
BILIRUB DIRECT SERPL-MCNC: 0.3 MG/DL (ref 0–0.2)
BILIRUB SERPL-MCNC: 0.7 MG/DL (ref 0.2–1)
BUN SERPL-MCNC: 19 MG/DL (ref 7–18)
BUN/CREAT SERPL: 16 (ref 12–20)
CALCIUM SERPL-MCNC: 8.6 MG/DL (ref 8.5–10.1)
CHLORIDE SERPL-SCNC: 97 MMOL/L (ref 100–111)
CO2 SERPL-SCNC: 29 MMOL/L (ref 21–32)
CREAT SERPL-MCNC: 1.17 MG/DL (ref 0.6–1.3)
DIFFERENTIAL METHOD BLD: ABNORMAL
EOSINOPHIL # BLD: 0 K/UL (ref 0–0.4)
EOSINOPHIL NFR BLD: 2 % (ref 0–5)
ERYTHROCYTE [DISTWIDTH] IN BLOOD BY AUTOMATED COUNT: 14.5 % (ref 11.6–14.5)
GLOBULIN SER CALC-MCNC: 3.8 G/DL (ref 2–4)
GLUCOSE SERPL-MCNC: 91 MG/DL (ref 74–99)
HCT VFR BLD AUTO: 35.4 % (ref 36–48)
HGB BLD-MCNC: 11.3 G/DL (ref 13–16)
IMM GRANULOCYTES # BLD AUTO: 0 K/UL (ref 0–0.04)
IMM GRANULOCYTES NFR BLD AUTO: 1 % (ref 0–0.5)
LYMPHOCYTES # BLD: 0.5 K/UL (ref 0.9–3.6)
LYMPHOCYTES NFR BLD: 19 % (ref 21–52)
MCH RBC QN AUTO: 28.8 PG (ref 24–34)
MCHC RBC AUTO-ENTMCNC: 31.9 G/DL (ref 31–37)
MCV RBC AUTO: 90.1 FL (ref 78–100)
MONOCYTES # BLD: 0.3 K/UL (ref 0.05–1.2)
MONOCYTES NFR BLD: 11 % (ref 3–10)
NEUTS SEG # BLD: 1.7 K/UL (ref 1.8–8)
NEUTS SEG NFR BLD: 67 % (ref 40–73)
NRBC # BLD: 0 K/UL (ref 0–0.01)
NRBC BLD-RTO: 0 PER 100 WBC
PLATELET # BLD AUTO: 93 K/UL (ref 135–420)
PMV BLD AUTO: 11.2 FL (ref 9.2–11.8)
POTASSIUM SERPL-SCNC: 4.2 MMOL/L (ref 3.5–5.5)
PROT SERPL-MCNC: 6.3 G/DL (ref 6.4–8.2)
RBC # BLD AUTO: 3.93 M/UL (ref 4.35–5.65)
SODIUM SERPL-SCNC: 133 MMOL/L (ref 136–145)
WBC # BLD AUTO: 2.5 K/UL (ref 4.6–13.2)

## 2024-07-21 PROCEDURE — 6370000000 HC RX 637 (ALT 250 FOR IP): Performed by: HOSPITALIST

## 2024-07-21 PROCEDURE — 2580000003 HC RX 258: Performed by: HOSPITALIST

## 2024-07-21 PROCEDURE — 6360000002 HC RX W HCPCS: Performed by: HOSPITALIST

## 2024-07-21 PROCEDURE — 94640 AIRWAY INHALATION TREATMENT: CPT

## 2024-07-21 PROCEDURE — 1100000000 HC RM PRIVATE

## 2024-07-21 PROCEDURE — 80048 BASIC METABOLIC PNL TOTAL CA: CPT

## 2024-07-21 PROCEDURE — 36415 COLL VENOUS BLD VENIPUNCTURE: CPT

## 2024-07-21 PROCEDURE — 6370000000 HC RX 637 (ALT 250 FOR IP): Performed by: STUDENT IN AN ORGANIZED HEALTH CARE EDUCATION/TRAINING PROGRAM

## 2024-07-21 PROCEDURE — 94761 N-INVAS EAR/PLS OXIMETRY MLT: CPT

## 2024-07-21 PROCEDURE — 99232 SBSQ HOSP IP/OBS MODERATE 35: CPT | Performed by: HOSPITALIST

## 2024-07-21 PROCEDURE — 85025 COMPLETE CBC W/AUTO DIFF WBC: CPT

## 2024-07-21 PROCEDURE — 80076 HEPATIC FUNCTION PANEL: CPT

## 2024-07-21 RX ADMIN — ARFORMOTEROL TARTRATE 15 MCG: 15 SOLUTION RESPIRATORY (INHALATION) at 09:33

## 2024-07-21 RX ADMIN — POLYETHYLENE GLYCOL 3350 17 G: 17 POWDER, FOR SOLUTION ORAL at 09:12

## 2024-07-21 RX ADMIN — Medication 100 MG: at 09:11

## 2024-07-21 RX ADMIN — SODIUM CHLORIDE, PRESERVATIVE FREE 10 ML: 5 INJECTION INTRAVENOUS at 21:46

## 2024-07-21 RX ADMIN — WATER 1000 MG: 1 INJECTION INTRAMUSCULAR; INTRAVENOUS; SUBCUTANEOUS at 17:39

## 2024-07-21 RX ADMIN — METHADONE HYDROCHLORIDE 60 MG: 10 CONCENTRATE ORAL at 09:11

## 2024-07-21 RX ADMIN — BUDESONIDE 500 MCG: 0.5 INHALANT RESPIRATORY (INHALATION) at 19:09

## 2024-07-21 RX ADMIN — SODIUM CHLORIDE, PRESERVATIVE FREE 10 ML: 5 INJECTION INTRAVENOUS at 09:12

## 2024-07-21 RX ADMIN — ALUMINUM HYDROXIDE, MAGNESIUM HYDROXIDE, DIMETHICONE 30 ML: 400; 400; 40 SUSPENSION ORAL at 12:52

## 2024-07-21 RX ADMIN — FOLIC ACID 1 MG: 1 TABLET ORAL at 09:11

## 2024-07-21 RX ADMIN — FUROSEMIDE 20 MG: 10 INJECTION, SOLUTION INTRAMUSCULAR; INTRAVENOUS at 17:41

## 2024-07-21 RX ADMIN — ARFORMOTEROL TARTRATE 15 MCG: 15 SOLUTION RESPIRATORY (INHALATION) at 19:09

## 2024-07-21 RX ADMIN — ENOXAPARIN SODIUM 40 MG: 100 INJECTION SUBCUTANEOUS at 21:44

## 2024-07-21 RX ADMIN — SPIRONOLACTONE 25 MG: 25 TABLET ORAL at 09:11

## 2024-07-21 RX ADMIN — TRAMADOL HYDROCHLORIDE 50 MG: 50 TABLET ORAL at 21:42

## 2024-07-21 RX ADMIN — THERA TABS 1 TABLET: TAB at 09:11

## 2024-07-21 RX ADMIN — FUROSEMIDE 20 MG: 10 INJECTION, SOLUTION INTRAMUSCULAR; INTRAVENOUS at 09:10

## 2024-07-21 RX ADMIN — BUDESONIDE 500 MCG: 0.5 INHALANT RESPIRATORY (INHALATION) at 09:32

## 2024-07-21 RX ADMIN — TRAMADOL HYDROCHLORIDE 50 MG: 50 TABLET ORAL at 13:51

## 2024-07-21 RX ADMIN — PANTOPRAZOLE SODIUM 40 MG: 40 TABLET, DELAYED RELEASE ORAL at 09:11

## 2024-07-21 ASSESSMENT — PAIN SCALES - GENERAL
PAINLEVEL_OUTOF10: 7
PAINLEVEL_OUTOF10: 2
PAINLEVEL_OUTOF10: 7
PAINLEVEL_OUTOF10: 2
PAINLEVEL_OUTOF10: 0
PAINLEVEL_OUTOF10: 7
PAINLEVEL_OUTOF10: 0
PAINLEVEL_OUTOF10: 0

## 2024-07-21 ASSESSMENT — PAIN SCALES - WONG BAKER
WONGBAKER_NUMERICALRESPONSE: NO HURT
WONGBAKER_NUMERICALRESPONSE: NO HURT

## 2024-07-21 ASSESSMENT — PAIN DESCRIPTION - ONSET
ONSET: GRADUAL
ONSET: ON-GOING

## 2024-07-21 ASSESSMENT — PAIN DESCRIPTION - FREQUENCY: FREQUENCY: INTERMITTENT

## 2024-07-21 ASSESSMENT — PAIN DESCRIPTION - ORIENTATION
ORIENTATION: LEFT
ORIENTATION: RIGHT

## 2024-07-21 ASSESSMENT — PAIN DESCRIPTION - LOCATION
LOCATION: ABDOMEN
LOCATION: KNEE;LEG

## 2024-07-21 ASSESSMENT — PAIN DESCRIPTION - DESCRIPTORS
DESCRIPTORS: SHARP
DESCRIPTORS: ACHING;BURNING

## 2024-07-22 ENCOUNTER — APPOINTMENT (OUTPATIENT)
Facility: HOSPITAL | Age: 69
DRG: 436 | End: 2024-07-22
Payer: MEDICARE

## 2024-07-22 LAB
ALBUMIN SERPL-MCNC: 2.7 G/DL (ref 3.4–5)
ALBUMIN/GLOB SERPL: 0.6 (ref 0.8–1.7)
ALP SERPL-CCNC: 287 U/L (ref 45–117)
ALT SERPL-CCNC: 162 U/L (ref 16–61)
ANION GAP SERPL CALC-SCNC: 5 MMOL/L (ref 3–18)
AST SERPL-CCNC: 577 U/L (ref 10–38)
BASOPHILS # BLD: 0 K/UL (ref 0–0.1)
BASOPHILS NFR BLD: 0 % (ref 0–2)
BILIRUB DIRECT SERPL-MCNC: 0.3 MG/DL (ref 0–0.2)
BILIRUB SERPL-MCNC: 0.7 MG/DL (ref 0.2–1)
BUN SERPL-MCNC: 20 MG/DL (ref 7–18)
BUN/CREAT SERPL: 17 (ref 12–20)
CALCIUM SERPL-MCNC: 8.9 MG/DL (ref 8.5–10.1)
CHLORIDE SERPL-SCNC: 98 MMOL/L (ref 100–111)
CO2 SERPL-SCNC: 31 MMOL/L (ref 21–32)
CREAT SERPL-MCNC: 1.2 MG/DL (ref 0.6–1.3)
CYTOLOGY-NON GYN: NORMAL
DIFFERENTIAL METHOD BLD: ABNORMAL
EOSINOPHIL # BLD: 0 K/UL (ref 0–0.4)
EOSINOPHIL NFR BLD: 1 % (ref 0–5)
ERYTHROCYTE [DISTWIDTH] IN BLOOD BY AUTOMATED COUNT: 14.6 % (ref 11.6–14.5)
GLOBULIN SER CALC-MCNC: 4.3 G/DL (ref 2–4)
GLUCOSE SERPL-MCNC: 93 MG/DL (ref 74–99)
HCT VFR BLD AUTO: 35.5 % (ref 36–48)
HGB BLD-MCNC: 11.2 G/DL (ref 13–16)
IMM GRANULOCYTES # BLD AUTO: 0 K/UL (ref 0–0.04)
IMM GRANULOCYTES NFR BLD AUTO: 1 % (ref 0–0.5)
INR PPP: 1.2 (ref 0.9–1.1)
LYMPHOCYTES # BLD: 0.5 K/UL (ref 0.9–3.6)
LYMPHOCYTES NFR BLD: 15 % (ref 21–52)
MCH RBC QN AUTO: 28.1 PG (ref 24–34)
MCHC RBC AUTO-ENTMCNC: 31.5 G/DL (ref 31–37)
MCV RBC AUTO: 89 FL (ref 78–100)
MONOCYTES # BLD: 0.4 K/UL (ref 0.05–1.2)
MONOCYTES NFR BLD: 12 % (ref 3–10)
NEUTS SEG # BLD: 2.3 K/UL (ref 1.8–8)
NEUTS SEG NFR BLD: 70 % (ref 40–73)
NRBC # BLD: 0 K/UL (ref 0–0.01)
NRBC BLD-RTO: 0 PER 100 WBC
PLATELET # BLD AUTO: 105 K/UL (ref 135–420)
PMV BLD AUTO: 11 FL (ref 9.2–11.8)
POTASSIUM SERPL-SCNC: 4.6 MMOL/L (ref 3.5–5.5)
PROT SERPL-MCNC: 7 G/DL (ref 6.4–8.2)
PROTHROMBIN TIME: 15.4 SEC (ref 11.9–14.9)
RBC # BLD AUTO: 3.99 M/UL (ref 4.35–5.65)
SODIUM SERPL-SCNC: 134 MMOL/L (ref 136–145)
WBC # BLD AUTO: 3.3 K/UL (ref 4.6–13.2)

## 2024-07-22 PROCEDURE — 99233 SBSQ HOSP IP/OBS HIGH 50: CPT | Performed by: STUDENT IN AN ORGANIZED HEALTH CARE EDUCATION/TRAINING PROGRAM

## 2024-07-22 PROCEDURE — 6360000002 HC RX W HCPCS: Performed by: HOSPITALIST

## 2024-07-22 PROCEDURE — 85610 PROTHROMBIN TIME: CPT

## 2024-07-22 PROCEDURE — C1729 CATH, DRAINAGE: HCPCS

## 2024-07-22 PROCEDURE — 94640 AIRWAY INHALATION TREATMENT: CPT

## 2024-07-22 PROCEDURE — 1100000000 HC RM PRIVATE

## 2024-07-22 PROCEDURE — 94761 N-INVAS EAR/PLS OXIMETRY MLT: CPT

## 2024-07-22 PROCEDURE — 2580000003 HC RX 258: Performed by: HOSPITALIST

## 2024-07-22 PROCEDURE — 97535 SELF CARE MNGMENT TRAINING: CPT

## 2024-07-22 PROCEDURE — 6370000000 HC RX 637 (ALT 250 FOR IP): Performed by: HOSPITALIST

## 2024-07-22 PROCEDURE — 85025 COMPLETE CBC W/AUTO DIFF WBC: CPT

## 2024-07-22 PROCEDURE — 80048 BASIC METABOLIC PNL TOTAL CA: CPT

## 2024-07-22 PROCEDURE — 94664 DEMO&/EVAL PT USE INHALER: CPT

## 2024-07-22 PROCEDURE — 6370000000 HC RX 637 (ALT 250 FOR IP): Performed by: STUDENT IN AN ORGANIZED HEALTH CARE EDUCATION/TRAINING PROGRAM

## 2024-07-22 PROCEDURE — 97530 THERAPEUTIC ACTIVITIES: CPT

## 2024-07-22 PROCEDURE — 80076 HEPATIC FUNCTION PANEL: CPT

## 2024-07-22 PROCEDURE — 36415 COLL VENOUS BLD VENIPUNCTURE: CPT

## 2024-07-22 RX ORDER — FUROSEMIDE 40 MG/1
40 TABLET ORAL DAILY
Status: DISCONTINUED | OUTPATIENT
Start: 2024-07-23 | End: 2024-07-30 | Stop reason: HOSPADM

## 2024-07-22 RX ORDER — SPIRONOLACTONE 100 MG/1
100 TABLET, FILM COATED ORAL DAILY
Status: DISCONTINUED | OUTPATIENT
Start: 2024-07-23 | End: 2024-07-30 | Stop reason: HOSPADM

## 2024-07-22 RX ADMIN — PANTOPRAZOLE SODIUM 40 MG: 40 TABLET, DELAYED RELEASE ORAL at 05:29

## 2024-07-22 RX ADMIN — ENOXAPARIN SODIUM 40 MG: 100 INJECTION SUBCUTANEOUS at 20:00

## 2024-07-22 RX ADMIN — FOLIC ACID 1 MG: 1 TABLET ORAL at 08:42

## 2024-07-22 RX ADMIN — POLYETHYLENE GLYCOL 3350 17 G: 17 POWDER, FOR SOLUTION ORAL at 08:42

## 2024-07-22 RX ADMIN — SPIRONOLACTONE 25 MG: 25 TABLET ORAL at 08:41

## 2024-07-22 RX ADMIN — ONDANSETRON 4 MG: 2 INJECTION INTRAMUSCULAR; INTRAVENOUS at 06:26

## 2024-07-22 RX ADMIN — FUROSEMIDE 20 MG: 10 INJECTION, SOLUTION INTRAMUSCULAR; INTRAVENOUS at 08:41

## 2024-07-22 RX ADMIN — ARFORMOTEROL TARTRATE 15 MCG: 15 SOLUTION RESPIRATORY (INHALATION) at 07:12

## 2024-07-22 RX ADMIN — SODIUM CHLORIDE, PRESERVATIVE FREE 10 ML: 5 INJECTION INTRAVENOUS at 22:03

## 2024-07-22 RX ADMIN — WATER 1000 MG: 1 INJECTION INTRAMUSCULAR; INTRAVENOUS; SUBCUTANEOUS at 16:52

## 2024-07-22 RX ADMIN — METHADONE HYDROCHLORIDE 60 MG: 10 CONCENTRATE ORAL at 08:41

## 2024-07-22 RX ADMIN — Medication 100 MG: at 08:41

## 2024-07-22 RX ADMIN — TRAMADOL HYDROCHLORIDE 50 MG: 50 TABLET ORAL at 18:58

## 2024-07-22 RX ADMIN — TRAMADOL HYDROCHLORIDE 50 MG: 50 TABLET ORAL at 05:28

## 2024-07-22 RX ADMIN — SODIUM CHLORIDE, PRESERVATIVE FREE 10 ML: 5 INJECTION INTRAVENOUS at 08:42

## 2024-07-22 RX ADMIN — THERA TABS 1 TABLET: TAB at 08:41

## 2024-07-22 RX ADMIN — BUDESONIDE 500 MCG: 0.5 INHALANT RESPIRATORY (INHALATION) at 07:12

## 2024-07-22 ASSESSMENT — PAIN SCALES - WONG BAKER
WONGBAKER_NUMERICALRESPONSE: NO HURT
WONGBAKER_NUMERICALRESPONSE: NO HURT

## 2024-07-22 ASSESSMENT — PAIN SCALES - GENERAL
PAINLEVEL_OUTOF10: 0
PAINLEVEL_OUTOF10: 7
PAINLEVEL_OUTOF10: 0
PAINLEVEL_OUTOF10: 2
PAINLEVEL_OUTOF10: 5

## 2024-07-22 ASSESSMENT — PAIN DESCRIPTION - LOCATION
LOCATION: LEG;KNEE
LOCATION: ABDOMEN

## 2024-07-22 ASSESSMENT — PAIN DESCRIPTION - DESCRIPTORS: DESCRIPTORS: BURNING;ACHING

## 2024-07-23 LAB
ALBUMIN SERPL-MCNC: 2.5 G/DL (ref 3.4–5)
ALBUMIN/GLOB SERPL: 0.6 (ref 0.8–1.7)
ALP SERPL-CCNC: 270 U/L (ref 45–117)
ALT SERPL-CCNC: 132 U/L (ref 16–61)
ANION GAP SERPL CALC-SCNC: 5 MMOL/L (ref 3–18)
AST SERPL-CCNC: 331 U/L (ref 10–38)
BACTERIA SPEC CULT: NORMAL
BACTERIA SPEC CULT: NORMAL
BASOPHILS # BLD: 0 K/UL (ref 0–0.1)
BASOPHILS NFR BLD: 0 % (ref 0–2)
BILIRUB SERPL-MCNC: 0.9 MG/DL (ref 0.2–1)
BUN SERPL-MCNC: 22 MG/DL (ref 7–18)
BUN/CREAT SERPL: 18 (ref 12–20)
CALCIUM SERPL-MCNC: 9 MG/DL (ref 8.5–10.1)
CHLORIDE SERPL-SCNC: 97 MMOL/L (ref 100–111)
CO2 SERPL-SCNC: 29 MMOL/L (ref 21–32)
CREAT SERPL-MCNC: 1.21 MG/DL (ref 0.6–1.3)
DIFFERENTIAL METHOD BLD: ABNORMAL
EOSINOPHIL # BLD: 0 K/UL (ref 0–0.4)
EOSINOPHIL NFR BLD: 0 % (ref 0–5)
ERYTHROCYTE [DISTWIDTH] IN BLOOD BY AUTOMATED COUNT: 14.7 % (ref 11.6–14.5)
GLOBULIN SER CALC-MCNC: 4.5 G/DL (ref 2–4)
GLUCOSE SERPL-MCNC: 99 MG/DL (ref 74–99)
GRAM STN SPEC: NORMAL
HCT VFR BLD AUTO: 33.3 % (ref 36–48)
HGB BLD-MCNC: 10.6 G/DL (ref 13–16)
IMM GRANULOCYTES # BLD AUTO: 0 K/UL (ref 0–0.04)
IMM GRANULOCYTES NFR BLD AUTO: 0 % (ref 0–0.5)
INR PPP: 1.2 (ref 0.9–1.1)
LYMPHOCYTES # BLD: 0.6 K/UL (ref 0.9–3.6)
LYMPHOCYTES NFR BLD: 13 % (ref 21–52)
MCH RBC QN AUTO: 28.3 PG (ref 24–34)
MCHC RBC AUTO-ENTMCNC: 31.8 G/DL (ref 31–37)
MCV RBC AUTO: 88.8 FL (ref 78–100)
MONOCYTES # BLD: 0.6 K/UL (ref 0.05–1.2)
MONOCYTES NFR BLD: 13 % (ref 3–10)
NEUTS SEG # BLD: 3.3 K/UL (ref 1.8–8)
NEUTS SEG NFR BLD: 73 % (ref 40–73)
NRBC # BLD: 0 K/UL (ref 0–0.01)
NRBC BLD-RTO: 0 PER 100 WBC
PLATELET # BLD AUTO: 100 K/UL (ref 135–420)
PMV BLD AUTO: 11.3 FL (ref 9.2–11.8)
POTASSIUM SERPL-SCNC: 4.8 MMOL/L (ref 3.5–5.5)
PROT SERPL-MCNC: 7 G/DL (ref 6.4–8.2)
PROTHROMBIN TIME: 15.6 SEC (ref 11.9–14.9)
RBC # BLD AUTO: 3.75 M/UL (ref 4.35–5.65)
SERVICE CMNT-IMP: NORMAL
SODIUM SERPL-SCNC: 131 MMOL/L (ref 136–145)
WBC # BLD AUTO: 4.5 K/UL (ref 4.6–13.2)

## 2024-07-23 PROCEDURE — 97535 SELF CARE MNGMENT TRAINING: CPT

## 2024-07-23 PROCEDURE — 6360000002 HC RX W HCPCS: Performed by: HOSPITALIST

## 2024-07-23 PROCEDURE — 99232 SBSQ HOSP IP/OBS MODERATE 35: CPT | Performed by: STUDENT IN AN ORGANIZED HEALTH CARE EDUCATION/TRAINING PROGRAM

## 2024-07-23 PROCEDURE — 80053 COMPREHEN METABOLIC PANEL: CPT

## 2024-07-23 PROCEDURE — 1100000000 HC RM PRIVATE

## 2024-07-23 PROCEDURE — 97110 THERAPEUTIC EXERCISES: CPT

## 2024-07-23 PROCEDURE — 6370000000 HC RX 637 (ALT 250 FOR IP): Performed by: STUDENT IN AN ORGANIZED HEALTH CARE EDUCATION/TRAINING PROGRAM

## 2024-07-23 PROCEDURE — 97116 GAIT TRAINING THERAPY: CPT

## 2024-07-23 PROCEDURE — 2580000003 HC RX 258: Performed by: HOSPITALIST

## 2024-07-23 PROCEDURE — 6370000000 HC RX 637 (ALT 250 FOR IP): Performed by: HOSPITALIST

## 2024-07-23 PROCEDURE — 94640 AIRWAY INHALATION TREATMENT: CPT

## 2024-07-23 PROCEDURE — 87522 HEPATITIS C REVRS TRNSCRPJ: CPT

## 2024-07-23 PROCEDURE — 87902 NFCT AGT GNTYP ALYS HEP C: CPT

## 2024-07-23 PROCEDURE — 85610 PROTHROMBIN TIME: CPT

## 2024-07-23 PROCEDURE — 94761 N-INVAS EAR/PLS OXIMETRY MLT: CPT

## 2024-07-23 PROCEDURE — 99232 SBSQ HOSP IP/OBS MODERATE 35: CPT | Performed by: NURSE PRACTITIONER

## 2024-07-23 PROCEDURE — 6370000000 HC RX 637 (ALT 250 FOR IP): Performed by: PHYSICIAN ASSISTANT

## 2024-07-23 PROCEDURE — 36415 COLL VENOUS BLD VENIPUNCTURE: CPT

## 2024-07-23 PROCEDURE — 85025 COMPLETE CBC W/AUTO DIFF WBC: CPT

## 2024-07-23 RX ADMIN — TRAMADOL HYDROCHLORIDE 50 MG: 50 TABLET ORAL at 12:04

## 2024-07-23 RX ADMIN — METHADONE HYDROCHLORIDE 60 MG: 10 CONCENTRATE ORAL at 08:36

## 2024-07-23 RX ADMIN — ENOXAPARIN SODIUM 40 MG: 100 INJECTION SUBCUTANEOUS at 20:00

## 2024-07-23 RX ADMIN — PANTOPRAZOLE SODIUM 40 MG: 40 TABLET, DELAYED RELEASE ORAL at 08:38

## 2024-07-23 RX ADMIN — FOLIC ACID 1 MG: 1 TABLET ORAL at 08:36

## 2024-07-23 RX ADMIN — SODIUM CHLORIDE, PRESERVATIVE FREE 10 ML: 5 INJECTION INTRAVENOUS at 22:42

## 2024-07-23 RX ADMIN — BUDESONIDE 500 MCG: 0.5 INHALANT RESPIRATORY (INHALATION) at 21:00

## 2024-07-23 RX ADMIN — SPIRONOLACTONE 100 MG: 100 TABLET ORAL at 08:36

## 2024-07-23 RX ADMIN — POLYETHYLENE GLYCOL 3350 17 G: 17 POWDER, FOR SOLUTION ORAL at 08:36

## 2024-07-23 RX ADMIN — ARFORMOTEROL TARTRATE 15 MCG: 15 SOLUTION RESPIRATORY (INHALATION) at 21:00

## 2024-07-23 RX ADMIN — SODIUM CHLORIDE, PRESERVATIVE FREE 10 ML: 5 INJECTION INTRAVENOUS at 12:04

## 2024-07-23 RX ADMIN — FUROSEMIDE 40 MG: 40 TABLET ORAL at 08:36

## 2024-07-23 RX ADMIN — Medication 100 MG: at 08:36

## 2024-07-23 RX ADMIN — THERA TABS 1 TABLET: TAB at 08:36

## 2024-07-23 RX ADMIN — WATER 1000 MG: 1 INJECTION INTRAMUSCULAR; INTRAVENOUS; SUBCUTANEOUS at 16:23

## 2024-07-23 RX ADMIN — TRAMADOL HYDROCHLORIDE 50 MG: 50 TABLET ORAL at 22:51

## 2024-07-23 ASSESSMENT — PAIN SCALES - WONG BAKER
WONGBAKER_NUMERICALRESPONSE: NO HURT

## 2024-07-23 ASSESSMENT — PAIN SCALES - GENERAL
PAINLEVEL_OUTOF10: 0
PAINLEVEL_OUTOF10: 0
PAINLEVEL_OUTOF10: 5
PAINLEVEL_OUTOF10: 0
PAINLEVEL_OUTOF10: 5
PAINLEVEL_OUTOF10: 0
PAINLEVEL_OUTOF10: 0

## 2024-07-23 ASSESSMENT — PAIN DESCRIPTION - LOCATION
LOCATION: FLANK
LOCATION: ABDOMEN

## 2024-07-23 ASSESSMENT — PAIN DESCRIPTION - DESCRIPTORS: DESCRIPTORS: ACHING

## 2024-07-23 ASSESSMENT — PAIN DESCRIPTION - ORIENTATION: ORIENTATION: RIGHT

## 2024-07-23 NOTE — CARE COORDINATION
Noted patient ready for discharge possibly in the next 2-3 days. There are 9 accepting facilities in [facility], but none of them are local. Spoke with patients ex-spouse and POA Racquel Lucia by phone at [phone]. She states she needs the patient to discharge to a local facility because she doesn't drive and no one would be able to see him. She states that patient was staying at a rooming house that belonged to her friend, and now that friend is selling it. Racquel states that her plan is for pt to stay with her at discharge. She states she would like to speak to the doctor to find out what is going on with him medically.     [facility] had declined related pt not meeting admission criteria. Spoke with Shira with [facility], she states that the patient was probably declined r/t taking Methadone. Per MD progress note, pt on methadone r/t chronic opioid use. Shira states she will check to see if [facility] can accept pt.  Clinicals sent to [facility] via [employer] Link.    Domi BOYLE,RN, [facility]  Case Management  [phone]

## 2024-07-23 NOTE — PROCEDURES
INTERVENTIONAL RADIOLOGY POST PROCEDURE NOTE              Paracentesis procedure note    July 23, 2024       2:14 PM     Preoperative Diagnosis:   Ascites.    Postoperative Diagnosis:  Same.    Attending Physician: Dr Chavez    :  Rubina Roger PA-C    Assistant:  None.    Type of Anesthesia:  1% Lidocaine, local    Procedure:  Paracentesis    Description:   Using ultrasound guidance the largest pocket of peritoneal fluid was localized and marked at the Right lower quadrant.   The patient was prepped and draped in the usual fashion.  1% Lidocaine was infiltrated locally. A 5 German over the needle catheter was advanced into the peritoneal cavity and bloody colored fluid was aspirated.  Once fluid was easily aspirated the needle was removed leaving the catheter in place.   The catheter was connected to vacuum containers and 2.7 liters of ascitic fluid was removed.    Findings:   2.7  Liters of ascites removed.    Estimated blood Loss:  Minimal     Transfusions: None.    Implants: None.    Specimen Removed:   Yes     Drains: None.    Complications: None    Condition: Stable    Disposition: Return to nursing unit     RUBINA ROGER PA-C

## 2024-07-23 NOTE — ACP (ADVANCE CARE PLANNING)
Advance Care Planning     Palliative Team Advance Care Planning (ACP) Conversation    Date of Conversation: 07/23/24     ACP documents on file prior to discussion:  -Power of  for Healthcare  -Portable DNR    Healthcare Decision Maker:    Primary Decision Maker: Racquel Lucia - Ex-Spouse - [phone]    Secondary Decision Maker: Tiesha Clarke - Brother/Sister - [phone]     Conversation Summary:    Follow up visit made to patient along with Palliative team member IFEOMA Ontiveros NP. Patient is sitting up in chair with legs elevated. Smiled as we entered the room, states \"I remember you ladies\". He also stated, \"I am feeling better\".   Patient is aware of his disease process and the need to follow up once discharged. He lives alone, does not have transportation, and his ex-wife does not drive. Patient shared that he \"wants to go as far as I can\", \"I will let the doctor know when I am tired and do not want any more treatment\".    Patient states he applied for Medicaid 2 months ago, but does not know the outcome. Perfect serve message sent to KACY Martinez with case management  To follow up with Medicaid.    Palliative team remains available to provide support to Mr Lucia and his family during this hospital stay.    Resuscitation Status:   Code Status: DNR     Documentation Completed:  -No new documents completed.    Yady Cruz RN  Palliative Medicine Inpatient RN  Palliative COPE Line: [phone]

## 2024-07-23 NOTE — CARE COORDINATION
Spoke to Shoshana Velez from [facility] and rehab via [facility], she will start authorization.     TAMAR Ken, RN   Case Management   [phone]

## 2024-07-24 PROCEDURE — 97530 THERAPEUTIC ACTIVITIES: CPT

## 2024-07-24 PROCEDURE — 6360000002 HC RX W HCPCS: Performed by: HOSPITALIST

## 2024-07-24 PROCEDURE — 1100000000 HC RM PRIVATE

## 2024-07-24 PROCEDURE — 6370000000 HC RX 637 (ALT 250 FOR IP): Performed by: PHYSICIAN ASSISTANT

## 2024-07-24 PROCEDURE — 2580000003 HC RX 258: Performed by: HOSPITALIST

## 2024-07-24 PROCEDURE — 6370000000 HC RX 637 (ALT 250 FOR IP): Performed by: STUDENT IN AN ORGANIZED HEALTH CARE EDUCATION/TRAINING PROGRAM

## 2024-07-24 PROCEDURE — 97168 OT RE-EVAL EST PLAN CARE: CPT

## 2024-07-24 PROCEDURE — 6370000000 HC RX 637 (ALT 250 FOR IP): Performed by: HOSPITALIST

## 2024-07-24 PROCEDURE — 94640 AIRWAY INHALATION TREATMENT: CPT

## 2024-07-24 PROCEDURE — 99231 SBSQ HOSP IP/OBS SF/LOW 25: CPT | Performed by: STUDENT IN AN ORGANIZED HEALTH CARE EDUCATION/TRAINING PROGRAM

## 2024-07-24 PROCEDURE — 94761 N-INVAS EAR/PLS OXIMETRY MLT: CPT

## 2024-07-24 RX ORDER — SENNA AND DOCUSATE SODIUM 50; 8.6 MG/1; MG/1
2 TABLET, FILM COATED ORAL DAILY PRN
Status: DISCONTINUED | OUTPATIENT
Start: 2024-07-24 | End: 2024-07-30 | Stop reason: HOSPADM

## 2024-07-24 RX ORDER — SPIRONOLACTONE 100 MG/1
100 TABLET, FILM COATED ORAL DAILY
Qty: 30 TABLET | Refills: 3 | Status: SHIPPED | OUTPATIENT
Start: 2024-07-25

## 2024-07-24 RX ORDER — FUROSEMIDE 40 MG/1
40 TABLET ORAL DAILY
Qty: 60 TABLET | Refills: 3 | Status: SHIPPED | OUTPATIENT
Start: 2024-07-25

## 2024-07-24 RX ADMIN — POLYETHYLENE GLYCOL 3350 17 G: 17 POWDER, FOR SOLUTION ORAL at 08:20

## 2024-07-24 RX ADMIN — ARFORMOTEROL TARTRATE 15 MCG: 15 SOLUTION RESPIRATORY (INHALATION) at 22:00

## 2024-07-24 RX ADMIN — WATER 1000 MG: 1 INJECTION INTRAMUSCULAR; INTRAVENOUS; SUBCUTANEOUS at 17:45

## 2024-07-24 RX ADMIN — BUDESONIDE 500 MCG: 0.5 INHALANT RESPIRATORY (INHALATION) at 07:47

## 2024-07-24 RX ADMIN — SPIRONOLACTONE 100 MG: 100 TABLET ORAL at 08:19

## 2024-07-24 RX ADMIN — SODIUM CHLORIDE, PRESERVATIVE FREE 10 ML: 5 INJECTION INTRAVENOUS at 20:30

## 2024-07-24 RX ADMIN — FUROSEMIDE 40 MG: 40 TABLET ORAL at 08:20

## 2024-07-24 RX ADMIN — FOLIC ACID 1 MG: 1 TABLET ORAL at 08:20

## 2024-07-24 RX ADMIN — THERA TABS 1 TABLET: TAB at 08:21

## 2024-07-24 RX ADMIN — METHADONE HYDROCHLORIDE 60 MG: 10 CONCENTRATE ORAL at 08:20

## 2024-07-24 RX ADMIN — BUDESONIDE 500 MCG: 0.5 INHALANT RESPIRATORY (INHALATION) at 22:00

## 2024-07-24 RX ADMIN — SENNOSIDES AND DOCUSATE SODIUM 2 TABLET: 50; 8.6 TABLET ORAL at 17:40

## 2024-07-24 RX ADMIN — PANTOPRAZOLE SODIUM 40 MG: 40 TABLET, DELAYED RELEASE ORAL at 08:21

## 2024-07-24 RX ADMIN — Medication 100 MG: at 08:21

## 2024-07-24 RX ADMIN — SODIUM CHLORIDE, PRESERVATIVE FREE 10 ML: 5 INJECTION INTRAVENOUS at 08:25

## 2024-07-24 RX ADMIN — ENOXAPARIN SODIUM 40 MG: 100 INJECTION SUBCUTANEOUS at 20:31

## 2024-07-24 RX ADMIN — ARFORMOTEROL TARTRATE 15 MCG: 15 SOLUTION RESPIRATORY (INHALATION) at 07:47

## 2024-07-24 ASSESSMENT — PAIN SCALES - GENERAL
PAINLEVEL_OUTOF10: 0

## 2024-07-24 ASSESSMENT — PAIN SCALES - WONG BAKER
WONGBAKER_NUMERICALRESPONSE: NO HURT
WONGBAKER_NUMERICALRESPONSE: NO HURT

## 2024-07-24 NOTE — CARE COORDINATION
Call made to [facility] [phone], spoke with Gagandeep, dispatch will call the nurses station with [facility]UD[facility].  Trip # [id number].

## 2024-07-24 NOTE — PALLIATIVE CARE
Palliative Medicine     Palliative Team received a call from patient's MPOA, Racquel Lucia. She expressed concern about patient's need for support when released form the hospital. She added that patient was frustrated with the change in plans. This writer reviewed the current notes from attending and CM. Informed Ms. Lucia they are continuing to work of a safe plan for after hospital care.  She requested I visit and support Mr Lucia. This writer met with patient at bedside. He was resting but was open to talking with me. He had eaten some lunch but c/o no bowel movement in 2 days. He felt his abdomin was a bit tighter.  He did share his concern about the dc plan but understand he needs to show patience. He is willing to continue any type of rehab services that will improve his overall condition.  He is aware Ms KIM Lucia has his best interest at heart and is willing to accept assistance from her. He shared he did not want to burden her since she is the caregiver for her sister.  He expressed gratitude for the visit and information. He is open to having our team follow up with him tomorrow.     Susie HARDWICKN, RN, [facility]  Palliative Medicine Inpatient RN  Palliative COPE Line: [phone]

## 2024-07-24 NOTE — CARE COORDINATION
[facility] and rehab confirmed auth today, but later stated they could not accept patient due to prior history and use of Methadone.     Called accepting facility in [facility] [place] [facility] and spoke to Silvia Cramer who stated they could not accept patient to de history of polysubstance abuse.     [facility] and rehab accepted patient in [facility]. Ariella Ayala stated they need auth via messenger in [facility] and I requested they start auth today.     Gela Abarca, MULUN, RN   Case Management   [phone]

## 2024-07-24 NOTE — CARE COORDINATION
Ariella Ayala from [facility] and Rehab stated she will start auth today.    Gela Abarca, MULUN, RN   Case Management   [phone]

## 2024-07-24 NOTE — CARE COORDINATION
Requested Case Management specialist to assist with transportation to [facility].  Address is [address] [place] [id number] and phone number is [phone]  Patient will require BLS transport.   Pt requires Stretcher If stretcher, reason: ascities of liver, impaired mobility, post-traumatic arthritis of knees, and history of closed fracture of distal end of right fibula.   Patient is currently requiring oxygen No   Height:5'11\"   Weight: 179lbs  Pt is on isolation: No   Is the pt ready now? Yes  Requested time: Next Available  PCS Faxed: No  Insurance verified on face sheet: Yes  Auth needed for transport: Yes  CM completed PCS/ Envelope and placed on chart.      MULU KenN, RN   Case Management   [phone]

## 2024-07-25 LAB — GLUCOSE BLD STRIP.AUTO-MCNC: 137 MG/DL (ref 70–110)

## 2024-07-25 PROCEDURE — 97535 SELF CARE MNGMENT TRAINING: CPT

## 2024-07-25 PROCEDURE — 6370000000 HC RX 637 (ALT 250 FOR IP): Performed by: PHYSICIAN ASSISTANT

## 2024-07-25 PROCEDURE — 6370000000 HC RX 637 (ALT 250 FOR IP): Performed by: HOSPITALIST

## 2024-07-25 PROCEDURE — 6370000000 HC RX 637 (ALT 250 FOR IP): Performed by: STUDENT IN AN ORGANIZED HEALTH CARE EDUCATION/TRAINING PROGRAM

## 2024-07-25 PROCEDURE — 97530 THERAPEUTIC ACTIVITIES: CPT

## 2024-07-25 PROCEDURE — 97164 PT RE-EVAL EST PLAN CARE: CPT

## 2024-07-25 PROCEDURE — 2580000003 HC RX 258: Performed by: HOSPITALIST

## 2024-07-25 PROCEDURE — 94640 AIRWAY INHALATION TREATMENT: CPT

## 2024-07-25 PROCEDURE — 1100000000 HC RM PRIVATE

## 2024-07-25 PROCEDURE — 94761 N-INVAS EAR/PLS OXIMETRY MLT: CPT

## 2024-07-25 PROCEDURE — 82962 GLUCOSE BLOOD TEST: CPT

## 2024-07-25 PROCEDURE — 6360000002 HC RX W HCPCS: Performed by: HOSPITALIST

## 2024-07-25 PROCEDURE — 99231 SBSQ HOSP IP/OBS SF/LOW 25: CPT | Performed by: STUDENT IN AN ORGANIZED HEALTH CARE EDUCATION/TRAINING PROGRAM

## 2024-07-25 RX ADMIN — Medication 100 MG: at 08:52

## 2024-07-25 RX ADMIN — SPIRONOLACTONE 100 MG: 100 TABLET ORAL at 08:53

## 2024-07-25 RX ADMIN — SODIUM CHLORIDE, PRESERVATIVE FREE 10 ML: 5 INJECTION INTRAVENOUS at 22:02

## 2024-07-25 RX ADMIN — SENNOSIDES AND DOCUSATE SODIUM 2 TABLET: 50; 8.6 TABLET ORAL at 08:52

## 2024-07-25 RX ADMIN — BUDESONIDE 500 MCG: 0.5 INHALANT RESPIRATORY (INHALATION) at 20:33

## 2024-07-25 RX ADMIN — FUROSEMIDE 40 MG: 40 TABLET ORAL at 08:52

## 2024-07-25 RX ADMIN — WATER 1000 MG: 1 INJECTION INTRAMUSCULAR; INTRAVENOUS; SUBCUTANEOUS at 18:08

## 2024-07-25 RX ADMIN — ENOXAPARIN SODIUM 40 MG: 100 INJECTION SUBCUTANEOUS at 22:02

## 2024-07-25 RX ADMIN — SODIUM CHLORIDE, PRESERVATIVE FREE 10 ML: 5 INJECTION INTRAVENOUS at 08:53

## 2024-07-25 RX ADMIN — METHADONE HYDROCHLORIDE 60 MG: 10 CONCENTRATE ORAL at 08:50

## 2024-07-25 RX ADMIN — ARFORMOTEROL TARTRATE 15 MCG: 15 SOLUTION RESPIRATORY (INHALATION) at 20:32

## 2024-07-25 RX ADMIN — PANTOPRAZOLE SODIUM 40 MG: 40 TABLET, DELAYED RELEASE ORAL at 08:52

## 2024-07-25 RX ADMIN — FOLIC ACID 1 MG: 1 TABLET ORAL at 08:52

## 2024-07-25 RX ADMIN — POLYETHYLENE GLYCOL 3350 17 G: 17 POWDER, FOR SOLUTION ORAL at 08:48

## 2024-07-25 RX ADMIN — THERA TABS 1 TABLET: TAB at 08:52

## 2024-07-25 ASSESSMENT — PAIN SCALES - GENERAL
PAINLEVEL_OUTOF10: 0

## 2024-07-25 ASSESSMENT — PAIN SCALES - WONG BAKER
WONGBAKER_NUMERICALRESPONSE: NO HURT

## 2024-07-25 NOTE — CARE COORDINATION
UAI/LTSS completed for patient  Screening ID # : [id number]   Will ask Dr. Hernandez to please sign and authorize UAI.    Domi HARDWICKN,RN, [facility]  Case Management  [phone]

## 2024-07-25 NOTE — CARE COORDINATION
07/25/24 1337   Avoidable Days   Payor  Auth     Domi HARDWICKN,RN, [facility]  Case Management  [phone]

## 2024-07-25 NOTE — PALLIATIVE CARE
Palliative Medicine      Palliative Team for follow visit at bedside with patient. Mr. Lucia was sitting up in the chair visiting with 3 friends. He c/o tightness in his abdomen which was distended. He shared that he has still not had a bowel movement despite daily Miralax and senokot. He did not eat any of his noon meal stating he does not have an appetite. He did consume his ensure protein drink.  Attending alerted to patient's complaints. Appreciate CM work of safe dc plan.     Palliative RN received a call from patient's MPOA, Racquel Lucia. She expressed concern about patient's need for support when released form the hospital. She added that patient may be trying to secure his place back at the boarding home where he was residing prior to admission. Palliative RN provided active listening and support during this time. She shared that she was like to do more for Mr. Lucia but he has a strong will. This writer shared a brief nurse update and  auth for the SNF was pending. Ms KIM Lucia expressed gratitude for the support.     Susie HARDWICKN, RN, [facility]  Palliative Medicine Inpatient RN  Palliative COPE Line: [phone]

## 2024-07-26 PROBLEM — F17.200 TOBACCO DEPENDENCE: Chronic | Status: RESOLVED | Noted: 2019-01-25 | Resolved: 2024-07-26

## 2024-07-26 PROBLEM — F19.90 IVDU (INTRAVENOUS DRUG USER): Chronic | Status: RESOLVED | Noted: 2019-01-25 | Resolved: 2024-07-26

## 2024-07-26 PROBLEM — E87.1 HYPONATREMIA: Status: RESOLVED | Noted: 2019-01-25 | Resolved: 2024-07-26

## 2024-07-26 PROBLEM — J15.4 STREPTOCOCCAL PNEUMONIA (HCC): Status: RESOLVED | Noted: 2019-01-28 | Resolved: 2024-07-26

## 2024-07-26 LAB
AFP L3 MFR SERPL: 40.7 % (ref 0–9.9)
AFP L3 MFR SERPL: 42.3 % (ref 0–9.9)
AFP SERPL-MCNC: 1884.9 NG/ML (ref 0–8.4)
AFP SERPL-MCNC: 1963.6 NG/ML (ref 0–8.4)

## 2024-07-26 PROCEDURE — 97110 THERAPEUTIC EXERCISES: CPT

## 2024-07-26 PROCEDURE — 6370000000 HC RX 637 (ALT 250 FOR IP): Performed by: HOSPITALIST

## 2024-07-26 PROCEDURE — 2580000003 HC RX 258: Performed by: HOSPITALIST

## 2024-07-26 PROCEDURE — 97116 GAIT TRAINING THERAPY: CPT

## 2024-07-26 PROCEDURE — 1100000000 HC RM PRIVATE

## 2024-07-26 PROCEDURE — 99231 SBSQ HOSP IP/OBS SF/LOW 25: CPT | Performed by: STUDENT IN AN ORGANIZED HEALTH CARE EDUCATION/TRAINING PROGRAM

## 2024-07-26 PROCEDURE — 6370000000 HC RX 637 (ALT 250 FOR IP): Performed by: STUDENT IN AN ORGANIZED HEALTH CARE EDUCATION/TRAINING PROGRAM

## 2024-07-26 PROCEDURE — 6370000000 HC RX 637 (ALT 250 FOR IP): Performed by: PHYSICIAN ASSISTANT

## 2024-07-26 PROCEDURE — 6360000002 HC RX W HCPCS: Performed by: HOSPITALIST

## 2024-07-26 PROCEDURE — 94761 N-INVAS EAR/PLS OXIMETRY MLT: CPT

## 2024-07-26 RX ADMIN — SPIRONOLACTONE 100 MG: 100 TABLET ORAL at 08:56

## 2024-07-26 RX ADMIN — METHADONE HYDROCHLORIDE 60 MG: 10 CONCENTRATE ORAL at 08:55

## 2024-07-26 RX ADMIN — Medication 100 MG: at 08:56

## 2024-07-26 RX ADMIN — POLYETHYLENE GLYCOL 3350 17 G: 17 POWDER, FOR SOLUTION ORAL at 08:56

## 2024-07-26 RX ADMIN — PANTOPRAZOLE SODIUM 40 MG: 40 TABLET, DELAYED RELEASE ORAL at 08:56

## 2024-07-26 RX ADMIN — FOLIC ACID 1 MG: 1 TABLET ORAL at 08:56

## 2024-07-26 RX ADMIN — SODIUM CHLORIDE, PRESERVATIVE FREE 20 ML: 5 INJECTION INTRAVENOUS at 21:18

## 2024-07-26 RX ADMIN — THERA TABS 1 TABLET: TAB at 08:56

## 2024-07-26 RX ADMIN — FUROSEMIDE 40 MG: 40 TABLET ORAL at 08:56

## 2024-07-26 RX ADMIN — TRAMADOL HYDROCHLORIDE 50 MG: 50 TABLET ORAL at 13:36

## 2024-07-26 RX ADMIN — ENOXAPARIN SODIUM 40 MG: 100 INJECTION SUBCUTANEOUS at 20:19

## 2024-07-26 RX ADMIN — SODIUM CHLORIDE, PRESERVATIVE FREE 10 ML: 5 INJECTION INTRAVENOUS at 08:56

## 2024-07-26 ASSESSMENT — PAIN SCALES - GENERAL
PAINLEVEL_OUTOF10: 4
PAINLEVEL_OUTOF10: 0
PAINLEVEL_OUTOF10: 1
PAINLEVEL_OUTOF10: 0
PAINLEVEL_OUTOF10: 0
PAINLEVEL_OUTOF10: 2
PAINLEVEL_OUTOF10: 0
PAINLEVEL_OUTOF10: 5
PAINLEVEL_OUTOF10: 1
PAINLEVEL_OUTOF10: 0

## 2024-07-26 ASSESSMENT — PAIN SCALES - WONG BAKER: WONGBAKER_NUMERICALRESPONSE: NO HURT

## 2024-07-26 ASSESSMENT — PAIN DESCRIPTION - ORIENTATION: ORIENTATION: MID

## 2024-07-26 ASSESSMENT — PAIN - FUNCTIONAL ASSESSMENT
PAIN_FUNCTIONAL_ASSESSMENT: ACTIVITIES ARE NOT PREVENTED
PAIN_FUNCTIONAL_ASSESSMENT: PREVENTS OR INTERFERES SOME ACTIVE ACTIVITIES AND ADLS

## 2024-07-26 ASSESSMENT — PAIN DESCRIPTION - ONSET
ONSET: ON-GOING
ONSET: ON-GOING

## 2024-07-26 ASSESSMENT — PAIN DESCRIPTION - FREQUENCY
FREQUENCY: CONTINUOUS
FREQUENCY: CONTINUOUS

## 2024-07-26 ASSESSMENT — PAIN DESCRIPTION - PAIN TYPE
TYPE: ACUTE PAIN;CHRONIC PAIN
TYPE: CHRONIC PAIN;ACUTE PAIN

## 2024-07-26 ASSESSMENT — PAIN DESCRIPTION - LOCATION
LOCATION: GENERALIZED
LOCATION: ABDOMEN

## 2024-07-26 ASSESSMENT — PAIN DESCRIPTION - DESCRIPTORS
DESCRIPTORS: ACHING
DESCRIPTORS: ACHING

## 2024-07-26 NOTE — CARE COORDINATION
Called [facility] to confirm [facility] was started, but no answer. Left several voicemail's and messages via [employer].     Spoke to patient at bedside and he is agreeable to [facility]. Reached out to accepting facility [facility] and rehab via careport. Monik Saunders confirmed they have beds and will start auth today.     Gela HARDWICKN, RN   Case Management   [phone]

## 2024-07-26 NOTE — PALLIATIVE CARE
Palliative Medicine      Palliative Team for follow visit at bedside with patient. Mr. Lucia was resting in bed and alerted quickly to name.  No family or visitors present. He shared that his appetite is still poor but he is trying to eat. He moved his bowels x2 in the last 24 hours. He feels his abdomin is still tight. This writer openly spoke to Mr. Lucia about his [facility] care. He accepts now that living outside of the hospital without 24 hours assist is not possible and he admits \" I need help, I can't care for my self alone\". He clearly stated he is not going to return to his prior living situation at the [facility], adding \"If I do I'm dead\". He know the best chance he has if for SNF placement.  He expressed gratitude for all the support and care he is receiving.     Palliative RN placed follow up call to Ms. Racquel LuciaANGEL. She stated she spoke with patient today and he was in a different  place and was more accepting of his current medical situation.  Ms Lucia stated she had  an open and candid conversation with patient about the support he will require post hospital.  Palliative RN provided active listening and compassionate support.  Ms KIM Lucia expressed gratitude for the support.      Susie Diaz BSN, RN, [facility]  Palliative Medicine Inpatient RN  [phone]

## 2024-07-27 LAB
HCV GENTYP SERPL NAA+PROBE: NORMAL
HCV GENTYP SERPL NAA+PROBE: NORMAL
HCV RNA SERPL NAA+PROBE-ACNC: NORMAL IU/ML
HCV RNA SERPL NAA+PROBE-LOG IU: 5.71 LOG10 IU/ML
LABORATORY COMMENT REPORT: NORMAL

## 2024-07-27 PROCEDURE — 6370000000 HC RX 637 (ALT 250 FOR IP): Performed by: STUDENT IN AN ORGANIZED HEALTH CARE EDUCATION/TRAINING PROGRAM

## 2024-07-27 PROCEDURE — 99232 SBSQ HOSP IP/OBS MODERATE 35: CPT | Performed by: STUDENT IN AN ORGANIZED HEALTH CARE EDUCATION/TRAINING PROGRAM

## 2024-07-27 PROCEDURE — 6360000002 HC RX W HCPCS: Performed by: HOSPITALIST

## 2024-07-27 PROCEDURE — 2580000003 HC RX 258: Performed by: HOSPITALIST

## 2024-07-27 PROCEDURE — 6370000000 HC RX 637 (ALT 250 FOR IP): Performed by: PHYSICIAN ASSISTANT

## 2024-07-27 PROCEDURE — 6370000000 HC RX 637 (ALT 250 FOR IP): Performed by: HOSPITALIST

## 2024-07-27 PROCEDURE — 94640 AIRWAY INHALATION TREATMENT: CPT

## 2024-07-27 PROCEDURE — 1100000000 HC RM PRIVATE

## 2024-07-27 PROCEDURE — 94761 N-INVAS EAR/PLS OXIMETRY MLT: CPT

## 2024-07-27 PROCEDURE — 2580000003 HC RX 258: Performed by: STUDENT IN AN ORGANIZED HEALTH CARE EDUCATION/TRAINING PROGRAM

## 2024-07-27 PROCEDURE — 94664 DEMO&/EVAL PT USE INHALER: CPT

## 2024-07-27 RX ORDER — SODIUM CHLORIDE 0.9 % (FLUSH) 0.9 %
5-40 SYRINGE (ML) INJECTION EVERY 12 HOURS SCHEDULED
Status: DISCONTINUED | OUTPATIENT
Start: 2024-07-27 | End: 2024-07-30 | Stop reason: HOSPADM

## 2024-07-27 RX ORDER — SODIUM CHLORIDE 9 MG/ML
INJECTION, SOLUTION INTRAVENOUS PRN
Status: DISCONTINUED | OUTPATIENT
Start: 2024-07-27 | End: 2024-07-30 | Stop reason: HOSPADM

## 2024-07-27 RX ORDER — SODIUM CHLORIDE 0.9 % (FLUSH) 0.9 %
5-40 SYRINGE (ML) INJECTION PRN
Status: DISCONTINUED | OUTPATIENT
Start: 2024-07-27 | End: 2024-07-30 | Stop reason: HOSPADM

## 2024-07-27 RX ORDER — CARVEDILOL 6.25 MG/1
6.25 TABLET ORAL 2 TIMES DAILY WITH MEALS
Qty: 60 TABLET | Refills: 3 | Status: SHIPPED | OUTPATIENT
Start: 2024-07-27

## 2024-07-27 RX ORDER — CARVEDILOL 6.25 MG/1
6.25 TABLET ORAL 2 TIMES DAILY WITH MEALS
Status: DISCONTINUED | OUTPATIENT
Start: 2024-07-27 | End: 2024-07-30 | Stop reason: HOSPADM

## 2024-07-27 RX ADMIN — METHADONE HYDROCHLORIDE 60 MG: 10 CONCENTRATE ORAL at 08:33

## 2024-07-27 RX ADMIN — SODIUM CHLORIDE, PRESERVATIVE FREE 10 ML: 5 INJECTION INTRAVENOUS at 11:56

## 2024-07-27 RX ADMIN — BUDESONIDE 500 MCG: 0.5 INHALANT RESPIRATORY (INHALATION) at 19:54

## 2024-07-27 RX ADMIN — TRAMADOL HYDROCHLORIDE 50 MG: 50 TABLET ORAL at 08:41

## 2024-07-27 RX ADMIN — Medication 100 MG: at 08:32

## 2024-07-27 RX ADMIN — SODIUM CHLORIDE, PRESERVATIVE FREE 10 ML: 5 INJECTION INTRAVENOUS at 08:33

## 2024-07-27 RX ADMIN — SODIUM CHLORIDE, PRESERVATIVE FREE 10 ML: 5 INJECTION INTRAVENOUS at 21:46

## 2024-07-27 RX ADMIN — SPIRONOLACTONE 100 MG: 100 TABLET ORAL at 08:32

## 2024-07-27 RX ADMIN — FUROSEMIDE 40 MG: 40 TABLET ORAL at 08:32

## 2024-07-27 RX ADMIN — BUDESONIDE 500 MCG: 0.5 INHALANT RESPIRATORY (INHALATION) at 08:13

## 2024-07-27 RX ADMIN — TRAMADOL HYDROCHLORIDE 50 MG: 50 TABLET ORAL at 16:49

## 2024-07-27 RX ADMIN — PANTOPRAZOLE SODIUM 40 MG: 40 TABLET, DELAYED RELEASE ORAL at 08:32

## 2024-07-27 RX ADMIN — THERA TABS 1 TABLET: TAB at 08:32

## 2024-07-27 RX ADMIN — TRAMADOL HYDROCHLORIDE 50 MG: 50 TABLET ORAL at 21:42

## 2024-07-27 RX ADMIN — CARVEDILOL 6.25 MG: 6.25 TABLET, FILM COATED ORAL at 11:54

## 2024-07-27 RX ADMIN — CARVEDILOL 6.25 MG: 6.25 TABLET, FILM COATED ORAL at 16:49

## 2024-07-27 RX ADMIN — FOLIC ACID 1 MG: 1 TABLET ORAL at 08:32

## 2024-07-27 RX ADMIN — ARFORMOTEROL TARTRATE 15 MCG: 15 SOLUTION RESPIRATORY (INHALATION) at 19:54

## 2024-07-27 RX ADMIN — ARFORMOTEROL TARTRATE 15 MCG: 15 SOLUTION RESPIRATORY (INHALATION) at 08:13

## 2024-07-27 RX ADMIN — POLYETHYLENE GLYCOL 3350 17 G: 17 POWDER, FOR SOLUTION ORAL at 08:33

## 2024-07-27 RX ADMIN — ENOXAPARIN SODIUM 40 MG: 100 INJECTION SUBCUTANEOUS at 20:00

## 2024-07-27 ASSESSMENT — PAIN DESCRIPTION - LOCATION
LOCATION: ABDOMEN

## 2024-07-27 ASSESSMENT — PAIN SCALES - GENERAL
PAINLEVEL_OUTOF10: 0
PAINLEVEL_OUTOF10: 4
PAINLEVEL_OUTOF10: 6
PAINLEVEL_OUTOF10: 5
PAINLEVEL_OUTOF10: 0
PAINLEVEL_OUTOF10: 4
PAINLEVEL_OUTOF10: 0
PAINLEVEL_OUTOF10: 4
PAINLEVEL_OUTOF10: 0
PAINLEVEL_OUTOF10: 0

## 2024-07-27 ASSESSMENT — PAIN SCALES - WONG BAKER
WONGBAKER_NUMERICALRESPONSE: NO HURT
WONGBAKER_NUMERICALRESPONSE: HURTS A LITTLE BIT
WONGBAKER_NUMERICALRESPONSE: NO HURT

## 2024-07-27 ASSESSMENT — PAIN DESCRIPTION - ORIENTATION
ORIENTATION: LOWER
ORIENTATION: INNER;LOWER
ORIENTATION: RIGHT;LEFT

## 2024-07-27 ASSESSMENT — PAIN DESCRIPTION - DESCRIPTORS
DESCRIPTORS: ACHING

## 2024-07-28 LAB
ERYTHROCYTE [DISTWIDTH] IN BLOOD BY AUTOMATED COUNT: 14.6 % (ref 11.6–14.5)
HCT VFR BLD AUTO: 36.9 % (ref 36–48)
HGB BLD-MCNC: 11.9 G/DL (ref 13–16)
MCH RBC QN AUTO: 28.3 PG (ref 24–34)
MCHC RBC AUTO-ENTMCNC: 32.2 G/DL (ref 31–37)
MCV RBC AUTO: 87.9 FL (ref 78–100)
NRBC # BLD: 0 K/UL (ref 0–0.01)
NRBC BLD-RTO: 0 PER 100 WBC
PLATELET # BLD AUTO: 139 K/UL (ref 135–420)
PMV BLD AUTO: 11.2 FL (ref 9.2–11.8)
RBC # BLD AUTO: 4.2 M/UL (ref 4.35–5.65)
WBC # BLD AUTO: 4.1 K/UL (ref 4.6–13.2)

## 2024-07-28 PROCEDURE — 1100000000 HC RM PRIVATE

## 2024-07-28 PROCEDURE — 85027 COMPLETE CBC AUTOMATED: CPT

## 2024-07-28 PROCEDURE — 2580000003 HC RX 258: Performed by: HOSPITALIST

## 2024-07-28 PROCEDURE — 94761 N-INVAS EAR/PLS OXIMETRY MLT: CPT

## 2024-07-28 PROCEDURE — 99231 SBSQ HOSP IP/OBS SF/LOW 25: CPT | Performed by: STUDENT IN AN ORGANIZED HEALTH CARE EDUCATION/TRAINING PROGRAM

## 2024-07-28 PROCEDURE — 6370000000 HC RX 637 (ALT 250 FOR IP): Performed by: STUDENT IN AN ORGANIZED HEALTH CARE EDUCATION/TRAINING PROGRAM

## 2024-07-28 PROCEDURE — 6370000000 HC RX 637 (ALT 250 FOR IP): Performed by: PHYSICIAN ASSISTANT

## 2024-07-28 PROCEDURE — 6360000002 HC RX W HCPCS: Performed by: HOSPITALIST

## 2024-07-28 PROCEDURE — 6370000000 HC RX 637 (ALT 250 FOR IP): Performed by: HOSPITALIST

## 2024-07-28 PROCEDURE — 36415 COLL VENOUS BLD VENIPUNCTURE: CPT

## 2024-07-28 PROCEDURE — 94640 AIRWAY INHALATION TREATMENT: CPT

## 2024-07-28 PROCEDURE — 2580000003 HC RX 258: Performed by: STUDENT IN AN ORGANIZED HEALTH CARE EDUCATION/TRAINING PROGRAM

## 2024-07-28 RX ADMIN — FUROSEMIDE 40 MG: 40 TABLET ORAL at 07:58

## 2024-07-28 RX ADMIN — FOLIC ACID 1 MG: 1 TABLET ORAL at 07:58

## 2024-07-28 RX ADMIN — SODIUM CHLORIDE, PRESERVATIVE FREE 10 ML: 5 INJECTION INTRAVENOUS at 21:37

## 2024-07-28 RX ADMIN — SPIRONOLACTONE 100 MG: 100 TABLET ORAL at 07:57

## 2024-07-28 RX ADMIN — TRAMADOL HYDROCHLORIDE 50 MG: 50 TABLET ORAL at 21:54

## 2024-07-28 RX ADMIN — SODIUM CHLORIDE, PRESERVATIVE FREE 10 ML: 5 INJECTION INTRAVENOUS at 08:03

## 2024-07-28 RX ADMIN — BUDESONIDE 500 MCG: 0.5 INHALANT RESPIRATORY (INHALATION) at 19:59

## 2024-07-28 RX ADMIN — SODIUM CHLORIDE, PRESERVATIVE FREE 10 ML: 5 INJECTION INTRAVENOUS at 08:04

## 2024-07-28 RX ADMIN — POLYETHYLENE GLYCOL 3350 17 G: 17 POWDER, FOR SOLUTION ORAL at 07:57

## 2024-07-28 RX ADMIN — ONDANSETRON 4 MG: 2 INJECTION INTRAMUSCULAR; INTRAVENOUS at 04:47

## 2024-07-28 RX ADMIN — PANTOPRAZOLE SODIUM 40 MG: 40 TABLET, DELAYED RELEASE ORAL at 07:58

## 2024-07-28 RX ADMIN — ARFORMOTEROL TARTRATE 15 MCG: 15 SOLUTION RESPIRATORY (INHALATION) at 19:59

## 2024-07-28 RX ADMIN — Medication 100 MG: at 07:58

## 2024-07-28 RX ADMIN — METHADONE HYDROCHLORIDE 60 MG: 10 CONCENTRATE ORAL at 07:58

## 2024-07-28 RX ADMIN — ENOXAPARIN SODIUM 40 MG: 100 INJECTION SUBCUTANEOUS at 21:35

## 2024-07-28 RX ADMIN — THERA TABS 1 TABLET: TAB at 07:58

## 2024-07-28 RX ADMIN — CARVEDILOL 6.25 MG: 6.25 TABLET, FILM COATED ORAL at 07:58

## 2024-07-28 RX ADMIN — TRAMADOL HYDROCHLORIDE 50 MG: 50 TABLET ORAL at 11:12

## 2024-07-28 ASSESSMENT — PAIN SCALES - WONG BAKER
WONGBAKER_NUMERICALRESPONSE: NO HURT
WONGBAKER_NUMERICALRESPONSE: NO HURT

## 2024-07-28 ASSESSMENT — PAIN DESCRIPTION - LOCATION
LOCATION: ABDOMEN
LOCATION: ABDOMEN

## 2024-07-28 ASSESSMENT — PAIN SCALES - GENERAL
PAINLEVEL_OUTOF10: 6
PAINLEVEL_OUTOF10: 0
PAINLEVEL_OUTOF10: 6
PAINLEVEL_OUTOF10: 0

## 2024-07-28 ASSESSMENT — PAIN DESCRIPTION - ORIENTATION
ORIENTATION: MID;LEFT;RIGHT
ORIENTATION: LOWER

## 2024-07-28 ASSESSMENT — PAIN DESCRIPTION - DESCRIPTORS
DESCRIPTORS: THROBBING
DESCRIPTORS: ACHING

## 2024-07-28 NOTE — PLAN OF CARE
Problem: Discharge Planning  Goal: Discharge to home or other facility with appropriate resources  7/19/2024 1102 by Taylor Aguilar RN  Outcome: Progressing  7/18/2024 2123 by Beulah Henderson, RN  Outcome: Progressing  Flowsheets (Taken 7/18/2024 1931)  Discharge to home or other facility with appropriate resources:   Identify barriers to discharge with patient and caregiver   Arrange for needed discharge resources and transportation as appropriate     Problem: Pain  Goal: Verbalizes/displays adequate comfort level or baseline comfort level  7/19/2024 1102 by Taylor Aguilar, RN  Outcome: Progressing  7/18/2024 2123 by Beulah Henderson, RN  Outcome: Not Progressing     
  Problem: Discharge Planning  Goal: Discharge to home or other facility with appropriate resources  7/19/2024 2217 by Beulah Henderson RN  Outcome: Progressing  Flowsheets (Taken 7/19/2024 1932)  Discharge to home or other facility with appropriate resources: Identify barriers to discharge with patient and caregiver  7/19/2024 1102 by Taylor Aguilar RN  Outcome: Progressing     Problem: Pain  Goal: Verbalizes/displays adequate comfort level or baseline comfort level  7/19/2024 2217 by Beulah Henderson RN  Outcome: Progressing  7/19/2024 1102 by Taylor Aguilar RN  Outcome: Progressing     Problem: Safety - Adult  Goal: Free from fall injury  7/19/2024 2217 by Beulah Henderson RN  Outcome: Progressing  7/19/2024 1102 by Taylor Aguilar RN  Outcome: Progressing     Problem: Skin/Tissue Integrity - Adult  Goal: Incisions, wounds, or drain sites healing without S/S of infection  7/19/2024 2217 by Beulah Henderson RN  Outcome: Progressing  Flowsheets (Taken 7/19/2024 1932)  Incisions, Wounds, or Drain Sites Healing Without Sign and Symptoms of Infection: ADMISSION and DAILY: Assess and document risk factors for pressure ulcer development  7/19/2024 1102 by Taylor Aguilar, RN  Outcome: Progressing     
  Problem: Discharge Planning  Goal: Discharge to home or other facility with appropriate resources  7/21/2024 1115 by Mary Nation RN  Outcome: Progressing  7/21/2024 1115 by Mary Nation RN  Outcome: Progressing  7/21/2024 1112 by Mary Nation RN  Outcome: Progressing  7/21/2024 1112 by Mary Nation RN  Outcome: Not Progressing  Flowsheets (Taken 7/21/2024 0830)  Discharge to home or other facility with appropriate resources:   Identify barriers to discharge with patient and caregiver   Arrange for needed discharge resources and transportation as appropriate     Problem: Pain  Goal: Verbalizes/displays adequate comfort level or baseline comfort level  7/21/2024 1115 by Mary Nation RN  Outcome: Progressing  7/21/2024 1115 by Mary Nation RN  Outcome: Progressing  7/21/2024 1112 by Mary Nation RN  Outcome: Progressing  7/21/2024 1112 by Mary Nation RN  Outcome: Not Progressing     Problem: Safety - Adult  Goal: Free from fall injury  7/21/2024 1115 by Mary Nation RN  Outcome: Progressing  7/21/2024 1115 by Mary Nation RN  Outcome: Progressing  7/21/2024 1112 by Mary Nation RN  Outcome: Progressing  7/21/2024 1112 by Mary Nation RN  Outcome: Not Progressing  Flowsheets (Taken 7/21/2024 0830)  Free From Fall Injury: Instruct family/caregiver on patient safety     Problem: Skin/Tissue Integrity  Goal: Absence of new skin breakdown  Description: 1.  Monitor for areas of redness and/or skin breakdown  2.  Assess vascular access sites hourly  3.  Every 4-6 hours minimum:  Change oxygen saturation probe site  4.  Every 4-6 hours:  If on nasal continuous positive airway pressure, respiratory therapy assess nares and determine need for appliance change or resting period.  7/21/2024 1115 by Mary Nation RN  Outcome: Progressing  7/21/2024 1115 by Mary Nation RN  Outcome: Progressing  7/21/2024 1112 by Mary Nation RN  Outcome: Progressing  7/21/2024 1112 by Mary Nation RN  Outcome: Not 
  Problem: Discharge Planning  Goal: Discharge to home or other facility with appropriate resources  7/22/2024 2103 by Petra Bryan RN  Outcome: Progressing  7/22/2024 1438 by Micheal Howard RN  Outcome: Progressing  7/22/2024 1438 by Micheal Howard RN  Outcome: Progressing     Problem: Pain  Goal: Verbalizes/displays adequate comfort level or baseline comfort level  7/22/2024 2103 by Petra Bryan RN  Outcome: Progressing  7/22/2024 1438 by Micheal Howard RN  Outcome: Progressing  7/22/2024 1438 by Micheal Howard RN  Outcome: Progressing     Problem: Safety - Adult  Goal: Free from fall injury  7/22/2024 2103 by Petra Bryan RN  Outcome: Progressing  7/22/2024 1438 by Micheal Howard RN  Outcome: Progressing  7/22/2024 1438 by Micheal Howard RN  Outcome: Progressing     Problem: Occupational Therapy - Adult  Goal: By Discharge: Performs self-care activities at highest level of function for planned discharge setting.  See evaluation for individualized goals.  Description: Occupational Therapy Goals:  Initiated 7/18/2024 to be met within 7-10 days.    1.  Patient will perform grooming with modified independence.   2.  Patient will perform bathing with supervision/set-up  using adaptive equipment.  3.  Patient will perform upper body dressing and lower body dressing  with supervision/set-up using adaptive equipment.  4.  Patient will perform drop arm commode transfers with supervision/set-up using modified lateral scoot while maintaining (R)LE NWB.  5.  Patient will perform all aspects of toileting with supervision/set-up.  6.  Patient will participate in upper extremity therapeutic exercise/activities with supervision/set-up for 8-10 minutes to increase strength/endurance for ADLs.    7.  Patient will utilize energy conservation techniques during functional activities with min verbal cues.    PLOF: pt reports having difficulty with ADLs and functional mobility using RW d/t 
  Problem: Discharge Planning  Goal: Discharge to home or other facility with appropriate resources  7/23/2024 2026 by Petra Bryan RN  Outcome: Progressing  7/23/2024 1119 by Micheal Howard RN  Outcome: Progressing     Problem: Pain  Goal: Verbalizes/displays adequate comfort level or baseline comfort level  7/23/2024 2026 by Petra Bryan RN  Outcome: Progressing  7/23/2024 1119 by Micheal Howard RN  Outcome: Progressing     Problem: Safety - Adult  Goal: Free from fall injury  7/23/2024 2026 by Petra Bryan RN  Outcome: Progressing  7/23/2024 1119 by Micheal Howard RN  Outcome: Progressing     Problem: Occupational Therapy - Adult  Goal: By Discharge: Performs self-care activities at highest level of function for planned discharge setting.  See evaluation for individualized goals.  Description: Occupational Therapy Goals:  Initiated 7/18/2024 to be met within 7-10 days.    1.  Patient will perform grooming with modified independence.   2.  Patient will perform bathing with supervision/set-up  using adaptive equipment.  3.  Patient will perform upper body dressing and lower body dressing  with supervision/set-up using adaptive equipment.  4.  Patient will perform drop arm commode transfers with supervision/set-up using modified lateral scoot while maintaining (R)LE NWB.  5.  Patient will perform all aspects of toileting with supervision/set-up.  6.  Patient will participate in upper extremity therapeutic exercise/activities with supervision/set-up for 8-10 minutes to increase strength/endurance for ADLs.    7.  Patient will utilize energy conservation techniques during functional activities with min verbal cues.    PLOF: pt reports having difficulty with ADLs and functional mobility using RW d/t NWB (R)LE        7/23/2024 1545 by Hanny Toribio OT  Outcome: Progressing  7/23/2024 1541 by Hanny Toribio OT  Outcome: Progressing     Problem: Physical Therapy - Adult  Goal: By 
  Problem: Discharge Planning  Goal: Discharge to home or other facility with appropriate resources  7/27/2024 2239 by Petra Bryan RN  Outcome: Progressing  7/27/2024 1114 by Andreina Scanlon RN  Outcome: Progressing     Problem: Pain  Goal: Verbalizes/displays adequate comfort level or baseline comfort level  7/27/2024 2239 by Petra Bryan RN  Outcome: Progressing  7/27/2024 1114 by Andreina Scanlon RN  Outcome: Progressing  Flowsheets (Taken 7/27/2024 1114)  Verbalizes/displays adequate comfort level or baseline comfort level: Encourage patient to monitor pain and request assistance     Problem: Safety - Adult  Goal: Free from fall injury  7/27/2024 2239 by Petra Bryan RN  Outcome: Progressing  7/27/2024 1114 by Andreina Scanlon RN  Outcome: Progressing     Problem: Skin/Tissue Integrity  Goal: Absence of new skin breakdown  Description: 1.  Monitor for areas of redness and/or skin breakdown  2.  Assess vascular access sites hourly  3.  Every 4-6 hours minimum:  Change oxygen saturation probe site  4.  Every 4-6 hours:  If on nasal continuous positive airway pressure, respiratory therapy assess nares and determine need for appliance change or resting period.  7/27/2024 2239 by Petra Bryan RN  Outcome: Progressing  7/27/2024 1114 by Andreina Scanlon RN  Outcome: Progressing     Problem: Skin/Tissue Integrity - Adult  Goal: Incisions, wounds, or drain sites healing without S/S of infection  7/27/2024 2239 by Petra Bryan RN  Outcome: Progressing  7/27/2024 1114 by Andreina Scanlon RN  Outcome: Progressing     Problem: Musculoskeletal - Adult  Goal: Maintain proper alignment of affected body part  7/27/2024 2239 by Petra Bryan RN  Outcome: Progressing  7/27/2024 1114 by Andreina Scanlon RN  Outcome: Progressing     Problem: Gastrointestinal - Adult  Goal: Maintains or returns to baseline bowel function  7/27/2024 2239 by Petra Bryan RN  Outcome: Progressing  7/27/2024 1114 by Andreina Scanlon 
  Problem: Discharge Planning  Goal: Discharge to home or other facility with appropriate resources  7/28/2024 0953 by Andreina Scanlon RN  Outcome: Progressing  7/27/2024 2239 by Petra Bryan RN  Outcome: Progressing     Problem: Pain  Goal: Verbalizes/displays adequate comfort level or baseline comfort level  7/28/2024 0953 by Andreina Scanlon RN  Outcome: Progressing  7/27/2024 2239 by Petra Bryan RN  Outcome: Progressing     Problem: Safety - Adult  Goal: Free from fall injury  7/28/2024 0953 by Andreina Scanlon RN  Outcome: Progressing  7/27/2024 2239 by Petar Bryan RN  Outcome: Progressing     Problem: Skin/Tissue Integrity  Goal: Absence of new skin breakdown  Description: 1.  Monitor for areas of redness and/or skin breakdown  2.  Assess vascular access sites hourly  3.  Every 4-6 hours minimum:  Change oxygen saturation probe site  4.  Every 4-6 hours:  If on nasal continuous positive airway pressure, respiratory therapy assess nares and determine need for appliance change or resting period.  7/28/2024 0953 by Andreina Scanlon RN  Outcome: Progressing  7/27/2024 2239 by Petra Bryan RN  Outcome: Progressing     Problem: Skin/Tissue Integrity - Adult  Goal: Incisions, wounds, or drain sites healing without S/S of infection  7/28/2024 0953 by Andreina Scanlon RN  Outcome: Progressing  7/27/2024 2239 by Petra Bryan RN  Outcome: Progressing     Problem: Musculoskeletal - Adult  Goal: Maintain proper alignment of affected body part  7/28/2024 0953 by Andreina Scanlon RN  Outcome: Progressing  7/27/2024 2239 by Petra Bryan RN  Outcome: Progressing     Problem: Gastrointestinal - Adult  Goal: Maintains or returns to baseline bowel function  7/28/2024 0953 by Andreina Scanlon RN  Outcome: Progressing  7/27/2024 2239 by Petra Bryan RN  Outcome: Progressing     Problem: Metabolic/Fluid and Electrolytes - Adult  Goal: Electrolytes maintained within normal limits  7/27/2024 2239 by Petra Bryan 
  Problem: Discharge Planning  Goal: Discharge to home or other facility with appropriate resources  Outcome: Progressing     Problem: Pain  Goal: Verbalizes/displays adequate comfort level or baseline comfort level  Outcome: Progressing     Problem: Safety - Adult  Goal: Free from fall injury  Outcome: Progressing     Problem: Skin/Tissue Integrity  Goal: Absence of new skin breakdown  Description: 1.  Monitor for areas of redness and/or skin breakdown  2.  Assess vascular access sites hourly  3.  Every 4-6 hours minimum:  Change oxygen saturation probe site  4.  Every 4-6 hours:  If on nasal continuous positive airway pressure, respiratory therapy assess nares and determine need for appliance change or resting period.  Outcome: Progressing     Problem: Skin/Tissue Integrity - Adult  Goal: Incisions, wounds, or drain sites healing without S/S of infection  Outcome: Progressing     Problem: Musculoskeletal - Adult  Goal: Maintain proper alignment of affected body part  Outcome: Progressing     Problem: Gastrointestinal - Adult  Goal: Maintains or returns to baseline bowel function  Outcome: Progressing     Problem: Metabolic/Fluid and Electrolytes - Adult  Goal: Electrolytes maintained within normal limits  Outcome: Progressing  Goal: Hemodynamic stability and optimal renal function maintained  Outcome: Progressing     Problem: Nutrition Deficit:  Goal: Optimize nutritional status  Outcome: Progressing     
  Problem: Discharge Planning  Goal: Discharge to home or other facility with appropriate resources  Outcome: Progressing     Problem: Pain  Goal: Verbalizes/displays adequate comfort level or baseline comfort level  Outcome: Progressing     Problem: Safety - Adult  Goal: Free from fall injury  Outcome: Progressing     Problem: Skin/Tissue Integrity  Goal: Absence of new skin breakdown  Description: 1.  Monitor for areas of redness and/or skin breakdown  2.  Assess vascular access sites hourly  3.  Every 4-6 hours minimum:  Change oxygen saturation probe site  4.  Every 4-6 hours:  If on nasal continuous positive airway pressure, respiratory therapy assess nares and determine need for appliance change or resting period.  Outcome: Progressing     Problem: Skin/Tissue Integrity - Adult  Goal: Incisions, wounds, or drain sites healing without S/S of infection  Outcome: Progressing     Problem: Musculoskeletal - Adult  Goal: Maintain proper alignment of affected body part  Outcome: Progressing     Problem: Gastrointestinal - Adult  Goal: Maintains or returns to baseline bowel function  Outcome: Progressing     Problem: Metabolic/Fluid and Electrolytes - Adult  Goal: Electrolytes maintained within normal limits  Outcome: Progressing  Goal: Hemodynamic stability and optimal renal function maintained  Outcome: Progressing     Problem: Nutrition Deficit:  Goal: Optimize nutritional status  Outcome: Progressing     Problem: Physical Therapy - Adult  Goal: By Discharge: Performs mobility at highest level of function for planned discharge setting.  See evaluation for individualized goals.  Description: Physical Therapy Goals:  Initiated 7/19/2024 to be met within 7-10 days.    1.  Patient will move from supine to sit and sit to supine  and roll side to side in bed with modified independence.    2.  Patient will transfer from bed to chair and chair to bed with modified independence using the least restrictive device.  3.  
  Problem: Discharge Planning  Goal: Discharge to home or other facility with appropriate resources  Outcome: Progressing     Problem: Pain  Goal: Verbalizes/displays adequate comfort level or baseline comfort level  Outcome: Progressing  Flowsheets (Taken 7/27/2024 1114)  Verbalizes/displays adequate comfort level or baseline comfort level: Encourage patient to monitor pain and request assistance     Problem: Safety - Adult  Goal: Free from fall injury  Outcome: Progressing     Problem: Skin/Tissue Integrity  Goal: Absence of new skin breakdown  Description: 1.  Monitor for areas of redness and/or skin breakdown  2.  Assess vascular access sites hourly  3.  Every 4-6 hours minimum:  Change oxygen saturation probe site  4.  Every 4-6 hours:  If on nasal continuous positive airway pressure, respiratory therapy assess nares and determine need for appliance change or resting period.  Outcome: Progressing     Problem: Skin/Tissue Integrity - Adult  Goal: Incisions, wounds, or drain sites healing without S/S of infection  Outcome: Progressing     Problem: Musculoskeletal - Adult  Goal: Maintain proper alignment of affected body part  Outcome: Progressing     Problem: Gastrointestinal - Adult  Goal: Maintains or returns to baseline bowel function  Outcome: Progressing     Problem: Metabolic/Fluid and Electrolytes - Adult  Goal: Electrolytes maintained within normal limits  Outcome: Progressing  Goal: Hemodynamic stability and optimal renal function maintained  Outcome: Progressing     Problem: Nutrition Deficit:  Goal: Optimize nutritional status  Outcome: Progressing     
  Problem: Occupational Therapy - Adult  Goal: By Discharge: Performs self-care activities at highest level of function for planned discharge setting.  See evaluation for individualized goals.  Description: Occupational Therapy Goals:  Initiated 7/18/2024 to be met within 7-10 days.    1.  Patient will perform grooming with modified independence.   2.  Patient will perform bathing with supervision/set-up  using adaptive equipment.  3.  Patient will perform upper body dressing and lower body dressing  with supervision/set-up using adaptive equipment.  4.  Patient will perform drop arm commode transfers with supervision/set-up using modified lateral scoot while maintaining (R)LE NWB.  5.  Patient will perform all aspects of toileting with supervision/set-up.  6.  Patient will participate in upper extremity therapeutic exercise/activities with supervision/set-up for 8-10 minutes to increase strength/endurance for ADLs.    7.  Patient will utilize energy conservation techniques during functional activities with min verbal cues.    PLOF: pt reports having difficulty with ADLs and functional mobility using RW d/t NWB (R)LE        Outcome: Progressing   OCCUPATIONAL THERAPY EVALUATION    Patient: Nael Lucia II (68 y.o. male)  Date: 7/18/2024  Primary Diagnosis: Shortness of breath [R06.02]  Cirrhosis (HCC) [K74.60]  Acute kidney injury (HCC) [N17.9]  Pancytopenia (HCC) [D61.818]  Ascites of liver [R18.8]  Heart failure with preserved ejection fraction, unspecified HF chronicity (HCC) [I50.30]       Precautions: Weight Bearing, Fall Risk, General Precautions, Right Lower Extremity Weight Bearing: Non Weight Bearing (pt is awaiting sx for (R)hip and (R) proximal tibia fx),  ,  ,  ,  ,  ,      ASSESSMENT :  Pt confirms (R)LE NWB while awaiting (R)knee and (R) hip sx and reports 7/10 abdominal pain. Bed mobility: cga supine <-> sit edge of bed, unable to scoot completely to edge of bed, pt repots he is having 
  Problem: Occupational Therapy - Adult  Goal: By Discharge: Performs self-care activities at highest level of function for planned discharge setting.  See evaluation for individualized goals.  Description: Occupational Therapy Goals:  Initiated 7/18/2024 to be met within 7-10 days.    1.  Patient will perform grooming with modified independence.   2.  Patient will perform bathing with supervision/set-up  using adaptive equipment.  3.  Patient will perform upper body dressing and lower body dressing  with supervision/set-up using adaptive equipment.  4.  Patient will perform drop arm commode transfers with supervision/set-up using modified lateral scoot while maintaining (R)LE NWB.  5.  Patient will perform all aspects of toileting with supervision/set-up.  6.  Patient will participate in upper extremity therapeutic exercise/activities with supervision/set-up for 8-10 minutes to increase strength/endurance for ADLs.    7.  Patient will utilize energy conservation techniques during functional activities with min verbal cues.    PLOF: pt reports having difficulty with ADLs and functional mobility using RW d/t NWB (R)LE        Outcome: Progressing   OCCUPATIONAL THERAPY TREATMENT    Patient: Nael Lucia II (68 y.o. male)  Date: 7/19/2024  Diagnosis: Shortness of breath [R06.02]  Cirrhosis (HCC) [K74.60]  Acute kidney injury (HCC) [N17.9]  Pancytopenia (HCC) [D61.818]  Ascites of liver [R18.8]  Heart failure with preserved ejection fraction, unspecified HF chronicity (HCC) [I50.30] Ascites of liver      Precautions: Fall Risk, Right Lower Extremity Weight Bearing: Non Weight Bearing (pt is awaiting sx for (R)hip and (R) proximal tibia fx),  ,  ,  ,  ,  ,      Chart, occupational therapy assessment, plan of care, and goals were reviewed.  ASSESSMENT:  Bed mobility: Mod A supine <-> sit edge of bed. Drop arm bedside commode utilized with pt able to perform modified lateral scoot with Min A, pt declined need to void. 
  Problem: Occupational Therapy - Adult  Goal: By Discharge: Performs self-care activities at highest level of function for planned discharge setting.  See evaluation for individualized goals.  Description: Occupational Therapy Goals:  Initiated 7/18/2024 to be met within 7-10 days.    1.  Patient will perform grooming with modified independence.   2.  Patient will perform bathing with supervision/set-up  using adaptive equipment.  3.  Patient will perform upper body dressing and lower body dressing  with supervision/set-up using adaptive equipment.  4.  Patient will perform drop arm commode transfers with supervision/set-up using modified lateral scoot while maintaining (R)LE NWB.  5.  Patient will perform all aspects of toileting with supervision/set-up.  6.  Patient will participate in upper extremity therapeutic exercise/activities with supervision/set-up for 8-10 minutes to increase strength/endurance for ADLs.    7.  Patient will utilize energy conservation techniques during functional activities with min verbal cues.    PLOF: pt reports having difficulty with ADLs and functional mobility using RW d/t NWB (R)LE        Outcome: Progressing   OCCUPATIONAL THERAPY TREATMENT    Patient: Nael Lucia II (68 y.o. male)  Date: 7/23/2024  Diagnosis: Shortness of breath [R06.02]  Cirrhosis (HCC) [K74.60]  Acute kidney injury (HCC) [N17.9]  Pancytopenia (HCC) [D61.818]  Ascites of liver [R18.8]  Heart failure with preserved ejection fraction, unspecified HF chronicity (HCC) [I50.30] Ascites of liver      Precautions: Fall Risk, Right Lower Extremity Weight Bearing: Weight Bearing As Tolerated, Left Lower Extremity Weight Bearing: Weight Bearing As Tolerated,  ,  ,  ,  ,      Chart, occupational therapy assessment, plan of care, and goals were reviewed.  ASSESSMENT:   Patient laying semi-relined in bed, reports pain 7/10 b/l hips s/p sitting up in recliner x 3 hours this date, pt states d/t chronic arthritis. Pt 
  Problem: Occupational Therapy - Adult  Goal: By Discharge: Performs self-care activities at highest level of function for planned discharge setting.  See evaluation for individualized goals.  Description: Occupational Therapy Goals:  Initiated 7/18/2024 to be met within 7-10 days.    1.  Patient will perform grooming with modified independence.   2.  Patient will perform bathing with supervision/set-up  using adaptive equipment.  3.  Patient will perform upper body dressing and lower body dressing  with supervision/set-up using adaptive equipment.  4.  Patient will perform drop arm commode transfers with supervision/set-up using modified lateral scoot while maintaining (R)LE NWB.  5.  Patient will perform all aspects of toileting with supervision/set-up.  6.  Patient will participate in upper extremity therapeutic exercise/activities with supervision/set-up for 8-10 minutes to increase strength/endurance for ADLs.    7.  Patient will utilize energy conservation techniques during functional activities with min verbal cues.    PLOF: pt reports having difficulty with ADLs and functional mobility using RW d/t NWB (R)LE      Outcome: Progressing   OCCUPATIONAL THERAPY TREATMENT    Patient: Nael Lucia II (68 y.o. male)  Date: 7/22/2024  Diagnosis: Shortness of breath [R06.02]  Cirrhosis (HCC) [K74.60]  Acute kidney injury (HCC) [N17.9]  Pancytopenia (HCC) [D61.818]  Ascites of liver [R18.8]  Heart failure with preserved ejection fraction, unspecified HF chronicity (HCC) [I50.30] Ascites of liver      Precautions: Fall Risk, Right Lower Extremity Weight Bearing: Weight Bearing As Tolerated, Left Lower Extremity Weight Bearing: Weight Bearing As Tolerated    Chart, occupational therapy assessment, plan of care, and goals were reviewed.  ASSESSMENT:  Pt just returning from paracentesis procedure. Pleasant and cooperative. Agreeable to EOB activity. Pt requires assist w/BLE to maneuver to EOB. Pt educated on energy 
  Problem: Occupational Therapy - Adult  Goal: By Discharge: Performs self-care activities at highest level of function for planned discharge setting.  See evaluation for individualized goals.  Description: Occupational Therapy Goals:  Initiated 7/18/2024, re-evaluated 7/24/2024, pt is slowly progressing, continue with goals to be met within 7-10 days.    1.  Patient will perform grooming with modified independence.   2.  Patient will perform bathing with supervision/set-up  using adaptive equipment.  3.  Patient will perform upper body dressing and lower body dressing  with supervision/set-up using adaptive equipment.  4.  Patient will perform drop arm commode transfers with supervision/set-up using modified lateral scoot while maintaining (R)LE NWB.  5.  Patient will perform all aspects of toileting with supervision/set-up.  6.  Patient will participate in upper extremity therapeutic exercise/activities with supervision/set-up for 8-10 minutes to increase strength/endurance for ADLs.    7.  Patient will utilize energy conservation techniques during functional activities with min verbal cues.    PLOF: pt reports having difficulty with ADLs and functional mobility using RW d/t NWB (R)LE        Outcome: Progressing   OCCUPATIONAL THERAPY TREATMENT    Patient: Nael Lucia II (68 y.o. male)  Date: 7/25/2024  Diagnosis: Shortness of breath [R06.02]  Cirrhosis (HCC) [K74.60]  Acute kidney injury (HCC) [N17.9]  Pancytopenia (HCC) [D61.818]  Ascites of liver [R18.8]  Heart failure with preserved ejection fraction, unspecified HF chronicity (HCC) [I50.30] Ascites of liver      Precautions: Fall Risk, Right Lower Extremity Weight Bearing: Weight Bearing As Tolerated, Left Lower Extremity Weight Bearing: Weight Bearing As Tolerated,  ,  ,  ,  ,      Chart, occupational therapy assessment, plan of care, and goals were reviewed.  ASSESSMENT:  Pt presented supine in bed upon entry and agreeable to work with OT. He came to 
  Problem: Occupational Therapy - Adult  Goal: By Discharge: Performs self-care activities at highest level of function for planned discharge setting.  See evaluation for individualized goals.  Description: Occupational Therapy Goals:  Initiated 7/18/2024, re-evaluated 7/24/2024, pt is slowly progressing, continue with goals to be met within 7-10 days.    1.  Patient will perform grooming with modified independence.   2.  Patient will perform bathing with supervision/set-up  using adaptive equipment.  3.  Patient will perform upper body dressing and lower body dressing  with supervision/set-up using adaptive equipment.  4.  Patient will perform drop arm commode transfers with supervision/set-up using modified lateral scoot while maintaining (R)LE NWB.  5.  Patient will perform all aspects of toileting with supervision/set-up.  6.  Patient will participate in upper extremity therapeutic exercise/activities with supervision/set-up for 8-10 minutes to increase strength/endurance for ADLs.    7.  Patient will utilize energy conservation techniques during functional activities with min verbal cues.    PLOF: pt reports having difficulty with ADLs and functional mobility using RW d/t NWB (R)LE        Outcome: Progressing  OCCUPATIONAL THERAPY RE-EVALUATION    Patient: Nael Lucia II (68 y.o. male)  Date: 7/24/2024  Primary Diagnosis: Shortness of breath [R06.02]  Cirrhosis (HCC) [K74.60]  Acute kidney injury (HCC) [N17.9]  Pancytopenia (HCC) [D61.818]  Ascites of liver [R18.8]  Heart failure with preserved ejection fraction, unspecified HF chronicity (HCC) [I50.30]       Precautions: Fall Risk, Right Lower Extremity Weight Bearing: Weight Bearing As Tolerated, Left Lower Extremity Weight Bearing: Weight Bearing As Tolerated    ASSESSMENT :    Pt is sitting EOB upon entry, using urinal, requiring SBA to assist with management of bed linen and reaching the urinal positioned on the side table. Pt required Min A to std 
  Problem: Physical Therapy - Adult  Goal: By Discharge: Performs mobility at highest level of function for planned discharge setting.  See evaluation for individualized goals.  Description: Physical Therapy Goals:  Initiated 7/19/2024 to be met within 7-10 days.    1.  Patient will move from supine to sit and sit to supine  and roll side to side in bed with modified independence.    2.  Patient will transfer from bed to chair and chair to bed with modified independence using the least restrictive device.  3.  Patient will perform sit to stand with minimal assistance/contact guard assist.  4.  Patient will ambulate with minimal assistance/contact guard assist for 50 feet with the least restrictive device.   5.  Patient will ascend/descend 3 stairs with handrail(s) with moderate assistance .    PLOF: Patient reports using RW prior to admission, but ambulation limited due to BLE pain. He lives in boarding house with 2 KALYANI      Outcome: Progressing     PHYSICAL THERAPY EVALUATION    Patient: Nael Lucia II (68 y.o. male)  Date: 7/19/2024  Primary Diagnosis: Shortness of breath [R06.02]  Cirrhosis (HCC) [K74.60]  Acute kidney injury (HCC) [N17.9]  Pancytopenia (HCC) [D61.818]  Ascites of liver [R18.8]  Heart failure with preserved ejection fraction, unspecified HF chronicity (HCC) [I50.30]       Precautions: Fall Risk, Right Lower Extremity Weight Bearing: Non Weight Bearing (pt is awaiting sx for (R)hip and (R) proximal tibia fx),  ,  ,  ,  ,  ,      ASSESSMENT :  Patient resting in bed, alert, and agreeable to PT evaluation. He reports 8/10 pain in abdomen and BLE. Patient needs mod A for supine to sit transfer. He has good static sitting balance. Scoots laterally to drop arm BSC with min A. Additional time needed for all mobility 2/2 pain. Mod A to lift BLE's back into bed. All needs left within reach. RN notified of patient performance and pain level.     DEFICITS/IMPAIRMENTS:    , Body Structures, Functions, 
  Problem: Physical Therapy - Adult  Goal: By Discharge: Performs mobility at highest level of function for planned discharge setting.  See evaluation for individualized goals.  Description: Physical Therapy Goals:  Initiated 7/19/2024 to be met within 7-10 days.  Reassessed:7/25/2024    1.  Patient will move from supine to sit and sit to supine  and roll side to side in bed with modified independence.    2.  Patient will transfer from bed to chair and chair to bed with modified independence using the least restrictive device.  3.  Patient will perform sit to stand with minimal assistance/contact guard assist.  4.  Patient will ambulate with minimal assistance/contact guard assist for 50 feet with the least restrictive device.   5.  Patient will ascend/descend 3 stairs with handrail(s) with moderate assistance .    PLOF: Patient reports using RW prior to admission, but ambulation limited due to BLE pain. He lives in boarding house with 2 KALYANI      Outcome: Progressing   PHYSICAL THERAPY RE-EVALUATION    Patient: Nael Lucia II (68 y.o. male)  Date: 7/25/2024  Primary Diagnosis: Shortness of breath [R06.02]  Cirrhosis (HCC) [K74.60]  Acute kidney injury (HCC) [N17.9]  Pancytopenia (HCC) [D61.818]  Ascites of liver [R18.8]  Heart failure with preserved ejection fraction, unspecified HF chronicity (HCC) [I50.30]       Precautions: Fall Risk, Right Lower Extremity Weight Bearing: Weight Bearing As Tolerated, Left Lower Extremity Weight Bearing: Weight Bearing As Tolerated,    ASSESSMENT :  Pt cleared by nursing prior to session. Pt was supine in bed, in NAD, and agreeable to tx when therapist entered room. Pt transferred to EOB with supervision and additional time needed. Pt transferred to standing at RW with min A and ambulated ~5 ft with RW and min A for safety. Heavily supported on RW and antalgic gait noted. Pt returned to EOB and was agreeable to sit up in recliner for lunch. Pt performed SPT with min A and RW 
Breakthrough pain despite Tramadol patient is trying non-pharmaceutical methods like heat and repositioning to alleviate pain. Maalox given to try to resolve breakthrough pain after tramadol dose Signed.  Problem: Safety - Adult  Goal: Free from fall injury  Outcome: Progressing     Problem: Musculoskeletal - Adult  Goal: Maintain proper alignment of affected body part  Outcome: Progressing     Problem: Pain  Goal: Verbalizes/displays adequate comfort level or baseline comfort level  Outcome: Not Progressing     
raise Les into the bed.  Pt was left in bed with needs in reach.  Kept patient NWB on right LE until weight bearing status can be confirmed with MD as patient is unclear when asking him.    1330: Spoke with Dr. Hernandez and patient is WBAT B LEs  Progression toward goals:   []      Improving appropriately and progressing toward goals  [x]      Improving slowly and progressing toward goals  []      Not making progress toward goals and plan of care will be adjusted     PLAN:  Patient continues to benefit from skilled intervention to address the above impairments.  Continue treatment per established plan of care.    Further Equipment Recommendations for Discharge: rolling walker and wheelchair 16 inch    AMPA: AM-PAC Inpatient Mobility Raw Score : 12      Current research shows that an AM-PAC score of 17 (13 without stairs) or less is not associated with a discharge to the patient's home setting.    This AMPAC score should be considered in conjunction with interdisciplinary team recommendations to determine the most appropriate discharge setting. Patient's social support, diagnosis, medical stability, and prior level of function should also be taken into consideration.     SUBJECTIVE:   Patient stated, \"They are getting the fluid of my stomach again later today.\"    OBJECTIVE DATA SUMMARY:   Critical Behavior:   Cooperative and pleasant     Functional Mobility Training:  Bed Mobility:  Bed Mobility Training  Bed Mobility Training: Yes  Rolling: Modified independent  Supine to Sit: Supervision  Sit to Supine: Minimum assistance  Scooting: Supervision  Transfers:  Transfer Training  Sit to Stand: Moderate assistance  Stand to Sit: Moderate assistance  Balance:  Balance  Sitting - Static: Good (unsupported)  Standing: Impaired  Standing - Static: Poor (with RW)      Pain:  Intensity Pre-treatment: 4/10   Intensity Post-treatment: 5/10  Scale: Numeric Rating Scale  Location: Abdomen    Activity Tolerance:   Activity 
within normal limits  7/22/2024 1438 by Micheal Howard RN  Outcome: Progressing  7/22/2024 1438 by Micheal Howard RN  Outcome: Progressing  Goal: Hemodynamic stability and optimal renal function maintained  7/22/2024 1438 by Micheal Howard RN  Outcome: Progressing  7/22/2024 1438 by Micheal Howard RN  Outcome: Progressing     Problem: Nutrition Deficit:  Goal: Optimize nutritional status  7/22/2024 1438 by Micheal Howard RN  Outcome: Progressing  7/22/2024 1438 by Micheal Howard RN  Outcome: Progressing     Problem: Physical Therapy - Adult  Goal: By Discharge: Performs mobility at highest level of function for planned discharge setting.  See evaluation for individualized goals.  Description: Physical Therapy Goals:  Initiated 7/19/2024 to be met within 7-10 days.    1.  Patient will move from supine to sit and sit to supine  and roll side to side in bed with modified independence.    2.  Patient will transfer from bed to chair and chair to bed with modified independence using the least restrictive device.  3.  Patient will perform sit to stand with minimal assistance/contact guard assist.  4.  Patient will ambulate with minimal assistance/contact guard assist for 50 feet with the least restrictive device.   5.  Patient will ascend/descend 3 stairs with handrail(s) with moderate assistance .    PLOF: Patient reports using RW prior to admission, but ambulation limited due to BLE pain. He lives in boarding house with 2 KALYANI      7/22/2024 1153 by Evette Centeno PT  Outcome: Progressing     
(unsupported)  Sitting - Dynamic: Fair (occasional) (+)  Standing: Impaired;With support  Standing - Static: Fair (-)  Standing - Dynamic: Fair (-)        Ambulation/Gait Training:     Gait  Gait Training: Yes  Left Side Weight Bearing: As tolerated  Right Side Weight Bearing: As tolerated  Overall Level of Assistance: Minimum assistance  Distance (ft): 3 Feet  Assistive Device: Walker, rolling  Interventions: Safety awareness training;Weight shifting training/pressure relief  Base of Support: Narrowed  Speed/Ginger: Slow  Step Length: Right shortened;Left shortened  Gait Abnormalities: Antalgic;Decreased step clearance;Step to gait          Therapeutic Exercises:     EXERCISE   Sets   Reps   Active Active Assist   Passive Self ROM   Comments   Ankle Pumps  5  [x] [] [] []    Quad Sets/Glut Sets    [] [] [] [] Hold for 5 secs   Hamstring Sets   [] [] [] []    Short Arc Quads  5 [x] [] [] []    Heel Slides   [] [] [] []    Straight Leg Raises   [] [] [] []    Hip Add   [] [] [] [] Hold for 5 secs, w/ pillow squeeze   Long Arc Quads   [] [] [] []    Seated Marching  5 [x] [] [] []    Standing Marching   [] [] [] []       [] [] [] []        Pain:  Intensity Pre-treatment: 1/10   Intensity Post-treatment: 8/10  Scale: Numeric Rating Scale  Location: Right Left Leg  Quality: Aching and Throbbing  Intervention(s): Nurse notified, Repositioning , and Rest      Activity Tolerance:   Activity Tolerance: Patient limited by pain  Please refer to the flowsheet for vital signs taken during this treatment.  After treatment:   [x] Patient left in no apparent distress sitting up in chair  [] Patient left in no apparent distress in bed  [x] Call bell left within reach  [x] Nursing notified  [] Caregiver present  [] Bed alarm activated  [] SCDs applied      COMMUNICATION/EDUCATION:   Patient Education  Education Given To: Patient  Education Provided: Plan of Care;Transfer Training  Education Method: Verbal;Teach 
RN  Outcome: Progressing  7/21/2024 1112 by Mary Nation RN  Outcome: Progressing  7/21/2024 1112 by Mary Nation RN  Outcome: Not Progressing     
diets, oral nutritional supplements, and vitamin/mineral supplements     
 [] [] [] []    Seated Marching   [] [] [] []    Standing Marching   [] [] [] []       [] [] [] []        Pain:  Intensity Pre-treatment: 5/10   Intensity Post-treatment: 5/10  Scale: Numeric Rating Scale  Location: Leg  Quality: Aching  Intervention(s): Nurse notified and Repositioning       Activity Tolerance:   Activity Tolerance: Patient tolerated treatment well;Patient limited by pain  Please refer to the flowsheet for vital signs taken during this treatment.  After treatment:   [x] Patient left in no apparent distress sitting up in chair  [] Patient left in no apparent distress in bed  [x] Call bell left within reach  [x] Nursing notified  [] Caregiver present  [] Bed alarm activated  [] SCDs applied      COMMUNICATION/EDUCATION:   Patient Education  Education Provided: Home Exercise Program;Transfer Training  Education Provided Comments: Educated patient on importance of hand positioning before initiating STS transfers in order to improve ability to safely transition  Education Method: Verbal;Demonstration;Teach Back  Barriers to Learning: None  Education Outcome: Verbalized understanding;Continued education needed;Demonstrated understanding      Chanell Rice PTA  Minutes: 23

## 2024-07-29 ENCOUNTER — APPOINTMENT (OUTPATIENT)
Facility: HOSPITAL | Age: 69
DRG: 436 | End: 2024-07-29
Payer: MEDICARE

## 2024-07-29 LAB
ALBUMIN FLD-MCNC: 1 G/DL
APPEARANCE FLD: ABNORMAL
COLOR FLD: ABNORMAL
EOSINOPHIL NFR FLD MANUAL: 0 %
LYMPHOCYTES NFR FLD: 52 %
MONOCYTES NFR FLD: 39 %
NEUTROPHILS NFR FLD: 9 % (ref 0–25)
NEUTS BAND # FLD: 0 %
NUC CELL # FLD: 527 /CU MM
RBC # FLD: ABNORMAL /CU MM
SPECIMEN SOURCE FLD: ABNORMAL
SPECIMEN SOURCE FLD: NORMAL

## 2024-07-29 PROCEDURE — 6360000002 HC RX W HCPCS: Performed by: PHYSICIAN ASSISTANT

## 2024-07-29 PROCEDURE — 87070 CULTURE OTHR SPECIMN AEROBIC: CPT

## 2024-07-29 PROCEDURE — 87205 SMEAR GRAM STAIN: CPT

## 2024-07-29 PROCEDURE — 6370000000 HC RX 637 (ALT 250 FOR IP): Performed by: HOSPITALIST

## 2024-07-29 PROCEDURE — 89051 BODY FLUID CELL COUNT: CPT

## 2024-07-29 PROCEDURE — 82042 OTHER SOURCE ALBUMIN QUAN EA: CPT

## 2024-07-29 PROCEDURE — 2580000003 HC RX 258: Performed by: STUDENT IN AN ORGANIZED HEALTH CARE EDUCATION/TRAINING PROGRAM

## 2024-07-29 PROCEDURE — 94761 N-INVAS EAR/PLS OXIMETRY MLT: CPT

## 2024-07-29 PROCEDURE — 6360000002 HC RX W HCPCS: Performed by: HOSPITALIST

## 2024-07-29 PROCEDURE — 6370000000 HC RX 637 (ALT 250 FOR IP): Performed by: STUDENT IN AN ORGANIZED HEALTH CARE EDUCATION/TRAINING PROGRAM

## 2024-07-29 PROCEDURE — P9047 ALBUMIN (HUMAN), 25%, 50ML: HCPCS | Performed by: PHYSICIAN ASSISTANT

## 2024-07-29 PROCEDURE — 2580000003 HC RX 258: Performed by: HOSPITALIST

## 2024-07-29 PROCEDURE — 49083 ABD PARACENTESIS W/IMAGING: CPT

## 2024-07-29 PROCEDURE — 6370000000 HC RX 637 (ALT 250 FOR IP): Performed by: PHYSICIAN ASSISTANT

## 2024-07-29 PROCEDURE — 99239 HOSP IP/OBS DSCHRG MGMT >30: CPT | Performed by: HOSPITALIST

## 2024-07-29 PROCEDURE — 94640 AIRWAY INHALATION TREATMENT: CPT

## 2024-07-29 PROCEDURE — 1100000003 HC PRIVATE W/ TELEMETRY

## 2024-07-29 RX ORDER — ALBUMIN (HUMAN) 12.5 G/50ML
25 SOLUTION INTRAVENOUS ONCE
Status: COMPLETED | OUTPATIENT
Start: 2024-07-29 | End: 2024-07-29

## 2024-07-29 RX ORDER — METHADONE HYDROCHLORIDE 10 MG/1
60 TABLET ORAL DAILY
Qty: 60 TABLET | Refills: 0 | Status: SHIPPED | DISCHARGE
Start: 2024-07-29 | End: 2024-08-08

## 2024-07-29 RX ORDER — METHADONE HYDROCHLORIDE 10 MG/1
60 TABLET ORAL DAILY
Qty: 60 TABLET | Refills: 0 | Status: SHIPPED | OUTPATIENT
Start: 2024-07-29 | End: 2024-07-29

## 2024-07-29 RX ADMIN — CARVEDILOL 6.25 MG: 6.25 TABLET, FILM COATED ORAL at 17:45

## 2024-07-29 RX ADMIN — METHADONE HYDROCHLORIDE 60 MG: 10 CONCENTRATE ORAL at 10:05

## 2024-07-29 RX ADMIN — ALBUMIN (HUMAN) 25 G: 0.25 INJECTION, SOLUTION INTRAVENOUS at 14:08

## 2024-07-29 RX ADMIN — Medication 100 MG: at 10:07

## 2024-07-29 RX ADMIN — ENOXAPARIN SODIUM 40 MG: 100 INJECTION SUBCUTANEOUS at 21:43

## 2024-07-29 RX ADMIN — PANTOPRAZOLE SODIUM 40 MG: 40 TABLET, DELAYED RELEASE ORAL at 06:38

## 2024-07-29 RX ADMIN — CARVEDILOL 6.25 MG: 6.25 TABLET, FILM COATED ORAL at 10:07

## 2024-07-29 RX ADMIN — ARFORMOTEROL TARTRATE 15 MCG: 15 SOLUTION RESPIRATORY (INHALATION) at 08:26

## 2024-07-29 RX ADMIN — THERA TABS 1 TABLET: TAB at 10:07

## 2024-07-29 RX ADMIN — FUROSEMIDE 40 MG: 40 TABLET ORAL at 10:07

## 2024-07-29 RX ADMIN — SODIUM CHLORIDE, PRESERVATIVE FREE 10 ML: 5 INJECTION INTRAVENOUS at 21:44

## 2024-07-29 RX ADMIN — SPIRONOLACTONE 100 MG: 100 TABLET ORAL at 10:07

## 2024-07-29 RX ADMIN — POLYETHYLENE GLYCOL 3350 17 G: 17 POWDER, FOR SOLUTION ORAL at 10:07

## 2024-07-29 RX ADMIN — BUDESONIDE 500 MCG: 0.5 INHALANT RESPIRATORY (INHALATION) at 08:26

## 2024-07-29 RX ADMIN — SODIUM CHLORIDE, PRESERVATIVE FREE 10 ML: 5 INJECTION INTRAVENOUS at 10:08

## 2024-07-29 RX ADMIN — SODIUM CHLORIDE, PRESERVATIVE FREE 10 ML: 5 INJECTION INTRAVENOUS at 10:07

## 2024-07-29 RX ADMIN — FOLIC ACID 1 MG: 1 TABLET ORAL at 10:07

## 2024-07-29 ASSESSMENT — PAIN SCALES - GENERAL
PAINLEVEL_OUTOF10: 0

## 2024-07-29 NOTE — PROCEDURES
RADIOLOGY POST PARACENTESIS NOTE     July 29, 2024       11:21 AM     Preoperative Diagnosis:   Ascites    Postoperative Diagnosis:  Same.    :  ESTEFANIA Chamberlain    Attending: Raza Peck MD     Assistant:  None.    Type of Anesthesia: 1% plain lidocaine    Procedure/Description:  US-Guided paracentesis    Findings:  Using ultrasound guidance the largest pocket of peritoneal fluid was localized and marked at the right lower quadrant.  The patient was prepped and draped in the usual fashion.  1% Lidocaine was infiltrated locally. A 5 Panamanian over the needle catheter was advanced into the peritoneal cavity and dark bri colored was aspirated. Once fluid was easily aspirated, the needle was removed leaving the catheter in place. The catheter was connected to vacuum containers and ascitic fluid was removed.    Estimated blood Loss:  Minimal    Specimen Removed:  yes    Complications: None    Condition: Stable    Impression: Successful paracentesis. 5.4 L ascites removed.    Discharge Plan:  continue present therapy    ESTEFANIA Chamberlain

## 2024-07-29 NOTE — CARE COORDINATION
Transport arrived to  patient this am while patient was getting a paracentesis. They stated to call tranport back when pt was back in room. Called [facility] back, and they state they are not able to return to get patient until tomorrow morning at 1100.  Nursing and pt notified.    Domi BOYLE,RN, [facility]  Case Management  [phone]

## 2024-07-29 NOTE — CARE COORDINATION
Requested Case Management specialist to assist [facility]  Address is [address] [place] [address] and phone number is [phone]   Patient will require BLS transport.   Pt requires Stretcher If stretcher, reason: ascities of liver, impaired mobility, post-traumatic arthritis of knees, and history of closed fracture of distal end of right fibula.   Patient is currently requiring oxygen No   Height:5'11\"   Weight: 179lbs  Pt is on isolation: No   Is the pt ready now? Yes  Requested time: Next Available  PCS Faxed: No  Insurance verified on face sheet: Yes  Auth needed for transport: Yes  CM completed PCS/ Envelope and placed on chart.      MULU KenN, RN   Case Management   [phone]

## 2024-07-29 NOTE — CARE COORDINATION
Transition of Care Plan to SNF/Rehab      Patient Name: Nael Lucia II                   YOB: 1955    SNF/Rehab Transition:  Patient has been accepted to [facility] SNF/Rehab and meets criteria for admission. Confirmed with [facility] that bed is available for today.   Patient will transported by [facility] and expected to leave at this afternoon.  Met with patient and he is agreeable to the transition plan.    Medicaid Long-term Services and Supports(LTSS) screening completion: Yes    Three Inpatient Midnights for Medicare: Yes    Current Code Status:   Code Status: DNR     Last Weight:   Wt Readings from Last 1 Encounters:   07/16/24 81.2 kg (179 lb)       Weightbearing Status:     IV Medication, IV Site, Device Type: No    Dialysis: No      O2 Needs (including O2, Bipap, Cpap, ect.): No    Covid Vaccine Dates/ if known:    Internal Administration   First Dose      Second Dose           Last COVID Lab SARS-CoV-2 ( )   Date Value   02/10/2023 Not detected     SARS-CoV-2, PCR ( )   Date Value   02/11/2023 Not detected     SARS-CoV-2, Rapid ( )   Date Value   02/16/2023 Not detected            Last COVID Lab SARS-CoV-2 ( )   Date Value   02/10/2023 Not detected     SARS-CoV-2, PCR ( )   Date Value   02/11/2023 Not detected     SARS-CoV-2, Rapid ( )   Date Value   02/16/2023 Not detected              Current Diet: ADULT ORAL NUTRITION SUPPLEMENT; Lunch; Standard High Calorie/High Protein Oral Supplement  ADULT DIET; Regular; No Added Salt (3-4 gm)    Wound Vac or Other Equipment Needs: No    Patient requires Isolation: No    Follow-up Appointment needed or scheduled:     Communication to SNF/Rehab:  Bedside RN, Collesa, has been notified to update the transition plan to the facility and call report (phone number).  Discharge information has been updated on the AVS and communicated to facility via [facility]Port.    Nursing Please include all hard scripts for

## 2024-07-29 NOTE — CARE COORDINATION
Call made to [facility] [phone], spoke with Marie, dispatch will call the nurses station with [facility].  Trip # [id number].

## 2024-07-29 NOTE — CARE COORDINATION
Call made to [facility] [phone], spoke with Milena, there is no availability to transport patient until tomorrow at 11:00 am due to distance.

## 2024-07-29 NOTE — CARE COORDINATION
07/29/24 1344   Avoidable Days   Community/External       Transportation  ([facility] has no transport availablity today. Pt traveling to [place], [place].)     Gela Abarca, MULUN, RN   Case Management   [phone]

## 2024-07-29 NOTE — DISCHARGE SUMMARY
Post-traumatic osteoarthritis of left knee; Osteonecrosis of right hip (HCC)      amLODIPine (NORVASC) 10 MG tablet Take 1 tablet by mouth daily      fluticasone-salmeterol (ADVAIR DISKUS) 250-50 MCG/ACT AEPB diskus inhaler Inhale 1 puff into the lungs in the morning and 1 puff in the evening.  Qty: 60 each, Refills: 0      ferrous sulfate (IRON 325) 325 (65 Fe) MG tablet Take 1 tablet by mouth 2 times daily (with meals)      folic acid (FOLVITE) 1 MG tablet Take 1 tablet by mouth daily      melatonin 3 MG TABS tablet Take 1 tablet by mouth      methadone (DOLOPHINE) 10 MG tablet Take 6 tablets by mouth daily.       pantoprazole (PROTONIX) 40 MG tablet Take 1 tablet by mouth every morning (before breakfast)      thiamine 100 MG tablet Take 1 tablet by mouth daily           STOP taking these medications       bumetanide (BUMEX) 1 MG tablet Comments:   Reason for Stopping:         Homeopathic Products (THERAWORX RELIEF) LIQD Comments:   Reason for Stopping:               Activity: activity as tolerated. Pt / ot eval and treat. Fall precautions.    Diet: cardiac diet    Wound Care: Wound Care Orders Sacrum:  cleanse wound with wound spray from par room, then apply Optifoam Gentle Silicone dressing, change every 2 days & prn soilage.    Follow-up:   SNF doctor upon arrival    Patient needs f/u arranged with Dr. [facility] upon discharge from SNF.     needs f/u arranged with Dr. Marin BRUCE upon discharge from SNF.       Time spent 46 minutes.

## 2024-07-29 NOTE — WOUND CARE
4 Eyes Skin Assessment     NAME:  Nael Lucia II  YOB: 1955  MEDICAL RECORD NUMBER:  [id number]    The patient is being assessed for  Shift Handoff    I agree that at least one RN has performed a thorough Head to Toe Skin Assessment on the patient. ALL assessment sites listed below have been assessed.      Areas assessed by both nurses:    Head, Face, Ears, Shoulders, Back, Chest, Arms, Elbows, Hands, Sacrum. Buttock, Coccyx, Ischium, Legs. Feet and Heels, and Under Medical Devices         Does the Patient have a Wound? Yes wound(s) were present on assessment. LDA wound assessment was Initiated and completed by RN.  Wound healing, hyperpigmentation noted.        Gareth Prevention initiated by RN: Yes  Wound Care Orders initiated by RN: Yes    Pressure Injury (Stage 3,4, Unstageable, DTI, NWPT, and Complex wounds) if present, place Wound referral order by RN under : No    New Ostomies, if present place, Ostomy referral order under : No     Nurse 1 eSignature: Electronically signed by FADI SCHAFFER RN on 7/29/24 at 7:23 AM EDT    **SHARE this note so that the co-signing nurse can place an eSignature**    Nurse 2 eSignature: {Esignature:[id number]}

## 2024-07-30 VITALS
OXYGEN SATURATION: 100 % | WEIGHT: 179 LBS | BODY MASS INDEX: 25.06 KG/M2 | HEIGHT: 71 IN | DIASTOLIC BLOOD PRESSURE: 75 MMHG | RESPIRATION RATE: 17 BRPM | HEART RATE: 75 BPM | SYSTOLIC BLOOD PRESSURE: 133 MMHG | TEMPERATURE: 97.9 F

## 2024-07-30 PROCEDURE — 6370000000 HC RX 637 (ALT 250 FOR IP): Performed by: HOSPITALIST

## 2024-07-30 PROCEDURE — 6370000000 HC RX 637 (ALT 250 FOR IP): Performed by: STUDENT IN AN ORGANIZED HEALTH CARE EDUCATION/TRAINING PROGRAM

## 2024-07-30 PROCEDURE — 94761 N-INVAS EAR/PLS OXIMETRY MLT: CPT

## 2024-07-30 PROCEDURE — 2580000003 HC RX 258: Performed by: HOSPITALIST

## 2024-07-30 PROCEDURE — 6370000000 HC RX 637 (ALT 250 FOR IP): Performed by: PHYSICIAN ASSISTANT

## 2024-07-30 PROCEDURE — 2580000003 HC RX 258: Performed by: STUDENT IN AN ORGANIZED HEALTH CARE EDUCATION/TRAINING PROGRAM

## 2024-07-30 RX ADMIN — CARVEDILOL 6.25 MG: 6.25 TABLET, FILM COATED ORAL at 09:06

## 2024-07-30 RX ADMIN — FOLIC ACID 1 MG: 1 TABLET ORAL at 09:06

## 2024-07-30 RX ADMIN — Medication 100 MG: at 09:06

## 2024-07-30 RX ADMIN — SODIUM CHLORIDE, PRESERVATIVE FREE 10 ML: 5 INJECTION INTRAVENOUS at 09:07

## 2024-07-30 RX ADMIN — PANTOPRAZOLE SODIUM 40 MG: 40 TABLET, DELAYED RELEASE ORAL at 07:01

## 2024-07-30 RX ADMIN — THERA TABS 1 TABLET: TAB at 09:06

## 2024-07-30 RX ADMIN — POLYETHYLENE GLYCOL 3350 17 G: 17 POWDER, FOR SOLUTION ORAL at 09:07

## 2024-07-30 RX ADMIN — SPIRONOLACTONE 100 MG: 100 TABLET ORAL at 09:06

## 2024-07-30 RX ADMIN — FUROSEMIDE 40 MG: 40 TABLET ORAL at 09:06

## 2024-07-30 RX ADMIN — METHADONE HYDROCHLORIDE 60 MG: 10 CONCENTRATE ORAL at 09:11

## 2024-07-30 ASSESSMENT — PAIN SCALES - GENERAL
PAINLEVEL_OUTOF10: 0

## 2024-07-30 NOTE — PROGRESS NOTES
Gastroenterology follow up-Progress note    Impression:  1.  Multifocal hepatocellular carcinoma   - CT abd/pelvis 4/16/24 and MRI abd with LR5 features 7/19/24   - was scheduled for Y90 radioembolization but was a no show for pre treatment work up x3 in 2022  - MRI performed 4/25/22 - 1.4x1.3 cm lesion segment 4 LIRADS 5, 2.2x1.9 cm segment 6 LIRADS 5 potentially infiltrative with peripheral additional suspicious nodules *1.2 and 0.6 cm), cirrhosis and portal hypertension with trace ascites   - Currently under care of [facility] and not a surgical candidate for resection.  2.  Decompensated HCV/EtOH cirrhosis   ---Fluid status: peripheral edema on diuretics, ascites no previous LVP  ---No previous Hepatic encephalopathy: on lactulose, xifaxan  ---Variceal status: no prior bleeding, no previous EGD  3.  Chronic Hepatitis C   - untreated (last tested 4/2022)  - Hep B Core Ab positive; Hep B Surface Ag neg. Hep A Ab total positive.  4.  Polysubstance abuse-alcohol, cocaine.    -reports heroin use when unable to get methadone    MELD 3.0: 13 at 7/22/2024  3:27 AM  MELD-Na: 10 at 7/22/2024  3:27 AM  Calculated from:  Serum Creatinine: 1.20 MG/DL at 7/22/2024  3:27 AM  Serum Sodium: 134 mmol/L at 7/22/2024  3:27 AM  Total Bilirubin: 0.7 MG/DL (Using min of 1 MG/DL) at 7/22/2024  3:27 AM  Serum Albumin: 2.7 g/dL at 7/22/2024  3:27 AM  INR(ratio): 1.2   at 7/22/2024  3:27 AM  Age at listing (hypothetical): 68 years  Sex: Male at 7/22/2024  3:27 AM        Plan:  1. Therapeutic paracentesis ordered  2. Optimized diuretic - furosemide 40 mg and aldactone 100 mg daily  3. Low sodium diet  4. Ideally best that patient is seen by a hepatologist such as Dr Rodríguez for outpatient treatment of HCV.  5. Varices screening - will assess for inpatient sometime this week otherwise as outpatient.  6. Hepatitis c (PCR and genotype ordered), CBC/CMP/INR  7. Rest per primary team and other consultants.    Chief Complaint: abdominal 
    [employer]  [phone]    Gastroenterology follow up-Progress note    Impression:  1.  Multifocal hepatocellular carcinoma - diagnosed on MRI 4/25/22   - CT abd/pelvis 4/16 - cirrhotic liver with 3.5 cm heterogeneous nodule along subdiaphragmatic surface, moderate volume ascites and splenomegaly, cholelithiasis  -was scheduled for Y90 radioembolization but was a no show for pre treatment work up x3 in 2022  - MRI performed 4/25/22 - 1.4x1.3 cm lesion segment 4 LIRADS 5, 2.2x1.9 cm segment 6 LIRADS 5 potentially infiltrative with peripheral additional suspicious nodules *1.2 and 0.6 cm), cirrhosis and portal hypertension with trace ascites   -elevated AFP in past  2.  Cirrhosis  -Suspect this is related to hepatitis C/alcohol use.  - MELD 9  - AFP pending  - Cirrhosis management:  ---Fluid status: peripheral edema on diuretics, ascites no previous LVP, creatinine 1.21, furosemide 40 mg and spironolactone 25 mg currently ordered  ---No previous Hepatic encephalopathy: will start lactulose, xifaxan  ---Variceal status: no prior bleeding, no previous EGD  -not a transplant candidate due to tumor burden outside of [place] criteria as well as noncompliance issues  3.  Chronic Hepatitis C   -HBsAb neg, HBsAg neg, HBeAg neg, HBeAb pos, HBV DNA pending  - untreated, genotype 1a, PCR ,000  - Hep B Core Ab positive; Hep A Ab total positive 4/25/22  4.  Polysubstance abuse-alcohol, cocaine.    -reports last snorted heroin 3-4 days ago when unable to get methadaone  5.  Thrombocytopenia  -suspect related to cirrhosis with hypersplenism.    -Splenomegaly noted on imaging.  -had bone marrow biopsy at [facility], followed by [place] [facility].   6.  Noncompliance    Plan:  1. MRI abd with liver tumor protocol pending   2. Counseled about drug use.  3. IR consult pending for paracentesis  4. Will need outpatient f/u for HCV treatment as well as EGD for varices screening.   5.  Low threshold for treatment for 
    [employer]  [phone]    Gastroenterology follow up-Progress note    Impression:  1.  Multifocal hepatocellular carcinoma - diagnosed on MRI 4/25/22   - CT abd/pelvis 4/16 - cirrhotic liver with 3.5 cm heterogeneous nodule along subdiaphragmatic surface, moderate volume ascites and splenomegaly, cholelithiasis  -was scheduled for Y90 radioembolization but was a no show for pre treatment work up x3 in 2022  - MRI performed 4/25/22 - 1.4x1.3 cm lesion segment 4 LIRADS 5, 2.2x1.9 cm segment 6 LIRADS 5 potentially infiltrative with peripheral additional suspicious nodules *1.2 and 0.6 cm), cirrhosis and portal hypertension with trace ascites   -elevated AFP in past  2.  Decompensated HCV/EtOH cirrhosis (CTP B, MELD 11)  ---Fluid status: peripheral edema on diuretics, ascites no previous LVP, creatinine 1.21, furosemide 40 mg and spironolactone 25 mg  ---No previous Hepatic encephalopathy: on lactulose, xifaxan  ---Variceal status: no prior bleeding, no previous EGD  -not a transplant candidate due to tumor burden outside of [place] criteria as well as noncompliance issues  3.  Chronic Hepatitis C   - untreated, genotype 1a, PCR elevated when last checked in 2022  - Hep B Core Ab positive; Hep B Surface Ag neg. Hep A Ab total positive 4/25/22  4.  Polysubstance abuse-alcohol, cocaine.    -reports recent heroin use when unable to get methadaone    Plan:  1. HCV treatment as outpatient as well as EGD for varices screening  2. Oncology input appreciated  3. Needs close follow up in methadone program.    Subjective:  abdominal pain and distention, nausea but no vomiting, no hematochezia    ROS: Denies any fevers, chills, rash.     Eyes: conjunctiva normal, EOM normal   Neck: ROM normal, supple and trachea normal   Cardiovascular: heart normal,  normal rate and regular rhythm   Pulmonary/Chest Wall: breath sounds normal and effort normal   Abdominal: Distended, tight, generalized tenderness     Patient Active Problem 
   Hematology / Oncology Progress Note  [facility]      Patient ID:  Nael Lucia II  68 y.o.  1955    Admit Date: 2024    Assessment:     Principal Problem:    Ascites of liver  Active Problems:    Cirrhosis (HCC)    Goals of care, counseling/discussion    Encounter for palliative care    Hepatocellular carcinoma (HCC)    Debility    Alcohol use  Resolved Problems:    * No resolved hospital problems. *    Hepatocellular carcinoma   Child's B:  no encephalopathy(0), moderate ascities (3); bilirubin 0.9 (1); albumin 2.8g (2); PT less than 4 secods above control (1) total 8 points  MRI Li Rads 5 2 lesions ()  Elevated AFP  Hepatitis C untreated  Has not had treatment due to poor compliance  MRI :  innumerable foci of arterial phase enchancement multifocal HCC LR 5; splenomegaly, cirrhosis, moderate to large ascites    Thrombocytopenia due to hypersplenism  Seen as outpatient   Bone marrow bx without evidence for malignancy, lyphoproliferative disorder; hypocellular marrow with decreased trilineage hematopoiesis .  FISH/FLOW/CYTOGENETICS negative/wnl     Symptomatic ascites     Plan:     Track cbc, lytes, await AFP  Ascites cytology and fluid studies  Repeat therapeutic paracentesis    He appears to understand that he has cirrhosis untreated hep c and liver cancer which is unresectable  I would prefer he complete rx for hep c and reassess readiness for rx at that time.        Subjective:   Abdominal fullness, uncomfortable,      Objective:     /72   Pulse 74   Temp 98.7 °F (37.1 °C) (Oral)   Resp 16   Ht 1.803 m (5' 11\")   Wt 81.2 kg (179 lb)   SpO2 97%   BMI 24.97 kg/m²           Temp (24hrs), Av.5 °F (36.9 °C), Min:97.9 °F (36.6 °C), Max:99.4 °F (37.4 °C)        Intake/Output Summary (Last 24 hours) at 2024 09  Last data filed at 2024 0050  Gross per 24 hour   Intake 240 ml   Output 450 ml   Net -210 ml       Review of Systems: 
  4 Eyes Skin Assessment     NAME:  Nael Lucia II  YOB: 1955  MEDICAL RECORD NUMBER:  [id number]    The patient is being assessed for  Shift Handoff    I agree that at least one RN has performed a thorough Head to Toe Skin Assessment on the patient. ALL assessment sites listed below have been assessed.      Areas assessed by both nurses:    Head, Face, Ears, Shoulders, Back, Chest, Arms, Elbows, Hands, Sacrum. Buttock, Coccyx, Ischium, Legs. Feet and Heels, and Under Medical Devices         Does the Patient have a Wound? Yes wound(s) were present on assessment. LDA wound assessment was Initiated and completed by RN       Gareth Prevention initiated by RN: Yes  Wound Care Orders initiated by RN: No    Pressure Injury (Stage 3,4, Unstageable, DTI, NWPT, and Complex wounds) if present, place Wound referral order by RN under : No    New Ostomies, if present place, Ostomy referral order under : No     Nurse 1 eSignature: Electronically signed by Taylor Aguilar RN on 7/19/24 at 7:03 PM EDT    **SHARE this note so that the co-signing nurse can place an eSignature**    Nurse 2 eSignature: Electronically signed by Beulah Henderson RN on 7/19/24 at 11:22 PM EDT   
  Physician Progress Note      PATIENT:               BEVERLY BANG  CSN #:                  [id number]  :                       1955  ADMIT DATE:       2024 1:45 PM  DISCH DATE:  RESPONDING  PROVIDER #:        Nigel Driscoll MD          QUERY TEXT:    Dr Hernandez  Patient admitted with cirrhosis. Per wound care note (), noted to also   have pressure ulcer. If possible, please document in progress notes and   discharge summary the location, present on admission status and stage of the   pressure ulcer:    The medical record reflects the following:  Risk Factors: cirrhosis, COPD, alcohol use, history of infected wound  Clinical Indicators: -Wound care RN; notes: 'STAGE 2 PI-Sacrum'  Treatment: wound care RN recommendations 'Unit staff nurses to cleanse wound   with wound spray from par room, then apply Optifoam Gentle Silicone dressing,   change every 2 days & prn soilage.'    Stage 1:  Non-blanchable erythema of intact skin  Stage 2:  Abrasion, Blister, Partial-thickness skin loss, with exposed dermis  Stage 3:  Full-thickness skin loss with damage or necrosis of subcutaneous   tissue  Stage 4:  Full-thickness skin & soft tissue loss through to underlying muscle,   tendon or bone  Unstageable: Obscured full-thickness skin & tissue loss  Options provided:  -- Stage 2 Pressure Ulcer of sacrumpresent on admission  -- Other - I will add my own diagnosis  -- Disagree - Not applicable / Not valid  -- Disagree - Clinically unable to determine / Unknown  -- Refer to Clinical Documentation Reviewer    PROVIDER RESPONSE TEXT:    This patient has a Stage 2 pressure ulcer of the sacrum which was present on   admission.    Query created by: Ellie Valverde on 2024 2:46 PM      Electronically signed by:  Nigel Driscoll MD 2024 1:39 PM          
  [facility] Hospitalist Group  Progress Note    Patient: Nael Lucia II Age: 68 y.o. : 1955 MR#: [id number] SSN: [id number]  Date/Time: 2024    Subjective:     Patient seen and examined at bedside, he reports he is uncomfortable and hopes that paracentesis can be done today.  Breathing is okay.  No family at bedside.    Assessment/Plan:     1. Cirrhosis.  GI put appreciated.  2. B.l LE edema. Lasix and Aldactone, monitor strict I's and O's.   3. moderate ascites-paracentesis today.  Follow fluid studies.  4.  Elevated LFTs, trending down.  5.  Chronic opioid use.  On methadone.  6.  Left hip pain with neuropathy.  Right hip degenerative joint disease.  Tramadol as needed.  7.  Thrombocytopenia s/p bone marrow biopsy done a few months ago at [facility], patient has seen [place] [facility].   8.  Hepatitis C untreated.  9.  Alcohol use continue thiamine, folic acid.  Past heavier use.  10.  COPD no acute exacerbation continue bronchodilator  11.  Hypokalemia, hypophosphatemia replete as needed  12.  Anemia normocytic normochromic  13. stage 2 on sacrum  POA. Wound care input appreciated  14. Generalized weakness.  Ambulatory dysfunction.  PT OT.  15. Multifocal hepatocellular carcinoma - diagnosed on MRI 22   - CT abd/pelvis  - cirrhotic liver with 3.5 cm heterogeneous nodule along subdiaphragmatic surface, moderate volume ascites and splenomegaly, cholelithiasis  -was scheduled for Y90 radioembolization but was a no show for pre treatment work up x3 in   - MRI performed 22 - 1.4x1.3 cm lesion segment 4 LIRADS 5, 2.2x1.9 cm segment 6 LIRADS 5 potentially infiltrative with peripheral additional suspicious nodules *1.2 and 0.6 cm), cirrhosis and portal hypertension with trace ascites   -elevated AFP in past  --> Oncology consult.  16. Cardiomegaly  -     Nuclear cardiac PYP amyloid study planar; Future per Dr. Ludwin Segura's notes.   17.  Functional 
  [facility] Hospitalist Group  Progress Note    Patient: Nael Lucia II Age: 68 y.o. : 1955 MR#: [id number] SSN: [id number]  Date/Time: 2024    Subjective:     Patient seen and examined at bedside, he is awaiting paracentesis.  Reports abdomen is taut, and painful.  MRI to be done this afternoon as well.  No family at bedside.    Assessment/Plan:     1. Cirrhosis.  GI put appreciated.  2. B.l LE edema. Lasix and Aldactone, monitor strict I's and O's.   3. moderate ascites-paracentesis today.  Follow fluid studies.  4.  Elevated LFTs, trending down.  5.  Chronic opioid use.  On methadone. last snorted heroin 3-4 days ago when unable to get methadaone   6.  Left hip pain with neuropathy.  Right hip degenerative joint disease.  Tramadol as needed.  7.  Thrombocytopenia s/p bone marrow biopsy done a few months ago at [facility], patient has seen [place] [facility].   8.  Hepatitis C untreated.  9.  Alcohol use continue thiamine, folic acid.  Past heavier use.  10.  COPD no acute exacerbation continue bronchodilator  11.  Hypokalemia, hypophosphatemia replete as needed  12.  Anemia, hx JANEE. Check studies.   13. stage 2 on sacrum  POA. Wound care input appreciated  14. Generalized weakness.  Ambulatory dysfunction.  PT OT.  15. Multifocal hepatocellular carcinoma - diagnosed on MRI 22   - CT abd/pelvis  - cirrhotic liver with 3.5 cm heterogeneous nodule along subdiaphragmatic surface, moderate volume ascites and splenomegaly, cholelithiasis  -was scheduled for Y90 radioembolization but was a no show for pre treatment work up x3 in   - MRI performed 22 - 1.4x1.3 cm lesion segment 4 LIRADS 5, 2.2x1.9 cm segment 6 LIRADS 5 potentially infiltrative with peripheral additional suspicious nodules *1.2 and 0.6 cm), cirrhosis and portal hypertension with trace ascites   -elevated AFP in past  --> Oncology consult, Dr. Anders.  16. Cardiomegaly  -     Nuclear cardiac 
  [facility] Hospitalist Group  Progress Note    Patient: Nael Lucia II Age: 68 y.o. : 1955 MR#: [id number] SSN: [id number]  Date/Time: 2024    Subjective:     POD1 US-Guided paracentesis. 6 L ascites removed.     Patient seen and examined at bedside, he had 2 BM in the last 7 days. Poor appetite, trying to eat some. Abdomen still distended and uncomfortable, but better than yesterday.     Assessment/Plan:     1. Cirrhosis.  GI put appreciated.  EGD outpatient for varices screening.  2. B.l LE edema. Lasix IV and Aldactone, monitor strict I's and O's.   3. moderate ascites-paracentesis 2024.  4.  Elevated LFTs, trending down.  5.  Chronic opioid use.  On methadone. last snorted heroin 3-4 days prior to admission when unable to get methadaone   6.  Left hip pain with neuropathy.  Right hip degenerative joint disease.  Tramadol as needed.  7.  Thrombocytopenia s/p bone marrow biopsy done a few months ago at [facility], patient has seen [place] [facility].   8.  Hepatitis C untreated.  9.  Alcohol use continue thiamine, folic acid.  Past heavier use.  10.  COPD no acute exacerbation continue bronchodilator  11.  Hypokalemia, hypophosphatemia replete as needed  12.  Anemia of chronic inflammation, hx JANEE.   13. stage 2 on sacrum  POA. Wound care input appreciated  14. Generalized weakness.  Ambulatory dysfunction.  PT OT.  15. Multifocal hepatocellular carcinoma - diagnosed on MRI 22   - CT abd/pelvis  - cirrhotic liver with 3.5 cm heterogeneous nodule along subdiaphragmatic surface, moderate volume ascites and splenomegaly, cholelithiasis  -was scheduled for Y90 radioembolization but was a no show for pre treatment work up x3 in   - MRI performed 22 - 1.4x1.3 cm lesion segment 4 LIRADS 5, 2.2x1.9 cm segment 6 LIRADS 5 potentially infiltrative with peripheral additional suspicious nodules *1.2 and 0.6 cm), cirrhosis and portal hypertension with trace 
  [facility] Hospitalist Group  Progress Note    Patient: Nael Lucia II Age: 68 y.o. : 1955 MR#: [id number] SSN: [id number]  Date: 2024                 DVT Prophylaxis:  [x]Lovenox  []Hep SQ  []SCDs  []Coumadin   []On Heparin gtt []PO anticoagulant    Anticipated discharge: 2024 to [facility]    Subjective:     The patient is new to me today.  He was seen and examined at the bedside in follow-up for decompensated liver cirrhosis among other issues.  He said that he feels okay overall.  He denied any new complaints.      Objective:   VS: /79   Pulse 70   Temp 97.8 °F (36.6 °C) (Oral)   Resp 18   Ht 1.803 m (5' 11\")   Wt 81.2 kg (179 lb)   SpO2 96%   BMI 24.97 kg/m²    Tmax/24hrs: Temp (24hrs), Av.1 °F (36.7 °C), Min:97.8 °F (36.6 °C), Max:98.5 °F (36.9 °C)    Intake/Output Summary (Last 24 hours) at 2024 1022  Last data filed at 2024 0710  Gross per 24 hour   Intake --   Output 2725 ml   Net -2725 ml        PHYSICAL EXAM  General Appearance: NAD, conversant  HENT: normocephalic/atraumatic, moist mucus membranes  Neck: No JVD, supple  Lungs: CTA with normal respiratory effort  CV: RRR, no m/r/g  Abdomen: soft, tense and distended with palpable fluid thrill; bowel sounds diminished  Extremities: no cyanosis, no peripheral edema  Neuro: No focal deficits, motor/sensory intact  Skin: Normal color, intact  Psych: appropriate affect, alert and oriented to person, place and time    Current Facility-Administered Medications   Medication Dose Route Frequency    sennosides-docusate sodium (SENOKOT-S) 8.6-50 MG tablet 2 tablet  2 tablet Oral Daily PRN    spironolactone (ALDACTONE) tablet 100 mg  100 mg Oral Daily    furosemide (LASIX) tablet 40 mg  40 mg Oral Daily    polyethylene glycol (GLYCOLAX) packet 17 g  17 g Oral Daily    aluminum & magnesium hydroxide-simethicone (MAALOX) 200-200-20 MG/5ML suspension 30 mL  30 mL Oral Q6H PRN    methadone 
  [facility] Hospitalist Group  Progress Note    Patient: Nael Lucia II Age: 68 y.o. : 1955 MR#: [id number] SSN: [id number]  Date: 2024                 DVT Prophylaxis:  [x]Lovenox  []Hep SQ  []SCDs  []Coumadin   []On Heparin gtt []PO anticoagulant    Anticipated discharge: 2024 to [facility]    Subjective:     Patient was seen and examined at the bedside.  He was resting in bed comfortably and was in no acute distress.  The patient said that he is doing \"fairly well \".  He denied any new complaints.      Objective:   VS: /64   Pulse 62   Temp 98.1 °F (36.7 °C) (Oral)   Resp 18   Ht 1.803 m (5' 11\")   Wt 81.2 kg (179 lb)   SpO2 99%   BMI 24.97 kg/m²    Tmax/24hrs: Temp (24hrs), Av.9 °F (36.6 °C), Min:97.4 °F (36.3 °C), Max:98.4 °F (36.9 °C)    Intake/Output Summary (Last 24 hours) at 2024 1519  Last data filed at 2024 1142  Gross per 24 hour   Intake 880 ml   Output 600 ml   Net 280 ml        PHYSICAL EXAM  General Appearance: NAD, conversant  HENT: normocephalic/atraumatic, moist mucus membranes  Neck: No JVD, supple  Lungs: CTA with normal respiratory effort  CV: RRR, no m/r/g  Abdomen: soft, tense and distended with palpable fluid thrill; bowel sounds diminished  Extremities: no cyanosis, no peripheral edema  Neuro: No focal deficits, motor/sensory intact  Skin: Normal color, intact  Psych: appropriate affect, alert and oriented to person, place and time    Current Facility-Administered Medications   Medication Dose Route Frequency    carvedilol (COREG) tablet 6.25 mg  6.25 mg Oral BID WC    sodium chloride flush 0.9 % injection 5-40 mL  5-40 mL IntraVENous 2 times per day    sodium chloride flush 0.9 % injection 5-40 mL  5-40 mL IntraVENous PRN    0.9 % sodium chloride infusion   IntraVENous PRN    sennosides-docusate sodium (SENOKOT-S) 8.6-50 MG tablet 2 tablet  2 tablet Oral Daily PRN    spironolactone (ALDACTONE) tablet 100 mg  100 mg 
 conducted an initial consultation and Spiritual Assessment for Nael Lucia II, who is a 68 y.o.,male. Patient's Primary Language is: English.   According to the patient's EMR Caodaism Affiliation is: None.     The reason the Patient came to the hospital is:   Patient Active Problem List    Diagnosis Date Noted    ILD (interstitial lung disease) (HCC) 02/16/2023    Acute respiratory failure with hypoxia (HCC) 02/11/2023    Acute respiratory failure with hypoxia and hypercarbia (HCC) 02/11/2023    Ascites of liver 07/16/2024    Cirrhosis (HCC) 07/16/2024    Acquired deformity of right knee 01/09/2024    Post-traumatic osteoarthritis of left knee 12/26/2023    Osteonecrosis of right hip (HCC) 12/26/2023    Tobacco abuse 12/26/2023    History of methadone use 12/26/2023    Post-traumatic osteoarthritis of right knee 11/13/2023    GAYATHRI (acute kidney injury) (HCC) 02/10/2023    GERD (gastroesophageal reflux disease) 02/10/2023    Positive D dimer 02/10/2023    Noncompliance with medications 02/10/2023    Multifocal pneumonia 02/10/2023    Methadone dependence (HCC)     Liver mass 04/24/2022    Bladder mass 04/24/2022    Hematuria 04/24/2022    Anemia 04/22/2022    Thrombocytopenia (HCC) 04/22/2022    Metatarsal fracture 04/22/2022    Alcohol abuse 04/22/2022    HTN (hypertension) 04/22/2022    Orthopedic aftercare for healing traumatic lower leg fracture, right, closed 04/22/2022    Impaired mobility and ADLs 04/22/2022    Acute blood loss as cause of postoperative anemia 04/22/2022    Closed displaced comminuted fracture of shaft of right tibia with routine healing 04/22/2022    Closed fracture of distal end of right fibula with routine healing 04/22/2022    Leukopenia 08/05/2012    Iron deficiency anemia, unspecified 08/05/2012    Epidural abscess, L2-L5 08/04/2012    Polysubstance abuse (HCC) 08/04/2012        The  provided the following Interventions:  Initiated a relationship of care and 
2050  Report was called from Lin SKINNER from the ED.  Situation and labs was given with the patient history.     2300.     Patient arrival to the unit with transport tech. Help patient to bed. Complains of pain in his hips and legs. Bilateral of the lower extremities is swollen. (+2). Patient have a stage 2 on the sacrum where he said he got from moving on bed. He was too weak to lift up. Mepelix was placed. Will continue to watch for any changes. Patient resting quietly in bed with call bell and phone in reach.  
4 Eyes Skin Assessment     NAME:  Nael Lucia II  YOB: 1955  MEDICAL RECORD NUMBER:  [id number]    The patient is being assessed for  Shift Handoff    I agree that at least one RN has performed a thorough Head to Toe Skin Assessment on the patient. ALL assessment sites listed below have been assessed.      Areas assessed by both nurses:    Head, Face, Ears, Shoulders, Back, Chest, Arms, Elbows, Hands, Sacrum. Buttock, Coccyx, Ischium, Legs. Feet and Heels, and Under Medical Devices         Does the Patient have a Wound? Yes wound(s) were present on assessment. LDA wound assessment was Initiated and completed by RN       Gareth Prevention initiated by RN: Yes  Wound Care Orders initiated by RN: No    Pressure Injury (Stage 3,4, Unstageable, DTI, NWPT, and Complex wounds) if present, place Wound referral order by RN under : No    New Ostomies, if present place, Ostomy referral order under : No     Nurse 1 eSignature: Electronically signed by Mary Nation RN on 7/21/24 at 6:26 PM EDT    **SHARE this note so that the co-signing nurse can place an eSignature**    Nurse 2 eSignature: Electronically signed by Ian Denis RN on 7/21/24 at 1940 PM EDT    
4 Eyes Skin Assessment     NAME:  Nael Lucia II  YOB: 1955  MEDICAL RECORD NUMBER:  [id number]    The patient is being assessed for  {Reason for Assessment:36244}    I agree that at least one RN has performed a thorough Head to Toe Skin Assessment on the patient. ALL assessment sites listed below have been assessed.      Areas assessed by both nurses:    Sacrum. Buttock, Coccyx, Ischium        Does the Patient have a Wound? Yes wound(s) were present on assessment. LDA wound assessment was Initiated and completed by RN       Gareth Prevention initiated by RN: Yes  Wound Care Orders initiated by RN: Yes    Pressure Injury (Stage 3,4, Unstageable, DTI, NWPT, and Complex wounds) if present, place Wound referral order by RN under : No    New Ostomies, if present place, Ostomy referral order under : No     Nurse 1 eSignature: Electronically signed by Ian Denis RN on 7/22/24 at 7:40 AM EDT    **SHARE this note so that the co-signing nurse can place an eSignature**    Nurse 2 eSignature: {Esignature:[id number]}    
4 Eyes Skin Assessment     NAME:  Nael Lucia II  YOB: 1955  MEDICAL RECORD NUMBER:  [id number]    The patient is being assessed for  Shift Handoff    I agree that at least one RN has performed a thorough Head to Toe Skin Assessment on the patient. ALL assessment sites listed below have been assessed.      Areas assessed by both nurses:    Head, Face, Ears, Shoulders, Back, Chest, Arms, Elbows, Hands, Sacrum. Buttock, Coccyx, Ischium, Legs. Feet and Heels, and Under Medical Devices         Does the Patient have a Wound? Yes wound(s) were present on assessment. LDA wound assessment was Initiated and completed by RN       Gareth Prevention initiated by RN: Yes  Wound Care Orders initiated by RN: Yes    Pressure Injury (Stage 3,4, Unstageable, DTI, NWPT, and Complex wounds) if present, place Wound referral order by RN under : Yes    New Ostomies, if present place, Ostomy referral order under : No     Nurse 1 eSignature: Electronically signed by Taylor Aguilar RN on 7/18/24 at 7:17 PM EDT    **SHARE this note so that the co-signing nurse can place an eSignature**    Nurse 2 eSignature: Electronically signed by Beulah Henderson RN on 7/19/24 at 2:40 AM EDT   
4 Eyes Skin Assessment     NAME:  Nael Lucia II  YOB: 1955  MEDICAL RECORD NUMBER:  [id number]    The patient is being assessed for  Shift Handoff    I agree that at least one RN has performed a thorough Head to Toe Skin Assessment on the patient. ALL assessment sites listed below have been assessed.      Areas assessed by both nurses:    Sacrum. Buttock, Coccyx, Ischium        Does the Patient have a Wound? Yes wound(s) were present on assessment. LDA wound assessment was Initiated and completed by RN       Gareth Prevention initiated by RN: No  Wound Care Orders initiated by RN: No    Pressure Injury (Stage 3,4, Unstageable, DTI, NWPT, and Complex wounds) if present, place Wound referral order by RN under : No    New Ostomies, if present place, Ostomy referral order under : No     Nurse 1 eSignature: Electronically signed by Ian Denis RN on 7/18/24 at 7:54 AM EDT    **SHARE this note so that the co-signing nurse can place an eSignature**    Nurse 2 eSignature: Electronically signed by Micheal Howard RN on 7/20/24 at 7:37 PM EDT    
4 Eyes Skin Assessment     NAME:  Nael Lucia II  YOB: 1955  MEDICAL RECORD NUMBER:  [id number]    The patient is being assessed for  Shift Handoff    I agree that at least one RN has performed a thorough Head to Toe Skin Assessment on the patient. ALL assessment sites listed below have been assessed.      Areas assessed by both nurses:    Head, Face, Ears, Shoulders, Back, Chest, Arms, Elbows, Hands, Sacrum. Buttock, Coccyx, Ischium, and Legs. Feet and Heels        Does the Patient have a Wound? Yes wound(s) were present on assessment. LDA wound assessment was Initiated and completed by RN healing mid sacral       Gareth Prevention initiated by RN: yes  Wound Care Orders initiated by RN: yes    Pressure Injury (Stage 3,4, Unstageable, DTI, NWPT, and Complex wounds) if present, place Wound referral order by RN under : No    New Ostomies, if present place, Ostomy referral order under : No     Nurse 1 eSignature: Electronically signed by Andreina Scanlon RN on 7/27/24 at 5:51 PM EDT    **SHARE this note so that the co-signing nurse can place an eSignature**    Nurse 2 eSignature: Electronically signed by Petra Bryan RN on 7/28/24 at 6:41 AM EDT   
4 Eyes Skin Assessment     NAME:  Nael Lucia II  YOB: 1955  MEDICAL RECORD NUMBER:  [id number]    The patient is being assessed for  Shift Handoff    I agree that at least one RN has performed a thorough Head to Toe Skin Assessment on the patient. ALL assessment sites listed below have been assessed.      Areas assessed by both nurses:    Head, Face, Ears, Shoulders, Back, Chest, Arms, Elbows, Hands, Sacrum. Buttock, Coccyx, Ischium, and Legs. Feet and Heels        Does the Patient have a Wound? Yes wound(s) were present on assessment. LDA wound assessment was Initiated and completed by RN       Gareth Prevention initiated by RN: Yes  Wound Care Orders initiated by RN: Yes    Pressure Injury (Stage 3,4, Unstageable, DTI, NWPT, and Complex wounds) if present, place Wound referral order by RN under : No    New Ostomies, if present place, Ostomy referral order under : No     Nurse 1 eSignature: Electronically signed by Andreina Scanlon RN on 7/17/24 at 6:29 PM EDT    **SHARE this note so that the co-signing nurse can place an eSignature**    Nurse 2 eSignature: {Esignature:[id number]}   
4 Eyes Skin Assessment     NAME:  Nael Lucia II  YOB: 1955  MEDICAL RECORD NUMBER:  [id number]    The patient is being assessed for  Shift Handoff    I agree that at least one RN has performed a thorough Head to Toe Skin Assessment on the patient. ALL assessment sites listed below have been assessed.      Areas assessed by both nurses:    Head, Face, Ears, Shoulders, Back, Chest, Arms, Elbows, Hands, Sacrum. Buttock, Coccyx, Ischium, and Legs. Feet and Heels        Does the Patient have a Wound? No noted wound(s), old sacral healed wound       Gareth Prevention initiated by RN: Yes  Wound Care Orders initiated by RN: Yes    Pressure Injury (Stage 3,4, Unstageable, DTI, NWPT, and Complex wounds) if present, place Wound referral order by RN under : No    New Ostomies, if present place, Ostomy referral order under : No     Nurse 1 eSignature: Electronically signed by Andreina Scanlon RN on 7/28/24 at 7:30 PM EDT    **SHARE this note so that the co-signing nurse can place an eSignature**    Nurse 2 eSignature: Electronically signed by FADI SCHAFFER RN on 7/29/24 at 3:06 AM EDT   
4 Eyes Skin Assessment     NAME:  Nael Lucia II  YOB: 1955  MEDICAL RECORD NUMBER:  [id number]    The patient is being assessed for  Shift Handoff    I agree that at least one RN has performed a thorough Head to Toe Skin Assessment on the patient. ALL assessment sites listed below have been assessed.      Areas assessed by both nurses:    Head, Face, Ears, Shoulders, Back, Chest, Arms, Elbows, Hands, Sacrum. Buttock, Coccyx, Ischium, and Legs. Feet and Heels        Does the Patient have a Wound? Yes wound(s) were present on assessment. LDA wound assessment was Initiated and completed by RN healing stage 2 on sacral       Gareth Prevention initiated by RN: Yes  Wound Care Orders initiated by RN: Yes    Pressure Injury (Stage 3,4, Unstageable, DTI, NWPT, and Complex wounds) if present, place Wound referral order by RN under : No    New Ostomies, if present place, Ostomy referral order under : No     Nurse 1 eSignature: Electronically signed by Petra Bryan RN on 7/28/24 at 6:44 AM EDT    **SHARE this note so that the co-signing nurse can place an eSignature**    Nurse 2 eSignature: Electronically signed by Andreina Scanlon RN on 7/28/24 at 3:06 PM EDT   
4 Eyes Skin Assessment     NAME:  Nael Lucia II  YOB: 1955  MEDICAL RECORD NUMBER:  [id number]    The patient is being assessed for  {Reason for Assessment:39847}    I agree that at least one RN has performed a thorough Head to Toe Skin Assessment on the patient. ALL assessment sites listed below have been assessed.      Areas assessed by both nurses:    {Pressure Areas Assessed:16156}        Does the Patient have a Wound? Yes wound(s) were present on assessment. LDA wound assessment was Initiated and completed by RN       Gareth Prevention initiated by RN: No  Wound Care Orders initiated by RN: Yes    Pressure Injury (Stage 3,4, Unstageable, DTI, NWPT, and Complex wounds) if present, place Wound referral order by RN under : Yes    New Ostomies, if present place, Ostomy referral order under : No     Nurse 1 eSignature: Electronically signed by Ian Denis RN on 7/21/24 at 8:15 AM EDT    **SHARE this note so that the co-signing nurse can place an eSignature**    Nurse 2 eSignature: {Esignature:[id number]}    
4 Eyes Skin Assessment     NAME:  Nael Lucia II  YOB: 1955  MEDICAL RECORD NUMBER:  [id number]    The patient is being assessed for  Shift Handoff    I agree that at least one RN has performed a thorough Head to Toe Skin Assessment on the patient. ALL assessment sites listed below have been assessed.      Areas assessed by both nurses:    Sacrum. Buttock, Coccyx, Ischium        Does the Patient have a Wound? No noted wound(s), healing mid sacral buttock        Gareth Prevention initiated by RN: No  Wound Care Orders initiated by RN: No    Pressure Injury (Stage 3,4, Unstageable, DTI, NWPT, and Complex wounds) if present, place Wound referral order by RN under : No    New Ostomies, if present place, Ostomy referral order under : No     Nurse 1 eSignature: Electronically signed by Inge Damian LPN on 7/27/24 at 7:40 AM EDT    **SHARE this note so that the co-signing nurse can place an eSignature**    Nurse 2 eSignature: Electronically signed by Andreina Scanlon RN on 7/27/24 at 7:44 AM EDT   
4 Eyes Skin Assessment     NAME:  Nael Lucia II  YOB: 1955  MEDICAL RECORD NUMBER:  [id number]    The patient is being assessed for  Shift Handoff    I agree that at least one RN has performed a thorough Head to Toe Skin Assessment on the patient. ALL assessment sites listed below have been assessed.      Areas assessed by both nurses:    Sacrum. Buttock, Coccyx, Ischium        Does the Patient have a Wound? Yes wound(s) were present on assessment. LDA wound assessment was Initiated and completed by RN  Stage 2 on the sacurm       Gareth Prevention initiated by RN: No  Wound Care Orders initiated by RN: No    Pressure Injury (Stage 3,4, Unstageable, DTI, NWPT, and Complex wounds) if present, place Wound referral order by RN under : No    New Ostomies, if present place, Ostomy referral order under : No     Nurse 1 eSignature: Electronically signed by Ian Denis RN on 7/17/24 at 7:43 AM EDT    **SHARE this note so that the co-signing nurse can place an eSignature**    Nurse 2 eSignature: Electronically signed by Andreina Scanlon RN on 7/17/24 at 7:44 AM EDT    
4 Eyes Skin Assessment     NAME:  Nael Lucia II  YOB: 1955  MEDICAL RECORD NUMBER:  [id number]    The patient is being assessed for  Shift Handoff    I agree that at least one RN has performed a thorough Head to Toe Skin Assessment on the patient. ALL assessment sites listed below have been assessed.      Areas assessed by both nurses:    Head, Face, Ears, Shoulders, Back, Chest, Arms, Elbows, Hands, Sacrum. Buttock, Coccyx, Ischium, and Legs. Feet and Heels        Does the Patient have a Wound? No noted wound(s)       Gareth Prevention initiated by RN: Yes  Wound Care Orders initiated by RN: No    Pressure Injury (Stage 3,4, Unstageable, DTI, NWPT, and Complex wounds) if present, place Wound referral order by RN under : No    New Ostomies, if present place, Ostomy referral order under : No     Nurse 1 eSignature: Electronically signed by Petra Bryan RN on 7/23/24 at 6:49 AM EDT    **SHARE this note so that the co-signing nurse can place an eSignature**    Nurse 2 eSignature: {Esignature:[id number]}   
4 Eyes Skin Assessment     NAME:  Nael Lucia II  YOB: 1955  MEDICAL RECORD NUMBER:  [id number]    The patient is being assessed for  Shift Handoff    I agree that at least one RN has performed a thorough Head to Toe Skin Assessment on the patient. ALL assessment sites listed below have been assessed.      Areas assessed by both nurses:    Head, Face, Ears, Shoulders, Back, Chest, Arms, Elbows, Hands, Sacrum. Buttock, Coccyx, Ischium, Legs. Feet and Heels, and Under Medical Devices         Does the Patient have a Wound? Yes wound(s) were present on assessment. LDA wound assessment was Initiated and completed by RN       Gareth Prevention initiated by RN: Yes  Wound Care Orders initiated by RN: Yes    Pressure Injury (Stage 3,4, Unstageable, DTI, NWPT, and Complex wounds) if present, place Wound referral order by RN under : No    New Ostomies, if present place, Ostomy referral order under : No     Nurse 1 eSignature: Electronically signed by Micheal Howard RN on 7/20/24 at 7:38 PM EDT    **SHARE this note so that the co-signing nurse can place an eSignature**    Nurse 2 eSignature: Electronically signed by Ian Denis RN on 7/20/24 at 1930 pm EDT    
4 Eyes Skin Assessment     NAME:  Nael Lucia II  YOB: 1955  MEDICAL RECORD NUMBER:  [id number]    The patient is being assessed for  Shift Handoff    I agree that at least one RN has performed a thorough Head to Toe Skin Assessment on the patient. ALL assessment sites listed below have been assessed.      Areas assessed by both nurses:    Head, Face, Ears, Shoulders, Back, Chest, Arms, Elbows, Hands, Sacrum. Buttock, Coccyx, Ischium, and Legs. Feet and Heels        Does the Patient have a Wound? No noted wound(s)       Gareth Prevention initiated by RN: Yes  Wound Care Orders initiated by RN: No    Pressure Injury (Stage 3,4, Unstageable, DTI, NWPT, and Complex wounds) if present, place Wound referral order by RN under : No    New Ostomies, if present place, Ostomy referral order under : No     Nurse 1 eSignature: Electronically signed by Petra Bryan RN on 7/24/24 at 5:16 AM EDT    **SHARE this note so that the co-signing nurse can place an eSignature**    Nurse 2 eSignature: {Esignature:[id number]}   
4 Eyes Skin Assessment     NAME:  Nael Lucia II  YOB: 1955  MEDICAL RECORD NUMBER:  [id number]    The patient is being assessed for  Shift Handoff    I agree that at least one RN has performed a thorough Head to Toe Skin Assessment on the patient. ALL assessment sites listed below have been assessed.      Areas assessed by both nurses:    Head, Face, Ears, Shoulders, Back, Chest, Arms, Elbows, Hands, Sacrum. Buttock, Coccyx, Ischium, Legs. Feet and Heels, and Under Medical Devices         Does the Patient have a Wound? Yes wound(s) were present on assessment. LDA wound assessment was Initiated and completed by RN       Gareth Prevention initiated by RN: Yes  Wound Care Orders initiated by RN: Yes    Pressure Injury (Stage 3,4, Unstageable, DTI, NWPT, and Complex wounds) if present, place Wound referral order by RN under : Yes    New Ostomies, if present place, Ostomy referral order under : No     Nurse 1 eSignature: Electronically signed by Beulah Henderson RN on 7/19/24 at 6:50 AM EDT    **SHARE this note so that the co-signing nurse can place an eSignature**    Nurse 2 eSignature: Electronically signed by Micheal Howard RN on 7/20/24 at 7:37 PM EDT  
4 Eyes Skin Assessment     NAME:  Nael Lucia II  YOB: 1955  MEDICAL RECORD NUMBER:  [id number]    The patient is being assessed for  Shift Handoff    I agree that at least one RN has performed a thorough Head to Toe Skin Assessment on the patient. ALL assessment sites listed below have been assessed.      Areas assessed by both nurses:    Head, Face, Ears, Shoulders, Back, Chest, Arms, Elbows, Hands, Sacrum. Buttock, Coccyx, Ischium, and Legs. Feet and Heels        Does the Patient have a Wound? No noted wound(s)       Gareth Prevention initiated by RN: Yes  Wound Care Orders initiated by RN: No    Pressure Injury (Stage 3,4, Unstageable, DTI, NWPT, and Complex wounds) if present, place Wound referral order by RN under : No    New Ostomies, if present place, Ostomy referral order under : No     Nurse 1 eSignature: Electronically signed by Clare Connors RN on 7/24/24 at 8:26 PM EDT    **SHARE this note so that the co-signing nurse can place an eSignature**    Nurse 2 eSignature: {Esignature:[id number]}   
4 Eyes Skin Assessment     NAME:  Nael Lucia II  YOB: 1955  MEDICAL RECORD NUMBER:  [id number]    The patient is being assessed for  Shift Handoff    I agree that at least one RN has performed a thorough Head to Toe Skin Assessment on the patient. ALL assessment sites listed below have been assessed.      Areas assessed by both nurses:    {Pressure Areas Assessed:48710}        Does the Patient have a Wound? No noted wound(s)       Gareth Prevention initiated by RN: No  Wound Care Orders initiated by RN: No    Pressure Injury (Stage 3,4, Unstageable, DTI, NWPT, and Complex wounds) if present, place Wound referral order by RN under : No    New Ostomies, if present place, Ostomy referral order under : No     Nurse 1 eSignature: Electronically signed by Clare Connors RN on 7/25/24 at 8:09 PM EDT    **SHARE this note so that the co-signing nurse can place an eSignature**    Nurse 2 eSignature: {Esignature:[id number]}   
4 Eyes Skin Assessment     NAME:  Nael Lucia II  YOB: 1955  MEDICAL RECORD NUMBER:  [id number]    The patient is being assessed for  Shift Handoff    I agree that at least one RN has performed a thorough Head to Toe Skin Assessment on the patient. ALL assessment sites listed below have been assessed.      Areas assessed by both nurses:    Head, Face, Ears, Shoulders, Back, Chest, Arms, Elbows, Hands, Sacrum. Buttock, Coccyx, Ischium, Legs. Feet and Heels, and Under Medical Devices         Does the Patient have a Wound? Yes wound(s) were present on assessment. LDA wound assessment was Initiated and completed by RN       Gareth Prevention initiated by RN: Yes  Wound Care Orders initiated by RN: Yes    Pressure Injury (Stage 3,4, Unstageable, DTI, NWPT, and Complex wounds) if present, place Wound referral order by RN under : No    New Ostomies, if present place, Ostomy referral order under : No     Nurse 1 eSignature: Electronically signed by Beulah Henderson RN on 7/20/24 at 6:19 AM EDT    **SHARE this note so that the co-signing nurse can place an eSignature**    Nurse 2 eSignature: Electronically signed by Micheal Howard RN on 7/20/24 at 7:36 PM EDT  
4 Eyes Skin Assessment     NAME:  Nael Lucia II  YOB: 1955  MEDICAL RECORD NUMBER:  [id number]    The patient is being assessed for  Shift Handoff    I agree that at least one RN has performed a thorough Head to Toe Skin Assessment on the patient. ALL assessment sites listed below have been assessed.      Areas assessed by both nurses:    Head, Face, Ears, Shoulders, Back, Chest, Arms, Elbows, Hands, Sacrum. Buttock, Coccyx, Ischium, Legs. Feet and Heels, and Under Medical Devices         Does the Patient have a Wound? No noted wound(s)       Gareth Prevention initiated by RN: No  Wound Care Orders initiated by RN: No    Pressure Injury (Stage 3,4, Unstageable, DTI, NWPT, and Complex wounds) if present, place Wound referral order by RN under : No    New Ostomies, if present place, Ostomy referral order under : No     Nurse 1 eSignature: Electronically signed by Panchito Doyle RN on 7/26/24 at 5:51 PM EDT    **SHARE this note so that the co-signing nurse can place an eSignature**    Nurse 2 eSignature: {Esignature:[id number]}    
4 Eyes Skin Assessment     NAME:  Nael Lucia II  YOB: 1955  MEDICAL RECORD NUMBER:  [id number]    The patient is being assessed for  Shift Handoff    I agree that at least one RN has performed a thorough Head to Toe Skin Assessment on the patient. ALL assessment sites listed below have been assessed.      Areas assessed by both nurses:    Head, Face, Ears, Shoulders, Back, Chest, Arms, Elbows, Hands, Sacrum. Buttock, Coccyx, Ischium, Legs. Feet and Heels, and Under Medical Devices         Does the Patient have a Wound? No noted wound(s)       Gareth Prevention initiated by RN: Yes  Wound Care Orders initiated by RN: Yes    Pressure Injury (Stage 3,4, Unstageable, DTI, NWPT, and Complex wounds) if present, place Wound referral order by RN under : No    New Ostomies, if present place, Ostomy referral order under : No     Nurse 1 eSignat    **SHARE this note so that the co-signing nurse can place an eSignature**    Nurse 2 eSignature: Electronically signed by Clare Connors RN on 7/25/24 at 8:32 AM EDT    
5:06 AM    Patient awake in bed watching TV. Vitals signs taken. No complaints at this time.  
Called [facility], gave SBAR to Manpreet. Informed hard copy of rx methadone is attached to discharge papers and given to medical transporter. Pt PIV was removed. All belongings were gathered.   
Comprehensive Nutrition Assessment    Type and Reason for Visit:  Initial, Wound    Nutrition Recommendations/Plan:   Continue current diet.   Plan to add oral nutrition supplement to optimize nutrition intake opportunity: Ensure Plus High Protein (each provides 350 kcal, 20g protein) once daily  Plan to order MVI r/t increased nutrient needs.  Continue folic acid and thiamine supplementation r/t ETOH abuse.    Monitor PO intake/tolerance of meals/supplements, weight, labs, and POC while admitted.      Malnutrition Assessment:  Malnutrition Status:  Insufficient data (07/19/24 1154)    Context:  Acute Illness       Nutrition History and Allergies:   PMHx: closed fracture of right fibula, heart murmur, h/o heroin/IV drug use, tobacco abuse. Wt hx: c wt- 179 lb (no source), 170 lb (4/5/24, +5.3%), 181 lb (1/9/24), 181 lb (11/13/23), 176 lb (2/12/23, +3.4%), no significant wt change documented x > 1 year PTA, per EMR review. NKFA.     Nutrition Assessment:    Pt admitted for management of cirrhosis, ascites of liver, hepatocellular carcinoma. Received referral for pt with stage II PI. Per flow sheets, meal intake % x 3 entries. Attempted to visit pt-  unavailable/PADMA.     Nutrition Related Findings:    Last BM: 7/18. Edema: +1 BLE. Labs: Na 134 L, K 4.1 WNL, Cl 102 WNL, Ca 9.3 WNL. Pertinent meds: folic acid, lasix, protonix, aldactone, thiamine. S/p K Phos replacement. Wound Type: Pressure Injury, Stage II, Surgical Incision      Current Nutrition Intake & Therapies:    Average Meal Intake: 51-75%, %  Average Supplements Intake: None Ordered  ADULT DIET; Regular; Low Fat/Low Chol/High Fiber/2 gm Na; Low Sodium (2 gm)    Anthropometric Measures:  Height: 180.3 cm (5' 11\")  Ideal Body Weight (IBW): 172 lbs (78 kg)       Current Body Weight: 81.2 kg (179 lb), 104.1 % IBW. Weight Source: Not Specified  Current BMI (kg/m2): 25  Usual Body Weight: 77.1 kg (170 lb) (4/5/24)  % Weight Change (Calculated): 
Comprehensive Nutrition Assessment    Type and Reason for Visit:  Initial, Wound    Nutrition Recommendations/Plan:   Liberalize diet to regular- BRITTANI  Continue oral supplements: Ensure Plus High Protein (each provides 350 kcal, 20g protein) +350 kcal each   and 16 gm protein and  continue MVI- wound healing   Feeding assistance - encouragement  Oral intake, food preferences, diet tolerance. Plan of care-      Malnutrition Assessment:  Malnutrition Status:  Insufficient data (07/19/24 1154)    Context:  Acute Illness     Findings of the 6 clinical characteristics of malnutrition:      Nutrition Assessment:    Provided follow up nutritional visit with pt who has progressive liver cirrhosis    Other concerns- plan:  Cirrhosis, decompensated  Hepatitis C  Multifocal hepatocellular carcinoma  Bilateral lower extremity edema  Moderate ascites  Elevated LFTs  Chronic opioid use on methadone  Thrombocytopenia  Previous heavy alcohol alcohol use  COPD without exacerbation  Anemia chronic disease  Stage II sacral ulcer  Hypersplenism       Fluctuating oral intake poor- good.Pt normally developed with healthy BMI- 25 but anticipate weight loss.  Wound tx ongoing, + BM- Senna    Palliative Care provided. Declining medical - nutritional status re- decrease level of care pre progressive disease state.    Not able to go back to [facility] Hospital d/c SNF.   .    Nutrition Related Findings:    Last BM: 7/18. Edema: +1 BLE. Labs: Na 134 L, K 4.1 WNL, Cl 102 WNL, Ca 9.3 WNL. Pertinent meds: folic acid, lasix, protonix, aldactone, thiamine. S/p K Phos replacement.     Pertinent Meds: folvite, thiamine, lasix, MVI, protonix  Pertinent Labs: NA- 131, Glu - 137, Alb 2.5- (AST- 331. 7/23)  No results for input(s): \"GLUCOSE\", \"BUN\", \"CREATININE\", \"NA\", \"K\", \"CL\", \"CO2\", \"CALCIUM\", \"PHOS\", \"MG\" in the last 72 hours.  Recent Labs     07/25/24  1619   POCGLU 137*     Last BM: 07/25/24  Skin: Wound Type: Pressure Injury, Stage II, Surgical 
MRI screening form done in flowsheets after shift change, physical screening form faxed at roughly 22:15. Signed.    After printer was fixed (toner replaced) fax busy error was received resent fax at 2:39 roughly. Signed.  
MRI screening form needs to be filled out and faxed to  [phone] BEFORE MRI can be scheduled.  If unable to obtain information from patient , [facility] needs to be contacted . If patient is claustrophobic or will needs pain meds, please have ordered in advance in order to facilitate exam.    
MRI screening form needs to be filled out and faxed to  [phone] BEFORE MRI can be scheduled.  If unable to obtain information from patient , [facility] needs to be contacted . If patient is claustrophobic or will needs pain meds, please have ordered in advance in order to facilitate exam.    
Palliative Medicine  Patient Name: Nael Lucia II  YOB: 1955  MRN: [id number]  Age: 68 y.o.  Gender: male    Date of Initial Consult: 7/18/2024   Date of Service: 7/23/2024  Time: 2:52 PM  Provider: FARIBA Hogan NP  Hospital Day: 8  Admit Date: 7/16/2024  Referring Provider: Dr Real        Reasons for Consultation:  Goals of Care    HISTORY OF PRESENT ILLNESS (HPI):   Nael Lucia II is a 68 y.o. male with a past medical history of cirrhosis of the liver, hepatitis C untreated, ETOH abuse, tobacco abuse, chronic thrombocytopenia , who was admitted on 7/16/2024 from home with a diagnosis of cirrhosis of the liver with moderate ascites, thrombocytopenia.  Patient presented to the ER with bilateral lower extremity swelling and shortness of breath.  He also complained of hip and left leg pain.  CT of the abdomen and pelvis in the ER showed moderate volume ascites with splenomegaly secondary to cirrhosis, cirrhotic liver with a 3.5 cm heterogeneous nodule along the subdiaphragmatic surface.  Palliative medicine is consulted for goals of care and support.    7/23/2024 sitting up in recliner alert and oriented x 3. Very pleasant. Feels slightly better. Less pain in abdomen.     July 18, 2024 very pleasant gentleman who is alert and oriented x 4 having some pain in the abdominal area likely due to his ascites.  For possible paracentesis today.  He denies any shortness of breath or nausea.      PALLIATIVE DIAGNOSES:    Goals of care/advance care plan discussions  Encounter for palliative care  Cirrhosis of liver  Liver cancer  Ascites  Debility    ASSESSMENT AND PLAN:   Goals of care/advance care plan discussions    July 22, 2024 Follow up along with Ms Nancy RN.  Mr. Lucia is alert and oriented x 3 he is sitting up in recliner.  Tells us he feels better.  Patient has a good understanding of his overall diagnosis.  We discussed proposed treatment options including starting with treatment 
Patient complained of constipation. Verbalized LBM was 7/22/2024. Receives daily Miralax. Provider notified and order prn Senokot ordered. Medication administered.   
Physical Therapy  Pt not seen for skilled PT due to:    []  Nausea/vomiting  []  Eating  []  Pain  []  Pt lethargic  []  Off Unit  Other: Pt declining at this time and requesting to come back tomorrow and stated \" I'm getting surgery I'm not doing anything.\"    Will f/u later as schedule allows. Thank you.  Yancy Amezquita PT, DPT    
Physical Therapy  Pt refused therapy today stating that he was just notified that he would be discharging to a facility and that he needed to call family to get clothes and did not have time for therapy today.    Will f/u later as schedule allows. Thank you.  Demario Esteves, PT, DPT      
Pt is alert and oriented. Is stable. Is on room air. Pt denies pain. Pt will be discharged to [facility] today. Estimated time of medical transport is scheduled for 11 am today.   Will continue to monitor pt for any status changes  
Pt will be discharged and medically transported to [facility]. Pt has been stable, pt denies pain. Will call SNF with [facility].    1800 Discharge was canceled for today due to transportation. Transportation has been rescheduled for tomorrow 7/30/24 @ 11 am.   
Report called to SUSANNE Sosa on [address].  
[facility] Hospitalist Group  Progress Note    Patient: Nael Lucia II Age: 68 y.o. : 1955 MR#: [id number] SSN: [id number]  Date/Time: 2024    Subjective:   Subjective   No acute events overnight, no new concerns or complaints, vitals stable.  Constipated, no significant complications however patient would like something for this.    Review of Systems   Constitutional: Negative for fever.      Assessment/Plan:   Cirrhosis, decompensated  Hepatitis C  Multifocal hepatocellular carcinoma  Bilateral lower extremity edema  Moderate ascites  Elevated LFTs  Chronic opioid use on methadone  Thrombocytopenia  Previous heavy alcohol alcohol use  COPD without exacerbation  Anemia chronic disease  Stage II sacral ulcer  Hypersplenism    Plan  Initially patient excepted to SNF however was later rejected after the fact.  Have added senna docusate for constipation.    Disposition: Expected location: [facility]    Expected timeframe: Stable for discharge to skilled nursing facility at this time 2024      Case discussed with:  [x]Patient  []Family  [x]Nursing  [x]Case Management  DVT Prophylaxis:  []Lovenox  []Hep SQ  [x]SCDs  []Coumadin   []On Heparin gtt   [] DOAC    Objective:   Objective:  General Appearance:  Comfortable, in no acute distress, not in pain and ill-appearing.    Vital signs: (most recent): Blood pressure 133/74, pulse 79, temperature 98.6 °F (37 °C), temperature source Oral, resp. rate 18, height 1.803 m (5' 11\"), weight 81.2 kg (179 lb), SpO2 98 %.  Vital signs are normal.  No fever.    HEENT: Normal HEENT exam.    Lungs:  Normal effort and normal respiratory rate.  Breath sounds clear to auscultation.    Heart: Normal rate.  Regular rhythm.  S1 normal and S2 normal.  No murmur, gallop or friction rub.   Chest: Symmetric chest wall expansion.   Abdomen: Abdomen is soft and non-distended.  Bowel sounds are normal.   There is no abdominal tenderness.     Extremities: Normal 
[facility] Hospitalist Group  Progress Note    Patient: Nael Lucia II Age: 68 y.o. : 1955 MR#: [id number] SSN: [id number]  Date/Time: 2024    Subjective:   Subjective   No acute events overnight, no new concerns or complaints, vitals stable.  Patient able to have large bowel movements with significant straining today.    Review of Systems   Constitutional: Negative for fever.      Assessment/Plan:   Cirrhosis, decompensated  Hepatitis C  Multifocal hepatocellular carcinoma  Bilateral lower extremity edema  Moderate ascites  Elevated LFTs  Chronic opioid use on methadone  Thrombocytopenia  Previous heavy alcohol alcohol use  COPD without exacerbation  Anemia chronic disease  Stage II sacral ulcer  Hypersplenism    Plan  Had bowel movement, much relieved by this.  Significant straining with mild BRBPR secondary to this.  Otherwise fair, pending discharge to SNF    Disposition: Expected location: [facility]    Expected timeframe: Stable for discharge to skilled nursing facility at this time 2024      Case discussed with:  [x]Patient  []Family  [x]Nursing  [x]Case Management  DVT Prophylaxis:  []Lovenox  []Hep SQ  [x]SCDs  []Coumadin   []On Heparin gtt   [] DOAC    Objective:   Objective:  General Appearance:  Comfortable, in no acute distress, not in pain and ill-appearing.    Vital signs: (most recent): Blood pressure 119/71, pulse 72, temperature 98.4 °F (36.9 °C), temperature source Oral, resp. rate 18, height 1.803 m (5' 11\"), weight 81.2 kg (179 lb), SpO2 98 %.  Vital signs are normal.  No fever.    HEENT: Normal HEENT exam.    Lungs:  Normal effort and normal respiratory rate.  Breath sounds clear to auscultation.    Heart: Normal rate.  Regular rhythm.  S1 normal and S2 normal.  No murmur, gallop or friction rub.   Chest: Symmetric chest wall expansion.   Abdomen: Abdomen is soft and non-distended.  Bowel sounds are normal.   There is no abdominal tenderness.   
[facility] Hospitalist Group  Progress Note    Patient: Nael Lucia II Age: 68 y.o. : 1955 MR#: [id number] SSN: [id number]  Date/Time: 2024    Subjective:   Subjective   No acute events overnight, no new concerns or complaints, vitals stable.    Review of Systems   Constitutional: Negative for fever.      Assessment/Plan:   Cirrhosis, decompensated  Hepatitis C  Multifocal hepatocellular carcinoma  Bilateral lower extremity edema  Moderate ascites  Elevated LFTs  Chronic opioid use on methadone  Thrombocytopenia  Previous heavy alcohol alcohol use  COPD without exacerbation  Anemia chronic disease  Stage II sacral ulcer  Hypersplenism    Plan  GI and oncology are following.  Liver cancer overall unresectable given its many foci within the liver.  Likely if full treatment is opted, will need treatment of hepatitis C followed by treatment of HCC.  Paracentesis as appropriate along with other liver failure medications such as Aldactone and furosemide low-sodium diet.  Will need variceal screening, can be done outpatient.  Overall seems to be approaching discharge in the next few days however will be somewhat difficult discharge given patient essentially functionally homeless and also with significant decrease in his functional capacity.  Currently would not be a safe discharge to an unsupervised and unrestricted location such as long-term house or group home where he cannot be assisted in someway.  Will attempt to search for SNF however somewhat difficult to find one local.    Disposition: Expected location: To be determined    Expected timeframe: To be determined       Case discussed with:  [x]Patient  []Family  [x]Nursing  [x]Case Management  DVT Prophylaxis:  []Lovenox  []Hep SQ  [x]SCDs  []Coumadin   []On Heparin gtt   [] DOAC    Objective:   Objective:  General Appearance:  Comfortable, in no acute distress, not in pain and ill-appearing.    Vital signs: (most recent): Blood 
[facility] Hospitalist Group  Progress Note    Patient: Nael Lucia II Age: 68 y.o. : 1955 MR#: [id number] SSN: [id number]  Date/Time: 2024    Subjective:   Subjective   No acute events overnight, no new concerns or complaints, vitals stable.      Review of Systems   Constitutional: Negative for fever.      Assessment/Plan:   Cirrhosis, decompensated  Hepatitis C  Multifocal hepatocellular carcinoma  Bilateral lower extremity edema  Moderate ascites  Elevated LFTs  Chronic opioid use on methadone  Thrombocytopenia  Previous heavy alcohol alcohol use  COPD without exacerbation  Anemia chronic disease  Stage II sacral ulcer  Hypersplenism    Plan  Pending discharge to SNF    Disposition: Expected location: SNF    Expected timeframe: Stable for discharge to skilled nursing facility at this time 2024      Case discussed with:  [x]Patient  []Family  [x]Nursing  [x]Case Management  DVT Prophylaxis:  []Lovenox  []Hep SQ  [x]SCDs  []Coumadin   []On Heparin gtt   [] DOAC    Objective:   Objective:  General Appearance:  Comfortable, in no acute distress, not in pain and ill-appearing.    Vital signs: (most recent): Blood pressure 130/73, pulse 76, temperature 98.1 °F (36.7 °C), temperature source Oral, resp. rate 18, height 1.803 m (5' 11\"), weight 81.2 kg (179 lb), SpO2 98 %.  Vital signs are normal.  No fever.    HEENT: Normal HEENT exam.    Lungs:  Normal effort and normal respiratory rate.  Breath sounds clear to auscultation.    Heart: Normal rate.  Regular rhythm.  S1 normal and S2 normal.  No murmur, gallop or friction rub.   Chest: Symmetric chest wall expansion.   Abdomen: Abdomen is soft and non-distended.  Bowel sounds are normal.   There is no abdominal tenderness.     Extremities: Normal range of motion.    Pulses: Distal pulses are intact.    Neurological: Patient is alert and oriented to person, place and time.  Normal strength.    Pupils:  Pupils are equal, round, 
Spiritual Health Assessment/Progress Note  [facility]    Loneliness/Social Isolation, Palliative Care,  ,  ,      Name: Nael Lucia II MRN: [id number]    Age: 68 y.o.     Sex: male   Language: English   Adventism: None   Ascites of liver     Date: 7/26/2024            Total Time Calculated: 7 min              Spiritual Assessment continued in [facility]        Referral/Consult From: Palliative Care   Encounter Overview/Reason: Loneliness/Social Isolation, Palliative Care  Service Provided For: Patient    Breanna, Belief, Meaning:   Patient Other: none  Family/Friends No family/friends present      Importance and Influence:  Patient has no beliefs influential to healthcare decision-making identified during this visit  Family/Friends no family/friends present    Community:  Patient feels well-supported. Support system includes: Extended family  Family/Friends Other: none    Assessment and Plan of Care:     Patient Interventions include: Explored spiritual coping/struggle/distress and theological reflection  Family/Friends Interventions include: Other: none    Patient Plan of Care: No spiritual needs identified for follow-up  Family/Friends Plan of Care: No spiritual needs identified for follow-up    Electronically signed by KAIDEN Rosenthal on 7/26/2024 at 2:24 PM    
[facility] ANTIMICROBIAL STEWARDSHIP TEAM  PRESCRIBER COMMUNICATION FORM      We have briefly reviewed the antimicrobials  currently prescribed for your patient along with  the clinical indications and would like to Make the following recommendations:    DISCONTINUE ANTIBIOTICS   ceftriaxone      Rationale:    (  )  No evidence of bacterial infection    (  )  Microbiology report does not support use    (  )  Narrowing broad-spectrum empiric coverage    (  )  Therapeutic duplication: overlapping antimicrobial spectrum    (  )  Clinical situation dictates change    (x)  Prolonged duration of therapy not needed    (  )  Allergy/toxicity/drug interaction present    (  ) More clinically/cost effective therapy available  ADD ANTIBIOTICS  Rationale:        (  ) Microbiology report supports use    (  ) Expanded coverage needed: gm positive /negative / anaerobic /                               atypical    ( ) More clinicaly/cost effective therapy than current regimen    ( ) Needed for synergy    ( ) Double coverage needed    ( ) Less toxic therapy    ( ) Antifungal therapy needed  ADDITIONAL SUGGESTIONS    ( ) continue current therapy with the following change    ( ) additional laboratory testing    ( ) consider infectious disease consultation    ( ) consider outpatient IV therapy    ( ) other    COMMENTS:        This communication is not to be considered a consultation. You are under no obligation to follow these suggestions. A physician-patient relationship has not been established between the physician Completing this form and your patient. If a thorough analysis of the case is desired, please request an ID consultation.  
Unable to send for patient for MRI until safety screening form is completed  
 There is no abdominal tenderness.     Extremities: Normal range of motion.    Pulses: Distal pulses are intact.    Neurological: Patient is alert and oriented to person, place and time.  Normal strength.    Pupils:  Pupils are equal, round, and reactive to light.    Skin:  Warm and dry.         Labs:    Recent Results (from the past 24 hour(s))   Protime-INR    Collection Time: 07/23/24  3:53 AM   Result Value Ref Range    Protime 15.6 (H) 11.9 - 14.9 sec    INR 1.2 (H) 0.9 - 1.1     CBC with Auto Differential    Collection Time: 07/23/24  3:53 AM   Result Value Ref Range    WBC 4.5 (L) 4.6 - 13.2 K/uL    RBC 3.75 (L) 4.35 - 5.65 M/uL    Hemoglobin 10.6 (L) 13.0 - 16.0 g/dL    Hematocrit 33.3 (L) 36.0 - 48.0 %    MCV 88.8 78.0 - 100.0 FL    MCH 28.3 24.0 - 34.0 PG    MCHC 31.8 31.0 - 37.0 g/dL    RDW 14.7 (H) 11.6 - 14.5 %    Platelets 100 (L) 135 - 420 K/uL    MPV 11.3 9.2 - 11.8 FL    Nucleated RBCs 0.0 0  WBC    nRBC 0.00 0.00 - 0.01 K/uL    Neutrophils % 73 40 - 73 %    Lymphocytes % 13 (L) 21 - 52 %    Monocytes % 13 (H) 3 - 10 %    Eosinophils % 0 0 - 5 %    Basophils % 0 0 - 2 %    Immature Granulocytes % 0 0.0 - 0.5 %    Neutrophils Absolute 3.3 1.8 - 8.0 K/UL    Lymphocytes Absolute 0.6 (L) 0.9 - 3.6 K/UL    Monocytes Absolute 0.6 0.05 - 1.2 K/UL    Eosinophils Absolute 0.0 0.0 - 0.4 K/UL    Basophils Absolute 0.0 0.0 - 0.1 K/UL    Immature Granulocytes Absolute 0.0 0.00 - 0.04 K/UL    Differential Type AUTOMATED     Comprehensive Metabolic Panel    Collection Time: 07/23/24  3:53 AM   Result Value Ref Range    Sodium 131 (L) 136 - 145 mmol/L    Potassium 4.8 3.5 - 5.5 mmol/L    Chloride 97 (L) 100 - 111 mmol/L    CO2 29 21 - 32 mmol/L    Anion Gap 5 3.0 - 18 mmol/L    Glucose 99 74 - 99 mg/dL    BUN 22 (H) 7.0 - 18 MG/DL    Creatinine 1.21 0.6 - 1.3 MG/DL    BUN/Creatinine Ratio 18 12 - 20      Est, Glom Filt Rate 64 >60 ml/min/1.73m2    Calcium 9.0 8.5 - 10.1 MG/DL    Total Bilirubin 0.9 0.2 - 1.0 
22 - 1.4x1.3 cm lesion segment 4 LIRADS 5, 2.2x1.9 cm segment 6 LIRADS 5 potentially infiltrative with peripheral additional suspicious nodules *1.2 and 0.6 cm), cirrhosis and portal hypertension with trace ascites   -elevated AFP in past  --> MRI 2024: Multiple hepatic masses as described with highly suspicious features.   --> Oncology input appreciated, Dr. Anders.  16. Cardiomegaly  -     Nuclear cardiac PYP amyloid study planar; Future per Dr. Ludwin Segura's notes.   17.  Functional homelessness.  Resides in boarding house.  18.  Constipation.  Bowel regimen.  19.  Full code.  Palliative care input appreciated.  Per CM on IDT, pt has no accepting facilities for SNF,  placed patient referrals statewide.     Additional Notes:      Case discussed with:  [x]patient  []family  [x]nursing  []case management   [] discussed on IDT.   DVT Prophylaxis:  []Lovenox  []Hep SQ  []SCDs  []Coumadin   []On Heparin gtt   [] noac    Objective:   VS: /70   Pulse 61   Temp 98 °F (36.7 °C) (Oral)   Resp 16   Ht 1.803 m (5' 11\")   Wt 81.2 kg (179 lb)   SpO2 98%   BMI 24.97 kg/m²    Tmax/24hrs: Temp (24hrs), Av.1 °F (36.7 °C), Min:97.5 °F (36.4 °C), Max:98.7 °F (37.1 °C)    Input/Output:   Intake/Output Summary (Last 24 hours) at 2024 1123  Last data filed at 2024 0345  Gross per 24 hour   Intake --   Output 400 ml   Net -400 ml       General:  awake alert and oriented x 4. Chronically ill appearing.   HEENT: ncat. Perrl. Poor oral hygiene.   Cardiovascular:  RRR  Pulmonary:  decreased breath sounds at bases.   GI: Distended, + fluid wave.  Soft.  Diffuse tenderness to palpation; no rebound, no guarding, no rigidity.  Extremities:  b.l LE swelling.   Neuro: No focal deficit  Skin:  stage 2 0.5x0.2x0.1cm, on sacrum POA.  No rash to visible skin    Labs:    Recent Results (from the past 24 hour(s))   CBC with Auto Differential    Collection Time: 24  4:59 AM   Result Value Ref Range    WBC 2.5 
Thrombocytopenia (HCC)    Metatarsal fracture    Polysubstance abuse (HCC)    Alcohol abuse    HTN (hypertension)    Liver mass    Bladder mass    Hematuria    Orthopedic aftercare for healing traumatic lower leg fracture, right, closed    Impaired mobility and ADLs    Acute blood loss as cause of postoperative anemia    Methadone dependence (HCC)    Closed displaced comminuted fracture of shaft of right tibia with routine healing    Closed fracture of distal end of right fibula with routine healing    GAYATHRI (acute kidney injury) (HCC)    GERD (gastroesophageal reflux disease)    Positive D dimer    Noncompliance with medications    Multifocal pneumonia    Acute respiratory failure with hypoxia (HCC)    Acute respiratory failure with hypoxia and hypercarbia (HCC)    ILD (interstitial lung disease) (HCC)    Post-traumatic osteoarthritis of right knee    Post-traumatic osteoarthritis of left knee    Osteonecrosis of right hip (HCC)    Tobacco abuse    History of methadone use    Acquired deformity of right knee    Ascites of liver    Cirrhosis (HCC)    Goals of care, counseling/discussion    Encounter for palliative care    Hepatocellular carcinoma (HCC)    Debility    Alcohol use         /69   Pulse 69   Temp 98.1 °F (36.7 °C) (Oral)   Resp 17   Ht 1.803 m (5' 11\")   Wt 81.2 kg (179 lb)   SpO2 98%   BMI 24.97 kg/m²         Intake/Output Summary (Last 24 hours) at 7/21/2024 1221  Last data filed at 7/21/2024 0345  Gross per 24 hour   Intake --   Output 400 ml   Net -400 ml         CBC w/Diff    Lab Results   Component Value Date/Time    WBC 2.5 (L) 07/21/2024 04:59 AM    RBC 3.93 (L) 07/21/2024 04:59 AM    HGB 11.3 (L) 07/21/2024 04:59 AM    HCT 35.4 (L) 07/21/2024 04:59 AM    MCV 90.1 07/21/2024 04:59 AM    MCH 28.8 07/21/2024 04:59 AM    MCHC 31.9 07/21/2024 04:59 AM    RDW 14.5 07/21/2024 04:59 AM    PLT 93 (L) 07/21/2024 04:59 AM    MPV 11.2 07/21/2024 04:59 AM    No results found for: \"MONO\", \"BANDS\", 
liver tumor protocol  3. Oncology input appreciated  4. Counseled about drug use.  5. IR consult for paracentesis  6. Will need OP f/u for HCV treatment as well as EGD for varices screening.   7.  Low threshold for treatment for opioid/alcohol withdrawal    Chief Complaint: lower extremity swelling      Subjective:  abdominal pain and distention, nausea but no vomiting, no hematochezia    ROS: Denies any fevers, chills, rash.     Eyes: conjunctiva normal, EOM normal   Neck: ROM normal, supple and trachea normal   Cardiovascular: heart normal,  normal rate and regular rhythm   Pulmonary/Chest Wall: breath sounds normal and effort normal   Abdominal: Distended, tight, generalized tenderness     Patient Active Problem List   Diagnosis    Epidural abscess, L2-L5    Leukopenia    Iron deficiency anemia, unspecified    Anemia    Thrombocytopenia (HCC)    Metatarsal fracture    Polysubstance abuse (HCC)    Alcohol abuse    HTN (hypertension)    Liver mass    Bladder mass    Hematuria    Orthopedic aftercare for healing traumatic lower leg fracture, right, closed    Impaired mobility and ADLs    Acute blood loss as cause of postoperative anemia    Methadone dependence (HCC)    Closed displaced comminuted fracture of shaft of right tibia with routine healing    Closed fracture of distal end of right fibula with routine healing    GAYATHRI (acute kidney injury) (HCC)    GERD (gastroesophageal reflux disease)    Positive D dimer    Noncompliance with medications    Multifocal pneumonia    Acute respiratory failure with hypoxia (HCC)    Acute respiratory failure with hypoxia and hypercarbia (HCC)    ILD (interstitial lung disease) (HCC)    Post-traumatic osteoarthritis of right knee    Post-traumatic osteoarthritis of left knee    Osteonecrosis of right hip (HCC)    Tobacco abuse    History of methadone use    Acquired deformity of right knee    Ascites of liver    Cirrhosis (HCC)         /74   Pulse 70   Temp 98.1 °F (36.7 
   Sodium 137 136 - 145 mmol/L    Potassium 3.8 3.5 - 5.5 mmol/L    Chloride 99 (L) 100 - 111 mmol/L    CO2 32 21 - 32 mmol/L    Anion Gap 6 3.0 - 18 mmol/L    Glucose 98 74 - 99 mg/dL    BUN 28 (H) 7.0 - 18 MG/DL    Creatinine 1.54 (H) 0.6 - 1.3 MG/DL    BUN/Creatinine Ratio 18 12 - 20      Est, Glom Filt Rate 48 (L) >60 ml/min/1.73m2    Calcium 9.9 8.5 - 10.1 MG/DL    Total Bilirubin 1.0 0.2 - 1.0 MG/DL    ALT 68 (H) 16 - 61 U/L     (H) 10 - 38 U/L    Alk Phosphatase 258 (H) 45 - 117 U/L    Total Protein 8.3 (H) 6.4 - 8.2 g/dL    Albumin 3.1 (L) 3.4 - 5.0 g/dL    Globulin 5.2 (H) 2.0 - 4.0 g/dL    Albumin/Globulin Ratio 0.6 (L) 0.8 - 1.7     CBC with Auto Differential    Collection Time: 07/16/24  3:35 PM   Result Value Ref Range    WBC 4.3 (L) 4.6 - 13.2 K/uL    RBC 4.09 (L) 4.35 - 5.65 M/uL    Hemoglobin 11.8 (L) 13.0 - 16.0 g/dL    Hematocrit 36.9 36.0 - 48.0 %    MCV 90.2 78.0 - 100.0 FL    MCH 28.9 24.0 - 34.0 PG    MCHC 32.0 31.0 - 37.0 g/dL    RDW 14.6 (H) 11.6 - 14.5 %    Platelets 114 (L) 135 - 420 K/uL    MPV 10.8 9.2 - 11.8 FL    Nucleated RBCs 0.0 0  WBC    nRBC 0.00 0.00 - 0.01 K/uL    Neutrophils % 75 (H) 40 - 73 %    Lymphocytes % 14 (L) 21 - 52 %    Monocytes % 9 3 - 10 %    Eosinophils % 1 0 - 5 %    Basophils % 1 0 - 2 %    Immature Granulocytes % 1 (H) 0.0 - 0.5 %    Neutrophils Absolute 3.2 1.8 - 8.0 K/UL    Lymphocytes Absolute 0.6 (L) 0.9 - 3.6 K/UL    Monocytes Absolute 0.4 0.05 - 1.2 K/UL    Eosinophils Absolute 0.0 0.0 - 0.4 K/UL    Basophils Absolute 0.0 0.0 - 0.1 K/UL    Immature Granulocytes Absolute 0.0 0.00 - 0.04 K/UL    Differential Type AUTOMATED     Magnesium    Collection Time: 07/16/24  3:35 PM   Result Value Ref Range    Magnesium 2.5 1.6 - 2.6 mg/dL   Brain Natriuretic Peptide    Collection Time: 07/16/24  3:35 PM   Result Value Ref Range    NT Pro- (H) 0 - 900 PG/ML   Vascular duplex lower extremity venous bilateral    Collection Time: 07/16/24  4:19 PM 
aspirated the needle was removed leaving the catheter in  place. The catheter was connected to vacuum containers and2.8 Liters was  removed. Ultrasound images were saved for documentation.    The patient tolerated the procedure well.  Impression: 1. Successful paracentesis of2.8 Liters of ascites    2. Fluid sent for laboratory analysis    Electronically signed by ESTEFANIA Kessler    [facility]  Phone: [phone]  [facility] Cell: [phone] (Mon-Fri 1410-3529)   
ml/min/1.73m2    Calcium 9.0 8.5 - 10.1 MG/DL    Total Bilirubin 0.9 0.2 - 1.0 MG/DL     (H) 16 - 61 U/L     (H) 10 - 38 U/L    Alk Phosphatase 270 (H) 45 - 117 U/L    Total Protein 7.0 6.4 - 8.2 g/dL    Albumin 2.5 (L) 3.4 - 5.0 g/dL    Globulin 4.5 (H) 2.0 - 4.0 g/dL    Albumin/Globulin Ratio 0.6 (L) 0.8 - 1.7           Radiology    XR Results (most recent):   MRI ABDOMEN W WO CONTRAST  Narrative: EXAM:  MR Abdomen without-with Contrast.    CLINICAL INDICATION:  3.5 cm liver mass on CT.  Hx multifocal HCC.    COMPARISON:      - CT: 07/16/24  - Ultrasound: 04/14/23.  - MR: 04/25/22.    TECHNIQUE:  Multiphasic multisequence MR imaging of the abdomen is performed  before and after gadolinium-based IV contrast administration (17 cc MultiHance).   General MR abdomen protocol utilized.    FINDINGS:    Liver:      - Innumerable foci of arterial phase enhancement.  Of these, the following are  the most notable.  ---- Segment 4A region with diffusion-restricted arterial phase enhancing 45 x  34 mm lesion with early washout (image #105 series #901).  This appears to be a  new finding or distinctly much more conspicuous compared to 04/25/22.  LR5.  ---- Segment 2 far lateral left lobe 36 x 27 mm diffusion-restricted mass with  arterial-enhancement and early washout (image #83).  New compared to prior  study.  LR5.  ---- Segment 2 region anterior aspect 27 x 22 mm arterial phase-enhancing,  early-washout, diffusion-restricted lesion (image #75).  New compared to prior  study.  LR5.  ---- Along with the above 3 conspicuous lesions, there is an arterial phase  rim-enhancing 20 x 19 mm caudate lobe lesion with diffusion restriction and  early washout.  Not apparent on prior study.  Probable LR5  - Extensive areas of arterial phase enhancement is demonstrated in the right  lobe of liver, encompassing an area of about 150 x 110 mm (image #96).  Within  this area of arterial phase enhancement, a 64 x 50 mm segment 6

## 2024-07-30 NOTE — CARE COORDINATION
07/30/24 1103   IMM Letter   IMM Letter given to Patient/Family/Significant other/Guardian/POA/by: Gela Abarca, RN Case Management   IMM Letter date given: 07/30/24   IMM Letter time given: 4546     TAMAR Ken, RN   Case Management [phone]

## 2024-07-30 NOTE — WOUND CARE
4 Eyes Skin Assessment     NAME:  Nael Lucia II  YOB: 1955  MEDICAL RECORD NUMBER:  [id number]    The patient is being assessed for  Shift Handoff    I agree that at least one RN has performed a thorough Head to Toe Skin Assessment on the patient. ALL assessment sites listed below have been assessed.      Areas assessed by both nurses:    Head, Face, Ears, Shoulders, Back, Chest, Arms, Elbows, Hands, Sacrum. Buttock, Coccyx, Ischium, Legs. Feet and Heels, and Under Medical Devices         Does the Patient have a Wound? No noted wound(s)       Gareth Prevention initiated by RN: Yes  Wound Care Orders initiated by RN: No    Pressure Injury (Stage 3,4, Unstageable, DTI, NWPT, and Complex wounds) if present, place Wound referral order by RN under : No    New Ostomies, if present place, Ostomy referral order under : No     Nurse 1 eSignature: Electronically signed by FADI SCHAFFER RN on 7/30/24 at 7:33 AM EDT    **SHARE this note so that the co-signing nurse can place an eSignature**    Nurse 2 eSignature: {Esignature:[id number]}

## 2024-07-30 NOTE — CARE COORDINATION
Transition of Care Plan to SNF/Rehab      Patient Name: Nael Lucia II                   YOB: 1955    SNF/Rehab Transition:  Patient has been accepted to [facility] SN[facility]hab and meets criteria for admission. Confirmed with [facility] that bed is available for today.   Patient will transported by [facility] and expected to leave at 11:00am  Met with patient and he is agreeable to the transition plan.    Medicaid Long-term Services and Supports(LTSS) screening completion: Yes    Three Inpatient Midnights for Medicare: Yes    Current Code Status:   Code Status: DNR     Last Weight:   Wt Readings from Last 1 Encounters:   07/16/24 81.2 kg (179 lb)       Weightbearing Status:     IV Medication, IV Site, Device Type: No    Dialysis: No      O2 Needs (including O2, Bipap, Cpap, ect.): No    Covid Vaccine Dates/ if known:    Internal Administration   First Dose      Second Dose           Last COVID Lab SARS-CoV-2 ( )   Date Value   02/10/2023 Not detected     SARS-CoV-2, PCR ( )   Date Value   02/11/2023 Not detected     SARS-CoV-2, Rapid ( )   Date Value   02/16/2023 Not detected            Last COVID Lab SARS-CoV-2 ( )   Date Value   02/10/2023 Not detected     SARS-CoV-2, PCR ( )   Date Value   02/11/2023 Not detected     SARS-CoV-2, Rapid ( )   Date Value   02/16/2023 Not detected              Current Diet: ADULT ORAL NUTRITION SUPPLEMENT; Lunch; Standard High Calorie/High Protein Oral Supplement  ADULT DIET; Regular; No Added Salt (3-4 gm)    Wound Vac or Other Equipment Needs: No    Patient requires Isolation: No    Follow-up Appointment needed or scheduled:     Communication to SNF/Rehab:  Bedside RN, Collesa, has been notified to update the transition plan to the facility and call report (phone number).  Discharge information has been updated on the AVS and communicated to facility via [facility].    Nursing Please include all hard scripts for

## 2024-08-02 LAB
BACTERIA SPEC CULT: NORMAL
GRAM STN SPEC: NORMAL
SERVICE CMNT-IMP: NORMAL

## 2025-03-14 NOTE — PLAN OF CARE
Problem: Pain  Goal: Verbalizes/displays adequate comfort level or baseline comfort level  Outcome: Progressing     Problem: Safety - Adult  Goal: Free from fall injury  Outcome: Progressing     Problem: ABCDS Injury Assessment  Goal: Absence of physical injury  Outcome: Progressing     Problem: Discharge Planning  Goal: Discharge to home or other facility with appropriate resources  Outcome: Progressing no

## (undated) DEVICE — 3.2MM GUIDE WIRE 400MM

## (undated) DEVICE — SINGLE USE SUCTION VALVE MAJ-209: Brand: SINGLE USE SUCTION VALVE (STERILE)

## (undated) DEVICE — LIGHT HANDLE: Brand: DEVON

## (undated) DEVICE — CATHETER SUCT TR FL TIP 14FR W/ O CTRL

## (undated) DEVICE — 1860 HEALTH CARE N95 MASK, 20EACH/BOX  6 BX/C: Brand: 3M™

## (undated) DEVICE — OBTRTR BLDELSS 8MM DISP -- DA VINCI - SNGL USE

## (undated) DEVICE — MEDI-VAC NON-CONDUCTIVE SUCTION TUBING: Brand: CARDINAL HEALTH

## (undated) DEVICE — KENDALL SCD EXPRESS SLEEVES, KNEE LENGTH, MEDIUM: Brand: KENDALL SCD

## (undated) DEVICE — REM POLYHESIVE ADULT PATIENT RETURN ELECTRODE: Brand: VALLEYLAB

## (undated) DEVICE — SOL ANTI-FOG 6ML MEDC -- MEDICHOICE - CONVERT TO 358427

## (undated) DEVICE — SUTURE MCRYL SZ 4-0 L18IN ABSRB UD L19MM PS-2 3/8 CIR PRIM Y496G

## (undated) DEVICE — NEEDLE ASPIR TRNSBRONCH 1.8 MM 21 GAX15 MM 130 CM EXCELON

## (undated) DEVICE — 450 ML BOTTLE OF 0.05% CHLORHEXIDINE GLUCONATE IN 99.95% STERILE WATER FOR IRRIGATION, USP AND APPLICATOR.: Brand: IRRISEPT ANTIMICROBIAL WOUND LAVAGE

## (undated) DEVICE — SET ADMIN L104IN 20 GTT GRAV RLER CLMP SMRT SITE NDL FREE

## (undated) DEVICE — SOL IRR NACL 0.9% 500ML POUR --

## (undated) DEVICE — 3M™ TEGADERM™ TRANSPARENT FILM DRESSING FRAME STYLE, 1626W, 4 IN X 4-3/4 IN (10 CM X 12 CM), 50/CT 4CT/CASE: Brand: 3M™ TEGADERM™

## (undated) DEVICE — SUTURE VLOC 90 2/0 VL 6 GS-22 VLOCM2105

## (undated) DEVICE — CONTAINER FORMALIN PREFILLED 10% NBF 40ML

## (undated) DEVICE — Device

## (undated) DEVICE — YANKAUER,SMOOTH HANDLE,HIGH CAPACITY: Brand: MEDLINE INDUSTRIES, INC.

## (undated) DEVICE — PADDING,UNDERCAST,COTTON, 4"X4YD STERILE: Brand: MEDLINE

## (undated) DEVICE — SINGLE USE BIOPSY VALVE MAJ-210: Brand: SINGLE USE BIOPSY VALVE (STERILE)

## (undated) DEVICE — ICE BAG INSERTS

## (undated) DEVICE — BASIN EMSIS 16OZ GRAPHITE PLAS KID SHP MOLD GRAD FOR ORAL

## (undated) DEVICE — DRAPE SURG EQUIP W105XH13XL20IN 3 ARM DISPOSABLE DA VINCI S

## (undated) DEVICE — NEEDLE ASPIR INNR 21GA OUTER 19GA L3X15MM TRNSBRONCH

## (undated) DEVICE — LINER SUCT CANSTR 3000CC PLAS SFT PRE ASSEMB W/OUT TBNG W/

## (undated) DEVICE — BIT DRL L330MM DIA4.2MM CALIB 100MM 3 FLUT QUIK CPL

## (undated) DEVICE — MASK SURG REG ORNG LEV 3 SFTY SEAL 4 LAYR SFT INNR LINING

## (undated) DEVICE — BRUSH CYTO L140CM DIA1.5MM WRK CHN 2MM BRIST SHTH RNG HNDL

## (undated) DEVICE — DRAPE TWL SURG 16X26IN BLU ORB04] ALLCARE INC]

## (undated) DEVICE — STOCKING ANTIEMB KNEE MED REG --

## (undated) DEVICE — ANTI-FOG SOLUTION WITH FOAM PAD: Brand: DEVON

## (undated) DEVICE — INTENDED FOR TISSUE SEPARATION, AND OTHER PROCEDURES THAT REQUIRE A SHARP SURGICAL BLADE TO PUNCTURE OR CUT.: Brand: BARD-PARKER SAFETY BLADES SIZE 10, STERILE

## (undated) DEVICE — STERILE POLYISOPRENE POWDER-FREE SURGICAL GLOVES: Brand: PROTEXIS

## (undated) DEVICE — PACK PROCEDURE SURG TOT HIP BSHR LF

## (undated) DEVICE — BLADE, TONGUE, 6", STERILE: Brand: MEDLINE

## (undated) DEVICE — FORCEPS BX L100CM DIA1.8MM WRK CHN 2MM PULM S STL RAD JAW 4

## (undated) DEVICE — BE 105-8 BRONCHOSCOPE SWIVEL - 15MM ID/22MM OD (PATIENT PORT) X15MM OD (EQUIPMENT PORT). REUSABLE.  FITS COMPONENTS OF ADULT VENTILATOR CIRCUITS.  MOLDED OF POLYETHERIMIDE. INCLUDES TWO SILICONE RUBBER CAPS; ONE CAP ALLOWS FOR THE USE OF A SUCTIONING CATHETER WHILE THE OTHER CAP ALLOWS FOR THE USE OF A FIBER-OPTIC BRONCHOSCOPE WITHOUT SIGNIFICANT LOSS OF PEEP.: Brand: BE 105-8 BRONCHOSCOPE SWIVEL

## (undated) DEVICE — 1860S HEALTH CARE RESPIRATOR N95 120EA/C: Brand: 3M™

## (undated) DEVICE — INTENDED FOR TISSUE SEPARATION, AND OTHER PROCEDURES THAT REQUIRE A SHARP SURGICAL BLADE TO PUNCTURE OR CUT.: Brand: BARD-PARKER ® CARBON RIB-BACK BLADES

## (undated) DEVICE — SHEET,DRAPE,70X100,STERILE: Brand: MEDLINE

## (undated) DEVICE — CANNULA NSL AD TBNG L14FT STD PVC O2 CRV CONN NONFLARED NSL

## (undated) DEVICE — ROD RMR L950MM DIA2.5MM W/ EXTN BALL TIP

## (undated) DEVICE — SUTURE MCRYL SZ 2-0 L36IN ABSRB UD L36MM CT-1 1/2 CIR Y945H

## (undated) DEVICE — SEAL CANN F/12MM 8.5-13MM DISP -- DA VINCI

## (undated) DEVICE — APPLICATOR FBR TIP L6IN COT TIP WOOD SHFT SWAB 2000 PER CA

## (undated) DEVICE — GOWN ,SIRUS ,NONREINFORCED 4XL: Brand: MEDLINE

## (undated) DEVICE — NEBULIZER,KIT,T-MOUTHPIECE,6"RESER,7'TUB: Brand: MEDLINE

## (undated) DEVICE — SLEEVE COMPR STD 12 IN FOR 165IN CALF COMFORT VENODYNE SYS

## (undated) DEVICE — INSTRMT SET WND CLSR SUT PASS --

## (undated) DEVICE — KIT CLN UP BON SECOURS MARYV

## (undated) DEVICE — DERMABOND SKIN ADH 0.7ML -- DERMABOND ADVANCED 12/BX

## (undated) DEVICE — GOWN PLASTIC FILM THMBHKS UNIV BLUE: Brand: CARDINAL HEALTH

## (undated) DEVICE — TIP COVER ACCESSORY

## (undated) DEVICE — SUTURE VCRL SZ 2-0 L27IN ABSRB UD L26MM SH 1/2 CIR J417H

## (undated) DEVICE — BLANKET WRM AD PREM MISTRAL-AIR

## (undated) DEVICE — 4-PORT MANIFOLD: Brand: NEPTUNE 2

## (undated) DEVICE — SOLUTION IV 1000ML 0.9% SOD CHL

## (undated) DEVICE — Z DISCONTINUED BY MEDLINE USE 2711682 TRAY SKIN PREP DRY W/ PREM GLV

## (undated) DEVICE — GLOVE ORTHO 7 1/2   MSG9475

## (undated) DEVICE — BITE BLOCK ENDOSCP UNIV AD 6 TO 9.4 MM

## (undated) DEVICE — PREP SKN CHLRAPRP APL 26ML STR --

## (undated) DEVICE — SYRINGE MED 10ML SLIP TIP BLNT FILL AND LUERLOCK DISP